# Patient Record
Sex: MALE | Race: WHITE | Employment: FULL TIME | URBAN - METROPOLITAN AREA
[De-identification: names, ages, dates, MRNs, and addresses within clinical notes are randomized per-mention and may not be internally consistent; named-entity substitution may affect disease eponyms.]

---

## 2021-07-16 PROCEDURE — 96375 TX/PRO/DX INJ NEW DRUG ADDON: CPT

## 2021-07-16 PROCEDURE — 96374 THER/PROPH/DIAG INJ IV PUSH: CPT

## 2021-07-16 PROCEDURE — 99282 EMERGENCY DEPT VISIT SF MDM: CPT

## 2021-07-16 RX ORDER — HYDROCODONE BITARTRATE AND ACETAMINOPHEN 5; 325 MG/1; MG/1
1 TABLET ORAL EVERY 6 HOURS PRN
Status: ON HOLD | COMMUNITY
End: 2021-08-02 | Stop reason: HOSPADM

## 2021-07-16 RX ORDER — DICYCLOMINE HCL 20 MG
20 TABLET ORAL EVERY 6 HOURS
Status: ON HOLD | COMMUNITY
End: 2021-08-02 | Stop reason: HOSPADM

## 2021-07-16 RX ORDER — HYDROCHLOROTHIAZIDE 25 MG/1
25 TABLET ORAL DAILY
Status: ON HOLD | COMMUNITY
End: 2021-08-02 | Stop reason: HOSPADM

## 2021-07-16 RX ORDER — CARVEDILOL 25 MG/1
25 TABLET ORAL 2 TIMES DAILY WITH MEALS
Status: ON HOLD | COMMUNITY
End: 2021-08-02 | Stop reason: HOSPADM

## 2021-07-16 RX ORDER — AMLODIPINE BESYLATE 5 MG/1
5 TABLET ORAL DAILY
Status: ON HOLD | COMMUNITY
End: 2021-08-02 | Stop reason: HOSPADM

## 2021-07-16 RX ORDER — POTASSIUM CHLORIDE 750 MG/1
20 CAPSULE, EXTENDED RELEASE ORAL 2 TIMES DAILY
Status: ON HOLD | COMMUNITY
End: 2021-08-02 | Stop reason: HOSPADM

## 2021-07-16 RX ORDER — ATORVASTATIN CALCIUM 80 MG/1
80 TABLET, FILM COATED ORAL DAILY
Status: ON HOLD | COMMUNITY
End: 2021-08-02 | Stop reason: HOSPADM

## 2021-07-16 ASSESSMENT — PAIN SCALES - GENERAL: PAINLEVEL_OUTOF10: 8

## 2021-07-16 ASSESSMENT — PAIN DESCRIPTION - LOCATION: LOCATION: ABDOMEN

## 2021-07-17 ENCOUNTER — HOSPITAL ENCOUNTER (INPATIENT)
Age: 41
LOS: 16 days | Discharge: HOME HEALTH | DRG: 871 | End: 2021-08-02
Attending: EMERGENCY MEDICINE
Payer: COMMERCIAL

## 2021-07-17 ENCOUNTER — APPOINTMENT (OUTPATIENT)
Dept: ULTRASOUND IMAGING | Age: 41
DRG: 871 | End: 2021-07-17
Payer: COMMERCIAL

## 2021-07-17 ENCOUNTER — APPOINTMENT (OUTPATIENT)
Dept: CT IMAGING | Age: 41
DRG: 871 | End: 2021-07-17
Payer: COMMERCIAL

## 2021-07-17 ENCOUNTER — APPOINTMENT (OUTPATIENT)
Dept: GENERAL RADIOLOGY | Age: 41
DRG: 871 | End: 2021-07-17
Payer: COMMERCIAL

## 2021-07-17 DIAGNOSIS — R19.7 DIARRHEA OF PRESUMED INFECTIOUS ORIGIN: Primary | ICD-10-CM

## 2021-07-17 DIAGNOSIS — R78.81 MSSA BACTEREMIA: ICD-10-CM

## 2021-07-17 DIAGNOSIS — E87.6 HYPOKALEMIA: ICD-10-CM

## 2021-07-17 DIAGNOSIS — I33.0 SUBACUTE BACTERIAL ENDOCARDITIS: ICD-10-CM

## 2021-07-17 DIAGNOSIS — E87.1 HYPONATREMIA: ICD-10-CM

## 2021-07-17 DIAGNOSIS — I34.0 SEVERE MITRAL REGURGITATION: ICD-10-CM

## 2021-07-17 DIAGNOSIS — B95.61 MSSA BACTEREMIA: ICD-10-CM

## 2021-07-17 PROBLEM — R04.0 BLEEDING NOSE: Status: ACTIVE | Noted: 2021-07-17

## 2021-07-17 PROBLEM — R73.9 HYPERGLYCEMIA: Status: ACTIVE | Noted: 2021-07-17

## 2021-07-17 PROBLEM — R65.21 SEPSIS WITH ACUTE ORGAN DYSFUNCTION AND SEPTIC SHOCK (HCC): Status: ACTIVE | Noted: 2021-07-17

## 2021-07-17 PROBLEM — I10 ESSENTIAL HYPERTENSION: Status: ACTIVE | Noted: 2021-07-17

## 2021-07-17 PROBLEM — E86.0 ACUTE DEHYDRATION: Status: ACTIVE | Noted: 2021-07-17

## 2021-07-17 PROBLEM — E83.42 HYPOMAGNESEMIA: Status: ACTIVE | Noted: 2021-07-17

## 2021-07-17 PROBLEM — D69.6 THROMBOCYTOPENIA (HCC): Status: ACTIVE | Noted: 2021-07-17

## 2021-07-17 PROBLEM — A41.9 SEPSIS WITH ACUTE ORGAN DYSFUNCTION AND SEPTIC SHOCK (HCC): Status: ACTIVE | Noted: 2021-07-17

## 2021-07-17 LAB
-: ABNORMAL
ABSOLUTE EOS #: 0 K/UL (ref 0–0.4)
ABSOLUTE EOS #: 0 K/UL (ref 0–0.4)
ABSOLUTE IMMATURE GRANULOCYTE: 0 K/UL (ref 0–0.3)
ABSOLUTE IMMATURE GRANULOCYTE: 0 K/UL (ref 0–0.3)
ABSOLUTE LYMPH #: 0.55 K/UL (ref 1–4.8)
ABSOLUTE LYMPH #: 0.83 K/UL (ref 1–4.8)
ABSOLUTE MONO #: 0.55 K/UL (ref 0.1–0.8)
ABSOLUTE MONO #: 0.9 K/UL (ref 0.1–0.8)
ABSOLUTE RETIC #: 0.04 M/UL (ref 0.03–0.08)
ADENOVIRUS PCR: NOT DETECTED
ALBUMIN SERPL-MCNC: 3.2 G/DL (ref 3.5–5.2)
ALBUMIN/GLOBULIN RATIO: 1 (ref 1–2.5)
ALP BLD-CCNC: 59 U/L (ref 40–129)
ALT SERPL-CCNC: 51 U/L (ref 5–41)
AMORPHOUS: ABNORMAL
ANION GAP SERPL CALCULATED.3IONS-SCNC: 13 MMOL/L (ref 9–17)
ANION GAP SERPL CALCULATED.3IONS-SCNC: 13 MMOL/L (ref 9–17)
AST SERPL-CCNC: 67 U/L
BACTERIA: ABNORMAL
BASOPHILS # BLD: 0 % (ref 0–2)
BASOPHILS # BLD: 0 % (ref 0–2)
BASOPHILS ABSOLUTE: 0 K/UL (ref 0–0.2)
BASOPHILS ABSOLUTE: 0 K/UL (ref 0–0.2)
BILIRUB SERPL-MCNC: 0.65 MG/DL (ref 0.3–1.2)
BILIRUBIN DIRECT: 0.21 MG/DL
BILIRUBIN URINE: NEGATIVE
BILIRUBIN, INDIRECT: 0.44 MG/DL (ref 0–1)
BORDETELLA PARAPERTUSSIS: NOT DETECTED
BORDETELLA PERTUSSIS PCR: NOT DETECTED
BUN BLDV-MCNC: 10 MG/DL (ref 6–20)
BUN BLDV-MCNC: 11 MG/DL (ref 6–20)
BUN/CREAT BLD: ABNORMAL (ref 9–20)
BUN/CREAT BLD: ABNORMAL (ref 9–20)
C-REACTIVE PROTEIN: 260.5 MG/L (ref 0–5)
CALCIUM SERPL-MCNC: 8 MG/DL (ref 8.6–10.4)
CALCIUM SERPL-MCNC: 8.3 MG/DL (ref 8.6–10.4)
CASTS UA: ABNORMAL /LPF (ref 0–8)
CHLAMYDIA PNEUMONIAE BY PCR: NOT DETECTED
CHLORIDE BLD-SCNC: 93 MMOL/L (ref 98–107)
CHLORIDE BLD-SCNC: 98 MMOL/L (ref 98–107)
CO2: 18 MMOL/L (ref 20–31)
CO2: 21 MMOL/L (ref 20–31)
COLOR: YELLOW
CORONAVIRUS 229E PCR: NOT DETECTED
CORONAVIRUS HKU1 PCR: NOT DETECTED
CORONAVIRUS NL63 PCR: NOT DETECTED
CORONAVIRUS OC43 PCR: NOT DETECTED
CREAT SERPL-MCNC: 0.93 MG/DL (ref 0.7–1.2)
CREAT SERPL-MCNC: 1.19 MG/DL (ref 0.7–1.2)
CRYSTALS, UA: ABNORMAL /HPF
DATE, STOOL #1: ABNORMAL
DATE, STOOL #2: ABNORMAL
DATE, STOOL #3: ABNORMAL
DIFFERENTIAL TYPE: ABNORMAL
DIFFERENTIAL TYPE: ABNORMAL
EOSINOPHILS RELATIVE PERCENT: 0 % (ref 1–4)
EOSINOPHILS RELATIVE PERCENT: 0 % (ref 1–4)
EPITHELIAL CELLS UA: ABNORMAL /HPF (ref 0–5)
FOLATE: >20 NG/ML
GFR AFRICAN AMERICAN: >60 ML/MIN
GFR AFRICAN AMERICAN: >60 ML/MIN
GFR NON-AFRICAN AMERICAN: >60 ML/MIN
GFR NON-AFRICAN AMERICAN: >60 ML/MIN
GFR SERPL CREATININE-BSD FRML MDRD: ABNORMAL ML/MIN/{1.73_M2}
GLOBULIN: ABNORMAL G/DL (ref 1.5–3.8)
GLUCOSE BLD-MCNC: 147 MG/DL (ref 70–99)
GLUCOSE BLD-MCNC: 151 MG/DL (ref 70–99)
GLUCOSE URINE: NEGATIVE
HAV IGM SER IA-ACNC: NONREACTIVE
HCT VFR BLD CALC: 36.3 % (ref 40.7–50.3)
HCT VFR BLD CALC: 38.2 % (ref 40.7–50.3)
HEMOCCULT SP1 STL QL: POSITIVE
HEMOCCULT SP2 STL QL: ABNORMAL
HEMOCCULT SP3 STL QL: ABNORMAL
HEMOGLOBIN: 12.2 G/DL (ref 13–17)
HEMOGLOBIN: 13.3 G/DL (ref 13–17)
HEPATITIS B CORE IGM ANTIBODY: NONREACTIVE
HEPATITIS B SURFACE ANTIGEN: NONREACTIVE
HEPATITIS C ANTIBODY: NONREACTIVE
HUMAN METAPNEUMOVIRUS PCR: NOT DETECTED
IMMATURE GRANULOCYTES: 0 %
IMMATURE GRANULOCYTES: 0 %
IMMATURE RETIC FRACT: 5.7 % (ref 2.7–18.3)
INFLUENZA A BY PCR: NOT DETECTED
INFLUENZA A H1 (2009) PCR: NORMAL
INFLUENZA A H1 PCR: NORMAL
INFLUENZA A H3 PCR: NORMAL
INFLUENZA B BY PCR: NOT DETECTED
KETONES, URINE: NEGATIVE
LEUKOCYTE ESTERASE, URINE: NEGATIVE
LIPASE: 32 U/L (ref 13–60)
LYMPHOCYTES # BLD: 8 % (ref 24–44)
LYMPHOCYTES # BLD: 9 % (ref 24–44)
MAGNESIUM: 1.5 MG/DL (ref 1.6–2.6)
MAGNESIUM: 2.6 MG/DL (ref 1.6–2.6)
MCH RBC QN AUTO: 28.7 PG (ref 25.2–33.5)
MCH RBC QN AUTO: 29 PG (ref 25.2–33.5)
MCHC RBC AUTO-ENTMCNC: 33.6 G/DL (ref 28.4–34.8)
MCHC RBC AUTO-ENTMCNC: 34.8 G/DL (ref 28.4–34.8)
MCV RBC AUTO: 82.5 FL (ref 82.6–102.9)
MCV RBC AUTO: 86.2 FL (ref 82.6–102.9)
MONOCYTES # BLD: 13 % (ref 1–7)
MONOCYTES # BLD: 6 % (ref 1–7)
MORPHOLOGY: ABNORMAL
MORPHOLOGY: ABNORMAL
MORPHOLOGY: NORMAL
MUCUS: ABNORMAL
MYCOPLASMA PNEUMONIAE PCR: NOT DETECTED
NITRITE, URINE: NEGATIVE
NRBC AUTOMATED: 0 PER 100 WBC
NRBC AUTOMATED: 0 PER 100 WBC
OTHER OBSERVATIONS UA: ABNORMAL
PARAINFLUENZA 1 PCR: NOT DETECTED
PARAINFLUENZA 2 PCR: NOT DETECTED
PARAINFLUENZA 3 PCR: NOT DETECTED
PARAINFLUENZA 4 PCR: NOT DETECTED
PDW BLD-RTO: 12.1 % (ref 11.8–14.4)
PDW BLD-RTO: 12.6 % (ref 11.8–14.4)
PH UA: 6 (ref 5–8)
PLATELET # BLD: ABNORMAL K/UL (ref 138–453)
PLATELET # BLD: ABNORMAL K/UL (ref 138–453)
PLATELET ESTIMATE: ABNORMAL
PLATELET ESTIMATE: ABNORMAL
PLATELET, FLUORESCENCE: 37 K/UL (ref 138–453)
PLATELET, FLUORESCENCE: 45 K/UL (ref 138–453)
PLATELET, IMMATURE FRACTION: 7.2 % (ref 1.1–10.3)
PLATELET, IMMATURE FRACTION: 8 % (ref 1.1–10.3)
PMV BLD AUTO: ABNORMAL FL (ref 8.1–13.5)
PMV BLD AUTO: ABNORMAL FL (ref 8.1–13.5)
POTASSIUM SERPL-SCNC: 2.9 MMOL/L (ref 3.7–5.3)
POTASSIUM SERPL-SCNC: 3.7 MMOL/L (ref 3.7–5.3)
POTASSIUM SERPL-SCNC: 3.7 MMOL/L (ref 3.7–5.3)
PROTEIN UA: ABNORMAL
RBC # BLD: 4.21 M/UL (ref 4.21–5.77)
RBC # BLD: 4.63 M/UL (ref 4.21–5.77)
RBC # BLD: ABNORMAL 10*6/UL
RBC # BLD: ABNORMAL 10*6/UL
RBC UA: ABNORMAL /HPF (ref 0–4)
RENAL EPITHELIAL, UA: ABNORMAL /HPF
RESP SYNCYTIAL VIRUS PCR: NOT DETECTED
RETIC %: 0.8 % (ref 0.5–1.9)
RETIC HEMOGLOBIN: 27.1 PG (ref 28.2–35.7)
RHINO/ENTEROVIRUS PCR: NOT DETECTED
SARS-COV-2, PCR: NOT DETECTED
SARS-COV-2, RAPID: NOT DETECTED
SEDIMENTATION RATE, ERYTHROCYTE: 31 MM (ref 0–15)
SEG NEUTROPHILS: 79 % (ref 36–66)
SEG NEUTROPHILS: 85 % (ref 36–66)
SEGMENTED NEUTROPHILS ABSOLUTE COUNT: 5.45 K/UL (ref 1.8–7.7)
SEGMENTED NEUTROPHILS ABSOLUTE COUNT: 7.82 K/UL (ref 1.8–7.7)
SODIUM BLD-SCNC: 127 MMOL/L (ref 135–144)
SODIUM BLD-SCNC: 129 MMOL/L (ref 135–144)
SPECIFIC GRAVITY UA: 1.01 (ref 1–1.03)
SPECIMEN DESCRIPTION: NORMAL
SPECIMEN DESCRIPTION: NORMAL
TIME, STOOL #1: ABNORMAL
TIME, STOOL #2: ABNORMAL
TIME, STOOL #3: ABNORMAL
TOTAL PROTEIN: 6.5 G/DL (ref 6.4–8.3)
TRICHOMONAS: ABNORMAL
TURBIDITY: CLEAR
URINE HGB: ABNORMAL
UROBILINOGEN, URINE: NORMAL
VITAMIN B-12: 1120 PG/ML (ref 232–1245)
WBC # BLD: 6.9 K/UL (ref 3.5–11.3)
WBC # BLD: 9.2 K/UL (ref 3.5–11.3)
WBC # BLD: ABNORMAL 10*3/UL
WBC # BLD: ABNORMAL 10*3/UL
WBC UA: ABNORMAL /HPF (ref 0–5)
YEAST: ABNORMAL

## 2021-07-17 PROCEDURE — 82607 VITAMIN B-12: CPT

## 2021-07-17 PROCEDURE — 2060000000 HC ICU INTERMEDIATE R&B

## 2021-07-17 PROCEDURE — 6370000000 HC RX 637 (ALT 250 FOR IP): Performed by: STUDENT IN AN ORGANIZED HEALTH CARE EDUCATION/TRAINING PROGRAM

## 2021-07-17 PROCEDURE — 6360000002 HC RX W HCPCS: Performed by: NURSE PRACTITIONER

## 2021-07-17 PROCEDURE — 87506 IADNA-DNA/RNA PROBE TQ 6-11: CPT

## 2021-07-17 PROCEDURE — 99222 1ST HOSP IP/OBS MODERATE 55: CPT | Performed by: STUDENT IN AN ORGANIZED HEALTH CARE EDUCATION/TRAINING PROGRAM

## 2021-07-17 PROCEDURE — 6370000000 HC RX 637 (ALT 250 FOR IP): Performed by: NURSE PRACTITIONER

## 2021-07-17 PROCEDURE — 36415 COLL VENOUS BLD VENIPUNCTURE: CPT

## 2021-07-17 PROCEDURE — 2580000003 HC RX 258: Performed by: STUDENT IN AN ORGANIZED HEALTH CARE EDUCATION/TRAINING PROGRAM

## 2021-07-17 PROCEDURE — APPSS60 APP SPLIT SHARED TIME 46-60 MINUTES: Performed by: NURSE PRACTITIONER

## 2021-07-17 PROCEDURE — 94760 N-INVAS EAR/PLS OXIMETRY 1: CPT

## 2021-07-17 PROCEDURE — 85055 RETICULATED PLATELET ASSAY: CPT

## 2021-07-17 PROCEDURE — 87147 CULTURE TYPE IMMUNOLOGIC: CPT

## 2021-07-17 PROCEDURE — 0202U NFCT DS 22 TRGT SARS-COV-2: CPT

## 2021-07-17 PROCEDURE — 2580000003 HC RX 258: Performed by: NURSE PRACTITIONER

## 2021-07-17 PROCEDURE — 83036 HEMOGLOBIN GLYCOSYLATED A1C: CPT

## 2021-07-17 PROCEDURE — 80074 ACUTE HEPATITIS PANEL: CPT

## 2021-07-17 PROCEDURE — 87205 SMEAR GRAM STAIN: CPT

## 2021-07-17 PROCEDURE — 87150 DNA/RNA AMPLIFIED PROBE: CPT

## 2021-07-17 PROCEDURE — 86403 PARTICLE AGGLUT ANTBDY SCRN: CPT

## 2021-07-17 PROCEDURE — 2500000003 HC RX 250 WO HCPCS: Performed by: NURSE PRACTITIONER

## 2021-07-17 PROCEDURE — 85045 AUTOMATED RETICULOCYTE COUNT: CPT

## 2021-07-17 PROCEDURE — 74176 CT ABD & PELVIS W/O CONTRAST: CPT

## 2021-07-17 PROCEDURE — 71045 X-RAY EXAM CHEST 1 VIEW: CPT

## 2021-07-17 PROCEDURE — 83690 ASSAY OF LIPASE: CPT

## 2021-07-17 PROCEDURE — 6360000002 HC RX W HCPCS: Performed by: STUDENT IN AN ORGANIZED HEALTH CARE EDUCATION/TRAINING PROGRAM

## 2021-07-17 PROCEDURE — 81001 URINALYSIS AUTO W/SCOPE: CPT

## 2021-07-17 PROCEDURE — 80048 BASIC METABOLIC PNL TOTAL CA: CPT

## 2021-07-17 PROCEDURE — 86140 C-REACTIVE PROTEIN: CPT

## 2021-07-17 PROCEDURE — 87635 SARS-COV-2 COVID-19 AMP PRB: CPT

## 2021-07-17 PROCEDURE — 85652 RBC SED RATE AUTOMATED: CPT

## 2021-07-17 PROCEDURE — 85025 COMPLETE CBC W/AUTO DIFF WBC: CPT

## 2021-07-17 PROCEDURE — 87040 BLOOD CULTURE FOR BACTERIA: CPT

## 2021-07-17 PROCEDURE — 82746 ASSAY OF FOLIC ACID SERUM: CPT

## 2021-07-17 PROCEDURE — 80076 HEPATIC FUNCTION PANEL: CPT

## 2021-07-17 PROCEDURE — 83735 ASSAY OF MAGNESIUM: CPT

## 2021-07-17 PROCEDURE — 82272 OCCULT BLD FECES 1-3 TESTS: CPT

## 2021-07-17 PROCEDURE — 83993 ASSAY FOR CALPROTECTIN FECAL: CPT

## 2021-07-17 PROCEDURE — 76705 ECHO EXAM OF ABDOMEN: CPT

## 2021-07-17 PROCEDURE — 87186 SC STD MICRODIL/AGAR DIL: CPT

## 2021-07-17 PROCEDURE — 84132 ASSAY OF SERUM POTASSIUM: CPT

## 2021-07-17 PROCEDURE — 87086 URINE CULTURE/COLONY COUNT: CPT

## 2021-07-17 PROCEDURE — 74018 RADEX ABDOMEN 1 VIEW: CPT

## 2021-07-17 RX ORDER — OXYCODONE HYDROCHLORIDE 5 MG/1
5 TABLET ORAL EVERY 6 HOURS PRN
Status: DISCONTINUED | OUTPATIENT
Start: 2021-07-17 | End: 2021-08-02 | Stop reason: HOSPADM

## 2021-07-17 RX ORDER — MAGNESIUM HYDROXIDE/ALUMINUM HYDROXICE/SIMETHICONE 120; 1200; 1200 MG/30ML; MG/30ML; MG/30ML
30 SUSPENSION ORAL ONCE
Status: DISCONTINUED | OUTPATIENT
Start: 2021-07-17 | End: 2021-07-17

## 2021-07-17 RX ORDER — FAMOTIDINE 20 MG/1
20 TABLET, FILM COATED ORAL ONCE
Status: DISCONTINUED | OUTPATIENT
Start: 2021-07-17 | End: 2021-07-17

## 2021-07-17 RX ORDER — AMLODIPINE BESYLATE 10 MG/1
5 TABLET ORAL DAILY
Status: DISCONTINUED | OUTPATIENT
Start: 2021-07-17 | End: 2021-07-18

## 2021-07-17 RX ORDER — POTASSIUM CHLORIDE 7.45 MG/ML
10 INJECTION INTRAVENOUS PRN
Status: DISCONTINUED | OUTPATIENT
Start: 2021-07-17 | End: 2021-07-17 | Stop reason: SDUPTHER

## 2021-07-17 RX ORDER — ONDANSETRON 4 MG/1
4 TABLET, ORALLY DISINTEGRATING ORAL EVERY 8 HOURS PRN
Status: DISCONTINUED | OUTPATIENT
Start: 2021-07-17 | End: 2021-08-02 | Stop reason: HOSPADM

## 2021-07-17 RX ORDER — 0.9 % SODIUM CHLORIDE 0.9 %
2000 INTRAVENOUS SOLUTION INTRAVENOUS ONCE
Status: COMPLETED | OUTPATIENT
Start: 2021-07-17 | End: 2021-07-17

## 2021-07-17 RX ORDER — SODIUM CHLORIDE 9 MG/ML
25 INJECTION, SOLUTION INTRAVENOUS PRN
Status: DISCONTINUED | OUTPATIENT
Start: 2021-07-17 | End: 2021-08-02 | Stop reason: HOSPADM

## 2021-07-17 RX ORDER — MAGNESIUM HYDROXIDE/ALUMINUM HYDROXICE/SIMETHICONE 120; 1200; 1200 MG/30ML; MG/30ML; MG/30ML
30 SUSPENSION ORAL EVERY 6 HOURS PRN
Status: DISCONTINUED | OUTPATIENT
Start: 2021-07-17 | End: 2021-08-02 | Stop reason: HOSPADM

## 2021-07-17 RX ORDER — POTASSIUM CHLORIDE 750 MG/1
20 CAPSULE, EXTENDED RELEASE ORAL 2 TIMES DAILY
Status: DISCONTINUED | OUTPATIENT
Start: 2021-07-17 | End: 2021-07-17

## 2021-07-17 RX ORDER — ONDANSETRON 2 MG/ML
4 INJECTION INTRAMUSCULAR; INTRAVENOUS EVERY 6 HOURS PRN
Status: DISCONTINUED | OUTPATIENT
Start: 2021-07-17 | End: 2021-08-02 | Stop reason: HOSPADM

## 2021-07-17 RX ORDER — CARVEDILOL 12.5 MG/1
25 TABLET ORAL 2 TIMES DAILY WITH MEALS
Status: DISCONTINUED | OUTPATIENT
Start: 2021-07-17 | End: 2021-07-18

## 2021-07-17 RX ORDER — SODIUM CHLORIDE 0.9 % (FLUSH) 0.9 %
5-40 SYRINGE (ML) INJECTION EVERY 12 HOURS SCHEDULED
Status: DISCONTINUED | OUTPATIENT
Start: 2021-07-17 | End: 2021-08-02 | Stop reason: HOSPADM

## 2021-07-17 RX ORDER — MORPHINE SULFATE 4 MG/ML
4 INJECTION, SOLUTION INTRAMUSCULAR; INTRAVENOUS
Status: DISCONTINUED | OUTPATIENT
Start: 2021-07-17 | End: 2021-07-17

## 2021-07-17 RX ORDER — KETOROLAC TROMETHAMINE 15 MG/ML
15 INJECTION, SOLUTION INTRAMUSCULAR; INTRAVENOUS ONCE
Status: COMPLETED | OUTPATIENT
Start: 2021-07-17 | End: 2021-07-17

## 2021-07-17 RX ORDER — POLYETHYLENE GLYCOL 3350 17 G/17G
17 POWDER, FOR SOLUTION ORAL DAILY PRN
Status: DISCONTINUED | OUTPATIENT
Start: 2021-07-17 | End: 2021-08-02 | Stop reason: HOSPADM

## 2021-07-17 RX ORDER — LANOLIN ALCOHOL/MO/W.PET/CERES
6 CREAM (GRAM) TOPICAL NIGHTLY PRN
Status: DISCONTINUED | OUTPATIENT
Start: 2021-07-17 | End: 2021-08-02 | Stop reason: HOSPADM

## 2021-07-17 RX ORDER — ACETAMINOPHEN 325 MG/1
650 TABLET ORAL EVERY 6 HOURS PRN
Status: DISCONTINUED | OUTPATIENT
Start: 2021-07-17 | End: 2021-08-02 | Stop reason: HOSPADM

## 2021-07-17 RX ORDER — POTASSIUM CHLORIDE 7.45 MG/ML
10 INJECTION INTRAVENOUS PRN
Status: DISCONTINUED | OUTPATIENT
Start: 2021-07-17 | End: 2021-07-19

## 2021-07-17 RX ORDER — POTASSIUM CHLORIDE 7.45 MG/ML
20 INJECTION INTRAVENOUS ONCE
Status: COMPLETED | OUTPATIENT
Start: 2021-07-17 | End: 2021-07-17

## 2021-07-17 RX ORDER — DEXTROSE, SODIUM CHLORIDE, AND POTASSIUM CHLORIDE 5; .45; .15 G/100ML; G/100ML; G/100ML
INJECTION INTRAVENOUS CONTINUOUS
Status: DISCONTINUED | OUTPATIENT
Start: 2021-07-17 | End: 2021-07-18

## 2021-07-17 RX ORDER — MORPHINE SULFATE 4 MG/ML
2 INJECTION, SOLUTION INTRAMUSCULAR; INTRAVENOUS
Status: DISCONTINUED | OUTPATIENT
Start: 2021-07-17 | End: 2021-07-17

## 2021-07-17 RX ORDER — ACETAMINOPHEN 500 MG
1000 TABLET ORAL ONCE
Status: DISCONTINUED | OUTPATIENT
Start: 2021-07-17 | End: 2021-07-17

## 2021-07-17 RX ORDER — HYDROCHLOROTHIAZIDE 25 MG/1
25 TABLET ORAL DAILY
Status: DISCONTINUED | OUTPATIENT
Start: 2021-07-17 | End: 2021-07-18

## 2021-07-17 RX ORDER — MAGNESIUM SULFATE 1 G/100ML
1000 INJECTION INTRAVENOUS PRN
Status: DISCONTINUED | OUTPATIENT
Start: 2021-07-17 | End: 2021-08-02 | Stop reason: HOSPADM

## 2021-07-17 RX ORDER — MAGNESIUM SULFATE IN WATER 40 MG/ML
2000 INJECTION, SOLUTION INTRAVENOUS ONCE
Status: COMPLETED | OUTPATIENT
Start: 2021-07-17 | End: 2021-07-17

## 2021-07-17 RX ORDER — POTASSIUM CHLORIDE 20 MEQ/1
40 TABLET, EXTENDED RELEASE ORAL ONCE
Status: COMPLETED | OUTPATIENT
Start: 2021-07-17 | End: 2021-07-17

## 2021-07-17 RX ORDER — ONDANSETRON 2 MG/ML
4 INJECTION INTRAMUSCULAR; INTRAVENOUS ONCE
Status: COMPLETED | OUTPATIENT
Start: 2021-07-17 | End: 2021-07-17

## 2021-07-17 RX ORDER — MAGNESIUM SULFATE 1 G/100ML
1000 INJECTION INTRAVENOUS PRN
Status: DISCONTINUED | OUTPATIENT
Start: 2021-07-17 | End: 2021-07-17 | Stop reason: SDUPTHER

## 2021-07-17 RX ORDER — ACETAMINOPHEN 650 MG/1
650 SUPPOSITORY RECTAL EVERY 6 HOURS PRN
Status: DISCONTINUED | OUTPATIENT
Start: 2021-07-17 | End: 2021-08-02 | Stop reason: HOSPADM

## 2021-07-17 RX ORDER — ATORVASTATIN CALCIUM 80 MG/1
80 TABLET, FILM COATED ORAL DAILY
Status: DISCONTINUED | OUTPATIENT
Start: 2021-07-17 | End: 2021-07-18

## 2021-07-17 RX ORDER — POTASSIUM CHLORIDE 20 MEQ/1
40 TABLET, EXTENDED RELEASE ORAL PRN
Status: DISCONTINUED | OUTPATIENT
Start: 2021-07-17 | End: 2021-07-19

## 2021-07-17 RX ORDER — SODIUM CHLORIDE 0.9 % (FLUSH) 0.9 %
10 SYRINGE (ML) INJECTION PRN
Status: DISCONTINUED | OUTPATIENT
Start: 2021-07-17 | End: 2021-08-02 | Stop reason: HOSPADM

## 2021-07-17 RX ADMIN — MAGNESIUM SULFATE 2000 MG: 2 INJECTION INTRAVENOUS at 08:08

## 2021-07-17 RX ADMIN — POTASSIUM CHLORIDE 40 MEQ: 1500 TABLET, EXTENDED RELEASE ORAL at 03:15

## 2021-07-17 RX ADMIN — ONDANSETRON 4 MG: 2 INJECTION INTRAMUSCULAR; INTRAVENOUS at 01:47

## 2021-07-17 RX ADMIN — ACETAMINOPHEN 650 MG: 325 TABLET ORAL at 20:38

## 2021-07-17 RX ADMIN — POTASSIUM CHLORIDE, DEXTROSE MONOHYDRATE AND SODIUM CHLORIDE: 150; 5; 450 INJECTION, SOLUTION INTRAVENOUS at 23:08

## 2021-07-17 RX ADMIN — ALUMINUM HYDROXIDE, MAGNESIUM HYDROXIDE, AND SIMETHICONE 30 ML: 200; 200; 20 SUSPENSION ORAL at 23:24

## 2021-07-17 RX ADMIN — ATORVASTATIN CALCIUM 80 MG: 80 TABLET, FILM COATED ORAL at 08:07

## 2021-07-17 RX ADMIN — MAGNESIUM SULFATE 2000 MG: 2 INJECTION INTRAVENOUS at 03:16

## 2021-07-17 RX ADMIN — POTASSIUM CHLORIDE 20 MEQ: 750 CAPSULE, EXTENDED RELEASE ORAL at 08:07

## 2021-07-17 RX ADMIN — OXYCODONE HYDROCHLORIDE 5 MG: 5 TABLET ORAL at 16:11

## 2021-07-17 RX ADMIN — OXYCODONE HYDROCHLORIDE 5 MG: 5 TABLET ORAL at 10:04

## 2021-07-17 RX ADMIN — SODIUM CHLORIDE 2000 ML: 9 INJECTION, SOLUTION INTRAVENOUS at 01:47

## 2021-07-17 RX ADMIN — ACETAMINOPHEN 650 MG: 325 TABLET ORAL at 12:33

## 2021-07-17 RX ADMIN — PIPERACILLIN AND TAZOBACTAM 3375 MG: 3; .375 INJECTION, POWDER, FOR SOLUTION INTRAVENOUS at 05:19

## 2021-07-17 RX ADMIN — PIPERACILLIN AND TAZOBACTAM 3375 MG: 3; .375 INJECTION, POWDER, FOR SOLUTION INTRAVENOUS at 22:35

## 2021-07-17 RX ADMIN — OXYCODONE HYDROCHLORIDE 5 MG: 5 TABLET ORAL at 23:23

## 2021-07-17 RX ADMIN — SODIUM CHLORIDE, PRESERVATIVE FREE 10 ML: 5 INJECTION INTRAVENOUS at 22:35

## 2021-07-17 RX ADMIN — SODIUM CHLORIDE, PRESERVATIVE FREE 10 ML: 5 INJECTION INTRAVENOUS at 08:08

## 2021-07-17 RX ADMIN — POTASSIUM CHLORIDE, DEXTROSE MONOHYDRATE AND SODIUM CHLORIDE: 150; 5; 450 INJECTION, SOLUTION INTRAVENOUS at 14:28

## 2021-07-17 RX ADMIN — KETOROLAC TROMETHAMINE 15 MG: 15 INJECTION, SOLUTION INTRAMUSCULAR; INTRAVENOUS at 01:47

## 2021-07-17 RX ADMIN — POTASSIUM CHLORIDE, DEXTROSE MONOHYDRATE AND SODIUM CHLORIDE: 150; 5; 450 INJECTION, SOLUTION INTRAVENOUS at 05:19

## 2021-07-17 RX ADMIN — PIPERACILLIN AND TAZOBACTAM 3375 MG: 3; .375 INJECTION, POWDER, FOR SOLUTION INTRAVENOUS at 14:08

## 2021-07-17 RX ADMIN — POTASSIUM CHLORIDE 20 MEQ: 7.46 INJECTION, SOLUTION INTRAVENOUS at 03:15

## 2021-07-17 ASSESSMENT — ENCOUNTER SYMPTOMS
WHEEZING: 0
STRIDOR: 0
VOMITING: 1
ABDOMINAL PAIN: 1
CONSTIPATION: 0
SORE THROAT: 0
BLOOD IN STOOL: 0
ABDOMINAL DISTENTION: 1
COUGH: 0
NAUSEA: 1
SHORTNESS OF BREATH: 0
DIARRHEA: 1

## 2021-07-17 ASSESSMENT — PAIN DESCRIPTION - LOCATION
LOCATION: GENERALIZED
LOCATION: ABDOMEN;GENERALIZED

## 2021-07-17 ASSESSMENT — PAIN SCALES - GENERAL
PAINLEVEL_OUTOF10: 7
PAINLEVEL_OUTOF10: 7
PAINLEVEL_OUTOF10: 10
PAINLEVEL_OUTOF10: 0
PAINLEVEL_OUTOF10: 8
PAINLEVEL_OUTOF10: 6

## 2021-07-17 ASSESSMENT — PAIN DESCRIPTION - PAIN TYPE
TYPE: ACUTE PAIN

## 2021-07-17 ASSESSMENT — PAIN DESCRIPTION - ONSET: ONSET: GRADUAL

## 2021-07-17 ASSESSMENT — PAIN DESCRIPTION - FREQUENCY: FREQUENCY: INTERMITTENT

## 2021-07-17 ASSESSMENT — PAIN DESCRIPTION - DESCRIPTORS: DESCRIPTORS: ACHING;TIGHTNESS

## 2021-07-17 NOTE — CARE COORDINATION
Case Management Initial Discharge Plan  [place]han,             Met with:patient to discuss discharge plans. Information verified: address, contacts, phone number, , insurance Yes  Insurance Provider: Rylan Chance [employer]    Emergency Contact/Next of Kin name & number: Emily Robb (wife) [phone]  Who are involved in patient's support system? wife    PCP: Edward Little MD  Date of last visit: called last Monday      Discharge Planning    Living Arrangements:  Spouse/Significant Other     Home has 2 stories   stairs to climb to get into front door, stairs to climb to reach second floor  Location of bedroom/bathroom in home main    Patient able to perform ADL's:Independent    Current Services (outpatient & in home) none  DME equipment: none  DME provider: none    Is patient receiving oral anticoagulation therapy? No    If indicated:   Physician managing anticoagulation treatment:   Where does patient obtain lab work for ATC treatment? Potential Assistance Needed:  N/A    Patient agreeable to home care: No  [place] of choice provided:  no    Prior SNF/Rehab Placement and Facility: no  Agreeable to SNF/Rehab: No  [place] of choice provided: no     Evaluation: no    Expected Discharge date:       Patient expects to be discharged to: If home: is the family and/or caregiver wiling & able to provide support at home? wife  Who will be providing this support? wife    Follow Up Appointment: Best Day/ Time:      Transportation provider: family  Transportation arrangements needed for discharge: No    Readmission Risk              Risk of Unplanned Readmission:  10             Does patient have a readmission risk score greater than 14?: No  If yes, follow-up appointment must be made within 7 days of discharge.      Goals of Care: increased comfort level      Educated patient on transitional options, provided freedom of choice and are agreeable with plan      Discharge Plan: home with wife          Electronically signed by Monalisa Samuels RN on 7/17/21 at 12:43 PM EDT

## 2021-07-17 NOTE — ED TRIAGE NOTES
Pt c/o abdominal pain with N/v/D x 4 days, pt was seen at [facility] and treated for dehydration & hypokalemia, pt states that pain is now in his 'bone', denies chest pain or shortness of breath, states that he has decrease in energy, triage completed

## 2021-07-17 NOTE — ED PROVIDER NOTES
[facility] [facility]     Emergency Department     Faculty Attestation        I performed a history and physical examination of the patient and discussed management with the resident. I reviewed the residents note and agree with the documented findings and plan of care. Any areas of disagreement are noted on the chart. I was personally present for the key portions of any procedures. I have documented in the chart those procedures where I was not present during the key portions. I have reviewed the emergency nurses triage note. I agree with the chief complaint, past medical history, past surgical history, allergies, medications, social and family history as documented unless otherwise noted below. For mid-level providers such as nurse practitioners as well as physicians assistants:    I have personally seen and evaluated the patient. I find the patient's history and physical exam are consistent with NP/PA documentation. I agree with the care provided, treatment rendered, disposition, & follow-up plan. Additional findings are as noted. Vital Signs: /78   Pulse 64   Temp 98.4 °F (36.9 °C)   Resp 18   Ht 5' 6\" (1.676 m)   Wt 172 lb (78 kg)   SpO2 98%   BMI 27.76 kg/m²   PCP:  Lorene Jesus MD    Pertinent Comments:     Patient has had some nausea vomiting diarrhea some body aches joint aches and rib pain has been going on for the past few days he seen in [facility] and labs showed mild hypokalemia and a CT which was unremarkable, per patient. Denies chest pain or shortness of breath. He states he feels generalized fatigue. Denies any recent travel or sick contacts. He describes clear watery vomiting and diarrhea.   Will check labs, symptomatic treatment, reassessment      Critical Care  None          Uziel Rose MD    Attending Emergency Medicine Physician              Junie Glover MD  07/17/21 0124       Hanna Slater, MD  07/17/21 [id number]

## 2021-07-17 NOTE — H&P
[facility]  Office: [address], DO, Jesika Kerrer, DO, Jamarcus Manifold, DO, Chacho Maggie Blood, DO, Augustina Varela MD, Theodore Tripp MD, Rocky Garcia MD, Jatin Hayward MD, Wendi Walker MD, Renée Conte MD, Facundo Moss MD, Bobo Buckner, DO, Micky Kinsey MD, Dahlia Echevarria DO, Александр Wright MD,  Yohannes Amaya DO, Norris Holter, MD, Gita Johnson MD, Addie Martinez MD, Yusuf Gilmore MD, Renae Dean MD, Samia Doran MD, Lien Marsh CNP, [facility], CNP, Lauren Chand, CNP, Stevie Londono, CNS, Javon Jose, CNP, Bertin Ramos, CNP, Loni Oglesby, CNP, William Steve, CNP, Diego Alvares, CNP, Marvin Choudhary PA-C, Kamran Morris, [facility], Debra Johnson, CNP, Maty Hopkins, CNP, Oliva Lugo, CNP, Radha Hernandez, CNP, Ariana Carlton, CNP, Dariel Galan, CNP, Angie Barnes, CNP, Royer Leroy, CNP         [address]    HISTORY AND PHYSICAL EXAMINATION            Date:   7/17/2021  Patient name:  Jatin Fournier  Date of admission:  7/17/2021 12:42 AM  MRN:   [id number]  Account:  [de-identified]  YOB: 1980  PCP:    Shayna Calderon MD  Room:   01/01  Code Status:    No Order    Chief Complaint:     Chief Complaint   Patient presents with    Abdominal Pain    Emesis        History Obtained From:     patient    History of Present Illness:     Jatin Fournier is a 36 y.o. Non-/non  male who presents with Abdominal Pain and Emesis   and is admitted to the hospital for the management of hypokalemia    Patient presents to the emergency room for the concern of generalized fatigue, recurrent abdominal pain, n/v/d with assoicated bone ache. Patient states that he was just recently released from [facility] for very similar complaints and was told that his potassium was low and the CT of the abdomen was normal.  Patient states the diarrhea is water-like.  Patient admits to approx 5-7 watery stools a day for the past 4 days, there is no associated bleeding. Abdomen pain is present prior to the diarrhea. There is associated fevers and chills. Subjective fever of 104 prior to arrival. Emesis is food that is digested but from earlier in the day. Patient admits to eating popcorn and nuts just the weekend before the onset. Work up in the emergency room        Laboratories:   Metabolic panel. - sodium 865, potassium 2.9, chloride 93, bun 21, creat 1.19, Magnesium 1.5, glucose 151  GI/Liver Panel- albumin 3.2, alk phos normal at 59, ALT 51, AST 67, normal bili levels   Hematology. -NO acute leukocytosis or gross anemia, plt 45 seg neutrophil 79, lymphocytes 8,       Imaging and Diagnostics:     KUB- pending        While in the Emergency room patient was given 2 grams of magnesium sulfate, and 20 meq kcl IV and then 40 oral. IV toradol was given for the abdomen pain. Past Medical History:     Past Medical History:   Diagnosis Date    Hyperlipidemia     Hypertension         Past Surgical History:     Past Surgical History:   Procedure Laterality Date    ANTERIOR CRUCIATE LIGAMENT REPAIR Right     APPENDECTOMY  07/09/1997    COLECTOMY      2012 - D/T Dverticulitis    JOINT REPLACEMENT      right knee, hip        Medications Prior to Admission:     Prior to Admission medications    Medication Sig Start Date End Date Taking?  Authorizing Provider   amLODIPine (NORVASC) 5 MG tablet Take 5 mg by mouth daily   Yes Historical Provider, MD   atorvastatin (LIPITOR) 80 MG tablet Take 80 mg by mouth daily   Yes Historical Provider, MD   carvedilol (COREG) 25 MG tablet Take 25 mg by mouth 2 times daily (with meals)   Yes Historical Provider, MD   dicyclomine (BENTYL) 20 MG tablet Take 20 mg by mouth every 6 hours   Yes Historical Provider, MD   hydroCHLOROthiazide (HYDRODIURIL) 25 MG tablet Take 25 mg by mouth daily   Yes Historical Provider, MD   HYDROcodone-acetaminophen (NORCO) 5-325 MG per tablet Take 1 tablet by mouth every 6 hours as needed for Pain. Yes Historical Provider, MD   potassium chloride (MICRO-K) 10 MEQ extended release capsule Take 20 mEq by mouth 2 times daily   Yes Historical Provider, MD        Allergies:     Patient has no known allergies. Social History:     Tobacco:    reports that he has never smoked. He has never used smokeless tobacco.  Alcohol:      has no history on file for alcohol use. Drug Use:  has no history on file for drug use. Family History:     Family History   Problem Relation Age of Onset    No Known Problems Mother     Other Father         diverticulitis       Review of Systems:     Positive and Negative as described in HPI. Review of Systems   Constitutional: Positive for appetite change, chills, fatigue and fever. Negative for diaphoresis. HENT: Positive for nosebleeds. Negative for congestion and hearing loss. Respiratory: Negative for cough, shortness of breath, wheezing and stridor. Cardiovascular: Negative for chest pain, palpitations and leg swelling. Gastrointestinal: Positive for abdominal pain, diarrhea, nausea and vomiting. Negative for blood in stool and constipation. Genitourinary: Negative for dysuria and frequency. Musculoskeletal: Positive for arthralgias and myalgias. Right rib area pain, right knee pain, Right leg pain. Skin: Negative for rash. Neurological: Negative for dizziness, seizures and headaches. Psychiatric/Behavioral: The patient is not nervous/anxious. Physical Exam:   /73   Pulse 122   Temp 102.7 °F (39.3 °C) (Oral)   Resp 19   Ht 5' 6\" (1.676 m)   Wt 172 lb (78 kg)   SpO2 99%   BMI 27.76 kg/m²   Temp (24hrs), Av.6 °F (38.1 °C), Min:98.4 °F (36.9 °C), Max:102.7 °F (39.3 °C)    No results for input(s): POCGLU in the last 72 hours. No intake or output data in the 24 hours ending 21 [id number]    Physical Exam  Vitals and nursing note reviewed.    Constitutional:       General: He is not in acute distress. Appearance: He is well-developed. He is ill-appearing. He is not toxic-appearing or diaphoretic. HENT:      Head: Normocephalic and atraumatic. Right Ear: Hearing normal.      Left Ear: Hearing normal.      Nose: Nose normal. No rhinorrhea. Eyes:      General: Lids are normal.      Extraocular Movements:      Right eye: Normal extraocular motion. Left eye: Normal extraocular motion. Conjunctiva/sclera: Conjunctivae normal.      Right eye: Right conjunctiva is not injected. Left eye: Left conjunctiva is not injected. Pupils: Pupils are equal, round, and reactive to light. Pupils are equal.      Right eye: Pupil is reactive. Left eye: Pupil is reactive. Neck:      Thyroid: No thyromegaly. Vascular: No carotid bruit. Trachea: Trachea and phonation normal. No tracheal deviation. Cardiovascular:      Rate and Rhythm: Regular rhythm. Tachycardia present. Pulses: Normal pulses. Heart sounds: Murmur heard. Pulmonary:      Effort: Pulmonary effort is normal. No respiratory distress. Breath sounds: Normal breath sounds. No stridor. No decreased breath sounds. Abdominal:      General: Abdomen is protuberant. Bowel sounds are normal. There is no distension. Palpations: Abdomen is soft. There is no mass. Tenderness: There is generalized abdominal tenderness. There is no guarding. Musculoskeletal:         General: No tenderness. Cervical back: Neck supple. Skin:     General: Skin is warm and dry. Findings: No erythema, lesion or rash. Neurological:      General: No focal deficit present. Mental Status: He is alert and oriented to person, place, and time. He is not disoriented. GCS: GCS eye subscore is 4. GCS verbal subscore is 5. GCS motor subscore is 6. Cranial Nerves: Cranial nerves are intact. No cranial nerve deficit.    Psychiatric:         Attention and Perception: Attention normal. Mood and Affect: Mood normal.         Speech: Speech normal.         Behavior: Behavior normal. Behavior is cooperative.          Investigations:      Laboratory Testing:  Recent Results (from the past 24 hour(s))   CBC Auto Differential    Collection Time: 07/17/21  1:40 AM   Result Value Ref Range    WBC 6.9 3.5 - 11.3 k/uL    RBC 4.63 4.21 - 5.77 m/uL    Hemoglobin 13.3 13.0 - 17.0 g/dL    Hematocrit 38.2 (L) 40.7 - 50.3 %    MCV 82.5 (L) 82.6 - 102.9 fL    MCH 28.7 25.2 - 33.5 pg    MCHC 34.8 28.4 - 34.8 g/dL    RDW 12.1 11.8 - 14.4 %    Platelets See Reflexed IPF Result 138 - 453 k/uL    MPV NOT REPORTED 8.1 - 13.5 fL    NRBC Automated 0.0 0.0 per 100 WBC    Differential Type NOT REPORTED     WBC Morphology NOT REPORTED     RBC Morphology NOT REPORTED     Platelet Estimate NOT REPORTED     Immature Granulocytes 0 0 %    Seg Neutrophils 79 (H) 36 - 66 %    Lymphocytes 8 (L) 24 - 44 %    Monocytes 13 (H) 1 - 7 %    Eosinophils % 0 (L) 1 - 4 %    Basophils 0 0 - 2 %    Absolute Immature Granulocyte 0.00 0.00 - 0.30 k/uL    Segs Absolute 5.45 1.8 - 7.7 k/uL    Absolute Lymph # 0.55 (L) 1.0 - 4.8 k/uL    Absolute Mono # 0.90 (H) 0.1 - 0.8 k/uL    Absolute Eos # 0.00 0.0 - 0.4 k/uL    Basophils Absolute 0.00 0.0 - 0.2 k/uL    Morphology MICROCYTOSIS PRESENT     Morphology INCREASED BANDS PRESENT    Basic Metabolic Panel w/ Reflex to MG    Collection Time: 07/17/21  1:40 AM   Result Value Ref Range    Glucose 151 (H) 70 - 99 mg/dL    BUN 11 6 - 20 mg/dL    CREATININE 1.19 0.70 - 1.20 mg/dL    Bun/Cre Ratio NOT REPORTED 9 - 20    Calcium 8.3 (L) 8.6 - 10.4 mg/dL    Sodium 127 (L) 135 - 144 mmol/L    Potassium 2.9 (LL) 3.7 - 5.3 mmol/L    Chloride 93 (L) 98 - 107 mmol/L    CO2 21 20 - 31 mmol/L    Anion Gap 13 9 - 17 mmol/L    GFR Non-African American >60 >60 mL/min    GFR African American >60 >60 mL/min    GFR Comment          GFR Staging NOT REPORTED    Hepatic Function Panel    Collection Time: 07/17/21  1:40 AM Result Value Ref Range    Albumin 3.2 (L) 3.5 - 5.2 g/dL    Alkaline Phosphatase 59 40 - 129 U/L    ALT 51 (H) 5 - 41 U/L    AST 67 (H) <40 U/L    Total Bilirubin 0.65 0.3 - 1.2 mg/dL    Bilirubin, Direct 0.21 <0.31 mg/dL    Bilirubin, Indirect 0.44 0.00 - 1.00 mg/dL    Total Protein 6.5 6.4 - 8.3 g/dL    Globulin NOT REPORTED 1.5 - 3.8 g/dL    Albumin/Globulin Ratio 1.0 1.0 - 2.5   Lipase    Collection Time: 07/17/21  1:40 AM   Result Value Ref Range    Lipase 32 13 - 60 U/L   Immature Platelet Fraction    Collection Time: 07/17/21  1:40 AM   Result Value Ref Range    Platelet, Immature Fraction 7.2 1.1 - 10.3 %    Platelet, Fluorescence 45 (L) 138 - 453 k/uL   Magnesium    Collection Time: 07/17/21  1:40 AM   Result Value Ref Range    Magnesium 1.5 (L) 1.6 - 2.6 mg/dL       Imaging/Diagnostics:  No results found. Assessment :      Hospital Problems         Last Modified POA    * (Principal) Hypokalemia 7/17/2021 Yes    Diarrhea 7/17/2021 Yes    Acute dehydration 7/17/2021 Yes    Hypomagnesemia 7/17/2021 Yes    Bleeding nose 7/17/2021 Yes    Thrombocytopenia ([address] 7/17/2021 Yes    Hyperglycemia 7/17/2021 Yes    Essential hypertension 7/17/2021 Yes          Plan:     Patient status inpatient in the Med[facility]Surge    1. Await KUB, CT scan r/o diverticulitis, however he had known ingestion of popcorn and nuts over the weekend. Will start zosyn until ultasound of gb back, and or CT abdomen pelvis. 2. Request CT results from [facility].   3. Check stool for inflammatory and infectious agents  4. Check covid given the myalgias, and no immunization history  5. Replace electrolytes, manage with sliding scale. Imbalance most likely related to the diarrhea and nausea in combination with hctz. 6. Check hg a1c given elevated glucose, even though there is acute illness, he is on a statin at high dose and at risk for DM. 7. Check inflammatory markers. 8. Monitor platelet function. 9. GI proph  10.  DVT proph with EPC     Consultations:   IP CONSULT TO HOSPITALIST     Patient is admitted as inpatient status because of co-morbidities listed above, severity of signs and symptoms as outlined, requirement for current medical therapies and most importantly because of direct risk to patient if care not provided in a hospital setting. Expected length of stay > 48 hours.     MARIKA Cross - CNP  7/17/2021  3:12 AM    Copy sent to Dr. Edward Little MD

## 2021-07-17 NOTE — ED PROVIDER NOTES
[facility]  ED  Emergency Department Encounter  EmergencyMedicine Resident     Pt Lashaun Pina  MRN: [id number]  Dilipgfricci 1980  Date of evaluation: 7/17/21  PCP:  Jackie Chaves MD    [address] [place] [address]       Chief Complaint   Patient presents with    Abdominal Pain    Emesis       HISTORY OF PRESENT ILLNESS  (Location/Symptom, Timing/Onset, Context/Setting, Quality, Duration, Modifying Factors, Severity.)      Willie Garza is a 36 y.o. male who presents with abdominal pain, bloating, emesis, watery diarrhea, body aches. Symptoms started 4 to 5 days ago. Patient states that he returned home from work had chills and felt fatigued. He developed diffuse abdominal pain, nausea and watery diarrhea. Nonbloody stool and emesis. Subsequently had intermittent vomiting and had no appetite. Since that time he has been able to intermittently keep down soup broth and fluids. He was evaluated at [facility] emergency department where they underwent lab work and a CT scan. Lab work showed hypokalemia. CT abdomen pelvis, per patient, showed some inflammatory bowel but no obstruction. He was discharged home on Norco, Zofran and potassium   20 twice daily. Today patient had a fever, felt lightheaded and has had continued symptoms. He reports that he stopped taking his Coreg as it made him lightheaded. Denies history of C. difficile. Denies recent antibiotic use in the past 3 months. Patient has a past medical history of hypertension, hyperlipidemia, diverticulitis resulting in partial hemicolectomy with reanastomoses, appendectomy. PAST MEDICAL / SURGICAL / SOCIAL / FAMILY HISTORY      has a past medical history of Hyperlipidemia and Hypertension. has a past surgical history that includes joint replacement; Anterior cruciate ligament repair (Right); Appendectomy (07/09/1997); and colectomy.     Social History     Socioeconomic History    Marital status:  Spouse name: Not on file    Number of children: Not on file    Years of education: Not on file    Highest education level: Not on file   Occupational History    Not on file   Tobacco Use    Smoking status: Never Smoker    Smokeless tobacco: Never Used   Substance and Sexual Activity    Alcohol use: Not on file    Drug use: Not on file    Sexual activity: Not on file   Other Topics Concern    Not on file   Social History Narrative    Not on file     Social Determinants of Health     Financial Resource Strain:     Difficulty of Paying Living Expenses:    Food Insecurity:     Worried About Running Out of Food in the Last Year:     [address] in the Last Year:    Transportation Needs:     Lack of Transportation (Medical):  Lack of Transportation (Non-Medical):    Physical Activity:     Days of Exercise per Week:     Minutes of Exercise per Session:    Stress:     Feeling of Stress :    Social Connections:     Frequency of Communication with Friends and Family:     Frequency of Social Gatherings with Friends and Family:     Attends [employer] Services:     Active Member of Clubs or Organizations:     Attends Club or Organization Meetings:     Marital Status:    Intimate Partner Violence:     Fear of Current or Ex-Partner:     Emotionally Abused:     Physically Abused:     Sexually Abused:        History reviewed. No pertinent family history. Allergies:  Patient has no known allergies. Home Medications:  Prior to Admission medications    Medication Sig Start Date End Date Taking?  Authorizing Provider   amLODIPine (NORVASC) 5 MG tablet Take 5 mg by mouth daily   Yes Historical Provider, MD   atorvastatin (LIPITOR) 80 MG tablet Take 80 mg by mouth daily   Yes Historical Provider, MD   carvedilol (COREG) 25 MG tablet Take 25 mg by mouth 2 times daily (with meals)   Yes Historical Provider, MD   dicyclomine (BENTYL) 20 MG tablet Take 20 mg by mouth every 6 hours   Yes Historical Provider, MD   hydroCHLOROthiazide (HYDRODIURIL) 25 MG tablet Take 25 mg by mouth daily   Yes Historical Provider, MD   HYDROcodone-acetaminophen (NORCO) 5-325 MG per tablet Take 1 tablet by mouth every 6 hours as needed for Pain. Yes Historical Provider, MD   potassium chloride (MICRO-K) 10 MEQ extended release capsule Take 20 mEq by mouth 2 times daily   Yes Historical Provider, MD       REVIEW OF SYSTEMS    (2-9 systems for level 4, 10 or more for level 5)      Review of Systems   Constitutional: Positive for appetite change, chills, fatigue and fever. HENT: Negative for congestion and sore throat. Respiratory: Negative for cough and shortness of breath. Cardiovascular: Negative for chest pain. Gastrointestinal: Positive for abdominal distention, abdominal pain, diarrhea, nausea and vomiting. Negative for blood in stool. Genitourinary: Positive for decreased urine volume. Negative for dysuria and frequency. Musculoskeletal: Positive for arthralgias and myalgias. Negative for neck stiffness. Skin: Negative for rash. Allergic/Immunologic: Negative for immunocompromised state. Neurological: Positive for headaches. Negative for weakness. PHYSICAL EXAM   (up to 7 for level 4, 8 or more for level 5)      INITIAL VITALS:   /73   Pulse 122   Temp 102.7 °F (39.3 °C) (Oral)   Resp 19   Ht 5' 6\" (1.676 m)   Wt 172 lb (78 kg)   SpO2 99%   BMI 27.76 kg/m²      Vitals:    07/16/21 2338 07/17/21 0147   BP: 127/78 118/73   Pulse: 64 122   Resp: 18 19   Temp: 98.4 °F (36.9 °C) 102.7 °F (39.3 °C)   TempSrc:  Oral   SpO2: 98% 99%   Weight: 172 lb (78 kg)    Height: 5' 6\" (1.676 m)         Physical Exam  Vitals reviewed. Constitutional:       General: He is not in acute distress. Appearance: He is well-developed. He is not ill-appearing, toxic-appearing or diaphoretic. HENT:      Head: Normocephalic and atraumatic. Comments: No intraoral rash.      Mouth/Throat:      Mouth: Mucous membranes are dry. Eyes:      Extraocular Movements: Extraocular movements intact. Pupils: Pupils are equal, round, and reactive to light. Cardiovascular:      Rate and Rhythm: Normal rate and regular rhythm. Pulmonary:      Effort: Pulmonary effort is normal.      Breath sounds: Normal breath sounds. Abdominal:      General: There is no distension. Palpations: Abdomen is soft. Tenderness: There is generalized abdominal tenderness. There is no guarding or rebound. Comments: Some abdominal bloating, abdomen is soft, mildly tender, no focal tenderness, no rebound or guarding. Old surgical scars present. Musculoskeletal:         General: Normal range of motion. Cervical back: Normal range of motion and neck supple. Right lower leg: No edema. Left lower leg: No edema. Skin:     General: Skin is warm and dry. Comments: No rash on chest abdomen extremities. Neurological:      General: No focal deficit present. Mental Status: He is alert and oriented to person, place, and time.          DIFFERENTIAL  DIAGNOSIS     PLAN (LABS / IMAGING / EKG):  Orders Placed This Encounter   Procedures    COVID-19, Rapid    XR ABDOMEN (KUB) (SINGLE AP VIEW)    CBC Auto Differential    Basic Metabolic Panel w/ Reflex to MG    Hepatic Function Panel    Lipase    Immature Platelet Fraction    Magnesium    Inpatient consult to Hospitalist    PATIENT STATUS (FROM ED OR OR/PROCEDURAL) Inpatient       MEDICATIONS ORDERED:  Orders Placed This Encounter   Medications    0.9 % sodium chloride bolus    ondansetron (ZOFRAN) injection 4 mg    ketorolac (TORADOL) injection 15 mg    DISCONTD: aluminum & magnesium hydroxide-simethicone (MAALOX) 200-200-20 MG/5ML suspension 30 mL    DISCONTD: acetaminophen (TYLENOL) tablet 1,000 mg    DISCONTD: famotidine (PEPCID) tablet 20 mg    potassium chloride (KLOR-CON M) extended release tablet 40 mEq    potassium chloride 10 mEq/100 mL IVPB (Peripheral Line)    magnesium sulfate 2000 mg in 50 mL IVPB premix       DIAGNOSTIC RESULTS / EMERGENCY DEPARTMENT COURSE / MDM   LAB RESULTS:  Results for orders placed or performed during the hospital encounter of 07/17/21   CBC Auto Differential   Result Value Ref Range    WBC 6.9 3.5 - 11.3 k/uL    RBC 4.63 4.21 - 5.77 m/uL    Hemoglobin 13.3 13.0 - 17.0 g/dL    Hematocrit 38.2 (L) 40.7 - 50.3 %    MCV 82.5 (L) 82.6 - 102.9 fL    MCH 28.7 25.2 - 33.5 pg    MCHC 34.8 28.4 - 34.8 g/dL    RDW 12.1 11.8 - 14.4 %    Platelets See Reflexed IPF Result 138 - 453 k/uL    MPV NOT REPORTED 8.1 - 13.5 fL    NRBC Automated 0.0 0.0 per 100 WBC    Differential Type NOT REPORTED     WBC Morphology NOT REPORTED     RBC Morphology NOT REPORTED     Platelet Estimate NOT REPORTED     Immature Granulocytes 0 0 %    Seg Neutrophils 79 (H) 36 - 66 %    Lymphocytes 8 (L) 24 - 44 %    Monocytes 13 (H) 1 - 7 %    Eosinophils % 0 (L) 1 - 4 %    Basophils 0 0 - 2 %    Absolute Immature Granulocyte 0.00 0.00 - 0.30 k/uL    Segs Absolute 5.45 1.8 - 7.7 k/uL    Absolute Lymph # 0.55 (L) 1.0 - 4.8 k/uL    Absolute Mono # 0.90 (H) 0.1 - 0.8 k/uL    Absolute Eos # 0.00 0.0 - 0.4 k/uL    Basophils Absolute 0.00 0.0 - 0.2 k/uL    Morphology MICROCYTOSIS PRESENT     Morphology INCREASED BANDS PRESENT    Basic Metabolic Panel w/ Reflex to MG   Result Value Ref Range    Glucose 151 (H) 70 - 99 mg/dL    BUN 11 6 - 20 mg/dL    CREATININE 1.19 0.70 - 1.20 mg/dL    Bun/Cre Ratio NOT REPORTED 9 - 20    Calcium 8.3 (L) 8.6 - 10.4 mg/dL    Sodium 127 (L) 135 - 144 mmol/L    Potassium 2.9 (LL) 3.7 - 5.3 mmol/L    Chloride 93 (L) 98 - 107 mmol/L    CO2 21 20 - 31 mmol/L    Anion Gap 13 9 - 17 mmol/L    GFR Non-African American >60 >60 mL/min    GFR African American >60 >60 mL/min    GFR Comment          GFR Staging NOT REPORTED    Hepatic Function Panel   Result Value Ref Range    Albumin 3.2 (L) 3.5 - 5.2 g/dL    Alkaline Phosphatase 59 40 - 129 U/L    ALT 51 (H) 5 - 41 U/L    AST 67 (H) <40 U/L    Total Bilirubin 0.65 0.3 - 1.2 mg/dL    Bilirubin, Direct 0.21 <0.31 mg/dL    Bilirubin, Indirect 0.44 0.00 - 1.00 mg/dL    Total Protein 6.5 6.4 - 8.3 g/dL    Globulin NOT REPORTED 1.5 - 3.8 g/dL    Albumin/Globulin Ratio 1.0 1.0 - 2.5   Lipase   Result Value Ref Range    Lipase 32 13 - 60 U/L   Immature Platelet Fraction   Result Value Ref Range    Platelet, Immature Fraction 7.2 1.1 - 10.3 %    Platelet, Fluorescence 45 (L) 138 - 453 k/uL   Magnesium   Result Value Ref Range    Magnesium 1.5 (L) 1.6 - 2.6 mg/dL         RADIOLOGY:  XR ABDOMEN (KUB) (SINGLE AP VIEW)   Final Result   Nonobstructive bowel gas pattern. No acute process identified. EKG      All EKG's are interpreted by the Emergency Department Physician who either signs or Co-signs this chart in the absence of a cardiologist.      INITIAL IMPRESSION:    Infectious diarrhea. EMERGENCY DEPARTMENT COURSE & MDM:    This is a 77-year-old gentleman coming in here with abdominal pain, bloating, nausea vomiting, body aches, fevers. Initial impression presumed infectious diarrhea. He is sitting in bed in no acute distress initial vital signs are unremarkable, he appears nontoxic, abdomen is nonsurgical.  Does appear dehydrated. Given recent CT abdomen and physical exam do not feel repeat CT abdomen pelvis is necessary. Will check CBC, BMP, administer 2 L normal saline, Toradol, Zofran and reassess. On reassessment patient's vital signs show a fever of 102.7, sinus tachycardia 122. He still appears nontoxic did tolerate p.o. Given electrolyte abnormalities, fevers, previous abdominal surgeries will admit patient for IV fluid rehydration, electrolyte corrections and potential consultation from infectious disease versus GI.   Symptoms likely related to viral gastroenteritis however alternatives such as autoimmune or inflammatory bowel disease, and bacterial gastroenteritis should be considered. ED Course as of Jul 17 0358   Sat Jul 17, 2021   0215 Potassium(!!): 2.9 [CS]   0215 Sodium(!): 127 [CS]   0253 Patient administered 20 mEq IV potassium and 40 p.o. Also given 2 L normal saline, 1 mg magnesium IV. Patient is admitted to [facility]. [facility] for CT abdomen pelvis read. Patient updated agreeable to admission.    [CS]      ED Course User Index  [CS] Jessa Villagomez DO         PROCEDURES:      CONSULTS:  IP CONSULT TO HOSPITALIST    CRITICAL CARE:  Please see attending note    FINAL IMPRESSION      1. Diarrhea of presumed infectious origin    2. Hyponatremia    3. Hypokalemia          DISPOSITION / PLAN     DISPOSITION Admitted 07/17/2021 02:50:52 AM      PATIENT REFERRED TO:  No follow-up provider specified.     DISCHARGE MEDICATIONS:  New Prescriptions    No medications on file       Jessa Villagomez DO  Emergency Medicine Resident    (Please note that portions of thisnote were completed with a voice recognition program.  Efforts were made to edit the dictations but occasionally words are mis-transcribed.)        Jessa Villagomez DO  Resident  07/17/21 7729

## 2021-07-17 NOTE — Clinical Note
Patient Class: Inpatient [101]   REQUIRED: Diagnosis: Diarrhea [787.91. ICD-9-CM]   Estimated Length of Stay: Estimated stay of more than 2 midnights   Telemetry/Cardiac Monitoring Required?: Yes

## 2021-07-17 NOTE — ED NOTES
Pt requesting pain medication, states allergy to morphine which was not in chart. Admitting team notified and allergy list updated.       Manuela De Santiago RN  07/17/21 7581

## 2021-07-18 ENCOUNTER — APPOINTMENT (OUTPATIENT)
Dept: GENERAL RADIOLOGY | Age: 41
DRG: 871 | End: 2021-07-18
Payer: COMMERCIAL

## 2021-07-18 ENCOUNTER — APPOINTMENT (OUTPATIENT)
Dept: CT IMAGING | Age: 41
DRG: 871 | End: 2021-07-18
Payer: COMMERCIAL

## 2021-07-18 LAB
ABSOLUTE EOS #: 0 K/UL (ref 0–0.4)
ABSOLUTE EOS #: 0 K/UL (ref 0–0.4)
ABSOLUTE IMMATURE GRANULOCYTE: 0 K/UL (ref 0–0.3)
ABSOLUTE IMMATURE GRANULOCYTE: 0.2 K/UL (ref 0–0.3)
ABSOLUTE LYMPH #: 0.59 K/UL (ref 1–4.8)
ABSOLUTE LYMPH #: 1.15 K/UL (ref 1–4.8)
ABSOLUTE MONO #: 0.48 K/UL (ref 0.1–0.8)
ABSOLUTE MONO #: 0.69 K/UL (ref 0.1–0.8)
ABSOLUTE RETIC #: 0.02 M/UL (ref 0.03–0.08)
ALBUMIN SERPL-MCNC: 2.7 G/DL (ref 3.5–5.2)
ALBUMIN SERPL-MCNC: 2.7 G/DL (ref 3.5–5.2)
ALBUMIN/GLOBULIN RATIO: 0.8 (ref 1–2.5)
ALBUMIN/GLOBULIN RATIO: 0.8 (ref 1–2.5)
ALLEN TEST: POSITIVE
ALP BLD-CCNC: 77 U/L (ref 40–129)
ALP BLD-CCNC: 81 U/L (ref 40–129)
ALT SERPL-CCNC: 43 U/L (ref 5–41)
ALT SERPL-CCNC: 43 U/L (ref 5–41)
AMYLASE: 131 U/L (ref 28–100)
ANION GAP SERPL CALCULATED.3IONS-SCNC: 11 MMOL/L (ref 9–17)
ANION GAP SERPL CALCULATED.3IONS-SCNC: 15 MMOL/L (ref 9–17)
AST SERPL-CCNC: 66 U/L
AST SERPL-CCNC: 66 U/L
ATYPICAL LYMPHOCYTE ABSOLUTE COUNT: 0.1 K/UL
ATYPICAL LYMPHOCYTES: 1 %
BASOPHILS # BLD: 0 % (ref 0–2)
BASOPHILS # BLD: 0 % (ref 0–2)
BASOPHILS ABSOLUTE: 0 K/UL (ref 0–0.2)
BASOPHILS ABSOLUTE: 0 K/UL (ref 0–0.2)
BILIRUB SERPL-MCNC: 0.6 MG/DL (ref 0.3–1.2)
BILIRUB SERPL-MCNC: 0.7 MG/DL (ref 0.3–1.2)
BILIRUBIN DIRECT: 0.25 MG/DL
BNP INTERPRETATION: ABNORMAL
BUN BLDV-MCNC: 6 MG/DL (ref 6–20)
BUN BLDV-MCNC: 8 MG/DL (ref 6–20)
BUN/CREAT BLD: ABNORMAL (ref 9–20)
BUN/CREAT BLD: ABNORMAL (ref 9–20)
CALCIUM SERPL-MCNC: 7.6 MG/DL (ref 8.6–10.4)
CALCIUM SERPL-MCNC: 7.9 MG/DL (ref 8.6–10.4)
CAMPYLOBACTER PCR: NORMAL
CHLORIDE BLD-SCNC: 101 MMOL/L (ref 98–107)
CHLORIDE BLD-SCNC: 101 MMOL/L (ref 98–107)
CO2: 19 MMOL/L (ref 20–31)
CO2: 21 MMOL/L (ref 20–31)
CREAT SERPL-MCNC: 0.76 MG/DL (ref 0.7–1.2)
CREAT SERPL-MCNC: 0.77 MG/DL (ref 0.7–1.2)
D-DIMER QUANTITATIVE: 12.99 MG/L FEU
DIFFERENTIAL TYPE: ABNORMAL
DIFFERENTIAL TYPE: ABNORMAL
E COLI ENTEROTOXIGENIC PCR: NORMAL
EOSINOPHILS RELATIVE PERCENT: 0 % (ref 1–4)
EOSINOPHILS RELATIVE PERCENT: 0 % (ref 1–4)
ESTIMATED AVERAGE GLUCOSE: 111 MG/DL
FIBRINOGEN: 666 MG/DL (ref 140–420)
FIO2: ABNORMAL
GFR AFRICAN AMERICAN: >60 ML/MIN
GFR AFRICAN AMERICAN: >60 ML/MIN
GFR NON-AFRICAN AMERICAN: >60 ML/MIN
GFR NON-AFRICAN AMERICAN: >60 ML/MIN
GFR SERPL CREATININE-BSD FRML MDRD: ABNORMAL ML/MIN/{1.73_M2}
GLUCOSE BLD-MCNC: 160 MG/DL (ref 75–110)
GLUCOSE BLD-MCNC: 169 MG/DL (ref 70–99)
GLUCOSE BLD-MCNC: 172 MG/DL (ref 70–99)
GLUCOSE BLD-MCNC: 187 MG/DL (ref 74–100)
HAPTOGLOBIN: 256 MG/DL (ref 30–200)
HBA1C MFR BLD: 5.5 % (ref 4–6)
HCT VFR BLD CALC: 36.5 % (ref 40.7–50.3)
HCT VFR BLD CALC: 37.9 % (ref 40.7–50.3)
HEMOGLOBIN: 12.5 G/DL (ref 13–17)
HEMOGLOBIN: 12.8 G/DL (ref 13–17)
IMMATURE GRANULOCYTES: 0 %
IMMATURE GRANULOCYTES: 2 %
IMMATURE RETIC FRACT: 4.3 % (ref 2.7–18.3)
INR BLD: 0.9
LACTATE DEHYDROGENASE: 437 U/L (ref 135–225)
LACTIC ACID, WHOLE BLOOD: 1.6 MMOL/L (ref 0.7–2.1)
LACTIC ACID: NORMAL MMOL/L
LEGIONELLA PNEUMOPHILIA AG, URINE: NEGATIVE
LIPASE: 299 U/L (ref 13–60)
LV EF: 48 %
LVEF MODALITY: NORMAL
LYMPHOCYTES # BLD: 12 % (ref 24–44)
LYMPHOCYTES # BLD: 6 % (ref 24–44)
MAGNESIUM: 2.2 MG/DL (ref 1.6–2.6)
MAGNESIUM: 2.3 MG/DL (ref 1.6–2.6)
MCH RBC QN AUTO: 28.3 PG (ref 25.2–33.5)
MCH RBC QN AUTO: 28.6 PG (ref 25.2–33.5)
MCHC RBC AUTO-ENTMCNC: 33.8 G/DL (ref 28.4–34.8)
MCHC RBC AUTO-ENTMCNC: 34.2 G/DL (ref 28.4–34.8)
MCV RBC AUTO: 82.8 FL (ref 82.6–102.9)
MCV RBC AUTO: 84.8 FL (ref 82.6–102.9)
MODE: ABNORMAL
MONOCYTES # BLD: 5 % (ref 1–7)
MONOCYTES # BLD: 7 % (ref 1–7)
MORPHOLOGY: ABNORMAL
MORPHOLOGY: NORMAL
MYOGLOBIN: 135 NG/ML (ref 28–72)
MYOGLOBIN: 138 NG/ML (ref 28–72)
NEGATIVE BASE EXCESS, ART: 1 (ref 0–2)
NRBC AUTOMATED: 0 PER 100 WBC
NRBC AUTOMATED: 0 PER 100 WBC
O2 DEVICE/FLOW/%: ABNORMAL
PARTIAL THROMBOPLASTIN TIME: 23.7 SEC (ref 20.5–30.5)
PATIENT TEMP: ABNORMAL
PDW BLD-RTO: 12.8 % (ref 11.8–14.4)
PDW BLD-RTO: 13.1 % (ref 11.8–14.4)
PHOSPHORUS: 0.7 MG/DL (ref 2.5–4.5)
PHOSPHORUS: 0.8 MG/DL (ref 2.5–4.5)
PHOSPHORUS: 3.1 MG/DL (ref 2.5–4.5)
PLATELET # BLD: ABNORMAL K/UL (ref 138–453)
PLATELET # BLD: ABNORMAL K/UL (ref 138–453)
PLATELET ESTIMATE: ABNORMAL
PLATELET ESTIMATE: ABNORMAL
PLATELET, FLUORESCENCE: 32 K/UL (ref 138–453)
PLATELET, FLUORESCENCE: 39 K/UL (ref 138–453)
PLATELET, IMMATURE FRACTION: 10.6 % (ref 1.1–10.3)
PLATELET, IMMATURE FRACTION: 12.9 % (ref 1.1–10.3)
PLESIOMONAS SHIGELLOIDES PCR: NORMAL
PMV BLD AUTO: ABNORMAL FL (ref 8.1–13.5)
PMV BLD AUTO: ABNORMAL FL (ref 8.1–13.5)
POC HCO3: 21.6 MMOL/L (ref 21–28)
POC O2 SATURATION: 99 % (ref 94–98)
POC PCO2 TEMP: ABNORMAL MM HG
POC PCO2: 28.6 MM HG (ref 35–48)
POC PH TEMP: ABNORMAL
POC PH: 7.49 (ref 7.35–7.45)
POC PO2 TEMP: ABNORMAL MM HG
POC PO2: 134.5 MM HG (ref 83–108)
POSITIVE BASE EXCESS, ART: ABNORMAL (ref 0–3)
POTASSIUM SERPL-SCNC: 3 MMOL/L (ref 3.7–5.3)
POTASSIUM SERPL-SCNC: 3.6 MMOL/L (ref 3.7–5.3)
PRO-BNP: 1835 PG/ML
PROTHROMBIN TIME: 10.1 SEC (ref 9.1–12.3)
RBC # BLD: 4.41 M/UL (ref 4.21–5.77)
RBC # BLD: 4.47 M/UL (ref 4.21–5.77)
RBC # BLD: ABNORMAL 10*6/UL
RBC # BLD: ABNORMAL 10*6/UL
RETIC %: 0.5 % (ref 0.5–1.9)
RETIC HEMOGLOBIN: 28.6 PG (ref 28.2–35.7)
SALMONELLA PCR: NORMAL
SAMPLE SITE: ABNORMAL
SEG NEUTROPHILS: 82 % (ref 36–66)
SEG NEUTROPHILS: 85 % (ref 36–66)
SEGMENTED NEUTROPHILS ABSOLUTE COUNT: 7.87 K/UL (ref 1.8–7.7)
SEGMENTED NEUTROPHILS ABSOLUTE COUNT: 8.42 K/UL (ref 1.8–7.7)
SHIGATOXIN GENE PCR: NORMAL
SHIGELLA SP PCR: NORMAL
SODIUM BLD-SCNC: 131 MMOL/L (ref 135–144)
SODIUM BLD-SCNC: 137 MMOL/L (ref 135–144)
SOURCE: NORMAL
SPECIMEN DESCRIPTION: NORMAL
STREP PNEUMONIAE ANTIGEN: NEGATIVE
TCO2 (CALC), ART: ABNORMAL MMOL/L (ref 22–29)
TOTAL PROTEIN: 6 G/DL (ref 6.4–8.3)
TOTAL PROTEIN: 6 G/DL (ref 6.4–8.3)
TROPONIN INTERP: ABNORMAL
TROPONIN INTERP: ABNORMAL
TROPONIN T: ABNORMAL NG/ML
TROPONIN T: ABNORMAL NG/ML
TROPONIN, HIGH SENSITIVITY: 165 NG/L (ref 0–22)
TROPONIN, HIGH SENSITIVITY: 171 NG/L (ref 0–22)
URIC ACID: 2.3 MG/DL (ref 3.4–7)
VIBRIO PCR: NORMAL
WBC # BLD: 9.6 K/UL (ref 3.5–11.3)
WBC # BLD: 9.9 K/UL (ref 3.5–11.3)
WBC # BLD: ABNORMAL 10*3/UL
WBC # BLD: ABNORMAL 10*3/UL
YERSINIA ENTEROCOLITICA PCR: NORMAL

## 2021-07-18 PROCEDURE — 2580000003 HC RX 258: Performed by: NURSE PRACTITIONER

## 2021-07-18 PROCEDURE — 85379 FIBRIN DEGRADATION QUANT: CPT

## 2021-07-18 PROCEDURE — 82803 BLOOD GASES ANY COMBINATION: CPT

## 2021-07-18 PROCEDURE — 93005 ELECTROCARDIOGRAM TRACING: CPT | Performed by: STUDENT IN AN ORGANIZED HEALTH CARE EDUCATION/TRAINING PROGRAM

## 2021-07-18 PROCEDURE — 86403 PARTICLE AGGLUT ANTBDY SCRN: CPT

## 2021-07-18 PROCEDURE — 84156 ASSAY OF PROTEIN URINE: CPT

## 2021-07-18 PROCEDURE — 83880 ASSAY OF NATRIURETIC PEPTIDE: CPT

## 2021-07-18 PROCEDURE — 93005 ELECTROCARDIOGRAM TRACING: CPT | Performed by: NURSE PRACTITIONER

## 2021-07-18 PROCEDURE — 94761 N-INVAS EAR/PLS OXIMETRY MLT: CPT

## 2021-07-18 PROCEDURE — 2580000003 HC RX 258: Performed by: INTERNAL MEDICINE

## 2021-07-18 PROCEDURE — 84550 ASSAY OF BLOOD/URIC ACID: CPT

## 2021-07-18 PROCEDURE — 85027 COMPLETE CBC AUTOMATED: CPT

## 2021-07-18 PROCEDURE — 85730 THROMBOPLASTIN TIME PARTIAL: CPT

## 2021-07-18 PROCEDURE — 6370000000 HC RX 637 (ALT 250 FOR IP): Performed by: NURSE PRACTITIONER

## 2021-07-18 PROCEDURE — 82947 ASSAY GLUCOSE BLOOD QUANT: CPT

## 2021-07-18 PROCEDURE — 87040 BLOOD CULTURE FOR BACTERIA: CPT

## 2021-07-18 PROCEDURE — 93005 ELECTROCARDIOGRAM TRACING: CPT | Performed by: INTERNAL MEDICINE

## 2021-07-18 PROCEDURE — 2500000003 HC RX 250 WO HCPCS

## 2021-07-18 PROCEDURE — 83874 ASSAY OF MYOGLOBIN: CPT

## 2021-07-18 PROCEDURE — 6360000002 HC RX W HCPCS: Performed by: NURSE PRACTITIONER

## 2021-07-18 PROCEDURE — 74177 CT ABD & PELVIS W/CONTRAST: CPT

## 2021-07-18 PROCEDURE — 87449 NOS EACH ORGANISM AG IA: CPT

## 2021-07-18 PROCEDURE — 85055 RETICULATED PLATELET ASSAY: CPT

## 2021-07-18 PROCEDURE — 80053 COMPREHEN METABOLIC PANEL: CPT

## 2021-07-18 PROCEDURE — 83735 ASSAY OF MAGNESIUM: CPT

## 2021-07-18 PROCEDURE — 85610 PROTHROMBIN TIME: CPT

## 2021-07-18 PROCEDURE — 73620 X-RAY EXAM OF FOOT: CPT

## 2021-07-18 PROCEDURE — 82248 BILIRUBIN DIRECT: CPT

## 2021-07-18 PROCEDURE — APPNB60 APP NON BILLABLE TIME 46-60 MINS: Performed by: NURSE PRACTITIONER

## 2021-07-18 PROCEDURE — 84484 ASSAY OF TROPONIN QUANT: CPT

## 2021-07-18 PROCEDURE — 83690 ASSAY OF LIPASE: CPT

## 2021-07-18 PROCEDURE — 84100 ASSAY OF PHOSPHORUS: CPT

## 2021-07-18 PROCEDURE — 2500000003 HC RX 250 WO HCPCS: Performed by: INTERNAL MEDICINE

## 2021-07-18 PROCEDURE — 83605 ASSAY OF LACTIC ACID: CPT

## 2021-07-18 PROCEDURE — 2060000000 HC ICU INTERMEDIATE R&B

## 2021-07-18 PROCEDURE — 87899 AGENT NOS ASSAY W/OPTIC: CPT

## 2021-07-18 PROCEDURE — 2500000003 HC RX 250 WO HCPCS: Performed by: NURSE PRACTITIONER

## 2021-07-18 PROCEDURE — 6360000004 HC RX CONTRAST MEDICATION: Performed by: INTERNAL MEDICINE

## 2021-07-18 PROCEDURE — 85384 FIBRINOGEN ACTIVITY: CPT

## 2021-07-18 PROCEDURE — 71045 X-RAY EXAM CHEST 1 VIEW: CPT

## 2021-07-18 PROCEDURE — 85025 COMPLETE CBC W/AUTO DIFF WBC: CPT

## 2021-07-18 PROCEDURE — 6360000002 HC RX W HCPCS: Performed by: INTERNAL MEDICINE

## 2021-07-18 PROCEDURE — 36600 WITHDRAWAL OF ARTERIAL BLOOD: CPT

## 2021-07-18 PROCEDURE — 85045 AUTOMATED RETICULOCYTE COUNT: CPT

## 2021-07-18 PROCEDURE — 99254 IP/OBS CNSLTJ NEW/EST MOD 60: CPT | Performed by: INTERNAL MEDICINE

## 2021-07-18 PROCEDURE — 82150 ASSAY OF AMYLASE: CPT

## 2021-07-18 PROCEDURE — 87086 URINE CULTURE/COLONY COUNT: CPT

## 2021-07-18 PROCEDURE — 83615 LACTATE (LD) (LDH) ENZYME: CPT

## 2021-07-18 PROCEDURE — 36415 COLL VENOUS BLD VENIPUNCTURE: CPT

## 2021-07-18 PROCEDURE — 99233 SBSQ HOSP IP/OBS HIGH 50: CPT | Performed by: INTERNAL MEDICINE

## 2021-07-18 PROCEDURE — 83010 ASSAY OF HAPTOGLOBIN QUANT: CPT

## 2021-07-18 PROCEDURE — 82570 ASSAY OF URINE CREATININE: CPT

## 2021-07-18 PROCEDURE — 87186 SC STD MICRODIL/AGAR DIL: CPT

## 2021-07-18 PROCEDURE — 93306 TTE W/DOPPLER COMPLETE: CPT

## 2021-07-18 PROCEDURE — 87205 SMEAR GRAM STAIN: CPT

## 2021-07-18 PROCEDURE — 87147 CULTURE TYPE IMMUNOLOGIC: CPT

## 2021-07-18 PROCEDURE — 94660 CPAP INITIATION&MGMT: CPT

## 2021-07-18 RX ORDER — 0.9 % SODIUM CHLORIDE 0.9 %
500 INTRAVENOUS SOLUTION INTRAVENOUS ONCE
Status: COMPLETED | OUTPATIENT
Start: 2021-07-18 | End: 2021-07-18

## 2021-07-18 RX ORDER — METOPROLOL TARTRATE 5 MG/5ML
INJECTION INTRAVENOUS
Status: COMPLETED
Start: 2021-07-18 | End: 2021-07-18

## 2021-07-18 RX ORDER — SODIUM CHLORIDE, SODIUM LACTATE, POTASSIUM CHLORIDE, CALCIUM CHLORIDE 600; 310; 30; 20 MG/100ML; MG/100ML; MG/100ML; MG/100ML
INJECTION, SOLUTION INTRAVENOUS CONTINUOUS
Status: DISCONTINUED | OUTPATIENT
Start: 2021-07-18 | End: 2021-07-19

## 2021-07-18 RX ORDER — METOPROLOL TARTRATE 5 MG/5ML
5 INJECTION INTRAVENOUS ONCE
Status: COMPLETED | OUTPATIENT
Start: 2021-07-18 | End: 2021-07-18

## 2021-07-18 RX ADMIN — ACETAMINOPHEN 650 MG: 325 TABLET ORAL at 21:47

## 2021-07-18 RX ADMIN — ACETAMINOPHEN 650 MG: 325 TABLET ORAL at 15:17

## 2021-07-18 RX ADMIN — CEFAZOLIN 2000 MG: 10 INJECTION, POWDER, FOR SOLUTION INTRAVENOUS at 23:06

## 2021-07-18 RX ADMIN — POTASSIUM CHLORIDE 10 MEQ: 7.46 INJECTION, SOLUTION INTRAVENOUS at 07:59

## 2021-07-18 RX ADMIN — POTASSIUM CHLORIDE 10 MEQ: 7.46 INJECTION, SOLUTION INTRAVENOUS at 11:41

## 2021-07-18 RX ADMIN — SODIUM PHOSPHATE, MONOBASIC, MONOHYDRATE 30 MMOL: 276; 142 INJECTION, SOLUTION INTRAVENOUS at 08:52

## 2021-07-18 RX ADMIN — SODIUM PHOSPHATE, MONOBASIC, MONOHYDRATE 30 MMOL: 276; 142 INJECTION, SOLUTION INTRAVENOUS at 13:23

## 2021-07-18 RX ADMIN — POTASSIUM CHLORIDE 10 MEQ: 7.46 INJECTION, SOLUTION INTRAVENOUS at 09:03

## 2021-07-18 RX ADMIN — CEFAZOLIN 2000 MG: 10 INJECTION, POWDER, FOR SOLUTION INTRAVENOUS at 18:41

## 2021-07-18 RX ADMIN — SODIUM CHLORIDE 500 ML: 9 INJECTION, SOLUTION INTRAVENOUS at 05:16

## 2021-07-18 RX ADMIN — POTASSIUM CHLORIDE 10 MEQ: 7.46 INJECTION, SOLUTION INTRAVENOUS at 06:31

## 2021-07-18 RX ADMIN — SODIUM CHLORIDE, PRESERVATIVE FREE 10 ML: 5 INJECTION INTRAVENOUS at 09:02

## 2021-07-18 RX ADMIN — IOPAMIDOL 75 ML: 755 INJECTION, SOLUTION INTRAVENOUS at 10:57

## 2021-07-18 RX ADMIN — CEFAZOLIN 2000 MG: 10 INJECTION, POWDER, FOR SOLUTION INTRAVENOUS at 13:19

## 2021-07-18 RX ADMIN — POTASSIUM CHLORIDE 10 MEQ: 7.46 INJECTION, SOLUTION INTRAVENOUS at 13:27

## 2021-07-18 RX ADMIN — METOPROLOL TARTRATE 5 MG: 1 INJECTION, SOLUTION INTRAVENOUS at 04:48

## 2021-07-18 RX ADMIN — SODIUM CHLORIDE, POTASSIUM CHLORIDE, SODIUM LACTATE AND CALCIUM CHLORIDE: 600; 310; 30; 20 INJECTION, SOLUTION INTRAVENOUS at 10:34

## 2021-07-18 RX ADMIN — METOPROLOL TARTRATE 5 MG: 5 INJECTION INTRAVENOUS at 04:48

## 2021-07-18 RX ADMIN — POTASSIUM CHLORIDE 10 MEQ: 7.46 INJECTION, SOLUTION INTRAVENOUS at 10:22

## 2021-07-18 RX ADMIN — PIPERACILLIN AND TAZOBACTAM 3375 MG: 3; .375 INJECTION, POWDER, FOR SOLUTION INTRAVENOUS at 07:53

## 2021-07-18 RX ADMIN — SODIUM CHLORIDE, PRESERVATIVE FREE 10 ML: 5 INJECTION INTRAVENOUS at 08:51

## 2021-07-18 RX ADMIN — ACETAMINOPHEN 650 MG: 325 TABLET ORAL at 08:51

## 2021-07-18 RX ADMIN — Medication 6 MG: at 23:06

## 2021-07-18 ASSESSMENT — PAIN SCALES - GENERAL
PAINLEVEL_OUTOF10: 4
PAINLEVEL_OUTOF10: 2

## 2021-07-18 ASSESSMENT — PAIN DESCRIPTION - DESCRIPTORS: DESCRIPTORS: ACHING

## 2021-07-18 ASSESSMENT — PAIN DESCRIPTION - LOCATION: LOCATION: ABDOMEN;GENERALIZED

## 2021-07-18 ASSESSMENT — PAIN DESCRIPTION - PAIN TYPE: TYPE: ACUTE PAIN

## 2021-07-18 NOTE — PROGRESS NOTES
quickly improved. Patient is on Ancef currently for antibiotics, this morning lipase resulted at 299, CT chest abdomen pelvis was ordered showing acute colitis. Patient is complaining of painful skin lesions, abdominal distention and shortness of breath and diaphoresis. Brief History:     From H&P:    Patient presents to the emergency room for the concern of generalized fatigue, recurrent abdominal pain, n/v/d with assoicated bone ache. Patient states that he was just recently released from [facility] for very similar complaints and was told that his potassium was low and the CT of the abdomen was normal.  Patient states the diarrhea is water-like. Patient admits to approx 5-7 watery stools a day for the past 4 days, there is no associated bleeding. Abdomen pain is present prior to the diarrhea. There is associated fevers and chills. Subjective fever of 104 prior to arrival. Emesis is food that is digested but from earlier in the day. Patient admits to eating popcorn and nuts just the weekend before the onset.        Review of Systems:     Constitutional:  negative for chills, fevers, sweats  Respiratory: complains of shortness of breath  Cardiovascular:  negative for chest pain, chest pressure/discomfort, lower extremity edema, palpitations  Gastrointestinal: complains of diarrhea, abdominal distention  Neurological:  negative for dizziness, headache    Medications: Allergies:     Allergies   Allergen Reactions    Morphine Other (See Comments)     Pt report muscle cramps       Current Meds:   Scheduled Meds:    ceFAZolin  2,000 mg Intravenous Q6H    sodium phosphate IVPB  30 mmol Intravenous Once    [Held by provider] atorvastatin  80 mg Oral Daily    sodium chloride flush  5-40 mL Intravenous 2 times per day    enoxaparin  40 mg Subcutaneous Daily     Continuous Infusions:    lactated ringers 200 mL/hr at 07/18/21 1034    sodium chloride       PRN Meds: sodium chloride flush, sodium chloride, potassium chloride **OR** potassium alternative oral replacement **OR** [DISCONTINUED] potassium chloride, ondansetron **OR** ondansetron, acetaminophen **OR** acetaminophen, polyethylene glycol, magnesium sulfate, potassium chloride, oxyCODONE, melatonin, aluminum & magnesium hydroxide-simethicone    Data:     Past Medical History:   has a past medical history of Diverticulosis, Hyperlipidemia, and Hypertension. Social History:   reports that he has never smoked. He has never used smokeless tobacco. He reports current alcohol use of about 20.0 standard drinks of alcohol per week. He reports that he does not use drugs. Family History:   Family History   Problem Relation Age of Onset    No Known Problems Mother     Other Father         diverticulitis       Vitals:  /85   Pulse 132   Temp 98.6 °F (37 °C) (Oral)   Resp 24   Ht 5' 6\" (1.676 m)   Wt 172 lb (78 kg)   SpO2 95%   BMI 27.76 kg/m²   Temp (24hrs), Av.8 °F (37.7 °C), Min:98.6 °F (37 °C), Max:102.5 °F (39.2 °C)    Recent Labs     21  0421 21  0535   POCGLU 160* 187*       I/O (24Hr):     Intake/Output Summary (Last 24 hours) at 2021 1205  Last data filed at 2021 1006  Gross per 24 hour   Intake 3058.75 ml   Output 1075 ml   Net 1983.75 ml       Labs:  Hematology:  Recent Labs     21  0140 21  1424 21  0521 21  0653   WBC 6.9 9.2 9.9  --    RBC 4.63 4.21 4.41  --    HGB 13.3 12.2* 12.5*  --    HCT 38.2* 36.3* 36.5*  --    MCV 82.5* 86.2 82.8  --    MCH 28.7 29.0 28.3  --    MCHC 34.8 33.6 34.2  --    RDW 12.1 12.6 12.8  --    PLT See Reflexed IPF Result See Reflexed IPF Result See Reflexed IPF Result  --    MPV NOT REPORTED NOT REPORTED NOT REPORTED  --    SEDRATE 31*  --   --   --    .5*  --   --   --    DDIMER  --   --   --  12.99     Chemistry:  Recent Labs     21  0140 21  0140 21  0855 21  0519 21  0521 21  0653   *  --  129*  --  131*  -- (SINGLE VIEW FRONTAL)    Result Date: 7/17/2021  Unremarkable portable chest radiograph. XR ABDOMEN (KUB) (SINGLE AP VIEW)    Result Date: 7/17/2021  Nonobstructive bowel gas pattern. No acute process identified. US GALLBLADDER RUQ    Result Date: 7/17/2021  Mild hepatomegaly. Diffuse hepatic steatosis. Contracted gallbladder. No cholelithiasis or acute cholecystitis. XR CHEST PORTABLE    Result Date: 7/18/2021  No focal consolidation. No acute process evident. CT CHEST ABDOMEN PELVIS W CONTRAST    Result Date: 7/18/2021  Moderate distention of the colon multiple air-fluid levels, findings may be seen with acute enterocolitis. Physical Examination:        General appearance:  Alert, ill-appearing, mild distress  Mental Status:  oriented to person, place and time and normal affect  Lungs:  clear to auscultation bilaterally, normal effort  Heart: Rapid rate regular rhythm 3 out of 6 systolic murmur left sternal border, no rubs or gallops  Abdomen: Moderately distended, epigastric tenderness, no rebound tenderness, no guarding, hypoactive bowel sounds  Extremities:  no edema, redness, tenderness in the calves  Skin: Multiple cutaneous lesions of the left upper extremity, left lower extremity and right upper extremity. Right posterior thorax with erythema marked by pen not increasing from yesterday. Small bruise on the right fingertips    Assessment:        Hospital Problems         Last Modified POA    * (Principal) Sepsis with acute organ dysfunction and septic shock ([address] 7/17/2021 Yes    Diarrhea 7/17/2021 Yes    Acute dehydration 7/17/2021 Yes    Hypomagnesemia 7/17/2021 Yes    Hypokalemia 7/17/2021 Yes    Bleeding nose 7/17/2021 Yes    Thrombocytopenia ([address] 7/17/2021 Yes    Hyperglycemia 7/17/2021 Yes    Essential hypertension 7/17/2021 Yes          Plan:        1. Severe Sepsis -unclear source at this time, infectious disease consulted, patient was changed to [facility] for antibiotics.

## 2021-07-18 NOTE — CONSULTS
[facility] Cardiology    Inpatient Consultation Note               Today's Date: 7/18/2021  Patient Name: Hailey Matt  Date of admission: 7/17/2021 12:42 AM  Patient's age: 36 y.o., 1980  Admission Dx: Diarrhea [R19.7]    Reason for  Consult:  Elevated troponin     Requesting Physician: Raji Alvarado DO    CHIEF COMPLAINT:     Chief Complaint   Patient presents with    Abdominal Pain    Emesis       History Obtained From:  patient, electronic medical record    HISTORY OF PRESENT ILLNESS:    36year old male presents to the ED with complaints of generalized fatigue, abdominal pain along with N/V & Dirrhea. He reports he has been having diarrhea with approximately 5-7 watery BM a day for the last 4 days. He has been tachycardiac since admission however upon ambulating to the restroom HR went up to 150's. Hs Trop ordered and noted to be elevated to 171. EKG shows Sinus tachycardia with a HR of 151. Currently he is reporting no chest pain. Blood cultures positive x2 for gram positive cocci in clusters. Past Medical History:   has a past medical history of Diverticulosis, Hyperlipidemia, and Hypertension. Past Surgical History:   has a past surgical history that includes Anterior cruciate ligament repair (Right); Appendectomy (07/09/1997); colectomy; Dilatation, esophagus; and Abdomen surgery. Home Medications:    Prior to Admission medications    Medication Sig Start Date End Date Taking?  Authorizing Provider   amLODIPine (NORVASC) 5 MG tablet Take 5 mg by mouth daily   Yes Historical Provider, MD   atorvastatin (LIPITOR) 80 MG tablet Take 80 mg by mouth daily   Yes Historical Provider, MD   carvedilol (COREG) 25 MG tablet Take 25 mg by mouth 2 times daily (with meals)   Yes Historical Provider, MD   dicyclomine (BENTYL) 20 MG tablet Take 20 mg by mouth every 6 hours   Yes Historical Provider, MD   hydroCHLOROthiazide (HYDRODIURIL) 25 MG tablet Take 25 mg by mouth daily   Yes Historical Provider, MD   HYDROcodone-acetaminophen (NORCO) 5-325 MG per tablet Take 1 tablet by mouth every 6 hours as needed for Pain.    Yes Historical Provider, MD   potassium chloride (MICRO-K) 10 MEQ extended release capsule Take 20 mEq by mouth 2 times daily   Yes Historical Provider, MD        Current Facility-Administered Medications: [Held by provider] amLODIPine (NORVASC) tablet 5 mg, 5 mg, Oral, Daily  [Held by provider] atorvastatin (LIPITOR) tablet 80 mg, 80 mg, Oral, Daily  [Held by provider] carvedilol (COREG) tablet 25 mg, 25 mg, Oral, BID WC  [Held by provider] hydroCHLOROthiazide (HYDRODIURIL) tablet 25 mg, 25 mg, Oral, Daily  dextrose 5 % and 0.45 % NaCl with KCl 20 mEq infusion, , Intravenous, Continuous  sodium chloride flush 0.9 % injection 5-40 mL, 5-40 mL, Intravenous, 2 times per day  sodium chloride flush 0.9 % injection 10 mL, 10 mL, Intravenous, PRN  0.9 % sodium chloride infusion, 25 mL, Intravenous, PRN  potassium chloride (KLOR-CON M) extended release tablet 40 mEq, 40 mEq, Oral, PRN **OR** potassium bicarb-citric acid (EFFER-K) effervescent tablet 40 mEq, 40 mEq, Oral, PRN **OR** [DISCONTINUED] potassium chloride 10 mEq/100 mL IVPB (Peripheral Line), 10 mEq, Intravenous, PRN  [Held by provider] enoxaparin (LOVENOX) injection 40 mg, 40 mg, Subcutaneous, Daily  ondansetron (ZOFRAN-ODT) disintegrating tablet 4 mg, 4 mg, Oral, Q8H PRN **OR** ondansetron (ZOFRAN) injection 4 mg, 4 mg, Intravenous, Q6H PRN  acetaminophen (TYLENOL) tablet 650 mg, 650 mg, Oral, Q6H PRN **OR** acetaminophen (TYLENOL) suppository 650 mg, 650 mg, Rectal, Q6H PRN  polyethylene glycol (GLYCOLAX) packet 17 g, 17 g, Oral, Daily PRN  magnesium sulfate 1000 mg in dextrose 5% 100 mL IVPB, 1,000 mg, Intravenous, PRN  potassium chloride 10 mEq/100 mL IVPB (Peripheral Line), 10 mEq, Intravenous, PRN  piperacillin-tazobactam (ZOSYN) 3,375 mg in dextrose 5 % 50 mL IVPB (mini-bag), 3,375 mg, Intravenous, Q8H  oxyCODONE (ROXICODONE) immediate release tablet 5 mg, 5 mg, Oral, Q6H PRN  melatonin tablet 6 mg, 6 mg, Oral, Nightly PRN  aluminum & magnesium hydroxide-simethicone (MAALOX) 200-200-20 MG/5ML suspension 30 mL, 30 mL, Oral, Q6H PRN    Allergies:  Morphine    Social History:   reports that he has never smoked. He has never used smokeless tobacco. He reports current alcohol use of about 20.0 standard drinks of alcohol per week. He reports that he does not use drugs. Family History: family history includes No Known Problems in his mother; Other in his father. REVIEW OF SYSTEMS:      · Constitutional: there has been no unanticipated weight loss. · Eyes: No visual changes or diplopia. · ENT: No Headaches  · Cardiovascular:  Remaining as above  · Respiratory: No cough  · Gastrointestinal: No abdominal pain. No change in bowel or bladder habits. · Genitourinary: No dysuria, trouble voiding, or hematuria. · Musculoskeletal: No joint complaints. · Neurological: No headache  · Hematologic/Lymphatic: No abnormal bruising or bleeding      PHYSICAL EXAM:      /75   Pulse 136   Temp 98.6 °F (37 °C) (Oral)   Resp 20   Ht 5' 6\" (1.676 m)   Wt 172 lb (78 kg)   SpO2 99%   BMI 27.76 kg/m²      Intake/Output Summary (Last 24 hours) at 7/18/2021 0636  Last data filed at 7/18/2021 0540  Gross per 24 hour   Intake 3058.75 ml   Output --   Net 3058.75 ml         Constitutional and General Appearance:    Alert, cooperative, no distress and appears stated age  Respiratory:  · No for increased work of breathing. · On auscultation: clear to auscultation bilaterally  Cardiovascular:  · Regular S1 and S2.   · No murmurs  Abdomen:   · No masses or tenderness  · Bowel sounds present  · Abdomen distended  Extremities:  ·  No Cyanosis or Clubbing  ·  Lower extremity edema: No  Neurological:  · Alert and oriented.       DATA:    Diagnostics:    EKG:   ST      Labs:     CBC:   Recent Labs     07/17/21  1424 07/18/21  0521   WBC 9.2 9.9   HGB 12.2* 12.5*   HCT 36.3* 36.5*   PLT See Reflexed IPF Result See Reflexed IPF Result     BMP:   Recent Labs     07/17/21  0855 07/18/21  0521   * 131*   K 3.7  3.7 3.0*   CO2 18* 19*   BUN 10 8   CREATININE 0.93 0.76   LABGLOM >60 >60   GLUCOSE 147* 172*     Pro-BNP:  No results for input(s): PROBNP in the last 72 hours. BNP: No results for input(s): BNP in the last 72 hours. PT/INR: No results for input(s): PROTIME, INR in the last 72 hours. APTT:No results for input(s): APTT in the last 72 hours. CARDIAC ENZYMES:No results for input(s): CKTOTAL, CKMB, CKMBINDEX, TROPONINI in the last 72 hours. Invalid input(s):  [address]  Recent Labs     07/18/21  0519   TROPONINT NOT REPORTED       FASTING LIPID PANEL:No results found for: HDL, LDLDIRECT, LDLCALC, TRIG  LIVER PROFILE:  Recent Labs     07/17/21  0140 07/18/21  0521   AST 67* 66*   ALT 51* 43*   LABALBU 3.2* 2.7*         Patient's Active Problem List  Principal Problem:    Sepsis with acute organ dysfunction and septic shock (HCC)  Active Problems:    Diarrhea    Acute dehydration    Hypomagnesemia    Hypokalemia    Bleeding nose    Thrombocytopenia (HCC)    Hyperglycemia    Essential hypertension  Resolved Problems:    * No resolved hospital problems. *        IMPRESSION:    1. Troponin elevation likely demand ischemia in setting of hypotension 2/2 to sepsis  2. Sinus tachycardia  3. Septic Shock  4. Bacteremia with blood cultures showing gram positive cocci in clusters x2  5. HTN  6. HLD  7. N/V Diarrhea  8. Hypokalemia    RECOMMENDATIONS:  1. Continue to trend troponin  2. 2D ECHO ordered. Will follow up on final result  3. Continue IVF. 4. Continue broad spectrum antibiotics per primary team.  5. Hold all antihypertensive medications  6. K>4, Mg>2  7. Will consider further cardiac work up once acute issues resolve  8. Consider ID consult    Thank you for allowing us to participate in the care of Beau Eden.  If you have any questions or concerns, please do not hesitate to contact us. Discussed with patient and Nurse. Jocelynn Kwok MD, M.D. Fellow, [address] [facility]        Please note that part of this chart were generated using voice recognition  dictation software. Although every effort was made to ensure the accuracy of this automated transcription, some errors in transcription may have occurred. Attestation signed by      Attending Physician Statement:    I have discussed the care of  Vazquez Silverio , including pertinent history and exam findings, with the Cardiology fellow/resident. I have seen and examined the patient and the key elements of all parts of the encounter have been performed by me. I agree with the assessment, plan and orders as documented by the fellow/resident, after I modified exam findings and plan of treatments, and the final version is my approved version of the assessment. Additional Comments: The patient presented with abdominal pain, nausea and vomiting, no chest pain or SOB, dizzy and no syncope. ECG showed NSR, sinus tachycardia and no ST changes. Mildly elevated troponin, type 2 from sepsis. Will obtain echo to assess for any wall motion abnormalities. Medical treatment per primary service for other co morbidities.

## 2021-07-18 NOTE — PROGRESS NOTES
[facility]. Diarrhea [R19.7] . Vitals:    07/18/21 0500   BP: 104/75   Pulse: 136   Resp:    Temp:    SpO2: 99%   . Patients home meds are   Prior to Admission medications    Medication Sig Start Date End Date Taking? Authorizing Provider   amLODIPine (NORVASC) 5 MG tablet Take 5 mg by mouth daily   Yes Historical Provider, MD   atorvastatin (LIPITOR) 80 MG tablet Take 80 mg by mouth daily   Yes Historical Provider, MD   carvedilol (COREG) 25 MG tablet Take 25 mg by mouth 2 times daily (with meals)   Yes Historical Provider, MD   dicyclomine (BENTYL) 20 MG tablet Take 20 mg by mouth every 6 hours   Yes Historical Provider, MD   hydroCHLOROthiazide (HYDRODIURIL) 25 MG tablet Take 25 mg by mouth daily   Yes Historical Provider, MD   HYDROcodone-acetaminophen (NORCO) 5-325 MG per tablet Take 1 tablet by mouth every 6 hours as needed for Pain.    Yes Historical Provider, MD   potassium chloride (MICRO-K) 10 MEQ extended release capsule Take 20 mEq by mouth 2 times daily   Yes Historical Provider, MD   .              RR 18  Breath Sounds: clear diminished      · Bronchodilator assessment at level  1    · []    Bronchodilator Assessment  BRONCHODILATOR ASSESSMENT SCORE  Score 0 1 2 3 4 5   Breath Sounds   []  Patient Baseline [x]  No Wheeze good aeration []  Faint, scattered wheezing, good aeration []  Expiratory Wheezing and or moderately diminished []  Insp/Exp wheeze and/or very diminished []  Insp/Exp and/ or marked distress   Respiratory Rate   []  Patient Baseline [x]  Less than 20 []  Less than 20 []  20-25 []  Greater than 25 []  Greater than 25   Peak flow % of Pred or PB [x]  NA   []  Greater than 90%  []  81-90% []  71-80% []  Less than or equal to 70%  or unable to perform []  Unable due to Respiratory Distress   Dyspnea re []  Patient Baseline [x]  No SOB []  No SOB []  SOB on exertion []  SOB min activity []  At rest/acute   e FEV% Predicted       [x]  NA []  Above 69%  []  Unable []  Above 60-69%  []  Unable []  Above 50-59%  []  Unable []  Above 35-49%  []  Unable []  Less than 35%  []  Unable

## 2021-07-18 NOTE — PROGRESS NOTES
Presented to bedside to discuss echo results with patient and family. Patient noted to have severe mitral regurgitation on echocardiogram.  Patient noted to have severe mitral regurgitation on echocardiogram.  We will plan for [place] to rule out possible infective endocarditis. Risks and benefits discussed with patient and family. They agreed to proceed with procedure. Plan discussed with Dr. Trae Jacobson.     William Farrell MD  PGY 5, [facility] ology Fellow

## 2021-07-18 NOTE — PROGRESS NOTES
RN updated on call NP Carmen Bolanos regarding patient's positive blood cultures showing 2 out of 2 positive for gram positive in clusters and the rapid ID was positive for staph aureus. No new interventions at this time.

## 2021-07-18 NOTE — CONSULTS
[facility] - Initial Consult Note  Today's Date and Time: 7/18/2021, 11:33 AM    Impression :   · MSSA septicemia 7-17-21  · Sepsis with acute organ dysfunction and septic shock  · Diarrhea  · Thrombocytopenia  · History of diverticulitis resulting in partial hemicolectomy with reanastomosis  · History of appendectomy  · History of right ACL repair with screw in place  · Acute dehydration  · Hx systolic cardiac murmur  · Sinus tachycardia  · Hyperlipidemia  · Elevated troponin level likely secondary to demand ischemia  · Elevated inflammatory markers  · Hypokalemia    Recommendations:   · Ancef 2000 mg IV every 6 hours starting 7/18/2021 until 8/15/2021 for MSSA septicemia  · Echocardiogram  · Xray imaging of right foot  · NG tube to low intermittent wall suction for nausea and vomiting with possible acute enterocolitis may be beneficial  · [place]ár[place] for continued urinary bladder distention    Medical Decision Making/Summary/Discussion:7/18/2021     ·   Infection Control Recommendations   · [place] Precautions  · Contact Isolation     Antimicrobial Stewardship Recommendations     · Simplification of therapy  · Targeted therapy    Coordination of Outpatient Care:   · Estimated Length of IV antimicrobials: 4 weeks  · Patient will need Midline Catheter Insertion: TBD  · Patient will need PICC line Insertion: TBD  · Patient will need: Home IV , [place],  SNF,  LTAC: Yes-either SNF or home infusion  · Patient will need outpatient wound care: TBD    Chief complaint/reason for consultation:   · MSSA sepsis      History of Present Illness:   Logan Valadez is a 36y.o.-year-old  male who was initially admitted on 7/17/2021. Patient seen at the request of Dr. Ade Edwards:     Patient initially presented to [facility] a few days ago with complaints of  abdominal pain, nausea, watery diarrhea and vomiting for the past 5 days after eating some chicken.   The patient states that his wife also had episodes of emesis after eating the chicken but her symptoms resolved shortly after. A CT abdomen was unremarkable and he was diagnosed with viral gastroenteritis and sent home with Norco, Zofran, and potassium supplement for hypokalemia. On 7/16/2021, he presented to [address] [place] [address] with worsening symptoms, including fever, chills, fatigue, and worsening abdominal pain with continued vomiting, right big toe pain. Work-up in the ED revealed a fever of 102.6, tachycardia with a heart rate up to 170, dyspnea with hypoxia, blood cultures showed MSSA x2, and urine cultures grew staph aureus ,000 CFU/mL. Occult blood stool positive. CT abdomen 7/17/2021  Impression   No acute abnormality identified in the abdomen and pelvis.       The bladder is markedly distended with a calculated volume of 2 L.       Hepatic steatosis and mild hepatomegaly.       Minor bibasilar atelectasis and interstitial thickening posteriorly. CT chest abdomen pelvis with contrast 7/18/2021  Impression   Moderate distention of the colon multiple air-fluid levels, findings may be   seen with acute enterocolitis. Abnormal labs include:  Sodium 131  Potassium 3.0  .5  Myoglobin 138  Troponin I 65  Amylase 131  AST 66  Lipase 299  Platelet 39  Sed rate 31    ID has been consulted for MSSA sepsis      CURRENT EVALUATION 7/18/2021:    T-max 102.5  Vital signs stable with tachycardia    Patient currently requiring high flow nasal cannula  He continues to complain of diffuse abdominal pain with nausea. He said he has also had a recent dental procedure that he believes was for a tooth abscess that he did not receive prophylactic antibiotics for. There is scattered bruising present and small reddened raised lumps that are warm to the touch on his hands and left arm. Ancef has been initiated for MSSA septicemia.   We will follow up on further culture and echo results    We will continue to monitor  Discussed with Dr. Soheila Sanford, X rays reviewed: 7/18/2021    BUN:8  Cr:0.76  NA: 131  K: 3.0    WBC:9.9  Hb:12.5  Plat: 39    Cultures:  Urine:  ·   Blood:  ·   Sputum :  ·   Wound:  ·     Discussed with patient, RN, family. I have personally reviewed the past medical history, past surgical history, medications, social history, and family history, and I have updated the database accordingly. Past Medical History:     Past Medical History:   Diagnosis Date    Diverticulosis     Hyperlipidemia     Hypertension        Past Surgical  History:     Past Surgical History:   Procedure Laterality Date    ABDOMEN SURGERY      large bowel resection    ANTERIOR CRUCIATE LIGAMENT REPAIR Right     APPENDECTOMY  07/09/1997    COLECTOMY      2012 - D/T Dverticulitis    DILATATION, ESOPHAGUS         Medications:      ceFAZolin  2,000 mg Intravenous Q6H    [Held by provider] amLODIPine  5 mg Oral Daily    [Held by provider] atorvastatin  80 mg Oral Daily    [Held by provider] carvedilol  25 mg Oral BID WC    [Held by provider] hydroCHLOROthiazide  25 mg Oral Daily    sodium chloride flush  5-40 mL Intravenous 2 times per day    [Held by provider] enoxaparin  40 mg Subcutaneous Daily       Social History:     Social History     Socioeconomic History    Marital status:      Spouse name: Not on file    Number of children: Not on file    Years of education: Not on file    Highest education level: Not on file   Occupational History    Not on file   Tobacco Use    Smoking status: Never Smoker    Smokeless tobacco: Never Used   Vaping Use    Vaping Use: Never assessed   Substance and Sexual Activity    Alcohol use:  Yes     Alcohol/week: 20.0 standard drinks     Types: 20 Cans of beer per week     Comment: 20/ week, average every other day    Drug use: Never    Sexual activity: Not on file   Other Topics Concern    Not on file   Social History Narrative    Not on file     Social Examination :     Patient Vitals for the past 8 hrs:   BP Temp Temp src Pulse Resp SpO2   07/18/21 0700 114/85 -- -- 132 -- 100 %   07/18/21 0547 -- -- -- -- 20 --   07/18/21 0500 104/75 98.6 °F (37 °C) Oral 136 20 99 %   07/18/21 0435 -- 98.6 °F (37 °C) Oral 153 -- 92 %   07/18/21 0420 -- -- -- 163 -- 90 %   07/18/21 0400 -- -- -- 129 -- --     General Appearance: Awake, alert, and in no apparent distress  Head:  Normocephalic, no trauma  Eyes: Pupils equal, round, reactive to light and accommodation; extraocular movements intact; sclera anicteric; conjunctivae pink. No embolic phenomena. ENT: Oropharynx clear, without erythema, exudate, or thrush. No tenderness of sinuses. Mouth/throat: mucosa pink and moist. No lesions. Dentition in good repair. Neck:Supple, without lymphadenopathy. Thyroid normal, No bruits. Pulmonary/Chest: Clear to auscultation, without wheezes, rales, or rhonchi. No dullness to percussion. Cardiovascular: Normal sinus tachycardia with murmur, No rubs, or gallops. Abdomen: Soft, non tender. Bowel sounds normal. No organomegaly  All four Extremities: No cyanosis, clubbing, edema, or effusions. Neurologic: No gross sensory or motor deficits. Skin: Warm and dry with good turgor. No signs of peripheral arterial or venous insufficiency. No ulcerations. No open wounds. Medical Decision Making -Laboratory:   I have independently reviewed/ordered the following labs:    CBC with Differential:   Recent Labs     07/17/21  1424 07/18/21  0521   WBC 9.2 9.9   HGB 12.2* 12.5*   HCT 36.3* 36.5*   PLT See Reflexed IPF Result See Reflexed IPF Result   LYMPHOPCT 9* 6*   MONOPCT 6 7     BMP:   Recent Labs     07/17/21  0855 07/17/21  0855 07/18/21  0519 07/18/21  0521   *  --   --  131*   K 3.7  3.7  --   --  3.0*   CL 98  --   --  101   CO2 18*  --   --  19*   BUN 10  --   --  8   CREATININE 0.93  --   --  0.76   MG 2.6   < > 2.2 2.3    < > = values in this interval not displayed.      Hepatic Function Panel:   Recent Labs     07/17/21  0140 07/18/21  0521   PROT 6.5 6.0*   LABALBU 3.2* 2.7*   BILIDIR 0.21  --    IBILI 0.44  --    BILITOT 0.65 0.70   ALKPHOS 59 81   ALT 51* 43*   AST 67* 66*     No results for input(s): RPR in the last 72 hours. No results for input(s): HIV in the last 72 hours. No results for input(s): BC in the last 72 hours. Lab Results   Component Value Date    MUCUS NOT REPORTED 07/17/2021    RBC 4.41 07/18/2021    TRICHOMONAS NOT REPORTED 07/17/2021    WBC 9.9 07/18/2021    YEAST NOT REPORTED 07/17/2021    TURBIDITY CLEAR 07/17/2021     Lab Results   Component Value Date    CREATININE 0.76 07/18/2021    GLUCOSE 172 07/18/2021       Medical Decision Making-Imaging:     EXAMINATION:   CT OF THE CHEST, ABDOMEN, AND PELVIS WITH CONTRAST 7/18/2021 10:53 am       TECHNIQUE:   CT of the chest, abdomen and pelvis was performed with the administration of   intravenous contrast. Multiplanar reformatted images are provided for review. Dose modulation, iterative reconstruction, and/or weight based adjustment of   the mA/kV was utilized to reduce the radiation dose to as low as reasonably   achievable.       COMPARISON:   None       HISTORY:   ORDERING SYSTEM PROVIDED HISTORY: MSSA bacteremia, ? DIC   TECHNOLOGIST PROVIDED HISTORY:   MSSA bacteremia, ? DIC       Reason for Exam: sepsis with acute organ dysfunction   Acuity: Unknown   Type of Exam: Unknown       FINDINGS:   Lungs/pleura: The central airways are patent.  There are no suspicious   pulmonary nodules       Mediastinum: There is no acute mediastinal abnormality       Soft Tissues/Bones: No suspicious osteolytic or osteoblastic lesions       Abdominal organs: The liver, spleen, adrenal glands, kidneys, and pancreas   demonstrate no acute abnormality.       GI/Bowel: The visualized portions of the small bowel are unremarkable. There   is borderline dilation of the colon.  There are multiple air-fluid levels   throughout the colon.

## 2021-07-18 NOTE — PROGRESS NOTES
Writer went into patients room due to patients increased heart rate. Patient ambulated to the bathroom, tried to have a BM and his heart rate increased to 160-170s. Patient was ambulated back to bed and his resting heart rate sustained 150-160s. Patient also stated that he was short of breath and oxygen was placed. /82. EKG was obtained showing sinus tach with lateral infarct. Orders were placed for 5mg metoprolol. After administration of the metoprolol patient started to hallucinate and his oxygen demand increased from patient being previously on room air to requiring 6L NC with O2 at 90% to 15L on the non-rebreather with O2 at 98%. Marybel Kinney NP came to evaluate the patient and orders were placed for ABGs, respiratory evaluation, bipap, lab, chest xray, and 500mL fluid bolus. After bipap placed, and initiation of the fluid bolus patient states that he was over all starting to feel better. Will continue to monitor.

## 2021-07-18 NOTE — PROGRESS NOTES
Asked to evaluate patient secondary to increase oxygen demands, tachycardia and mild hypotension. 696/ 98% NRB,   Physical Exam  Constitutional:       General: He is awake. Appearance: He is ill-appearing and diaphoretic. Neck:      Vascular: No JVD. Cardiovascular:      Rate and Rhythm: Tachycardia present. Heart sounds: Murmur heard. Systolic murmur is present. Pulmonary:      Effort: Tachypnea present. No accessory muscle usage or respiratory distress. Breath sounds: Examination of the right-middle field reveals decreased breath sounds and rales. Examination of the left-middle field reveals rales. Examination of the right-lower field reveals decreased breath sounds and rales. Examination of the left-lower field reveals rales. Decreased breath sounds and rales present. Abdominal:      General: Abdomen is protuberant. Bowel sounds are increased. Palpations: Abdomen is soft. Tenderness: There is abdominal tenderness. Musculoskeletal:        Feet:    Feet:      Left foot:      Skin integrity: Erythema (right great toe ) present. Neurological:      Mental Status: He is alert and oriented to person, place, and time. GCS: GCS eye subscore is 4. GCS verbal subscore is 5. GCS motor subscore is 6. Cranial Nerves: Cranial nerves are intact. Comments: Hallucinations earlier    Psychiatric:         Behavior: Behavior is cooperative. Plan  Staph bacteremia-  Blood culture 2/2 positive, tachy, mild hypotension diaphoretic. Will start with 500 cc fluid bolus despite the lung exam for perfusion. IF cxr is normal, will move forward with remainder of sepsis fluid bolus Check lactic.  Consider ct of l/s r/o infection if the echocardiogram is normal.    Tachypnea, increased 02 demand- stat cxr, RT eval nivpp  ABG  Check ddimer as the events occurred ( sudden dyspnea, tachycardia) after getting up to ambulate to bathroom   Check BMP for electrolyte disturbances  Check EKG for abnormal EKG, repeat once HR is improved 100 or lower or 30 minute, consider demand ischemia given the increased hr anticipate elevated trop to some degree. D/W patient the possible need for transfer to ICU. Patient is open to intubation and or full resuscitation measures         Please call all labs to ONCALL,   Please do not hesitate to call for any changes in exam or clinical picture.

## 2021-07-18 NOTE — FLOWSHEET NOTE
SPIRITUAL CARE DEPARTMENT - [address]  PROGRESS NOTE    Shift date: 7/18/21  Shift day: Sunday   Shift # 1    Room # 2391/9022-05   Name: Lance Bojorquez            Age: 36 y.o. Gender: male          Restorationist: Unknown   Place of Sabianism: Unknown    Referral: Routine Visit    Admit Date & Time: 7/17/2021 12:42 AM    PATIENT/EVENT DESCRIPTION:  Lance Bojorquez is a 36 y.o. male who is currently having a CT done. SPIRITUAL ASSESSMENT/INTERVENTION:   visited room for initial visit. Family member communicated that Patient was out for medical getting at "[employer]. I proceeded to tell the family member to let the Patient know that I stopped by. If they need spiritual care they can notify a  for future visitation. SPIRITUAL CARE FOLLOW-UP PLAN:  Chaplains will remain available to offer spiritual and emotional support as needed.       Electronically signed by Colt Oscar, on 7/18/2021 at 10:54 AM.  [facility]  [phone]       07/18/21 1051   Encounter Summary   Services provided to: Family   Referral/Consult From: [address] [facility] Family members   Place of Faith Unknown   Contact Adventism No   Continue Visiting   (7/18/21)   Volunteer Visit No   Complexity of Encounter Low   Length of Encounter 15 minutes   Spiritual Assessment Completed Yes   Routine   Type Initial   Assessment   (Patient getting CT.)   Intervention Sustaining presence/ Ministry of presence   Outcome Acceptance;Receptive

## 2021-07-19 ENCOUNTER — APPOINTMENT (OUTPATIENT)
Dept: CARDIAC CATH/INVASIVE PROCEDURES | Age: 41
DRG: 871 | End: 2021-07-19
Payer: COMMERCIAL

## 2021-07-19 LAB
ABO/RH: NORMAL
ALBUMIN SERPL-MCNC: 2.3 G/DL (ref 3.5–5.2)
ALBUMIN SERPL-MCNC: 2.5 G/DL (ref 3.5–5.2)
ALBUMIN SERPL-MCNC: 2.7 G/DL (ref 3.5–5.2)
ALBUMIN/GLOBULIN RATIO: 0.7 (ref 1–2.5)
ALBUMIN/GLOBULIN RATIO: 0.8 (ref 1–2.5)
ALP BLD-CCNC: 73 U/L (ref 40–129)
ALP BLD-CCNC: 92 U/L (ref 40–129)
ALT SERPL-CCNC: 34 U/L (ref 5–41)
ALT SERPL-CCNC: 37 U/L (ref 5–41)
ANION GAP SERPL CALCULATED.3IONS-SCNC: 10 MMOL/L (ref 9–17)
ANION GAP SERPL CALCULATED.3IONS-SCNC: 12 MMOL/L (ref 9–17)
ANION GAP SERPL CALCULATED.3IONS-SCNC: 15 MMOL/L (ref 9–17)
ANTIBODY SCREEN: NEGATIVE
ARM BAND NUMBER: NORMAL
AST SERPL-CCNC: 55 U/L
AST SERPL-CCNC: 64 U/L
BILIRUB SERPL-MCNC: 0.6 MG/DL (ref 0.3–1.2)
BILIRUB SERPL-MCNC: 0.6 MG/DL (ref 0.3–1.2)
BLOOD BANK SPECIMEN: NORMAL
BUN BLDV-MCNC: 10 MG/DL (ref 6–20)
BUN BLDV-MCNC: 8 MG/DL (ref 6–20)
BUN BLDV-MCNC: 9 MG/DL (ref 6–20)
BUN/CREAT BLD: ABNORMAL (ref 9–20)
C DIFF AG + TOXIN: NEGATIVE
CALCIUM SERPL-MCNC: 7.7 MG/DL (ref 8.6–10.4)
CALCIUM SERPL-MCNC: 7.9 MG/DL (ref 8.6–10.4)
CALCIUM SERPL-MCNC: 8.6 MG/DL (ref 8.6–10.4)
CALPROTECTIN, FECAL: <5 UG/G
CHLORIDE BLD-SCNC: 102 MMOL/L (ref 98–107)
CHLORIDE BLD-SCNC: 104 MMOL/L (ref 98–107)
CHLORIDE BLD-SCNC: 104 MMOL/L (ref 98–107)
CO2: 22 MMOL/L (ref 20–31)
CO2: 23 MMOL/L (ref 20–31)
CO2: 24 MMOL/L (ref 20–31)
CREAT SERPL-MCNC: 0.48 MG/DL (ref 0.7–1.2)
CREAT SERPL-MCNC: 0.57 MG/DL (ref 0.7–1.2)
CREAT SERPL-MCNC: 0.65 MG/DL (ref 0.7–1.2)
CREATININE URINE: 25.4 MG/DL (ref 39–259)
CULTURE: ABNORMAL
CULTURE: NO GROWTH
EKG ATRIAL RATE: 124 BPM
EKG ATRIAL RATE: 151 BPM
EKG P AXIS: 61 DEGREES
EKG P-R INTERVAL: 148 MS
EKG P-R INTERVAL: 156 MS
EKG Q-T INTERVAL: 244 MS
EKG Q-T INTERVAL: 306 MS
EKG QRS DURATION: 78 MS
EKG QRS DURATION: 88 MS
EKG QTC CALCULATION (BAZETT): 386 MS
EKG QTC CALCULATION (BAZETT): 439 MS
EKG R AXIS: 132 DEGREES
EKG R AXIS: 73 DEGREES
EKG T AXIS: 164 DEGREES
EKG T AXIS: 6 DEGREES
EKG VENTRICULAR RATE: 124 BPM
EKG VENTRICULAR RATE: 151 BPM
EXPIRATION DATE: NORMAL
GFR AFRICAN AMERICAN: >60 ML/MIN
GFR NON-AFRICAN AMERICAN: >60 ML/MIN
GFR SERPL CREATININE-BSD FRML MDRD: ABNORMAL ML/MIN/{1.73_M2}
GLUCOSE BLD-MCNC: 116 MG/DL (ref 70–99)
GLUCOSE BLD-MCNC: 139 MG/DL (ref 70–99)
GLUCOSE BLD-MCNC: 175 MG/DL (ref 70–99)
HAV IGM SER IA-ACNC: NONREACTIVE
HCT VFR BLD CALC: 32.1 % (ref 40.7–50.3)
HCT VFR BLD CALC: 35.1 % (ref 40.7–50.3)
HEMOGLOBIN: 11.2 G/DL (ref 13–17)
HEMOGLOBIN: 11.9 G/DL (ref 13–17)
HEPATITIS B CORE IGM ANTIBODY: NONREACTIVE
HEPATITIS B SURFACE ANTIGEN: NONREACTIVE
HEPATITIS C ANTIBODY: NONREACTIVE
HIV AG/AB: NONREACTIVE
INR BLD: 1
LIPASE: 228 U/L (ref 13–60)
Lab: ABNORMAL
Lab: NORMAL
Lab: NORMAL
MAGNESIUM: 2 MG/DL (ref 1.6–2.6)
MAGNESIUM: 2.1 MG/DL (ref 1.6–2.6)
MCH RBC QN AUTO: 28.9 PG (ref 25.2–33.5)
MCH RBC QN AUTO: 29.1 PG (ref 25.2–33.5)
MCHC RBC AUTO-ENTMCNC: 33.9 G/DL (ref 28.4–34.8)
MCHC RBC AUTO-ENTMCNC: 34.9 G/DL (ref 28.4–34.8)
MCV RBC AUTO: 82.9 FL (ref 82.6–102.9)
MCV RBC AUTO: 85.8 FL (ref 82.6–102.9)
MICRO OVA & PARASITES: NORMAL
NRBC AUTOMATED: 0 PER 100 WBC
NRBC AUTOMATED: 0 PER 100 WBC
PDW BLD-RTO: 13.2 % (ref 11.8–14.4)
PDW BLD-RTO: 13.4 % (ref 11.8–14.4)
PHOSPHORUS: 1.8 MG/DL (ref 2.5–4.5)
PHOSPHORUS: 1.9 MG/DL (ref 2.5–4.5)
PHOSPHORUS: 2.2 MG/DL (ref 2.5–4.5)
PLATELET # BLD: 50 K/UL (ref 138–453)
PLATELET # BLD: 66 K/UL (ref 138–453)
PLATELET # BLD: ABNORMAL K/UL (ref 138–453)
PLATELET, FLUORESCENCE: 40 K/UL (ref 138–453)
PLATELET, IMMATURE FRACTION: 10.7 % (ref 1.1–10.3)
PMV BLD AUTO: 12.1 FL (ref 8.1–13.5)
PMV BLD AUTO: ABNORMAL FL (ref 8.1–13.5)
POTASSIUM SERPL-SCNC: 2.7 MMOL/L (ref 3.7–5.3)
POTASSIUM SERPL-SCNC: 2.9 MMOL/L (ref 3.7–5.3)
POTASSIUM SERPL-SCNC: 2.9 MMOL/L (ref 3.7–5.3)
POTASSIUM SERPL-SCNC: 3.1 MMOL/L (ref 3.7–5.3)
PROTHROMBIN TIME: 10.5 SEC (ref 9.1–12.3)
RBC # BLD: 3.87 M/UL (ref 4.21–5.77)
RBC # BLD: 4.09 M/UL (ref 4.21–5.77)
SODIUM BLD-SCNC: 137 MMOL/L (ref 135–144)
SODIUM BLD-SCNC: 139 MMOL/L (ref 135–144)
SODIUM BLD-SCNC: 140 MMOL/L (ref 135–144)
SPECIMEN DESCRIPTION: ABNORMAL
SPECIMEN DESCRIPTION: NORMAL
SURGICAL PATHOLOGY REPORT: NORMAL
TOTAL PROTEIN, URINE: 15 MG/DL
TOTAL PROTEIN: 5.4 G/DL (ref 6.4–8.3)
TOTAL PROTEIN: 5.9 G/DL (ref 6.4–8.3)
TROPONIN INTERP: ABNORMAL
TROPONIN T: ABNORMAL NG/ML
TROPONIN, HIGH SENSITIVITY: 76 NG/L (ref 0–22)
URINE TOTAL PROTEIN CREATININE RATIO: 0.59 (ref 0–0.2)
WBC # BLD: 11.8 K/UL (ref 3.5–11.3)
WBC # BLD: 9.4 K/UL (ref 3.5–11.3)

## 2021-07-19 PROCEDURE — 93010 ELECTROCARDIOGRAM REPORT: CPT | Performed by: INTERNAL MEDICINE

## 2021-07-19 PROCEDURE — 86850 RBC ANTIBODY SCREEN: CPT

## 2021-07-19 PROCEDURE — 36415 COLL VENOUS BLD VENIPUNCTURE: CPT

## 2021-07-19 PROCEDURE — 6360000002 HC RX W HCPCS: Performed by: NURSE PRACTITIONER

## 2021-07-19 PROCEDURE — 86901 BLOOD TYPING SEROLOGIC RH(D): CPT

## 2021-07-19 PROCEDURE — 87324 CLOSTRIDIUM AG IA: CPT

## 2021-07-19 PROCEDURE — 80069 RENAL FUNCTION PANEL: CPT

## 2021-07-19 PROCEDURE — P9073 PLATELETS PHERESIS PATH REDU: HCPCS

## 2021-07-19 PROCEDURE — 85610 PROTHROMBIN TIME: CPT

## 2021-07-19 PROCEDURE — 2500000003 HC RX 250 WO HCPCS: Performed by: INTERNAL MEDICINE

## 2021-07-19 PROCEDURE — 6370000000 HC RX 637 (ALT 250 FOR IP): Performed by: NURSE PRACTITIONER

## 2021-07-19 PROCEDURE — 36430 TRANSFUSION BLD/BLD COMPNT: CPT

## 2021-07-19 PROCEDURE — APPSS30 APP SPLIT SHARED TIME 16-30 MINUTES: Performed by: NURSE PRACTITIONER

## 2021-07-19 PROCEDURE — 2060000000 HC ICU INTERMEDIATE R&B

## 2021-07-19 PROCEDURE — 99232 SBSQ HOSP IP/OBS MODERATE 35: CPT | Performed by: INTERNAL MEDICINE

## 2021-07-19 PROCEDURE — 83735 ASSAY OF MAGNESIUM: CPT

## 2021-07-19 PROCEDURE — 85055 RETICULATED PLATELET ASSAY: CPT

## 2021-07-19 PROCEDURE — 84484 ASSAY OF TROPONIN QUANT: CPT

## 2021-07-19 PROCEDURE — 6360000002 HC RX W HCPCS: Performed by: INTERNAL MEDICINE

## 2021-07-19 PROCEDURE — 99253 IP/OBS CNSLTJ NEW/EST LOW 45: CPT | Performed by: OTOLARYNGOLOGY

## 2021-07-19 PROCEDURE — 99233 SBSQ HOSP IP/OBS HIGH 50: CPT | Performed by: INTERNAL MEDICINE

## 2021-07-19 PROCEDURE — 30901 CONTROL OF NOSEBLEED: CPT | Performed by: OTOLARYNGOLOGY

## 2021-07-19 PROCEDURE — 83690 ASSAY OF LIPASE: CPT

## 2021-07-19 PROCEDURE — 86644 CMV ANTIBODY: CPT

## 2021-07-19 PROCEDURE — 2580000003 HC RX 258: Performed by: INTERNAL MEDICINE

## 2021-07-19 PROCEDURE — 6370000000 HC RX 637 (ALT 250 FOR IP): Performed by: INTERNAL MEDICINE

## 2021-07-19 PROCEDURE — 84100 ASSAY OF PHOSPHORUS: CPT

## 2021-07-19 PROCEDURE — 85049 AUTOMATED PLATELET COUNT: CPT

## 2021-07-19 PROCEDURE — 87329 GIARDIA AG IA: CPT

## 2021-07-19 PROCEDURE — 80074 ACUTE HEPATITIS PANEL: CPT

## 2021-07-19 PROCEDURE — 80053 COMPREHEN METABOLIC PANEL: CPT

## 2021-07-19 PROCEDURE — 84132 ASSAY OF SERUM POTASSIUM: CPT

## 2021-07-19 PROCEDURE — 86900 BLOOD TYPING SEROLOGIC ABO: CPT

## 2021-07-19 PROCEDURE — 87389 HIV-1 AG W/HIV-1&-2 AB AG IA: CPT

## 2021-07-19 PROCEDURE — 93005 ELECTROCARDIOGRAM TRACING: CPT | Performed by: STUDENT IN AN ORGANIZED HEALTH CARE EDUCATION/TRAINING PROGRAM

## 2021-07-19 PROCEDURE — 86645 CMV ANTIBODY IGM: CPT

## 2021-07-19 PROCEDURE — 85027 COMPLETE CBC AUTOMATED: CPT

## 2021-07-19 PROCEDURE — 093K7ZZ CONTROL BLEEDING IN NASAL MUCOSA AND SOFT TISSUE, VIA NATURAL OR ARTIFICIAL OPENING: ICD-10-PCS | Performed by: OTOLARYNGOLOGY

## 2021-07-19 PROCEDURE — 87449 NOS EACH ORGANISM AG IA: CPT

## 2021-07-19 PROCEDURE — 87328 CRYPTOSPORIDIUM AG IA: CPT

## 2021-07-19 RX ORDER — POTASSIUM CHLORIDE 20 MEQ/1
40 TABLET, EXTENDED RELEASE ORAL 2 TIMES DAILY WITH MEALS
Status: DISCONTINUED | OUTPATIENT
Start: 2021-07-19 | End: 2021-07-24

## 2021-07-19 RX ORDER — SODIUM CHLORIDE 9 MG/ML
INJECTION, SOLUTION INTRAVENOUS PRN
Status: DISCONTINUED | OUTPATIENT
Start: 2021-07-19 | End: 2021-08-02 | Stop reason: HOSPADM

## 2021-07-19 RX ORDER — DIPHENHYDRAMINE HYDROCHLORIDE 50 MG/ML
25 INJECTION INTRAMUSCULAR; INTRAVENOUS ONCE
Status: COMPLETED | OUTPATIENT
Start: 2021-07-19 | End: 2021-07-19

## 2021-07-19 RX ORDER — POTASSIUM CHLORIDE 20 MEQ/1
40 TABLET, EXTENDED RELEASE ORAL ONCE
Status: COMPLETED | OUTPATIENT
Start: 2021-07-19 | End: 2021-07-19

## 2021-07-19 RX ORDER — ECHINACEA PURPUREA EXTRACT 125 MG
1 TABLET ORAL PRN
Status: DISCONTINUED | OUTPATIENT
Start: 2021-07-19 | End: 2021-08-02 | Stop reason: HOSPADM

## 2021-07-19 RX ORDER — OXYMETAZOLINE HYDROCHLORIDE 0.05 G/100ML
2 SPRAY NASAL ONCE
Status: COMPLETED | OUTPATIENT
Start: 2021-07-19 | End: 2021-07-19

## 2021-07-19 RX ADMIN — POTASSIUM CHLORIDE: 149 INJECTION, SOLUTION, CONCENTRATE INTRAVENOUS at 16:38

## 2021-07-19 RX ADMIN — POTASSIUM CHLORIDE 10 MEQ: 7.46 INJECTION, SOLUTION INTRAVENOUS at 11:57

## 2021-07-19 RX ADMIN — POTASSIUM CHLORIDE 10 MEQ: 7.46 INJECTION, SOLUTION INTRAVENOUS at 08:44

## 2021-07-19 RX ADMIN — POTASSIUM CHLORIDE 40 MEQ: 1500 TABLET, EXTENDED RELEASE ORAL at 16:37

## 2021-07-19 RX ADMIN — Medication 6 MG: at 19:48

## 2021-07-19 RX ADMIN — DIPHENHYDRAMINE HYDROCHLORIDE 25 MG: 50 INJECTION INTRAMUSCULAR; INTRAVENOUS at 20:26

## 2021-07-19 RX ADMIN — Medication 2 SPRAY: at 06:03

## 2021-07-19 RX ADMIN — ACETAMINOPHEN 650 MG: 325 TABLET ORAL at 16:37

## 2021-07-19 RX ADMIN — POTASSIUM PHOSPHATE, MONOBASIC AND POTASSIUM PHOSPHATE, DIBASIC 30 MMOL: 224; 236 INJECTION, SOLUTION, CONCENTRATE INTRAVENOUS at 13:57

## 2021-07-19 RX ADMIN — CEFAZOLIN 2000 MG: 10 INJECTION, POWDER, FOR SOLUTION INTRAVENOUS at 16:45

## 2021-07-19 RX ADMIN — ACETAMINOPHEN 650 MG: 325 TABLET ORAL at 04:11

## 2021-07-19 RX ADMIN — POTASSIUM CHLORIDE 10 MEQ: 7.46 INJECTION, SOLUTION INTRAVENOUS at 10:43

## 2021-07-19 RX ADMIN — POTASSIUM CHLORIDE 40 MEQ: 1500 TABLET, EXTENDED RELEASE ORAL at 20:43

## 2021-07-19 RX ADMIN — SODIUM CHLORIDE, POTASSIUM CHLORIDE, SODIUM LACTATE AND CALCIUM CHLORIDE: 600; 310; 30; 20 INJECTION, SOLUTION INTRAVENOUS at 04:11

## 2021-07-19 RX ADMIN — POTASSIUM CHLORIDE 10 MEQ: 7.46 INJECTION, SOLUTION INTRAVENOUS at 09:31

## 2021-07-19 RX ADMIN — CEFAZOLIN 2000 MG: 10 INJECTION, POWDER, FOR SOLUTION INTRAVENOUS at 14:11

## 2021-07-19 RX ADMIN — POTASSIUM CHLORIDE 10 MEQ: 7.46 INJECTION, SOLUTION INTRAVENOUS at 13:09

## 2021-07-19 RX ADMIN — POTASSIUM CHLORIDE 10 MEQ: 7.46 INJECTION, SOLUTION INTRAVENOUS at 13:56

## 2021-07-19 RX ADMIN — CEFAZOLIN 2000 MG: 10 INJECTION, POWDER, FOR SOLUTION INTRAVENOUS at 06:04

## 2021-07-19 ASSESSMENT — PAIN SCALES - GENERAL
PAINLEVEL_OUTOF10: 3
PAINLEVEL_OUTOF10: 1
PAINLEVEL_OUTOF10: 1
PAINLEVEL_OUTOF10: 0

## 2021-07-19 NOTE — PROGRESS NOTES
[facility]  Office: [address], DO, Jasmin Ahmadi, DO, Prisca Maharaj, DO, Roxanna Kahn, DO, Joyce Anderson MD, Ruben Villalobos MD, Brittany Narvaez MD, Mata Blanchard MD, Mahad Rascon MD, Skylar Magaña MD, Jeannette Lovett MD, Therese Hines, DO, Sánchez Floyd MD, Raji Alvarado, DO, Girish Fontanez MD,  Jenn Samayoa DO, Az García MD, Eva Rodriguez MD, Benedict Lopez MD, Mikael Jackson MD, Latha Calderon MD, Selena Cedeño MD, [place], [facility], [facility] Dias, CNP, Abel Garcia, [facility], Mirtha Hayward, CNS, Karis Zamarripa, CNP, Wendi Ahmadi, CNP, Manuel Carrera, CNP, Shanna Rocha, CNP, Joon Parents, CNP, Nellie Arredondo PA-C, Baylee Denny, [facility], Vikas Cardona, CNP, Hugo Phoenix, CNP, Anu Warren, CNP, Jacklyn Frankel, CNP, Nakita Scruggs, CNP, Bettye Gross, CNP, Janes Elder, CNP, Mahi Funes, [address] [place] [address]    Progress Note    7/19/2021    11:41 AM    Name:   Hailey Matt  MRN:     [id number]     Acct:      [de-identified]   Room:   [address] [place] [address] Day:  2  Admit Date:  7/17/2021 12:42 AM    PCP:   Phu Wen MD  Code Status:  Full Code    Subjective:     C/C:   Chief Complaint   Patient presents with    Abdominal Pain    Emesis     Interval History Status: worsening    Patient stat echo yesterday showed small vegetation on mitral valve. Severe MR and mitral valve prolapse. Plan for MONROE and heart cath today. Long discussion with family at bedside regarding plan. Spoke with infectious disease at bedside as well. Brief History:     From H&P:    Patient presents to the emergency room for the concern of generalized fatigue, recurrent abdominal pain, n/v/d with assoicated bone ache. Patient states that he was just recently released from [facility] for very similar complaints and was told that his potassium was low and the CT of the abdomen was normal.  Patient states the diarrhea is water-like. Patient admits to approx 5-7 watery stools a day for the past 4 days, there is no associated bleeding. Abdomen pain is present prior to the diarrhea. There is associated fevers and chills. Subjective fever of 104 prior to arrival. Emesis is food that is digested but from earlier in the day. Patient admits to eating popcorn and nuts just the weekend before the onset.        Review of Systems:     Constitutional:  negative for chills, fevers, sweats  Respiratory: complains of shortness of breath  Cardiovascular:  negative for chest pain, chest pressure/discomfort, lower extremity edema, palpitations  Gastrointestinal: complains of diarrhea, abdominal distention  Neurological:  negative for dizziness, headache    Medications: Allergies: Allergies   Allergen Reactions    Morphine Other (See Comments)     Pt report muscle cramps       Current Meds:   Scheduled Meds:    potassium phosphate IVPB  30 mmol Intravenous Once    ceFAZolin  2,000 mg Intravenous Q6H    sodium chloride flush  5-40 mL Intravenous 2 times per day     Continuous Infusions:    sodium chloride      lactated ringers 75 mL/hr at 07/19/21 0411    sodium chloride       PRN Meds: sodium chloride, sodium chloride, sodium chloride flush, sodium chloride, potassium chloride **OR** potassium alternative oral replacement **OR** [DISCONTINUED] potassium chloride, ondansetron **OR** ondansetron, acetaminophen **OR** acetaminophen, polyethylene glycol, magnesium sulfate, potassium chloride, oxyCODONE, melatonin, aluminum & magnesium hydroxide-simethicone    Data:     Past Medical History:   has a past medical history of Diverticulosis, Hyperlipidemia, and Hypertension. Social History:   reports that he has never smoked. He has never used smokeless tobacco. He reports current alcohol use of about 20.0 standard drinks of alcohol per week. He reports that he does not use drugs.      Family History:   Family History   Problem Relation Age of Onset  No Known Problems Mother     Other Father         diverticulitis       Vitals:  /77   Pulse 103   Temp 98.8 °F (37.1 °C)   Resp 30   Ht 5' 6\" (1.676 m)   Wt 193 lb 2 oz (87.6 kg)   SpO2 97%   BMI 31.17 kg/m²   Temp (24hrs), Av.7 °F (37.1 °C), Min:97.2 °F (36.2 °C), Max:101.8 °F (38.8 °C)    Recent Labs     21  0421 21  0535   POCGLU 160* 187*       I/O (24Hr): Intake/Output Summary (Last 24 hours) at 2021 1141  Last data filed at 2021 0800  Gross per 24 hour   Intake 6652.92 ml   Output 6800 ml   Net -147.08 ml       Labs:  Hematology:  Recent Labs     21  0140 21  1424 21  0521 21  0653 21  1427 21  1457 21  0656   WBC 6.9   < > 9.9  --   --  9.6 9.4   RBC 4.63   < > 4.41  --   --  4.47 3.87*   HGB 13.3   < > 12.5*  --   --  12.8* 11.2*   HCT 38.2*   < > 36.5*  --   --  37.9* 32.1*   MCV 82.5*   < > 82.8  --   --  84.8 82.9   MCH 28.7   < > 28.3  --   --  28.6 28.9   MCHC 34.8   < > 34.2  --   --  33.8 34.9*   RDW 12.1   < > 12.8  --   --  13.1 13.2   PLT See Reflexed IPF Result   < > See Reflexed IPF Result  --   --  See Reflexed IPF Result See Reflexed IPF Result   MPV NOT REPORTED   < > NOT REPORTED  --   --  NOT REPORTED NOT REPORTED   SEDRATE 31*  --   --   --   --   --   --    .5*  --   --   --   --   --   --    INR  --   --   --   --  0.9  --  1.0   DDIMER  --   --   --  12.99  --   --   --     < > = values in this interval not displayed.      Chemistry:  Recent Labs     21  0855 21  0519 21  0521 21  0521 21  0653 21  0653 21  1427 21  0656 21  0658   NA   < >  --  131*  --   --   --  137 139  --    K   < >  --  3.0*  --   --   --  3.6* 2.7*  --    CL   < >  --  101  --   --   --  101 102  --    CO2   < >  --  19*  --   --   --   22  --    GLUCOSE   < >  --  172*  --   --   --  169* 139*  --    BUN   < >  --  8  --   --   --  6 9  --    CREATININE   < >  -- 0.76  --   --   --  0.77 0.65*  --    MG  --  2.2 2.3  --   --   --   --  2.1  --    ANIONGAP   < >  --  11  --   --   --  15 15  --    LABGLOM   < >  --  >60  --   --   --  >60 >60  --    GFRAA   < >  --  >60  --   --   --  >60 >60  --    CALCIUM   < >  --  7.9*  --   --   --  7.6* 7.7*  --    PHOS  --   --  0.8*   < > 0.7*   < > 3.1 1.8* 1.9*   PROBNP  --   --   --   --  1,835*  --   --   --   --    TROPHS  --  171*  --   --  165*  --   --   --   --    MYOGLOBIN  --  135*  --   --  138*  --   --   --   --    LACTACIDWB  --  1.6  --   --   --   --   --   --   --     < > = values in this interval not displayed. Recent Labs     07/17/21  0140 07/17/21  0140 07/18/21  0421 07/18/21  0519 07/18/21  0521 07/18/21  0535 07/18/21  1427 07/19/21  0656   PROT 6.5   < >  --   --  6.0*  --  6.0* 5.4*   LABALBU 3.2*   < >  --   --  2.7*  --  2.7* 2.3*   LABA1C 5.5  --   --   --   --   --   --   --    AST 67*   < >  --   --  66*  --  66* 55*   ALT 51*   < >  --   --  43*  --  43* 34   LDH  --   --   --   --   --   --  437*  --    ALKPHOS 59   < >  --   --  81  --  77 73   BILITOT 0.65   < >  --   --  0.70  --  0.60 0.60   BILIDIR 0.21  --   --   --   --   --  0.25  --    AMYLASE  --   --   --   --  131*  --   --   --    LIPASE 32  --   --   --  299*  --   --  228*   URICACID  --   --   --  2.3*  --   --   --   --    POCGLU  --   --  160*  --   --  187*  --   --     < > = values in this interval not displayed.      ABG:  Lab Results   Component Value Date    POCPH 7.486 07/18/2021    POCPCO2 28.6 07/18/2021    POCPO2 134.5 07/18/2021    POCHCO3 21.6 07/18/2021    NBEA 1 07/18/2021    PBEA NOT REPORTED 07/18/2021    AUO2GTT NOT REPORTED 07/18/2021    HNGQ7MZI 99 07/18/2021    FIO2 NOT REPORTED 07/18/2021     Lab Results   Component Value Date/Time    SPECIAL L WRIST 2 ML 07/18/2021 02:43 PM     Lab Results   Component Value Date/Time    CULTURE (A) 07/18/2021 02:43 PM     POSITIVE Blood Culture Results called to and read back by: MILA Rocha ON 7/19/21 AT 0606    CULTURE  07/18/2021 02:43 PM     DIRECT Carlus Cobia STAIN FROM BOTTLE: [id number] IN [employer]       Radiology:  CT ABDOMEN PELVIS WO CONTRAST Additional Contrast? None    Result Date: 7/17/2021  No acute abnormality identified in the abdomen and pelvis. The bladder is markedly distended with a calculated volume of 2 L. Hepatic steatosis and mild hepatomegaly. Minor bibasilar atelectasis and interstitial thickening posteriorly. XR CHEST (SINGLE VIEW FRONTAL)    Result Date: 7/17/2021  Unremarkable portable chest radiograph. XR ABDOMEN (KUB) (SINGLE AP VIEW)    Result Date: 7/17/2021  Nonobstructive bowel gas pattern. No acute process identified. US GALLBLADDER RUQ    Result Date: 7/17/2021  Mild hepatomegaly. Diffuse hepatic steatosis. Contracted gallbladder. No cholelithiasis or acute cholecystitis. XR CHEST PORTABLE    Result Date: 7/18/2021  No focal consolidation. No acute process evident. CT CHEST ABDOMEN PELVIS W CONTRAST    Result Date: 7/18/2021  Moderate distention of the colon multiple air-fluid levels, findings may be seen with acute enterocolitis. Physical Examination:        General appearance:  Alert, ill-appearing, mild distress  Mental Status:  oriented to person, place and time and normal affect  Lungs:  clear to auscultation bilaterally, normal effort  Heart: Rapid rate regular rhythm 3 out of 6 systolic murmur left sternal border, no rubs or gallops  Abdomen: Moderately distended, epigastric tenderness, no rebound tenderness, no guarding, hypoactive bowel sounds  Extremities:  no edema, redness, tenderness in the calves  Skin: Multiple cutaneous lesions of the left upper extremity, left lower extremity and right upper extremity. Right posterior thorax with erythema marked by pen not increasing from yesterday.   Small bruise on the right fingertips    Assessment:        Hospital Problems         Last Modified POA    * (Principal) Sepsis with acute organ dysfunction and septic shock ([facility] [address] 7/17/2021 Yes    Diarrhea 7/17/2021 Yes    Acute dehydration 7/17/2021 Yes    Hypomagnesemia 7/17/2021 Yes    Hypokalemia 7/17/2021 Yes    Bleeding nose 7/17/2021 Yes    Thrombocytopenia ([facility] [address] 7/17/2021 Yes    Hyperglycemia 7/17/2021 Yes    Essential hypertension 7/17/2021 Yes          Plan:        1. Severe Sepsis -unclear source at this time, infectious disease consulted, patient was changed to Ancef for antibiotics. MSSA is suspected on blood cultures. MONROE today  2. MSSA bacteremia -patient on Ancef for bacteremia, spoke with orthopedic surgery they feel left toe is low risk for septic joint. Do not feel he needs an aspiration  3. Thrombocytopenia -no coagulopathy, peripheral smear for schistocytes. Suspect ITP  4. Diarrhea -factious disease screening for C. difficile and ova and parasites. Order CMV IgG and IgM  5. Elevated lipase-no acute pancreatitis on CT, complaining of epigastric pain but mild. Will give IV fluids, possibly from sepsis  6. Acute respiratory failure with hypoxia-multifactorial from severe mitral regurgitation, volume overload. 7. Hypophosphatemia, hypomagnesemia-replace  8. Elevated troponin -likely secondary to dehydration, plateaued. Cardiology is following, echocardiogram ordered  9. Plan:  1. MONROE and heart cath today  2. Infectious disease working up for C. difficile and ova and parasites. 3. Orthopedic surgery discussed aspiration of left toe, will hold off for now  4. Order CMV IgG/IgM, check HIV  5.  Continue Ancef for now, discussed the possibility that if we are unable to wean him from oxygen he will need to go to [facility] for infection to clear if cardiology feels MR needs to be addressed    Danny Kang DO  7/19/2021  11:41 AM

## 2021-07-19 NOTE — CONSULTS
injection 5-40 mL  5-40 mL Intravenous 2 times per day Capri Holder, APRN - CNP   10 mL at 07/18/21 0902    sodium chloride flush 0.9 % injection 10 mL  10 mL Intravenous PRN Pari Peter APRN - CNP   10 mL at 07/18/21 0851    0.9 % sodium chloride infusion  25 mL Intravenous PRN Pari Peter, APRN - CNP        potassium chloride (KLOR-CON M) extended release tablet 40 mEq  40 mEq Oral PRN Pari Peter, APRN - CNP        Or    potassium bicarb-citric acid (EFFER-K) effervescent tablet 40 mEq  40 mEq Oral PRN Pari Peter, APRN - CNP        ondansetron (ZOFRAN-ODT) disintegrating tablet 4 mg  4 mg Oral Q8H PRN Pari Peter, APRN - CNP        Or    ondansetron (ZOFRAN) injection 4 mg  4 mg Intravenous Q6H PRN Pari Peter APRN - CNP        acetaminophen (TYLENOL) tablet 650 mg  650 mg Oral Q6H PRN Pari Peter, APRN - CNP   650 mg at 07/19/21 0411    Or    acetaminophen (TYLENOL) suppository 650 mg  650 mg Rectal Q6H PRN Pari Peter, APRN - CNP        polyethylene glycol (GLYCOLAX) packet 17 g  17 g Oral Daily PRN Pari Peter, APRN - CNP        magnesium sulfate 1000 mg in dextrose 5% 100 mL IVPB  1,000 mg Intravenous PRN Pari Peter APRN - CNP        potassium chloride 10 mEq/100 mL IVPB (Peripheral Line)  10 mEq Intravenous PRN Pari Peter, APRN -  mL/hr at 07/18/21 1327 10 mEq at 07/18/21 1327    oxyCODONE (ROXICODONE) immediate release tablet 5 mg  5 mg Oral Q6H PRN Chrissy Hodgson MD   5 mg at 07/17/21 2323    melatonin tablet 6 mg  6 mg Oral Nightly PRN Soila Spangler APRN - CNP   6 mg at 07/18/21 2306    aluminum & magnesium hydroxide-simethicone (MAALOX) 200-200-20 MG/5ML suspension 30 mL  30 mL Oral Q6H PRN MARIKA Barr CNP   30 mL at 07/17/21 7822        ALLERGIES:   Allergies   Allergen Reactions    Morphine Other (See Comments)     Pt report muscle cramps PHYSICAL EXAM:  Vitals:    07/18/21 2330 07/19/21 0000 07/19/21 0400 07/19/21 0415   BP: 109/76 112/71 114/72    Pulse: 106 105 113    Resp: 25 26 (!) 31    Temp: 98.2 °F (36.8 °C)  98.3 °F (36.8 °C)    TempSrc: Oral  Oral    SpO2: 92% 92% 92%    Weight:    193 lb 2 oz (87.6 kg)   Height:          GENERAL: well developed and well nourished and in no acute distress  HEAD: normocephalic and atraumatic  EYES: no eyelid swelling, no conjunctival injection or exudate, pupils equal round and reactive to light  EXTERNAL EARS: normal  NOSE: bilateral raw areas on anterior septum, left worse than right. No clot visualized, no bleeding seen more posteriorly  MOUTH/THROAT: mucous membranes moist, no focal lesions, no tonsillar enlargement or exudate and old appearing blood draining from nasopharynx on left side in posteroir pharynx  NECK: non-tender, full range of motion, no mass, no focal lymphadenopathy  RESPIRATORY: Normal expansion. Clear to auscultation. No rales, rhonchi, or wheezing. NEUROLOGICAL:  cranial nerves II-XII are grossly intact    PROCEDURE: bilateral Nasal Cautery    DATE AND TIME OF PROCEDURE: 7/19/2021 7:12 AM  PATIENT NAME: Vazquez Silverio  MRN: [id number]  SURGEON: Fina Funez MD   ASSISTANT: {None    PREOPERATIVE DIAGNOSIS:  bilateralepistaxis  POSTOPERATIVE DIAGNOSIS:  bilateral epistaxis    INDICATION:  bilateralepistaxis refractory to medical therapy    TEACHING: Procedure, benefits, and risks (persistent bleeding, perforation, need for further interventions) were explained to patient Verbal informed consent was obtained. TIME OUT: A time out was conducted immediately before starting the procedure that confirmed a final verification of the correct patient, correct procedure, correct patient position, correct site and availability of special equipment.     SITE: bilateral nostril(s)    ANESTHESIA:  Topical 2% lidocaine jelly  COMPLICATIONS: None  EBL: None  TOLERANCE: Excellent    DESCRIPTION OF PROCEDURE:  The bilateral nasal cavity was anesthetized with topical lidocaine and decongested with oxymetazoline applied on a cotton ball. After time for vasoconstriction and anesthesia, the cotton ball was removed and the bilateral nasal septum was visualized. Silver nitrate was applied to the prominent vessel/raw area. Care was taken to avoid contact of the silver nitrate with nasal skin. Hemostasis was excellent. This concluded the procedure. IMPRESSION:  44yo M with bactermia and thrombocytopenia with bilateral (L>R) anterior epistaxis, now s/p nasal cautery.     RECOMMENDATIONS/PLAN:  - Recommend antibiotic ointment to anterior septum twice daily for next 7 days  - Humidify air through nasal cannula to prevent dryness  - Transfuse platelets to goal of 50  - Please call ENT if further bleeding      --------------------------------------------------  Liset Ramirez MD   [facility]  Office  ph# [phone]    Also available in [facility]

## 2021-07-19 NOTE — PROGRESS NOTES
[facility]   Progress Note                   Date:   7/19/2021  Patient name: Francie Ng  Date of admission:  7/17/2021 12:42 AM  MRN:   [id number]  YOB: 1980  PCP: Corey Jay MD    Reason for Admission: Diarrhea [R19.7]    Subjective:       Clinical Changes / Abnormalities: Patient seen and examined in room after discussion with RN. COVID 19 negative. No recent fevers. RN reports patient does experience hallucinations when febrile. Received platelets this morning. Has been NPO since midnight for MONROE today. Medications:   Scheduled Meds:   ceFAZolin  2,000 mg Intravenous Q6H    sodium chloride flush  5-40 mL Intravenous 2 times per day     Continuous Infusions:   sodium chloride      lactated ringers 75 mL/hr at 07/19/21 0411    sodium chloride       CBC:   Recent Labs     07/18/21  0521 07/18/21  1457 07/19/21  0656   WBC 9.9 9.6 9.4   HGB 12.5* 12.8* 11.2*   PLT See Reflexed IPF Result See Reflexed IPF Result See Reflexed IPF Result     BMP:    Recent Labs     07/18/21  0521 07/18/21  1427 07/19/21  0656   * 137 139   K 3.0* 3.6* 2.7*    101 102   CO2 19* 21 22   BUN 8 6 9   CREATININE 0.76 0.77 0.65*   GLUCOSE 172* 169* 139*     Hepatic:   Recent Labs     07/18/21  0521 07/18/21  1427 07/19/21  0656   AST 66* 66* 55*   ALT 43* 43* 34   BILITOT 0.70 0.60 0.60   ALKPHOS 81 77 73     Troponin:   Recent Labs     07/18/21  0519 07/18/21  0653   TROPHS 171* 165*     BNP: No results for input(s): BNP in the last 72 hours. Lipids: No results for input(s): CHOL, HDL in the last 72 hours.     Invalid input(s): LDLCALCU  INR:   Recent Labs     07/18/21  1427 07/19/21  0656   INR 0.9 1.0       Objective:   Vitals: /72   Pulse 103   Temp 98.8 °F (37.1 °C)   Resp 20   Ht 5' 6\" (1.676 m)   Wt 193 lb 2 oz (87.6 kg)   SpO2 93%   BMI 31.17 kg/m²   General appearance: alert and cooperative with exam  HEENT: Head: Normocephalic, no lesions, without obvious abnormality. Neck: no JVD, trachea midline, no adenopathy  Lungs: Clear to auscultation  Heart: Regular rate and rhythm, s1/s2 auscultated, no murmurs  Abdomen: soft, non-tender, bowel sounds active  Extremities: no edema  Neurologic: not done    ECHO 7/18/2021  Summary  Global left ventricular systolic function is normal. Calculated ejection  fraction 48% by Dubois's method. Visually estimated EF 50%. Posterior mitral valve leaflet prolapse. Severe mitral regurgitation. Eccentric anterior diredted jet. EOA = 0.7cm2. PISA radius is 1.3cm. Vena contracta is 0.99 cm. MR volume is  58ml. Possible small vegetation on posterior leaflet. Consider MNOROE. Assessment / Acute Cardiac Problems:   1. Troponin elevation likely demand ischemia in setting of hypotension 2/2 to sepsis  2. Sinus tachycardia  3. Septic Shock  4. Bacteremia with blood cultures showing gram positive cocci in clusters x2  5. HTN  6. HLD  7. N/V Diarrhea  8. Hypokalemia    Patient Active Problem List:     Diarrhea     Acute dehydration     Hypomagnesemia     Hypokalemia     Bleeding nose     Thrombocytopenia (HCC)     Hyperglycemia     Essential hypertension     Sepsis with acute organ dysfunction and septic shock ([facility] [address]      Plan of Treatment:   1. Troponin 171, 165 with last one 7/18/2021 at 0653. Will recheck today. 2. Echo as noted above. Plan for MONROE today to evaluate possible vegetation. COVID 19 test negative. 3. Keep K+ > 4.0, and Mg+ > 2.0. K was 2.7 this morning. Potassium replacement in process. Will recheck K this afternoon. Continue IV Fluids and IV antibiotics per primary team/ID. 4. Keep K+ > 4.0, and Mg+ > 2.0  5. HR remains 90's to 100's at rest. BP stable. Continue to monitor. 6. Further orders pending MONROE findings.          Electronically signed by MARIKA Carrion CNP on 7/19/2021 at 9:09 [facility]      [phone]

## 2021-07-19 NOTE — PROGRESS NOTES
Pt has been experiencing frequent bloody noses. He had two tonight, one lasting about an hour long. Otolaryngology consult was placed. Orders for Afrin nasal spray and saline nasal spray were placed. NP Una at bedside to evaluate pt.

## 2021-07-19 NOTE — PROGRESS NOTES
Patient noted to have thrombocytopenia this morning with platelet count of 61I. He was administered a unit of platelet transfusion however repeat platelet level not obtained. Recommend to obtain platelet level, if greater than 50 K will proceed with OMNROE. If less than 50K will hold off on MONROE until platelet count above 50K. Discussed with Dr. Eduard Preciado.     Finn Pichardo MD  Fellow Cardiovascular Disease  [facility]

## 2021-07-19 NOTE — PLAN OF CARE
Patient's platelet levels came back at 50K, however Potassium level is at 2.9 despite replacements. Discussed with Dr. Osvaldo Rust and was advised to cancel the MONROE today due to hypokalemia. Recommend to keep potassium more than 4.  Reason for canceling MONROE today was explained to the patient.       Niels Cedeño MD  Cardiovascular Disease Fellow

## 2021-07-19 NOTE — PROGRESS NOTES
Patient admitted from room 3004, consent signed, all questions answered. Pt ready for procedure. Bed in low position, call light to reach with side rails up 2 of 2. Wife at bedside with patient.

## 2021-07-19 NOTE — PLAN OF CARE
Problem: Pain:  Goal: Pain level will decrease  Description: Pain level will decrease  7/19/2021 1134 by Layla Rodriguez RN  Outcome: Ongoing  7/18/2021 2354 by Leni Contreras RN  Outcome: Ongoing  Goal: Control of acute pain  Description: Control of acute pain  7/19/2021 1134 by Layla Rodriguez RN  Outcome: Ongoing  7/18/2021 2354 by Leni Contreras RN  Outcome: Ongoing  Goal: Control of chronic pain  Description: Control of chronic pain  7/19/2021 1134 by Layla Rodriguez RN  Outcome: Ongoing  7/18/2021 2354 by Leni Contreras RN  Outcome: Ongoing     Problem: Infection, Septic Shock:  Goal: Will show no infection signs and symptoms  Description: Will show no infection signs and symptoms  7/19/2021 1134 by Layla Rodriguez RN  Outcome: Ongoing  7/18/2021 2354 by Leni Contreras RN  Outcome: Ongoing     Problem: Falls - Risk of:  Goal: Will remain free from falls  Description: Will remain free from falls  Outcome: Ongoing  Goal: Absence of physical injury  Description: Absence of physical injury  Outcome: Ongoing     Problem: Gas Exchange - Impaired:  Goal: Levels of oxygenation will improve  Description: Levels of oxygenation will improve  Outcome: Ongoing     Problem: Cardiac:  Goal: Ability to maintain vital signs within normal range will improve  Description: Ability to maintain vital signs within normal range will improve  Outcome: Ongoing  Goal: Cardiovascular alteration will improve  Description: Cardiovascular alteration will improve  Outcome: Ongoing     Problem: Health Behavior:  Goal: Will modify at least one risk factor affecting health status  Description: Will modify at least one risk factor affecting health status  Outcome: Ongoing  Goal: Identification of resources available to assist in meeting health care needs will improve  Description: Identification of resources available to assist in meeting health care needs will improve  Outcome: Ongoing     Problem: Physical Regulation:  Goal: Ability to maintain vital signs within normal range will improve  Description: Ability to maintain vital signs within normal range will improve  Outcome: Ongoing  Goal: Complications related to the disease process, condition or treatment will be avoided or minimized  Description: Complications related to the disease process, condition or treatment will be avoided or minimized  Outcome: Ongoing  Goal: Diagnostic test results will improve  Description: Diagnostic test results will improve  Outcome: Ongoing  Goal: Will remain free from infection  Description: Will remain free from infection  Outcome: Ongoing  Goal: Ability to maintain clinical measurements within normal limits will improve  Description: Ability to maintain clinical measurements within normal limits will improve  Outcome: Ongoing  Goal: Will show no signs and symptoms of electrolyte imbalance  Description: Will show no signs and symptoms of electrolyte imbalance  Outcome: Ongoing     Problem: Skin Integrity:  Goal: Demonstration of wound healing without infection will improve  Description: Demonstration of wound healing without infection will improve  Outcome: Ongoing  Goal: Complications related to intravenous access or infusion will be avoided or minimized  Description: Complications related to intravenous access or infusion will be avoided or minimized  Outcome: Ongoing     Problem: Respiratory:  Goal: Ability to maintain normal respiratory secretions will improve  Description: Ability to maintain normal respiratory secretions will improve  Outcome: Ongoing     Problem: Anxiety:  Goal: Level of anxiety will decrease  Description: Level of anxiety will decrease  Outcome: Ongoing     Problem: Fluid Volume:  Goal: Ability to achieve a balanced intake and output will improve  Description: Ability to achieve a balanced intake and output will improve  Outcome: Ongoing

## 2021-07-19 NOTE — PROGRESS NOTES
Writer notified ID that pts second set of blood cultures came back positive for gram positive cocci clusters

## 2021-07-19 NOTE — PROGRESS NOTES
Comprehensive Nutrition Assessment    Type and Reason for Visit:  Initial, Positive Nutrition Screen (wt loss, poor appetite)    Nutrition Recommendations/Plan:   -Continue NPO status   -Start diet as able   -Suggest ensure enlive supplements BID as able   -Will monitor nutrition progression     Nutrition Assessment:  Pt currently off the floor during time of visit and NPO for MONROE and heart cath today. No wt hx in EMR. Prior to NPO status, pt was consuming 50-75% of his meals. Will monitor for restart of diet and add nutritional supplements as able to compliment PO intake. Malnutrition Assessment:  Malnutrition Status:  Insufficient data    Context:  Acute Illness     Findings of the 6 clinical characteristics of malnutrition:  Energy Intake:  Mild decrease in energy intake (Comment)  Weight Loss:  Unable to assess     Body Fat Loss:  Unable to assess     Muscle Mass Loss:  Unable to assess    Fluid Accumulation:  1 - Mild Extremities   Strength:  Not Performed    Estimated Daily Nutrient Needs:  Energy (kcal):  1.2-1.3 ~> 6621-0810 kcals/d; Weight Used for Energy Requirements:  Current     Protein (g):  1.2-1.4 gm/kg ~> 78-91 gms/d; Weight Used for Protein Requirements:  Ideal        Fluid (ml/day):  2500 mLs/d OR per MD discretion; Method Used for Fluid Requirements:   (NCM)      Nutrition Related Findings:  BM 7/18; K 2.7, Phos 1.9; meds reviewed      Wounds:  None       Current Nutrition Therapies:    Diet NPO    Anthropometric Measures:  · Height: 5' 6\" (167.6 cm)  · Current Body Weight: 193 lb (87.5 kg)   · Admission Body Weight: 172 lb (78 kg)    · Ideal Body Weight: 142 lbs; % Ideal Body Weight 135.9 %   · BMI: 31.2  · BMI Categories: Obese Class 1 (BMI 30.0-34. 9)       Nutrition Diagnosis:   · Inadequate oral intake related to  (procedure today) as evidenced by NPO or clear liquid status due to medical condition      Nutrition Interventions:   Food and/or Nutrient Delivery:  Continue NPO (Start diet as able; suggest ensure enlive supplements BID as able)  Nutrition Education/Counseling:  Education not indicated   Coordination of Nutrition Care:  Continue to monitor while inpatient    Goals: Set   Restart diet within 24-72 hrs       Nutrition Monitoring and Evaluation:   Food/Nutrient Intake Outcomes:  Diet Advancement/Tolerance  Physical Signs/Symptoms Outcomes:  Biochemical Data, Nutrition Focused Physical Findings, Skin, Weight, GI Status, Fluid Status or Edema     Discharge Planning:     Too soon to determine     Electronically signed by Angelica Hess RD, KWADWO on 7/19/21 at 12:06 PM EDT    Contact: [phone]

## 2021-07-19 NOTE — CARE COORDINATION
Transitional planning:  RN approached writer and asked who the pt could contact at [facility], since he worked in the [employer] and has unusual rashes on his body. [address] [place] [address] at [facility] said he could call this number and ask for extension [phone] to establish becoming a pt at the [facility].   4553 Gave information to the pt.     1015 Nursing report, states pt needs LTACH. Pt is at O2 5 L nc. Has [place]. Needs IV antibiotics for 4 weeks. Pt having MONROE today. 36 NCM met with pt's parents at bedside. Pt and spouse in MONROE. Discussed SNF vs HC. Parents state spouse is a stay at home mother and they would more than likely choose home care. [facility] [place], [facility]. and SNF choice list at bedside.

## 2021-07-19 NOTE — PROGRESS NOTES
[facility] - Progress Note    Today's Date and Time: 7/19/2021, 10:44 AM    Impression :   · MSSA septicemia 7-17-21  · Sepsis with acute organ dysfunction and septic shock  · Diarrhea  · Thrombocytopenia  · History of diverticulitis resulting in partial hemicolectomy with reanastomosis  · History of appendectomy  · History of right ACL repair with screw in place  · Acute dehydration  · Hx systolic cardiac murmur  · Sinus tachycardia  · Hyperlipidemia  · Elevated troponin level likely secondary to demand ischemia  · Elevated inflammatory markers  · Hypokalemia    Recommendations:   · Ancef 2000 mg IV every 6 hours starting 7/18/2021 until 8/15/2021 for MSSA septicemia  · Echocardiogram-MONROE  · X ray imaging of right foot  · NG tube to low intermittent wall suction for nausea and vomiting with possible acute enterocolitis may be beneficial  · Cárdenas for continued urinary bladder distention    Medical Decision Making/Summary/Discussion:7/19/2021     ·   Infection Control Recommendations   · Cecil Precautions  · Contact Isolation     Antimicrobial Stewardship Recommendations     · Simplification of therapy  · Targeted therapy    Coordination of Outpatient Care:   · Estimated Length of IV antimicrobials: 4 weeks  · Patient will need Midline Catheter Insertion: TBD  · Patient will need PICC line Insertion: TBD  · Patient will need: Home IV , [place],  SNF,  LTAC: Yes-either SNF or home infusion  · Patient will need outpatient wound care: TBD    Chief complaint/reason for consultation:   · MSSA sepsis      History of Present Illness:   Bobo Morin is a 36y.o.-year-old  male who was initially admitted on 7/17/2021. Patient seen at the request of Dr. Oren Nieto:     Patient initially presented to [facility] a few days ago with complaints of  abdominal pain, nausea, watery diarrhea and vomiting for the past 5 days after eating some chicken.   The revealed: Moderate distention of the colon multiple air-fluid levels, findings may be   seen with acute enterocolitis. Ortho has been consulted due to concern for septic joint. ENT was consulted as the patient experienced multiple prolonged episodes of epistaxis overnight. His platelet count has continued to be decreased from an unknown cause. Stool studies have been negative thus far. Ova and parasite stool study & C-diff ordered on 7/19/2021    Hepatitis panel ordered    Ancef has been initiated for MSSA septicemia. We will follow up on further culture and echo results    We will continue to monitor  Discussed with Dr. Gabbie Baum and RN    Labs, X rays reviewed: 7/19/2021    BUN:8  Cr:0.76  NA: 131  K: 3.0-->2.7  CRP: 260.5  LDH: 437    WBC:9.9  Hb:12.5  Plat: 39-->40  Haptoglobin: 256  Sed rate: 31  Lipase 299->228  Lactate: 1.6    Cultures:  Urine:  ·   Blood:  ·   Sputum :  ·   Wound:  ·     Discussed with patient, RN, family. I have personally reviewed the past medical history, past surgical history, medications, social history, and family history, and I have updated the database accordingly.   Past Medical History:     Past Medical History:   Diagnosis Date    Diverticulosis     Hyperlipidemia     Hypertension        Past Surgical  History:     Past Surgical History:   Procedure Laterality Date    ABDOMEN SURGERY      large bowel resection    ANTERIOR CRUCIATE LIGAMENT REPAIR Right     APPENDECTOMY  07/09/1997    COLECTOMY      2012 - D/T Dverticulitis    DILATATION, ESOPHAGUS         Medications:      potassium phosphate IVPB  30 mmol Intravenous Once    ceFAZolin  2,000 mg Intravenous Q6H    sodium chloride flush  5-40 mL Intravenous 2 times per day       Social History:     Social History     Socioeconomic History    Marital status:      Spouse name: Not on file    Number of children: Not on file    Years of education: Not on file    Highest education level: Not on file Occupational History    Not on file   Tobacco Use    Smoking status: Never Smoker    Smokeless tobacco: Never Used   Vaping Use    Vaping Use: Never assessed   Substance and Sexual Activity    Alcohol use: Yes     Alcohol/week: 20.0 standard drinks     Types: 20 Cans of beer per week     Comment: 20/ week, average every other day    Drug use: Never    Sexual activity: Not on file   Other Topics Concern    Not on file   Social History Narrative    Not on file     Social Determinants of Health     Financial Resource Strain:     Difficulty of Paying Living Expenses:    Food Insecurity:     Worried About Running Out of Food in the Last Year:     [address] in the Last Year:    Transportation Needs:     Lack of Transportation (Medical):  Lack of Transportation (Non-Medical):    Physical Activity:     Days of Exercise per Week:     Minutes of Exercise per Session:    Stress:     Feeling of Stress :    Social Connections:     Frequency of Communication with Friends and Family:     Frequency of Social Gatherings with Friends and Family:     Attends Worship Services:     Active Member of Clubs or Organizations:     Attends Club or Organization Meetings:     Marital Status:    Intimate Partner Violence:     Fear of Current or Ex-Partner:     Emotionally Abused:     Physically Abused:     Sexually Abused:        Family History:     Family History   Problem Relation Age of Onset    No Known Problems Mother     Other Father         diverticulitis        Allergies:   Morphine     Review of Systems:   Constitutional: No fevers or chills. No systemic complaints  Head: No headaches  Eyes: No double vision or blurry vision. No conjunctival inflammation. ENT: No sore throat or runny nose. . No hearing loss, tinnitus or vertigo. Cardiovascular: No chest pain or palpitations. No shortness of breath. No CASTRO  Lung: No shortness of breath or cough.  No sputum production  Abdomen: No nausea, vomiting, diarrhea, or abdominal pain. Pamalee Bucker No cramps. Genitourinary: No increased urinary frequency, or dysuria. No hematuria. No suprapubic or CVA pain  Musculoskeletal: No muscle aches or pains. No joint effusions, swelling or deformities  Hematologic: No bleeding or bruising. Neurologic: No headache, weakness, numbness, or tingling. Integument: Scattered small reddened bumps and bruises  Psychiatric: No depression. Endocrine: No polyuria, no polydipsia, no polyphagia. Physical Examination :     Patient Vitals for the past 8 hrs:   BP Temp Temp src Pulse Resp SpO2 Weight   07/19/21 1038 114/77 -- -- 103 30 -- --   07/19/21 0839 122/72 98.8 °F (37.1 °C) -- 103 20 93 % --   07/19/21 0829 116/70 98.4 °F (36.9 °C) -- 99 26 94 % --   07/19/21 0824 114/70 98.2 °F (36.8 °C) -- 98 27 95 % --   07/19/21 0819 112/74 97.2 °F (36.2 °C) -- 98 30 93 % --   07/19/21 0814 115/75 98.2 °F (36.8 °C) Oral 99 (!) 31 93 % --   07/19/21 0758 -- 98.2 °F (36.8 °C) Oral 99 -- -- --   07/19/21 0740 109/72 98.2 °F (36.8 °C) Oral 100 (!) 31 93 % --   07/19/21 0415 -- -- -- -- -- -- 193 lb 2 oz (87.6 kg)   07/19/21 0400 114/72 98.3 °F (36.8 °C) Oral 113 (!) 31 92 % --     General Appearance: Awake, alert, and in no apparent distress  Head:  Normocephalic, no trauma  Eyes: Pupils equal, round, reactive to light and accommodation; extraocular movements intact; sclera anicteric; conjunctivae pink. No embolic phenomena. ENT: Oropharynx clear, without erythema, exudate, or thrush. No tenderness of sinuses. Mouth/throat: mucosa pink and moist. No lesions. Dentition in good repair. Neck:Supple, without lymphadenopathy. Thyroid normal, No bruits. Pulmonary/Chest: Clear to auscultation, without wheezes, rales, or rhonchi. No dullness to percussion. Cardiovascular: Normal sinus tachycardia with murmur, No rubs, or gallops. Abdomen: Soft, non tender.  Bowel sounds normal. No organomegaly  All four Extremities: No cyanosis, clubbing, edema, or effusions. Neurologic: No gross sensory or motor deficits. Skin: Warm and dry with good turgor. No signs of peripheral arterial or venous insufficiency. No ulcerations. No open wounds. Medical Decision Making -Laboratory:   I have independently reviewed/ordered the following labs:    CBC with Differential:   Recent Labs     07/18/21  0521 07/18/21  0521 07/18/21  1457 07/19/21  0656   WBC 9.9   < > 9.6 9.4   HGB 12.5*   < > 12.8* 11.2*   HCT 36.5*   < > 37.9* 32.1*   PLT See Reflexed IPF Result   < > See Reflexed IPF Result See Reflexed IPF Result   LYMPHOPCT 6*  --  12*  --    MONOPCT 7  --  5  --     < > = values in this interval not displayed. BMP:   Recent Labs     07/18/21  0521 07/18/21  0521 07/18/21  1427 07/19/21  0656   *   < > 137 139   K 3.0*   < > 3.6* 2.7*      < > 101 102   CO2 19*   < > 21 22   BUN 8   < > 6 9   CREATININE 0.76   < > 0.77 0.65*   MG 2.3  --   --  2.1    < > = values in this interval not displayed. Hepatic Function Panel:   Recent Labs     07/17/21  0140 07/18/21  0521 07/18/21 1427 07/19/21  0656   PROT 6.5   < > 6.0* 5.4*   LABALBU 3.2*   < > 2.7* 2.3*   BILIDIR 0.21  --  0.25  --    IBILI 0.44  --   --   --    BILITOT 0.65   < > 0.60 0.60   ALKPHOS 59   < > 77 73   ALT 51*   < > 43* 34   AST 67*   < > 66* 55*    < > = values in this interval not displayed. No results for input(s): RPR in the last 72 hours. No results for input(s): HIV in the last 72 hours. No results for input(s): BC in the last 72 hours.   Lab Results   Component Value Date    MUCUS NOT REPORTED 07/17/2021    RBC 3.87 07/19/2021    TRICHOMONAS NOT REPORTED 07/17/2021    WBC 9.4 07/19/2021    YEAST NOT REPORTED 07/17/2021    TURBIDITY CLEAR 07/17/2021     Lab Results   Component Value Date    CREATININE 0.65 07/19/2021    GLUCOSE 139 07/19/2021       Medical Decision Making-Imaging:     EXAMINATION:   CT OF THE CHEST, ABDOMEN, AND PELVIS WITH CONTRAST 7/18/2021 10:53 am     TECHNIQUE:   CT of the chest, abdomen and pelvis was performed with the administration of   intravenous contrast. Multiplanar reformatted images are provided for review. Dose modulation, iterative reconstruction, and/or weight based adjustment of   the mA/kV was utilized to reduce the radiation dose to as low as reasonably   achievable.       COMPARISON:   None       HISTORY:   ORDERING SYSTEM PROVIDED HISTORY: MSSA bacteremia, ? DIC   TECHNOLOGIST PROVIDED HISTORY:   MSSA bacteremia, ? DIC       Reason for Exam: sepsis with acute organ dysfunction   Acuity: Unknown   Type of Exam: Unknown       FINDINGS:   Lungs/pleura: The central airways are patent.  There are no suspicious   pulmonary nodules       Mediastinum: There is no acute mediastinal abnormality       Soft Tissues/Bones: No suspicious osteolytic or osteoblastic lesions       Abdominal organs: The liver, spleen, adrenal glands, kidneys, and pancreas   demonstrate no acute abnormality.       GI/Bowel: The visualized portions of the small bowel are unremarkable. There   is borderline dilation of the colon.  There are multiple air-fluid levels   throughout the colon.       Peritoneum/Retroperitoneum: There is a solid amount of retroperitoneal fluid   along the distal ureters bilaterally.       Pelvis: The bladder is moderately distended.       Bones: No suspicious osseous lesions are identified           Impression   Moderate distention of the colon multiple air-fluid levels, findings may be   seen with acute enterocolitis.             XR FOOT LEFT (2 VIEWS) [[id number]] Collected: 07/18/21 1411      Order Status: Completed Updated: 07/19/21 0759     Narrative:       EXAMINATION:   TWO XRAY VIEWS OF THE LEFT FOOT     7/18/2021 2:05 pm     COMPARISON:   None. HISTORY:   ORDERING SYSTEM PROVIDED HISTORY: Left big toe pain and erythema. Looking for   source of MSSA sepsis   TECHNOLOGIST PROVIDED HISTORY:   Left big toe pain and erythema.  Looking for source of MSSA sepsis     FINDINGS:   AP and lateral views of the foot obtained.  Normal alignment and   mineralization.  No abnormal periosteal reaction.  No erosions.  No fracture. No aggressive lytic or blastic lesions.  Mild soft tissue swelling of the   great toe noted.      Impression:       No radiographic evidence for acute osteomyelitis.  Mild soft tissue swelling   of the great toe possibly cellulitis. Medical Decision Txmnnc-Arixfmhy-Vijsl:       Medical Decision Making-Other:     Note:  · Labs, medications, radiologic studies were reviewed with personal review of films  · Large amounts of data were reviewed  · Discussed with nursing Staff, Discharge planner  · Infection Control and Prevention measures reviewed  · All prior entries were reviewed  · Administer medications as ordered  · Prognosis: Guarded  · Discharge planning reviewed  · Follow up as outpatient. Thank you for allowing us to participate in the care of this patient. Please call with questions. MARIKA Galeana - CNP     ATTESTATION:    I have discussed the case, including pertinent history and exam findings with the residents and students. I have seen and examined the patient and the key elements of the encounter have been performed by me. I was present when the student obtained his information or examined the patient. I have reviewed the laboratory data, other diagnostic studies and discussed them with the residents. I have updated the medical record where necessary. I agree with the assessment, plan and orders as documented by the resident/ student.     Alexus Hawkins MD.      Pager: [phone] - Office: [phone]

## 2021-07-20 ENCOUNTER — APPOINTMENT (OUTPATIENT)
Dept: CT IMAGING | Age: 41
DRG: 871 | End: 2021-07-20
Payer: COMMERCIAL

## 2021-07-20 PROBLEM — R74.01 TRANSAMINITIS: Status: ACTIVE | Noted: 2021-07-20

## 2021-07-20 PROBLEM — E66.9 OBESITY (BMI 30-39.9): Status: ACTIVE | Noted: 2021-07-20

## 2021-07-20 PROBLEM — K92.2 GI BLEED: Status: ACTIVE | Noted: 2021-07-20

## 2021-07-20 PROBLEM — R78.81 MSSA BACTEREMIA: Status: ACTIVE | Noted: 2021-07-20

## 2021-07-20 PROBLEM — D64.9 NORMOCYTIC NORMOCHROMIC ANEMIA: Status: ACTIVE | Noted: 2021-07-20

## 2021-07-20 PROBLEM — F22 DELUSIONS (HCC): Status: ACTIVE | Noted: 2021-07-20

## 2021-07-20 PROBLEM — B95.61 MSSA BACTEREMIA: Status: ACTIVE | Noted: 2021-07-20

## 2021-07-20 PROBLEM — J96.01 ACUTE RESPIRATORY FAILURE WITH HYPOXIA (HCC): Status: ACTIVE | Noted: 2021-07-20

## 2021-07-20 PROBLEM — I34.0 SEVERE MITRAL REGURGITATION: Status: ACTIVE | Noted: 2021-07-20

## 2021-07-20 PROBLEM — E83.39 HYPOPHOSPHATEMIA: Status: ACTIVE | Noted: 2021-07-20

## 2021-07-20 LAB
ALBUMIN SERPL-MCNC: 2.2 G/DL (ref 3.5–5.2)
ALBUMIN/GLOBULIN RATIO: 0.6 (ref 1–2.5)
ALP BLD-CCNC: 82 U/L (ref 40–129)
ALT SERPL-CCNC: 31 U/L (ref 5–41)
AMMONIA: 52 UMOL/L (ref 16–60)
ANION GAP SERPL CALCULATED.3IONS-SCNC: 11 MMOL/L (ref 9–17)
ANION GAP SERPL CALCULATED.3IONS-SCNC: 9 MMOL/L (ref 9–17)
AST SERPL-CCNC: 54 U/L
BILIRUB SERPL-MCNC: 0.62 MG/DL (ref 0.3–1.2)
BLD PROD TYP BPU: NORMAL
BUN BLDV-MCNC: 7 MG/DL (ref 6–20)
BUN BLDV-MCNC: 8 MG/DL (ref 6–20)
BUN/CREAT BLD: ABNORMAL (ref 9–20)
BUN/CREAT BLD: ABNORMAL (ref 9–20)
CALCIUM SERPL-MCNC: 7.7 MG/DL (ref 8.6–10.4)
CALCIUM SERPL-MCNC: 8 MG/DL (ref 8.6–10.4)
CHLORIDE BLD-SCNC: 104 MMOL/L (ref 98–107)
CHLORIDE BLD-SCNC: 105 MMOL/L (ref 98–107)
CMV IGM: 0.1
CO2: 22 MMOL/L (ref 20–31)
CO2: 24 MMOL/L (ref 20–31)
CREAT SERPL-MCNC: 0.48 MG/DL (ref 0.7–1.2)
CREAT SERPL-MCNC: 0.49 MG/DL (ref 0.7–1.2)
CULTURE: ABNORMAL
CYTOMEGALOVIRUS IGG ANTIBODY: 0.3
DIRECT EXAM: NORMAL
DIRECT EXAM: NORMAL
DISPENSE STATUS BLOOD BANK: NORMAL
EKG ATRIAL RATE: 113 BPM
EKG P AXIS: 41 DEGREES
EKG P-R INTERVAL: 160 MS
EKG Q-T INTERVAL: 332 MS
EKG QRS DURATION: 90 MS
EKG QTC CALCULATION (BAZETT): 455 MS
EKG R AXIS: 57 DEGREES
EKG T AXIS: 14 DEGREES
EKG VENTRICULAR RATE: 113 BPM
GFR AFRICAN AMERICAN: >60 ML/MIN
GFR AFRICAN AMERICAN: >60 ML/MIN
GFR NON-AFRICAN AMERICAN: >60 ML/MIN
GFR NON-AFRICAN AMERICAN: >60 ML/MIN
GFR SERPL CREATININE-BSD FRML MDRD: ABNORMAL ML/MIN/{1.73_M2}
GLUCOSE BLD-MCNC: 129 MG/DL (ref 70–99)
GLUCOSE BLD-MCNC: 136 MG/DL (ref 70–99)
HCT VFR BLD CALC: 32.1 % (ref 40.7–50.3)
HEMOGLOBIN: 10.8 G/DL (ref 13–17)
LV EF: 55 %
LVEF MODALITY: NORMAL
Lab: ABNORMAL
Lab: NORMAL
MAGNESIUM: 1.9 MG/DL (ref 1.6–2.6)
MCH RBC QN AUTO: 28.4 PG (ref 25.2–33.5)
MCHC RBC AUTO-ENTMCNC: 33.6 G/DL (ref 28.4–34.8)
MCV RBC AUTO: 84.5 FL (ref 82.6–102.9)
NRBC AUTOMATED: 0 PER 100 WBC
PATHOLOGIST REVIEW: NORMAL
PDW BLD-RTO: 13.5 % (ref 11.8–14.4)
PHOSPHORUS: 1.6 MG/DL (ref 2.5–4.5)
PLATELET # BLD: 78 K/UL (ref 138–453)
PMV BLD AUTO: 12.3 FL (ref 8.1–13.5)
POTASSIUM SERPL-SCNC: 3.4 MMOL/L (ref 3.7–5.3)
POTASSIUM SERPL-SCNC: 3.5 MMOL/L (ref 3.7–5.3)
RBC # BLD: 3.8 M/UL (ref 4.21–5.77)
SODIUM BLD-SCNC: 137 MMOL/L (ref 135–144)
SODIUM BLD-SCNC: 138 MMOL/L (ref 135–144)
SPECIMEN DESCRIPTION: ABNORMAL
SPECIMEN DESCRIPTION: NORMAL
TOTAL PROTEIN: 5.6 G/DL (ref 6.4–8.3)
TRANSFUSION STATUS: NORMAL
TSH SERPL DL<=0.05 MIU/L-ACNC: 1.86 MIU/L (ref 0.3–5)
UNIT DIVISION: 0
UNIT NUMBER: NORMAL
WBC # BLD: 10.9 K/UL (ref 3.5–11.3)

## 2021-07-20 PROCEDURE — 51798 US URINE CAPACITY MEASURE: CPT

## 2021-07-20 PROCEDURE — 6370000000 HC RX 637 (ALT 250 FOR IP): Performed by: INTERNAL MEDICINE

## 2021-07-20 PROCEDURE — 2580000003 HC RX 258: Performed by: INTERNAL MEDICINE

## 2021-07-20 PROCEDURE — 6360000002 HC RX W HCPCS

## 2021-07-20 PROCEDURE — 93312 ECHO TRANSESOPHAGEAL: CPT

## 2021-07-20 PROCEDURE — 6360000002 HC RX W HCPCS: Performed by: INTERNAL MEDICINE

## 2021-07-20 PROCEDURE — 6360000002 HC RX W HCPCS: Performed by: NURSE PRACTITIONER

## 2021-07-20 PROCEDURE — APPSS30 APP SPLIT SHARED TIME 16-30 MINUTES: Performed by: NURSE PRACTITIONER

## 2021-07-20 PROCEDURE — 2580000003 HC RX 258: Performed by: NURSE PRACTITIONER

## 2021-07-20 PROCEDURE — 80048 BASIC METABOLIC PNL TOTAL CA: CPT

## 2021-07-20 PROCEDURE — 99233 SBSQ HOSP IP/OBS HIGH 50: CPT | Performed by: INTERNAL MEDICINE

## 2021-07-20 PROCEDURE — 87150 DNA/RNA AMPLIFIED PROBE: CPT

## 2021-07-20 PROCEDURE — 84100 ASSAY OF PHOSPHORUS: CPT

## 2021-07-20 PROCEDURE — 93010 ELECTROCARDIOGRAM REPORT: CPT | Performed by: INTERNAL MEDICINE

## 2021-07-20 PROCEDURE — 87205 SMEAR GRAM STAIN: CPT

## 2021-07-20 PROCEDURE — 93325 DOPPLER ECHO COLOR FLOW MAPG: CPT

## 2021-07-20 PROCEDURE — 6360000004 HC RX CONTRAST MEDICATION: Performed by: INTERNAL MEDICINE

## 2021-07-20 PROCEDURE — 71260 CT THORAX DX C+: CPT

## 2021-07-20 PROCEDURE — 6370000000 HC RX 637 (ALT 250 FOR IP): Performed by: NURSE PRACTITIONER

## 2021-07-20 PROCEDURE — 36415 COLL VENOUS BLD VENIPUNCTURE: CPT

## 2021-07-20 PROCEDURE — 87040 BLOOD CULTURE FOR BACTERIA: CPT

## 2021-07-20 PROCEDURE — 2060000000 HC ICU INTERMEDIATE R&B

## 2021-07-20 PROCEDURE — 2709999900 HC NON-CHARGEABLE SUPPLY

## 2021-07-20 PROCEDURE — 83735 ASSAY OF MAGNESIUM: CPT

## 2021-07-20 PROCEDURE — 99232 SBSQ HOSP IP/OBS MODERATE 35: CPT | Performed by: INTERNAL MEDICINE

## 2021-07-20 PROCEDURE — 84443 ASSAY THYROID STIM HORMONE: CPT

## 2021-07-20 PROCEDURE — C9113 INJ PANTOPRAZOLE SODIUM, VIA: HCPCS | Performed by: INTERNAL MEDICINE

## 2021-07-20 PROCEDURE — 87186 SC STD MICRODIL/AGAR DIL: CPT

## 2021-07-20 PROCEDURE — 85027 COMPLETE CBC AUTOMATED: CPT

## 2021-07-20 PROCEDURE — 82140 ASSAY OF AMMONIA: CPT

## 2021-07-20 PROCEDURE — 70450 CT HEAD/BRAIN W/O DYE: CPT

## 2021-07-20 PROCEDURE — 99222 1ST HOSP IP/OBS MODERATE 55: CPT | Performed by: INTERNAL MEDICINE

## 2021-07-20 PROCEDURE — 2500000003 HC RX 250 WO HCPCS: Performed by: INTERNAL MEDICINE

## 2021-07-20 PROCEDURE — 80053 COMPREHEN METABOLIC PANEL: CPT

## 2021-07-20 PROCEDURE — B246ZZ4 ULTRASONOGRAPHY OF RIGHT AND LEFT HEART, TRANSESOPHAGEAL: ICD-10-PCS | Performed by: INTERNAL MEDICINE

## 2021-07-20 RX ORDER — SODIUM CHLORIDE 9 MG/ML
10 INJECTION INTRAVENOUS 2 TIMES DAILY
Status: DISCONTINUED | OUTPATIENT
Start: 2021-07-20 | End: 2021-07-23

## 2021-07-20 RX ORDER — LOPERAMIDE HYDROCHLORIDE 2 MG/1
4 CAPSULE ORAL 4 TIMES DAILY PRN
Status: DISCONTINUED | OUTPATIENT
Start: 2021-07-20 | End: 2021-08-02 | Stop reason: HOSPADM

## 2021-07-20 RX ORDER — PANTOPRAZOLE SODIUM 40 MG/10ML
40 INJECTION, POWDER, LYOPHILIZED, FOR SOLUTION INTRAVENOUS 2 TIMES DAILY
Status: DISCONTINUED | OUTPATIENT
Start: 2021-07-20 | End: 2021-07-23

## 2021-07-20 RX ORDER — MAGNESIUM SULFATE 1 G/100ML
1000 INJECTION INTRAVENOUS ONCE
Status: COMPLETED | OUTPATIENT
Start: 2021-07-20 | End: 2021-07-20

## 2021-07-20 RX ORDER — POTASSIUM CHLORIDE 20 MEQ/1
40 TABLET, EXTENDED RELEASE ORAL ONCE
Status: COMPLETED | OUTPATIENT
Start: 2021-07-20 | End: 2021-07-20

## 2021-07-20 RX ORDER — SODIUM CHLORIDE 0.9 % (FLUSH) 0.9 %
5-40 SYRINGE (ML) INJECTION PRN
Status: CANCELLED | OUTPATIENT
Start: 2021-07-20

## 2021-07-20 RX ORDER — DEXTROSE, SODIUM CHLORIDE, AND POTASSIUM CHLORIDE 5; .45; .3 G/100ML; G/100ML; G/100ML
INJECTION INTRAVENOUS CONTINUOUS
Status: DISCONTINUED | OUTPATIENT
Start: 2021-07-20 | End: 2021-07-23

## 2021-07-20 RX ORDER — DIPHENHYDRAMINE HYDROCHLORIDE 50 MG/ML
25 INJECTION INTRAMUSCULAR; INTRAVENOUS ONCE
Status: COMPLETED | OUTPATIENT
Start: 2021-07-20 | End: 2021-07-20

## 2021-07-20 RX ORDER — SODIUM CHLORIDE 9 MG/ML
25 INJECTION, SOLUTION INTRAVENOUS PRN
Status: CANCELLED | OUTPATIENT
Start: 2021-07-20

## 2021-07-20 RX ORDER — SODIUM CHLORIDE 0.9 % (FLUSH) 0.9 %
5-40 SYRINGE (ML) INJECTION EVERY 12 HOURS SCHEDULED
Status: CANCELLED | OUTPATIENT
Start: 2021-07-20

## 2021-07-20 RX ADMIN — SODIUM CHLORIDE, PRESERVATIVE FREE 10 ML: 5 INJECTION INTRAVENOUS at 22:00

## 2021-07-20 RX ADMIN — DIBASIC SODIUM PHOSPHATE, MONOBASIC POTASSIUM PHOSPHATE AND MONOBASIC SODIUM PHOSPHATE 2 TABLET: 852; 155; 130 TABLET ORAL at 21:31

## 2021-07-20 RX ADMIN — POTASSIUM CHLORIDE 40 MEQ: 1500 TABLET, EXTENDED RELEASE ORAL at 17:01

## 2021-07-20 RX ADMIN — MAGNESIUM SULFATE HEPTAHYDRATE 1000 MG: 1 INJECTION, SOLUTION INTRAVENOUS at 09:43

## 2021-07-20 RX ADMIN — SODIUM CHLORIDE, PRESERVATIVE FREE 10 ML: 5 INJECTION INTRAVENOUS at 17:01

## 2021-07-20 RX ADMIN — CEFAZOLIN 2000 MG: 10 INJECTION, POWDER, FOR SOLUTION INTRAVENOUS at 05:25

## 2021-07-20 RX ADMIN — DIBASIC SODIUM PHOSPHATE, MONOBASIC POTASSIUM PHOSPHATE AND MONOBASIC SODIUM PHOSPHATE 2 TABLET: 852; 155; 130 TABLET ORAL at 09:46

## 2021-07-20 RX ADMIN — LOPERAMIDE HYDROCHLORIDE 4 MG: 2 CAPSULE ORAL at 19:16

## 2021-07-20 RX ADMIN — PANTOPRAZOLE SODIUM 40 MG: 40 INJECTION, POWDER, LYOPHILIZED, FOR SOLUTION INTRAVENOUS at 22:00

## 2021-07-20 RX ADMIN — POTASSIUM CHLORIDE, DEXTROSE MONOHYDRATE AND SODIUM CHLORIDE: 300; 5; 450 INJECTION, SOLUTION INTRAVENOUS at 04:58

## 2021-07-20 RX ADMIN — CEFAZOLIN 2000 MG: 10 INJECTION, POWDER, FOR SOLUTION INTRAVENOUS at 18:12

## 2021-07-20 RX ADMIN — CEFAZOLIN 2000 MG: 10 INJECTION, POWDER, FOR SOLUTION INTRAVENOUS at 11:47

## 2021-07-20 RX ADMIN — MUPIROCIN: 20 OINTMENT TOPICAL at 21:32

## 2021-07-20 RX ADMIN — IOPAMIDOL 75 ML: 755 INJECTION, SOLUTION INTRAVENOUS at 16:33

## 2021-07-20 RX ADMIN — ACETAMINOPHEN 650 MG: 325 TABLET ORAL at 23:45

## 2021-07-20 RX ADMIN — POTASSIUM CHLORIDE, DEXTROSE MONOHYDRATE AND SODIUM CHLORIDE: 300; 5; 450 INJECTION, SOLUTION INTRAVENOUS at 21:00

## 2021-07-20 RX ADMIN — MUPIROCIN: 20 OINTMENT TOPICAL at 08:32

## 2021-07-20 RX ADMIN — POTASSIUM CHLORIDE 40 MEQ: 1500 TABLET, EXTENDED RELEASE ORAL at 19:15

## 2021-07-20 RX ADMIN — METOPROLOL TARTRATE 25 MG: 25 TABLET ORAL at 09:43

## 2021-07-20 RX ADMIN — PANTOPRAZOLE SODIUM 40 MG: 40 INJECTION, POWDER, LYOPHILIZED, FOR SOLUTION INTRAVENOUS at 17:05

## 2021-07-20 RX ADMIN — SODIUM CHLORIDE, PRESERVATIVE FREE 10 ML: 5 INJECTION INTRAVENOUS at 08:32

## 2021-07-20 RX ADMIN — METOPROLOL TARTRATE 25 MG: 25 TABLET ORAL at 21:32

## 2021-07-20 RX ADMIN — CEFAZOLIN 2000 MG: 10 INJECTION, POWDER, FOR SOLUTION INTRAVENOUS at 23:42

## 2021-07-20 RX ADMIN — POTASSIUM CHLORIDE 40 MEQ: 1500 TABLET, EXTENDED RELEASE ORAL at 08:32

## 2021-07-20 RX ADMIN — CEFAZOLIN 2000 MG: 10 INJECTION, POWDER, FOR SOLUTION INTRAVENOUS at 00:05

## 2021-07-20 RX ADMIN — DIPHENHYDRAMINE HYDROCHLORIDE 25 MG: 50 INJECTION, SOLUTION INTRAMUSCULAR; INTRAVENOUS at 21:31

## 2021-07-20 RX ADMIN — Medication 6 MG: at 21:33

## 2021-07-20 ASSESSMENT — PAIN SCALES - GENERAL
PAINLEVEL_OUTOF10: 0
PAINLEVEL_OUTOF10: 0

## 2021-07-20 NOTE — PROGRESS NOTES
[facility]  Transesophageal Echocardiogram       Today's Date:  7/20/2021  Indication:   Septicemia w/ new murmur    Operators:  Primary:   Fahad Rangel MD (Attending Physician)  Assistant:   Ifeanyi Pereira MD (Cardiovascular Fellow)    Procedure:    Patient seen and examined. History and Physical reviewed. Labs reviewed. After informed consent was obtained with explanation of the risks and benefits, the patient was brought to cardiac cath lab. All sedation was administered by the cardiologist. The oropharynx was pre-anesthesized with viscous lidocaine and cetacaine spray. The ultrasound probe was passed without any difficulty. MONROE findings:    LA:  Normal  RAJAT:  No thrombus  RA:  Normal  RV:   Normal  LV:  Normal  Estimated LVEF:  55%  Aorta:   Mild atheromatous disease arch  Pericardium: Trivial pericardial effusion  Septum:  No intracardiac shunt via color Doppler. Valves:    Mitral Valve: Large vegetation measuring 2.5 X 2.4 cm attached on the posterior leaflet of the mitral valve associated with Flail and degenerated leaflet. Severe regurgitation w/ an anteriorly directed eccentric jet is identified. Aortic Valve: The aortic valve is trileaflet and opens adequately. No regurgitiation is identified. Tricuspid valve: Structurally normal. No regurgitation is identified. Pulmonary valve: Normal. No significant regurgitation    No thrombus identified. Summary:     1. A MONROE was performed without complications. 2. LVEF 55%  3. No thrombus identified  4. No intracardiac shunt via color Doppler. 5. Large vegetation measuring 2.5 X 2.4 cm noted on the posterior leaflet of the mitral valve. Posterior leaflet is flair and is associated with severe anteriorly directed eccentric regurgitation.      Recommendations:  1. CV Surgery consult for further recommendations      Electronically signed by Ifeanyi Pereira MD on 7/20/2021 at 3:39 PM  Cardiovascular Diseases Fellow  [facility]. [facility]    Attending Physician      Gypsy Ruth MD, Jules Hernandez

## 2021-07-20 NOTE — PROGRESS NOTES
[facility]  Office: [address], DO, Owen Alfaro, DO, Saray Rico, DO, Armida Souza Blood, DO, Sanjeev Christiansen MD, Ken Truong MD, Erwin Alvarez MD, Yolande Nevarez MD, Myles Miller MD, Latisha Dean MD, Julienne Galeano MD, Jean Carlos Bledsoe, DO, Lacey Majano MD, Yuri Watkins, DO, Thiago Goldman MD,  Abigail Cornelius, DO, Sue Doran MD, Wayne Naylor MD, Mayco Rinaldi MD, Murray Bueno MD, Tete Eli MD, Nae Bartholomew MD, Andra Sanchez, [facility], [facility], CNP, Cesar Melton, CNP, Ralph Garcia, CNS, Noah Barthel, Geri Lemon, CNP, Jorge Regan, CNP, Gudelia Alegre, CNP, Sharon Oliva, CNP, Brenda Perez PA-C, [facility], [facility], Shanel Levin, CNP, Natacha Russo, CNP, Clay Garza, CNP, Dee Brian, CNP, Kole Suazo, CNP, Bennett Aguilar, CNP, Jeffrey Brooks, [facility], Juanis Leslie, [address] [place] [address]    Progress Note    7/20/2021    1:44 PM    Name:   Francie Ng  MRN:     [id number]     [place]:      [de-identified]   Room:   [address] [place] [address] Day:  3  Admit Date:  7/17/2021 12:42 AM    PCP:   Corey Jay MD  Code Status:  Full Code    Subjective:     C/C:   Chief Complaint   Patient presents with    Abdominal Pain    Emesis     Interval History Status: improved. Patient seen examined this afternoon. Family is at bedside, including his wife. Patient states that he is feeling overall improved. Continues to have episodes of confusion and delusions. He understands that the things that he is seeing are not physically present in the room. Continues to have diarrhea. Cárdensa catheter is irritating. Vitals been stable. Remains on supplemental oxygen. He reports that his left great toe redness and swelling is improving.     Brief History:     Mr. Tere Hinds is a pleasant 36year old  gentleman who presneted to [facility] for evaluation of confusion, fatigue, diarrhea, and abdominal pains on 7/17/2021. He was recently released from [facility] for similar complaints and was noted to have low potassium levels at that time. CT of the abdomen was normal. The diarrhea was \"water-like\" and profuse in nature, admitting to approx 5-7 watery stools a day for the past 4 days prior to admission. No BRBPR. Abdominal pain was present prior to the diarrhea. There is associated fevers and chills. Subjective fever of 104 prior to arrival. Emesis is food that is digested but from earlier in the day. Patient admits to eating popcorn and nuts just the weekend before the onset. His comorbidities include psychiatric disturbances following deployment to [facility] in 2003 and 2005, along with hypertension, and a history of a colectomy secondary to diverticulitis. CT of the abdomen pelvis was performed which revealed a markedly distended bladder with a calculated volume of approximately 2 L in the bladder along with hepatic steatosis and mild hepatomegaly with minor bibasilar atelectasis and interstitial thickening posteriorly. A Cárdenas catheter was placed with alleviation of the patient's urinary retention. Next morning, the patient's lipase level was noted be 29. A CT of the abdomen pelvis was repeated on 7/18 which revealed multiple air-fluid levels, seen with acute enterocolitis. Patient was her IV antibiotics admitted to the hospital.    He was noted to be quite tachycardic with a heart rate upwards of 151 bpm.  Cardiology was consulted, as source troponin was also increased. Troponins likely thought to be secondary to a type II mechanism from sepsis. 2D echo was ordered and revealed an EF of 50% with posterior mitral leaflet prolapse and severe MR with an eccentric anterior directed jet with a possible small vegetation on the posterior leaflet. He is scheduled to undergo MONROE on 7/20.       Meanwhile, 2 of 2 blood cultures have returned positive for MSSA.  His urine culture also grew 50 to 100,000 CFU of MSSA. Infectious disease was consulted and is placed the patient on Ancef. He was also noted to be quite thrombocytopenic with a bradley of 32. This is improving. Fibrinogen is normal.    Patient was scheduled to undergo MONROE on 7/19, however, due to profound hypokalemia, this procedure was postponed. He did develop epistaxis on the evening of 7/18. ENT was consulted and recommended transfusing platelets for goal of greater than 50k. He underwent nasal cautery along with Afrin use which controlled his bleeding. Review of Systems:     Constitutional:  negative for chills, fevers, sweats  Respiratory: Reports shortness of breath; negative for cough, wheezing  Cardiovascular:  negative for chest pain, chest pressure/discomfort, lower extremity edema, palpitations  Gastrointestinal: Reports continued diarrhea; Negative for abdominal pain, constipation,  nausea, vomiting  : Reports Cárdenas catheter irritation  Extremities: Reports left great toe redness is improving. Pain is essentially resolved. Neurological: Reports delusions, negative for dizziness, headache    Medications: Allergies:     Allergies   Allergen Reactions    Morphine Other (See Comments)     Pt report muscle cramps       Current Meds:   Scheduled Meds:    phosphorus  500 mg Oral BID    metoprolol tartrate  25 mg Oral BID    potassium chloride  40 mEq Oral BID WC    mupirocin   Nasal BID    ceFAZolin  2,000 mg Intravenous Q6H    sodium chloride flush  5-40 mL Intravenous 2 times per day     Continuous Infusions:    dextrose 5% and 0.45% NaCl with KCl 40 mEq 100 mL/hr at 07/20/21 0458    sodium chloride      sodium chloride       PRN Meds: sodium chloride, sodium chloride, sodium chloride flush, sodium chloride, ondansetron **OR** ondansetron, acetaminophen **OR** acetaminophen, polyethylene glycol, magnesium sulfate, oxyCODONE, melatonin, aluminum & magnesium hydroxide-simethicone    Data:     Past Medical History:   has a past medical history of Diverticulosis, Hyperlipidemia, and Hypertension. Social History:   reports that he has never smoked. He has never used smokeless tobacco. He reports current alcohol use of about 20.0 standard drinks of alcohol per week. He reports that he does not use drugs. Family History:   Family History   Problem Relation Age of Onset    No Known Problems Mother     Other Father         diverticulitis       Vitals:  /76   Pulse 97   Temp 97.9 °F (36.6 °C) (Oral)   Resp 25   Ht 5' 6\" (1.676 m)   Wt 194 lb 10.7 oz (88.3 kg)   SpO2 96%   BMI 31.42 kg/m²   Temp (24hrs), Av °F (36.7 °C), Min:97.7 °F (36.5 °C), Max:98.4 °F (36.9 °C)    Recent Labs     21  0421 21  0535   POCGLU 160* 187*       I/O (24Hr): Intake/Output Summary (Last 24 hours) at 2021 1344  Last data filed at 2021 1200  Gross per 24 hour   Intake 5645 ml   Output 9100 ml   Net -3455 ml       Labs:  Hematology:  Recent Labs     21  0653 21  1427 21  1457 21  0656 21  0656 21  1116 21  1951 21  0549   WBC  --   --    < > 9.4  --   --  11.8* 10.9   RBC  --   --    < > 3.87*  --   --  4.09* 3.80*   HGB  --   --    < > 11.2*  --   --  11.9* 10.8*   HCT  --   --    < > 32.1*  --   --  35.1* 32.1*   MCV  --   --    < > 82.9  --   --  85.8 84.5   MCH  --   --    < > 28.9  --   --  29.1 28.4   MCHC  --   --    < > 34.9*  --   --  33.9 33.6   RDW  --   --    < > 13.2  --   --  13.4 13.5   PLT  --   --    < > See Reflexed IPF Result   < > 50* 66* 78*   MPV  --   --    < > NOT REPORTED  --   --  12.1 12.3   INR  --  0.9  --  1.0  --   --   --   --    DDIMER 12.99  --   --   --   --   --   --   --     < > = values in this interval not displayed.      Chemistry:  Recent Labs     21  2668 21  4061 21  1427 21  8722 21  0656 21  0658 21  1116 07/19/21  1116 07/19/21  1233 07/19/21  1951 07/20/21  0549   NA  --    < >  --    < > 139   < >  --  140  --   --  137 137   K  --    < >  --    < > 2.7*   < >  --  2.9*   < > 2.9* 3.1* 3.5*   CL  --    < >  --    < > 102   < >  --  104  --   --  104 104   CO2  --    < >  --    < > 22   < >  --  24  --   --  23 22   GLUCOSE  --    < >  --    < > 139*   < >  --  116*  --   --  175* 129*   BUN  --    < >  --    < > 9   < >  --  10  --   --  8 8   CREATININE  --    < >  --    < > 0.65*   < >  --  0.48*  --   --  0.57* 0.49*   MG 2.2   < >  --   --  2.1  --   --   --   --   --  2.0 1.9   ANIONGAP  --    < >  --    < > 15   < >  --  12  --   --  10 11   LABGLOM  --    < >  --    < > >60   < >  --  >60  --   --  >60 >60   GFRAA  --    < >  --    < > >60   < >  --  >60  --   --  >60 >60   CALCIUM  --    < >  --    < > 7.7*   < >  --  7.9*  --   --  8.6 7.7*   PHOS  --    < > 0.7*   < > 1.8*   < > 1.9* 2.2*  --   --   --  1.6*   PROBNP  --   --  1,835*  --   --   --   --   --   --   --   --   --    TROPHS 171*  --  165*  --   --   --   --  76*  --   --   --   --    MYOGLOBIN 135*  --  138*  --   --   --   --   --   --   --   --   --    LACTACIDWB 1.6  --   --   --   --   --   --   --   --   --   --   --     < > = values in this interval not displayed.      Recent Labs     07/18/21  0421 07/18/21  0519 07/18/21  0521 07/18/21  0521 07/18/21  0535 07/18/21  1427 07/18/21  1427 07/19/21  0656 07/19/21  0656 07/19/21  1116 07/19/21  1951 07/20/21  0549   PROT  --   --  6.0*   < >  --  6.0*   < > 5.4*  --   --  5.9* 5.6*   LABALBU  --   --  2.7*   < >  --  2.7*   < > 2.3*   < > 2.5* 2.7* 2.2*   AST  --   --  66*   < >  --  66*   < > 55*  --   --  64* 54*   ALT  --   --  43*   < >  --  43*   < > 34  --   --  37 31   LDH  --   --   --   --   --  437*  --   --   --   --   --   --    ALKPHOS  --   --  81   < >  --  77   < > 73  --   --  92 82   BILITOT  --   --  0.70   < >  --  0.60   < > 0.60  --   --  0.60 0.62   BILIDIR  --   -- --   --   --  0.25  --   --   --   --   --   --    AMYLASE  --   --  131*  --   --   --   --   --   --   --   --   --    LIPASE  --   --  299*  --   --   --   --  228*  --   --   --   --    URICACID  --  2.3*  --   --   --   --   --   --   --   --   --   --    POCGLU 160*  --   --   --  187*  --   --   --   --   --   --   --     < > = values in this interval not displayed. ABG:  Lab Results   Component Value Date    POCPH 7.486 07/18/2021    POCPCO2 28.6 07/18/2021    POCPO2 134.5 07/18/2021    POCHCO3 21.6 07/18/2021    NBEA 1 07/18/2021    PBEA NOT REPORTED 07/18/2021    NZU8UAT NOT REPORTED 07/18/2021    WQVD1UWF 99 07/18/2021    FIO2 NOT REPORTED 07/18/2021     Lab Results   Component Value Date/Time    SPECIAL NOT REPORTED 07/19/2021 01:41 PM     Lab Results   Component Value Date/Time    CULTURE (A) 07/18/2021 02:43 PM     POSITIVE Blood Culture Results called to and read back by: MILA MCCOLLUM ON 7/19/21 AT 0606    CULTURE  07/18/2021 02:43 PM     DIRECT GRAM STAIN FROM BOTTLE: GRAM POSITIVE COCCI IN CLUSTERS    CULTURE STAPHYLOCOCCUS AUREUS (A) 07/18/2021 02:43 PM       Radiology:  CT ABDOMEN PELVIS WO CONTRAST Additional Contrast? None    Result Date: 7/17/2021  No acute abnormality identified in the abdomen and pelvis. The bladder is markedly distended with a calculated volume of 2 L. Hepatic steatosis and mild hepatomegaly. Minor bibasilar atelectasis and interstitial thickening posteriorly. XR CHEST (SINGLE VIEW FRONTAL)    Result Date: 7/17/2021  Unremarkable portable chest radiograph. XR FOOT LEFT (2 VIEWS)    Result Date: 7/19/2021  No radiographic evidence for acute osteomyelitis. Mild soft tissue swelling of the great toe possibly cellulitis. RECOMMENDATION: NM bone scan or MRI may be obtained if there remains strong concern for acute osteomyelitis. XR ABDOMEN (KUB) (SINGLE AP VIEW)    Result Date: 7/17/2021  Nonobstructive bowel gas pattern. No acute process identified.      [address] [facility] GALLBLADDER RUQ    Result Date: 7/17/2021  Mild hepatomegaly. Diffuse hepatic steatosis. Contracted gallbladder. No cholelithiasis or acute cholecystitis. XR CHEST PORTABLE    Result Date: 7/18/2021  No focal consolidation. No acute process evident. CT CHEST ABDOMEN PELVIS W CONTRAST    Result Date: 7/18/2021  Moderate distention of the colon multiple air-fluid levels, findings may be seen with acute enterocolitis. Physical Examination:        General appearance:  alert, cooperative and no distress,  gentleman sitting up in bed  Mental Status:  oriented to person, place and time and normal affect  Lungs:  clear to auscultation bilaterally, normal effort  Heart: Tachycardia with regular rhythm, there is a loud systolic murmur which is blowing in nature best appreciated at the apex  Abdomen:  soft, nontender, nondistended, protuberant, normal bowel sounds, no masses, hepatomegaly, splenomegaly  : Cárdenas catheter present  Extremities:  no edema, redness, tenderness in the calves; left great toe with erythema at the 1st MTP joint. Skin:  Erythema to the first MTP joint on the left    Assessment:        Hospital Problems         Last Modified POA    * (Principal) MSSA bacteremia 7/20/2021 Yes    Diarrhea 7/20/2021 Yes    Acute dehydration 7/20/2021 Yes    Hypokalemia 7/20/2021 Yes    Epistaxis 7/20/2021 No    Thrombocytopenia ([address] 7/20/2021 Yes    Sepsis with acute organ dysfunction and septic shock ([address] 7/20/2021 Yes    Delusions ([address] 7/20/2021 Yes    Hypophosphatemia 7/20/2021 Yes    Severe mitral regurgitation 7/20/2021 Yes    Acute respiratory failure with hypoxia ([address] 7/20/2021 Yes    GI bleed 7/20/2021 Yes    Hypomagnesemia 7/20/2021 Yes    Normocytic normochromic anemia 7/20/2021 Yes    Obesity (BMI 30-39. 9) 7/20/2021 Yes    Transaminitis 7/20/2021 Yes    Hyperglycemia 7/20/2021 Yes    Essential hypertension 7/20/2021 Yes          Plan:        1.  MSSA bacteremia - on Ancef through 8/15. ID following. Source is still unclear. To undergo MONROE today. If no vegetation is noted, need to investigate left great toe with official ortho consult and aspiration. 2. Acute respiratory failure with hypoxia - d-dimer elevated. Need to r/o PE  3. Delusions, confusion, TME - possibly, and likely due to sepsis. However, still having symptoms, despite antibiotics. Check CT of the brain and EEG. May need LP. May need neuro eval. Continue to monitor at present. 4. Thrombocytopenia-improving with IV antibiotics. Fibrinogen elevated at 666. Peripheral smear reviewed. Thrombocytopenia likely secondary to sepsis. 5. GI bleed-likely lower in nature. Occult blood is positive. Continue to monitor hemoglobin hematocrit daily. We will involve GI at this time, as his hemoglobin has dropped 2.5 g since admission. 6. Acute diarrhea, probably viral in nature. Start imodium today  7. Severe mitral regurg-cardiology following. Plan for MONROE today. 8. Normocytic normochromic anemia-occult blood positive. GI consult. Continue to trend daily CBC. Start PPI BID orally at this time  9. Hypokalemia, hypophosphatemia-replace lytes daily. 10. Transaminitis-likely related to fatty liver disease, possibly due to sepsis  11. Obesity 31.42 - diet/weight loss/exercise  12.  Acute left great toe pain - gout vs septic joint - improving with IV antibioitcs    FOREIGN PASTRANA DO  7/20/2021  1:44 PM

## 2021-07-20 NOTE — PROGRESS NOTES
[facility]   Progress Note                   Date:   7/20/2021  Patient name: Francie Ng  Date of admission:  7/17/2021 12:42 AM  MRN:   [id number]  YOB: 1980  PCP: Corey Jay MD    Reason for Admission: Diarrhea [R19.7]    Subjective:       Clinical Changes / Abnormalities: Patient seen and examined in room after discussion with RN. COVID 19 negative. No recent fevers. RN reports patient's HR slightly elevated today. MONROE yesterday was canceled due to hypokalemia. Patient had breakfast today. Will make NPO now and try for MONROE this afternoon. K up to 3.5. Received replacement. Nurse reports intermittent hallucinations. These seem to occur late at night when patient is tired or increased temp. No chest pain or SOB. Tele SR/ST. Medications:   Scheduled Meds:   phosphorus  500 mg Oral BID    potassium chloride  40 mEq Oral BID WC    mupirocin   Nasal BID    ceFAZolin  2,000 mg Intravenous Q6H    sodium chloride flush  5-40 mL Intravenous 2 times per day     Continuous Infusions:   dextrose 5% and 0.45% NaCl with KCl 40 mEq 100 mL/hr at 07/20/21 0458    sodium chloride      sodium chloride       CBC:   Recent Labs     07/19/21  0656 07/19/21  0656 07/19/21 1116 07/19/21 1951 07/20/21  0549   WBC 9.4  --   --  11.8* 10.9   HGB 11.2*  --   --  11.9* 10.8*   PLT See Reflexed IPF Result   < > 50* 66* 78*    < > = values in this interval not displayed. BMP:    Recent Labs     07/19/21  1116 07/19/21  1116 07/19/21  1233 07/19/21 1951 07/20/21  0549     --   --  137 137   K 2.9*   < > 2.9* 3.1* 3.5*     --   --  104 104   CO2 24  --   --  23 22   BUN 10  --   --  8 8   CREATININE 0.48*  --   --  0.57* 0.49*   GLUCOSE 116*  --   --  175* 129*    < > = values in this interval not displayed.      Hepatic:   Recent Labs     07/19/21  0656 07/19/21 1951 07/20/21  0549   AST 55* 64* 54*   ALT 34 37 31   BILITOT 0.60 0.60 0.62   ALKPHOS 73 92 82     Troponin: Recent Labs     07/18/21  0519 07/18/21  0653 07/19/21  1116   TROPHS 171* 165* 76*     BNP: No results for input(s): BNP in the last 72 hours. Lipids: No results for input(s): CHOL, HDL in the last 72 hours. Invalid input(s): LDLCALCU  INR:   Recent Labs     07/18/21  1427 07/19/21  0656   INR 0.9 1.0       Objective:   Vitals: /75   Pulse 126   Temp 97.9 °F (36.6 °C)   Resp 22   Ht 5' 6\" (1.676 m)   Wt 194 lb 10.7 oz (88.3 kg)   SpO2 95%   BMI 31.42 kg/m²   General appearance: alert and cooperative with exam  HEENT: Head: Normocephalic, no lesions, without obvious abnormality. Neck: no JVD, trachea midline, no adenopathy  Lungs: Clear to auscultation. Supplemental Oxygen in use. Heart: Regular rate and rhythm, s1/s2 auscultated, no murmurs.   Abdomen: soft, non-tender, bowel sounds active  Extremities: no edema  Neurologic: not done    ECHO 7/18/2021  Summary  Global left ventricular systolic function is normal. Calculated ejection  fraction 48% by Dubois's method. Visually estimated EF 50%. Posterior mitral valve leaflet prolapse. Severe mitral regurgitation. Eccentric anterior diredted jet. EOA = 0.7cm2. PISA radius is 1.3cm. Vena contracta is 0.99 cm. MR volume is  58ml. Possible small vegetation on posterior leaflet. Consider MONROE. Assessment / Acute Cardiac Problems:   1. Troponin elevation likely demand ischemia in setting of hypotension 2/2 to sepsis  2. Sinus tachycardia  3. Septic Shock  4. Bacteremia with blood cultures showing gram positive cocci in clusters x2  5. HTN  6. HLD  7. N/V Diarrhea  8. Hypokalemia    Patient Active Problem List:     Diarrhea     Acute dehydration     Hypomagnesemia     Hypokalemia     Bleeding nose     Thrombocytopenia (HCC)     Hyperglycemia     Essential hypertension     Sepsis with acute organ dysfunction and septic shock ([facility]      Plan of Treatment:   1. Troponin 171, 165 with last one 7/18/2021 at 0653. Will recheck today.    2. Echo as noted above. Plan for MONROE yesterday was cancelled due to hypokalemia. Will plan MONROE for later today to evaluate possible vegetation. Will make NPO now. 3. Keep K+ > 4.0, and Mg+ > 2.0. K was 3.5 this morning. Potassium replacement PO given. Mag 1.9. Replacement ordered. Will recheck K this afternoon. 4. Continue IV Fluids and IV antibiotics per primary team/ID. 5. HR remains 110s at rest this am. BP stable. Will give Metoprolol 25mg PO BID with hold parameters and continue to monitor. 6. Further orders pending MONROE findings.          Electronically signed by MARIKA Contreras CNP on 7/20/2021 at 8:46 AM  [facility]      [phone]

## 2021-07-20 NOTE — CONSULTS
GASTROENTEROLOGY CONSULTATION NOTE    7/20/2021      REASON FOR CONSULT: Acute anemia, positive fecal blood    REQUESTING PHYSICIAN:  Mellissa Baeza DO    HISTORY OF PRESENT ILLNESS:    The patient is a 36 y.o. male who is admitted with staph endocarditis was noted to have a 2.5 g drop in hemoglobin since admission on 7/17. Patient had a stool occult blood that was tested positive. GI has been consulted to assess for GI bleed. Of note the patient has had significantly low platelets of less than 50,000 on admission. He was transfused platelets and the recent platelet count is around 68,000. Patient has a history of severe diverticulosis and diverticulitis and has undergone sigmoid colectomy in 2012. Denies any NSAID use. Reports h/o alcohol use on weekend. Denies any recreational drug use or h/o smoking tobacco. Reports brown stools for the most days, sometimes the stools are dark. Denies black or bloody stools. Denies any nausea or vomiting. Medical History:   Past Medical History:   Diagnosis Date    Diverticulosis     Hyperlipidemia     Hypertension        SURGICAL HISTORY:  Past Surgical History:   Procedure Laterality Date    ABDOMEN SURGERY      large bowel resection    ANTERIOR CRUCIATE LIGAMENT REPAIR Right     APPENDECTOMY  07/09/1997    COLECTOMY      2012 - D/T Dverticulitis    DILATATION, ESOPHAGUS         MEDS:  Prior to Admission medications    Medication Sig Start Date End Date Taking?  Authorizing Provider   amLODIPine (NORVASC) 5 MG tablet Take 5 mg by mouth daily   Yes Historical Provider, MD   atorvastatin (LIPITOR) 80 MG tablet Take 80 mg by mouth daily   Yes Historical Provider, MD   carvedilol (COREG) 25 MG tablet Take 25 mg by mouth 2 times daily (with meals)   Yes Historical Provider, MD   dicyclomine (BENTYL) 20 MG tablet Take 20 mg by mouth every 6 hours   Yes Historical Provider, MD hydroCHLOROthiazide (HYDRODIURIL) 25 MG tablet Take 25 mg by mouth daily   Yes Historical Provider, MD   HYDROcodone-acetaminophen (NORCO) 5-325 MG per tablet Take 1 tablet by mouth every 6 hours as needed for Pain. Yes Historical Provider, MD   potassium chloride (MICRO-K) 10 MEQ extended release capsule Take 20 mEq by mouth 2 times daily   Yes Historical Provider, MD     Scheduled Meds:   phosphorus  500 mg Oral BID    metoprolol tartrate  25 mg Oral BID    pantoprazole  40 mg Intravenous BID    And    sodium chloride (PF)  10 mL Intravenous BID    potassium chloride  40 mEq Oral BID WC    mupirocin   Nasal BID    ceFAZolin  2,000 mg Intravenous Q6H    sodium chloride flush  5-40 mL Intravenous 2 times per day     Continuous Infusions:   dextrose 5% and 0.45% NaCl with KCl 40 mEq 100 mL/hr at 07/20/21 0458    sodium chloride      sodium chloride       PRN Meds:sodium chloride, sodium chloride, sodium chloride flush, sodium chloride, ondansetron **OR** ondansetron, acetaminophen **OR** acetaminophen, polyethylene glycol, magnesium sulfate, oxyCODONE, melatonin, aluminum & magnesium hydroxide-simethicone    Allergies   Allergen Reactions    Morphine Other (See Comments)     Pt report muscle cramps       SOCIAL HISTORY:   Social History     Socioeconomic History    Marital status:      Spouse name: Not on file    Number of children: Not on file    Years of education: Not on file    Highest education level: Not on file   Occupational History    Not on file   Tobacco Use    Smoking status: Never Smoker    Smokeless tobacco: Never Used   Vaping Use    Vaping Use: Never assessed   Substance and Sexual Activity    Alcohol use:  Yes     Alcohol/week: 20.0 standard drinks     Types: 20 Cans of beer per week     Comment: 20/ week, average every other day    Drug use: Never    Sexual activity: Not on file   Other Topics Concern    Not on file   Social History Narrative    Not on file Social Determinants of Health     Financial Resource Strain:     Difficulty of Paying Living Expenses:    Food Insecurity:     Worried About Running Out of Food in the Last Year:     [address] in the Last Year:    Transportation Needs:     Lack of Transportation (Medical):  Lack of Transportation (Non-Medical):    Physical Activity:     Days of Exercise per Week:     Minutes of Exercise per Session:    Stress:     Feeling of Stress :    Social Connections:     Frequency of Communication with Friends and Family:     Frequency of Social Gatherings with Friends and Family:     Attends Yazidism Services:     Active Member of Clubs or Organizations:     Attends Club or Organization Meetings:     Marital Status:    Intimate Partner Violence:     Fear of Current or Ex-Partner:     Emotionally Abused:     Physically Abused:     Sexually Abused:        FAMILY HISTORY:   Family History   Problem Relation Age of Onset    No Known Problems Mother     Other Father         diverticulitis       REVIEW OF SYSTEMS:  10 out of 14 systems were reviewed and otherwise negative per patient recall. PHYSICAL EXAM:    /81   Pulse 103   Temp 100.8 °F (38.2 °C) (Oral)   Resp 25   Ht 5' 6\" (1.676 m)   Wt 194 lb 10.7 oz (88.3 kg)   SpO2 96%   BMI 31.42 kg/m²     General:  Alert and oriented x 3. No acute distress, pleasant conscious uncooperative. Nontoxic in appearance. HEENT:  Sclera are anicteric and conjunctiva are normal.  Normocephalic. Hearing is adequate. Moist oropharynx without thrush. No oral ulcers are seen. Neck is supple without masses. No palpable cervical or supraclavicular lymph nodes  Lungs:  Clear to auscultation, with no rales, wheezes, or rhonchi. Unlabored breathing pattern with adequate aeration. Heart:  Regular rate and rhythm. There are no murmurs. Abdomen: Bowel sounds are normal.  The abdomen is soft and non distended.   There is no tenderness, no guarding, rebound, or rigidity. There are no palpable masses, hepatosplenomegaly. Extemities: There is no clubbing or edema. Pulses are palpable. Skin:  No rashes or lesions appreciated. There is no jaundice. Neuro:  CN 2-12 intact bilaterally, no asterixis, no obvious focal neurologic deficits.   Musculoskeletal:  Normal muscle tone, no joint swelling,     Lab and Imaging Review   Recent Labs     07/18/21  0521 07/18/21  0521 07/18/21  1427 07/18/21  1427 07/18/21  1457 07/19/21  0656 07/19/21  0656 07/19/21  1116 07/19/21  1233 07/19/21  1951 07/20/21  0549 07/20/21  1150   WBC 9.9   < >  --    < > 9.6 9.4  --   --   --  11.8* 10.9  --    HGB 12.5*  --   --   --  12.8* 11.2*  --   --   --  11.9* 10.8*  --    MCV 82.8   < >  --    < > 84.8 82.9  --   --   --  85.8 84.5  --    PLT See Reflexed IPF Result   < >  --    < > See Reflexed IPF Result See Reflexed IPF Result   < > 50*  --  66* 78*  --    INR  --   --  0.9  --   --  1.0  --   --   --   --   --   --    *   < > 137   < >  --  139   < > 140   < > 137 137 138   K 3.0*   < > 3.6*   < >  --  2.7*   < > 2.9*   < > 3.1* 3.5* 3.4*      < > 101   < >  --  102   < > 104   < > 104 104 105   CO2 19*   < > 21   < >  --  22   < > 24   < > 23 22 24   BUN 8   < > 6   < >  --  9   < > 10   < > 8 8 7   CREATININE 0.76   < > 0.77   < >  --  0.65*   < > 0.48*   < > 0.57* 0.49* 0.48*   GLUCOSE 172*   < > 169*   < >  --  139*   < > 116*   < > 175* 129* 136*   CALCIUM 7.9*   < > 7.6*   < >  --  7.7*   < > 7.9*   < > 8.6 7.7* 8.0*   PROT 6.0*   < > 6.0*   < >  --  5.4*  --   --   --  5.9* 5.6*  --    LABALBU 2.7*   < > 2.7*   < >  --  2.3*   < > 2.5*  --  2.7* 2.2*  --    AST 66*   < > 66*   < >  --  55*  --   --   --  64* 54*  --    ALT 43*   < > 43*   < >  --  34  --   --   --  37 31  --    ALKPHOS 81   < > 77   < >  --  73  --   --   --  92 82  --    BILITOT 0.70   < > 0.60   < >  --  0.60  --   --   --  0.60 0.62  --    BILIDIR  --   --  0.25  --   --   --   --   -- --   --   --   --    AMYLASE 131*  --   --   --   --   --   --   --   --   --   --   --    LIPASE 299*  --   --   --   --  228*  --   --   --   --   --   --    MG 2.3   < >  --   --   --  2.1  --   --   --  2.0 1.9  --     < > = values in this interval not displayed. Recent Labs     07/18/21  1427 07/19/21  0656   INR 0.9 1.0   PROTIME 10.1 10.5       IMPRESSION:  1. Melena  2. Acute blood loss anemia most likely due to low platelets and anti-  3. 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 Staph endocarditis  5. Staph bacteremia       RECOMMENDATIONS:   1. Continue IV pantoprazole 40 mg twice daily  2. Patient is high risk to undergo procedure due to the above comorbid status including acute CHF, staph endocarditis, ITP/thrombocytopenia  3. Continue conservative management  4. Monitor CBC and transfuse as clinically indicated  5. We will consider endoscopic evaluation as patient is having signs of active GI bleeding such as melena/hematochezia, hemodynamic instability      Please note that this chart was generated using voice recognition Dragon dictation software. Although every effort was made to ensure the accuracy of this automated transcription, some errors in transcription may have occurred.       Electronically signed by Salvatore Win MD on 7/20/2021 at 4:48 PM

## 2021-07-20 NOTE — PROGRESS NOTES
[facility]  Pre-Procedure Conscious Sedation Data         Pre Procedure Conscious Sedation Data:  ASA Class:                  [] I [] II [x] III [] IV     Mallampati Class:       [] I [x] II [] III [] IV    Page Tejada MD  [facility]  [address] [facility] [place]

## 2021-07-20 NOTE — PLAN OF CARE
Problem: Pain:  Goal: Pain level will decrease  Description: Pain level will decrease  7/20/2021 1043 by Marylouise Severs, RN  Outcome: Ongoing  7/20/2021 0342 by Mamadou Lomax RN  Outcome: Ongoing  Goal: Control of acute pain  Description: Control of acute pain  7/20/2021 1043 by Marylouise Severs, RN  Outcome: Ongoing  7/20/2021 0342 by Mamadou Lomax RN  Outcome: Ongoing  Goal: Control of chronic pain  Description: Control of chronic pain  7/20/2021 1043 by Marylouise Severs, RN  Outcome: Ongoing  7/20/2021 0342 by Mamadou Lomax RN  Outcome: Ongoing     Problem: Infection, Septic Shock:  Goal: Will show no infection signs and symptoms  Description: Will show no infection signs and symptoms  7/20/2021 1043 by Marylouise Severs, RN  Outcome: Ongoing  7/20/2021 0342 by Mamadou Lomax RN  Outcome: Ongoing     Problem: Falls - Risk of:  Goal: Will remain free from falls  Description: Will remain free from falls  7/20/2021 1043 by Marylouise Severs, RN  Outcome: Ongoing  7/20/2021 0342 by Mamadou Lomax RN  Outcome: Ongoing  Goal: Absence of physical injury  Description: Absence of physical injury  7/20/2021 1043 by Marylouise Severs, RN  Outcome: Ongoing  7/20/2021 0342 by Mamadou Lomax RN  Outcome: Ongoing     Problem: Gas Exchange - Impaired:  Goal: Levels of oxygenation will improve  Description: Levels of oxygenation will improve  7/20/2021 1043 by Marylouise Severs, RN  Outcome: Ongoing  7/20/2021 0342 by Mamadou Lomax RN  Outcome: Ongoing     Problem: Cardiac:  Goal: Ability to maintain vital signs within normal range will improve  Description: Ability to maintain vital signs within normal range will improve  7/20/2021 1043 by Marylouise Severs, RN  Outcome: Ongoing  7/20/2021 0342 by Mamadou Lomax RN  Outcome: Ongoing  Goal: Cardiovascular alteration will improve  Description: Cardiovascular alteration will improve  7/20/2021 1043 by Marylouise Severs, RN  Outcome: Ongoing  7/20/2021 0342 by Mamadou Lomax RN  Outcome: Ongoing     Problem: Health Behavior:  Goal: Will modify at least one risk factor affecting health status  Description: Will modify at least one risk factor affecting health status  7/20/2021 1043 by Naomi Aparicio RN  Outcome: Ongoing  7/20/2021 0342 by Babar Batista RN  Outcome: Ongoing  Goal: Identification of resources available to assist in meeting health care needs will improve  Description: Identification of resources available to assist in meeting health care needs will improve  7/20/2021 1043 by Naomi Aparicio RN  Outcome: Ongoing  7/20/2021 0342 by Babar Batista RN  Outcome: Ongoing     Problem: Physical Regulation:  Goal: Ability to maintain vital signs within normal range will improve  Description: Ability to maintain vital signs within normal range will improve  7/20/2021 1043 by Naomi Aparicio RN  Outcome: Ongoing  7/20/2021 0342 by Babar Batista RN  Outcome: Ongoing  Goal: Complications related to the disease process, condition or treatment will be avoided or minimized  Description: Complications related to the disease process, condition or treatment will be avoided or minimized  7/20/2021 1043 by Naomi Aparicio RN  Outcome: Ongoing  7/20/2021 0342 by Babar Batista RN  Outcome: Ongoing  Goal: Diagnostic test results will improve  Description: Diagnostic test results will improve  7/20/2021 1043 by Naomi Aparicio RN  Outcome: Ongoing  7/20/2021 0342 by Babar Batista RN  Outcome: Ongoing  Goal: Will remain free from infection  Description: Will remain free from infection  7/20/2021 1043 by Naomi Aparicio RN  Outcome: Ongoing  7/20/2021 0342 by Babar Batista RN  Outcome: Ongoing  Goal: Ability to maintain clinical measurements within normal limits will improve  Description: Ability to maintain clinical measurements within normal limits will improve  7/20/2021 1043 by Naomi Aparicio RN  Outcome: Ongoing  7/20/2021 0342 by Babar Batista RN  Outcome: Ongoing  Goal: Will show no signs and symptoms of electrolyte imbalance  Description: Will show no signs and symptoms of electrolyte imbalance  7/20/2021 1043 by Sujatha Juárez RN  Outcome: Ongoing  7/20/2021 0342 by Lizeth Beltran RN  Outcome: Ongoing     Problem: Nutritional:  Goal: Nutritional status will improve  Description: Nutritional status will improve  7/20/2021 1043 by Sujatha Juárez RN  Outcome: Ongoing  7/20/2021 0342 by Lizeth Beltran RN  Outcome: Ongoing     Problem: Respiratory:  Goal: Ability to maintain normal respiratory secretions will improve  Description: Ability to maintain normal respiratory secretions will improve  7/20/2021 1043 by Sujatha Juárez RN  Outcome: Ongoing  7/20/2021 0342 by Lizeth Beltran RN  Outcome: Ongoing     Problem: Skin Integrity:  Goal: Demonstration of wound healing without infection will improve  Description: Demonstration of wound healing without infection will improve  7/20/2021 1043 by Sujatha Juárez RN  Outcome: Ongoing  7/20/2021 0342 by Lizeth Beltran RN  Outcome: Ongoing  Goal: Complications related to intravenous access or infusion will be avoided or minimized  Description: Complications related to intravenous access or infusion will be avoided or minimized  7/20/2021 1043 by Sujatha Juárez RN  Outcome: Ongoing  7/20/2021 0342 by Lizeth Beltran RN  Outcome: Ongoing  Goal: Will show no infection signs and symptoms  Description: Will show no infection signs and symptoms  7/20/2021 1043 by Sujatha Juárez RN  Outcome: Ongoing  7/20/2021 0342 by Lizeth Beltran RN  Outcome: Ongoing  Goal: Absence of new skin breakdown  Description: Absence of new skin breakdown  7/20/2021 1043 by Sujatha Juárez RN  Outcome: Ongoing  7/20/2021 0342 by Lizeth Beltran RN  Outcome: Ongoing     Problem: Anxiety:  Goal: Level of anxiety will decrease  Description: Level of anxiety will decrease  7/20/2021 1043 by Sujatha Juárez RN  Outcome: Ongoing  7/20/2021 0342 by Lizeth Beltran RN  Outcome: Ongoing

## 2021-07-20 NOTE — PROGRESS NOTES
[facility] - Progress Note    Today's Date and Time: 7/20/2021, 10:43 AM    Impression :   · MSSA septicemia 7-17-21  · Sepsis with acute organ dysfunction and septic shock  · Diarrhea  · Thrombocytopenia  · History of diverticulitis resulting in partial hemicolectomy with reanastomosis  · History of appendectomy  · History of right ACL repair with screw in place  · Acute dehydration  · Hx systolic cardiac murmur  · Sinus tachycardia  · Hyperlipidemia  · Elevated troponin level likely secondary to demand ischemia  · Elevated inflammatory markers  · Hypokalemia    Recommendations:   · Ancef 2000 mg IV every 6 hours starting 7/18/2021 until 8/15/2021 for MSSA septicemia  · Echocardiogram-MONROE  · X ray imaging of right foot  · NG tube to low intermittent wall suction for nausea and vomiting with possible acute enterocolitis may be beneficial  · Cárdenas for continued urinary bladder distention    Medical Decision Making/Summary/Discussion:7/20/2021     ·   Infection Control Recommendations   · [place] Precautions  · Contact Isolation     Antimicrobial Stewardship Recommendations     · Simplification of therapy  · Targeted therapy    Coordination of Outpatient Care:   · Estimated Length of IV antimicrobials: 4 weeks  · Patient will need Midline Catheter Insertion: TBD  · Patient will need PICC line Insertion: TBD  · Patient will need: Home IV , [place],  SNF,  LTAC: Yes-either SNF or home infusion  · Patient will need outpatient wound care: TBD    Chief complaint/reason for consultation:   · MSSA sepsis      History of Present Illness:   Sujit Jacobson is a 36y.o.-year-old  male who was initially admitted on 7/17/2021. Patient seen at the request of Dr. Shanita Adhikari:     Patient initially presented to [facility] a few days ago with complaints of  abdominal pain, nausea, watery diarrhea and vomiting for the past 5 days after eating some chicken.   The patient states that his wife also had episodes of emesis after eating the chicken but her symptoms resolved shortly after. A CT abdomen was unremarkable and he was diagnosed with viral gastroenteritis and sent home with Norco, Zofran, and potassium supplement for hypokalemia. On 7/16/2021, he presented to [address] [place] [address] with worsening symptoms, including fever, chills, fatigue, and worsening abdominal pain with continued vomiting, right big toe pain. Work-up in the ED revealed a fever of 102.6, tachycardia with a heart rate up to 170, dyspnea with hypoxia, blood cultures showed MSSA x2, and urine cultures grew staph aureus ,000 CFU/mL. Occult blood stool positive. CT abdomen 7/17/2021  Impression   No acute abnormality identified in the abdomen and pelvis.       The bladder is markedly distended with a calculated volume of 2 L.       Hepatic steatosis and mild hepatomegaly.       Minor bibasilar atelectasis and interstitial thickening posteriorly. CT chest abdomen pelvis with contrast 7/18/2021  Impression   Moderate distention of the colon multiple air-fluid levels, findings may be   seen with acute enterocolitis. Abnormal labs include:  Sodium 131  Potassium 3.0  .5  Myoglobin 138  Troponin I 65  Amylase 131  AST 66  Lipase 299  Platelet 39  Sed rate 31    ID has been consulted for MSSA sepsis      CURRENT EVALUATION 7/20/2021:    Afebrile  Vital signs stable with continued tachycardia    Upon evaluation, the patient stated that he was feeling better but is having episodes of hallucinations. CT head has been ordered per primary.     Cardiology will attempt to complete MONROE later this afternoon   Echocardiogram completed on 7/18/2021 revealed posterior mitral valve leaflet prolapse, severe mitral regurgitation, and possible small vegetation on posterior leaflet    Right foot x-ray revealed:  No radiographic evidence for acute osteomyelitis.  Mild soft tissue swelling   of the great toe possibly cellulitis.       RECOMMENDATION:   NM bone scan or MRI may be obtained if there remains strong concern for acute   osteomyelitis.         CT abdomen pelvis revealed: Moderate distention of the colon multiple air-fluid levels, findings may be   seen with acute enterocolitis. Ortho has been consulted due to concern for septic joint. They do not feel that the toe is septic and have no plans for interventions at this time. Stool studies have been negative     Hepatitis panel nonreactive    Ancef has been initiated for MSSA septicemia. We will follow up on further culture and echo results    We will continue to monitor  Discussed with RN and Dr. Randall Coleman, X rays reviewed: 7/20/2021    BUN:8-->8  Cr:0.76-->0.49  K: 3.0-->2.7-->3.5  CRP: 260.5  LDH: 437    WBC:9.9-->10.9  Hb:12.5-->10.8  Plat: 39-->40-->78  Haptoglobin: 256  Sed rate: 31  Lipase 299->228  Lactate: 1.6    Cultures:  Urine:  ·   Blood:  · 7/17/2021: MSSA x2  · 7/18/2021: MSSA  Sputum :  ·   Wound:  ·     Discussed with patient, RN, family. I have personally reviewed the past medical history, past surgical history, medications, social history, and family history, and I have updated the database accordingly.   Past Medical History:     Past Medical History:   Diagnosis Date    Diverticulosis     Hyperlipidemia     Hypertension        Past Surgical  History:     Past Surgical History:   Procedure Laterality Date    ABDOMEN SURGERY      large bowel resection    ANTERIOR CRUCIATE LIGAMENT REPAIR Right     APPENDECTOMY  07/09/1997    COLECTOMY      2012 - D/T Dverticulitis    DILATATION, ESOPHAGUS         Medications:      phosphorus  500 mg Oral BID    metoprolol tartrate  25 mg Oral BID    potassium chloride  40 mEq Oral BID WC    mupirocin   Nasal BID    ceFAZolin  2,000 mg Intravenous Q6H    sodium chloride flush  5-40 mL Intravenous 2 times per day       Social History:     Social History loss, tinnitus or vertigo. Cardiovascular: No chest pain or palpitations. No shortness of breath. No CASTRO  Lung: No shortness of breath or cough. No sputum production  Abdomen: No nausea, vomiting, diarrhea, or abdominal pain. Lon Salter No cramps. Genitourinary: No increased urinary frequency, or dysuria. No hematuria. No suprapubic or CVA pain  Musculoskeletal: No muscle aches or pains. No joint effusions, swelling or deformities  Hematologic: No bleeding or bruising. Neurologic: No headache, weakness, numbness, or tingling. Integument: Scattered small reddened bumps and bruises  Psychiatric: No depression. Endocrine: No polyuria, no polydipsia, no polyphagia. Physical Examination :     Patient Vitals for the past 8 hrs:   BP Temp Temp src Pulse Resp SpO2 Weight   07/20/21 0721 115/75 97.9 °F (36.6 °C) -- 126 22 95 % --   07/20/21 0441 127/68 98.1 °F (36.7 °C) Oral 104 (!) 36 95 % 194 lb 10.7 oz (88.3 kg)     General Appearance: Awake, alert, and in no apparent distress  Head:  Normocephalic, no trauma  Eyes: Pupils equal, round, reactive to light and accommodation; extraocular movements intact; sclera anicteric; conjunctivae pink. No embolic phenomena. ENT: Oropharynx clear, without erythema, exudate, or thrush. No tenderness of sinuses. Mouth/throat: mucosa pink and moist. No lesions. Dentition in good repair. Neck:Supple, without lymphadenopathy. Thyroid normal, No bruits. Pulmonary/Chest: Clear to auscultation, without wheezes, rales, or rhonchi. No dullness to percussion. Cardiovascular: Normal sinus tachycardia with murmur, No rubs, or gallops. Abdomen: Soft, non tender. Bowel sounds normal. No organomegaly  All four Extremities: No cyanosis, clubbing, edema, or effusions. Neurologic: No gross sensory or motor deficits. Skin: Warm and dry with good turgor. No signs of peripheral arterial or venous insufficiency. No ulcerations. No open wounds.     Medical Decision Making -Laboratory:   I have     COMPARISON:   None       HISTORY:   ORDERING SYSTEM PROVIDED HISTORY: MSSA bacteremia, ? DIC   TECHNOLOGIST PROVIDED HISTORY:   MSSA bacteremia, ? DIC       Reason for Exam: sepsis with acute organ dysfunction   Acuity: Unknown   Type of Exam: Unknown       FINDINGS:   Lungs/pleura: The central airways are patent.  There are no suspicious   pulmonary nodules       Mediastinum: There is no acute mediastinal abnormality       Soft Tissues/Bones: No suspicious osteolytic or osteoblastic lesions       Abdominal organs: The liver, spleen, adrenal glands, kidneys, and pancreas   demonstrate no acute abnormality.       GI/Bowel: The visualized portions of the small bowel are unremarkable. There   is borderline dilation of the colon.  There are multiple air-fluid levels   throughout the colon.       Peritoneum/Retroperitoneum: There is a solid amount of retroperitoneal fluid   along the distal ureters bilaterally.       Pelvis: The bladder is moderately distended.       Bones: No suspicious osseous lesions are identified           Impression   Moderate distention of the colon multiple air-fluid levels, findings may be   seen with acute enterocolitis.             XR FOOT LEFT (2 VIEWS) [[id number]] Collected: 07/18/21 1411      Order Status: Completed Updated: 07/19/21 0759     Narrative:       EXAMINATION:   TWO XRAY VIEWS OF THE LEFT FOOT     7/18/2021 2:05 pm     COMPARISON:   None. HISTORY:   ORDERING SYSTEM PROVIDED HISTORY: Left big toe pain and erythema. Looking for   source of MSSA sepsis   TECHNOLOGIST PROVIDED HISTORY:   Left big toe pain and erythema. Looking for source of MSSA sepsis     FINDINGS:   AP and lateral views of the foot obtained.  Normal alignment and   mineralization.  No abnormal periosteal reaction.  No erosions.  No fracture.    No aggressive lytic or blastic lesions.  Mild soft tissue swelling of the   great toe noted.      Impression:       No radiographic evidence for acute osteomyelitis.  Mild soft tissue swelling   of the great toe possibly cellulitis. Medical Decision [id number]:     Component Collected Lab   Specimen Description 07/17/2021  8:48  [address]   . BLOOD    Special Requests 07/17/2021  8:48  [address]   10ML LFA    Culture Abnormal  07/17/2021  8:48 AM [address] Blood Culture Results called to and read back by: Dominga Hammond RN AT 2045 ON 07.17.2021    Culture 07/17/2021  8:48 AM [facility] FROM BOTTLE: GRAM POSITIVE COCCI IN CLUSTERS    Culture Abnormal  07/17/2021  8:48  [address]   Staphylococcus aureus Detected: mecA/C and MREJ Not Detected Methodology- Polymerase Chain Reaction (PCR)   Culture Abnormal  07/17/2021  8:48 AM [facility] This isolate is methicillin susceptible. Testing Performed By    Manjinder Watson Name Director Address Valid Date Range   [address] [address]Jsesica Adams MD [address] [facility] [address] 08/30/17 0801-Present   Susceptibility    Staphylococcus aureus (4)    Antibiotic Interpretation DAVID Status    penicillin Resistant  Final     >=0.5   RESISTANT   cefoxitin screen  NOT REPORTED Final    ciprofloxacin   Final     NOT REPORTED    clindamycin Sensitive  Final     <=0.25   SUSCEPTIBLE   erythromycin Sensitive  Final     <=0.25   SUSCEPTIBLE   gentamicin Sensitive  Final     <=0.5   SUSCEPTIBLE   gentamicin Sensitive Gentamicin is used only in combination with other active agents that test susceptible.  Final    Induced Clind Resist  NOT REPORTED Final    levofloxacin Sensitive  Final     0.25   SUSCEPTIBLE   linezolid   Final     NOT REPORTED    moxifloxacin   Final     NOT REPORTED    nitrofurantoin   Final     NOT REPORTED    oxacillin Sensitive  Final     0.5   SUSCEPTIBLE   Synercid   Final     NOT REPORTED rifampin   Final     NOT REPORTED    tetracycline Sensitive  Final     <=1   SUSCEPTIBLE   tigecycline   Final     NOT REPORTED    trimethoprim-sulfamethoxazole Sensitive  Final     <=10   SUSCEPTIBLE   vancomycin   Final     NOT REPORTED        Medical Decision Making-Other:     Note:  · Labs, medications, radiologic studies were reviewed with personal review of films  · Large amounts of data were reviewed  · Discussed with nursing Staff, Discharge planner  · Infection Control and Prevention measures reviewed  · All prior entries were reviewed  · Administer medications as ordered  · Prognosis: Guarded  · Discharge planning reviewed  · Follow up as outpatient. Thank you for allowing us to participate in the care of this patient. Please call with questions. Tania Alegre, APRN - CNP     ATTESTATION:    I have discussed the case, including pertinent history and exam findings with the APRN. I have evaluated the  History, physical findings and pictures of the patient and the key elements of the encounter have been performed by me. I have reviewed the laboratory data, other diagnostic studies and discussed them with the APRN. I have updated the medical record where necessary. I agree with the assessment, plan and orders as documented by the APRN.     Alicia Woods MD.        Pager: [phone] - Office: [phone]

## 2021-07-20 NOTE — PLAN OF CARE
Problem: Pain:  Goal: Pain level will decrease  Description: Pain level will decrease  Outcome: Ongoing  Goal: Control of acute pain  Description: Control of acute pain  Outcome: Ongoing  Goal: Control of chronic pain  Description: Control of chronic pain  Outcome: Ongoing     Problem: Infection, Septic Shock:  Goal: Will show no infection signs and symptoms  Description: Will show no infection signs and symptoms  Outcome: Ongoing     Problem: Falls - Risk of:  Goal: Will remain free from falls  Description: Will remain free from falls  Outcome: Ongoing  Goal: Absence of physical injury  Description: Absence of physical injury  Outcome: Ongoing     Problem: Gas Exchange - Impaired:  Goal: Levels of oxygenation will improve  Description: Levels of oxygenation will improve  Outcome: Ongoing     Problem: Cardiac:  Goal: Ability to maintain vital signs within normal range will improve  Description: Ability to maintain vital signs within normal range will improve  Outcome: Ongoing  Goal: Cardiovascular alteration will improve  Description: Cardiovascular alteration will improve  Outcome: Ongoing     Problem: Health Behavior:  Goal: Will modify at least one risk factor affecting health status  Description: Will modify at least one risk factor affecting health status  Outcome: Ongoing  Goal: Identification of resources available to assist in meeting health care needs will improve  Description: Identification of resources available to assist in meeting health care needs will improve  Outcome: Ongoing     Problem: Physical Regulation:  Goal: Ability to maintain vital signs within normal range will improve  Description: Ability to maintain vital signs within normal range will improve  Outcome: Ongoing  Goal: Complications related to the disease process, condition or treatment will be avoided or minimized  Description: Complications related to the disease process, condition or treatment will be avoided or minimized  Outcome: Ongoing  Goal: Diagnostic test results will improve  Description: Diagnostic test results will improve  Outcome: Ongoing  Goal: Will remain free from infection  Description: Will remain free from infection  Outcome: Ongoing  Goal: Ability to maintain clinical measurements within normal limits will improve  Description: Ability to maintain clinical measurements within normal limits will improve  Outcome: Ongoing  Goal: Will show no signs and symptoms of electrolyte imbalance  Description: Will show no signs and symptoms of electrolyte imbalance  Outcome: Ongoing     Problem: Nutritional:  Goal: Nutritional status will improve  Description: Nutritional status will improve  Outcome: Ongoing     Problem: Respiratory:  Goal: Ability to maintain normal respiratory secretions will improve  Description: Ability to maintain normal respiratory secretions will improve  Outcome: Ongoing     Problem: Skin Integrity:  Goal: Demonstration of wound healing without infection will improve  Description: Demonstration of wound healing without infection will improve  Outcome: Ongoing  Goal: Complications related to intravenous access or infusion will be avoided or minimized  Description: Complications related to intravenous access or infusion will be avoided or minimized  Outcome: Ongoing  Goal: Will show no infection signs and symptoms  Description: Will show no infection signs and symptoms  Outcome: Ongoing  Goal: Absence of new skin breakdown  Description: Absence of new skin breakdown  Outcome: Ongoing     Problem: Anxiety:  Goal: Level of anxiety will decrease  Description: Level of anxiety will decrease  Outcome: Ongoing     Problem: Fluid Volume:  Goal: Ability to achieve a balanced intake and output will improve  Description: Ability to achieve a balanced intake and output will improve  Outcome: Ongoing     Problem: Nutrition  Goal: Optimal nutrition therapy  Outcome: Ongoing

## 2021-07-21 ENCOUNTER — APPOINTMENT (OUTPATIENT)
Dept: MRI IMAGING | Age: 41
DRG: 871 | End: 2021-07-21
Payer: COMMERCIAL

## 2021-07-21 PROBLEM — R42 VERTIGO: Status: ACTIVE | Noted: 2021-07-21

## 2021-07-21 PROBLEM — G93.41 METABOLIC ENCEPHALOPATHY: Status: ACTIVE | Noted: 2021-07-21

## 2021-07-21 LAB
ABSOLUTE EOS #: 0.11 K/UL (ref 0–0.44)
ABSOLUTE IMMATURE GRANULOCYTE: 0.11 K/UL (ref 0–0.3)
ABSOLUTE LYMPH #: 1.36 K/UL (ref 1.1–3.7)
ABSOLUTE MONO #: 0.68 K/UL (ref 0.1–1.2)
ALBUMIN SERPL-MCNC: 2.5 G/DL (ref 3.5–5.2)
ANION GAP SERPL CALCULATED.3IONS-SCNC: 13 MMOL/L (ref 9–17)
BASOPHILS # BLD: 0 % (ref 0–2)
BASOPHILS ABSOLUTE: 0 K/UL (ref 0–0.2)
BUN BLDV-MCNC: 9 MG/DL (ref 6–20)
BUN/CREAT BLD: ABNORMAL (ref 9–20)
CALCIUM SERPL-MCNC: 7.8 MG/DL (ref 8.6–10.4)
CHLORIDE BLD-SCNC: 104 MMOL/L (ref 98–107)
CO2: 20 MMOL/L (ref 20–31)
CREAT SERPL-MCNC: 0.53 MG/DL (ref 0.7–1.2)
DIFFERENTIAL TYPE: ABNORMAL
EOSINOPHILS RELATIVE PERCENT: 1 % (ref 1–4)
GFR AFRICAN AMERICAN: >60 ML/MIN
GFR NON-AFRICAN AMERICAN: >60 ML/MIN
GFR SERPL CREATININE-BSD FRML MDRD: ABNORMAL ML/MIN/{1.73_M2}
GFR SERPL CREATININE-BSD FRML MDRD: ABNORMAL ML/MIN/{1.73_M2}
GLUCOSE BLD-MCNC: 121 MG/DL (ref 70–99)
HCT VFR BLD CALC: 32.4 % (ref 40.7–50.3)
HEMOGLOBIN: 10.6 G/DL (ref 13–17)
IMMATURE GRANULOCYTES: 1 %
LYMPHOCYTES # BLD: 12 % (ref 24–43)
MCH RBC QN AUTO: 29 PG (ref 25.2–33.5)
MCHC RBC AUTO-ENTMCNC: 32.7 G/DL (ref 28.4–34.8)
MCV RBC AUTO: 88.8 FL (ref 82.6–102.9)
MONOCYTES # BLD: 6 % (ref 3–12)
MORPHOLOGY: NORMAL
NRBC AUTOMATED: 0 PER 100 WBC
PDW BLD-RTO: 13.6 % (ref 11.8–14.4)
PHOSPHORUS: 2.4 MG/DL (ref 2.5–4.5)
PLATELET # BLD: 163 K/UL (ref 138–453)
PLATELET ESTIMATE: ABNORMAL
PMV BLD AUTO: 11.7 FL (ref 8.1–13.5)
POTASSIUM SERPL-SCNC: 3.9 MMOL/L (ref 3.7–5.3)
RBC # BLD: 3.65 M/UL (ref 4.21–5.77)
RBC # BLD: ABNORMAL 10*6/UL
SEG NEUTROPHILS: 80 % (ref 36–65)
SEGMENTED NEUTROPHILS ABSOLUTE COUNT: 9.04 K/UL (ref 1.5–8.1)
SODIUM BLD-SCNC: 137 MMOL/L (ref 135–144)
WBC # BLD: 11.3 K/UL (ref 3.5–11.3)
WBC # BLD: ABNORMAL 10*3/UL

## 2021-07-21 PROCEDURE — 6360000002 HC RX W HCPCS: Performed by: INTERNAL MEDICINE

## 2021-07-21 PROCEDURE — 99233 SBSQ HOSP IP/OBS HIGH 50: CPT | Performed by: INTERNAL MEDICINE

## 2021-07-21 PROCEDURE — 95819 EEG AWAKE AND ASLEEP: CPT | Performed by: PSYCHIATRY & NEUROLOGY

## 2021-07-21 PROCEDURE — 95819 EEG AWAKE AND ASLEEP: CPT

## 2021-07-21 PROCEDURE — 36415 COLL VENOUS BLD VENIPUNCTURE: CPT

## 2021-07-21 PROCEDURE — 6370000000 HC RX 637 (ALT 250 FOR IP): Performed by: INTERNAL MEDICINE

## 2021-07-21 PROCEDURE — 99232 SBSQ HOSP IP/OBS MODERATE 35: CPT | Performed by: INTERNAL MEDICINE

## 2021-07-21 PROCEDURE — 6370000000 HC RX 637 (ALT 250 FOR IP): Performed by: NURSE PRACTITIONER

## 2021-07-21 PROCEDURE — 70551 MRI BRAIN STEM W/O DYE: CPT

## 2021-07-21 PROCEDURE — 99254 IP/OBS CNSLTJ NEW/EST MOD 60: CPT | Performed by: PSYCHIATRY & NEUROLOGY

## 2021-07-21 PROCEDURE — C9113 INJ PANTOPRAZOLE SODIUM, VIA: HCPCS | Performed by: INTERNAL MEDICINE

## 2021-07-21 PROCEDURE — 99254 IP/OBS CNSLTJ NEW/EST MOD 60: CPT | Performed by: NURSE PRACTITIONER

## 2021-07-21 PROCEDURE — 2580000003 HC RX 258: Performed by: INTERNAL MEDICINE

## 2021-07-21 PROCEDURE — 80069 RENAL FUNCTION PANEL: CPT

## 2021-07-21 PROCEDURE — 2580000003 HC RX 258: Performed by: NURSE PRACTITIONER

## 2021-07-21 PROCEDURE — 99253 IP/OBS CNSLTJ NEW/EST LOW 45: CPT | Performed by: THORACIC SURGERY (CARDIOTHORACIC VASCULAR SURGERY)

## 2021-07-21 PROCEDURE — 2500000003 HC RX 250 WO HCPCS: Performed by: INTERNAL MEDICINE

## 2021-07-21 PROCEDURE — 6360000002 HC RX W HCPCS: Performed by: NURSE PRACTITIONER

## 2021-07-21 PROCEDURE — 2060000000 HC ICU INTERMEDIATE R&B

## 2021-07-21 PROCEDURE — 85025 COMPLETE CBC W/AUTO DIFF WBC: CPT

## 2021-07-21 RX ORDER — MECLIZINE HCL 12.5 MG/1
12.5 TABLET ORAL 3 TIMES DAILY PRN
Status: DISCONTINUED | OUTPATIENT
Start: 2021-07-21 | End: 2021-08-02 | Stop reason: HOSPADM

## 2021-07-21 RX ORDER — KETOROLAC TROMETHAMINE 15 MG/ML
15 INJECTION, SOLUTION INTRAMUSCULAR; INTRAVENOUS EVERY 6 HOURS PRN
Status: DISCONTINUED | OUTPATIENT
Start: 2021-07-21 | End: 2021-07-24

## 2021-07-21 RX ORDER — DIPHENHYDRAMINE HYDROCHLORIDE 50 MG/ML
25 INJECTION INTRAMUSCULAR; INTRAVENOUS ONCE
Status: COMPLETED | OUTPATIENT
Start: 2021-07-21 | End: 2021-07-21

## 2021-07-21 RX ADMIN — SODIUM CHLORIDE, PRESERVATIVE FREE 10 ML: 5 INJECTION INTRAVENOUS at 08:59

## 2021-07-21 RX ADMIN — SODIUM CHLORIDE, PRESERVATIVE FREE 10 ML: 5 INJECTION INTRAVENOUS at 09:07

## 2021-07-21 RX ADMIN — CEFAZOLIN 2000 MG: 10 INJECTION, POWDER, FOR SOLUTION INTRAVENOUS at 21:44

## 2021-07-21 RX ADMIN — PANTOPRAZOLE SODIUM 40 MG: 40 INJECTION, POWDER, LYOPHILIZED, FOR SOLUTION INTRAVENOUS at 08:58

## 2021-07-21 RX ADMIN — CEFAZOLIN 2000 MG: 10 INJECTION, POWDER, FOR SOLUTION INTRAVENOUS at 15:50

## 2021-07-21 RX ADMIN — LOPERAMIDE HYDROCHLORIDE 4 MG: 2 CAPSULE ORAL at 09:07

## 2021-07-21 RX ADMIN — CEFAZOLIN 2000 MG: 10 INJECTION, POWDER, FOR SOLUTION INTRAVENOUS at 06:00

## 2021-07-21 RX ADMIN — DIPHENHYDRAMINE HYDROCHLORIDE 25 MG: 50 INJECTION INTRAMUSCULAR; INTRAVENOUS at 23:35

## 2021-07-21 RX ADMIN — ACETAMINOPHEN 650 MG: 325 TABLET ORAL at 06:18

## 2021-07-21 RX ADMIN — MECLIZINE HYDROCHLORIDE 12.5 MG: 12.5 TABLET, FILM COATED ORAL at 12:17

## 2021-07-21 RX ADMIN — POTASSIUM CHLORIDE, DEXTROSE MONOHYDRATE AND SODIUM CHLORIDE: 300; 5; 450 INJECTION, SOLUTION INTRAVENOUS at 14:56

## 2021-07-21 RX ADMIN — MUPIROCIN: 20 OINTMENT TOPICAL at 21:45

## 2021-07-21 RX ADMIN — MUPIROCIN: 20 OINTMENT TOPICAL at 09:00

## 2021-07-21 RX ADMIN — PANTOPRAZOLE SODIUM 40 MG: 40 INJECTION, POWDER, LYOPHILIZED, FOR SOLUTION INTRAVENOUS at 21:45

## 2021-07-21 RX ADMIN — METOPROLOL TARTRATE 25 MG: 25 TABLET ORAL at 08:58

## 2021-07-21 RX ADMIN — POTASSIUM CHLORIDE 40 MEQ: 1500 TABLET, EXTENDED RELEASE ORAL at 08:58

## 2021-07-21 RX ADMIN — POTASSIUM CHLORIDE 40 MEQ: 1500 TABLET, EXTENDED RELEASE ORAL at 18:19

## 2021-07-21 RX ADMIN — SODIUM CHLORIDE, PRESERVATIVE FREE 10 ML: 5 INJECTION INTRAVENOUS at 23:38

## 2021-07-21 RX ADMIN — METOPROLOL TARTRATE 25 MG: 25 TABLET ORAL at 23:35

## 2021-07-21 RX ADMIN — SODIUM CHLORIDE, PRESERVATIVE FREE 10 ML: 5 INJECTION INTRAVENOUS at 21:45

## 2021-07-21 RX ADMIN — LOPERAMIDE HYDROCHLORIDE 4 MG: 2 CAPSULE ORAL at 23:37

## 2021-07-21 ASSESSMENT — ENCOUNTER SYMPTOMS
EYE ITCHING: 0
CHOKING: 0
CONSTIPATION: 0
PHOTOPHOBIA: 0
ABDOMINAL PAIN: 1
EYE REDNESS: 0
BACK PAIN: 0
COUGH: 0
NAUSEA: 1
VOMITING: 1
EYE PAIN: 0
WHEEZING: 0
SHORTNESS OF BREATH: 0
DIARRHEA: 1

## 2021-07-21 ASSESSMENT — PAIN SCALES - GENERAL
PAINLEVEL_OUTOF10: 0
PAINLEVEL_OUTOF10: 0

## 2021-07-21 NOTE — CONSULTS
Neurology Inpatient Consult Note          CONSULTED BY: Internal medicine  REASON FOR CONSULT: Vertigo, delusions  CHIEF COMPLAINT: Abdominal, pain, nausea, vomiting, diarrhea, confusion    History Obtained From:  patient, electronic medical record       HISTORY OF PRESENT ILLNESS:              The patient is a 36 y.o. male with significant past medical history of HLD, hypertension who presents with fever, vomiting, abdominal pain, diarrhea, for 5 days. Then, on Friday he started having behavioral changes. At that time his temperature was found to be 104 at home. Behavioral changes are described by his wife as happening during sleeping but at that time his eyes will be open and he will be describing seeing things that are not there like seeing a dog or people who are not in the room and speaking about things that are not happening. He does not recall these events. However, he remember seeing things like water coming from the IV line that his wife describes is not there. No delusions. Then, today he woke up with severe vertigo, and gait unsteadiness. Vertigo went on for 3 to 4 hours continuously. Was not provoked with eye opening or closing or with head movement. He was also having significant gait unsteadiness and needed help to get into the bathroom. During that time, he also had an episode of diplopia that lasted for 2 hours. Diplopia was monocular and oblique. It was not associated with any tinnitus, ear pain, hearing loss, or ear fullness. Since Friday he has been having memory problems, and difficulty to concentrate. He denies headache, photophobia, neck pain and stiffness. No seizure like activity. No weakness except for right shoulder weakness due to rotator cuff that has been ongoing for some time. No numbness or tingling. He has been managed for septicemia due to staph aureus and septic shock. Blood cultures grew MSSA.   Was found to have infective endocarditis involving the mitral valve with large vegetation and severe regurgitation. Was also found to have melena and dropping hemoglobin which is thought to be likely due to thrombocytopenia with platelet in 47Y. Ancef was started for MSSA septicemia.     Past Medical History:        Diagnosis Date    Diverticulosis     Hyperlipidemia     Hypertension      Past Surgical History:        Procedure Laterality Date    ABDOMEN SURGERY      large bowel resection    ANTERIOR CRUCIATE LIGAMENT REPAIR Right     APPENDECTOMY  07/09/1997    COLECTOMY      2012 - D/T Dverticulitis    DILATATION, ESOPHAGUS       Current Medications:   Current Facility-Administered Medications: meclizine (ANTIVERT) tablet 12.5 mg, 12.5 mg, Oral, TID PRN  dextrose 5 % and 0.45 % NaCl with KCl 40 mEq infusion, , Intravenous, Continuous  metoprolol tartrate (LOPRESSOR) tablet 25 mg, 25 mg, Oral, BID  pantoprazole (PROTONIX) injection 40 mg, 40 mg, Intravenous, BID **AND** sodium chloride (PF) 0.9 % injection 10 mL, 10 mL, Intravenous, BID  loperamide (IMODIUM) capsule 4 mg, 4 mg, Oral, 4x Daily PRN  sodium chloride (OCEAN) 0.65 % nasal spray 1 spray, 1 spray, Each Nostril, PRN  0.9 % sodium chloride infusion, , Intravenous, PRN  potassium chloride (KLOR-CON M) extended release tablet 40 mEq, 40 mEq, Oral, BID WC  mupirocin (BACTROBAN) 2 % ointment, , Nasal, BID  ceFAZolin (ANCEF) 2000 mg in dextrose 5 % 50 mL IVPB, 2,000 mg, Intravenous, Q6H  sodium chloride flush 0.9 % injection 5-40 mL, 5-40 mL, Intravenous, 2 times per day  sodium chloride flush 0.9 % injection 10 mL, 10 mL, Intravenous, PRN  0.9 % sodium chloride infusion, 25 mL, Intravenous, PRN  ondansetron (ZOFRAN-ODT) disintegrating tablet 4 mg, 4 mg, Oral, Q8H PRN **OR** ondansetron (ZOFRAN) injection 4 mg, 4 mg, Intravenous, Q6H PRN  acetaminophen (TYLENOL) tablet 650 mg, 650 mg, Oral, Q6H PRN **OR** acetaminophen (TYLENOL) suppository 650 mg, 650 mg, Rectal, Q6H PRN  polyethylene glycol (GLYCOLAX) packet 17 g, 17 g, Oral, Daily PRN  magnesium sulfate 1000 mg in dextrose 5% 100 mL IVPB, 1,000 mg, Intravenous, PRN  oxyCODONE (ROXICODONE) immediate release tablet 5 mg, 5 mg, Oral, Q6H PRN  melatonin tablet 6 mg, 6 mg, Oral, Nightly PRN  aluminum & magnesium hydroxide-simethicone (MAALOX) 200-200-20 MG/5ML suspension 30 mL, 30 mL, Oral, Q6H PRN  Allergies:  Morphine    Social History:  TOBACCO:   reports that he has never smoked. He has never used smokeless tobacco.  ETOH:   reports current alcohol use of about 20.0 standard drinks of alcohol per week. DRUGS:   reports no history of drug use. ACTIVITIES OF DAILY LIVING:    INSTRUMENTAL ACTIVITIES OF DAILY LIVING:  Patient is able to perform all instrumental activities of daily living. Family History:       Problem Relation Age of Onset    No Known Problems Mother     Other Father         diverticulitis       REVIEW OF SYSTEMS:  Review of Systems   Constitutional: Positive for chills, fatigue and fever. Negative for unexpected weight change. Eyes: Positive for visual disturbance. Negative for photophobia, pain, redness and itching. Respiratory: Negative for cough, choking, shortness of breath and wheezing. Cardiovascular: Negative for chest pain, palpitations and leg swelling. Gastrointestinal: Positive for abdominal pain, diarrhea, nausea and vomiting. Negative for constipation. Endocrine: Negative. Genitourinary: Negative. Musculoskeletal: Positive for arthralgias and gait problem. Negative for back pain, myalgias, neck pain and neck stiffness. Neurological: Positive for dizziness and light-headedness. Negative for tremors, seizures, syncope, facial asymmetry, speech difficulty, weakness, numbness and headaches. Psychiatric/Behavioral: Positive for behavioral problems, confusion, decreased concentration, hallucinations and sleep disturbance. Negative for agitation, dysphoric mood, self-injury and suicidal ideas.  The patient is not nervous/anxious and is not hyperactive. PHYSICAL EXAM:  Vitals:  /80   Pulse 122   Temp 100.2 °F (37.9 °C) (Oral)   Resp 27   Ht 5' 6\" (1.676 m)   Wt 181 lb 14.1 oz (82.5 kg)   SpO2 96%   BMI 29.36 kg/m²    Physical Exam  Neurological:      Mental Status: He is oriented to person, place, and time. Coordination: Finger-Nose-Finger Test and Heel to [place] nerissa Test normal.      Deep Tendon Reflexes:      Reflex Scores:       Tricep reflexes are 2+ on the right side and 2+ on the left side. Bicep reflexes are 2+ on the right side and 2+ on the left side. Brachioradialis reflexes are 2+ on the right side and 2+ on the left side. Patellar reflexes are 2+ on the right side and 2+ on the left side. Achilles reflexes are 2+ on the right side and 2+ on the left side. Psychiatric:         Speech: Speech normal.        Neurologic Exam     Mental Status   Oriented to person, place, and time. Registration: recalls 3 of 3 objects. Recall at 5 minutes: recalls 2 of 3 objects. Follows 3 step commands. Attention: normal. Concentration: normal.   Speech: speech is normal   Level of consciousness: alert    Cranial Nerves   Cranial nerves II through XII intact. Motor Exam   Muscle bulk: normal  Overall muscle tone: normal  Right arm tone: normal  Left arm tone: normal  Right arm pronator drift: absent  Left arm pronator drift: absent  Right leg tone: normal  Left leg tone: normal    Strength   Strength 5/5 except as noted.    Right deltoid: 3/5    Sensory Exam   Light touch normal.   Pinprick normal.     Gait, Coordination, and Reflexes     Coordination   Finger to nose coordination: normal  Heel to shin coordination: normal    Tremor   Resting tremor: absent  Intention tremor: absent  Action tremor: absent    Reflexes   Right brachioradialis: 2+  Left brachioradialis: 2+  Right biceps: 2+  Left biceps: 2+  Right triceps: 2+  Left triceps: 2+  Right patellar: 2+  Left patellar: 2+  Right achilles: 2+  Left achilles: 2+  Right plantar: normal  Left plantar: normal  Right Haney: absent  Left Haney: absent  Right ankle clonus: absent  Left pendular knee jerk: absent    NIHSS: 0     DATA  Recent Results (from the past 24 hour(s))   BASIC METABOLIC PANEL    Collection Time: 07/20/21 11:50 AM   Result Value Ref Range    Glucose 136 (H) 70 - 99 mg/dL    BUN 7 6 - 20 mg/dL    CREATININE 0.48 (L) 0.70 - 1.20 mg/dL    Bun/Cre Ratio NOT REPORTED 9 - 20    Calcium 8.0 (L) 8.6 - 10.4 mg/dL    Sodium 138 135 - 144 mmol/L    Potassium 3.4 (L) 3.7 - 5.3 mmol/L    Chloride 105 98 - 107 mmol/L    CO2 24 20 - 31 mmol/L    Anion Gap 9 9 - 17 mmol/L    GFR Non-African American >60 >60 mL/min    GFR African American >60 >60 mL/min    GFR Comment          GFR Staging NOT REPORTED    AMMONIA    Collection Time: 07/20/21  5:55 PM   Result Value Ref Range    Ammonia 52 16 - 60 umol/L   TSH with Reflex    Collection Time: 07/20/21  5:55 PM   Result Value Ref Range    TSH 1.86 0.30 - 5.00 mIU/L   Culture, Blood 1    Collection Time: 07/20/21  6:00 PM    Specimen: Blood   Result Value Ref Range    Specimen Description . BLOOD     Special Requests  R HAND 20 ML     Culture (A)      POSITIVE Blood Culture Results called to and read back by: Ariadna NIX AT 1000 7/21/21    Culture       DIRECT GRAM STAIN FROM BOTTLE: GRAM POSITIVE COCCI IN CLUSTERS    Culture (A)      Staphylococcus aureus Detected: mecA/C and MREJ Not Detected Methodology- Polymerase Chain Reaction (PCR)   RENAL FUNCTION PANEL    Collection Time: 07/21/21  7:10 AM   Result Value Ref Range    Glucose 121 (H) 70 - 99 mg/dL    BUN 9 6 - 20 mg/dL    CREATININE 0.53 (L) 0.70 - 1.20 mg/dL    Bun/Cre Ratio NOT REPORTED 9 - 20    Calcium 7.8 (L) 8.6 - 10.4 mg/dL    Albumin 2.5 (L) 3.5 - 5.2 g/dL    Phosphorus 2.4 (L) 2.5 - 4.5 mg/dL    Sodium 137 135 - 144 mmol/L    Potassium 3.9 3.7 - 5.3 mmol/L    Chloride 104 98 - 107 mmol/L    CO2 20 20 - 31 mmol/L    Anion Gap 13 9 - 17 mmol/L    GFR Non-African American >60 >60 mL/min    GFR African American >60 >60 mL/min    GFR Comment          GFR Staging NOT REPORTED    CBC Auto Differential    Collection Time: 07/21/21  7:10 AM   Result Value Ref Range    WBC 11.3 3.5 - 11.3 k/uL    RBC 3.65 (L) 4.21 - 5.77 m/uL    Hemoglobin 10.6 (L) 13.0 - 17.0 g/dL    Hematocrit 32.4 (L) 40.7 - 50.3 %    MCV 88.8 82.6 - 102.9 fL    MCH 29.0 25.2 - 33.5 pg    MCHC 32.7 28.4 - 34.8 g/dL    RDW 13.6 11.8 - 14.4 %    Platelets 847 085 - 079 k/uL    MPV 11.7 8.1 - 13.5 fL    NRBC Automated 0.0 0.0 per 100 WBC    Differential Type NOT REPORTED     WBC Morphology NOT REPORTED     RBC Morphology NOT REPORTED     Platelet Estimate NOT REPORTED     Immature Granulocytes 1 (H) 0 %    Seg Neutrophils 80 (H) 36 - 65 %    Lymphocytes 12 (L) 24 - 43 %    Monocytes 6 3 - 12 %    Eosinophils % 1 1 - 4 %    Basophils 0 0 - 2 %    Absolute Immature Granulocyte 0.11 0.00 - 0.30 k/uL    Segs Absolute 9.04 (H) 1.50 - 8.10 k/uL    Absolute Lymph # 1.36 1.10 - 3.70 k/uL    Absolute Mono # 0.68 0.10 - 1.20 k/uL    Absolute Eos # 0.11 0.00 - 0.44 k/uL    Basophils Absolute 0.00 0.00 - 0.20 k/uL    Morphology Normal        IMAGING    CTH W/O  7/20/2021   Impression   No acute intracranial abnormality.  MRI may be obtained if clinically   indicated. CT abdomen 7/17/2021  Impression   No acute abnormality identified in the abdomen and pelvis. The bladder is markedly distended with a calculated volume of 2 L. Hepatic steatosis and mild hepatomegaly. Minor bibasilar atelectasis and interstitial thickening posteriorly. CT chest abdomen pelvis with contrast 7/18/2021  Impression   Moderate distention of the colon multiple air-fluid levels, findings may be   seen with acute enterocolitis. MONROE  7/20/2021  1. A MONROE was performed without complications. 2. LVEF 55%  3. No thrombus identified  4. No intracardiac shunt via color Doppler.    5. Large vegetation measuring 2.5 X 2.4 cm noted on the posterior leaflet of the mitral valve. Posterior leaflet is flair and is associated with severe anteriorly directed eccentric regurgitation. 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 · Low suspicion for meningitis or encephalitis     Thank you for the consultation. Will follow.      Damion Capellan MD  Neurology Resident PGY-4  7/21/2021 at 11:16 AM

## 2021-07-21 NOTE — PROGRESS NOTES
Speech Language Pathology  [address] [facility]  Speech Language Pathology    Date: 7/21/2021  Patient Name: Edmond Gray  YOB: 1980   AGE: 36 y.o. MRN: [id number]        Patient Not Available for Speech Therapy     Due to:  [] Testing  [] Hemodialysis  [] Cancelled by RN  [] Surgery   [] Intubation/Sedation/Pain Medication  [] Medical instability  [x] Other: Pt. With other disciplines. Next scheduled treatment: 7/22/2021    Completed by: [address]  Clinician    Cosigned By: Zuleyma Grigsby S.CCC/SLP

## 2021-07-21 NOTE — CONSULTS
[id number] [facility]  Consultation Note                             Patient's Name/Date of Birth: Hailey Matt / 1980 (89 y.o.)    Date: July 21, 2021     Chief Complaint:   Chief Complaint   Patient presents with    Abdominal Pain    Emesis     Surgeon: Dr. Kurt Lagos  Cardiologist: Dr. Cordell Corea  PCP: Dr. Andrew Elkins      Admitting Diagnosis: MSSA Bacterimia  Reason for consultation: Mitral Valve Vegetation     HISTORY OF PRESENT ILLNESS:  Hailey Matt is a 36y.o. year old, male who presented to the emergency room at [address] [place] [address] with complaints of abdominal pain, confusion, fatigue, diarrhea, emesis and body aches. The patient's symptoms started approximately 6 days ago. Previously to the patient coming to [facility], he presented to [facility] where they treated him for hypokalemia and was discharged. The patient noted to be tachycardic with elevated troponin levels. Cardiology was consulted and a 2D echo was ordered and showed a posterior mitral leaflet prolapse and severe MR with an eccentric anterior directed jet with a possible small vegetation on the posterior leaflet. The patient underwent a ETT on 7/20 which showed a Large vegetation measuring 2.5 X 2.4 cm noted on the posterior leaflet of the mitral valve. Posterior leaflet is flair and is associated with severe anteriorly directed eccentric regurgitation. Which is why we are being consulted. The patient has a history of HTN, hyperlipidemia, diverticulosis resulting in a partial hemicolectomy with reanastomoses, and an appendectomy. It is also noted that the patent has 2 of 2 blood cultures have returned positive for MSSA. His urine culture also grew 50 to 100,000 CFU of MSSA. Infectious disease was consulted and is placed the patient on Ancef.      Review of systems:  General positive for  fevers and fatigue  HEENT Denies any diplopia, tinnitus or vertigo  Resp Denies any shortness of breath, cough or wheezing  Cardiac Denies any chest pain, palpitations, claudication or edema  GI Denies any melena, hematochezia, hematemesis or pyrosis   Denies any frequency, urgency, hesitancy or incontinence  Heme Denies bruising or bleeding easily  Endocrine Denies any history of diabetes or thyroid disease  Neuro positive for dizziness  Neurological: negative    Past Medical History:        Diagnosis Date    Diverticulosis     Hyperlipidemia     Hypertension        Past Surgical History:        Procedure Laterality Date    ABDOMEN SURGERY      large bowel resection    ANTERIOR CRUCIATE LIGAMENT REPAIR Right     APPENDECTOMY  07/09/1997    COLECTOMY      2012 - D/T Dverticulitis    DILATATION, ESOPHAGUS         Medications:    metoprolol tartrate  25 mg Oral BID    pantoprazole  40 mg Intravenous BID    And    sodium chloride (PF)  10 mL Intravenous BID    potassium chloride  40 mEq Oral BID WC    mupirocin   Nasal BID    ceFAZolin  2,000 mg Intravenous Q6H    sodium chloride flush  5-40 mL Intravenous 2 times per day       Allergies:  Morphine    Social History:       Social History     Tobacco Use   Smoking Status Never Smoker   Smokeless Tobacco Never Used        Social History     Substance and Sexual Activity   Alcohol Use Yes    Alcohol/week: 20.0 standard drinks    Types: 20 Cans of beer per week    Comment: 20/ week, average every other day        Social History     Substance and Sexual Activity   Drug Use Never      Marital status:      Occupation:  Employed     Family History:        Problem Relation Age of Onset    No Known Problems Mother     Other Father         diverticulitis       PHYSICAL EXAM:  General: no acute distress, alert, oriented x 3, appropriate mood and affect, looks stated age, well nourished  VITALS:  /80   Pulse 122   Temp 100.2 °F (37.9 °C) (Oral)   Resp 27   Ht 5' 6\" (1.676 m)   Wt 181 lb 14.1 oz (82.5 kg)   SpO2 96%   BMI 29.36 kg/m²   Eyes: is normal. Calculated ejection  fraction 48% by Dubois's method. Visually estimated EF 50%. Posterior mitral valve leaflet prolapse. Severe mitral regurgitation. Eccentric anterior diredted jet. EOA = 0.7cm2. PISA radius is 1.3cm. Vena contracta is 0.99 cm. MR volume is  58ml. Possible small vegetation on posterior leaflet. Consider MONROE. CT: 7/20/2021  FINDINGS:   Pulmonary Arteries: This study is nondiagnostic for pulmonary embolism. There   is excessive respiratory motion artifact degrading image resolution. There is   not significant contrast within the pulmonary artery system to adequately   assess for pulmonary embolus.       Mediastinum: No evidence of mediastinal lymphadenopathy.  The heart and   pericardium demonstrate no acute abnormality.  There is no acute abnormality   of the thoracic aorta.       Lungs/pleura: Small bilateral pleural effusions and mild bibasilar   atelectasis  No evidence of  pneumothorax.       Upper Abdomen: Limited images of the upper abdomen are unremarkable.       Soft Tissues/Bones: No acute bone or soft tissue abnormality. MONROE: 7/20/2021     LA:       Normal  RAJAT:    No thrombus  RA:      Normal  RV:      Normal  LV:       Normal  Estimated LVEF:         55%  Aorta:               Mild atheromatous disease arch  Pericardium:    Trivial pericardial effusion  Septum:           No intracardiac shunt via color Doppler.      Valves:     Mitral Valve: Large vegetation measuring 2.5 X 2.4 cm attached on the posterior leaflet of the mitral valve associated with Flail and degenerated leaflet. Severe regurgitation w/ an anteriorly directed eccentric jet is identified. Aortic Valve: The aortic valve is trileaflet and opens adequately. No regurgitiation is identified. Tricuspid valve: Structurally normal. No regurgitation is identified. Pulmonary valve: Normal. No significant regurgitation     No thrombus identified.     Assessment & Plan:  Problem List:  Patient Active Problem List   Diagnosis    Diarrhea    Acute dehydration    Hypomagnesemia    Hypokalemia    Epistaxis    Thrombocytopenia (HCC)    Hyperglycemia    Essential hypertension    Sepsis with acute organ dysfunction and septic shock (HCC)    Delusions (HCC)    Hypophosphatemia    MSSA bacteremia    Severe mitral regurgitation    Acute respiratory failure with hypoxia (HCC)    GI bleed    Normocytic normochromic anemia    Obesity (BMI 30-39. 9)    Transaminitis       Plan: Will discuss operative plan with other specialists to assist in planning the timing of the patient's surgery. Appreciate other specialties opinions on surgical timing. The above recommendations including medications and orders were discussed and agreed upon with Dr. Valeria Garduno the attending on service for the cardiothoracic surgery group today. On this date 7/21/2021 I have spent 45 minutes reviewing previous notes, test results and face to face with the patient discussing the diagnosis and importance of compliance with the treatment plan as well as documenting on the day of the visit. At least 50% of the time documented was spent with the patient to provide counseling and/or coordination of care.     Agree with the above  Patient will require IV antibiotics and repeat blood cultures  We will plan for mitral valve replacement in the next week  Have discussed surgery and risks and benefits with patient  Do appreciate the consult and happy to assist in patient's care    MARIKA Corcoran - PATTI, CNP  Phone: [phone]

## 2021-07-21 NOTE — PROGRESS NOTES
Physician Progress Note      PATIENT:               Kelvin PAIGE #:                  [id number]  :                       1980  ADMIT DATE:       2021 12:42 AM  DISCH DATE:  RESPONDING  PROVIDER #:        Deanne Michael DO          QUERY TEXT:    Patient admitted with staph bacteremia, weakness, dehydration. Noted   documentation of sepsis in progress notes. Per MONROE, large vegetation on the   posterior leaflet of the mitral valve. If possible, please document in   progress notes and discharge summary the source of sepsis:    The medical record reflects the following:  Risk Factors: valvular vegetation  Clinical Indicators:  MONROE: Large vegetation measuring 2.5 X 2.4 cm   attached on the posterior leaflet of the mitral valve associated with flail   and degenerated leaflet.  Blood Cx: positive staph aureus, methicillin susceptible   Urine Cx: staph aureus, methicillin susceptible, ,000 cfu/ml  Treatment: IV Cefazolin, ID consult, Cardiology consult, MONROE    Thank you. Marii Gabriel RN, CDS. Please call with any questions 6a-2:30pm.   (cell) [phone]  Options provided:  -- Sepsis, present on admission due to bacterial endocarditis  -- Sepsis, present on admission, due to bacterial endocarditis and UTI  -- Sepsis, present on admission, due to, Please document source. -- Other - I will add my own diagnosis  -- Disagree - Not applicable / Not valid  -- Disagree - Clinically unable to determine / Unknown  -- Refer to Clinical Documentation Reviewer    PROVIDER RESPONSE TEXT:    This patient has sepsis which was present on admission due to bacterial   endocarditis.     Query created by: Jose Angel Daniels on 2021 10:44 AM      Electronically signed by:  Deanne Michael DO 2021 1:34 PM

## 2021-07-21 NOTE — PROGRESS NOTES
[facility])  Occupational Therapy Not Seen Note    DATE: 2021    NAME: Risa Sanchez  MRN: [id number]   : 1980      Patient not seen this date for Occupational Therapy due to: Other: plan for possible CT surgery.  await POC and timing of OR     Next Scheduled Treatment: check back 2021    Electronically signed by MOISES Emmanuel on 2021 at 2:18 PM

## 2021-07-21 NOTE — PROGRESS NOTES
THE [facility] [place] [facility]   Progress Note    Radha Rapp is a 36 y.o. male patient. Hospitalization Day:4      Chief consult reason:   Acute anemia, positive fecal blood      Subjective:  Patient seen and examined. No acute events overnight. No rectal bleeding. Patient complains of rigors, slightly tachycardic on antibiotics. Tolerated diet with no nausea or vomiting. Hemoglobin stable    VITALS:  /72   Pulse 122   Temp 98.4 °F (36.9 °C) (Oral)   Resp 21   Ht 5' 6\" (1.676 m)   Wt 181 lb 14.1 oz (82.5 kg)   SpO2 94%   BMI 29.36 kg/m²   TEMPERATURE:  Current - Temp: 98.4 °F (36.9 °C); Max - Temp  Av.9 °F (37.2 °C)  Min: 97.9 °F (36.6 °C)  Max: 100.2 °F (37.9 °C)    Physical Assessment:  General appearance:  alert, cooperative and no distress  Mental Status:  oriented to person, place and time and normal affect  Lungs:  clear to auscultation bilaterally, normal effort  Heart:  regular rate and rhythm, no murmur  Abdomen:  soft, nontender, nondistended, normal bowel sounds, no masses, hepatomegaly, splenomegaly  Extremities:  no edema, redness, tenderness in the calves  Skin:  no gross lesions, rashes, induration    Data Review:    Labs and Imaging:     CBC:  Recent Labs     21  0656 21  1116 21  1951 21  0549 21  0710   WBC 9.4  --  11.8* 10.9 11.3   HGB 11.2*  --  11.9* 10.8* 10.6*   MCV 82.9  --  85.8 84.5 88.8   RDW 13.2  --  13.4 13.5 13.6   PLT See Reflexed IPF Result 50* 66* 78* 163       ANEMIA STUDIES:  No results for input(s): LABIRON, TIBC, FERRITIN, [id number], FOLATE, OCCULTBLD in the last 72 hours.     BMP:  Recent Labs     21  0549 21  1150 21  0710    138 137   K 3.5* 3.4* 3.9    105 104   CO2 22 24 20   BUN 8 7 9   CREATININE 0.49* 0.48* 0.53*   GLUCOSE 129* 136* 121*   CALCIUM 7.7* 8.0* 7.8*       LFTS:  Recent Labs     21  0656 21  1116 21  1951 21  0549 21  0710   ALKPHOS 73 Plan and Recommendations:    1. No indications for endoscopy at this time due to lack of overt bleeding, current infections and stable hemoglobin  2. Continue conservative management  3. Monitor CBC and transfuse as clinically indicated  4. If patient develops overt bleeding we will assess and consider endoscopy at that time  5. Will follow      This plan was formulated in collaboration with Dr. Bibiana Pretty MD    Thank you for allowing me to participate in the care of your patient. Please feel free to contact me with any questions or concerns. [facility]    This note was created with the assistance of a speech-recognition program.  Although the intention is to generate a document that actually reflects the content of the visit, no guarantees can be provided that every mistake has been identified and corrected by editing.

## 2021-07-21 NOTE — PROCEDURES
[address] [facility] [address] [address]                          ELECTROENCEPHALOGRAM REPORT    PATIENT NAME: Gerson Fox                 :        1980  MED REC NO:   [id number]                             ROOM:       3004  ACCOUNT NO:   [de-identified]                           ADMIT DATE: 2021  PROVIDER:     Mukul Flor MD    DATE OF EE2021    ATTENDING OF RECORD:  Jean Carlos Bledsoe DO    REASON FOR STUDY:  This is a 51-year-old gentleman with confusion,  hallucinations, episode of vertigo with MSSA sepsis, and bacterial  endocarditis. MEDICATIONS:  Include Antivert, Lopressor, Protonix, Imodium, Zofran,  and Roxicodone. EEG FINDINGS:  This is a 16-channel EEG with one EKG channel recording  performed on a patient described to be awake, drowsy, and asleep. The  patient shows delayed waking rhythms. Background activity consists of  poorly-regulated 9 Hz activity in a 40-60 microvolt range, showing some  reactivity to eye opening and closing. Over the anterior head regions,  there are 15-20 Hz activity in a 20-30 microvolt range. The record is  prominent for mild degree of medium-amplitude 4-7 Hz activity seen  throughout all head areas during waking state. With drowsiness and  sleep, there is further intrusion of slower frequencies in the theta and  lesser degree in the delta band, accompanied by vertex wave activity and  sleep spindles. This record is not lateralized or epileptiform. Hyperventilation is not performed. Photic stimulation shows no change  of the record. IMPRESSION:  This EEG shows the presence of mild diffuse slowing. This  EEG is concordant with diffuse encephalopathy. No clear lateralized or  epileptiform disturbance is seen.       Carlos Gallardo MD    D: 2021 18:17:47       T: 2021 19:04:07     [id number]  Job#: [id number]     Doc#: [id number]    CC:

## 2021-07-21 NOTE — PROGRESS NOTES
[facility] - Progress Note    Today's Date and Time: 7/21/2021, 1:44 PM    Impression :   · MSSA septicemia 7-17-21  · Sepsis with acute organ dysfunction and septic shock  · Mitral valve vegetation 2.5 X 2.4 cm 7/20/2021  · Diarrhea   · Thrombocytopenia  · History of diverticulitis resulting in partial hemicolectomy with reanastomosis  · History of appendectomy  · History of right ACL repair with screw in place  · Acute dehydration  · Hx systolic cardiac murmur  · Sinus tachycardia  · Hyperlipidemia  · Elevated troponin level likely secondary to demand ischemia  · Elevated inflammatory markers  · Hypokalemia    Recommendations:   · Ancef 2000 mg IV every 6 hours starting 7/18/2021 until 8/15/2021 for MSSA septicemia    Medical Decision Making/Summary/Discussion:7/21/2021     ·   Infection Control Recommendations   · [place] Precautions  · Contact Isolation     Antimicrobial Stewardship Recommendations     · Simplification of therapy  · Targeted therapy    Coordination of Outpatient Care:   · Estimated Length of IV antimicrobials: 4 weeks  · Patient will need Midline Catheter Insertion: TBD  · Patient will need PICC line Insertion: TBD  · Patient will need: Home IV , [place],  SNF,  LTAC: Yes-either SNF or home infusion  · Patient will need outpatient wound care: TBD    Chief complaint/reason for consultation:   · MSSA sepsis      History of Present Illness:   Emilia Rachel is a 36y.o.-year-old  male who was initially admitted on 7/17/2021. Patient seen at the request of Dr. Mariia Valdivia:     Patient initially presented to [facility] a few days ago with complaints of  abdominal pain, nausea, watery diarrhea and vomiting for the past 5 days after eating some chicken. The patient states that his wife also had episodes of emesis after eating the chicken but her symptoms resolved shortly after.   A CT abdomen was unremarkable and he was diagnosed with viral gastroenteritis and sent home with Norco, Zofran, and potassium supplement for hypokalemia. On 7/16/2021, he presented to [address] [place] [address] with worsening symptoms, including fever, chills, fatigue, and worsening abdominal pain with continued vomiting, right big toe pain. Work-up in the ED revealed a fever of 102.6, tachycardia with a heart rate up to 170, dyspnea with hypoxia, blood cultures showed MSSA x2, and urine cultures grew staph aureus ,000 CFU/mL. Occult blood stool positive. CT abdomen 7/17/2021  Impression   No acute abnormality identified in the abdomen and pelvis.       The bladder is markedly distended with a calculated volume of 2 L.       Hepatic steatosis and mild hepatomegaly.       Minor bibasilar atelectasis and interstitial thickening posteriorly. CT chest abdomen pelvis with contrast 7/18/2021  Impression   Moderate distention of the colon multiple air-fluid levels, findings may be   seen with acute enterocolitis. Abnormal labs include:  Sodium 131  Potassium 3.0  .5  Myoglobin 138  Troponin I 65  Amylase 131  AST 66  Lipase 299  Platelet 39  Sed rate 31    ID has been consulted for MSSA sepsis      CURRENT EVALUATION 7/21/2021:    Afebrile  VS stable    On evaluation, the patient was alert and oriented on room air with his wife present. States he feels much better than when he was first admitted. He is to undergo a valve replacement per CT surgery in approximately 1 week. The patient's blood cultures from 7/20/2020 continue to show MSSA sepsis after 4 days of IV Ancef. Repeat blood cultures have been ordered for 7/22/2021    Neurology consult has been ordered due to episodes of altered mentation along with vertigo and diplopia.        Echocardiogram completed on 7/18/2021 revealed posterior mitral valve leaflet prolapse, severe mitral regurgitation, and possible small vegetation on posterior leaflet    MONROE on 7/20 which showed a Large vegetation measuring 2.5 X 2.4 cm noted on the posterior leaflet of the mitral valve. Posterior leaflet is flair and is associated with severe anteriorly directed eccentric regurgitation. Right foot x-ray revealed:  No radiographic evidence for acute osteomyelitis.  Mild soft tissue swelling   of the great toe possibly cellulitis.       RECOMMENDATION:   NM bone scan or MRI may be obtained if there remains strong concern for acute   osteomyelitis.         CT abdomen pelvis revealed: Moderate distention of the colon multiple air-fluid levels, findings may be   seen with acute enterocolitis. Bruising on the patient's back is from a Tens unit-it does not appear to be the source of sepsis. There are no open lesions noted and there are three distinct marks from the electrodes. There are Osler node appearing lesions on the patient's left pinky toe and right middle finger. Stool studies have been negative    Hepatitis panel nonreactive    Ancef has been initiated for MSSA septicemia. We will follow up on further culture and echo results    We will continue to monitor  Discussed with RN and Dr. Shannan Garcia, X rays reviewed: 7/21/2021    BUN:8-->8  Cr:0.76-->0.49  K: 3.0-->2.7-->3.5  CRP: 260.5  LDH: 437    WBC:9.9-->10.9  Hb:12.5-->10.8  Plat: 39-->40-->78  Haptoglobin: 256  Sed rate: 31  Lipase 299->228  Lactate: 1.6    Cultures:  Urine:  ·   Blood:  · 7/17/2021: MSSA x2  · 7/18/2021: MSSA  Sputum :  ·   Wound:  ·     Discussed with patient, RN, family. I have personally reviewed the past medical history, past surgical history, medications, social history, and family history, and I have updated the database accordingly.   Past Medical History:     Past Medical History:   Diagnosis Date    Diverticulosis     Hyperlipidemia     Hypertension        Past Surgical  History:     Past Surgical History:   Procedure Laterality Date    ABDOMEN SURGERY      large bowel resection    ANTERIOR CRUCIATE LIGAMENT REPAIR Right     APPENDECTOMY  07/09/1997    COLECTOMY      2012 - D/T Dverticulitis    DILATATION, ESOPHAGUS         Medications:      metoprolol tartrate  25 mg Oral BID    pantoprazole  40 mg Intravenous BID    And    sodium chloride (PF)  10 mL Intravenous BID    potassium chloride  40 mEq Oral BID WC    mupirocin   Nasal BID    ceFAZolin  2,000 mg Intravenous Q6H    sodium chloride flush  5-40 mL Intravenous 2 times per day       Social History:     Social History     Socioeconomic History    Marital status:      Spouse name: Not on file    Number of children: Not on file    Years of education: Not on file    Highest education level: Not on file   Occupational History    Not on file   Tobacco Use    Smoking status: Never Smoker    Smokeless tobacco: Never Used   Vaping Use    Vaping Use: Never assessed   Substance and Sexual Activity    Alcohol use: Yes     Alcohol/week: 20.0 standard drinks     Types: 20 Cans of beer per week     Comment: 20/ week, average every other day    Drug use: Never    Sexual activity: Not on file   Other Topics Concern    Not on file   Social History Narrative    Not on file     Social Determinants of Health     Financial Resource Strain:     Difficulty of Paying Living Expenses:    Food Insecurity:     Worried About Running Out of Food in the Last Year:     [address] in the Last Year:    Transportation Needs:     Lack of Transportation (Medical):      Lack of Transportation (Non-Medical):    Physical Activity:     Days of Exercise per Week:     Minutes of Exercise per Session:    Stress:     Feeling of Stress :    Social Connections:     Frequency of Communication with Friends and Family:     Frequency of Social Gatherings with Friends and Family:     Attends Voodoo Services:     Active Member of Clubs or Organizations:     Attends Club or Organization Meetings:     Marital Status:    Intimate Partner Violence:  Fear of Current or Ex-Partner:     Emotionally Abused:     Physically Abused:     Sexually Abused:        Family History:     Family History   Problem Relation Age of Onset    No Known Problems Mother     Other Father         diverticulitis        Allergies:   Morphine     Review of Systems:   Constitutional: No fevers or chills. No systemic complaints  Head: No headaches  Eyes: No double vision or blurry vision. No conjunctival inflammation. ENT: No sore throat or runny nose. . No hearing loss, tinnitus or vertigo. Cardiovascular: No chest pain or palpitations. No shortness of breath. No CASTRO  Lung: No shortness of breath or cough. No sputum production  Abdomen: No nausea, vomiting, diarrhea, or abdominal pain. Clenton Reas No cramps. Genitourinary: No increased urinary frequency, or dysuria. No hematuria. No suprapubic or CVA pain  Musculoskeletal: No muscle aches or pains. No joint effusions, swelling or deformities  Hematologic: No bleeding or bruising. Neurologic: No headache, weakness, numbness, or tingling. Integument: Scattered small reddened bumps and bruises  Psychiatric: No depression. Endocrine: No polyuria, no polydipsia, no polyphagia. Physical Examination :     Patient Vitals for the past 8 hrs:   BP Temp Temp src Pulse Resp SpO2 Weight   07/21/21 1000 126/70 97.9 °F (36.6 °C) Oral 93 21 95 % --   07/21/21 0655 123/80 100.2 °F (37.9 °C) Oral 122 27 96 % --   07/21/21 0647 -- 98.4 °F (36.9 °C) Oral -- -- -- --   07/21/21 0610 -- -- -- -- -- -- 181 lb 14.1 oz (82.5 kg)     General Appearance: Awake, alert, and in no apparent distress  Head:  Normocephalic, no trauma  Eyes: Pupils equal, round, reactive to light and accommodation; extraocular movements intact; sclera anicteric; conjunctivae pink. No embolic phenomena. ENT: Oropharynx clear, without erythema, exudate, or thrush. No tenderness of sinuses. Mouth/throat: mucosa pink and moist. No lesions. Dentition in good repair.   Neck:Supple, without lymphadenopathy. Thyroid normal, No bruits. Pulmonary/Chest: Clear to auscultation, without wheezes, rales, or rhonchi. No dullness to percussion. Cardiovascular: Normal sinus tachycardia with murmur, No rubs, or gallops. Abdomen: Soft, non tender. Bowel sounds normal. No organomegaly  All four Extremities: No cyanosis, clubbing, edema, or effusions. Neurologic: No gross sensory or motor deficits. Skin: Warm and dry with good turgor. No signs of peripheral arterial or venous insufficiency. No ulcerations. No open wounds. Medical Decision Making -Laboratory:   I have independently reviewed/ordered the following labs:    CBC with Differential:   Recent Labs     07/18/21  1457 07/19/21  0656 07/20/21  0549 07/21/21  0710   WBC 9.6   < > 10.9 11.3   HGB 12.8*   < > 10.8* 10.6*   HCT 37.9*   < > 32.1* 32.4*   PLT See Reflexed IPF Result   < > 78* 163   LYMPHOPCT 12*  --   --  12*   MONOPCT 5  --   --  6    < > = values in this interval not displayed. BMP:   Recent Labs     07/19/21 1951 07/19/21 1951 07/20/21  0549 07/20/21  0549 07/20/21  1150 07/21/21  0710      < > 137   < > 138 137   K 3.1*   < > 3.5*   < > 3.4* 3.9      < > 104   < > 105 104   CO2 23   < > 22   < > 24 20   BUN 8   < > 8   < > 7 9   CREATININE 0.57*   < > 0.49*   < > 0.48* 0.53*   MG 2.0  --  1.9  --   --   --     < > = values in this interval not displayed. Hepatic Function Panel:   Recent Labs     07/18/21  1427 07/19/21  0656 07/19/21 1951 07/19/21 1951 07/20/21  0549 07/21/21  0710   PROT 6.0*   < > 5.9*  --  5.6*  --    LABALBU 2.7*   < > 2.7*   < > 2.2* 2.5*   BILIDIR 0.25  --   --   --   --   --    BILITOT 0.60   < > 0.60  --  0.62  --    ALKPHOS 77   < > 92  --  82  --    ALT 43*   < > 37  --  31  --    AST 66*   < > 64*  --  54*  --     < > = values in this interval not displayed. No results for input(s): RPR in the last 72 hours. No results for input(s): HIV in the last 72 hours.   No results for input(s): BC in the last 72 hours. Lab Results   Component Value Date    MUCUS NOT REPORTED 07/17/2021    RBC 3.65 07/21/2021    TRICHOMONAS NOT REPORTED 07/17/2021    WBC 11.3 07/21/2021    YEAST NOT REPORTED 07/17/2021    TURBIDITY CLEAR 07/17/2021     Lab Results   Component Value Date    CREATININE 0.53 07/21/2021    GLUCOSE 121 07/21/2021       Medical Decision Making-Imaging:     EXAMINATION:   CT OF THE CHEST, ABDOMEN, AND PELVIS WITH CONTRAST 7/18/2021 10:53 am       TECHNIQUE:   CT of the chest, abdomen and pelvis was performed with the administration of   intravenous contrast. Multiplanar reformatted images are provided for review. Dose modulation, iterative reconstruction, and/or weight based adjustment of   the mA/kV was utilized to reduce the radiation dose to as low as reasonably   achievable.       COMPARISON:   None       HISTORY:   ORDERING SYSTEM PROVIDED HISTORY: MSSA bacteremia, ? DIC   TECHNOLOGIST PROVIDED HISTORY:   MSSA bacteremia, ? DIC       Reason for Exam: sepsis with acute organ dysfunction   Acuity: Unknown   Type of Exam: Unknown       FINDINGS:   Lungs/pleura: The central airways are patent.  There are no suspicious   pulmonary nodules       Mediastinum: There is no acute mediastinal abnormality       Soft Tissues/Bones: No suspicious osteolytic or osteoblastic lesions       Abdominal organs: The liver, spleen, adrenal glands, kidneys, and pancreas   demonstrate no acute abnormality.       GI/Bowel: The visualized portions of the small bowel are unremarkable. There   is borderline dilation of the colon.  There are multiple air-fluid levels   throughout the colon.       Peritoneum/Retroperitoneum: There is a solid amount of retroperitoneal fluid   along the distal ureters bilaterally.       Pelvis:  The bladder is moderately distended.       Bones: No suspicious osseous lesions are identified           Impression   Moderate distention of the colon multiple air-fluid levels, findings may be   seen with acute enterocolitis.             XR FOOT LEFT (2 VIEWS) [[id number]] Collected: 07/18/21 1411      Order Status: Completed Updated: 07/19/21 0759     Narrative:       EXAMINATION:   TWO XRAY VIEWS OF THE LEFT FOOT     7/18/2021 2:05 pm     COMPARISON:   None. HISTORY:   ORDERING SYSTEM PROVIDED HISTORY: Left big toe pain and erythema. Looking for   source of MSSA sepsis   TECHNOLOGIST PROVIDED HISTORY:   Left big toe pain and erythema. Looking for source of MSSA sepsis     FINDINGS:   AP and lateral views of the foot obtained.  Normal alignment and   mineralization.  No abnormal periosteal reaction.  No erosions.  No fracture. No aggressive lytic or blastic lesions.  Mild soft tissue swelling of the   great toe noted.      Impression:       No radiographic evidence for acute osteomyelitis.  Mild soft tissue swelling   of the great toe possibly cellulitis. Medical Decision [id number]:     Component Collected Lab   Specimen Description 07/17/2021  8:48  [address]   . BLOOD    Special Requests 07/17/2021  8:48  [address]   10ML LFA    Culture Abnormal  07/17/2021  8:48 AM [address] Blood Culture Results called to and read back by: Jaswant Dawn RN AT 2045 ON 07.17.2021    Culture 07/17/2021  8:48 AM 14[address] [facility] FROM BOTTLE: GRAM POSITIVE COCCI IN CLUSTERS    Culture Abnormal  07/17/2021  8:48  [address]   Staphylococcus aureus Detected: mecA/C and MREJ Not Detected Methodology- Polymerase Chain Reaction (PCR)   Culture Abnormal  07/17/2021  8:48 AM [address] [facility] This isolate is methicillin susceptible.     Testing Performed By    Manjinder Watson Name Director Address Valid Date Range   [address] [address] 08/30/17 0801-Present with the APRN. I have updated the medical record where necessary. I agree with the assessment, plan and orders as documented by the APRN.     Jeanine Vera MD.          Pager: [phone] - Office: [phone]

## 2021-07-21 NOTE — ADT AUTH CERT
Subscriber Details  Hospital Account [de-identified]  [facility] Subscriber Name/Sex/Relation Subscriber  Subscriber Address/Phone Subscriber Emp/Emp Phone   1. [address] M - Male   (Self) 1980 [address]  [address]   [phone](M) OTHER    Utilization Reviews       Systemic or Infectious Condition GR[facility] - Care Day 5 (2021) by Casandra Hernández RN       Review Entered Review Status   2021 13:08 Completed      Criteria Review      Care Day: 5 Care Date: 2021 Level of Care:    Guideline Day 2    Clinical Status    ( ) * No ICU or intermediate care needs    Interventions    (X) Inpatient interventions continue    2021 1:08 PM EDT by Pau Simpson      Ancef 2000 mg q 6 hr IV  Protonix 40 mg IV bid  Tylenol 650 mg po prn x 1  Imodium 4 mg po prn x 1  Antivert 12.5 mg po prn  x1    * Milestone   Additional Notes   2021         Neurology Inpatient Consult Note       REASON FOR CONSULT: Vertigo, delusions           IMPRESSION   ? Toxic metabolic encephalopathy due to sepsis    ? Episode of vertigo and diplopia likely due to posterior circulation TIA likely due to septic emboli    ? MSSA septicemia    ? Infective endocarditis with severe mitral regurgitation    ? 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 Melena, blood loss anemia    ? Type 2 MI        RECOMMENDATIONS:    ? MRI brain wo contrast to rule out silent infarcts    ? Continue antibiotics as per ID   ? Surgical management as planned as per cardiology as CTS. ? Low suspicion for meningitis or encephalitis           Internal Medicine           Interval History Status: improved.        Patient seen examined this morning.  Wife and friend are at bedside.  Today, the patient is reporting that he is having significant vertiginous symptoms including spinning of the room while at rest. Enzo Manuel did have hallucinations yesterday afternoon.  Delusions appear to have improved.  Left toe pain is gone. Enzo Manuel does report an episode of rigors this morning.  Continues to be intermittently febrile.  Previously on 4 L of oxygen via nasal cannula.  This is been weaned to room air.          Review of Systems:       Constitutional: Reports rigors, negative for sweats   Respiratory: Reports shortness of breath; negative for cough, wheezing   Cardiovascular:  negative for chest pain, chest pressure/discomfort, lower extremity edema, palpitations   Gastrointestinal: Reports continued diarrhea; Negative for abdominal pain, constipation,  nausea, vomiting   : Reports Cárdenas catheter irritation   Extremities: Reports left great toe redness is improving.  Pain is essentially resolved.    Neurological: Reports dizziness and spinning of the room while at rest.  Denies headache.           · metoprolol tartrate  25 mg Oral BID   · pantoprazole  40 mg Intravenous BID     And   · sodium chloride (PF)  10 mL Intravenous BID   · potassium chloride  40 mEq Oral BID WC   · mupirocin   Nasal BID   · ceFAZolin  2,000 mg Intravenous Q6H   · sodium chloride flush  5-40 mL Intravenous 2 times per day          Continuous Infusions:    Infusions Meds    · dextrose 5% and 0.45% NaCl with KCl 40 mEq 100 mL/hr at 21 2100            Vitals:   /80   Pulse 122   Temp 100.2 °F (37.9 °C) (Oral)   Resp 27   Ht 5' 6\" (1.676 m)   Wt 181 lb 14.1 oz (82.5 kg)   SpO2 96%   BMI 29.36 kg/m²    Temp (24hrs), Av.2 °F (37.3 °C), Min:97.9 °F (36.6 °C), Max:100.8 °F (38.2 °C)       No results for input(s): POCGLU in the last 72 hours.       I/O (24Hr):       Intake/Output Summary (Last 24 hours) at 2021 1040   Last data filed at 2021 9236      Gross per 24 hour   Intake 3726 ml   Output 4623 ml   Net -897 ml         Physical Examination       General appearance:  alert, cooperative and no distress,  gentleman sitting up in bed   Mental Status:  oriented to person, place and time and normal affect   Lungs:  clear to auscultation bilaterally, normal effort   Heart: Regular rate (96 bpm) with regular rhythm, there is a loud systolic murmur which is blowing in nature best appreciated at the apex   Abdomen:  soft, nontender, nondistended, protuberant, normal bowel sounds, no masses, hepatomegaly, splenomegaly   : Cárdenas catheter has been removed   Extremities:  no edema, redness, tenderness in the calves; left great toe with erythema at the 1st MTP joint. Neuro:  strength is equal bilaterally.  Upper extremity strength is 5/5.  Lower extremity strength is 5/5.  No nystagmus noted. Skin:  Erythema to the first MTP joint on the left is improving.  Mid back erythema and discoloration noted. Plan:          1. MSSA bacteremia - on Ancef through 8/15. ID following. Source is still unclear. Large mitral valve vegetation noted. CT surgery has evaluated the patient and appears to be recommending surgery. Discussed with ID today. See #2   2. 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 Continue IV antibiotics as noted above. 3. Back rash - brought to my attention for the first time today. Patient does have a history of a traumatic injury to his spinal vertebrae.  This may be the source of his infection.  Discussed with ID.  May need MRI of the C, T, L-spine. 4. Acute respiratory failure with hypoxia - D-dimer is elevated.  CT of the chest to rule out PE was inadequate yesterday.  Needs to have V/Q or repeat CT of the chest to r/o PE prior to d/c due to hypoxia and elevated D-dimer, tachycardia. Well's score 1.5   5. Delusions, confusion, TME - possibly, and likely due to sepsis. However, still having symptoms, despite antibiotics. Check CT of the brain and EEG. May need LP. May need neuro eval. Continue to monitor at present. 6. Vertigo - neuro consult today. Start Antivert. May need MRI of the brain   7. Thrombocytopenia - resolved.  Likely secondary to sepsis.    8. GI bleed/normo normo anemia-likely lower in nature. Continue IV protonix. Hemoglobin 10.6. GI following. Continue to monitor hemoglobin hematocrit daily.  Hemoglobin stable   9. Acute diarrhea, probably viral in nature - continue imodium   10. Hypokalemia, hypophosphatemia-replace lytes daily. 11. Transaminitis-likely related to fatty liver disease, possibly due to sepsis. Check every other day. 12. Obesity 31.42 - diet/weight loss/exercise   13. Acute left great toe pain - gout vs septic joint - improving with IV antibioitcs   14. [facility]   Consultation Note        Review of systems:   General positive for  fevers and fatigue   HEENT Denies any diplopia, tinnitus or vertigo   Resp Denies any shortness of breath, cough or wheezing   Cardiac Denies any chest pain, palpitations, claudication or edema   GI Denies any melena, hematochezia, hematemesis or pyrosis    Denies any frequency, urgency, hesitancy or incontinence   Heme Denies bruising or bleeding easily   Endocrine Denies any history of diabetes or thyroid disease   Neuro positive for dizziness   Neurological: negative      ·   metoprolol tartrate    25 mg   Oral   BID   ·   pantoprazole    40 mg   Intravenous   BID       And   ·   sodium chloride (PF)    10 mL   Intravenous   BID   ·   potassium chloride    40 mEq   Oral   BID WC   ·   mupirocin       Nasal   BID   ·   ceFAZolin    2,000 mg   Intravenous   Q6H   ·   sodium chloride flush    5-40 mL   Intravenous   2 times per day          PHYSICAL EXAM:   General: no acute distress, alert, oriented x 3, appropriate mood and affect, looks stated age, well nourished   VITALS:  /80   Pulse 122   Temp 100.2 °F (37.9 °C) (Oral)   Resp 27   Ht 5' 6\" (1.676 m)   Wt 181 lb 14.1 oz (82.5 kg)   SpO2 96%   BMI 29.36 kg/m²    Eyes:  Pupils equal round reactive to light and accomodation. Extraocular movement intact. Anicteric. Head/ENT:  Atraumatic, normocephalic.  Hearing grossly intact.     Neck: Supple, nontender. Trachea midline. No thyromegaly.     Lymphatics: cervical no lymphadenopathy, clavicular no lymphadenopathy   Lungs: clear to auscultation   Cardiovascular: Normal S1, S2, Rapid rate regular rhythm 3 out of 6 systolic murmur left sternal border, no rubs or gallops noted   Abdomen:  Soft, non-tender, normal bowel sounds.  No bruits, organomegaly or masses. Musculoskeletal:  5/5 muscle strength throughout.     Extremities: negative   PV:  negative   Skin: No jaundice or eczema. Neurological: Normal sensation throughout. Moves all extremities to command   Psychiatric: Oriented to person place and time, no evidence of depression.          Echo: 7/18/2021   CONCLUSIONS       Summary   Global left ventricular systolic function is normal. Calculated ejection   fraction 48% by Dubois's method. Visually estimated EF 50%. Posterior mitral valve leaflet prolapse. Severe mitral regurgitation. Eccentric anterior diredted jet. EOA = 0.7cm2. PISA radius is 1.3cm. Vena contracta is 0.99 cm. MR volume is   58ml. Possible small vegetation on posterior leaflet. Consider MONROE.       CT: 7/20/2021   FINDINGS:   Pulmonary Arteries: This study is nondiagnostic for pulmonary embolism. There   is excessive respiratory motion artifact degrading image resolution.  There is   not significant contrast within the pulmonary artery system to adequately   assess for pulmonary embolus.       Mediastinum: No evidence of mediastinal lymphadenopathy.  The heart and   pericardium demonstrate no acute abnormality.  There is no acute abnormality   of the thoracic aorta.       Lungs/pleura: Small bilateral pleural effusions and mild bibasilar   atelectasis  No evidence of  pneumothorax.       Upper Abdomen: Limited images of the upper abdomen are unremarkable.       Soft          MONROE: 7/20/2021       LA:       Normal   RAJAT:    No thrombus   RA:      Normal   RV:      Normal   LV:       Normal   Estimated LVEF:         55%   Aorta:               PGPG atheromatous disease arch   Pericardium:    Trivial pericardial effusion   Septum:           No intracardiac shunt via color Doppler.        Valves:      Mitral Valve: Large vegetation measuring 2.5 X 2.4 cm attached on the posterior leaflet of the mitral valve associated with Flail and degenerated leaflet. Severe regurgitation w/ an anteriorly directed eccentric jet is identified. Aortic Valve: The aortic valve is trileaflet and opens adequately. No regurgitiation is identified. Tricuspid valve: Structurally normal. No regurgitation is identified. Pulmonary valve: Normal. No significant regurgitation       No thrombus identified. Plan: Will discuss operative plan with other specialists to assist in planning the timing of the patient's surgery.     Appreciate other specialties opinions on surgical timing.          7/21/2021 07:10   Creatinine: 0.53 (L)   Glucose: 121 (H)   Calcium: 7.8 (L)   Phosphorus: 2.4 (L)   Albumin: 2.5 (L)   RBC: 3.65 (L)   Hemoglobin Quant: 10.6 (L)   Hematocrit: 32.4 (L)   Seg Neutrophils: 80 (H)   Segs Absolute: 9.04 (H)   Lymphocytes: 12 (L)   Immature Granulocytes: 1 (H)         Systemic or Infectious Condition GRG - Care Day 3 (7/19/2021) by Juliocesar Ann RN       Review Entered Review Status   7/21/2021 12:59 Completed      Criteria Review      Care Day: 3 Care Date: 7/19/2021 Level of Care:    Guideline Day 2    Clinical Status    ( ) * No ICU or intermediate care needs    Interventions    (X) Inpatient interventions continue    7/21/2021 12:59 PM EDT by Guero Haney      Ancef 2000 mg q 6 hr IV  Benadryl 25 mg IV x 1  Potassium phosphate 30 mmol IV x 1  LR 75 ml/hr  KCl 40 meq 100 ml/hr IV  tylenol 650 mg poprn x 2  melatonin 6 mg po hs  KCl 10 meq prn IV x 6    * Milestone   Additional Notes   7/19/2021       Internal Medicine       Interval History Status: worsening       Patient stat echo yesterday showed small vegetation on mitral valve.  Severe MR and mitral valve prolapse.  Plan for MONROE and heart cath today.  Long discussion with family at bedside regarding plan.  Spoke with infectious disease at bedside as well. Plan:          1. Severe Sepsis -unclear source at this time, infectious disease consulted, patient was changed to Ancef for antibiotics.  MSSA is suspected on blood cultures.  MONROE today   2. MSSA bacteremia -patient on Ancef for bacteremia, spoke with orthopedic surgery they feel left toe is low risk for septic joint.  Do not feel he needs an aspiration   3. Thrombocytopenia -no coagulopathy, peripheral smear for schistocytes.  Suspect ITP   4. Diarrhea -factious disease screening for C. difficile and ova and parasites.  Order CMV IgG and IgM   5. Elevated lipase-no acute pancreatitis on CT, complaining of epigastric pain but mild.  Will give IV fluids, possibly from sepsis   6. Acute respiratory failure with hypoxia-multifactorial from severe mitral regurgitation, volume overload.     7. Hypophosphatemia, hypomagnesemia-replace   8. Elevated troponin -likely secondary to dehydration, plateaued.  Cardiology is following, echocardiogram ordered   9. Plan:   1. MONROE and heart cath today   2. Infectious disease working up for C. difficile and ova and parasites. 3. Orthopedic surgery discussed aspiration of left toe, will hold off for now   4. Order CMV IgG/IgM, check HIV   5. Continue Ancef for now, discussed the possibility that if we are unable to wean him from oxygen he will need to go to [facility] for infection to clear if cardiology feels MR needs to be addressed   6. Cardiology       Patient noted to have thrombocytopenia this morning with platelet count of 17U. He was administered a unit of platelet transfusion however repeat platelet level not obtained. Recommend to obtain platelet level, if greater than 50 K will proceed with MONROE.  If less than 50K will hold off on MONROE until platelet count above 50K          Infectious Disease at 07/18/21 0902   · sodium chloride flush 0.9 % injection 10 mL  10 mL Intravenous PRN MARIKA Farooq CNP   10 mL at 07/18/21 0851   · 0.9 % sodium chloride infusion  25 mL Intravenous PRN MARIKA Farooq CNP       · potassium chloride (KLOR-CON M) extended release tablet 40 mEq  40 mEq Oral PRN MARIKA Farooq CNP         Or   · potassium bicarb-citric acid (EFFER-K) effervescent tablet 40 mEq  40 mEq Oral PRN MARIKA Farooq CNP       · ondansetron (ZOFRAN-ODT) disintegrating tablet 4 mg  4 mg Oral Q8H PRN MARIKA Farooq CNP         Or   · ondansetron (ZOFRAN) injection 4 mg  4 mg Intravenous Q6H PRN MARIKA Farooq CNP       · acetaminophen (TYLENOL) tablet 650 mg  650 mg Oral Q6H PRN MARIKA Farooq CNP   650 mg at 07/19/21 0411     Or   · acetaminophen (TYLENOL) suppository 650 mg  650 mg Rectal Q6H PRN MARIKA Farooq CNP       · polyethylene glycol (GLYCOLAX) packet 17 g  17 g Oral Daily PRN MARIKA Farooq CNP       · magnesium sulfate 1000 mg in dextrose 5% 100 mL IVPB  1,000 mg Intravenous PRN MARIKA Farooq CNP       · potassium chloride 10 mEq/100 mL IVPB (Peripheral Line)  10 mEq Intravenous PRN MARIKA Farooq  mL/hr at 07/18/21 1327 10 mEq at 07/18/21 1327   · oxyCODONE (ROXICODONE) immediate release tablet 5 mg  5 mg Oral Q6H PRN Nicci Sanford MD   5 mg at 07/17/21 2323   · melatonin tablet 6 mg  6 mg Oral Nightly PRN MARIKA Tom CNP   6 mg at 07/18/21 2306   · aluminum & magnesium hydroxide-simethicone (MAALOX) 200-200-20 MG/5ML suspension 30 mL  30 mL Oral Q6H PRN Shashank Samoa, APRN - CNP   30 mL at 07/17/21 2324             Vitals:       07/18/21 2330   07/19/21 0000   07/19/21 0400   07/19/21 0415   BP:   109/76   112/71   114/72       Pulse:   106   105   113       Resp:   25   26   (!) 31       Temp:   98.2 °F (36.8 °C)     98.3 °F (36.8 °C)       TempSrc:   Oral       Oral       SpO2:   92%   92%   92%       Weight:               193 lb 2 oz (87.6 kg)   Height:                      GENERAL: well developed and well nourished and in no acute distress   HEAD: normocephalic and atraumatic   EYES: no eyelid swelling, no conjunctival injection or exudate, pupils equal round and reactive to light   EXTERNAL EARS: normal   NOSE: bilateral raw areas on anterior septum, left worse than right. No clot visualized, no bleeding seen more posteriorly   MOUTH/THROAT: mucous membranes moist, no focal lesions, no tonsillar enlargement or exudate and old appearing blood draining from nasopharynx on left side in posteroir pharynx   NECK: non-tender, full range of motion, no mass, no focal lymphadenopathy   RESPIRATORY: Normal expansion.  Clear to auscultation.  No rales, rhonchi, or wheezing. NEUROLOGICAL:  cranial nerves II-XII are grossly intact       PROCEDURE: bilateral Nasal Cautery       DATE AND TIME OF PROCEDURE: 7/19/2021 7:12 AM   PATIENT NAME: Cleve Ugarte   SNU: [id number]   SURGEON: Wanda De Jesus MD   ASSISTANT: {None       PREOPERATIVE DIAGNOSIS:  bilateralepistaxis  POSTOPERATIVE DIAGNOSIS:  bilateral epistaxis       INDICATION:  bilateralepistaxis refractory to medical therapy       TEACHING: Procedure, benefits, and risks (persistent bleeding, perforation, need for further interventions) were explained to patient Verbal informed consent was obtained.    TIME OUT: A time out was conducted immediately before starting the procedure that confirmed a final verification of the correct patient, correct procedure, correct patient position, correct site and availability of special equipment.       SITE: bilateral nostril(s)       ANESTHESIA:  Topical 2% lidocaine jelly  COMPLICATIONS: None   EBL: None   TOLERANCE: Excellent       DESCRIPTION OF PROCEDURE:  The bilateral nasal cavity was anesthetized with topical lidocaine and decongested DIFF AG + TOXIN: NEGATIVE      7/19/2021 12:36   Specimen Description: . FECES   MICRO OVA & PARASITES: A Giardia/Cryptosporidium Screen has been performed.  A traditional Ova and Parasite exam, if col. ..    Special Requests: NOT REPORTED      7/19/2021 18:41   EKG 12-LEAD:    Atrial Rate: 113   P Axis: 41   P-R Interval: 160   Q-T Interval: 332   QRS Duration: 90   QTc Calculation (Bazett): 455   R Axis: 57   T Axis: 14   Ventricular Rate: 113      7/19/2021 19:51   Potassium: 3.1 (L)   Creatinine: 0.57 (L)   Glucose: 175 (H)   Albumin/Globulin Ratio: 0.8 (L)   Total Protein: 5.9 (L)   Albumin: 2.7 (L)   AST: 64 (H)   WBC: 11.8 (H)   RBC: 4.09 (L)   Hemoglobin Quant: 11.9 (L)   Hematocrit: 35.1 (L)   Platelet Count: 66 (L)

## 2021-07-22 ENCOUNTER — APPOINTMENT (OUTPATIENT)
Dept: CT IMAGING | Age: 41
DRG: 871 | End: 2021-07-22
Payer: COMMERCIAL

## 2021-07-22 LAB
ABSOLUTE EOS #: 0.12 K/UL (ref 0–0.44)
ABSOLUTE IMMATURE GRANULOCYTE: 0.12 K/UL (ref 0–0.3)
ABSOLUTE LYMPH #: 1.84 K/UL (ref 1.1–3.7)
ABSOLUTE MONO #: 0.93 K/UL (ref 0.1–1.2)
ALBUMIN SERPL-MCNC: 2.7 G/DL (ref 3.5–5.2)
ALBUMIN SERPL-MCNC: 2.7 G/DL (ref 3.5–5.2)
ALBUMIN/GLOBULIN RATIO: 0.7 (ref 1–2.5)
ALP BLD-CCNC: 79 U/L (ref 40–129)
ALT SERPL-CCNC: 35 U/L (ref 5–41)
ANION GAP SERPL CALCULATED.3IONS-SCNC: 14 MMOL/L (ref 9–17)
AST SERPL-CCNC: 38 U/L
BASOPHILS # BLD: 0 % (ref 0–2)
BASOPHILS ABSOLUTE: 0.04 K/UL (ref 0–0.2)
BILIRUB SERPL-MCNC: 0.55 MG/DL (ref 0.3–1.2)
BILIRUBIN DIRECT: 0.16 MG/DL
BILIRUBIN, INDIRECT: 0.39 MG/DL (ref 0–1)
BUN BLDV-MCNC: 7 MG/DL (ref 6–20)
BUN/CREAT BLD: ABNORMAL (ref 9–20)
CALCIUM SERPL-MCNC: 8.3 MG/DL (ref 8.6–10.4)
CHLORIDE BLD-SCNC: 104 MMOL/L (ref 98–107)
CO2: 19 MMOL/L (ref 20–31)
CREAT SERPL-MCNC: 0.64 MG/DL (ref 0.7–1.2)
CULTURE: ABNORMAL
DIFFERENTIAL TYPE: ABNORMAL
EOSINOPHILS RELATIVE PERCENT: 1 % (ref 1–4)
GFR AFRICAN AMERICAN: >60 ML/MIN
GFR NON-AFRICAN AMERICAN: >60 ML/MIN
GFR SERPL CREATININE-BSD FRML MDRD: ABNORMAL ML/MIN/{1.73_M2}
GFR SERPL CREATININE-BSD FRML MDRD: ABNORMAL ML/MIN/{1.73_M2}
GLOBULIN: ABNORMAL G/DL (ref 1.5–3.8)
GLUCOSE BLD-MCNC: 141 MG/DL (ref 70–99)
HCT VFR BLD CALC: 34.7 % (ref 40.7–50.3)
HEMOGLOBIN: 11.1 G/DL (ref 13–17)
IMMATURE GRANULOCYTES: 1 %
LYMPHOCYTES # BLD: 16 % (ref 24–43)
Lab: ABNORMAL
MCH RBC QN AUTO: 28.7 PG (ref 25.2–33.5)
MCHC RBC AUTO-ENTMCNC: 32 G/DL (ref 28.4–34.8)
MCV RBC AUTO: 89.7 FL (ref 82.6–102.9)
MONOCYTES # BLD: 8 % (ref 3–12)
NRBC AUTOMATED: 0 PER 100 WBC
PDW BLD-RTO: 13.6 % (ref 11.8–14.4)
PHOSPHORUS: 3.1 MG/DL (ref 2.5–4.5)
PLATELET # BLD: 214 K/UL (ref 138–453)
PLATELET ESTIMATE: ABNORMAL
PMV BLD AUTO: 10.6 FL (ref 8.1–13.5)
POTASSIUM SERPL-SCNC: 4.4 MMOL/L (ref 3.7–5.3)
RBC # BLD: 3.87 M/UL (ref 4.21–5.77)
RBC # BLD: ABNORMAL 10*6/UL
SEG NEUTROPHILS: 74 % (ref 36–65)
SEGMENTED NEUTROPHILS ABSOLUTE COUNT: 8.39 K/UL (ref 1.5–8.1)
SODIUM BLD-SCNC: 137 MMOL/L (ref 135–144)
SPECIMEN DESCRIPTION: ABNORMAL
TOTAL PROTEIN: 6.6 G/DL (ref 6.4–8.3)
WBC # BLD: 11.4 K/UL (ref 3.5–11.3)
WBC # BLD: ABNORMAL 10*3/UL

## 2021-07-22 PROCEDURE — 94761 N-INVAS EAR/PLS OXIMETRY MLT: CPT

## 2021-07-22 PROCEDURE — 80076 HEPATIC FUNCTION PANEL: CPT

## 2021-07-22 PROCEDURE — 87205 SMEAR GRAM STAIN: CPT

## 2021-07-22 PROCEDURE — 86403 PARTICLE AGGLUT ANTBDY SCRN: CPT

## 2021-07-22 PROCEDURE — C9113 INJ PANTOPRAZOLE SODIUM, VIA: HCPCS | Performed by: INTERNAL MEDICINE

## 2021-07-22 PROCEDURE — 80053 COMPREHEN METABOLIC PANEL: CPT

## 2021-07-22 PROCEDURE — 70498 CT ANGIOGRAPHY NECK: CPT

## 2021-07-22 PROCEDURE — 6360000002 HC RX W HCPCS: Performed by: INTERNAL MEDICINE

## 2021-07-22 PROCEDURE — 2580000003 HC RX 258: Performed by: NURSE PRACTITIONER

## 2021-07-22 PROCEDURE — 80069 RENAL FUNCTION PANEL: CPT

## 2021-07-22 PROCEDURE — 87147 CULTURE TYPE IMMUNOLOGIC: CPT

## 2021-07-22 PROCEDURE — 2060000000 HC ICU INTERMEDIATE R&B

## 2021-07-22 PROCEDURE — 6370000000 HC RX 637 (ALT 250 FOR IP): Performed by: NURSE PRACTITIONER

## 2021-07-22 PROCEDURE — 87186 SC STD MICRODIL/AGAR DIL: CPT

## 2021-07-22 PROCEDURE — 82248 BILIRUBIN DIRECT: CPT

## 2021-07-22 PROCEDURE — 6370000000 HC RX 637 (ALT 250 FOR IP): Performed by: INTERNAL MEDICINE

## 2021-07-22 PROCEDURE — 36415 COLL VENOUS BLD VENIPUNCTURE: CPT

## 2021-07-22 PROCEDURE — 6360000002 HC RX W HCPCS: Performed by: NURSE PRACTITIONER

## 2021-07-22 PROCEDURE — 85025 COMPLETE CBC W/AUTO DIFF WBC: CPT

## 2021-07-22 PROCEDURE — 94660 CPAP INITIATION&MGMT: CPT

## 2021-07-22 PROCEDURE — 2700000000 HC OXYGEN THERAPY PER DAY

## 2021-07-22 PROCEDURE — 2500000003 HC RX 250 WO HCPCS: Performed by: INTERNAL MEDICINE

## 2021-07-22 PROCEDURE — 87040 BLOOD CULTURE FOR BACTERIA: CPT

## 2021-07-22 PROCEDURE — 84100 ASSAY OF PHOSPHORUS: CPT

## 2021-07-22 PROCEDURE — 99232 SBSQ HOSP IP/OBS MODERATE 35: CPT | Performed by: NURSE PRACTITIONER

## 2021-07-22 PROCEDURE — 2580000003 HC RX 258: Performed by: INTERNAL MEDICINE

## 2021-07-22 PROCEDURE — 6360000004 HC RX CONTRAST MEDICATION: Performed by: STUDENT IN AN ORGANIZED HEALTH CARE EDUCATION/TRAINING PROGRAM

## 2021-07-22 PROCEDURE — APPSS30 APP SPLIT SHARED TIME 16-30 MINUTES: Performed by: NURSE PRACTITIONER

## 2021-07-22 PROCEDURE — 92523 SPEECH SOUND LANG COMPREHEN: CPT

## 2021-07-22 PROCEDURE — 99233 SBSQ HOSP IP/OBS HIGH 50: CPT | Performed by: INTERNAL MEDICINE

## 2021-07-22 PROCEDURE — 99232 SBSQ HOSP IP/OBS MODERATE 35: CPT | Performed by: INTERNAL MEDICINE

## 2021-07-22 RX ORDER — DIPHENHYDRAMINE HYDROCHLORIDE 50 MG/ML
25 INJECTION INTRAMUSCULAR; INTRAVENOUS EVERY 6 HOURS PRN
Status: DISCONTINUED | OUTPATIENT
Start: 2021-07-22 | End: 2021-07-24

## 2021-07-22 RX ORDER — MECLIZINE HYDROCHLORIDE 25 MG/1
TABLET ORAL
Qty: 45 TABLET | Refills: 1 | Status: ON HOLD | OUTPATIENT
Start: 2021-07-22 | End: 2021-08-22

## 2021-07-22 RX ADMIN — IOPAMIDOL 90 ML: 755 INJECTION, SOLUTION INTRAVENOUS at 09:08

## 2021-07-22 RX ADMIN — SODIUM CHLORIDE, PRESERVATIVE FREE 10 ML: 5 INJECTION INTRAVENOUS at 11:36

## 2021-07-22 RX ADMIN — DIPHENHYDRAMINE HYDROCHLORIDE 25 MG: 50 INJECTION, SOLUTION INTRAMUSCULAR; INTRAVENOUS at 22:00

## 2021-07-22 RX ADMIN — RIFAMPIN 600 MG: 600 INJECTION, POWDER, LYOPHILIZED, FOR SOLUTION INTRAVENOUS at 17:21

## 2021-07-22 RX ADMIN — SODIUM CHLORIDE, PRESERVATIVE FREE 10 ML: 5 INJECTION INTRAVENOUS at 21:23

## 2021-07-22 RX ADMIN — CEFAZOLIN 2000 MG: 10 INJECTION, POWDER, FOR SOLUTION INTRAVENOUS at 11:36

## 2021-07-22 RX ADMIN — MUPIROCIN: 20 OINTMENT TOPICAL at 08:21

## 2021-07-22 RX ADMIN — ACETAMINOPHEN 650 MG: 325 TABLET ORAL at 19:21

## 2021-07-22 RX ADMIN — PANTOPRAZOLE SODIUM 40 MG: 40 INJECTION, POWDER, LYOPHILIZED, FOR SOLUTION INTRAVENOUS at 21:23

## 2021-07-22 RX ADMIN — POTASSIUM CHLORIDE 40 MEQ: 1500 TABLET, EXTENDED RELEASE ORAL at 17:21

## 2021-07-22 RX ADMIN — CEFAZOLIN 2000 MG: 10 INJECTION, POWDER, FOR SOLUTION INTRAVENOUS at 03:40

## 2021-07-22 RX ADMIN — POTASSIUM CHLORIDE, DEXTROSE MONOHYDRATE AND SODIUM CHLORIDE: 300; 5; 450 INJECTION, SOLUTION INTRAVENOUS at 15:00

## 2021-07-22 RX ADMIN — PANTOPRAZOLE SODIUM 40 MG: 40 INJECTION, POWDER, LYOPHILIZED, FOR SOLUTION INTRAVENOUS at 11:35

## 2021-07-22 RX ADMIN — POTASSIUM CHLORIDE, DEXTROSE MONOHYDRATE AND SODIUM CHLORIDE: 300; 5; 450 INJECTION, SOLUTION INTRAVENOUS at 05:49

## 2021-07-22 RX ADMIN — POTASSIUM CHLORIDE 40 MEQ: 1500 TABLET, EXTENDED RELEASE ORAL at 08:20

## 2021-07-22 RX ADMIN — METOPROLOL TARTRATE 25 MG: 25 TABLET ORAL at 21:23

## 2021-07-22 RX ADMIN — MUPIROCIN: 20 OINTMENT TOPICAL at 21:22

## 2021-07-22 RX ADMIN — NAFCILLIN INJECTION 2000 MG: 2 POWDER, FOR SOLUTION INTRAMUSCULAR; INTRAMUSCULAR; INTRAVENOUS at 23:15

## 2021-07-22 RX ADMIN — SODIUM CHLORIDE, PRESERVATIVE FREE 10 ML: 5 INJECTION INTRAVENOUS at 21:29

## 2021-07-22 RX ADMIN — METOPROLOL TARTRATE 25 MG: 25 TABLET ORAL at 08:20

## 2021-07-22 RX ADMIN — NAFCILLIN INJECTION 2000 MG: 2 POWDER, FOR SOLUTION INTRAMUSCULAR; INTRAMUSCULAR; INTRAVENOUS at 15:07

## 2021-07-22 RX ADMIN — LOPERAMIDE HYDROCHLORIDE 4 MG: 2 CAPSULE ORAL at 08:20

## 2021-07-22 RX ADMIN — LOPERAMIDE HYDROCHLORIDE 4 MG: 2 CAPSULE ORAL at 17:22

## 2021-07-22 RX ADMIN — NAFCILLIN INJECTION 2000 MG: 2 POWDER, FOR SOLUTION INTRAMUSCULAR; INTRAMUSCULAR; INTRAVENOUS at 21:17

## 2021-07-22 ASSESSMENT — PAIN SCALES - GENERAL
PAINLEVEL_OUTOF10: 0
PAINLEVEL_OUTOF10: 6
PAINLEVEL_OUTOF10: 0

## 2021-07-22 ASSESSMENT — PAIN - FUNCTIONAL ASSESSMENT: PAIN_FUNCTIONAL_ASSESSMENT: 0-10

## 2021-07-22 NOTE — PLAN OF CARE
Problem: Pain:  Goal: Pain level will decrease  Description: Pain level will decrease  Outcome: Ongoing  Goal: Control of acute pain  Description: Control of acute pain  Outcome: Ongoing  Goal: Control of chronic pain  Description: Control of chronic pain  Outcome: Ongoing     Problem: Infection, Septic Shock:  Goal: Will show no infection signs and symptoms  Description: Will show no infection signs and symptoms  Outcome: Ongoing     Problem: Falls - Risk of:  Goal: Will remain free from falls  Description: Will remain free from falls  Outcome: Ongoing  Goal: Absence of physical injury  Description: Absence of physical injury  Outcome: Ongoing     Problem: Cardiac:  Goal: Ability to maintain vital signs within normal range will improve  Description: Ability to maintain vital signs within normal range will improve  Outcome: Ongoing  Goal: Cardiovascular alteration will improve  Description: Cardiovascular alteration will improve  Outcome: Ongoing     Problem: Physical Regulation:  Goal: Ability to maintain vital signs within normal range will improve  Description: Ability to maintain vital signs within normal range will improve  Outcome: Ongoing  Goal: Complications related to the disease process, condition or treatment will be avoided or minimized  Description: Complications related to the disease process, condition or treatment will be avoided or minimized  Outcome: Ongoing  Goal: Diagnostic test results will improve  Description: Diagnostic test results will improve  Outcome: Ongoing  Goal: Will remain free from infection  Description: Will remain free from infection  Outcome: Ongoing  Goal: Ability to maintain clinical measurements within normal limits will improve  Description: Ability to maintain clinical measurements within normal limits will improve  Outcome: Ongoing  Goal: Will show no signs and symptoms of electrolyte imbalance  Description: Will show no signs and symptoms of electrolyte

## 2021-07-22 NOTE — PROGRESS NOTES
[facility]  Office: [address], DO, Kaity Moe, DO, Jf Cross, DO, Manuela Erazo Blood, DO, Margaret Padilla MD, Genaro Sy MD, Balbina Sorto MD, Jahaira Alvarez MD, Olayinka Mckeon MD, Brittnee Rae MD, Ishan Cross MD, Katie Gaytan, DO, Franciso Schaumann, MD, Antoinette Mansfield DO, Stuart Ellis MD,  Destini Fishman DO, Tangela Mantilla MD, Denman Najjar, MD, Francisco Conner MD, Nayla Frank MD, Lorna Silva MD, Carin Denson MD, Keyon Childress, [facility], [facility], CNP, Sincere Lui, CNP, Kaitlin Agarwal, CNS, Srinivasa Spain, Meryl Charles, CNP, Ernestine Alfonso, CNP, Susan Harvey, CNP, Kathy Rosas CNP, Scott Vallejo PA-C, Lam Maki, [facility], Scot Chun, CNP, Amelie Jeffries, CNP, Paula Hale, CNP, Tom Blandon, CNP, Stephanie Rush, CNP, Adolfo Cole CNP, Luisa Cardenas, CNP, Katrina Schroeder, [address] [place] [address]    Progress Note    7/22/2021    2:38 PM    Name:   Sharif Choi  MRN:     [id number]     [place]:      [de-identified]   Room:   [address] [place] [address] Day:  5  Admit Date:  7/17/2021 12:42 AM    PCP:   Eliseo Mercado MD  Code Status:  Full Code    Subjective:     C/C:   Chief Complaint   Patient presents with    Abdominal Pain    Emesis     Interval History Status: improved. Patient seen examined this afternoon. Wife and family are at bedside. He reports that today has been a \"good\" day. States that his dizziness is significantly improved. Having loose bowel movements still. Hallucinations/delusions are also improving. Discussed the results of MRI with patient and wife. They expressed understanding that he has infarcts. Later this afternoon, RN reporting a 90 second episode of epigastric pain/chest pain. Notes that this dissipated after ensure was drank. HR noted to jump to 120s during this episode.     Brief History:     Mr. Dina Chester is a pleasant 36year old  gentleman who presneted to [facility] for evaluation of confusion, fatigue, diarrhea, and abdominal pains on 7/17/2021. He was recently released from [facility] for similar complaints and was noted to have low potassium levels at that time. CT of the abdomen was normal. The diarrhea was \"water-like\" and profuse in nature, admitting to approx 5-7 watery stools a day for the past 4 days prior to admission. No BRBPR. Abdominal pain was present prior to the diarrhea. There is associated fevers and chills. Subjective fever of 104 prior to arrival. Emesis is food that is digested but from earlier in the day. Patient admits to eating popcorn and nuts just the weekend before the onset. 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 CT of the abdomen pelvis was performed which revealed a markedly distended bladder with a calculated volume of approximately 2 L in the bladder along with hepatic steatosis and mild hepatomegaly with minor bibasilar atelectasis and interstitial thickening posteriorly. A Cárdenas catheter was placed with alleviation of the patient's urinary retention. Next morning, the patient's lipase level was noted be 29. A CT of the abdomen pelvis was repeated on 7/18 which revealed multiple air-fluid levels, seen with acute enterocolitis. Patient was her IV antibiotics admitted to the hospital.    He was noted to be quite tachycardic with a heart rate upwards of 151 bpm.  Cardiology was consulted, as source troponin was also increased. Troponins likely thought to be secondary to a type II mechanism from sepsis. 2D echo was ordered and revealed an EF of 50% with posterior mitral leaflet prolapse and severe MR with an eccentric anterior directed jet with a possible small vegetation on the posterior leaflet.   He is scheduled to undergo MONROE on KCl 40 mEq 100 mL/hr at 21 0549    sodium chloride      sodium chloride       PRN Meds: meclizine, ketorolac, loperamide, sodium chloride, sodium chloride, sodium chloride flush, sodium chloride, ondansetron **OR** ondansetron, acetaminophen **OR** acetaminophen, polyethylene glycol, magnesium sulfate, oxyCODONE, melatonin, aluminum & magnesium hydroxide-simethicone    Data:     Past Medical History:   has a past medical history of Diverticulosis, Hyperlipidemia, and Hypertension. Social History:   reports that he has never smoked. He has never used smokeless tobacco. He reports current alcohol use of about 20.0 standard drinks of alcohol per week. He reports that he does not use drugs. Family History:   Family History   Problem Relation Age of Onset    No Known Problems Mother     Other Father         diverticulitis       Vitals:  /61   Pulse 99   Temp 97.9 °F (36.6 °C) (Oral)   Resp 30   Ht 5' 6\" (1.676 m)   Wt 185 lb 10 oz (84.2 kg)   SpO2 99%   BMI 29.96 kg/m²   Temp (24hrs), Av.2 °F (36.8 °C), Min:97.3 °F (36.3 °C), Max:99.3 °F (37.4 °C)    No results for input(s): POCGLU in the last 72 hours. I/O (24Hr):     Intake/Output Summary (Last 24 hours) at 2021 1438  Last data filed at 2021 1015  Gross per 24 hour   Intake 350 ml   Output 4000 ml   Net -3650 ml       Labs:  Hematology:  Recent Labs     21  0549 21  0710 21  0752   WBC 10.9 11.3 11.4*   RBC 3.80* 3.65* 3.87*   HGB 10.8* 10.6* 11.1*   HCT 32.1* 32.4* 34.7*   MCV 84.5 88.8 89.7   MCH 28.4 29.0 28.7   MCHC 33.6 32.7 32.0   RDW 13.5 13.6 13.6   PLT 78* 163 214   MPV 12.3 11.7 10.6     Chemistry:  Recent Labs     21  0549 21  0549 21  1150 21  0710 21  0752      < > 137   < > 138 137 137   K 3.1*   < > 3.5*   < > 3.4* 3.9 4.4      < > 104   < > 105 104 104   CO2 23   < > 22   < > 24 20 19*   GLUCOSE 175*   < > 129*   < > 136* 121* 141*   BUN 8   < > 8   < > 7 9 7   CREATININE 0.57*   < > 0.49*   < > 0.48* 0.53* 0.64*   MG 2.0  --  1.9  --   --   --   --    ANIONGAP 10   < > 11   < > 9 13 14   LABGLOM >60   < > >60   < > >60 >60 >60   GFRAA >60   < > >60   < > >60 >60 >60   CALCIUM 8.6   < > 7.7*   < > 8.0* 7.8* 8.3*   PHOS  --   --  1.6*  --   --  2.4* 3.1    < > = values in this interval not displayed. Recent Labs     07/19/21 1951 07/19/21 1951 07/20/21  0549 07/20/21 1755 07/21/21 0710 07/22/21  0752   PROT 5.9*  --  5.6*  --   --  6.6   LABALBU 2.7*   < > 2.2*  --  2.5* 2.7*  2.7*   TSH  --   --   --  1.86  --   --    AST 64*  --  54*  --   --  38   ALT 37  --  31  --   --  35   ALKPHOS 92  --  82  --   --  79   BILITOT 0.60  --  0.62  --   --  0.55   BILIDIR  --   --   --   --   --  0.16   AMMONIA  --   --   --  52  --   --     < > = values in this interval not displayed. ABG:  Lab Results   Component Value Date    POCPH 7.486 07/18/2021    POCPCO2 28.6 07/18/2021    POCPO2 134.5 07/18/2021    POCHCO3 21.6 07/18/2021    NBEA 1 07/18/2021    PBEA NOT REPORTED 07/18/2021    CBU3BDJ NOT REPORTED 07/18/2021    NBGL5LOX 99 07/18/2021    FIO2 NOT REPORTED 07/18/2021     Lab Results   Component Value Date/Time    SPECIAL  R HAND 20 ML 07/20/2021 06:00 PM     Lab Results   Component Value Date/Time    CULTURE (A) 07/20/2021 06:00 PM     POSITIVE Blood Culture Results called to and read back by: Halima NIX AT 1000 7/21/21    CULTURE  07/20/2021 06:00 PM     DIRECT GRAM STAIN FROM BOTTLE: GRAM POSITIVE COCCI IN CLUSTERS    CULTURE (A) 07/20/2021 06:00 PM     Staphylococcus aureus Detected: mecA/C and MREJ Not Detected Methodology- Polymerase Chain Reaction (PCR)    CULTURE STAPHYLOCOCCUS AUREUS (A) 07/20/2021 06:00 PM       Radiology:  CT ABDOMEN PELVIS WO CONTRAST Additional Contrast? None    Result Date: 7/17/2021  No acute abnormality identified in the abdomen and pelvis.  The bladder is markedly distended with a calculated volume of 2 L. Hepatic steatosis and mild hepatomegaly. Minor bibasilar atelectasis and interstitial thickening posteriorly. XR CHEST (SINGLE VIEW FRONTAL)    Result Date: 7/17/2021  Unremarkable portable chest radiograph. XR FOOT LEFT (2 VIEWS)    Result Date: 7/19/2021  No radiographic evidence for acute osteomyelitis. Mild soft tissue swelling of the great toe possibly cellulitis. RECOMMENDATION: NM bone scan or MRI may be obtained if there remains strong concern for acute osteomyelitis. XR ABDOMEN (KUB) (SINGLE AP VIEW)    Result Date: 7/17/2021  Nonobstructive bowel gas pattern. No acute process identified. US GALLBLADDER RUQ    Result Date: 7/17/2021  Mild hepatomegaly. Diffuse hepatic steatosis. Contracted gallbladder. No cholelithiasis or acute cholecystitis. XR CHEST PORTABLE    Result Date: 7/18/2021  No focal consolidation. No acute process evident. CT CHEST ABDOMEN PELVIS W CONTRAST    Result Date: 7/18/2021  Moderate distention of the colon multiple air-fluid levels, findings may be seen with acute enterocolitis. Physical Examination:        General appearance:  alert, cooperative and no distress,  gentleman sitting up in bed  Mental Status:  oriented to person, place and time and normal affect  Lungs:  clear to auscultation bilaterally, normal effort  Heart: Regular rate (96 bpm) with regular rhythm, there is a loud systolic murmur which is blowing in nature best appreciated at the apex  Abdomen:  soft, nontender, nondistended, protuberant, normal bowel sounds, no masses, hepatomegaly, splenomegaly  Extremities:  no edema, redness, tenderness in the calves; left great toe with erythema at the 1st MTP joint. Neuro:  strength is equal bilaterally. Upper extremity strength is 5/5. Lower extremity strength is 5/5. No nystagmus noted. Skin:  Erythema to the first MTP joint on the left is improving.   Mid back erythema and discoloration noted.    Assessment:        Hospital Problems         Last Modified POA    * (Principal) MSSA bacteremia 7/20/2021 Yes    Diarrhea 7/20/2021 Yes    Acute dehydration 7/20/2021 Yes    Hypokalemia 7/20/2021 Yes    Epistaxis 7/20/2021 No    Thrombocytopenia ([address] 7/20/2021 Yes    Sepsis with acute organ dysfunction and septic shock ([address] 7/20/2021 Yes    Delusions ([address] 7/20/2021 Yes    Hypophosphatemia 7/20/2021 Yes    Severe mitral regurgitation 7/20/2021 Yes    Acute respiratory failure with hypoxia ([address] 7/20/2021 Yes    GI bleed 7/20/2021 Yes    Vertigo 7/21/2021 No    Hypomagnesemia 7/20/2021 Yes    Normocytic normochromic anemia 7/20/2021 Yes    Obesity (BMI 30-39. 9) 7/20/2021 Yes    Transaminitis 7/20/2021 Yes    Hyperglycemia 7/20/2021 Yes    Essential hypertension 4/52/8844 Yes    Metabolic encephalopathy 6/52/4812 Yes    Iron deficiency anemia secondary to blood loss (chronic) 7/21/2021 Yes    Guaiac positive stools 7/21/2021 Yes          Plan:        1. MSSA bacteremia - changed from Ancef to Nafcillin 2 g q 4 hours through 8/15. ID managing. Also adding Rifampin 600 mg qd for synergy. No AC recommended. Source is still unclear. Large mitral valve vegetation noted. CT surgery has evaluated the patient and appears to be recommending surgery. Discussed with ID today. 2. 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 Continue IV antibiotics as noted above. 3. Back rash - noted. Pictures in media/ID notes. May need MRI of the C, T, L-spine. 4. Acute respiratory failure with hypoxia - D-dimer is elevated. CT of the chest to rule out PE was inadequate yesterday. Wells score 1.5. Hold off on scanning, as we are unable to anticoagulate him due to septic emboli in the brain.   5. Acute septic emboli infarcts - to the supra and infratentorial structures along with subacute to chronic encephalomalacia with chronic blood products within the left superior parietal lobule and right inferior parietal lobule. Await further input from neurology. May be contributing factor to #6  6. Delusions, confusion, TME - possibly, and likely due to sepsis. However, still having symptoms, despite antibiotics. 7. Vertigo - likely related to #5 - continue antivert. Symptoms appear to have abated. 8. Thrombocytopenia - resolved. Likely secondary to sepsis. 9. GI bleed/normo normo anemia-likely lower in nature. Change IV protonix to oral. Follow up with GI as noted. Hemoglobin 11.1 and stable. 10. Acute diarrhea, probably viral in nature - continue imodium  11. Hypokalemia, hypophosphatemia-replace lytes daily. 12. Transaminitis-likely related to fatty liver disease, possibly due to sepsis. Now WNL  13. Obesity 31.42 - diet/weight loss/exercise  14.  Acute left great toe pain - gout vs septic joint - improving with IV antibioitcs    33 Darlene Valdez DO  7/22/2021  2:38 PM

## 2021-07-22 NOTE — PROGRESS NOTES
[facility])  Occupational Therapy Not Seen Note    DATE: 2021    NAME: Nneka Monsivais  MRN: [id number]   : 1980      Patient not seen this date for Occupational Therapy due to:    Testing: CT; Await CTS surgical plan     Next Scheduled Treatment:  Ck     Electronically signed by Anthony Guy OT on 2021 at 8:56 AM

## 2021-07-22 NOTE — PROGRESS NOTES
[facility]  Progress Note                   Date:   7/22/2021  Patient name: Funmilayo Mcfarland  Date of admission:  7/17/2021 12:42 AM  MRN:   [id number]  YOB: 1980  PCP: Saida German MD    Reason for Admission: Diarrhea [R19.7]    Subjective:       Clinical Changes /Abnormalities:  Patient seen and examined after discussion with Dr Kalani Royal and Neurology. Sitting up in bed eating lunch with family at bedside. Denies chest pain and SOB. SR/ST on tele HR low 100s. Review of Systems    Medications:   Scheduled Meds:   rifampin (RIFADIN) IVPB  600 mg Intravenous Q24H    metoprolol tartrate  25 mg Oral BID    pantoprazole  40 mg Intravenous BID    And    sodium chloride (PF)  10 mL Intravenous BID    potassium chloride  40 mEq Oral BID WC    mupirocin   Nasal BID    ceFAZolin  2,000 mg Intravenous Q6H    sodium chloride flush  5-40 mL Intravenous 2 times per day     Continuous Infusions:   dextrose 5% and 0.45% NaCl with KCl 40 mEq 100 mL/hr at 07/22/21 0549    sodium chloride      sodium chloride       CBC:   Recent Labs     07/20/21  0549 07/21/21  0710 07/22/21  0752   WBC 10.9 11.3 11.4*   HGB 10.8* 10.6* 11.1*   PLT 78* 163 214     BMP:    Recent Labs     07/20/21  1150 07/21/21  0710 07/22/21  0752    137 137   K 3.4* 3.9 4.4    104 104   CO2 24 20 19*   BUN 7 9 7   CREATININE 0.48* 0.53* 0.64*   GLUCOSE 136* 121* 141*     Hepatic:  Recent Labs     07/19/21  1951 07/20/21  0549 07/22/21  0752   AST 64* 54* 38   ALT 37 31 35   BILITOT 0.60 0.62 0.55   ALKPHOS 92 82 79     Troponin: No results for input(s): TROPHS in the last 72 hours. BNP: No results for input(s): BNP in the last 72 hours. Lipids: No results for input(s): CHOL, HDL in the last 72 hours. Invalid input(s): LDLCALCU  INR: No results for input(s): INR in the last 72 hours.     DIAGNOSTIC DATA  MONROE 7/20/2021  MONROE findings:     LA:       Normal  RAJAT:    No thrombus  RA:      Normal  RV: Normal  LV:       Normal  Estimated LVEF:         55%  Aorta:               Mild atheromatous disease arch  Pericardium:    Trivial pericardial effusion  Septum:           No intracardiac shunt via color Doppler.      Valves:     Mitral Valve: Large vegetation measuring 2.5 X 2.4 cm attached on the posterior leaflet of the mitral valve associated with Flail and degenerated leaflet. Severe regurgitation w/ an anteriorly directed eccentric jet is identified. Aortic Valve: The aortic valve is trileaflet and opens adequately. No regurgitiation is identified. Tricuspid valve: Structurally normal. No regurgitation is identified. Pulmonary valve: Normal. No significant regurgitation     No thrombus identified.        Summary:      1. A MONROE was performed without complications. 2. LVEF 55%  3. No thrombus identified  4. No intracardiac shunt via color Doppler. 5. Large vegetation measuring 2.5 X 2.4 cm noted on the posterior leaflet of the mitral valve. Posterior leaflet is flair and is associated with severe anteriorly directed eccentric regurgitation.      Recommendations:  1. CV Surgery consult for further recommendations    ECHO 7/18/2021  Summary  Global left ventricular systolic function is normal. Calculated ejection  fraction 48% by Dubois's method. Visually estimated EF 50%. Posterior mitral valve leaflet prolapse. Severe mitral regurgitation. Eccentric anterior diredted jet. EOA = 0.7cm2. PISA radius is 1.3cm. Vena contracta is 0.99 cm. MR volume is  58ml. Possible small vegetation on posterior leaflet. Consider MONROE. Objective:   Vitals: /61   Pulse 99   Temp 97.9 °F (36.6 °C) (Oral)   Resp 30   Ht 5' 6\" (1.676 m)   Wt 185 lb 10 oz (84.2 kg)   SpO2 99%   BMI 29.96 kg/m²   General appearance: alert and cooperative with exam  HEENT: Head: Normocephalic, no lesions, without obvious abnormality. Neck:no JVD, trachea midline, no adenopathy  Lungs: Clear to auscultation diminished bases. Heart: Regular rate and rhythm, s1/s2 auscultated, no murmurs  Abdomen: soft, non-tender, bowel sounds active  Extremities: no edema  Neurologic: not done        Assessment / Acute Cardiac Problems:   1. Troponin elevation likely demand ischemia in setting of hypotension 2/2 to sepsis  2. Sinus tachycardia  3. Septic Shock  4. Bacteremia with blood cultures showing gram positive cocci in clusters x2  5. HTN  6. HLD  7. N/V Diarrhea  8. Hypokalemia    Patient Active Problem List:     Diarrhea     Acute dehydration     Hypomagnesemia     Hypokalemia     Epistaxis     Thrombocytopenia (HCC)     Hyperglycemia     Essential hypertension     Sepsis with acute organ dysfunction and septic shock (HCC)     Delusions (HCC)     Hypophosphatemia     MSSA bacteremia     Severe mitral regurgitation     Acute respiratory failure with hypoxia (HCC)     GI bleed     Normocytic normochromic anemia     Obesity (BMI 30-39. 9)     Transaminitis     Vertigo     Metabolic encephalopathy     Iron deficiency anemia secondary to blood loss (chronic)     Guaiac positive stools      Plan of Treatment:   1. Troponin elevation likely secondary to demand ischemia/sepsis  2. Tachycardia  Low 100s on tele. Continue BB with parameters. Hypotensive this am.   3. Keep K > 4.0 and Mag > 2.0  4. Sepsis followed by ID on IV AB  5. Endocarditis:  MONROE as above with large vegetation. CV surgery consulted. IV AB Plans for MVR possibly next week. 6. Toxic metabolic encephalopathy/septic emboli CVA Followed by neurology  7.  Rest of care managed by Primary Team.     Electronically signed by MARIKA Alexandre NP on 7/22/2021 at [address] [place] [address],[address].  [phone]

## 2021-07-22 NOTE — PROGRESS NOTES
Medication Sig Dispense Refill    amLODIPine (NORVASC) 5 MG tablet Take 5 mg by mouth daily      atorvastatin (LIPITOR) 80 MG tablet Take 80 mg by mouth daily      carvedilol (COREG) 25 MG tablet Take 25 mg by mouth 2 times daily (with meals)      dicyclomine (BENTYL) 20 MG tablet Take 20 mg by mouth every 6 hours      hydroCHLOROthiazide (HYDRODIURIL) 25 MG tablet Take 25 mg by mouth daily      HYDROcodone-acetaminophen (NORCO) 5-325 MG per tablet Take 1 tablet by mouth every 6 hours as needed for Pain.  potassium chloride (MICRO-K) 10 MEQ extended release capsule Take 20 mEq by mouth 2 times daily         Allergies: Nneka Monsivais is allergic to morphine.     Past Medical History:   Diagnosis Date    Diverticulosis     Hyperlipidemia     Hypertension        Past Surgical History:   Procedure Laterality Date    ABDOMEN SURGERY      large bowel resection    ANTERIOR CRUCIATE LIGAMENT REPAIR Right     APPENDECTOMY  07/09/1997    COLECTOMY      2012 - D/T Dverticulitis    DILATATION, ESOPHAGUS         Medications:     metoprolol tartrate  25 mg Oral BID    pantoprazole  40 mg Intravenous BID    And    sodium chloride (PF)  10 mL Intravenous BID    potassium chloride  40 mEq Oral BID WC    mupirocin   Nasal BID    ceFAZolin  2,000 mg Intravenous Q6H    sodium chloride flush  5-40 mL Intravenous 2 times per day     PRN Meds include: meclizine, ketorolac, loperamide, sodium chloride, sodium chloride, sodium chloride flush, sodium chloride, ondansetron **OR** ondansetron, acetaminophen **OR** acetaminophen, polyethylene glycol, magnesium sulfate, oxyCODONE, melatonin, aluminum & magnesium hydroxide-simethicone    Objective:   /70   Pulse 94   Temp 97.7 °F (36.5 °C) (Oral)   Resp 25   Ht 5' 6\" (1.676 m)   Wt 185 lb 10 oz (84.2 kg)   SpO2 (!) 89%   BMI 29.96 kg/m²     Blood pressure range: Systolic (78KLJ), WJT:117 , Min:111 , QVL:475   ; Diastolic (18BWE), VWT:40, Min:70, Neurologic Exam      Mental Status   Oriented to person, place, and time. Registration: recalls 3 of 3 objects. Recall at 5 minutes: recalls 2 of 3 objects. Follows 3 step commands. Attention: normal. Concentration: normal.   Speech: speech is normal   Level of consciousness: alert     Cranial Nerves   Cranial nerves II through XII intact. Motor Exam   Muscle bulk: normal  Overall muscle tone: normal  Right arm tone: normal  Left arm tone: normal  Right arm pronator drift: absent  Left arm pronator drift: absent  Right leg tone: normal  Left leg tone: normal     Strength   Strength 5/5 except as noted.    Right deltoid: 3/5     Sensory Exam   Light touch normal.   Pinprick normal.      Gait, Coordination, and Reflexes      Coordination   Finger to nose coordination: normal  Heel to shin coordination: normal     Tremor   Resting tremor: absent  Intention tremor: absent  Action tremor: absent     Reflexes   Right brachioradialis: 2+  Left brachioradialis: 2+  Right biceps: 2+  Left biceps: 2+  Right triceps: 2+  Left triceps: 2+  Right patellar: 2+  Left patellar: 2+  Right achilles: 2+  Left achilles: 2+  Right plantar: normal  Left plantar: normal  Right Haney: absent  Left Haney: absent  Right ankle clonus: absent  Left pendular knee jerk: absent     NIHSS: 0     Lab Results:   CBC:   Recent Labs     07/20/21  0549 07/21/21  0710 07/22/21  0752   WBC 10.9 11.3 11.4*   HGB 10.8* 10.6* 11.1*   PLT 78* 163 214     BMP:    Recent Labs     07/20/21  0549 07/20/21  1150 07/21/21  0710    138 137   K 3.5* 3.4* 3.9    105 104   CO2 22 24 20   BUN 8 7 9   CREATININE 0.49* 0.48* 0.53*   GLUCOSE 129* 136* 121*         Lab Results   Component Value Date    ALT 31 07/20/2021    AST 54 (H) 07/20/2021    TSH 1.86 07/20/2021    INR 1.0 07/19/2021    LABA1C 5.5 07/17/2021    LQGSQYGI24 1120 07/17/2021       No results found for: PHENYTOIN, PHENYTOIN, VALPROATE, CBMZ    IMAGING  [facility] W/O  7/20/2021 Impression   No acute intracranial abnormality. MRI may be obtained if clinically   indicated. CT abdomen 7/17/2021  Impression   No acute abnormality identified in the abdomen and pelvis. The bladder is markedly distended with a calculated volume of 2 L. Hepatic steatosis and mild hepatomegaly. Minor bibasilar atelectasis and interstitial thickening posteriorly. CT chest abdomen pelvis with contrast 7/18/2021  Impression   Moderate distention of the colon multiple air-fluid levels, findings may be   seen with acute enterocolitis. MONROE  7/20/2021  1. A MONROE was performed without complications. 2. LVEF 55%  3. No thrombus identified  4. No intracardiac shunt via color Doppler. 5. Large vegetation measuring 2.5 X 2.4 cm noted on the posterior leaflet of the mitral valve. Posterior leaflet is flair and is associated with severe anteriorly directed eccentric regurgitation. 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 · Low suspicion for meningitis or encephalitis      Thank you for the consultation. Will follow.        George Dorantes MD  PGY-4 Neurology Resident

## 2021-07-22 NOTE — PROGRESS NOTES
[facility]  Daily Progress Note    Surgeon: Dr. Yvonne Kong  Procedure: MVR- schedule TBD   EF: 50%     Subjective:  Mr. Юлия Groves feels well today with no acute complaints. The patient had intractable vertigo with gait imbalances yesterday-testing completed. OBTC and ambulating. Denies chest pain or shortness of breath. Plan of care reviewed and questions answered. Physical Exam  Vital Signs: /70   Pulse 94   Temp 97.7 °F (36.5 °C) (Oral)   Resp 25   Ht 5' 6\" (1.676 m)   Wt 185 lb 10 oz (84.2 kg)   SpO2 (!) 89%   BMI 29.96 kg/m²  O2 Flow Rate (L/min): 2 L/min   Admit Weight: Weight: 172 lb (78 kg)   WEIGHTWeight: 185 lb 10 oz (84.2 kg)     General: alert and oriented to person, place and time, well-developed and well-nourished, in no acute distress. Heart: Normal S1 and S2.  Regular rhythm. No murmurs, gallops, or rubs.   Pacing Wires: No   Lungs: clear to auscultation bilaterally and diminished breath sounds bibasilar Chest tubes: No  Abdomen: soft, non tender, non distended, BSx4  Extremities: negative      Scheduled Meds:    metoprolol tartrate  25 mg Oral BID    pantoprazole  40 mg Intravenous BID    And    sodium chloride (PF)  10 mL Intravenous BID    potassium chloride  40 mEq Oral BID WC    mupirocin   Nasal BID    ceFAZolin  2,000 mg Intravenous Q6H    sodium chloride flush  5-40 mL Intravenous 2 times per day     Continuous Infusions:    dextrose 5% and 0.45% NaCl with KCl 40 mEq 100 mL/hr at 07/22/21 0549    sodium chloride      sodium chloride         Data:  CBC:   Recent Labs     07/19/21  1951 07/20/21  0549 07/21/21  0710   WBC 11.8* 10.9 11.3   HGB 11.9* 10.8* 10.6*   HCT 35.1* 32.1* 32.4*   MCV 85.8 84.5 88.8   PLT 66* 78* 163     BMP:   Recent Labs     07/19/21  1116 07/19/21  1233 07/20/21  0549 07/20/21  1150 07/21/21  0710      < > 137 138 137   K 2.9*   < > 3.5* 3.4* 3.9      < > 104 105 104   CO2 24   < > 22 24 20   PHOS 2.2* --  1.6*  --  2.4*   BUN 10   < > 8 7 9   CREATININE 0.48*   < > 0.49* 0.48* 0.53*    < > = values in this interval not displayed. PT/INR: No results for input(s): PROTIME, INR in the last 72 hours. APTT: No results for input(s): APTT in the last 72 hours. I/O:  I/O last 3 completed shifts: In: 12 [P.O.:950]  Out: 2200 [Urine:2200]    Assessment/Plan:      Diagnosis Date    Diverticulosis     Hyperlipidemia     Hypertension       Neuro: Intact   Lungs: clear, diminished in posterior bases   HD: VSS   Edema: none noted   GI: active bowel sounds noted throughout   : good urine output       Oxygen as needed via nasal cannula to maintain SpO2 > 92%  o Wean as tolerated  [employer] spirometry, acapella and ambulation    Will discuss case with Dr. Sid Andersen to make surgical plan/date. The above recommendations including medications and orders were discussed and agreed upon with Dr. Sid Andersen, the attending on service for the cardiothoracic surgery group today. Electronically signed by MARIKA Pereyra CNP on 7/22/2021 at 7:24 AM    On this date 7/22/2021 I have spent 27 minutes reviewing previous notes, test results and face to face with the patient discussing the diagnosis and importance of compliance with the treatment plan as well as documenting on the day of the visit. At least 50% of the time documented was spent with the patient to provide counseling and/or coordination of care. This note was created with the assistance of a speech-recognition program.  Although the intention is to generate a document that actually reflects the content of the visit, no guarantees can be provided that every mistake has been identified and corrected by editing. Note was updated later by me after  physical examination and  completion of the assessment.

## 2021-07-22 NOTE — PROGRESS NOTES
Neurology Nurse Practitioner Progress Note      INTERVAL HISTORY: This is a 36 y.o.  male admitted 7/17/2021 for Diarrhea [R19.7]. This is a follow-up neurology progress note. The patient was examined and the chart was reviewed. Discussed with the pt & RN. There were no acute events overnight. No new motor, sensory, visual or bulbar symptoms. Patient reported that his symptoms have completely resolved. HPI: Radha Rapp is a 36 y.o. male with H/O HTN, HLD, diverticulitis s/p partial hemicolectomy (2012), who was admitted 7/16/2021 for nausea, vomiting, abdominal pain, watery diarrhea along with body/joints aches and generalized fatigue. Patient reported that his symptoms started 4 to 5 days ago. On the day of admission, he felt feverish, chills & lightheaded so presented to the ED for evaluation. Patient was treated for MSSA sepsis, septic shock, subacute endocarditis and severe MR. Neurology was consulted on 7/21 due to reports of visual hallucinations, vertigo and double vision. Family reported that patient was noticed to be sleeping when he opened his eyes and described seeing people are a dog in the room & spoke about things that were not happening. Patient recalled seeing water coming from the IV line that was not there. On the morning of 7/21, patient woke up with severe vertigo and unsteady gait. Vertigo was constant, no aggravating factor, requiring help to get to the restroom. He also complained of oblique monocular double vision that lasted for 2 hours. On further questioning patient reported difficulty to concentrate and some difficulty in remembering things. He denied any other focal motor, sensory, visual or bulbar symptoms.      rifampin (RIFADIN) IVPB  600 mg Intravenous Q24H    nafcillin  2,000 mg Intravenous Q4H    metoprolol tartrate  25 mg Oral BID    pantoprazole  40 mg Intravenous BID    And    sodium chloride (PF)  10 mL Intravenous BID    potassium chloride  40 mEq Oral BID 07/22/2021    AST 38 07/22/2021     07/22/2021    K 4.4 07/22/2021     07/22/2021    AMMONIA 52 07/20/2021    CREATININE 0.64 (L) 07/22/2021    BUN 7 07/22/2021    CO2 19 (L) 07/22/2021    TSH 1.86 07/20/2021    INR 1.0 07/19/2021    [id number] 1120 07/17/2021    FOLATE >20.0 07/17/2021    LABA1C 5.5 07/17/2021         DIAGNOSTIC DATA:  CT HEAD (7/20/2021): No acute intracranial abnormality     MRI BRAIN (0/25/8701): Acute embolic infarctions involving supra & infratentorial structures. Subacute to chronic encephalomalacia with chronic blood products within the L superior parietal lobule & R inferior parietal lobule    CTA HEAD & NECK (7/22/2021): Unremarkable      ECHO (7/18/2021): EF 50%. Severe MR. Posterior mitral valve leaflet prolapse. Possible small vegetation on posterior leaflet    MONROE (7/20/2021): EF 55%. Large vegetation measuring 2.5 X 2.4 cm noted on the posterior leaflet of the mitral valve. Posterior leaflet is flair and is associated with severe anteriorly directed eccentric regurgitation      EEG (7/21/2021): This EEG shows the presence of mild diffuse slowing. This EEG is concordant with diffuse encephalopathy. No clear lateralized or epileptiform disturbance is seen                          IMPRESSION: 36 y.o.  male admitted with  Metabolic encephalopathy, along with hallucinations, vertigo and diplopia, in the setting of MSSA sepsis, septic shock, subacute endocarditis & acute embolic infarctions supra/infratentorial structures.  Patient stays awake, alert & oriented, at his baseline mentation    MRI brain - acute embolic infarctions supra/infratentorial structures    Hallucination, vertigo & diplopia has resolved completely    MSSA sepsis, subacute endocarditis; is on ATBs, as per ID team    Comorbid conditions - HTN, HLD, diverticulitis s/p partial hemicolectomy (2012), R rotator cuff injury          PLAN:  Antiplatelets/AC contraindicated due to embolic strokes, most likely septic due to endocarditis    MVR to be planned by CTS team    Will follow    Please note that this note was generated using a voice recognition dictation software. Although every effort was made to ensure the accuracy of this automated transcription, some errors in transcription may have occurred.

## 2021-07-22 NOTE — PLAN OF CARE
GI plan of care    No indications for endoscopy as patient is having brown stools  No further interventions from GI-patient will need to follow-up in the office 1 to 2 weeks post discharge to consider EGD/colonoscopy due to FOB positive testing and anemia    GI will sign off    Ismael Callejas, CNP

## 2021-07-22 NOTE — PROGRESS NOTES
Speech Language Pathology  Facility/Department: [facility]  Initial Speech/Language/Cognitive Assessment    NAME: Lance Bojorquez  : 1980   MRN: [id number]  ADMISSION DATE: 2021  ADMITTING DIAGNOSIS: has Diarrhea; Acute dehydration; Hypomagnesemia; Hypokalemia; Epistaxis; Thrombocytopenia ([address] Hyperglycemia; Essential hypertension; Sepsis with acute organ dysfunction and septic shock ([address] Delusions ([address] Hypophosphatemia; MSSA bacteremia; Severe mitral regurgitation; Acute respiratory failure with hypoxia (HCC); GI bleed; Normocytic normochromic anemia; Obesity (BMI 30-39.9); Transaminitis; Vertigo; Metabolic encephalopathy; Iron deficiency anemia secondary to blood loss (chronic); and Guaiac positive stools on their problem list.    Date of Eval: 2021   Evaluating Therapist: Alva Harris    RECENT RESULTS  CT OF HEAD/MRI:   Findings suggestive of acute embolic infarctions involving supra and   infratentorial structures as described.       Subacute to chronic encephalomalacia with chronic blood products within the   left superior parietal lobule and right inferior parietal lobule.       There is no acute intracranial hemorrhage, or intracranial mass lesion. Primary Complaint: Lance Bojorquez is a 36 y.o. Non-/non  male who presents with Abdominal Pain and Emesis and is admitted to the hospital for the management of hypokalemia     Patient presents to the emergency room for the concern of generalized fatigue, recurrent abdominal pain, n/v/d with assoicated bone ache. Patient states that he was just recently released from [place] for very similar complaints and was told that his potassium was low and the CT of the abdomen was normal.  Patient states the diarrhea is water-like. Patient admits to approx 5-7 watery stools a day for the past 4 days, there is no associated bleeding. Abdomen pain is present prior to the diarrhea.   There is associated fevers and chills. Subjective fever of 104 prior to arrival. Emesis is food that is digested but from earlier in the day. Patient admits to eating popcorn and nuts just the weekend before the onset. Pain:  Pain Assessment  Pain Assessment: 0-10  Pain Level: 0    Assessment: Pt presents with no apparent cognitive deficits at this time. No dysarthria noted, no oral motor deficits. No further ST is recommended. Verbal education provided. No therapy recommended at discharge. Recommendations:  Requires SLP Intervention: No  D/C Recommendations: No therapy recommended at discharge. Subjective:   Previous level of function and limitations: independent  General  Chart Reviewed: Yes  Family / Caregiver Present: Yes (wife)  Social/Functional History  Lives With: Spouse  Active : Yes  Occupation: Full time employment        Objective:  Oral/Motor  Oral Motor: Within functional limits    Motor Speech  Motor Speech: Within Functional Limits    Cognition:   Orientation  Overall Orientation Status: Within Normal Limits  Attention  Attention: Within Functional Limits  Memory  Memory: Within Funtional Limits  Problem Solving  Problem Solving: Within Functional Limits  Abstract Reasoning  Abstract Reasoning: Within Functional Limits  Safety/Judgement  Safety/Judgement: Within Functional Limits    Prognosis:  Speech Therapy Prognosis  Prognosis: Good  Individuals consulted  Consulted and agree with results and recommendations: Patient; Family member  Family member consulted: wife    Education:  Patient Education: yes  Patient Education Response: Verbalizes understanding          Therapy Time:   Individual Concurrent Group Co-treatment   Time In 1004         Time Out 1012         Minutes 8               Completed by: [address]  Clinician    Cosigned By: Meg Barros S.CCC/SLP  7/22/2021 11:11 AM

## 2021-07-22 NOTE — PROGRESS NOTES
[facility] - Progress Note    Today's Date and Time: 7/22/2021, 10:08 AM    Impression :   · MSSA septicemia 7-17-21  · Persistent growth of bacteria in blood 7-17-21 through 7-20-21 despite treatment  · Sepsis with acute organ dysfunction and septic shock  · Mitral Valve endocarditis. 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 Will need 6 weeks from first negative blood cultures.   · Rifampin 600 mg daily added 7/22/21 for synergy  · Please do not give anticoagulants due to the risk of septic emboli in the brain transitioning to hemorrhagic   · Repeat blood cultures 7/22/21    Medical Decision Making/Summary/Discussion:7/22/2021     ·   Infection Control Recommendations   · Berkley Precautions  · Contact Isolation     Antimicrobial Stewardship Recommendations     · Simplification of therapy  · Targeted therapy    Coordination of Outpatient Care:   · Estimated Length of IV antimicrobials: 4 weeks  · Patient will need Midline Catheter Insertion: TBD  · Patient will need PICC line Insertion: TBD  · Patient will need: Home IV , [place],  SNF,  LTAC: Yes-either SNF or home infusion  · Patient will need outpatient wound care: TBD    Chief complaint/reason for consultation:   · MSSA sepsis      History of Present Illness:   Denisse Suresh is a 36y.o.-year-old  male who was initially admitted on 7/17/2021. Patient seen at the request of Dr. Belen Cummings:     Patient initially presented to [facility] a few days ago with complaints of  abdominal pain, nausea, watery diarrhea and vomiting for the past 5 days after eating some chicken. The patient states that his wife also had episodes of emesis after eating the chicken but her symptoms resolved shortly after. A CT abdomen was unremarkable and he was diagnosed with viral gastroenteritis and sent home with Norco, Zofran, and potassium supplement for hypokalemia. On 7/16/2021, he presented to [facility] with worsening symptoms, including fever, chills, fatigue, and worsening abdominal pain with continued vomiting, right big toe pain. Work-up in the ED revealed a fever of 102.6, tachycardia with a heart rate up to 170, dyspnea with hypoxia, blood cultures showed MSSA x2, and urine cultures grew staph aureus ,000 CFU/mL. Occult blood stool positive. CT abdomen 7/17/2021  Impression   No acute abnormality identified in the abdomen and pelvis.       The bladder is markedly distended with a calculated volume of 2 L.       Hepatic steatosis and mild hepatomegaly.       Minor bibasilar atelectasis and interstitial thickening posteriorly. CT chest abdomen pelvis with contrast 7/18/2021  Impression   Moderate distention of the colon multiple air-fluid levels, findings may be   seen with acute enterocolitis. Abnormal labs include:  Sodium 131  Potassium 3.0  .5  Myoglobin 138  Troponin I 65  Amylase 131  AST 66  Lipase 299  Platelet 39  Sed rate 31    ID has been consulted for MSSA sepsis    CURRENT EVALUATION 7/22/2021:    Afebrile  VS stable    Upon evaluation, the patient was alert and oriented on 2 L NC. MRI brain 5-14-35: Acute embolic infarctions in supra and infratentorial structures.      The patient's blood cultures from 7/20/2020 continue to show MSSA sepsis after 4 days of IV Ancef. Rifampin added on 7/22/21. Repeat blood cultures have been ordered for 7/22/2021    Neurology consult has been ordered due to episodes of altered mentation along with vertigo and diplopia. Echocardiogram completed on 7/18/2021 revealed posterior mitral valve leaflet prolapse, severe mitral regurgitation, and possible small vegetation on posterior leaflet. Presence of vegetation on mitral valve would help explain the emboli to the brain. [place] on 7/20 showed a Large vegetation measuring 2.5 X 2.4 cm noted on the posterior leaflet of the mitral valve. Posterior leaflet is flair and is associated with severe anteriorly directed eccentric regurgitation. Right foot x-ray revealed:  No radiographic evidence for acute osteomyelitis.  Mild soft tissue swelling   of the great toe possibly cellulitis.       RECOMMENDATION:   NM bone scan or MRI may be obtained if there remains strong concern for acute   osteomyelitis.         CT abdomen pelvis revealed: Moderate distention of the colon multiple air-fluid levels, findings may be   seen with acute enterocolitis. Bruising on the patient's back is from a Tens unit-it does not appear to be the source of sepsis. There are no open lesions noted and there are three distinct marks from the electrodes. There are Osler node appearing lesions on the patient's left pinky toe and right middle finger. Stool studies have been negative    Hepatitis panel nonreactive    Ancef has been initiated for MSSA septicemia.   We will follow up on further culture and echo results    We will continue to monitor  Discussed with RN, Dr. Lester Re with neurology and Dr. Hopkins Read with primary service    Labs, X rays reviewed: 7/22/2021    BUN:8-->8  Cr:0.76-->0.49  K: 3.0-->2.7-->3.5  CRP: 260.5  LDH: 437    WBC:9.9-->10.9  Hb:12.5-->10.8  Plat: 39-->40-->78  Haptoglobin: 256  Sed rate: 31  Lipase 299->228  Lactate: 1.6    Cultures:  Urine:  ·   Blood:  · 7/17/2021: MSSA x2  · 7/18/2021: MSSA  · 7/20/21: MSSA  Sputum :  ·   Wound:    Discussed with patient, RN, family. I have personally reviewed the past medical history, past surgical history, medications, social history, and family history, and I have updated the database accordingly. Past Medical History:     Past Medical History:   Diagnosis Date    Diverticulosis     Hyperlipidemia     Hypertension        Past Surgical  History:     Past Surgical History:   Procedure Laterality Date    ABDOMEN SURGERY      large bowel resection    ANTERIOR CRUCIATE LIGAMENT REPAIR Right     APPENDECTOMY  07/09/1997    COLECTOMY      2012 - D/T Dverticulitis    DILATATION, ESOPHAGUS         Medications:      metoprolol tartrate  25 mg Oral BID    pantoprazole  40 mg Intravenous BID    And    sodium chloride (PF)  10 mL Intravenous BID    potassium chloride  40 mEq Oral BID WC    mupirocin   Nasal BID    ceFAZolin  2,000 mg Intravenous Q6H    sodium chloride flush  5-40 mL Intravenous 2 times per day       Social History:     Social History     Socioeconomic History    Marital status:      Spouse name: Not on file    Number of children: Not on file    Years of education: Not on file    Highest education level: Not on file   Occupational History    Not on file   Tobacco Use    Smoking status: Never Smoker    Smokeless tobacco: Never Used   Vaping Use    Vaping Use: Never assessed   Substance and Sexual Activity    Alcohol use:  Yes     Alcohol/week: 20.0 standard drinks     Types: 20 Cans of beer per week     Comment: 20/ week, average every other day    Drug use: Never    Sexual activity: Not on file   Other Topics Concern    Not on file   Social History Narrative    Not on file     Social Determinants of Health     Financial Resource Strain:     Difficulty of Paying Living Expenses:    Food Insecurity:     Worried About Running Out of Food in the Last Year:     [address] in the Last Year: Transportation Needs:     Lack of Transportation (Medical):  Lack of Transportation (Non-Medical):    Physical Activity:     Days of Exercise per Week:     Minutes of Exercise per Session:    Stress:     Feeling of Stress :    Social Connections:     Frequency of Communication with Friends and Family:     Frequency of Social Gatherings with Friends and Family:     Attends Mandaeism Services:     Active Member of Clubs or Organizations:     Attends Club or Organization Meetings:     Marital Status:    Intimate Partner Violence:     Fear of Current or Ex-Partner:     Emotionally Abused:     Physically Abused:     Sexually Abused:        Family History:     Family History   Problem Relation Age of Onset    No Known Problems Mother     Other Father         diverticulitis        Allergies:   Morphine     Review of Systems:   Constitutional: No fevers or chills. No systemic complaints  Head: No headaches  Eyes: No double vision or blurry vision. No conjunctival inflammation. ENT: No sore throat or runny nose. . No hearing loss, tinnitus or vertigo. Cardiovascular: No chest pain or palpitations. No shortness of breath. No CASTRO  Lung: No shortness of breath or cough. No sputum production  Abdomen: No nausea, vomiting, diarrhea, or abdominal pain. Lorean Snare No cramps. Genitourinary: No increased urinary frequency, or dysuria. No hematuria. No suprapubic or CVA pain  Musculoskeletal: No muscle aches or pains. No joint effusions, swelling or deformities  Hematologic: No bleeding or bruising. Neurologic: No headache, weakness, numbness, or tingling. Integument: Scattered small reddened bumps and bruises  Psychiatric: No depression. Endocrine: No polyuria, no polydipsia, no polyphagia.     Physical Examination :     Patient Vitals for the past 8 hrs:   BP Temp Temp src Pulse Resp SpO2 Weight   07/22/21 0655 111/70 97.7 °F (36.5 °C) Oral 94 25 (!) 89 % --   07/22/21 0519 -- -- -- -- -- -- 185 lb 10 oz (84.2 kg) 07/22/21 0349 -- 97.3 °F (36.3 °C) Oral -- -- 97 % --     General Appearance: Awake, alert, and in no apparent distress  Head:  Normocephalic, no trauma  Eyes: Pupils equal, round, reactive to light and accommodation; extraocular movements intact; sclera anicteric; conjunctivae pink. No embolic phenomena. ENT: Oropharynx clear, without erythema, exudate, or thrush. No tenderness of sinuses. Mouth/throat: mucosa pink and moist. No lesions. Dentition in good repair. Neck:Supple, without lymphadenopathy. Thyroid normal, No bruits. Pulmonary/Chest: Clear to auscultation, without wheezes, rales, or rhonchi. No dullness to percussion. Cardiovascular: Normal sinus tachycardia with murmur, No rubs, or gallops. Abdomen: Soft, non tender. Bowel sounds normal. No organomegaly  All four Extremities: No cyanosis, clubbing, edema, or effusions. Neurologic: No gross sensory or motor deficits. Skin: Warm and dry with good turgor. No signs of peripheral arterial or venous insufficiency. No ulcerations. No open wounds. Medical Decision Making -Laboratory:   I have independently reviewed/ordered the following labs:    CBC with Differential:   Recent Labs     07/21/21  0710 07/22/21  0752   WBC 11.3 11.4*   HGB 10.6* 11.1*   HCT 32.4* 34.7*    214   LYMPHOPCT 12* 16*   MONOPCT 6 8     BMP:   Recent Labs     07/19/21 1951 07/19/21 1951 07/20/21  0549 07/20/21  1150 07/21/21  0710 07/22/21  0752      < > 137   < > 137 137   K 3.1*   < > 3.5*   < > 3.9 4.4      < > 104   < > 104 104   CO2 23   < > 22   < > 20 19*   BUN 8   < > 8   < > 9 7   CREATININE 0.57*   < > 0.49*   < > 0.53* 0.64*   MG 2.0  --  1.9  --   --   --     < > = values in this interval not displayed.      Hepatic Function Panel:   Recent Labs     07/20/21  0549 07/20/21  0549 07/21/21  0710 07/22/21  0752   PROT 5.6*  --   --  6.6   LABALBU 2.2*   < > 2.5* 2.7*  2.7*   BILIDIR  --   --   --  0.16   IBILI  --   --   --  0.39   BILITOT 0.62 --   --  0.55   ALKPHOS 82  --   --  79   ALT 31  --   --  35   AST 54*  --   --  38    < > = values in this interval not displayed. No results for input(s): RPR in the last 72 hours. No results for input(s): HIV in the last 72 hours. No results for input(s): BC in the last 72 hours. Lab Results   Component Value Date    MUCUS NOT REPORTED 07/17/2021    RBC 3.87 07/22/2021    TRICHOMONAS NOT REPORTED 07/17/2021    WBC 11.4 07/22/2021    YEAST NOT REPORTED 07/17/2021    TURBIDITY CLEAR 07/17/2021     Lab Results   Component Value Date    CREATININE 0.64 07/22/2021    GLUCOSE 141 07/22/2021       Medical Decision Making-Imaging:          MRI Brain 7/2121   Impression       Findings suggestive of acute embolic infarctions involving supra and   infratentorial structures as described.       Subacute to chronic encephalomalacia with chronic blood products within the   left superior parietal lobule and right inferior parietal lobule.       There is no acute intracranial hemorrhage, or intracranial mass lesion.           EXAMINATION:   CT OF THE CHEST, ABDOMEN, AND PELVIS WITH CONTRAST 7/18/2021 10:53 am       TECHNIQUE:   CT of the chest, abdomen and pelvis was performed with the administration of   intravenous contrast. Multiplanar reformatted images are provided for review. Dose modulation, iterative reconstruction, and/or weight based adjustment of   the mA/kV was utilized to reduce the radiation dose to as low as reasonably   achievable.       COMPARISON:   None       HISTORY:   ORDERING SYSTEM PROVIDED HISTORY: MSSA bacteremia, ? DIC   TECHNOLOGIST PROVIDED HISTORY:   MSSA bacteremia, ? DIC       Reason for Exam: sepsis with acute organ dysfunction   Acuity: Unknown   Type of Exam: Unknown       FINDINGS:   Lungs/pleura:  The central airways are patent.  There are no suspicious   pulmonary nodules       Mediastinum: There is no acute mediastinal abnormality       Soft Tissues/Bones: No suspicious osteolytic or osteoblastic lesions       Abdominal organs: The liver, spleen, adrenal glands, kidneys, and pancreas   demonstrate no acute abnormality.       GI/Bowel: The visualized portions of the small bowel are unremarkable. There   is borderline dilation of the colon.  There are multiple air-fluid levels   throughout the colon.       Peritoneum/Retroperitoneum: There is a solid amount of retroperitoneal fluid   along the distal ureters bilaterally.       Pelvis: The bladder is moderately distended.       Bones: No suspicious osseous lesions are identified           Impression   Moderate distention of the colon multiple air-fluid levels, findings may be   seen with acute enterocolitis.             XR FOOT LEFT (2 VIEWS) [[id number]] Collected: 07/18/21 1411      Order Status: Completed Updated: 07/19/21 0759     Narrative:       EXAMINATION:   TWO XRAY VIEWS OF THE LEFT FOOT     7/18/2021 2:05 pm     COMPARISON:   None. HISTORY:   ORDERING SYSTEM PROVIDED HISTORY: Left big toe pain and erythema. Looking for   source of MSSA sepsis   TECHNOLOGIST PROVIDED HISTORY:   Left big toe pain and erythema. Looking for source of MSSA sepsis     FINDINGS:   AP and lateral views of the foot obtained.  Normal alignment and   mineralization.  No abnormal periosteal reaction.  No erosions.  No fracture. No aggressive lytic or blastic lesions.  Mild soft tissue swelling of the   great toe noted.      Impression:       No radiographic evidence for acute osteomyelitis.  Mild soft tissue swelling   of the great toe possibly cellulitis.       EXAMINATION:   MRI OF THE BRAIN WITHOUT CONTRAST  7/21/2021 7:03 pm       TECHNIQUE:   Multiplanar multisequence MRI of the brain was performed without the   administration of intravenous contrast.       COMPARISON:   Head CT of 07/20/2021       HISTORY:   ORDERING SYSTEM PROVIDED HISTORY: episode of vertigo, dipolopia, has   infecticive endocadrditis, r/o septic emboli, abscess   TECHNOLOGIST PROVIDED HISTORY:   episode of vertigo, dipolopia, has infecticive endocadrditis, r/o septic   emboli, abscess   Reason for Exam: VERTIGO, MSSA BACTEREMIA, R/O SEPTIC EMBOLI       FINDINGS:   MRI brain:       There are punctate areas of restricted diffusion within the left superior   frontal gyrus subcortical white matter, right superior frontal gyrus   subcortical white matter, left superior parietal lobule right inferior   frontal gyrus subcortical white matter, left inferior frontal gyrus   subcortical white matter and bilateral cerebellar hemisphere, suggestive of   acute embolic infarctions.       There are focus of encephalomalacia with susceptibility artifact and   increased signal on DWI within left superior parietal lobule and right   inferior parietal lobule suggestive of subacute to chronic infarct with   chronic blood products.       There is no acute intracranial hemorrhage, or intracranial mass lesion. No   mass effect, midline shift, or extra-axial collection is noted.       The brain parenchyma is otherwise normal.  The pituitary gland is normal in   appearance. The cerebellar tonsils are in normal position.       The ventricles, sulci, and cisterns are within normal size and range.  No   significant volume loss. .  The intracranial flow voids are preserved.       The globes and orbits are within normal limits.  The visualized extracranial   structures including paranasal sinuses and mastoid air cells are unremarkable.           Impression       Findings suggestive of acute embolic infarctions involving supra and   infratentorial structures as described.       Subacute to chronic encephalomalacia with chronic blood products within the   left superior parietal lobule and right inferior parietal lobule.       There is no acute intracranial hemorrhage, or intracranial mass lesion.       The findings were sent to the Radiology Results [address] at 8:24   pm on 7/21/2021to be communicated to a licensed <=1   SUSCEPTIBLE   tigecycline   Final     NOT REPORTED    trimethoprim-sulfamethoxazole Sensitive  Final     <=10   SUSCEPTIBLE   vancomycin   Final     NOT REPORTED        Medical Decision Making-Other:     Note:  · Labs, medications, radiologic studies were reviewed with personal review of films  · Large amounts of data were reviewed  · Discussed with nursing Staff, Discharge planner  · Infection Control and Prevention measures reviewed  · All prior entries were reviewed  · Administer medications as ordered  · Prognosis: Guarded  · Discharge planning reviewed  · Follow up as outpatient. Thank you for allowing us to participate in the care of this patient. Please call with questions. Shahzad Salas, MARIKA - CNP     ATTESTATION:    I have discussed the case, including pertinent history and exam findings with the APRN. I have evaluated the  History, physical findings and pictures of the patient and the key elements of the encounter have been performed by me. I have reviewed the laboratory data, other diagnostic studies and discussed them with the APRN. I have updated the medical record where necessary. I agree with the assessment, plan and orders as documented by the APRN.     Logan Harper MD.          Pager: [phone] - Office: [phone]

## 2021-07-22 NOTE — PLAN OF CARE
Problem: Pain:  Goal: Pain level will decrease  Description: Pain level will decrease  7/22/2021 0619 by Justina Burnett RN  Outcome: Ongoing  7/21/2021 2017 by Joss Sneed RN  Outcome: Ongoing  Goal: Control of acute pain  Description: Control of acute pain  7/22/2021 0619 by Justina Burnett RN  Outcome: Ongoing  7/21/2021 2017 by Joss Sneed RN  Outcome: Ongoing  Goal: Control of chronic pain  Description: Control of chronic pain  7/22/2021 0619 by Justina Burnett RN  Outcome: Ongoing  7/21/2021 2017 by Joss Sneed RN  Outcome: Ongoing     Problem: Infection, Septic Shock:  Goal: Will show no infection signs and symptoms  Description: Will show no infection signs and symptoms  7/22/2021 0619 by Justina Burnett RN  Outcome: Ongoing  7/21/2021 2017 by Joss Sneed RN  Outcome: Ongoing     Problem: Falls - Risk of:  Goal: Will remain free from falls  Description: Will remain free from falls  7/22/2021 0619 by Justina Burnett RN  Outcome: Ongoing  7/21/2021 2017 by Joss Sneed RN  Outcome: Ongoing  Goal: Absence of physical injury  Description: Absence of physical injury  7/22/2021 0619 by Justina Burnett RN  Outcome: Ongoing  7/21/2021 2017 by Joss Sneed RN  Outcome: Ongoing     Problem: Gas Exchange - Impaired:  Goal: Levels of oxygenation will improve  Description: Levels of oxygenation will improve  7/22/2021 0619 by Justina Burnett RN  Outcome: Ongoing  7/21/2021 2017 by Joss Sneed RN  Outcome: Ongoing     Problem: Cardiac:  Goal: Ability to maintain vital signs within normal range will improve  Description: Ability to maintain vital signs within normal range will improve  7/22/2021 0619 by Justina Burnett RN  Outcome: Ongoing  7/21/2021 2017 by Joss Sneed RN  Outcome: Ongoing  Goal: Cardiovascular alteration will improve  Description: Cardiovascular alteration will improve  7/22/2021 0619 by Justina Burnett RN  Outcome: Ongoing  7/21/2021 2017 by Joss Sneed RN  Outcome: Ongoing Problem: Health Behavior:  Goal: Will modify at least one risk factor affecting health status  Description: Will modify at least one risk factor affecting health status  7/22/2021 0619 by Jaci Lombardi RN  Outcome: Ongoing  7/21/2021 2017 by Roma Chilel RN  Outcome: Ongoing  Goal: Identification of resources available to assist in meeting health care needs will improve  Description: Identification of resources available to assist in meeting health care needs will improve  7/22/2021 0619 by Jaci Lombardi RN  Outcome: Ongoing  7/21/2021 2017 by Roma Chilel RN  Outcome: Ongoing     Problem: Physical Regulation:  Goal: Ability to maintain vital signs within normal range will improve  Description: Ability to maintain vital signs within normal range will improve  7/22/2021 0619 by Jaci Lombardi RN  Outcome: Ongoing  7/21/2021 2017 by Roma Chilel RN  Outcome: Ongoing  Goal: Complications related to the disease process, condition or treatment will be avoided or minimized  Description: Complications related to the disease process, condition or treatment will be avoided or minimized  7/22/2021 0619 by Jaci Lombardi RN  Outcome: Ongoing  7/21/2021 2017 by Roma Chilel RN  Outcome: Ongoing  Goal: Diagnostic test results will improve  Description: Diagnostic test results will improve  7/22/2021 0619 by Jaci Lombardi RN  Outcome: Ongoing  7/21/2021 2017 by Roma Chilel RN  Outcome: Ongoing  Goal: Will remain free from infection  Description: Will remain free from infection  7/22/2021 0619 by Jaci Lombardi RN  Outcome: Ongoing  7/21/2021 2017 by Roma Chilel RN  Outcome: Ongoing  Goal: Ability to maintain clinical measurements within normal limits will improve  Description: Ability to maintain clinical measurements within normal limits will improve  7/22/2021 0619 by Jaci Lombardi RN  Outcome: Ongoing  7/21/2021 2017 by Roma Chilel RN  Outcome: Ongoing  Goal: Will show no signs and symptoms of electrolyte imbalance  Description: Will show no signs and symptoms of electrolyte imbalance  7/22/2021 0619 by Lorne Houser RN  Outcome: Ongoing  7/21/2021 2017 by Soraida Mills RN  Outcome: Ongoing     Problem: Nutritional:  Goal: Nutritional status will improve  Description: Nutritional status will improve  7/22/2021 0619 by Lorne Houser RN  Outcome: Ongoing  7/21/2021 2017 by Soraida Mills RN  Outcome: Ongoing     Problem: Respiratory:  Goal: Ability to maintain normal respiratory secretions will improve  Description: Ability to maintain normal respiratory secretions will improve  7/22/2021 0619 by Lorne Houser RN  Outcome: Ongoing  7/21/2021 2017 by Soraida Mills RN  Outcome: Ongoing     Problem: Skin Integrity:  Goal: Demonstration of wound healing without infection will improve  Description: Demonstration of wound healing without infection will improve  7/22/2021 0619 by Lorne Houser RN  Outcome: Ongoing  7/21/2021 2017 by Soraida Mills RN  Outcome: Ongoing  Goal: Complications related to intravenous access or infusion will be avoided or minimized  Description: Complications related to intravenous access or infusion will be avoided or minimized  7/22/2021 0619 by Lorne Houser RN  Outcome: Ongoing  7/21/2021 2017 by Soraida Mills RN  Outcome: Ongoing  Goal: Will show no infection signs and symptoms  Description: Will show no infection signs and symptoms  7/22/2021 0619 by Lorne Houser RN  Outcome: Ongoing  7/21/2021 2017 by Soraida Mills RN  Outcome: Ongoing  Goal: Absence of new skin breakdown  Description: Absence of new skin breakdown  7/22/2021 0619 by Lorne Houser RN  Outcome: Ongoing  7/21/2021 2017 by Soraida Mills RN  Outcome: Ongoing     Problem: Anxiety:  Goal: Level of anxiety will decrease  Description: Level of anxiety will decrease  7/22/2021 0619 by Lorne Houser RN  Outcome: Ongoing  7/21/2021 2017 by Soraida Mills RN  Outcome: Ongoing     Problem: Fluid Volume:  Goal: Ability to achieve a balanced intake and output will improve  Description: Ability to achieve a balanced intake and output will improve  7/22/2021 0619 by Jaci Lombadri RN  Outcome: Ongoing  7/21/2021 2017 by Roma Chilel RN  Outcome: Ongoing     Problem: Nutrition  Goal: Optimal nutrition therapy  7/22/2021 0619 by Jaci Lombardi RN  Outcome: Ongoing  7/21/2021 2017 by Roma Chilel RN  Outcome: Ongoing

## 2021-07-22 NOTE — PROGRESS NOTES
Physical Therapy         Physical Therapy Cancel Note      DATE: 2021    NAME: Hosey Homans  MRN: [id number]   : 1980      Patient not seen this date for Physical Therapy due to:     Await CTS surgical plan       Electronically signed by Roshan Meraz PT on 2021 at 12:03 PM

## 2021-07-23 ENCOUNTER — APPOINTMENT (OUTPATIENT)
Dept: GENERAL RADIOLOGY | Age: 41
DRG: 871 | End: 2021-07-23
Payer: COMMERCIAL

## 2021-07-23 ENCOUNTER — APPOINTMENT (OUTPATIENT)
Dept: MRI IMAGING | Age: 41
DRG: 871 | End: 2021-07-23
Payer: COMMERCIAL

## 2021-07-23 ENCOUNTER — APPOINTMENT (OUTPATIENT)
Dept: CT IMAGING | Age: 41
DRG: 871 | End: 2021-07-23
Payer: COMMERCIAL

## 2021-07-23 LAB
ABSOLUTE EOS #: 0.07 K/UL (ref 0–0.44)
ABSOLUTE IMMATURE GRANULOCYTE: 0.11 K/UL (ref 0–0.3)
ABSOLUTE LYMPH #: 1.13 K/UL (ref 1.1–3.7)
ABSOLUTE MONO #: 0.69 K/UL (ref 0.1–1.2)
ALBUMIN SERPL-MCNC: 2.7 G/DL (ref 3.5–5.2)
ANION GAP SERPL CALCULATED.3IONS-SCNC: 10 MMOL/L (ref 9–17)
ANION GAP SERPL CALCULATED.3IONS-SCNC: 20 MMOL/L (ref 9–17)
BASOPHILS # BLD: 1 % (ref 0–2)
BASOPHILS ABSOLUTE: 0.05 K/UL (ref 0–0.2)
BUN BLDV-MCNC: 13 MG/DL (ref 6–20)
BUN BLDV-MCNC: 16 MG/DL (ref 6–20)
BUN/CREAT BLD: ABNORMAL (ref 9–20)
BUN/CREAT BLD: ABNORMAL (ref 9–20)
CALCIUM SERPL-MCNC: 8.2 MG/DL (ref 8.6–10.4)
CALCIUM SERPL-MCNC: 8.3 MG/DL (ref 8.6–10.4)
CHLORIDE BLD-SCNC: 101 MMOL/L (ref 98–107)
CHLORIDE BLD-SCNC: 105 MMOL/L (ref 98–107)
CO2: 14 MMOL/L (ref 20–31)
CO2: 22 MMOL/L (ref 20–31)
CREAT SERPL-MCNC: 0.92 MG/DL (ref 0.7–1.2)
CREAT SERPL-MCNC: 0.95 MG/DL (ref 0.7–1.2)
DIFFERENTIAL TYPE: ABNORMAL
EKG ATRIAL RATE: 146 BPM
EKG P AXIS: 50 DEGREES
EKG P-R INTERVAL: 142 MS
EKG Q-T INTERVAL: 260 MS
EKG QRS DURATION: 74 MS
EKG QTC CALCULATION (BAZETT): 405 MS
EKG R AXIS: 40 DEGREES
EKG T AXIS: 40 DEGREES
EKG VENTRICULAR RATE: 146 BPM
EOSINOPHILS RELATIVE PERCENT: 1 % (ref 1–4)
GFR AFRICAN AMERICAN: >60 ML/MIN
GFR AFRICAN AMERICAN: >60 ML/MIN
GFR NON-AFRICAN AMERICAN: >60 ML/MIN
GFR NON-AFRICAN AMERICAN: >60 ML/MIN
GFR SERPL CREATININE-BSD FRML MDRD: ABNORMAL ML/MIN/{1.73_M2}
GLUCOSE BLD-MCNC: 117 MG/DL (ref 70–99)
GLUCOSE BLD-MCNC: 123 MG/DL (ref 70–99)
HCT VFR BLD CALC: 37 % (ref 40.7–50.3)
HEMOGLOBIN: 11.5 G/DL (ref 13–17)
IMMATURE GRANULOCYTES: 1 %
LACTIC ACID, WHOLE BLOOD: 1.9 MMOL/L (ref 0.7–2.1)
LACTIC ACID, WHOLE BLOOD: 2.3 MMOL/L (ref 0.7–2.1)
LACTIC ACID, WHOLE BLOOD: 5.4 MMOL/L (ref 0.7–2.1)
LACTIC ACID: ABNORMAL MMOL/L
LYMPHOCYTES # BLD: 12 % (ref 24–43)
MAGNESIUM: 2.1 MG/DL (ref 1.6–2.6)
MCH RBC QN AUTO: 29.1 PG (ref 25.2–33.5)
MCHC RBC AUTO-ENTMCNC: 31.1 G/DL (ref 28.4–34.8)
MCV RBC AUTO: 93.7 FL (ref 82.6–102.9)
MONOCYTES # BLD: 7 % (ref 3–12)
NRBC AUTOMATED: 0 PER 100 WBC
PDW BLD-RTO: 13.4 % (ref 11.8–14.4)
PHOSPHORUS: 4.7 MG/DL (ref 2.5–4.5)
PHOSPHORUS: 4.8 MG/DL (ref 2.5–4.5)
PLATELET # BLD: 196 K/UL (ref 138–453)
PLATELET ESTIMATE: ABNORMAL
PMV BLD AUTO: 10.4 FL (ref 8.1–13.5)
POTASSIUM SERPL-SCNC: 4.2 MMOL/L (ref 3.7–5.3)
POTASSIUM SERPL-SCNC: 4.7 MMOL/L (ref 3.7–5.3)
RBC # BLD: 3.95 M/UL (ref 4.21–5.77)
RBC # BLD: ABNORMAL 10*6/UL
SEG NEUTROPHILS: 78 % (ref 36–65)
SEGMENTED NEUTROPHILS ABSOLUTE COUNT: 7.75 K/UL (ref 1.5–8.1)
SODIUM BLD-SCNC: 135 MMOL/L (ref 135–144)
SODIUM BLD-SCNC: 137 MMOL/L (ref 135–144)
TROPONIN INTERP: ABNORMAL
TROPONIN T: ABNORMAL NG/ML
TROPONIN, HIGH SENSITIVITY: 92 NG/L (ref 0–22)
WBC # BLD: 9.8 K/UL (ref 3.5–11.3)
WBC # BLD: ABNORMAL 10*3/UL

## 2021-07-23 PROCEDURE — 2580000003 HC RX 258: Performed by: INTERNAL MEDICINE

## 2021-07-23 PROCEDURE — APPSS30 APP SPLIT SHARED TIME 16-30 MINUTES: Performed by: NURSE PRACTITIONER

## 2021-07-23 PROCEDURE — 85025 COMPLETE CBC W/AUTO DIFF WBC: CPT

## 2021-07-23 PROCEDURE — 6360000004 HC RX CONTRAST MEDICATION: Performed by: INTERNAL MEDICINE

## 2021-07-23 PROCEDURE — 6370000000 HC RX 637 (ALT 250 FOR IP): Performed by: NURSE PRACTITIONER

## 2021-07-23 PROCEDURE — 97162 PT EVAL MOD COMPLEX 30 MIN: CPT

## 2021-07-23 PROCEDURE — 80069 RENAL FUNCTION PANEL: CPT

## 2021-07-23 PROCEDURE — 84100 ASSAY OF PHOSPHORUS: CPT

## 2021-07-23 PROCEDURE — 72126 CT NECK SPINE W/DYE: CPT

## 2021-07-23 PROCEDURE — 83735 ASSAY OF MAGNESIUM: CPT

## 2021-07-23 PROCEDURE — 83605 ASSAY OF LACTIC ACID: CPT

## 2021-07-23 PROCEDURE — 2580000003 HC RX 258: Performed by: NURSE PRACTITIONER

## 2021-07-23 PROCEDURE — 99232 SBSQ HOSP IP/OBS MODERATE 35: CPT | Performed by: INTERNAL MEDICINE

## 2021-07-23 PROCEDURE — 72129 CT CHEST SPINE W/DYE: CPT

## 2021-07-23 PROCEDURE — 93005 ELECTROCARDIOGRAM TRACING: CPT | Performed by: STUDENT IN AN ORGANIZED HEALTH CARE EDUCATION/TRAINING PROGRAM

## 2021-07-23 PROCEDURE — 6360000002 HC RX W HCPCS: Performed by: INTERNAL MEDICINE

## 2021-07-23 PROCEDURE — 94761 N-INVAS EAR/PLS OXIMETRY MLT: CPT

## 2021-07-23 PROCEDURE — 2060000000 HC ICU INTERMEDIATE R&B

## 2021-07-23 PROCEDURE — 6370000000 HC RX 637 (ALT 250 FOR IP): Performed by: INTERNAL MEDICINE

## 2021-07-23 PROCEDURE — 2500000003 HC RX 250 WO HCPCS

## 2021-07-23 PROCEDURE — 72132 CT LUMBAR SPINE W/DYE: CPT

## 2021-07-23 PROCEDURE — 2700000000 HC OXYGEN THERAPY PER DAY

## 2021-07-23 PROCEDURE — 2500000003 HC RX 250 WO HCPCS: Performed by: INTERNAL MEDICINE

## 2021-07-23 PROCEDURE — 84484 ASSAY OF TROPONIN QUANT: CPT

## 2021-07-23 PROCEDURE — C9113 INJ PANTOPRAZOLE SODIUM, VIA: HCPCS | Performed by: INTERNAL MEDICINE

## 2021-07-23 PROCEDURE — 97110 THERAPEUTIC EXERCISES: CPT

## 2021-07-23 PROCEDURE — 71045 X-RAY EXAM CHEST 1 VIEW: CPT

## 2021-07-23 PROCEDURE — 6360000002 HC RX W HCPCS: Performed by: NURSE PRACTITIONER

## 2021-07-23 PROCEDURE — 80048 BASIC METABOLIC PNL TOTAL CA: CPT

## 2021-07-23 PROCEDURE — 93005 ELECTROCARDIOGRAM TRACING: CPT | Performed by: INTERNAL MEDICINE

## 2021-07-23 PROCEDURE — 72141 MRI NECK SPINE W/O DYE: CPT

## 2021-07-23 PROCEDURE — 36415 COLL VENOUS BLD VENIPUNCTURE: CPT

## 2021-07-23 RX ORDER — SODIUM CHLORIDE 9 MG/ML
INJECTION, SOLUTION INTRAVENOUS CONTINUOUS
Status: DISCONTINUED | OUTPATIENT
Start: 2021-07-23 | End: 2021-07-26

## 2021-07-23 RX ORDER — LORAZEPAM 2 MG/ML
INJECTION INTRAMUSCULAR
Status: DISCONTINUED
Start: 2021-07-23 | End: 2021-07-24

## 2021-07-23 RX ORDER — PANTOPRAZOLE SODIUM 40 MG/1
40 TABLET, DELAYED RELEASE ORAL
Status: DISCONTINUED | OUTPATIENT
Start: 2021-07-23 | End: 2021-08-02 | Stop reason: HOSPADM

## 2021-07-23 RX ORDER — 0.9 % SODIUM CHLORIDE 0.9 %
500 INTRAVENOUS SOLUTION INTRAVENOUS ONCE
Status: COMPLETED | OUTPATIENT
Start: 2021-07-23 | End: 2021-07-23

## 2021-07-23 RX ORDER — LORAZEPAM 2 MG/ML
1 INJECTION INTRAMUSCULAR ONCE
Status: COMPLETED | OUTPATIENT
Start: 2021-07-23 | End: 2021-07-23

## 2021-07-23 RX ORDER — METOPROLOL TARTRATE 5 MG/5ML
INJECTION INTRAVENOUS
Status: COMPLETED
Start: 2021-07-23 | End: 2021-07-23

## 2021-07-23 RX ADMIN — LOPERAMIDE HYDROCHLORIDE 4 MG: 2 CAPSULE ORAL at 11:38

## 2021-07-23 RX ADMIN — Medication 6 MG: at 20:56

## 2021-07-23 RX ADMIN — IOPAMIDOL 75 ML: 755 INJECTION, SOLUTION INTRAVENOUS at 14:32

## 2021-07-23 RX ADMIN — POTASSIUM CHLORIDE 40 MEQ: 1500 TABLET, EXTENDED RELEASE ORAL at 08:44

## 2021-07-23 RX ADMIN — NAFCILLIN INJECTION 2000 MG: 2 POWDER, FOR SOLUTION INTRAMUSCULAR; INTRAMUSCULAR; INTRAVENOUS at 08:09

## 2021-07-23 RX ADMIN — METOPROLOL TARTRATE 25 MG: 25 TABLET ORAL at 08:44

## 2021-07-23 RX ADMIN — DIPHENHYDRAMINE HYDROCHLORIDE 25 MG: 50 INJECTION, SOLUTION INTRAMUSCULAR; INTRAVENOUS at 04:19

## 2021-07-23 RX ADMIN — KETOROLAC TROMETHAMINE 15 MG: 15 INJECTION, SOLUTION INTRAMUSCULAR; INTRAVENOUS at 19:12

## 2021-07-23 RX ADMIN — PANTOPRAZOLE SODIUM 40 MG: 40 INJECTION, POWDER, LYOPHILIZED, FOR SOLUTION INTRAVENOUS at 08:44

## 2021-07-23 RX ADMIN — POTASSIUM CHLORIDE, DEXTROSE MONOHYDRATE AND SODIUM CHLORIDE: 300; 5; 450 INJECTION, SOLUTION INTRAVENOUS at 08:43

## 2021-07-23 RX ADMIN — SODIUM CHLORIDE 500 ML: 9 INJECTION, SOLUTION INTRAVENOUS at 20:57

## 2021-07-23 RX ADMIN — NAFCILLIN INJECTION 2000 MG: 2 POWDER, FOR SOLUTION INTRAMUSCULAR; INTRAMUSCULAR; INTRAVENOUS at 18:59

## 2021-07-23 RX ADMIN — METOPROLOL TARTRATE 37.5 MG: 25 TABLET ORAL at 20:56

## 2021-07-23 RX ADMIN — SODIUM CHLORIDE 500 ML: 9 INJECTION, SOLUTION INTRAVENOUS at 02:48

## 2021-07-23 RX ADMIN — SODIUM CHLORIDE: 9 INJECTION, SOLUTION INTRAVENOUS at 09:54

## 2021-07-23 RX ADMIN — RIFAMPIN 600 MG: 600 INJECTION, POWDER, LYOPHILIZED, FOR SOLUTION INTRAVENOUS at 11:38

## 2021-07-23 RX ADMIN — LORAZEPAM 1 MG: 2 INJECTION INTRAMUSCULAR; INTRAVENOUS at 12:48

## 2021-07-23 RX ADMIN — NAFCILLIN INJECTION 2000 MG: 2 POWDER, FOR SOLUTION INTRAMUSCULAR; INTRAMUSCULAR; INTRAVENOUS at 02:48

## 2021-07-23 RX ADMIN — KETOROLAC TROMETHAMINE 15 MG: 15 INJECTION, SOLUTION INTRAMUSCULAR; INTRAVENOUS at 01:19

## 2021-07-23 RX ADMIN — MUPIROCIN: 20 OINTMENT TOPICAL at 08:44

## 2021-07-23 RX ADMIN — NAFCILLIN INJECTION 2000 MG: 2 POWDER, FOR SOLUTION INTRAMUSCULAR; INTRAMUSCULAR; INTRAVENOUS at 15:35

## 2021-07-23 RX ADMIN — SODIUM CHLORIDE, PRESERVATIVE FREE 10 ML: 5 INJECTION INTRAVENOUS at 08:44

## 2021-07-23 RX ADMIN — ACETAMINOPHEN 650 MG: 325 TABLET ORAL at 18:59

## 2021-07-23 RX ADMIN — POTASSIUM CHLORIDE 40 MEQ: 1500 TABLET, EXTENDED RELEASE ORAL at 17:29

## 2021-07-23 RX ADMIN — LOPERAMIDE HYDROCHLORIDE 4 MG: 2 CAPSULE ORAL at 06:40

## 2021-07-23 RX ADMIN — NAFCILLIN INJECTION 2000 MG: 2 POWDER, FOR SOLUTION INTRAMUSCULAR; INTRAMUSCULAR; INTRAVENOUS at 23:08

## 2021-07-23 RX ADMIN — METOPROLOL TARTRATE 5 MG: 5 INJECTION INTRAVENOUS at 01:41

## 2021-07-23 RX ADMIN — MUPIROCIN: 20 OINTMENT TOPICAL at 21:05

## 2021-07-23 ASSESSMENT — PAIN SCALES - GENERAL
PAINLEVEL_OUTOF10: 1
PAINLEVEL_OUTOF10: 5
PAINLEVEL_OUTOF10: 0

## 2021-07-23 NOTE — PROGRESS NOTES
Nutrition Assessment     Type and Reason for Visit: Reassess    Nutrition Recommendations/Plan:   -Recommend modifying to low Phos diet   -Recommend increasing ensure enlive supplements BID to QID per pt request - chocolate only     Nutrition Assessment:  Pt improving from a nutritional standpoint aeb diet has been restarted and pt reports that he has very good appetite and is consuming % of his meals and ensure enlive supplements. Pt stated that he would like to receive the ensure supplements QID - chocolate only. Will modify to low phos diet d/t slightly elevated phos. No significant wt loss noted. No wounds noted. Pt deemed to be low-risk. Full nutrition assessment not needed at this time. Will monitor for changes in nutritional status. Malnutrition Assessment:  Malnutrition Status: No malnutrition    Current Nutrition Therapies:    ADULT DIET;  Regular  Adult Oral Nutrition Supplement; Standard High Calorie/High Protein Oral Supplement    Nutrition Diagnosis:   No nutrition diagnosis at this time     Nutrition Interventions:   Food and/or Nutrient Delivery:  Continue Current Diet, Modify Oral Nutrition Supplement  Nutrition Education/Counseling:  Education not indicated   Coordination of Nutrition Care:  Continue to monitor while inpatient      Nutrition Monitoring and Evaluation:   Food/Nutrient Intake Outcomes:  Food and Nutrient Intake, Supplement Intake  Physical Signs/Symptoms Outcomes:  Biochemical Data, Nutrition Focused Physical Findings, Skin, Weight, GI Status, Fluid Status or Edema     Discharge Planning:    Continue current diet     Electronically signed by Sara Brock RD, LD on 7/23/21 at 3:12 PM EDT    Contact: [phone]

## 2021-07-23 NOTE — PROGRESS NOTES
[facility]  Office: [address], DO, Anirudh Hoffman, DO, Zackary Apache, DO, Uma Osoriomalinda Blood, DO, Kellie Narayan MD, Ani Bradford MD, Christian Mo MD, Jayleen Sarabia MD, Beryle Marseille, MD, Ciro Pallas, MD, Oumar Lopze MD, Moe [facility], DO, Rebecca Solis MD, Rosaura Liang, DO, Chris Sharp MD,  Rebel Mitchell, DO, Jeremiah Solomon MD, Eufemia Fulton MD, Nayeli Nuñez MD, Kirk Rao MD, Isi Elias MD, Beverly Pritchett MD, Jolene Hood, [facility], [facility], [facility], Emanuel Cole, CNP, Sekou Willard, CNS, Tomi Smith, Jair Garcia, CNP, Sybil Sena, CNP, Berna Palacios, CNP, Herrera Chawla, CNP, James Preciado PA-C, Mortimer Guardian, [facility], Abel Blanco, CNP, Lavonne Frankel, CNP, Osmar Carrasco, CNP, Ralph Campos, CNP, Melvina Mendoza, CNP, Alexus Daily, CNP, Erna Madden, CNP, Favian Mann, [address] [place] [address]    Progress Note    7/23/2021    10:37 AM    Name:   Hosey Homans  MRN:     [id number]     Acct:      [de-identified]   Room:   [address] [place] [address] Day:  6  Admit Date:  7/17/2021 12:42 AM    PCP:   Savannah Frances MD  Code Status:  Full Code    Subjective:     C/C:   Chief Complaint   Patient presents with    Abdominal Pain    Emesis     Interval History Status: improved. Patient seen examined this morning. Wife and family are at bedside. Seen ambulating from the restroom back to bed. Rapid response called overnight secondary to rigors and concern for SVT. EKG revealed heart rate of 170 bpm.  Symptoms lasted approximately 20 minutes then self-abated. Patient and family obviously concerned about his wellbeing. Brief History:     Mr. Carie Browning is a pleasant 36year old  gentleman who presneted to [facility] for evaluation of confusion, fatigue, diarrhea, and abdominal pains on 7/17/2021.  He was recently released from [facility] for similar complaints and was noted to have low potassium levels at that time. CT of the abdomen was normal. The diarrhea was \"water-like\" and profuse in nature, admitting to approx 5-7 watery stools a day for the past 4 days prior to admission. No BRBPR. Abdominal pain was present prior to the diarrhea. There is associated fevers and chills. Subjective fever of 104 prior to arrival. Emesis is food that is digested but from earlier in the day. Patient admits to eating popcorn and nuts just the weekend before the onset. His comorbidities include psychiatric disturbances following deployment to [facility] in 2003 and 2005, along with hypertension, and a history of a colectomy secondary to diverticulitis. CT of the abdomen pelvis was performed which revealed a markedly distended bladder with a calculated volume of approximately 2 L in the bladder along with hepatic steatosis and mild hepatomegaly with minor bibasilar atelectasis and interstitial thickening posteriorly. A Cárdenas catheter was placed with alleviation of the patient's urinary retention. Next morning, the patient's lipase level was noted be 29. A CT of the abdomen pelvis was repeated on 7/18 which revealed multiple air-fluid levels, seen with acute enterocolitis. Patient was her IV antibiotics admitted to the hospital.    He was noted to be quite tachycardic with a heart rate upwards of 151 bpm.  Cardiology was consulted, as source troponin was also increased. Troponins likely thought to be secondary to a type II mechanism from sepsis. 2D echo was ordered and revealed an EF of 50% with posterior mitral leaflet prolapse and severe MR with an eccentric anterior directed jet with a possible small vegetation on the posterior leaflet. He is scheduled to undergo MONROE on 7/20. Meanwhile, 2 of 2 blood cultures have returned positive for MSSA. His urine culture also grew 50 to 100,000 CFU of MSSA.   Infectious disease was consulted and is placed the patient on Ancef. He was also noted to be quite thrombocytopenic with a bradley of 32. This is improving. Fibrinogen is normal.    Patient was scheduled to undergo MONROE on 7/19, however, due to profound hypokalemia, this procedure was postponed. He did develop epistaxis on the evening of 7/18. ENT was consulted and recommended transfusing platelets for goal of greater than 50k. He underwent nasal cautery along with Afrin use which controlled his bleeding.    - MRI with acute infarcts  - Adjusting antibiotic therapy  - CT surgery wanting to intervene on valve -but after antibiotic therapy. Review of Systems:     Constitutional: Reports intermittent rigors, negative for sweats  Respiratory: Denies shortness of breath; negative for cough, wheezing  Cardiovascular:  Negative for chest pain, chest pressure/discomfort, lower extremity edema, palpitations  Gastrointestinal: Reports continued diarrhea, but daily improvement. Negative for abdominal pain, constipation,  nausea, vomiting  Extremities: Reports left great toe redness is improving. Pain is resolved. Neurological: Reports no further episodes of dizziness. Denies headache. Medications: Allergies:     Allergies   Allergen Reactions    Morphine Other (See Comments)     Pt report muscle cramps       Current Meds:   Scheduled Meds:    rifampin (RIFADIN) IVPB  600 mg Intravenous Q24H    nafcillin  2,000 mg Intravenous Q4H    metoprolol tartrate  25 mg Oral BID    pantoprazole  40 mg Intravenous BID    And    sodium chloride (PF)  10 mL Intravenous BID    potassium chloride  40 mEq Oral BID WC    mupirocin   Nasal BID    sodium chloride flush  5-40 mL Intravenous 2 times per day     Continuous Infusions:    sodium chloride 100 mL/hr at 07/23/21 0954    sodium chloride      sodium chloride       PRN Meds: diphenhydrAMINE, meclizine, ketorolac, loperamide, sodium chloride, sodium chloride, sodium chloride flush, sodium chloride, ondansetron **OR** ondansetron, acetaminophen **OR** acetaminophen, polyethylene glycol, magnesium sulfate, oxyCODONE, melatonin, aluminum & magnesium hydroxide-simethicone    Data:     Past Medical History:   has a past medical history of Diverticulosis, Hyperlipidemia, and Hypertension. Social History:   reports that he has never smoked. He has never used smokeless tobacco. He reports current alcohol use of about 20.0 standard drinks of alcohol per week. He reports that he does not use drugs. Family History:   Family History   Problem Relation Age of Onset    No Known Problems Mother     Other Father         diverticulitis       Vitals:  /68   Pulse 92   Temp 99 °F (37.2 °C) (Oral)   Resp 26   Ht 5' 6\" (1.676 m)   Wt 178 lb 9.2 oz (81 kg)   SpO2 97%   BMI 28.82 kg/m²   Temp (24hrs), Av.5 °F (36.9 °C), Min:97.9 °F (36.6 °C), Max:99 °F (37.2 °C)    No results for input(s): POCGLU in the last 72 hours. I/O (24Hr):     Intake/Output Summary (Last 24 hours) at 2021 1037  Last data filed at 2021 1855  Gross per 24 hour   Intake 2380 ml   Output --   Net 2380 ml       Labs:  Hematology:  Recent Labs     21  0710 21  0752 21  0854   WBC 11.3 11.4* 9.8   RBC 3.65* 3.87* 3.95*   HGB 10.6* 11.1* 11.5*   HCT 32.4* 34.7* 37.0*   MCV 88.8 89.7 93.7   MCH 29.0 28.7 29.1   MCHC 32.7 32.0 31.1   RDW 13.6 13.6 13.4    214 196   MPV 11.7 10.6 10.4     Chemistry:  Recent Labs     21  0752 21  0152 21  0854 21  0901    135 137  --    K 4.4 4.7 4.2  --     101 105  --    CO2 19* 14* 22  --    GLUCOSE 141* 117* 123*  --    BUN 7 13 16  --    CREATININE 0.64* 0.95 0.92  --    MG  --  2.1  --   --    ANIONGAP 14 20* 10  --    LABGLOM >60 >60 >60  --    GFRAA >60 >60 >60  --    CALCIUM 8.3* 8.2* 8.3*  --    PHOS 3.1 4.8* 4.7*  --    TROPHS  --  92*  --   --    LACTACIDWB  --  5.4*  --  2.3*     Recent Labs     21  1755 21  0710 21  3536 07/23/21  0854   PROT  --   --  6.6  --    LABALBU  --  2.5* 2.7*  2.7* 2.7*   TSH 1.86  --   --   --    AST  --   --  38  --    ALT  --   --  35  --    ALKPHOS  --   --  79  --    BILITOT  --   --  0.55  --    BILIDIR  --   --  0.16  --    AMMONIA 52  --   --   --      ABG:  Lab Results   Component Value Date    POCPH 7.486 07/18/2021    POCPCO2 28.6 07/18/2021    POCPO2 134.5 07/18/2021    POCHCO3 21.6 07/18/2021    NBEA 1 07/18/2021    PBEA NOT REPORTED 07/18/2021    LVH4KWK NOT REPORTED 07/18/2021    VMKT8PMT 99 07/18/2021    FIO2 NOT REPORTED 07/18/2021     Lab Results   Component Value Date/Time    SPECIAL L H 10 ML 07/22/2021 07:50 AM     Lab Results   Component Value Date/Time    CULTURE (A) 07/22/2021 07:50 AM     POSITIVE Blood Culture Results called to and read back by: MILA Martinez [phone] 07/23/21    CULTURE  07/22/2021 07:50 AM     DIRECT Maggie Echeverria STAIN FROM BOTTLE: GRAM POSITIVE COCCI IN CLUSTERS       Radiology:  CT ABDOMEN PELVIS WO CONTRAST Additional Contrast? None    Result Date: 7/17/2021  No acute abnormality identified in the abdomen and pelvis. The bladder is markedly distended with a calculated volume of 2 L. Hepatic steatosis and mild hepatomegaly. Minor bibasilar atelectasis and interstitial thickening posteriorly. XR CHEST (SINGLE VIEW FRONTAL)    Result Date: 7/17/2021  Unremarkable portable chest radiograph. XR FOOT LEFT (2 VIEWS)    Result Date: 7/19/2021  No radiographic evidence for acute osteomyelitis. Mild soft tissue swelling of the great toe possibly cellulitis. RECOMMENDATION: NM bone scan or MRI may be obtained if there remains strong concern for acute osteomyelitis. XR ABDOMEN (KUB) (SINGLE AP VIEW)    Result Date: 7/17/2021  Nonobstructive bowel gas pattern. No acute process identified. US GALLBLADDER RUQ    Result Date: 7/17/2021  Mild hepatomegaly. Diffuse hepatic steatosis. Contracted gallbladder. No cholelithiasis or acute cholecystitis.      XR CHEST PORTABLE    Result Date: 7/18/2021  No focal consolidation. No acute process evident. CT CHEST ABDOMEN PELVIS W CONTRAST    Result Date: 7/18/2021  Moderate distention of the colon multiple air-fluid levels, findings may be seen with acute enterocolitis. Physical Examination:        General appearance:  alert, cooperative and no distress,  gentleman sitting up in bed  Mental Status:  oriented to person, place and time and normal affect  Lungs:  clear to auscultation bilaterally, normal effort  Heart: Tachycardic continue rate (100 bpm) with regular rhythm, there is a loud systolic murmur which is blowing in nature best appreciated at the apex  Abdomen:  soft, nontender, nondistended, protuberant, normal bowel sounds, no masses, hepatomegaly, splenomegaly  Extremities:  no edema, redness, tenderness in the calves; left great toe with erythema at the 1st MTP joint. Neuro:  strength is equal bilaterally. Upper extremity strength is 5/5. Lower extremity strength is 5/5. No nystagmus noted. Skin:  Erythema to the first MTP joint on the left is improving. Mid back erythema and discoloration noted. Assessment:        Hospital Problems         Last Modified POA    * (Principal) MSSA bacteremia 7/20/2021 Yes    Diarrhea 7/20/2021 Yes    Acute dehydration 7/20/2021 Yes    Hypokalemia 7/20/2021 Yes    Epistaxis 7/20/2021 No    Thrombocytopenia ([address] 7/20/2021 Yes    Sepsis with acute organ dysfunction and septic shock ([address] 7/20/2021 Yes    Delusions ([address] 7/20/2021 Yes    Hypophosphatemia 7/20/2021 Yes    Severe mitral regurgitation 7/20/2021 Yes    Acute respiratory failure with hypoxia ([address] 7/20/2021 Yes    GI bleed 7/20/2021 Yes    Vertigo 7/21/2021 No    Hypomagnesemia 7/20/2021 Yes    Normocytic normochromic anemia 7/20/2021 Yes    Obesity (BMI 30-39. 9) 7/20/2021 Yes    Transaminitis 7/20/2021 Yes    Hyperglycemia 7/20/2021 Yes    Essential hypertension 6/21/5306 Yes    Metabolic encephalopathy 7/21/2021 Yes    Iron deficiency anemia secondary to blood loss (chronic) 7/21/2021 Yes    Guaiac positive stools 7/21/2021 Yes          Plan:        1. MSSA bacteremia - Continue Nafcillin 2 g q 4 hours and Rifampin 600 mg qd through 8/15. ID following. No AC recommended. Source is still unclear. Checking MRI of C-T-L spine today. Large mitral valve vegetation noted. CT surgery has evaluated the patient and is recommending surgery after. Discussed with ID today. 2. 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 Continue IV antibiotics as noted above. Anticipate 6 weeks of antibiotics after first negative culture. 3. SVT vs ST overnight - related to rigors vs true SVT. Metoprolol was given. HR improved once rigors stopped. Await further cardiology input  4. Lactic acidosis - lactic 5.3 overnight. Improving this morning with IVF. Blood cultures pending. 5. Back rash - noted. Pictures in media/ID notes. Not felt to be source of infection. 6. Acute respiratory failure with hypoxia - D-dimer is elevated. CT of the chest to rule out PE was inadequate. Wells score 1.5. Hold off on scanning, as we are unable to anticoagulate him due to septic emboli in the brain. 7. Acute septic emboli infarcts - to the supra and infratentorial structures along with subacute to chronic encephalomalacia with chronic blood products within the left superior parietal lobule and right inferior parietal lobule. Await further input from neurology. May be contributing factor to #6. Holding off on anti-platelet / anti-coag  8. Delusions, confusion, TME - possibly, and likely due to sepsis. Improving. 9. Vertigo - likely related to #5 - continue antivert. Symptoms appear to have resolved. 10. Thrombocytopenia - resolved. Likely secondary to sepsis. 11. GI bleed/normo normo anemia-likely lower in nature. Continue oral protonix. Follow up with GI as noted.   12. Acute diarrhea, probably viral in nature - continue imodium. Improving slowly. 13. Hypokalemia, hypophosphatemia - replace lytes daily. 14. Transaminitis-likely related to fatty liver disease, possibly due to sepsis. Now WNL  15. Obesity 31.42 - diet/weight loss/exercise  16.  Acute left great toe pain - gout vs septic joint - improving with IV antibioitcs    FOREIGN PASTRANA DO  7/23/2021  10:37 AM

## 2021-07-23 NOTE — FLOWSHEET NOTE
SPIRITUAL CARE DEPARTMENT - [facility]  PROGRESS NOTE    Shift date: 7/23/2021  Shift day: Thursday   Shift # 3    Room # 0407/1158-32   Name: Lance Bojorquez            Age: 36 y.o. Gender: male          Zoroastrianism:    Place of [employer]:     Referral: Rapid Response    Admit Date & Time: 7/17/2021 12:42 AM    PATIENT/EVENT DESCRIPTION:  Lance Bojorquez is a 36 y.o. male  In room 3004.  responded to RRT. SPIRITUAL ASSESSMENT/INTERVENTION:  Tosin Wang was ministry of presence.  ministered to patient's wife Donnal Pancho while patient was getting attention from medical staff. Spouse was tearful and anxious.  was a calming presence.  prayed with spouse and the patient.,      SPIRITUAL CARE FOLLOW-UP PLAN:  Chaplains will remain available to offer spiritual and emotional support as needed. Electronically signed by Theo Fuller      07/23/21 0510   Encounter Summary   Services provided to: Patient; Family   Referral/Consult From: Multi-disciplinary team   Support System Spouse   Continue Visiting   (7/23/2021)   Complexity of Encounter High   Length of Encounter 45 minutes   Spiritual Assessment Completed Yes   Crisis   Type Rapid response   Assessment Approachable   Intervention Active listening;Prayer;Sustaining presence/ Ministry of presence   Outcome Expressed gratitude   ,Chaplain Resident, on 7/23/2021 at 5:13 AM.  Allen Dumont  [phone]

## 2021-07-23 NOTE — PROGRESS NOTES
[facility]  Progress Note                   Date:   7/23/2021  Patient name: Denisse Suresh  Date of admission:  7/17/2021 12:42 AM  MRN:   [id number]  YOB: 1980  PCP: José Manuel Rice MD    Reason for Admission: Diarrhea [R19.7]    Subjective:       Clinical Changes /Abnormalities: Family at bedside. Overnight episode of tachycardia - SVT noted on ECGs. Resolved after rigors improved & IV Lopressor. Denies any chest pain or SOB during event. SR/ST on tele      Review of Systems    Medications:   Scheduled Meds:   pantoprazole  40 mg Oral BID AC    LORazepam        rifampin (RIFADIN) IVPB  600 mg Intravenous Q24H    nafcillin  2,000 mg Intravenous Q4H    metoprolol tartrate  25 mg Oral BID    potassium chloride  40 mEq Oral BID WC    mupirocin   Nasal BID    sodium chloride flush  5-40 mL Intravenous 2 times per day     Continuous Infusions:   sodium chloride 100 mL/hr at 07/23/21 0954    sodium chloride      sodium chloride       CBC:   Recent Labs     07/21/21  0710 07/22/21  0752 07/23/21  0854   WBC 11.3 11.4* 9.8   HGB 10.6* 11.1* 11.5*    214 196     BMP:    Recent Labs     07/22/21  0752 07/23/21  0152 07/23/21  0854    135 137   K 4.4 4.7 4.2    101 105   CO2 19* 14* 22   BUN 7 13 16   CREATININE 0.64* 0.95 0.92   GLUCOSE 141* 117* 123*     Hepatic:  Recent Labs     07/22/21  0752   AST 38   ALT 35   BILITOT 0.55   ALKPHOS 79     Troponin:   Recent Labs     07/23/21  0152   TROPHS 92*     BNP: No results for input(s): BNP in the last 72 hours. Lipids: No results for input(s): CHOL, HDL in the last 72 hours. Invalid input(s): LDLCALCU  INR: No results for input(s): INR in the last 72 hours.     DIAGNOSTIC DATA  MONROE 7/20/2021  MONROE findings:     LA:       Normal  RAJAT:    No thrombus  RA:      Normal  RV:      Normal  LV:       Normal  Estimated LVEF:         55%  Aorta:               Mild atheromatous disease arch  Pericardium:    Trivial pericardial effusion  Septum:           No intracardiac shunt via color Doppler.      Valves:     Mitral Valve: Large vegetation measuring 2.5 X 2.4 cm attached on the posterior leaflet of the mitral valve associated with Flail and degenerated leaflet. Severe regurgitation w/ an anteriorly directed eccentric jet is identified. Aortic Valve: The aortic valve is trileaflet and opens adequately. No regurgitiation is identified. Tricuspid valve: Structurally normal. No regurgitation is identified. Pulmonary valve: Normal. No significant regurgitation     No thrombus identified.        Summary:      1. A MONROE was performed without complications. 2. LVEF 55%  3. No thrombus identified  4. No intracardiac shunt via color Doppler. 5. Large vegetation measuring 2.5 X 2.4 cm noted on the posterior leaflet of the mitral valve. Posterior leaflet is flair and is associated with severe anteriorly directed eccentric regurgitation.      Recommendations:  1. CV Surgery consult for further recommendations    ECHO 7/18/2021  Summary  Global left ventricular systolic function is normal. Calculated ejection  fraction 48% by Dubois's method. Visually estimated EF 50%. Posterior mitral valve leaflet prolapse. Severe mitral regurgitation. Eccentric anterior diredted jet. EOA = 0.7cm2. PISA radius is 1.3cm. Vena contracta is 0.99 cm. MR volume is  58ml. Possible small vegetation on posterior leaflet. Consider MONROE. Objective:   Vitals: /70   Pulse 96   Temp 98.9 °F (37.2 °C) (Oral)   Resp 28   Ht 5' 6\" (1.676 m)   Wt 178 lb 9.2 oz (81 kg)   SpO2 93%   BMI 28.82 kg/m²   General appearance: alert and cooperative with exam  HEENT: Head: Normocephalic, no lesions, without obvious abnormality. Neck:no JVD, trachea midline, no adenopathy  Lungs: Clear to auscultation diminished bases.    Heart: Regular rate and rhythm, s1/s2 auscultated, no murmurs  Abdomen: soft, non-tender, bowel sounds active  Extremities: no edema  Neurologic: not done        Assessment / Acute Cardiac Problems:   1. Troponin elevation likely demand ischemia in setting of hypotension 2/2 to sepsis  2. Sinus tachycardia  3. Septic Shock  4. Bacteremia with blood cultures showing gram positive cocci in clusters x2  5. HTN  6. HLD  7. N/V Diarrhea  8. Hypokalemia    Patient Active Problem List:     Diarrhea     Acute dehydration     Hypomagnesemia     Hypokalemia     Epistaxis     Thrombocytopenia (HCC)     Hyperglycemia     Essential hypertension     Sepsis with acute organ dysfunction and septic shock (HCC)     Delusions (HCC)     Hypophosphatemia     MSSA bacteremia     Severe mitral regurgitation     Acute respiratory failure with hypoxia (HCC)     GI bleed     Normocytic normochromic anemia     Obesity (BMI 30-39. 9)     Transaminitis     Vertigo     Metabolic encephalopathy     Iron deficiency anemia secondary to blood loss (chronic)     Guaiac positive stools      Plan of Treatment:   1. Troponin elevation likely secondary to demand ischemia/sepsis. No ischemic work-up at this time. 2. Tachycardia with episode of SVT overnight. Continue BB with parameters - will increase to 37.5mg BID. 3. Keep K > 4.0 and Mag > 2.0  4. Sepsis followed by ID on IV AB  5. Endocarditis:  MONROE as above with large vegetation. CV surgery consulted. IV AB x6 weeks then will plan CTS per notes.     Electronically signed by Travis Osler, APRN - CNP on 7/23/2021 at [facility] PM  [address]  [phone]

## 2021-07-23 NOTE — PROGRESS NOTES
Pt. Family called out and states pt. Was having heartburn. When RN entered room, pt states his ches was burning like heartburn, lasted about a min. And a half. His wife states he got pale and HR went into the 120's. HR now 119 and B/P WNL. No temp noted at this time. Pt. States he drank his ensure and feels better now. Dr. Clay Tovar notified via perfect. Serve.

## 2021-07-23 NOTE — PROGRESS NOTES
[facility] - Progress Note    Today's Date and Time: 7/23/2021, 9:43 AM    Impression :   · MSSA septicemia 7-17-21  · Persistent growth of bacteria in blood 7-17-21 through 7-20-21 despite treatment  · Sepsis with acute organ dysfunction and septic shock  · Mitral Valve endocarditis. 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 Will need 6 weeks from first negative blood cultures.   · Rifampin 600 mg daily added 7/22/21 for synergy  · Please do not give anticoagulants due to the risk of septic emboli in the brain transitioning to hemorrhagic   · Repeat blood cultures ordered for 7/24/21    Medical Decision Making/Summary/Discussion:7/23/2021     ·   Infection Control Recommendations   · New [place] Precautions  · Contact Isolation     Antimicrobial Stewardship Recommendations     · Simplification of therapy  · Targeted therapy    Coordination of Outpatient Care:   · Estimated Length of IV antimicrobials: 4 weeks  · Patient will need Midline Catheter Insertion: TBD  · Patient will need PICC line Insertion: TBD  · Patient will need: Home IV , [place],  SNF,  LTAC: Yes-either SNF or home infusion  · Patient will need outpatient wound care: TBD    Chief complaint/reason for consultation:   · MSSA sepsis      History of Present Illness:   Radha Rapp is a 36y.o.-year-old  male who was initially admitted on 7/17/2021. Patient seen at the request of Dr. Shruthi Arevalo:     Patient initially presented to [facility] a few days ago with complaints of  abdominal pain, nausea, watery diarrhea and vomiting for the past 5 days after eating some chicken. The patient states that his wife also had episodes of emesis after eating the chicken but her symptoms resolved shortly after. A CT abdomen was unremarkable and he was diagnosed with viral gastroenteritis and sent home with Norco, Zofran, and potassium supplement for hypokalemia. On 7/16/2021, he presented to [facility] with worsening symptoms, including fever, chills, fatigue, and worsening abdominal pain with continued vomiting, right big toe pain. Work-up in the ED revealed a fever of 102.6, tachycardia with a heart rate up to 170, dyspnea with hypoxia, blood cultures showed MSSA x2, and urine cultures grew staph aureus ,000 CFU/mL. Occult blood stool positive. CT abdomen 7/17/2021  Impression   No acute abnormality identified in the abdomen and pelvis.       The bladder is markedly distended with a calculated volume of 2 L.       Hepatic steatosis and mild hepatomegaly.       Minor bibasilar atelectasis and interstitial thickening posteriorly. CT chest abdomen pelvis with contrast 7/18/2021  Impression   Moderate distention of the colon multiple air-fluid levels, findings may be   seen with acute enterocolitis. Abnormal labs include:  Sodium 131  Potassium 3.0  .5  Myoglobin 138  Troponin I 65  Amylase 131  AST 66  Lipase 299  Platelet 39  Sed rate 31    ID has been consulted for MSSA sepsis    CURRENT EVALUATION 7/23/2021:    Afebrile  VS stable    Upon evaluation, the patient is alert and oriented on 2 L nasal cannula. There was a rapid response called last night due to an episode of SVT and rigors. His lactic acid was elevated at 2.3 this morning.   Lymphocyte count is low  Path smear has been ordered    CT surgery is planning for valve repair after the patient has completed IV antibiotic therapy-unless the patient decompensates to the point of needing emergent surgical intervention. MRI brain 8-28-01: Acute embolic infarctions in supra and infratentorial structures. The patient's blood cultures from 7/22/2020 continue to show MSSA sepsis after 4 days of IV Ancef. Repeat blood cultures from 7/22/2021 continue to grow gram-positive cocci clusters. Because of the septic emboli in the brain, Ancef was discontinued and nafcillin was initiated on 7/22/2021. Rifampin was also added for synergy. Neurology consult has been ordered due to episodes of altered mentation along with vertigo and diplopia. Echocardiogram completed on 7/18/2021 revealed posterior mitral valve leaflet prolapse, severe mitral regurgitation, and possible small vegetation on posterior leaflet. Presence of vegetation on mitral valve would help explain the emboli to the brain. MONROE on 7/20 showed a Large vegetation measuring 2.5 X 2.4 cm noted on the posterior leaflet of the mitral valve. Posterior leaflet is flair and is associated with severe anteriorly directed eccentric regurgitation. Right foot x-ray revealed:  No radiographic evidence for acute osteomyelitis.  Mild soft tissue swelling   of the great toe possibly cellulitis.       RECOMMENDATION:   NM bone scan or MRI may be obtained if there remains strong concern for acute   osteomyelitis.         CT abdomen pelvis revealed: Moderate distention of the colon multiple air-fluid levels, findings may be   seen with acute enterocolitis. Bruising on the patient's back is from a Tens unit-it does not appear to be the source of sepsis. There are no open lesions noted and there are three distinct marks from the electrodes. There are Osler node appearing lesions on the patient's left pinky toe and right middle finger.              Stool studies have been negative    Hepatitis panel nonreactive    We will continue to monitor  Discussed with CT surgery and Dr. Anahy Kuhn, X rays reviewed: 7/23/2021    BUN:8-->8-->13  Cr:0.76-->0.49-->0.95  CRP: 260.5  LDH: 437    WBC:9.9-->10.9-->9.8  Hb:12.5-->10.8-->11.5  Plat: 39-->40-->78-->196  Haptoglobin: 256  Sed rate: 31  Lipase 299->228  Lactate: 1.6-->2.3    Cultures:  Urine:  ·   Blood:  · 7/17/2021: MSSA x2  · 7/18/2021: MSSA  · 7/20/21: MSSA  · 7/22/2021: MSSA  Sputum :  ·   Wound:    Discussed with patient, RN, family. I have personally reviewed the past medical history, past surgical history, medications, social history, and family history, and I have updated the database accordingly. Past Medical History:     Past Medical History:   Diagnosis Date    Diverticulosis     Hyperlipidemia     Hypertension        Past Surgical  History:     Past Surgical History:   Procedure Laterality Date    ABDOMEN SURGERY      large bowel resection    ANTERIOR CRUCIATE LIGAMENT REPAIR Right     APPENDECTOMY  07/09/1997    COLECTOMY      2012 - D/T Dverticulitis    DILATATION, ESOPHAGUS         Medications:      rifampin (RIFADIN) IVPB  600 mg Intravenous Q24H    nafcillin  2,000 mg Intravenous Q4H    metoprolol tartrate  25 mg Oral BID    pantoprazole  40 mg Intravenous BID    And    sodium chloride (PF)  10 mL Intravenous BID    potassium chloride  40 mEq Oral BID WC    mupirocin   Nasal BID    sodium chloride flush  5-40 mL Intravenous 2 times per day       Social History:     Social History     Socioeconomic History    Marital status:      Spouse name: Not on file    Number of children: Not on file    Years of education: Not on file    Highest education level: Not on file   Occupational History    Not on file   Tobacco Use    Smoking status: Never Smoker    Smokeless tobacco: Never Used   Vaping Use    Vaping Use: Never assessed   Substance and Sexual Activity    Alcohol use:  Yes deformities  Hematologic: No bleeding or bruising. Neurologic: No headache, weakness, numbness, or tingling. Integument: Scattered small reddened bumps and bruises  Psychiatric: No depression. Endocrine: No polyuria, no polydipsia, no polyphagia. Physical Examination :     Patient Vitals for the past 8 hrs:   BP Temp Temp src Pulse Resp SpO2 Weight   07/23/21 0645 101/68 99 °F (37.2 °C) Oral 92 26 97 % --   07/23/21 0600 -- -- -- -- -- -- 178 lb 9.2 oz (81 kg)   07/23/21 0300 95/63 99 °F (37.2 °C) Oral -- -- -- --     General Appearance: Awake, alert, and in no apparent distress  Head:  Normocephalic, no trauma  Eyes: Pupils equal, round, reactive to light and accommodation; extraocular movements intact; sclera anicteric; conjunctivae pink. No embolic phenomena. ENT: Oropharynx clear, without erythema, exudate, or thrush. No tenderness of sinuses. Mouth/throat: mucosa pink and moist. No lesions. Dentition in good repair. Neck:Supple, without lymphadenopathy. Thyroid normal, No bruits. Pulmonary/Chest: Clear to auscultation, without wheezes, rales, or rhonchi. No dullness to percussion. Cardiovascular: Normal sinus tachycardia with murmur, No rubs, or gallops. Abdomen: Soft, non tender. Bowel sounds normal. No organomegaly  All four Extremities: No cyanosis, clubbing, edema, or effusions. Neurologic: No gross sensory or motor deficits. Skin: Warm and dry with good turgor. No signs of peripheral arterial or venous insufficiency. No ulcerations. No open wounds.     Medical Decision Making -Laboratory:   I have independently reviewed/ordered the following labs:    CBC with Differential:   Recent Labs     07/22/21  0752 07/23/21  0854   WBC 11.4* 9.8   HGB 11.1* 11.5*   HCT 34.7* 37.0*    196   LYMPHOPCT 16* 12*   MONOPCT 8 7     BMP:   Recent Labs     07/22/21  0752 07/23/21  0152    135   K 4.4 4.7    101   CO2 19* 14*   BUN 7 13   CREATININE 0.64* 0.95   MG  --  2.1     Hepatic Function Panel:   Recent Labs     07/21/21  0710 07/22/21  0752   PROT  --  6.6   LABALBU 2.5* 2.7*  2.7*   BILIDIR  --  0.16   IBILI  --  0.39   BILITOT  --  0.55   ALKPHOS  --  79   ALT  --  35   AST  --  38     No results for input(s): RPR in the last 72 hours. No results for input(s): HIV in the last 72 hours. No results for input(s): BC in the last 72 hours. Lab Results   Component Value Date    MUCUS NOT REPORTED 07/17/2021    RBC 3.95 07/23/2021    TRICHOMONAS NOT REPORTED 07/17/2021    WBC 9.8 07/23/2021    YEAST NOT REPORTED 07/17/2021    TURBIDITY CLEAR 07/17/2021     Lab Results   Component Value Date    CREATININE 0.95 07/23/2021    GLUCOSE 117 07/23/2021       Medical Decision Making-Imaging:          MRI Brain 7/2121   Impression       Findings suggestive of acute embolic infarctions involving supra and   infratentorial structures as described.       Subacute to chronic encephalomalacia with chronic blood products within the   left superior parietal lobule and right inferior parietal lobule.       There is no acute intracranial hemorrhage, or intracranial mass lesion.           EXAMINATION:   CT OF THE CHEST, ABDOMEN, AND PELVIS WITH CONTRAST 7/18/2021 10:53 am       TECHNIQUE:   CT of the chest, abdomen and pelvis was performed with the administration of   intravenous contrast. Multiplanar reformatted images are provided for review. Dose modulation, iterative reconstruction, and/or weight based adjustment of   the mA/kV was utilized to reduce the radiation dose to as low as reasonably   achievable.       COMPARISON:   None       HISTORY:   ORDERING SYSTEM PROVIDED HISTORY: MSSA bacteremia, ? DIC   TECHNOLOGIST PROVIDED HISTORY:   MSSA bacteremia, ? DIC       Reason for Exam: sepsis with acute organ dysfunction   Acuity: Unknown   Type of Exam: Unknown       FINDINGS:   Lungs/pleura:  The central airways are patent.  There are no suspicious   pulmonary nodules       Mediastinum: There is no acute mediastinal abnormality       Soft Tissues/Bones: No suspicious osteolytic or osteoblastic lesions       Abdominal organs: The liver, spleen, adrenal glands, kidneys, and pancreas   demonstrate no acute abnormality.       GI/Bowel: The visualized portions of the small bowel are unremarkable. There   is borderline dilation of the colon.  There are multiple air-fluid levels   throughout the colon.       Peritoneum/Retroperitoneum: There is a solid amount of retroperitoneal fluid   along the distal ureters bilaterally.       Pelvis: The bladder is moderately distended.       Bones: No suspicious osseous lesions are identified           Impression   Moderate distention of the colon multiple air-fluid levels, findings may be   seen with acute enterocolitis.             XR FOOT LEFT (2 VIEWS) [[id number]] Collected: 07/18/21 1411      Order Status: Completed Updated: 07/19/21 0759     Narrative:       EXAMINATION:   TWO XRAY VIEWS OF THE LEFT FOOT     7/18/2021 2:05 pm     COMPARISON:   None. HISTORY:   ORDERING SYSTEM PROVIDED HISTORY: Left big toe pain and erythema. Looking for   source of MSSA sepsis   TECHNOLOGIST PROVIDED HISTORY:   Left big toe pain and erythema. Looking for source of MSSA sepsis     FINDINGS:   AP and lateral views of the foot obtained.  Normal alignment and   mineralization.  No abnormal periosteal reaction.  No erosions.  No fracture. No aggressive lytic or blastic lesions.  Mild soft tissue swelling of the   great toe noted.      Impression:       No radiographic evidence for acute osteomyelitis.  Mild soft tissue swelling   of the great toe possibly cellulitis.       EXAMINATION:   MRI OF THE BRAIN WITHOUT CONTRAST  7/21/2021 7:03 pm       TECHNIQUE:   Multiplanar multisequence MRI of the brain was performed without the   administration of intravenous contrast.       COMPARISON:   Head CT of 07/20/2021       HISTORY:   ORDERING SYSTEM PROVIDED HISTORY: episode of vertigo, dipolopia, has infecticive endocadrditis, r/o septic emboli, abscess   TECHNOLOGIST PROVIDED HISTORY:   episode of vertigo, dipolopia, has infecticive endocadrditis, r/o septic   emboli, abscess   Reason for Exam: VERTIGO, MSSA BACTEREMIA, R/O SEPTIC EMBOLI       FINDINGS:   MRI brain:       There are punctate areas of restricted diffusion within the left superior   frontal gyrus subcortical white matter, right superior frontal gyrus   subcortical white matter, left superior parietal lobule right inferior   frontal gyrus subcortical white matter, left inferior frontal gyrus   subcortical white matter and bilateral cerebellar hemisphere, suggestive of   acute embolic infarctions.       There are focus of encephalomalacia with susceptibility artifact and   increased signal on DWI within left superior parietal lobule and right   inferior parietal lobule suggestive of subacute to chronic infarct with   chronic blood products.       There is no acute intracranial hemorrhage, or intracranial mass lesion. No   mass effect, midline shift, or extra-axial collection is noted.       The brain parenchyma is otherwise normal.  The pituitary gland is normal in   appearance. The cerebellar tonsils are in normal position.       The ventricles, sulci, and cisterns are within normal size and range.  No   significant volume loss. .  The intracranial flow voids are preserved.       The globes and orbits are within normal limits.  The visualized extracranial   structures including paranasal sinuses and mastoid air cells are unremarkable.           Impression       Findings suggestive of acute embolic infarctions involving supra and   infratentorial structures as described.       Subacute to chronic encephalomalacia with chronic blood products within the   left superior parietal lobule and right inferior parietal lobule.       There is no acute intracranial hemorrhage, or intracranial mass lesion.       The findings were sent to the Radiology Results Communication Center at 8:24   pm on 7/21/2021to be communicated to a licensed caregiver.                 Medical Decision [id number]:     Component Collected Lab   Specimen Description 07/17/2021  8:48  [address]   . BLOOD    Special Requests 07/17/2021  8:48  [address]   10ML LFA    Culture Abnormal  07/17/2021  8:48 AM [address] Blood Culture Results called to and read back by: Chelsea Amezcua RN AT 2045 ON 07.17.2021    Culture 07/17/2021  8:48 AM 14[address] [facility] FROM BOTTLE: GRAM POSITIVE COCCI IN CLUSTERS    Culture Abnormal  07/17/2021  8:48  [address]   Staphylococcus aureus Detected: mecA/C and MREJ Not Detected Methodology- Polymerase Chain Reaction (PCR)   Culture Abnormal  07/17/2021  8:48 AM [address] [facility] This isolate is methicillin susceptible. Testing Performed By    [facility] Bryant Watson Name Director Address Valid Date Range   [facility] MD [address] [facility] [address] 08/30/17 0801-Present   Susceptibility    Staphylococcus aureus (4)    Antibiotic Interpretation DAVID Status    penicillin Resistant  Final     >=0.5   RESISTANT   cefoxitin screen  NOT REPORTED Final    ciprofloxacin   Final     NOT REPORTED    clindamycin Sensitive  Final     <=0.25   SUSCEPTIBLE   erythromycin Sensitive  Final     <=0.25   SUSCEPTIBLE   gentamicin Sensitive  Final     <=0.5   SUSCEPTIBLE   gentamicin Sensitive Gentamicin is used only in combination with other active agents that test susceptible.  Final    Induced Clind Resist  NOT REPORTED Final    levofloxacin Sensitive  Final     0.25   SUSCEPTIBLE   linezolid   Final     NOT REPORTED    moxifloxacin   Final     NOT REPORTED    nitrofurantoin   Final     NOT REPORTED    oxacillin Sensitive  Final     0.5   SUSCEPTIBLE   Synercid   Final     NOT

## 2021-07-23 NOTE — PROGRESS NOTES
Pt. Resting in bed with eyes closed. HR remains elevated now into 130's. Paged cardiology second attempt.

## 2021-07-23 NOTE — CARE COORDINATION
Rapid Response paged over head    Patient with rigors that started approx 10-15 minutes prior to my arrival.  Rapid called for SVT. 12 lead EKG demonstrating SVT with HR:170s and SBP: 140s-150s. Patient given 5mg IV Lopressor. Repeat EKG demonstrating ST with HR:130. Patient denying any chest pain or shortness of breath. Preceding rapid response, patient with onset of rigors while he was sleeping. Given dose of prn Toradol. After approx 15 minutes, rigors ceased and patient returned to baseline. Continues to deny any chest pain or shortness of breath. No recent fevers. Stat labs pending including lactic acid.  Will follow and discuss with attending physician

## 2021-07-23 NOTE — PROGRESS NOTES
[facility]  Daily Progress Note    Surgeon: Dr. Slade Lopez  Procedure: MVR- schedule TBD   EF: 50%     Subjective:  Mr. Tomas Linton feels well today with no acute complaints. The patient had an episode of rigors noted throughout the night where his heart rate elevated to the 180s with a temp of 99.0. During the episode of rigors, the patient did experience chest pain and pressure. Denies any current chest pain or shortness of breath. Plan of care reviewed and questions answered. Physical Exam  Vital Signs: /68   Pulse 92   Temp 99 °F (37.2 °C) (Oral)   Resp 26   Ht 5' 6\" (1.676 m)   Wt 178 lb 9.2 oz (81 kg)   SpO2 97%   BMI 28.82 kg/m²  O2 Flow Rate (L/min): 2 L/min   Admit Weight: Weight: 172 lb (78 kg)   WEIGHTWeight: 178 lb 9.2 oz (81 kg)     General: alert and oriented to person, place and time, well-developed and well-nourished, in no acute distress. Heart: Normal S1 and S2.  Regular rhythm. No murmurs, gallops, or rubs.   Pacing Wires: No   Lungs: clear to auscultation bilaterally and diminished breath sounds bibasilar Chest tubes: No  Abdomen: soft, non tender, non distended, BSx4  Extremities: negative      Scheduled Meds:    rifampin (RIFADIN) IVPB  600 mg Intravenous Q24H    nafcillin  2,000 mg Intravenous Q4H    metoprolol tartrate  25 mg Oral BID    pantoprazole  40 mg Intravenous BID    And    sodium chloride (PF)  10 mL Intravenous BID    potassium chloride  40 mEq Oral BID WC    mupirocin   Nasal BID    sodium chloride flush  5-40 mL Intravenous 2 times per day     Continuous Infusions:    dextrose 5% and 0.45% NaCl with KCl 40 mEq 100 mL/hr at 07/23/21 0843    sodium chloride      sodium chloride         Data:  CBC:   Recent Labs     07/21/21  0710 07/22/21  0752   WBC 11.3 11.4*   HGB 10.6* 11.1*   HCT 32.4* 34.7*   MCV 88.8 89.7    214     BMP:   Recent Labs     07/21/21  0710 07/22/21  0752 07/23/21  0152    137 135   K 3.9 4.4 4.7  104 101   CO2 20 19* 14*   PHOS 2.4* 3.1 4.8*   BUN 9 7 13   CREATININE 0.53* 0.64* 0.95     PT/INR: No results for input(s): PROTIME, INR in the last 72 hours. APTT: No results for input(s): APTT in the last 72 hours. CXR:  FINDINGS:   AP portable view of the chest is electronically marked regarding sides.  No   infiltrates, effusions or pneumothoraces.  Cardiomediastinal silhouette is   within normal limits.  Stable mild elevation of the right diaphragm.  The   remaining visualized osseous and soft tissues are unchanged. I/O:  I/O last 3 completed shifts: In: 2680 [P.O.:1025; I.V.:1655]  Out: 1800 [Urine:1800]    Assessment/Plan:      Diagnosis Date    Diverticulosis     Hyperlipidemia     Hypertension       Neuro: Intact   Lungs: clear, diminished in posterior bases   HD: VSS   Edema: none noted   GI: active bowel sounds noted throughout   : good urine output     Oxygen as needed via nasal cannula to maintain SpO2 > 92%  o Wean as tolerated  [employer] spirometry, acapella and ambulation    Will discuss case with the CT surgeons to obtain a surgical plan/date.  Continue antibiotics for 4-6 weeks.  Will need neuro clearance prior to surgery        The above recommendations including medications and orders were discussed and agreed upon with Dr. Celia Millan, the attending on service for the cardiothoracic surgery group today. Electronically signed by MARIKA Kenny CNP on 7/23/2021 at 8:47 AM    On this date 7/23/2021 I have spent 23 minutes reviewing previous notes, test results and face to face with the patient discussing the diagnosis and importance of compliance with the treatment plan as well as documenting on the day of the visit. At least 50% of the time documented was spent with the patient to provide counseling and/or coordination of care.     This note was created with the assistance of a speech-recognition program.  Although the intention is to generate a document that actually reflects the content of the visit, no guarantees can be provided that every mistake has been identified and corrected by editing. Note was updated later by me after  physical examination and  completion of the assessment.

## 2021-07-23 NOTE — PROGRESS NOTES
Physical Therapy    Facility/Department: [facility]  Initial Assessment    NAME: Nneka Monsivais  : 1980  MRN: [id number]    Date of Service: 2021    Discharge Recommendations:  Patient would benefit from continued therapy after discharge   PT Equipment Recommendations  Equipment Needed: No       Patient Active Problem List    Diagnosis Date Noted    Vertigo 2021    Delusions ([address] 2021    Hypophosphatemia 2021    MSSA bacteremia 2021    Severe mitral regurgitation 2021    Acute respiratory failure with hypoxia ([address] 2021    GI bleed 2021    Normocytic normochromic anemia 2021    Obesity (BMI 30-39.9) 2021    Transaminitis     Metabolic encephalopathy     Iron deficiency anemia secondary to blood loss (chronic)     Guaiac positive stools     Diarrhea 2021    Acute dehydration 2021    Hypomagnesemia 2021    Hypokalemia 2021    Epistaxis 2021    Thrombocytopenia ([address] 2021    Hyperglycemia 2021    Essential hypertension 2021    Sepsis with acute organ dysfunction and septic shock ([address] 2021       Assessment   Body structures, Functions, Activity limitations: Decreased endurance  Assessment: Pt able to tolerate ambulation well this date. Will benefit from continued PT to improve endurance and balance  Prognosis: Good  Decision Making: Medium Complexity  PT Education: Goals;PT Role  Patient Education: Importance of OOB and up to chair. Education on LE exercises to complete. Standing gatroc stretch completed and encouraged B LE's. Pt demonstrated good understanding. Encouraged ambulation ini room or halls with nursing  REQUIRES PT FOLLOW UP: Yes  Activity Tolerance  Activity Tolerance: Patient Tolerated treatment well       Patient Diagnosis(es): The primary encounter diagnosis was Diarrhea of presumed infectious origin.  Diagnoses of Hyponatremia and Hypokalemia were also pertinent to this visit. has a past medical history of Diverticulosis, Hyperlipidemia, and Hypertension. has a past surgical history that includes Anterior cruciate ligament repair (Right); Appendectomy (07/09/1997); colectomy; Dilatation, esophagus; and Abdomen surgery. Restrictions  Restrictions/Precautions  Restrictions/Precautions: Up as Tolerated  Position Activity Restriction  Other position/activity restrictions: Pt on 6 lpm O2 nasal canula. Decreased to 2 lpm per nursing.   Vision/Hearing  Vision: Within Functional Limits  Hearing: Within functional limits     Subjective  General  Chart Reviewed: Yes  Patient assessed for rehabilitation services?: Yes  Family / Caregiver Present: Yes (spouse)  Follows Commands: Within Functional Limits  Pain Screening  Patient Currently in Pain: No  Vital Signs  Patient Currently in Pain: No       Orientation  Orientation  Overall Orientation Status: Within Functional Limits  Social/Functional History  Social/Functional History  Lives With: Spouse  Type of Home: House  Home Layout: Two level, Able to Live on Main level with bedroom/bathroom  Home Equipment:  (none)  ADL Assistance: Independent  Homemaking Assistance: Independent  Homemaking Responsibilities: Yes  Ambulation Assistance: Independent  Transfer Assistance: Independent  Active : Yes  Mode of Transportation: Car  Occupation: Full time employment  Additional Comments: Pt active and healthy prior to admit, 3 young girls at home  Cognition   Cognition  Overall Cognitive Status: WFL    Objective          AROM RLE (degrees)  RLE AROM: WFL  AROM LLE (degrees)  LLE AROM : WFL  Strength RLE  Strength RLE: WFL  Strength LLE  Strength LLE: WFL     Sensation  Overall Sensation Status: WFL  Bed mobility  Rolling to Left: Independent  Supine to Sit: Independent  Sit to Supine: Independent  Transfers  Sit to Stand: Supervision  Stand to sit: Supervision  Bed to Chair: Supervision  Ambulation  Ambulation?: Yes  More Ambulation?: No  Ambulation 1  Surface: level tile  Device: No Device  Assistance: Supervision  Gait Deviations: Slow Melinda  Distance: 150 ft x2 with standing rest  Comments: SpO2 remained >92% on 4 lpm O2. Good tolerance  Stairs/Curb  Stairs?: No     Balance  Posture: Good  Sitting - Static: Good  Sitting - Dynamic: Good  Standing - Static: Good  Standing - Dynamic: Fair;+   Gastroc stretch standing B LE's against wall    Seated LE exercise program: Long Arc Quads, hip abduction/adduction, heel/toe raises, and marches. Reps: 10    Plan   Plan  Times per week: 5-6x/week  Current Treatment Recommendations: Strengthening, Transfer Training, Endurance Training, Gait Training, Stair training, Functional Mobility Training  Safety Devices  Type of devices: Call light within reach, Left in chair  Restraints  Initially in place: No      AM-PAC Score  AM-PAC Inpatient Mobility Raw Score : 22 (07/23/21 1143)  AM-PAC Inpatient T-Scale Score : 53.28 (07/23/21 1143)  Mobility Inpatient CMS 0-100% Score: 20.91 (07/23/21 1143)  Mobility Inpatient CMS G-Code Modifier : Jarekjenn Elder (07/23/21 [facility])          Goals  Short term goals  Time Frame for Short term goals: 10 visits  Short term goal 1: Pt indep to ambulate 400 ft  Short term goal 2: Pt negotiate 4 stairs with 1 rail, supervision  Short term goal 3: Pt indep with HEP for endurance  Patient Goals   Patient goals :  To return home       Therapy Time   Individual Concurrent Group Co-treatment   Time In 1035         Time Out 1105         Minutes 30         Timed Code Treatment Minutes: [address] [facility], PT

## 2021-07-24 LAB
ABSOLUTE EOS #: 0.07 K/UL (ref 0–0.44)
ABSOLUTE IMMATURE GRANULOCYTE: 0.1 K/UL (ref 0–0.3)
ABSOLUTE LYMPH #: 1.18 K/UL (ref 1.1–3.7)
ABSOLUTE MONO #: 0.81 K/UL (ref 0.1–1.2)
ALBUMIN SERPL-MCNC: 2.7 G/DL (ref 3.5–5.2)
ALBUMIN SERPL-MCNC: 2.7 G/DL (ref 3.5–5.2)
ALBUMIN/GLOBULIN RATIO: 0.8 (ref 1–2.5)
ALP BLD-CCNC: 66 U/L (ref 40–129)
ALT SERPL-CCNC: 26 U/L (ref 5–41)
ANION GAP SERPL CALCULATED.3IONS-SCNC: 8 MMOL/L (ref 9–17)
AST SERPL-CCNC: 26 U/L
BASOPHILS # BLD: 0 % (ref 0–2)
BASOPHILS ABSOLUTE: 0.04 K/UL (ref 0–0.2)
BILIRUB SERPL-MCNC: 0.71 MG/DL (ref 0.3–1.2)
BILIRUBIN DIRECT: 0.28 MG/DL
BILIRUBIN, INDIRECT: 0.43 MG/DL (ref 0–1)
BUN BLDV-MCNC: 13 MG/DL (ref 6–20)
BUN/CREAT BLD: ABNORMAL (ref 9–20)
CALCIUM SERPL-MCNC: 8 MG/DL (ref 8.6–10.4)
CHLORIDE BLD-SCNC: 101 MMOL/L (ref 98–107)
CO2: 22 MMOL/L (ref 20–31)
CREAT SERPL-MCNC: 0.71 MG/DL (ref 0.7–1.2)
CULTURE: ABNORMAL
DIFFERENTIAL TYPE: ABNORMAL
EKG ATRIAL RATE: 120 BPM
EKG P AXIS: 36 DEGREES
EKG P-R INTERVAL: 158 MS
EKG Q-T INTERVAL: 314 MS
EKG QRS DURATION: 80 MS
EKG QTC CALCULATION (BAZETT): 443 MS
EKG R AXIS: 36 DEGREES
EKG T AXIS: 17 DEGREES
EKG VENTRICULAR RATE: 120 BPM
EOSINOPHILS RELATIVE PERCENT: 1 % (ref 1–4)
GFR AFRICAN AMERICAN: >60 ML/MIN
GFR NON-AFRICAN AMERICAN: >60 ML/MIN
GFR SERPL CREATININE-BSD FRML MDRD: ABNORMAL ML/MIN/{1.73_M2}
GFR SERPL CREATININE-BSD FRML MDRD: ABNORMAL ML/MIN/{1.73_M2}
GLOBULIN: ABNORMAL G/DL (ref 1.5–3.8)
GLUCOSE BLD-MCNC: 143 MG/DL (ref 70–99)
HCT VFR BLD CALC: 30.2 % (ref 40.7–50.3)
HEMOGLOBIN: 9.4 G/DL (ref 13–17)
IMMATURE GRANULOCYTES: 1 %
LACTIC ACID, WHOLE BLOOD: 1 MMOL/L (ref 0.7–2.1)
LACTIC ACID, WHOLE BLOOD: 1.2 MMOL/L (ref 0.7–2.1)
LYMPHOCYTES # BLD: 12 % (ref 24–43)
Lab: ABNORMAL
Lab: ABNORMAL
MCH RBC QN AUTO: 28.6 PG (ref 25.2–33.5)
MCHC RBC AUTO-ENTMCNC: 31.1 G/DL (ref 28.4–34.8)
MCV RBC AUTO: 91.8 FL (ref 82.6–102.9)
MONOCYTES # BLD: 8 % (ref 3–12)
NRBC AUTOMATED: 0 PER 100 WBC
PDW BLD-RTO: 13.4 % (ref 11.8–14.4)
PHOSPHORUS: 2.8 MG/DL (ref 2.5–4.5)
PLATELET # BLD: 210 K/UL (ref 138–453)
PLATELET ESTIMATE: ABNORMAL
PMV BLD AUTO: 10.5 FL (ref 8.1–13.5)
POTASSIUM SERPL-SCNC: 4 MMOL/L (ref 3.7–5.3)
RBC # BLD: 3.29 M/UL (ref 4.21–5.77)
RBC # BLD: ABNORMAL 10*6/UL
SEG NEUTROPHILS: 78 % (ref 36–65)
SEGMENTED NEUTROPHILS ABSOLUTE COUNT: 7.64 K/UL (ref 1.5–8.1)
SODIUM BLD-SCNC: 131 MMOL/L (ref 135–144)
SPECIMEN DESCRIPTION: ABNORMAL
SPECIMEN DESCRIPTION: ABNORMAL
TOTAL PROTEIN: 6 G/DL (ref 6.4–8.3)
WBC # BLD: 9.8 K/UL (ref 3.5–11.3)
WBC # BLD: ABNORMAL 10*3/UL

## 2021-07-24 PROCEDURE — 99232 SBSQ HOSP IP/OBS MODERATE 35: CPT | Performed by: NURSE PRACTITIONER

## 2021-07-24 PROCEDURE — 6370000000 HC RX 637 (ALT 250 FOR IP): Performed by: INTERNAL MEDICINE

## 2021-07-24 PROCEDURE — 99232 SBSQ HOSP IP/OBS MODERATE 35: CPT | Performed by: INTERNAL MEDICINE

## 2021-07-24 PROCEDURE — 2580000003 HC RX 258: Performed by: INTERNAL MEDICINE

## 2021-07-24 PROCEDURE — 6370000000 HC RX 637 (ALT 250 FOR IP): Performed by: NURSE PRACTITIONER

## 2021-07-24 PROCEDURE — 36415 COLL VENOUS BLD VENIPUNCTURE: CPT

## 2021-07-24 PROCEDURE — 87077 CULTURE AEROBIC IDENTIFY: CPT

## 2021-07-24 PROCEDURE — 87186 SC STD MICRODIL/AGAR DIL: CPT

## 2021-07-24 PROCEDURE — 93010 ELECTROCARDIOGRAM REPORT: CPT | Performed by: INTERNAL MEDICINE

## 2021-07-24 PROCEDURE — 2580000003 HC RX 258: Performed by: NURSE PRACTITIONER

## 2021-07-24 PROCEDURE — 6360000002 HC RX W HCPCS: Performed by: NURSE PRACTITIONER

## 2021-07-24 PROCEDURE — 80076 HEPATIC FUNCTION PANEL: CPT

## 2021-07-24 PROCEDURE — 87040 BLOOD CULTURE FOR BACTERIA: CPT

## 2021-07-24 PROCEDURE — 85025 COMPLETE CBC W/AUTO DIFF WBC: CPT

## 2021-07-24 PROCEDURE — 2060000000 HC ICU INTERMEDIATE R&B

## 2021-07-24 PROCEDURE — 87150 DNA/RNA AMPLIFIED PROBE: CPT

## 2021-07-24 PROCEDURE — 83605 ASSAY OF LACTIC ACID: CPT

## 2021-07-24 PROCEDURE — 2700000000 HC OXYGEN THERAPY PER DAY

## 2021-07-24 PROCEDURE — 87205 SMEAR GRAM STAIN: CPT

## 2021-07-24 PROCEDURE — 6360000002 HC RX W HCPCS: Performed by: INTERNAL MEDICINE

## 2021-07-24 PROCEDURE — 80069 RENAL FUNCTION PANEL: CPT

## 2021-07-24 PROCEDURE — 87147 CULTURE TYPE IMMUNOLOGIC: CPT

## 2021-07-24 PROCEDURE — 2500000003 HC RX 250 WO HCPCS: Performed by: INTERNAL MEDICINE

## 2021-07-24 PROCEDURE — 99233 SBSQ HOSP IP/OBS HIGH 50: CPT | Performed by: INTERNAL MEDICINE

## 2021-07-24 RX ORDER — DIPHENHYDRAMINE HCL 25 MG
25 TABLET ORAL ONCE
Status: COMPLETED | OUTPATIENT
Start: 2021-07-24 | End: 2021-07-24

## 2021-07-24 RX ORDER — DIPHENHYDRAMINE HYDROCHLORIDE 50 MG/ML
12.5 INJECTION INTRAMUSCULAR; INTRAVENOUS EVERY 6 HOURS PRN
Status: DISCONTINUED | OUTPATIENT
Start: 2021-07-24 | End: 2021-08-02 | Stop reason: HOSPADM

## 2021-07-24 RX ORDER — DIPHENOXYLATE HYDROCHLORIDE AND ATROPINE SULFATE 2.5; .025 MG/1; MG/1
1 TABLET ORAL 2 TIMES DAILY PRN
Status: DISCONTINUED | OUTPATIENT
Start: 2021-07-24 | End: 2021-07-25

## 2021-07-24 RX ORDER — POTASSIUM CHLORIDE 20 MEQ/1
20 TABLET, EXTENDED RELEASE ORAL 2 TIMES DAILY WITH MEALS
Status: DISCONTINUED | OUTPATIENT
Start: 2021-07-25 | End: 2021-08-02 | Stop reason: HOSPADM

## 2021-07-24 RX ADMIN — LOPERAMIDE HYDROCHLORIDE 4 MG: 2 CAPSULE ORAL at 04:33

## 2021-07-24 RX ADMIN — SODIUM CHLORIDE, PRESERVATIVE FREE 10 ML: 5 INJECTION INTRAVENOUS at 21:49

## 2021-07-24 RX ADMIN — ACETAMINOPHEN 650 MG: 325 TABLET ORAL at 09:18

## 2021-07-24 RX ADMIN — PANTOPRAZOLE SODIUM 40 MG: 40 TABLET, DELAYED RELEASE ORAL at 15:19

## 2021-07-24 RX ADMIN — DIPHENHYDRAMINE HCL 25 MG: 25 TABLET ORAL at 13:08

## 2021-07-24 RX ADMIN — DIPHENHYDRAMINE HYDROCHLORIDE 12.5 MG: 50 INJECTION, SOLUTION INTRAMUSCULAR; INTRAVENOUS at 21:45

## 2021-07-24 RX ADMIN — LOPERAMIDE HYDROCHLORIDE 4 MG: 2 CAPSULE ORAL at 11:00

## 2021-07-24 RX ADMIN — ACETAMINOPHEN 650 MG: 325 TABLET ORAL at 23:41

## 2021-07-24 RX ADMIN — METOPROLOL TARTRATE 37.5 MG: 25 TABLET ORAL at 09:03

## 2021-07-24 RX ADMIN — PANTOPRAZOLE SODIUM 40 MG: 40 TABLET, DELAYED RELEASE ORAL at 09:05

## 2021-07-24 RX ADMIN — NAFCILLIN INJECTION 2000 MG: 2 POWDER, FOR SOLUTION INTRAMUSCULAR; INTRAMUSCULAR; INTRAVENOUS at 23:41

## 2021-07-24 RX ADMIN — RIFAMPIN 600 MG: 600 INJECTION, POWDER, LYOPHILIZED, FOR SOLUTION INTRAVENOUS at 12:12

## 2021-07-24 RX ADMIN — ACETAMINOPHEN 650 MG: 325 TABLET ORAL at 17:16

## 2021-07-24 RX ADMIN — NAFCILLIN INJECTION 2000 MG: 2 POWDER, FOR SOLUTION INTRAMUSCULAR; INTRAMUSCULAR; INTRAVENOUS at 10:51

## 2021-07-24 RX ADMIN — NAFCILLIN INJECTION 2000 MG: 2 POWDER, FOR SOLUTION INTRAMUSCULAR; INTRAMUSCULAR; INTRAVENOUS at 03:20

## 2021-07-24 RX ADMIN — NAFCILLIN INJECTION 2000 MG: 2 POWDER, FOR SOLUTION INTRAMUSCULAR; INTRAMUSCULAR; INTRAVENOUS at 19:52

## 2021-07-24 RX ADMIN — Medication 6 MG: at 21:45

## 2021-07-24 RX ADMIN — MUPIROCIN: 20 OINTMENT TOPICAL at 09:06

## 2021-07-24 RX ADMIN — POTASSIUM CHLORIDE 40 MEQ: 1500 TABLET, EXTENDED RELEASE ORAL at 09:03

## 2021-07-24 RX ADMIN — LOPERAMIDE HYDROCHLORIDE 4 MG: 2 CAPSULE ORAL at 17:16

## 2021-07-24 RX ADMIN — NAFCILLIN INJECTION 2000 MG: 2 POWDER, FOR SOLUTION INTRAMUSCULAR; INTRAMUSCULAR; INTRAVENOUS at 06:47

## 2021-07-24 RX ADMIN — NAFCILLIN INJECTION 2000 MG: 2 POWDER, FOR SOLUTION INTRAMUSCULAR; INTRAMUSCULAR; INTRAVENOUS at 15:15

## 2021-07-24 RX ADMIN — METOPROLOL TARTRATE 37.5 MG: 25 TABLET ORAL at 21:44

## 2021-07-24 ASSESSMENT — PAIN SCALES - GENERAL
PAINLEVEL_OUTOF10: 0

## 2021-07-24 NOTE — PROGRESS NOTES
NEUROLOGY INPATIENT PROGRESS NOTE    7/24/2021         Current Exam:     Chart reviewed. Discussed with RN. Patient is feeling well today without acute complaint. He reports diplopia and vertigo have fully resolved. He continues on antibiotics; planning to go home with a PICC and 4-6 weeks of antibiotics before undergoing MVR. Brief History:    Jatin Fournier is a  36 y.o. male with H/O HTN, HLD, diverticulitis status post partial hemicolectomy in 2012, who was admitted on 7/17/2021 with nausea, vomiting, abdominal pain, diarrhea and body aches with generalized fatigue. He was found to have MSSA sepsis, septic shock, subacute endocarditis and severe MR. Neurology was consulted for reports of visual hallucinations along with vertigo and diplopia. Patient family reported patient describing seeing people and a dog in the room and spoke to them about things that were not happening. Patient recalls seeing water coming from the IV line that was not there. On the morning of 7/21 the patient woke up with severe vertigo and an unsteady gait. He described the vertigo was constant with no aggravating factor, requiring help to get up to the restroom. MRI brain was done with acute embolic infarctions involving the supra and infratentorial structures as well as subacute to chronic encephalomalacia with chronic blood products within left superior parietal lobule and right inferior parietal lobule. CTA head and neck were done and were unremarkable. EEG with mild diffuse slowing. Antiplatelets and anticoagulation held given septic emboli. CTS is following the case and planning for MVR. No current facility-administered medications on file prior to encounter.      Current Outpatient Medications on File Prior to Encounter   Medication Sig Dispense Refill    amLODIPine (NORVASC) 5 MG tablet Take 5 mg by mouth daily      atorvastatin (LIPITOR) 80 MG tablet Take 80 mg by mouth daily      carvedilol (COREG) 25 MG tablet Take 25 mg by mouth 2 times daily (with meals)      dicyclomine (BENTYL) 20 MG tablet Take 20 mg by mouth every 6 hours      hydroCHLOROthiazide (HYDRODIURIL) 25 MG tablet Take 25 mg by mouth daily      HYDROcodone-acetaminophen (NORCO) 5-325 MG per tablet Take 1 tablet by mouth every 6 hours as needed for Pain.  potassium chloride (MICRO-K) 10 MEQ extended release capsule Take 20 mEq by mouth 2 times daily         Allergies: Radha Rapp is allergic to morphine. Past Medical History:   Diagnosis Date    Diverticulosis     Hyperlipidemia     Hypertension        Past Surgical History:   Procedure Laterality Date    ABDOMEN SURGERY      large bowel resection    ANTERIOR CRUCIATE LIGAMENT REPAIR Right     APPENDECTOMY  07/09/1997    COLECTOMY      2012 - D/T Dverticulitis    DILATATION, ESOPHAGUS         Social History: Radha Rapp  reports that he has never smoked. He has never used smokeless tobacco. He reports current alcohol use of about 20.0 standard drinks of alcohol per week. He reports that he does not use drugs.     Family History   Problem Relation Age of Onset    No Known Problems Mother     Other Father         diverticulitis       Objective:   /78   Pulse 109   Temp 98.6 °F (37 °C) (Oral)   Resp 25   Ht 5' 6\" (1.676 m)   Wt 186 lb 4.6 oz (84.5 kg)   SpO2 94%   BMI 30.07 kg/m²     Blood pressure range: Systolic (00LZS), KHU:011 , Min:96 , KODY:339   ; Diastolic (63LDE), QKM:73, Min:48, Max:78      Review of Systems:  Constitutional  Negative for fever and chills    HEENT  Negative for ear discharge, ear pain, nosebleed    Eyes  Negative for photophobia, pain and discharge    Respiratory  Negative for hemoptysis and sputum    Cardiovascular  Negative for orthopnea, claudication and PND    Gastrointestinal  Negative for abdominal pain, diarrhea, blood in stool    Musculoskeletal  Negative for joint pain, negative for myalgia    Skin  Negative for rash or itching    Endo/heme/allergies  Negative for polydipsia, environmental allergy    Psychiatric/behavioral  Negative for suicidal ideation. Patient is not anxious        NEUROLOGIC EXAMINATION  GENERAL  Appears comfortable and in no distress   HEENT  NC/ AT   NECK  Supple   MENTAL STATUS:  Alert, oriented, intact memory, no confusion, normal speech, normal language, no hallucination or delusion   CRANIAL NERVES: II     -      Visual fields intact to confrontation  III,IV,VI -  EOMs full, no afferent defect, no ARIELLA, no ptosis  V     -     Normal facial sensation  VII    -     Normal facial symmetry  VIII   -     Intact hearing  IX,X -     Symmetrical palate  XI    -     Symmetrical shoulder shrug  XII   -     Midline tongue, no atrophy    MOTOR FUNCTION:  significant for good strength of grade 5/5 in bilateral proximal and distal muscle groups of both upper and lower extremities with normal bulk, normal tone and no involuntary movements, no tremor   SENSORY FUNCTION:  Normal touch, normal pin   CEREBELLAR FUNCTION:  Intact fine motor control over upper limbs   REFLEX FUNCTION:  Symmetric, no perverted reflex, no Babinski sign   STATION and GAIT  Not tested       Data:    Lab Results:   CBC:   Recent Labs     07/22/21  0752 07/23/21  0854 07/24/21  0714   WBC 11.4* 9.8 9.8   HGB 11.1* 11.5* 9.4*    196 210     BMP:    Recent Labs     07/23/21  0152 07/23/21  0854 07/24/21  0714    137 131*   K 4.7 4.2 4.0    105 101   CO2 14* 22 22   BUN 13 16 13   CREATININE 0.95 0.92 0.71   GLUCOSE 117* 123* 143*         Lab Results   Component Value Date    ALT 26 07/24/2021    AST 26 07/24/2021    TSH 1.86 07/20/2021    INR 1.0 07/19/2021    LABA1C 5.5 07/17/2021    [id number] 1120 07/17/2021           Diagnostic data reviewed:  CT HEAD (7/20/2021): No acute intracranial abnormality      MRI BRAIN (4/57/0002): Acute embolic infarctions involving supra & infratentorial structures.  Subacute to chronic encephalomalacia with chronic blood products within the L superior parietal lobule & R inferior parietal lobule    CTA HEAD & NECK (7/22/2021): Unremarkable       ECHO (7/18/2021): EF 50%. Severe MR. Posterior mitral valve leaflet prolapse. Possible small vegetation on posterior leaflet    MONROE (7/20/2021): EF 55%. Large vegetation measuring 2.5 X 2.4 cm noted on the posterior leaflet of the mitral valve. Posterior leaflet is flair and is associated with severe anteriorly directed eccentric regurgitation        EEG (7/21/2021): This EEG shows the presence of mild diffuse slowing. This EEG is concordant with diffuse encephalopathy. No clear lateralized or epileptiform disturbance is seen            Impression:  -Metabolic encephalopathy with hallucinations in the setting of MSSA sepsis, septic shock, subacute endocarditis and acute embolic infarctions in the supra/infratentorial structures  -Septic emboli to brain  -MSSA sepsis with subacute endocarditis    Plan:  -Patient's complaints of vertigo and diplopia have resolved  -Patient continues on antibiotics as per ID team  -Antiplatelets/anticoagulation contraindicated due to septic emboli which have a higher chance of bleeding  -MVR to be planned by CTS team following 6 weeks of antibiotics and negative blood cultures  -PT/OT  -Neurologically clear. Will need to follow up in the outpatient clinic in 4 weeks. Will follow with you while he is here. Please note that this note was generated using a voice recognition dictation software. Although every effort was made to ensure the accuracy of this automated transcription, some errors in transcription may have occurred.

## 2021-07-24 NOTE — PROGRESS NOTES
[facility] - Progress Note    Today's Date and Time: 7/24/2021, 2:11 PM    Impression :   · MSSA septicemia 7-17-21  · Persistent growth of bacteria in blood 7-17-21 through 7-20-21 despite treatment  · Sepsis with acute organ dysfunction and septic shock  · Mitral Valve endocarditis. 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 Will need 6 weeks from first negative blood cultures. · Rifampin 600 mg daily added 7/22/21 for synergy  · Please do not give anticoagulants due to the risk of septic emboli in the brain transitioning to hemorrhagic   · Repeat blood cultures ordered for 7/24/21  · Plan infusion of Exebacase under compassionate use protocol on 7-25-21. Patient has given informed consent.     Medical Decision Making/Summary/Discussion:7/24/2021     ·   Infection Control Recommendations   · [place] Precautions  · Contact Isolation     Antimicrobial Stewardship Recommendations     · Simplification of therapy  · Targeted therapy    Coordination of Outpatient Care:   · Estimated Length of IV antimicrobials: 4 weeks  · Patient will need Midline Catheter Insertion: TBD  · Patient will need PICC line Insertion: TBD  · Patient will need: Home IV , [place],  SNF,  LTAC: Yes-either SNF or home infusion  · Patient will need outpatient wound care: TBD    Chief complaint/reason for consultation:   · MSSA sepsis      History of Present Illness:   Hosey Homans is a 36y.o.-year-old  male who was initially admitted on 7/17/2021. Patient seen at the request of Dr. Silver Gowers:     Patient initially presented to [facility] a few days ago with complaints of  abdominal pain, nausea, watery diarrhea and vomiting for the past 5 days after eating some chicken. The patient states that his wife also had episodes of emesis after eating the chicken but her symptoms resolved shortly after. A CT abdomen was unremarkable and he was diagnosed with viral gastroenteritis and sent home with Norco, Zofran, and potassium supplement for hypokalemia. On 7/16/2021, he presented to [address] [place] [address] with worsening symptoms, including fever, chills, fatigue, and worsening abdominal pain with continued vomiting, right big toe pain. Work-up in the ED revealed a fever of 102.6, tachycardia with a heart rate up to 170, dyspnea with hypoxia, blood cultures showed MSSA x2, and urine cultures grew staph aureus ,000 CFU/mL. Occult blood stool positive. CT abdomen 7/17/2021  Impression   No acute abnormality identified in the abdomen and pelvis.       The bladder is markedly distended with a calculated volume of 2 L.       Hepatic steatosis and mild hepatomegaly.       Minor bibasilar atelectasis and interstitial thickening posteriorly. CT chest abdomen pelvis with contrast 7/18/2021  Impression   Moderate distention of the colon multiple air-fluid levels, findings may be   seen with acute enterocolitis. Abnormal labs include:  Sodium 131  Potassium 3.0  .5  Myoglobin 138  Troponin I 65  Amylase 131  AST 66  Lipase 299  Platelet 39  Sed rate 31    ID has been consulted for MSSA sepsis    CURRENT EVALUATION 7/24/2021:    Afebrile  VS stable. BP on lower side  Had episode of SVT during the night.      Upon evaluation, the patient is alert and oriented on 2 L nasal cannula. Mentation is clear. He feels about the same. Reports intermittent rigors. I reviewed with patient and wife his clinical course up to the present and the difficulties with source control. I indicated to the patient and his wife, in the presence of his RN, that we had been able to secure permission from the  of Tilda Meraz, Dr Zoraida Le of the IRB at [facility], Hospital administration and Legal Department for him to receive Exebacase under a Compassionate Use Protocol. I discussed the available information with him including prior published clinical results and safety data. Indicated that there was no promise of efficacy. I emphasized that participation was voluntary. Patient and his wife had all their questions answered. I gave them two copies of the informed consent to review. They will keep one copy and return to signed copy for inclusion in his records. Patient gave informed consent to proceed. CT surgery is planning for valve repair after the patient has completed IV antibiotic therapy-unless the patient decompensates to the point of needing emergent surgical intervention. MRI brain [id number]: Acute embolic infarctions in supra and infratentorial structures. The patient's blood cultures from 7/22/2020 continue to show growth of MSSA. Additional cultures were drawn 7-24-21. Because of the septic emboli in the brain, Ancef was discontinued and nafcillin was initiated on 7/22/2021. Rifampin was also added for synergy. Neurology consult was ordered due to episodes of altered mentation along with vertigo and diplopia. Vertigo and diplopia have subsided. Neurology plans outpatient follow up. Echocardiogram completed on 7/18/2021 revealed posterior mitral valve leaflet prolapse, severe mitral regurgitation, and possible small vegetation on posterior leaflet. Presence of vegetation on mitral valve would help explain the emboli to the brain.     MONROE on 7/20 showed a Large vegetation measuring 2.5 X 2.4 cm noted on the posterior leaflet of the mitral valve. Posterior leaflet is flair and is associated with severe anteriorly directed eccentric regurgitation. Right foot x-ray revealed:  No radiographic evidence for acute osteomyelitis.  Mild soft tissue swelling   of the great toe possibly cellulitis.       RECOMMENDATION:   NM bone scan or MRI may be obtained if there remains strong concern for acute   osteomyelitis.         CT abdomen pelvis revealed: Moderate distention of the colon multiple air-fluid levels, findings may be   seen with acute enterocolitis. Bruising on the patient's back is from a Tens unit-it does not appear to be the source of sepsis. There are no open lesions noted and there are three distinct marks from the electrodes. There are Osler node appearing lesions on the patient's left pinky toe and right middle finger. Stool studies have been negative    Hepatitis panel nonreactive    Discussed with CT surgery and Dr. Marcelino Rose, X rays reviewed: 7/24/2021    BUN:8-->8-->13  Cr:0.76-->0.49-->0.95-->0.71    CRP: 260.5  LDH: 437    WBC:9.9-->10.9-->9.8  Hb:12.5-->10.8-->11.5  Plat: 39-->40-->78-->196  Haptoglobin: 256  Sed rate: 31  Lipase 299->228  Lactate: 1.6-->2.3    Cultures:  Urine:  ·   Blood:  · 7/17/2021: MSSA x2  · 7/18/2021: MSSA  · 7/20/21: MSSA  · 7/22/2021: MSSA  Sputum :  ·   Wound:    Discussed with patient, RN, family, Dr Yamilet Knox. I have personally reviewed the past medical history, past surgical history, medications, social history, and family history, and I have updated the database accordingly.   Past Medical History:     Past Medical History:   Diagnosis Date    Diverticulosis     Hyperlipidemia     Hypertension        Past Surgical  History:     Past Surgical History:   Procedure Laterality Date    ABDOMEN SURGERY      large bowel resection    ANTERIOR CRUCIATE LIGAMENT REPAIR Right     APPENDECTOMY 07/09/1997    COLECTOMY      2012 - D/T Dverticulitis    DILATATION, ESOPHAGUS         Medications:      pantoprazole  40 mg Oral BID AC    metoprolol tartrate  37.5 mg Oral BID    rifampin (RIFADIN) IVPB  600 mg Intravenous Q24H    nafcillin  2,000 mg Intravenous Q4H    potassium chloride  40 mEq Oral BID WC    mupirocin   Nasal BID    sodium chloride flush  5-40 mL Intravenous 2 times per day       Social History:     Social History     Socioeconomic History    Marital status:      Spouse name: Not on file    Number of children: Not on file    Years of education: Not on file    Highest education level: Not on file   Occupational History    Not on file   Tobacco Use    Smoking status: Never Smoker    Smokeless tobacco: Never Used   Vaping Use    Vaping Use: Never assessed   Substance and Sexual Activity    Alcohol use: Yes     Alcohol/week: 20.0 standard drinks     Types: 20 Cans of beer per week     Comment: 20/ week, average every other day    Drug use: Never    Sexual activity: Not on file   Other Topics Concern    Not on file   Social History Narrative    Not on file     Social Determinants of Health     Financial Resource Strain:     Difficulty of Paying Living Expenses:    Food Insecurity:     Worried About Running Out of Food in the Last Year:     [address] in the Last Year:    Transportation Needs:     Lack of Transportation (Medical):      Lack of Transportation (Non-Medical):    Physical Activity:     Days of Exercise per Week:     Minutes of Exercise per Session:    Stress:     Feeling of Stress :    Social Connections:     Frequency of Communication with Friends and Family:     Frequency of Social Gatherings with Friends and Family:     Attends Taoism Services:     Active Member of Clubs or Organizations:     Attends Club or Organization Meetings:     Marital Status:    Intimate Partner Violence:     Fear of Current or Ex-Partner:     Emotionally Abused:     Physically Abused:     Sexually Abused:        Family History:     Family History   Problem Relation Age of Onset    No Known Problems Mother     Other Father         diverticulitis        Allergies:   Morphine     Review of Systems:   Constitutional: No fevers or chills. No systemic complaints  Head: No headaches  Eyes: No double vision or blurry vision. No conjunctival inflammation. ENT: No sore throat or runny nose. . No hearing loss, tinnitus or vertigo. Cardiovascular: No chest pain or palpitations. No shortness of breath. No CASTRO  Lung: No shortness of breath or cough. No sputum production  Abdomen: No nausea, vomiting, diarrhea, or abdominal pain. Rawleigh Billing No cramps. Genitourinary: No increased urinary frequency, or dysuria. No hematuria. No suprapubic or CVA pain  Musculoskeletal: No muscle aches or pains. No joint effusions, swelling or deformities  Hematologic: No bleeding or bruising. Neurologic: No headache, weakness, numbness, or tingling. Integument: Scattered small reddened bumps and bruises  Psychiatric: No depression. Endocrine: No polyuria, no polydipsia, no polyphagia. Physical Examination :     Patient Vitals for the past 8 hrs:   BP Temp Temp src Pulse Resp SpO2 Weight   07/24/21 1200 98/61 -- -- 95 20 -- --   07/24/21 0800 110/76 98.3 °F (36.8 °C) Oral 119 24 92 % --   07/24/21 0612 -- -- -- -- -- -- 186 lb 4.6 oz (84.5 kg)     General Appearance: Awake, alert, and in no apparent distress  Head:  Normocephalic, no trauma  Eyes: Pupils equal, round, reactive to light and accommodation; extraocular movements intact; sclera anicteric; conjunctivae pink. No embolic phenomena. ENT: Oropharynx clear, without erythema, exudate, or thrush. No tenderness of sinuses. Mouth/throat: mucosa pink and moist. No lesions. Dentition in good repair. Neck:Supple, without lymphadenopathy. Thyroid normal, No bruits. Pulmonary/Chest: Clear to auscultation, without wheezes, rales, or rhonchi.   No dullness to percussion. Cardiovascular: Normal sinus tachycardia with murmur, No rubs, or gallops. Abdomen: Soft, non tender. Bowel sounds normal. No organomegaly  All four Extremities: No cyanosis, clubbing, edema, or effusions. Neurologic: No gross sensory or motor deficits. Skin: Warm and dry with good turgor. No signs of peripheral arterial or venous insufficiency. No ulcerations. No open wounds. Medical Decision Making -Laboratory:   I have independently reviewed/ordered the following labs:    CBC with Differential:   Recent Labs     07/23/21  0854 07/24/21 0714   WBC 9.8 9.8   HGB 11.5* 9.4*   HCT 37.0* 30.2*    210   LYMPHOPCT 12* 12*   MONOPCT 7 8     BMP:   Recent Labs     07/23/21  0152 07/23/21  0152 07/23/21  0854 07/24/21 0714      < > 137 131*   K 4.7   < > 4.2 4.0      < > 105 101   CO2 14*   < > 22 22   BUN 13   < > 16 13   CREATININE 0.95   < > 0.92 0.71   MG 2.1  --   --   --     < > = values in this interval not displayed. Hepatic Function Panel:   Recent Labs     07/22/21  0752 07/22/21  0752 07/23/21  0854 07/24/21 0714   PROT 6.6  --   --  6.0*   LABALBU 2.7*  2.7*   < > 2.7* 2.7*  2.7*   BILIDIR 0.16  --   --  0.28   IBILI 0.39  --   --  0.43   BILITOT 0.55  --   --  0.71   ALKPHOS 79  --   --  66   ALT 35  --   --  26   AST 38  --   --  26    < > = values in this interval not displayed. No results for input(s): RPR in the last 72 hours. No results for input(s): HIV in the last 72 hours. No results for input(s): BC in the last 72 hours.   Lab Results   Component Value Date    MUCUS NOT REPORTED 07/17/2021    RBC 3.29 07/24/2021    TRICHOMONAS NOT REPORTED 07/17/2021    WBC 9.8 07/24/2021    YEAST NOT REPORTED 07/17/2021    TURBIDITY CLEAR 07/17/2021     Lab Results   Component Value Date    CREATININE 0.71 07/24/2021    GLUCOSE 143 07/24/2021       Medical Decision Making-Imaging:          MRI Brain 7/2121   Impression       Findings suggestive of acute embolic infarctions involving supra and   infratentorial structures as described.       Subacute to chronic encephalomalacia with chronic blood products within the   left superior parietal lobule and right inferior parietal lobule.       There is no acute intracranial hemorrhage, or intracranial mass lesion.           EXAMINATION:   CT OF THE CHEST, ABDOMEN, AND PELVIS WITH CONTRAST 7/18/2021 10:53 am       TECHNIQUE:   CT of the chest, abdomen and pelvis was performed with the administration of   intravenous contrast. Multiplanar reformatted images are provided for review. Dose modulation, iterative reconstruction, and/or weight based adjustment of   the mA/kV was utilized to reduce the radiation dose to as low as reasonably   achievable.       COMPARISON:   None       HISTORY:   ORDERING SYSTEM PROVIDED HISTORY: MSSA bacteremia, ? DIC   TECHNOLOGIST PROVIDED HISTORY:   MSSA bacteremia, ? DIC       Reason for Exam: sepsis with acute organ dysfunction   Acuity: Unknown   Type of Exam: Unknown       FINDINGS:   Lungs/pleura: The central airways are patent.  There are no suspicious   pulmonary nodules       Mediastinum: There is no acute mediastinal abnormality       Soft Tissues/Bones: No suspicious osteolytic or osteoblastic lesions       Abdominal organs: The liver, spleen, adrenal glands, kidneys, and pancreas   demonstrate no acute abnormality.       GI/Bowel: The visualized portions of the small bowel are unremarkable. There   is borderline dilation of the colon.  There are multiple air-fluid levels   throughout the colon.       Peritoneum/Retroperitoneum: There is a solid amount of retroperitoneal fluid   along the distal ureters bilaterally.       Pelvis:  The bladder is moderately distended.       Bones: No suspicious osseous lesions are identified           Impression   Moderate distention of the colon multiple air-fluid levels, findings may be   seen with acute enterocolitis.             XR FOOT LEFT (2 VIEWS) [[id number]] Collected: 07/18/21 1411      Order Status: Completed Updated: 07/19/21 0759     Narrative:       EXAMINATION:   TWO XRAY VIEWS OF THE LEFT FOOT     7/18/2021 2:05 pm     COMPARISON:   None. HISTORY:   ORDERING SYSTEM PROVIDED HISTORY: Left big toe pain and erythema. Looking for   source of MSSA sepsis   TECHNOLOGIST PROVIDED HISTORY:   Left big toe pain and erythema. Looking for source of MSSA sepsis     FINDINGS:   AP and lateral views of the foot obtained.  Normal alignment and   mineralization.  No abnormal periosteal reaction.  No erosions.  No fracture. No aggressive lytic or blastic lesions.  Mild soft tissue swelling of the   great toe noted.      Impression:       No radiographic evidence for acute osteomyelitis.  Mild soft tissue swelling   of the great toe possibly cellulitis.       EXAMINATION:   MRI OF THE BRAIN WITHOUT CONTRAST  7/21/2021 7:03 pm       TECHNIQUE:   Multiplanar multisequence MRI of the brain was performed without the   administration of intravenous contrast.       COMPARISON:   Head CT of 07/20/2021       HISTORY:   ORDERING SYSTEM PROVIDED HISTORY: episode of vertigo, dipolopia, has   infecticive endocadrditis, r/o septic emboli, abscess   TECHNOLOGIST PROVIDED HISTORY:   episode of vertigo, dipolopia, has infecticive endocadrditis, r/o septic   emboli, abscess   Reason for Exam: VERTIGO, MSSA BACTEREMIA, R/O SEPTIC EMBOLI       FINDINGS:   MRI brain:       There are punctate areas of restricted diffusion within the left superior   frontal gyrus subcortical white matter, right superior frontal gyrus   subcortical white matter, left superior parietal lobule right inferior   frontal gyrus subcortical white matter, left inferior frontal gyrus   subcortical white matter and bilateral cerebellar hemisphere, suggestive of   acute embolic infarctions.       There are focus of encephalomalacia with susceptibility artifact and   increased signal on DWI within left superior parietal lobule and right   inferior parietal lobule suggestive of subacute to chronic infarct with   chronic blood products.       There is no acute intracranial hemorrhage, or intracranial mass lesion. No   mass effect, midline shift, or extra-axial collection is noted.       The brain parenchyma is otherwise normal.  The pituitary gland is normal in   appearance. The cerebellar tonsils are in normal position.       The ventricles, sulci, and cisterns are within normal size and range.  No   significant volume loss. .  The intracranial flow voids are preserved.       The globes and orbits are within normal limits. The visualized extracranial   structures including paranasal sinuses and mastoid air cells are unremarkable.           Impression       Findings suggestive of acute embolic infarctions involving supra and   infratentorial structures as described.       Subacute to chronic encephalomalacia with chronic blood products within the   left superior parietal lobule and right inferior parietal lobule.       There is no acute intracranial hemorrhage, or intracranial mass lesion.       The findings were sent to the Radiology Results [address] at 8:24   pm on 7/21/2021to be communicated to a licensed caregiver.                 Medical Decision [id number]-Loerw:     Component Collected Lab   Specimen Description 07/17/2021  8:48  [address]   . BLOOD    Special Requests 07/17/2021  8:48  [address]   10ML LFA    Culture Abnormal  07/17/2021  8:48 AM [address] Blood Culture Results called to and read back by: Sophie Diaz RN AT 2045 ON 07.17.2021    Culture 07/17/2021  8:48 AM [address] FROM BOTTLE: GRAM POSITIVE COCCI IN CLUSTERS    Culture Abnormal  07/17/2021  8:48  [address]   Staphylococcus aureus Detected: mecA/C and MREJ Not Detected Methodology- Polymerase Chain Reaction (PCR)   Culture Abnormal  07/17/2021  8:48 AM [address] [facility] This isolate is methicillin susceptible. Testing Performed By    Manjinder Watson Name Director Address Valid Date Range   [address]-Jessica Adams MD [address] [facility] [address] 08/30/17 0801-Present   Susceptibility    Staphylococcus aureus (4)    Antibiotic Interpretation DAVID Status    penicillin Resistant  Final     >=0.5   RESISTANT   cefoxitin screen  NOT REPORTED Final    ciprofloxacin   Final     NOT REPORTED    clindamycin Sensitive  Final     <=0.25   SUSCEPTIBLE   erythromycin Sensitive  Final     <=0.25   SUSCEPTIBLE   gentamicin Sensitive  Final     <=0.5   SUSCEPTIBLE   gentamicin Sensitive Gentamicin is used only in combination with other active agents that test susceptible. Final    Induced Clind Resist  NOT REPORTED Final    levofloxacin Sensitive  Final     0.25   SUSCEPTIBLE   linezolid   Final     NOT REPORTED    moxifloxacin   Final     NOT REPORTED    nitrofurantoin   Final     NOT REPORTED    oxacillin Sensitive  Final     0.5   SUSCEPTIBLE   Synercid   Final     NOT REPORTED    rifampin   Final     NOT REPORTED    tetracycline Sensitive  Final     <=1   SUSCEPTIBLE   tigecycline   Final     NOT REPORTED    trimethoprim-sulfamethoxazole Sensitive  Final     <=10   SUSCEPTIBLE   vancomycin   Final     NOT REPORTED        Medical Decision Making-Other:     Note:  · Labs, medications, radiologic studies were reviewed with personal review of films  · Large amounts of data were reviewed  · Discussed with nursing Staff, Discharge planner  · Infection Control and Prevention measures reviewed  · All prior entries were reviewed  · Administer medications as ordered  · Prognosis: Guarded  · Discharge planning reviewed  · Follow up as outpatient. Thank you for allowing us to participate in the care of this patient.  Please call with questions.     Abril Mariano MD       Pager: [phone] - Office: [phone]

## 2021-07-24 NOTE — PLAN OF CARE
Problem: Pain:  Goal: Pain level will decrease  Description: Pain level will decrease  Outcome: Ongoing  Goal: Control of acute pain  Description: Control of acute pain  Outcome: Ongoing  Goal: Control of chronic pain  Description: Control of chronic pain  Outcome: Ongoing     Problem: Infection, Septic Shock:  Goal: Will show no infection signs and symptoms  Description: Will show no infection signs and symptoms  Outcome: Ongoing     Problem: Falls - Risk of:  Goal: Will remain free from falls  Description: Will remain free from falls  Outcome: Ongoing  Goal: Absence of physical injury  Description: Absence of physical injury  Outcome: Ongoing     Problem: Gas Exchange - Impaired:  Goal: Levels of oxygenation will improve  Description: Levels of oxygenation will improve  Outcome: Ongoing     Problem: Cardiac:  Goal: Ability to maintain vital signs within normal range will improve  Description: Ability to maintain vital signs within normal range will improve  Outcome: Ongoing  Goal: Cardiovascular alteration will improve  Description: Cardiovascular alteration will improve  Outcome: Ongoing     Problem: Health Behavior:  Goal: Will modify at least one risk factor affecting health status  Description: Will modify at least one risk factor affecting health status  Outcome: Ongoing  Goal: Identification of resources available to assist in meeting health care needs will improve  Description: Identification of resources available to assist in meeting health care needs will improve  Outcome: Ongoing     Problem: Physical Regulation:  Goal: Ability to maintain vital signs within normal range will improve  Description: Ability to maintain vital signs within normal range will improve  Outcome: Ongoing  Goal: Complications related to the disease process, condition or treatment will be avoided or minimized  Description: Complications related to the disease process, condition or treatment will be avoided or minimized  Outcome: Ongoing  Goal: Diagnostic test results will improve  Description: Diagnostic test results will improve  Outcome: Ongoing  Goal: Will remain free from infection  Description: Will remain free from infection  Outcome: Ongoing  Goal: Ability to maintain clinical measurements within normal limits will improve  Description: Ability to maintain clinical measurements within normal limits will improve  Outcome: Ongoing  Goal: Will show no signs and symptoms of electrolyte imbalance  Description: Will show no signs and symptoms of electrolyte imbalance  Outcome: Ongoing     Problem: Nutritional:  Goal: Nutritional status will improve  Description: Nutritional status will improve  Outcome: Ongoing     Problem: Respiratory:  Goal: Ability to maintain normal respiratory secretions will improve  Description: Ability to maintain normal respiratory secretions will improve  Outcome: Ongoing     Problem: Skin Integrity:  Goal: Demonstration of wound healing without infection will improve  Description: Demonstration of wound healing without infection will improve  Outcome: Ongoing  Goal: Complications related to intravenous access or infusion will be avoided or minimized  Description: Complications related to intravenous access or infusion will be avoided or minimized  Outcome: Ongoing  Goal: Will show no infection signs and symptoms  Description: Will show no infection signs and symptoms  Outcome: Ongoing  Goal: Absence of new skin breakdown  Description: Absence of new skin breakdown  Outcome: Ongoing     Problem: Anxiety:  Goal: Level of anxiety will decrease  Description: Level of anxiety will decrease  Outcome: Ongoing     Problem: Fluid Volume:  Goal: Ability to achieve a balanced intake and output will improve  Description: Ability to achieve a balanced intake and output will improve  Outcome: Ongoing     Problem: Nutrition  Goal: Optimal nutrition therapy  Outcome: Ongoing     Problem: HEMODYNAMIC STATUS  Goal: Patient has stable vital signs and fluid balance  Outcome: Ongoing     Problem: ACTIVITY INTOLERANCE/IMPAIRED MOBILITY  Goal: Mobility/activity is maintained at optimum level for patient  Outcome: Ongoing     Problem: COMMUNICATION IMPAIRMENT  Goal: Ability to express needs and understand communication  Outcome: Ongoing

## 2021-07-24 NOTE — CARE COORDINATION
Several Days  [] More than half the day  []  Nearly every day    9. Thoughts that you would be better off dead or of hurting yourself in some way? [x] Not at all  [] Several Days  [] More than half the day  []  Nearly every day    Total Score: 1    If you checked off any problems, how difficult have these problems made it for you to do your work, take care of things at home, or get along with other people?    [x] Not difficult at all  [] Somewhat Difficult  [] Very Difficult  []  Extremely Difficult

## 2021-07-24 NOTE — PROGRESS NOTES
[facility]  Office: [address], DO, Kaity Moe, DO, Jf Cross, DO, Manuela Erazo Blood, DO, Margaret Padilla MD, Genaro Sy MD, Balbina Sorto MD, Jahaira Alvarez MD, Olayinka Mckeon MD, Brittnee Rae MD, Ishan Cross MD, Katie Gaytan, DO, Franciso Schaumann, MD, Antoinette Mansfield DO, Stuart Ellis MD,  Destini Fishman DO, Tangela Mantilla MD, Denman Najjar, MD, Francisco Conner MD, Nayla Frank MD, Lorna Silva MD, Carin Denson MD, Keyon Childress, [facility], [facility], CNP, Sincere Lui, CNP, Kaitlin Agarwal, CNS, Srinivasa Spain, Meryl Charles, CNP, Ernestine Alfonso, CNP, Susan Harvey, CNP, Kathy Rosas, CNP, MELONIE MoreauC, Lam Maki, [facility], Scot Chun, CNP, Amelie Jeffries, CNP, Paula Hale, CNP, Tom Blandon, CNP, Stephanie Rush, CNP, Adolfo Cole, CNP, Luisa Cardenas, CNP, Katrina Schroeder, [address] [place] [address]    Progress Note    7/24/2021    4:42 PM    Name:   Sharif Choi  MRN:     [id number]     [place]:      [de-identified]   Room:   3004/3004-01   Day:  7  Admit Date:  7/17/2021 12:42 AM    PCP:   Eliseo Mercado MD  Code Status:  Full Code    Subjective:     C/C:   Chief Complaint   Patient presents with    Abdominal Pain    Emesis     Interval History Status: improved. Patient seen examined this morning. Wife and aunt/uncle are at bedside. Tachycardia persists, but the patient reports a full night of sleep and a generalized feeling of improvement. Tolerating diet. Diarrhea persists. No dizziness. Brief History:     Mr. Dina Chester is a pleasant 36year old  gentleman who presneted to [facility] for evaluation of confusion, fatigue, diarrhea, and abdominal pains on 7/17/2021. He was recently released from [facility] for similar complaints and was noted to have low potassium levels at that time.  CT of the abdomen was normal. The diarrhea was \"water-like\" and profuse in nature, admitting to approx 5-7 watery stools a day for the past 4 days prior to admission. No BRBPR. Abdominal pain was present prior to the diarrhea. There is associated fevers and chills. Subjective fever of 104 prior to arrival. Emesis is food that is digested but from earlier in the day. Patient admits to eating popcorn and nuts just the weekend before the onset. His comorbidities include psychiatric disturbances following deployment to [facility] ([place], [place]) in 2003 and 2005, along with hypertension, and a history of a colectomy secondary to diverticulitis. CT of the abdomen pelvis was performed which revealed a markedly distended bladder with a calculated volume of approximately 2 L in the bladder along with hepatic steatosis and mild hepatomegaly with minor bibasilar atelectasis and interstitial thickening posteriorly. A Cárdenas catheter was placed with alleviation of the patient's urinary retention. Next morning, the patient's lipase level was noted be 29. A CT of the abdomen pelvis was repeated on 7/18 which revealed multiple air-fluid levels, seen with acute enterocolitis. Patient was her IV antibiotics admitted to the hospital.    He was noted to be quite tachycardic with a heart rate upwards of 151 bpm.  Cardiology was consulted, as source troponin was also increased. Troponins likely thought to be secondary to a type II mechanism from sepsis. 2D echo was ordered and revealed an EF of 50% with posterior mitral leaflet prolapse and severe MR with an eccentric anterior directed jet with a possible small vegetation on the posterior leaflet. He is scheduled to undergo MONROE on 7/20. Meanwhile, 2 of 2 blood cultures have returned positive for MSSA. His urine culture also grew 50 to 100,000 CFU of MSSA. Infectious disease was consulted and is placed the patient on Ancef. He was also noted to be quite thrombocytopenic with a bradley of 32. This is improving.   Fibrinogen is normal.    Patient was scheduled to undergo MONROE on 7/19, however, due to profound hypokalemia, this procedure was postponed. He did develop epistaxis on the evening of 7/18. ENT was consulted and recommended transfusing platelets for goal of greater than 50k. He underwent nasal cautery along with Afrin use which controlled his bleeding.    - MRI with acute infarcts  - Adjusting antibiotic therapy  - CT surgery wanting to intervene on valve -but after antibiotic therapy. Review of Systems:     Constitutional: Reports no rigors today, negative for sweats  Respiratory: Denies shortness of breath; negative for cough, wheezing  Cardiovascular:  Negative for chest pain, chest pressure/discomfort, lower extremity edema, palpitations  Gastrointestinal: Reports continued diarrhea, but daily improvement. Negative for abdominal pain, constipation,  nausea, vomiting  Extremities: Reports left great toe redness is resolved. Pain is resolved. Neurological: Reports no further episodes of dizziness. Denies headache. Medications: Allergies:     Allergies   Allergen Reactions    Morphine Other (See Comments)     Pt report muscle cramps       Current Meds:   Scheduled Meds:    pantoprazole  40 mg Oral BID AC    metoprolol tartrate  37.5 mg Oral BID    rifampin (RIFADIN) IVPB  600 mg Intravenous Q24H    nafcillin  2,000 mg Intravenous Q4H    potassium chloride  40 mEq Oral BID WC    mupirocin   Nasal BID    sodium chloride flush  5-40 mL Intravenous 2 times per day     Continuous Infusions:    sodium chloride 100 mL/hr at 07/24/21 0700    sodium chloride      sodium chloride       PRN Meds: diphenhydrAMINE, meclizine, ketorolac, loperamide, sodium chloride, sodium chloride, sodium chloride flush, sodium chloride, ondansetron **OR** ondansetron, acetaminophen **OR** acetaminophen, polyethylene glycol, magnesium sulfate, oxyCODONE, melatonin, aluminum & magnesium hydroxide-simethicone    Data:     Past Medical History:   has a past medical history of Diverticulosis, Hyperlipidemia, and Hypertension. Social History:   reports that he has never smoked. He has never used smokeless tobacco. He reports current alcohol use of about 20.0 standard drinks of alcohol per week. He reports that he does not use drugs. Family History:   Family History   Problem Relation Age of Onset    No Known Problems Mother     Other Father         diverticulitis       Vitals:  BP 98/61   Pulse 95   Temp 98.3 °F (36.8 °C) (Oral)   Resp 20   Ht 5' 6\" (1.676 m)   Wt 186 lb 4.6 oz (84.5 kg)   SpO2 92%   BMI 30.07 kg/m²   Temp (24hrs), Av.3 °F (36.8 °C), Min:98 °F (36.7 °C), Max:98.6 °F (37 °C)    No results for input(s): POCGLU in the last 72 hours. I/O (24Hr): Intake/Output Summary (Last 24 hours) at 2021 1642  Last data filed at 2021 1632  Gross per 24 hour   Intake 3669 ml   Output --   Net 3669 ml       Labs:  Hematology:  Recent Labs     21  0752 21  0854 21  0714   WBC 11.4* 9.8 9.8   RBC 3.87* 3.95* 3.29*   HGB 11.1* 11.5* 9.4*   HCT 34.7* 37.0* 30.2*   MCV 89.7 93.7 91.8   MCH 28.7 29.1 28.6   MCHC 32.0 31.1 31.1   RDW 13.6 13.4 13.4    196 210   MPV 10.6 10.4 10.5     Chemistry:  Recent Labs     21  0152 21  0854 21  0901 21  1837 21  0037 21  0714    137  --   --   --  131*   K 4.7 4.2  --   --   --  4.0    105  --   --   --  101   CO2 14* 22  --   --   --  22   GLUCOSE 117* 123*  --   --   --  143*   BUN 13 16  --   --   --  13   CREATININE 0.95 0.92  --   --   --  0.71   MG 2.1  --   --   --   --   --    ANIONGAP 20* 10  --   --   --  8*   LABGLOM >60 >60  --   --   --  >60   GFRAA >60 >60  --   --   --  >60   CALCIUM 8.2* 8.3*  --   --   --  8.0*   PHOS 4.8* 4.7*  --   --   --  2.8   TROPHS 92*  --   --   --   --   --    LACTACIDWB 5.4*  --    < > 1.9 1.2 1.0    < > = values in this interval not displayed.      Recent Labs 07/22/21  0752 07/23/21  0854 07/24/21  0714   PROT 6.6  --  6.0*   LABALBU 2.7*  2.7* 2.7* 2.7*  2.7*   AST 38  --  26   ALT 35  --  26   ALKPHOS 79  --  66   BILITOT 0.55  --  0.71   BILIDIR 0.16  --  0.28     ABG:  Lab Results   Component Value Date    POCPH 7.486 07/18/2021    POCPCO2 28.6 07/18/2021    POCPO2 134.5 07/18/2021    POCHCO3 21.6 07/18/2021    NBEA 1 07/18/2021    PBEA NOT REPORTED 07/18/2021    XME0RGQ NOT REPORTED 07/18/2021    LGLO8YLY 99 07/18/2021    FIO2 NOT REPORTED 07/18/2021     Lab Results   Component Value Date/Time    SPECIAL 5ML LT HAND 07/24/2021 07:09 AM     Lab Results   Component Value Date/Time    CULTURE NO GROWTH 8 HOURS 07/24/2021 07:09 AM       Radiology:  CT ABDOMEN PELVIS WO CONTRAST Additional Contrast? None    Result Date: 7/17/2021  No acute abnormality identified in the abdomen and pelvis. The bladder is markedly distended with a calculated volume of 2 L. Hepatic steatosis and mild hepatomegaly. Minor bibasilar atelectasis and interstitial thickening posteriorly. XR CHEST (SINGLE VIEW FRONTAL)    Result Date: 7/17/2021  Unremarkable portable chest radiograph. XR FOOT LEFT (2 VIEWS)    Result Date: 7/19/2021  No radiographic evidence for acute osteomyelitis. Mild soft tissue swelling of the great toe possibly cellulitis. RECOMMENDATION: NM bone scan or MRI may be obtained if there remains strong concern for acute osteomyelitis. XR ABDOMEN (KUB) (SINGLE AP VIEW)    Result Date: 7/17/2021  Nonobstructive bowel gas pattern. No acute process identified. US GALLBLADDER RUQ    Result Date: 7/17/2021  Mild hepatomegaly. Diffuse hepatic steatosis. Contracted gallbladder. No cholelithiasis or acute cholecystitis. XR CHEST PORTABLE    Result Date: 7/18/2021  No focal consolidation. No acute process evident.      CT CHEST ABDOMEN PELVIS W CONTRAST    Result Date: 7/18/2021  Moderate distention of the colon multiple air-fluid levels, findings may be seen with acute enterocolitis. Physical Examination:        General appearance:  alert, cooperative and no distress,  gentleman sitting up in bed  Mental Status:  oriented to person, place and time and normal affect  Lungs:  clear to auscultation bilaterally, normal effort  Heart: Tachycardic continue rate (106 bpm) with regular rhythm, there is a loud systolic murmur which is blowing in nature best appreciated at the apex  Abdomen:  soft, nontender, nondistended, protuberant, normal bowel sounds, no masses, hepatomegaly, splenomegaly  Extremities:  no edema, redness, tenderness in the calves; left great toe with resolution of erythema at the 1st MTP joint. Neuro:  strength is equal bilaterally. Upper extremity strength is 5/5. Lower extremity strength is 5/5. No nystagmus noted. Skin:  Erythema to the first MTP joint on the left is resolved. Mid back erythema and discoloration noted. See media    Assessment:        Hospital Problems         Last Modified POA    * (Principal) MSSA bacteremia 7/20/2021 Yes    Diarrhea 7/20/2021 Yes    Acute dehydration 7/20/2021 Yes    Hypokalemia 7/20/2021 Yes    Epistaxis 7/20/2021 No    Thrombocytopenia ([address] 7/20/2021 Yes    Sepsis with acute organ dysfunction and septic shock ([address] 7/20/2021 Yes    Delusions ([address] 7/20/2021 Yes    Hypophosphatemia 7/20/2021 Yes    Severe mitral regurgitation 7/20/2021 Yes    Acute respiratory failure with hypoxia ([address] 7/20/2021 Yes    GI bleed 7/20/2021 Yes    Vertigo 7/21/2021 No    Hypomagnesemia 7/20/2021 Yes    Normocytic normochromic anemia 7/20/2021 Yes    Obesity (BMI 30-39. 9) 7/20/2021 Yes    Transaminitis 7/20/2021 Yes    Hyperglycemia 7/20/2021 Yes    Essential hypertension 5/00/9565 Yes    Metabolic encephalopathy 3/80/4853 Yes    Iron deficiency anemia secondary to blood loss (chronic) 7/21/2021 Yes    Guaiac positive stools 7/21/2021 Yes          Plan:        1.  MSSA bacteremia - Continue Nafcillin 2 g q 4 hours and Rifampin 600 mg qd through 8/15. ID following. They have gotten approval for a one time dose of exebacase. No AC recommended. Source is still unclear. CT of the spine is without evidence of any discitis that we can tell. Large mitral valve vegetation noted. CT surgery has evaluated the patient and is recommending surgery after antibiotic course. Discussed with ID today. 2. 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 Continue IV antibiotics as noted above. Anticipate 6 weeks of antibiotics after first negative culture. Exebacase to be given. 3. SVT vs sinus tachycardia - likely secondary to sepsis. Avoid beta-blocker unless patient is in SVT. 4. Lactic acidosis - resolved  5. Acute respiratory failure with hypoxia - D-dimer is elevated. Wean supplemental oxygen as able. 6. Acute septic emboli infarcts - to the supra and infratentorial structures along with subacute to chronic encephalomalacia with chronic blood products within the left superior parietal lobule and right inferior parietal lobule. Avoid anticoagulation  7. Delusions, confusion, TME - possibly, and likely due to sepsis +/- acute infarcts. Resolved  8. Vertigo - likely related to #6 - continue antivert. Symptoms esolved. 9. Thrombocytopenia - resolved. Likely secondary to sepsis. 10. GI bleed/normo normo anemia-Continue protonix. Follow up with GI as noted. 11. Acute diarrhea - increase imodium dose today. 12. Hypokalemia, hypophosphatemia - replace lytes daily. Change potassium to 20 mEq daily. 13. Transaminitis-likely related to fatty liver disease, possibly due to sepsis. Now WNL  14. Obesity 30.07 - diet/weight loss/exercise  15. Acute left great toe pain - gout vs septic joint - resolved with IV antibioitcs  16. Disposition: Discharge once patient has negative culture. 6 weeks of antibiotics to follow.   Then follow-up with CT surgery for interventional eval.  Patient receiving exebacase today or tomorrow.     Louis Roy DO  7/24/2021  4:42 PM

## 2021-07-24 NOTE — PROGRESS NOTES
[facility]  Progress Note                   Date:   7/24/2021  Patient name: Edmundo Cash  Date of admission:  7/17/2021 12:42 AM  MRN:   [id number]  YOB: 1980  PCP: Bard Peggy MD    Reason for Admission: Diarrhea [R19.7]    Subjective:       Clinical Changes /Abnormalities: Seen & examined in room with family at bedside. Overnight another episode of tachycardia noted with rates up to 135. Pt. Was asymptomatic. Resolved spontaneously. Denies any chest pain or SOB. SR/ST on tele      Review of Systems    Medications:   Scheduled Meds:   pantoprazole  40 mg Oral BID AC    metoprolol tartrate  37.5 mg Oral BID    rifampin (RIFADIN) IVPB  600 mg Intravenous Q24H    nafcillin  2,000 mg Intravenous Q4H    potassium chloride  40 mEq Oral BID WC    mupirocin   Nasal BID    sodium chloride flush  5-40 mL Intravenous 2 times per day     Continuous Infusions:   sodium chloride 100 mL/hr at 07/23/21 0954    sodium chloride      sodium chloride       CBC:   Recent Labs     07/22/21  0752 07/23/21  0854 07/24/21  0714   WBC 11.4* 9.8 9.8   HGB 11.1* 11.5* 9.4*    196 210     BMP:    Recent Labs     07/23/21  0152 07/23/21  0854 07/24/21  0714    137 131*   K 4.7 4.2 4.0    105 101   CO2 14* 22 22   BUN 13 16 13   CREATININE 0.95 0.92 0.71   GLUCOSE 117* 123* 143*     Hepatic:  Recent Labs     07/22/21  0752 07/24/21  0714   AST 38 26   ALT 35 26   BILITOT 0.55 0.71   ALKPHOS 79 66     Troponin:   Recent Labs     07/23/21  0152   TROPHS 92*     BNP: No results for input(s): BNP in the last 72 hours. Lipids: No results for input(s): CHOL, HDL in the last 72 hours. Invalid input(s): LDLCALCU  INR: No results for input(s): INR in the last 72 hours.     DIAGNOSTIC DATA  MONROE 7/20/2021  MONROE findings:     LA:       Normal  RAJAT:    No thrombus  RA:      Normal  RV:      Normal  LV:       Normal  Estimated LVEF:         55%  Aorta:               Mild atheromatous disease arch  Pericardium:    Trivial pericardial effusion  Septum:           No intracardiac shunt via color Doppler.      Valves:     Mitral Valve: Large vegetation measuring 2.5 X 2.4 cm attached on the posterior leaflet of the mitral valve associated with Flail and degenerated leaflet. Severe regurgitation w/ an anteriorly directed eccentric jet is identified. Aortic Valve: The aortic valve is trileaflet and opens adequately. No regurgitiation is identified. Tricuspid valve: Structurally normal. No regurgitation is identified. Pulmonary valve: Normal. No significant regurgitation     No thrombus identified.        Summary:      1. A MONROE was performed without complications. 2. LVEF 55%  3. No thrombus identified  4. No intracardiac shunt via color Doppler. 5. Large vegetation measuring 2.5 X 2.4 cm noted on the posterior leaflet of the mitral valve. Posterior leaflet is flair and is associated with severe anteriorly directed eccentric regurgitation.      Recommendations:  1. CV Surgery consult for further recommendations    ECHO 7/18/2021  Summary  Global left ventricular systolic function is normal. Calculated ejection  fraction 48% by Dubois's method. Visually estimated EF 50%. Posterior mitral valve leaflet prolapse. Severe mitral regurgitation. Eccentric anterior diredted jet. EOA = 0.7cm2. PISA radius is 1.3cm. Vena contracta is 0.99 cm. MR volume is  58ml. Possible small vegetation on posterior leaflet. Consider MONROE. Objective:   Vitals: /76   Pulse 119   Temp 98.3 °F (36.8 °C) (Oral)   Resp 24   Ht 5' 6\" (1.676 m)   Wt 186 lb 4.6 oz (84.5 kg)   SpO2 92%   BMI 30.07 kg/m²   General appearance: alert and cooperative with exam  HEENT: Head: Normocephalic, no lesions, without obvious abnormality. Neck:no JVD, trachea midline, no adenopathy  Lungs: Clear to auscultation diminished bases.    Heart: Regular rate and rhythm, s1/s2 auscultated, no murmurs  Abdomen: soft, non-tender, bowel sounds active  Extremities: no edema  Neurologic: not done        Assessment / Acute Cardiac Problems:   1. Troponin elevation likely demand ischemia in setting of hypotension 2/2 to sepsis  2. Sinus tachycardia  3. Septic Shock  4. Bacteremia with blood cultures showing gram positive cocci in clusters x2  5. HTN  6. HLD  7. N/V Diarrhea  8. Hypokalemia    Patient Active Problem List:     Diarrhea     Acute dehydration     Hypomagnesemia     Hypokalemia     Epistaxis     Thrombocytopenia (HCC)     Hyperglycemia     Essential hypertension     Sepsis with acute organ dysfunction and septic shock (HCC)     Delusions (HCC)     Hypophosphatemia     MSSA bacteremia     Severe mitral regurgitation     Acute respiratory failure with hypoxia (HCC)     GI bleed     Normocytic normochromic anemia     Obesity (BMI 30-39. 9)     Transaminitis     Vertigo     Metabolic encephalopathy     Iron deficiency anemia secondary to blood loss (chronic)     Guaiac positive stools      Plan of Treatment:   1. Troponin elevation likely secondary to demand ischemia/sepsis. No ischemic work-up at this time. 2. Tachycardia with episode of SVT. Continue BB with parameters - Dose increased yesterday. IV Lopressor PRN. 3. Keep K > 4.0 and Mag > 2.0  4. Sepsis followed by ID on IV AB  5. Endocarditis:  MONROE as above with large vegetation. CV surgery consulted. IV AB x6 weeks then will plan CTS per notes.     Electronically signed by MARIKA Mcgarry CNP on 7/24/2021 at 9:32 AM  [address]  [phone]

## 2021-07-24 NOTE — PLAN OF CARE
Pt will follow up with CTS on scheduled date after antibiotics to discuss valve surgery   Please have him get re-echo outpatient close to scheduled date along with completion of antibiotics and clean serial blood cultures.

## 2021-07-25 LAB
ABSOLUTE EOS #: <0.03 K/UL (ref 0–0.44)
ABSOLUTE IMMATURE GRANULOCYTE: 0.1 K/UL (ref 0–0.3)
ABSOLUTE LYMPH #: 1.15 K/UL (ref 1.1–3.7)
ABSOLUTE MONO #: 0.78 K/UL (ref 0.1–1.2)
ALBUMIN SERPL-MCNC: 2.7 G/DL (ref 3.5–5.2)
ALBUMIN/GLOBULIN RATIO: 0.8 (ref 1–2.5)
ALP BLD-CCNC: 67 U/L (ref 40–129)
ALT SERPL-CCNC: 23 U/L (ref 5–41)
ANION GAP SERPL CALCULATED.3IONS-SCNC: 8 MMOL/L (ref 9–17)
AST SERPL-CCNC: 20 U/L
BASOPHILS # BLD: 0 % (ref 0–2)
BASOPHILS ABSOLUTE: 0.03 K/UL (ref 0–0.2)
BILIRUB SERPL-MCNC: 0.41 MG/DL (ref 0.3–1.2)
BUN BLDV-MCNC: 8 MG/DL (ref 6–20)
BUN/CREAT BLD: ABNORMAL (ref 9–20)
CALCIUM SERPL-MCNC: 8.3 MG/DL (ref 8.6–10.4)
CHLORIDE BLD-SCNC: 102 MMOL/L (ref 98–107)
CO2: 23 MMOL/L (ref 20–31)
CREAT SERPL-MCNC: 0.56 MG/DL (ref 0.7–1.2)
DIFFERENTIAL TYPE: ABNORMAL
EOSINOPHILS RELATIVE PERCENT: 0 % (ref 1–4)
GFR AFRICAN AMERICAN: >60 ML/MIN
GFR NON-AFRICAN AMERICAN: >60 ML/MIN
GFR SERPL CREATININE-BSD FRML MDRD: ABNORMAL ML/MIN/{1.73_M2}
GFR SERPL CREATININE-BSD FRML MDRD: ABNORMAL ML/MIN/{1.73_M2}
GLUCOSE BLD-MCNC: 130 MG/DL (ref 70–99)
HCT VFR BLD CALC: 30.8 % (ref 40.7–50.3)
HEMOGLOBIN: 9.7 G/DL (ref 13–17)
IMMATURE GRANULOCYTES: 1 %
LYMPHOCYTES # BLD: 11 % (ref 24–43)
MAGNESIUM: 2 MG/DL (ref 1.6–2.6)
MCH RBC QN AUTO: 28.7 PG (ref 25.2–33.5)
MCHC RBC AUTO-ENTMCNC: 31.5 G/DL (ref 28.4–34.8)
MCV RBC AUTO: 91.1 FL (ref 82.6–102.9)
MONOCYTES # BLD: 8 % (ref 3–12)
NRBC AUTOMATED: 0 PER 100 WBC
PDW BLD-RTO: 13.2 % (ref 11.8–14.4)
PLATELET # BLD: 250 K/UL (ref 138–453)
PLATELET ESTIMATE: ABNORMAL
PMV BLD AUTO: 9.9 FL (ref 8.1–13.5)
POTASSIUM SERPL-SCNC: 3.5 MMOL/L (ref 3.7–5.3)
RBC # BLD: 3.38 M/UL (ref 4.21–5.77)
RBC # BLD: ABNORMAL 10*6/UL
SEG NEUTROPHILS: 80 % (ref 36–65)
SEGMENTED NEUTROPHILS ABSOLUTE COUNT: 8.26 K/UL (ref 1.5–8.1)
SODIUM BLD-SCNC: 133 MMOL/L (ref 135–144)
TOTAL PROTEIN: 6.3 G/DL (ref 6.4–8.3)
WBC # BLD: 10.3 K/UL (ref 3.5–11.3)
WBC # BLD: ABNORMAL 10*3/UL

## 2021-07-25 PROCEDURE — 2500000003 HC RX 250 WO HCPCS: Performed by: INTERNAL MEDICINE

## 2021-07-25 PROCEDURE — 2580000003 HC RX 258: Performed by: NURSE PRACTITIONER

## 2021-07-25 PROCEDURE — 83735 ASSAY OF MAGNESIUM: CPT

## 2021-07-25 PROCEDURE — 6370000000 HC RX 637 (ALT 250 FOR IP): Performed by: NURSE PRACTITIONER

## 2021-07-25 PROCEDURE — 99232 SBSQ HOSP IP/OBS MODERATE 35: CPT | Performed by: INTERNAL MEDICINE

## 2021-07-25 PROCEDURE — 99232 SBSQ HOSP IP/OBS MODERATE 35: CPT | Performed by: NURSE PRACTITIONER

## 2021-07-25 PROCEDURE — 80053 COMPREHEN METABOLIC PANEL: CPT

## 2021-07-25 PROCEDURE — 87040 BLOOD CULTURE FOR BACTERIA: CPT

## 2021-07-25 PROCEDURE — 36415 COLL VENOUS BLD VENIPUNCTURE: CPT

## 2021-07-25 PROCEDURE — 85025 COMPLETE CBC W/AUTO DIFF WBC: CPT

## 2021-07-25 PROCEDURE — 6370000000 HC RX 637 (ALT 250 FOR IP): Performed by: INTERNAL MEDICINE

## 2021-07-25 PROCEDURE — 6360000002 HC RX W HCPCS: Performed by: NURSE PRACTITIONER

## 2021-07-25 PROCEDURE — 2060000000 HC ICU INTERMEDIATE R&B

## 2021-07-25 PROCEDURE — 2580000003 HC RX 258: Performed by: INTERNAL MEDICINE

## 2021-07-25 PROCEDURE — 86403 PARTICLE AGGLUT ANTBDY SCRN: CPT

## 2021-07-25 PROCEDURE — 87205 SMEAR GRAM STAIN: CPT

## 2021-07-25 PROCEDURE — 6360000002 HC RX W HCPCS: Performed by: INTERNAL MEDICINE

## 2021-07-25 PROCEDURE — 87186 SC STD MICRODIL/AGAR DIL: CPT

## 2021-07-25 RX ORDER — METOPROLOL TARTRATE 50 MG/1
50 TABLET, FILM COATED ORAL 2 TIMES DAILY
Status: DISCONTINUED | OUTPATIENT
Start: 2021-07-25 | End: 2021-08-02 | Stop reason: HOSPADM

## 2021-07-25 RX ORDER — DIPHENOXYLATE HYDROCHLORIDE AND ATROPINE SULFATE 2.5; .025 MG/1; MG/1
1 TABLET ORAL 2 TIMES DAILY
Status: DISCONTINUED | OUTPATIENT
Start: 2021-07-25 | End: 2021-08-02 | Stop reason: HOSPADM

## 2021-07-25 RX ADMIN — NAFCILLIN INJECTION 2000 MG: 2 POWDER, FOR SOLUTION INTRAMUSCULAR; INTRAMUSCULAR; INTRAVENOUS at 07:35

## 2021-07-25 RX ADMIN — ACETAMINOPHEN 650 MG: 325 TABLET ORAL at 17:32

## 2021-07-25 RX ADMIN — PANTOPRAZOLE SODIUM 40 MG: 40 TABLET, DELAYED RELEASE ORAL at 07:50

## 2021-07-25 RX ADMIN — NAFCILLIN INJECTION 2000 MG: 2 POWDER, FOR SOLUTION INTRAMUSCULAR; INTRAMUSCULAR; INTRAVENOUS at 23:01

## 2021-07-25 RX ADMIN — SODIUM CHLORIDE: 9 INJECTION, SOLUTION INTRAVENOUS at 14:46

## 2021-07-25 RX ADMIN — PANTOPRAZOLE SODIUM 40 MG: 40 TABLET, DELAYED RELEASE ORAL at 06:39

## 2021-07-25 RX ADMIN — DIPHENOXYLATE HYDROCHLORIDE AND ATROPINE SULFATE 1 TABLET: 2.5; .025 TABLET ORAL at 13:07

## 2021-07-25 RX ADMIN — ACETAMINOPHEN 650 MG: 325 TABLET ORAL at 06:39

## 2021-07-25 RX ADMIN — MUPIROCIN: 20 OINTMENT TOPICAL at 08:00

## 2021-07-25 RX ADMIN — POTASSIUM CHLORIDE 20 MEQ: 1500 TABLET, EXTENDED RELEASE ORAL at 07:51

## 2021-07-25 RX ADMIN — DEXTROSE MONOHYDRATE 20 ML: 50 INJECTION, SOLUTION INTRAVENOUS at 12:17

## 2021-07-25 RX ADMIN — METOPROLOL TARTRATE 37.5 MG: 25 TABLET ORAL at 07:50

## 2021-07-25 RX ADMIN — SODIUM CHLORIDE: 9 INJECTION, SOLUTION INTRAVENOUS at 03:32

## 2021-07-25 RX ADMIN — DIPHENOXYLATE HYDROCHLORIDE AND ATROPINE SULFATE 1 TABLET: 2.5; .025 TABLET ORAL at 21:34

## 2021-07-25 RX ADMIN — METOPROLOL TARTRATE 50 MG: 25 TABLET ORAL at 19:41

## 2021-07-25 RX ADMIN — NAFCILLIN INJECTION 2000 MG: 2 POWDER, FOR SOLUTION INTRAMUSCULAR; INTRAMUSCULAR; INTRAVENOUS at 14:45

## 2021-07-25 RX ADMIN — MUPIROCIN: 20 OINTMENT TOPICAL at 19:42

## 2021-07-25 RX ADMIN — NAFCILLIN INJECTION 2000 MG: 2 POWDER, FOR SOLUTION INTRAMUSCULAR; INTRAMUSCULAR; INTRAVENOUS at 18:53

## 2021-07-25 RX ADMIN — NAFCILLIN INJECTION 2000 MG: 2 POWDER, FOR SOLUTION INTRAMUSCULAR; INTRAMUSCULAR; INTRAVENOUS at 03:32

## 2021-07-25 RX ADMIN — SODIUM CHLORIDE, PRESERVATIVE FREE 10 ML: 5 INJECTION INTRAVENOUS at 14:33

## 2021-07-25 RX ADMIN — PANTOPRAZOLE SODIUM 40 MG: 40 TABLET, DELAYED RELEASE ORAL at 15:52

## 2021-07-25 RX ADMIN — DEXTROSE 20 ML: 5 SOLUTION INTRAVENOUS at 14:20

## 2021-07-25 RX ADMIN — LOPERAMIDE HYDROCHLORIDE 4 MG: 2 CAPSULE ORAL at 07:50

## 2021-07-25 RX ADMIN — NAFCILLIN INJECTION 2000 MG: 2 POWDER, FOR SOLUTION INTRAMUSCULAR; INTRAMUSCULAR; INTRAVENOUS at 10:45

## 2021-07-25 RX ADMIN — Medication 6 MG: at 21:36

## 2021-07-25 RX ADMIN — RIFAMPIN 600 MG: 600 INJECTION, POWDER, LYOPHILIZED, FOR SOLUTION INTRAVENOUS at 15:50

## 2021-07-25 RX ADMIN — DIPHENHYDRAMINE HYDROCHLORIDE 12.5 MG: 50 INJECTION, SOLUTION INTRAMUSCULAR; INTRAVENOUS at 21:36

## 2021-07-25 RX ADMIN — POTASSIUM CHLORIDE 20 MEQ: 1500 TABLET, EXTENDED RELEASE ORAL at 15:52

## 2021-07-25 ASSESSMENT — PAIN SCALES - GENERAL
PAINLEVEL_OUTOF10: 0

## 2021-07-25 NOTE — PROGRESS NOTES
[facility] - Progress Note    Today's Date and Time: 7/25/2021, 10:16 AM    Impression :   · MSSA septicemia 7-17-21  · Persistent growth of bacteria in blood 7-17-21 through 7-20-21 despite treatment  · Sepsis with acute organ dysfunction and septic shock  · Mitral Valve endocarditis. 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 Will need 6 weeks from first negative blood cultures. · Rifampin 600 mg daily added 7/22/21 for synergy  · Please do not give anticoagulants due to the risk of septic emboli in the brain transitioning to hemorrhagic   · Plan infusion of Exebacase under compassionate use protocol on 7-25-21. Patient has given informed consent.     Medical Decision Making/Summary/Discussion:7/25/2021     ·   Infection Control Recommendations   · [place] Precautions  · Contact Isolation     Antimicrobial Stewardship Recommendations     · Simplification of therapy  · Targeted therapy    Coordination of Outpatient Care:   · Estimated Length of IV antimicrobials: 4 weeks  · Patient will need Midline Catheter Insertion: TBD  · Patient will need PICC line Insertion: TBD  · Patient will need: Home IV , [place],  SNF,  LTAC: Yes-either SNF or home infusion  · Patient will need outpatient wound care: TBD    Chief complaint/reason for consultation:   · MSSA sepsis      History of Present Illness:   Nneka Monsivais is a 36y.o.-year-old  male who was initially admitted on 7/17/2021. Patient seen at the request of Dr. Shruthi Arevalo:     Patient initially presented to [facility] a few days ago with complaints of  abdominal pain, nausea, watery diarrhea and vomiting for the past 5 days after eating some chicken. The patient states that his wife also had episodes of emesis after eating the chicken but her symptoms resolved shortly after. A CT abdomen was unremarkable and he was diagnosed with viral gastroenteritis and sent home with Norco, Zofran, and potassium supplement for hypokalemia. On 7/16/2021, he presented to [address] [place] [address] with worsening symptoms, including fever, chills, fatigue, and worsening abdominal pain with continued vomiting, right big toe pain. Work-up in the ED revealed a fever of 102.6, tachycardia with a heart rate up to 170, dyspnea with hypoxia, blood cultures showed MSSA x2, and urine cultures grew staph aureus ,000 CFU/mL. Occult blood stool positive. CT abdomen 7/17/2021  Impression   No acute abnormality identified in the abdomen and pelvis.       The bladder is markedly distended with a calculated volume of 2 L.       Hepatic steatosis and mild hepatomegaly.       Minor bibasilar atelectasis and interstitial thickening posteriorly. CT chest abdomen pelvis with contrast 7/18/2021  Impression   Moderate distention of the colon multiple air-fluid levels, findings may be   seen with acute enterocolitis. Abnormal labs include:  Sodium 131  Potassium 3.0  .5  Myoglobin 138  Troponin I 65  Amylase 131  AST 66  Lipase 299  Platelet 39  Sed rate 31    ID has been consulted for MSSA sepsis    CURRENT EVALUATION 7/25/2021:    Afebrile  VS stable    Alert and oriented on room air  Mentation is clear. He feels about the same.       On 7-24-21 I reviewed with patient and wife his clinical course up to the present and the difficulties with source control. I indicated to the patient and his wife, in the presence of his RN, that we had been able to secure permission from the  of Nisha Cindi, Dr Maxim Pierre of the IRB at [address] administration and Legal Department for him to receive Exebacase under a Compassionate Use Protocol. I discussed the available information with him including prior published clinical results and safety data. Indicated that there was no promise of efficacy. I emphasized that participation was voluntary. Patient and his wife had all their questions answered. I gave them two copies of the informed consent to review. They will keep one copy and return to signed copy for inclusion in his records. Patient gave informed consent to proceed. Infusion of Exebacase to be administered today. CT surgery is planning for valve repair after the patient has completed IV antibiotic therapy-unless the patient decompensates to the point of needing emergent surgical intervention. MRI brain [id number]: Acute embolic infarctions in supra and infratentorial structures. The patient's blood cultures from 7/24/2020 continue to show growth of MSSA. Additional cultures were drawn 7-25-21. Because of the septic emboli in the brain, Ancef was discontinued and nafcillin was initiated on 7/22/2021. Rifampin was also added for synergy. Neurology consult was ordered due to episodes of altered mentation along with vertigo and diplopia. Vertigo and diplopia have subsided. Neurology plans outpatient follow up. Echocardiogram completed on 7/18/2021 revealed posterior mitral valve leaflet prolapse, severe mitral regurgitation, and possible small vegetation on posterior leaflet. Presence of vegetation on mitral valve would help explain the emboli to the brain.     MONROE on 7/20 showed a Large vegetation measuring 2.5 X 2.4 cm noted on the posterior leaflet of the mitral valve. Posterior leaflet is ESOPHAGUS         Medications:      potassium chloride  20 mEq Oral BID WC    pantoprazole  40 mg Oral BID AC    metoprolol tartrate  37.5 mg Oral BID    rifampin (RIFADIN) IVPB  600 mg Intravenous Q24H    nafcillin  2,000 mg Intravenous Q4H    mupirocin   Nasal BID    sodium chloride flush  5-40 mL Intravenous 2 times per day       Social History:     Social History     Socioeconomic History    Marital status:      Spouse name: Not on file    Number of children: Not on file    Years of education: Not on file    Highest education level: Not on file   Occupational History    Not on file   Tobacco Use    Smoking status: Never Smoker    Smokeless tobacco: Never Used   Vaping Use    Vaping Use: Never assessed   Substance and Sexual Activity    Alcohol use: Yes     Alcohol/week: 20.0 standard drinks     Types: 20 Cans of beer per week     Comment: 20/ week, average every other day    Drug use: Never    Sexual activity: Not on file   Other Topics Concern    Not on file   Social History Narrative    Not on file     Social Determinants of Health     Financial Resource Strain:     Difficulty of Paying Living Expenses:    Food Insecurity:     Worried About Running Out of Food in the Last Year:     [address] in the Last Year:    Transportation Needs:     Lack of Transportation (Medical):      Lack of Transportation (Non-Medical):    Physical Activity:     Days of Exercise per Week:     Minutes of Exercise per Session:    Stress:     Feeling of Stress :    Social Connections:     Frequency of Communication with Friends and Family:     Frequency of Social Gatherings with Friends and Family:     Attends [employer] Services:     Active Member of Clubs or Organizations:     Attends Club or Organization Meetings:     Marital Status:    Intimate Partner Violence:     Fear of Current or Ex-Partner:     Emotionally Abused:     Physically Abused:     Sexually Abused:        Family History:     Family History   Problem Relation Age of Onset    No Known Problems Mother     Other Father         diverticulitis        Allergies:   Morphine     Review of Systems:   Constitutional: No fevers or chills. No systemic complaints  Head: No headaches  Eyes: No double vision or blurry vision. No conjunctival inflammation. ENT: No sore throat or runny nose. . No hearing loss, tinnitus or vertigo. Cardiovascular: No chest pain or palpitations. No shortness of breath. No CASTRO  Lung: No shortness of breath or cough. No sputum production  Abdomen: No nausea, vomiting, diarrhea, or abdominal pain. Lon Salter No cramps. Genitourinary: No increased urinary frequency, or dysuria. No hematuria. No suprapubic or CVA pain  Musculoskeletal: No muscle aches or pains. No joint effusions, swelling or deformities  Hematologic: No bleeding or bruising. Neurologic: No headache, weakness, numbness, or tingling. Integument: Scattered small reddened bumps and bruises  Psychiatric: No depression. Endocrine: No polyuria, no polydipsia, no polyphagia. Physical Examination :     Patient Vitals for the past 8 hrs:   BP Temp Temp src Pulse Resp SpO2 Weight   07/25/21 0740 108/71 98.3 °F (36.8 °C) Oral 107 18 95 % --   07/25/21 0623 106/70 98.4 °F (36.9 °C) Oral 99 16 98 % --   07/25/21 0600 106/70 99.9 °F (37.7 °C) -- -- -- -- 183 lb 3.2 oz (83.1 kg)   07/25/21 0359 (!) 87/54 98.3 °F (36.8 °C) Oral 86 16 -- --     General Appearance: Awake, alert, and in no apparent distress  Head:  Normocephalic, no trauma  Eyes: Pupils equal, round, reactive to light and accommodation; extraocular movements intact; sclera anicteric; conjunctivae pink. No embolic phenomena. ENT: Oropharynx clear, without erythema, exudate, or thrush. No tenderness of sinuses. Mouth/throat: mucosa pink and moist. No lesions. Dentition in good repair. Neck:Supple, without lymphadenopathy. Thyroid normal, No bruits.   Pulmonary/Chest: Clear to auscultation, without wheezes, rales, or rhonchi. No dullness to percussion. Cardiovascular: Normal sinus tachycardia with murmur, No rubs, or gallops. Abdomen: Soft, non tender. Bowel sounds normal. No organomegaly  All four Extremities: No cyanosis, clubbing, edema, or effusions. Neurologic: No gross sensory or motor deficits. Skin: Warm and dry with good turgor. No signs of peripheral arterial or venous insufficiency. No ulcerations. No open wounds. Medical Decision Making -Laboratory:   I have independently reviewed/ordered the following labs:    CBC with Differential:   Recent Labs     07/23/21  0854 07/24/21 0714   WBC 9.8 9.8   HGB 11.5* 9.4*   HCT 37.0* 30.2*    210   LYMPHOPCT 12* 12*   MONOPCT 7 8     BMP:   Recent Labs     07/23/21  0152 07/23/21  0152 07/23/21  0854 07/24/21 0714      < > 137 131*   K 4.7   < > 4.2 4.0      < > 105 101   CO2 14*   < > 22 22   BUN 13   < > 16 13   CREATININE 0.95   < > 0.92 0.71   MG 2.1  --   --   --     < > = values in this interval not displayed. Hepatic Function Panel:   Recent Labs     07/23/21  0854 07/24/21 0714   PROT  --  6.0*   LABALBU 2.7* 2.7*  2.7*   BILIDIR  --  0.28   IBILI  --  0.43   BILITOT  --  0.71   ALKPHOS  --  66   ALT  --  26   AST  --  26     No results for input(s): RPR in the last 72 hours. No results for input(s): HIV in the last 72 hours. No results for input(s): BC in the last 72 hours.   Lab Results   Component Value Date    MUCUS NOT REPORTED 07/17/2021    RBC 3.29 07/24/2021    TRICHOMONAS NOT REPORTED 07/17/2021    WBC 9.8 07/24/2021    YEAST NOT REPORTED 07/17/2021    TURBIDITY CLEAR 07/17/2021     Lab Results   Component Value Date    CREATININE 0.71 07/24/2021    GLUCOSE 143 07/24/2021       Medical Decision Making-Imaging:          MRI Brain 7/2121   Impression       Findings suggestive of acute embolic infarctions involving supra and   infratentorial structures as described.       Subacute to chronic encephalomalacia with chronic blood products within the   left superior parietal lobule and right inferior parietal lobule.       There is no acute intracranial hemorrhage, or intracranial mass lesion.           EXAMINATION:   CT OF THE CHEST, ABDOMEN, AND PELVIS WITH CONTRAST 7/18/2021 10:53 am       TECHNIQUE:   CT of the chest, abdomen and pelvis was performed with the administration of   intravenous contrast. Multiplanar reformatted images are provided for review. Dose modulation, iterative reconstruction, and/or weight based adjustment of   the mA/kV was utilized to reduce the radiation dose to as low as reasonably   achievable.       COMPARISON:   None       HISTORY:   ORDERING SYSTEM PROVIDED HISTORY: MSSA bacteremia, ? DIC   TECHNOLOGIST PROVIDED HISTORY:   MSSA bacteremia, ? DIC       Reason for Exam: sepsis with acute organ dysfunction   Acuity: Unknown   Type of Exam: Unknown       FINDINGS:   Lungs/pleura: The central airways are patent.  There are no suspicious   pulmonary nodules       Mediastinum: There is no acute mediastinal abnormality       Soft Tissues/Bones: No suspicious osteolytic or osteoblastic lesions       Abdominal organs: The liver, spleen, adrenal glands, kidneys, and pancreas   demonstrate no acute abnormality.       GI/Bowel: The visualized portions of the small bowel are unremarkable. There   is borderline dilation of the colon.  There are multiple air-fluid levels   throughout the colon.       Peritoneum/Retroperitoneum: There is a solid amount of retroperitoneal fluid   along the distal ureters bilaterally.       Pelvis:  The bladder is moderately distended.       Bones: No suspicious osseous lesions are identified           Impression   Moderate distention of the colon multiple air-fluid levels, findings may be   seen with acute enterocolitis.             XR FOOT LEFT (2 VIEWS) [[id number]] Collected: 07/18/21 1411      Order Status: Completed Updated: 07/19/21 0755     Narrative:       EXAMINATION:   TWO XRAY VIEWS OF THE LEFT FOOT     7/18/2021 2:05 pm     COMPARISON:   None. HISTORY:   ORDERING SYSTEM PROVIDED HISTORY: Left big toe pain and erythema. Looking for   source of MSSA sepsis   TECHNOLOGIST PROVIDED HISTORY:   Left big toe pain and erythema. Looking for source of MSSA sepsis     FINDINGS:   AP and lateral views of the foot obtained.  Normal alignment and   mineralization.  No abnormal periosteal reaction.  No erosions.  No fracture. No aggressive lytic or blastic lesions.  Mild soft tissue swelling of the   great toe noted.      Impression:       No radiographic evidence for acute osteomyelitis.  Mild soft tissue swelling   of the great toe possibly cellulitis.       EXAMINATION:   MRI OF THE BRAIN WITHOUT CONTRAST  7/21/2021 7:03 pm       TECHNIQUE:   Multiplanar multisequence MRI of the brain was performed without the   administration of intravenous contrast.       COMPARISON:   Head CT of 07/20/2021       HISTORY:   ORDERING SYSTEM PROVIDED HISTORY: episode of vertigo, dipolopia, has   infecticive endocadrditis, r/o septic emboli, abscess   TECHNOLOGIST PROVIDED HISTORY:   episode of vertigo, dipolopia, has infecticive endocadrditis, r/o septic   emboli, abscess   Reason for Exam: VERTIGO, MSSA BACTEREMIA, R/O SEPTIC EMBOLI       FINDINGS:   MRI brain:       There are punctate areas of restricted diffusion within the left superior   frontal gyrus subcortical white matter, right superior frontal gyrus   subcortical white matter, left superior parietal lobule right inferior   frontal gyrus subcortical white matter, left inferior frontal gyrus   subcortical white matter and bilateral cerebellar hemisphere, suggestive of   acute embolic infarctions.       There are focus of encephalomalacia with susceptibility artifact and   increased signal on DWI within left superior parietal lobule and right   inferior parietal lobule suggestive of subacute to chronic infarct with   chronic blood products.       There is no acute intracranial hemorrhage, or intracranial mass lesion. No   mass effect, midline shift, or extra-axial collection is noted.       The brain parenchyma is otherwise normal.  The pituitary gland is normal in   appearance. The cerebellar tonsils are in normal position.       The ventricles, sulci, and cisterns are within normal size and range.  No   significant volume loss. .  The intracranial flow voids are preserved.       The globes and orbits are within normal limits. The visualized extracranial   structures including paranasal sinuses and mastoid air cells are unremarkable.           Impression       Findings suggestive of acute embolic infarctions involving supra and   infratentorial structures as described.       Subacute to chronic encephalomalacia with chronic blood products within the   left superior parietal lobule and right inferior parietal lobule.       There is no acute intracranial hemorrhage, or intracranial mass lesion.       The findings were sent to the Radiology Results [address] at 8:24   pm on 7/21/2021to be communicated to a licensed caregiver.                 Medical Decision [facility]:     Component Collected Lab   Specimen Description 07/17/2021  8:48  [address]   . BLOOD    Special Requests 07/17/2021  8:48  [address]   10ML LFA    Culture Abnormal  07/17/2021  8:48 AM [address] Blood Culture Results called to and read back by: Duane Reyes RN AT 2045 ON 07.17.2021    Culture 07/17/2021  8:48 AM [facility] FROM BOTTLE: GRAM POSITIVE COCCI IN CLUSTERS    Culture Abnormal  07/17/2021  8:48  [address]   Staphylococcus aureus Detected: mecA/C and MREJ Not Detected Methodology- Polymerase Chain Reaction (PCR)   Culture Abnormal  07/17/2021  8:48 AM [facility] This isolate is methicillin susceptible. Testing Performed By    Manjinder Watson Name Director Address Valid Date Range   [address]-Jessica Adams MD [address] [facility] [address] 08/30/17 0801-Present   Susceptibility    Staphylococcus aureus (4)    Antibiotic Interpretation DAVID Status    penicillin Resistant  Final     >=0.5   RESISTANT   cefoxitin screen  NOT REPORTED Final    ciprofloxacin   Final     NOT REPORTED    clindamycin Sensitive  Final     <=0.25   SUSCEPTIBLE   erythromycin Sensitive  Final     <=0.25   SUSCEPTIBLE   gentamicin Sensitive  Final     <=0.5   SUSCEPTIBLE   gentamicin Sensitive Gentamicin is used only in combination with other active agents that test susceptible. Final    Induced Clind Resist  NOT REPORTED Final    levofloxacin Sensitive  Final     0.25   SUSCEPTIBLE   linezolid   Final     NOT REPORTED    moxifloxacin   Final     NOT REPORTED    nitrofurantoin   Final     NOT REPORTED    oxacillin Sensitive  Final     0.5   SUSCEPTIBLE   Synercid   Final     NOT REPORTED    rifampin   Final     NOT REPORTED    tetracycline Sensitive  Final     <=1   SUSCEPTIBLE   tigecycline   Final     NOT REPORTED    trimethoprim-sulfamethoxazole Sensitive  Final     <=10   SUSCEPTIBLE   vancomycin   Final     NOT REPORTED        Medical Decision Making-Other:     Note:  · Labs, medications, radiologic studies were reviewed with personal review of films  · Large amounts of data were reviewed  · Discussed with nursing Staff, Discharge planner  · Infection Control and Prevention measures reviewed  · All prior entries were reviewed  · Administer medications as ordered  · Prognosis: Guarded  · Discharge planning reviewed  · Follow up as outpatient. Thank you for allowing us to participate in the care of this patient. Please call with questions.     Scotty Vergara MD       Pager: [phone] - Office: [phone]

## 2021-07-25 NOTE — PROGRESS NOTES
daily      carvedilol (COREG) 25 MG tablet Take 25 mg by mouth 2 times daily (with meals)      dicyclomine (BENTYL) 20 MG tablet Take 20 mg by mouth every 6 hours      hydroCHLOROthiazide (HYDRODIURIL) 25 MG tablet Take 25 mg by mouth daily      HYDROcodone-acetaminophen (NORCO) 5-325 MG per tablet Take 1 tablet by mouth every 6 hours as needed for Pain.  potassium chloride (MICRO-K) 10 MEQ extended release capsule Take 20 mEq by mouth 2 times daily         Allergies: Sharif Choi is allergic to morphine. Past Medical History:   Diagnosis Date    Diverticulosis     Hyperlipidemia     Hypertension        Past Surgical History:   Procedure Laterality Date    ABDOMEN SURGERY      large bowel resection    ANTERIOR CRUCIATE LIGAMENT REPAIR Right     APPENDECTOMY  07/09/1997    COLECTOMY      2012 - D/T Dverticulitis    DILATATION, ESOPHAGUS         Social History: Sharif Choi  reports that he has never smoked. He has never used smokeless tobacco. He reports current alcohol use of about 20.0 standard drinks of alcohol per week. He reports that he does not use drugs.     Family History   Problem Relation Age of Onset    No Known Problems Mother     Other Father         diverticulitis       Objective:   /71   Pulse 107   Temp 98.3 °F (36.8 °C) (Oral)   Resp 18   Ht 5' 6\" (1.676 m)   Wt 183 lb 3.2 oz (83.1 kg)   SpO2 95%   BMI 29.57 kg/m²     Blood pressure range: Systolic (11ZEW), LRJ:999 , Min:87 , AAY:545   ; Diastolic (61LFV), WIU:52, Min:54, Max:78      Review of Systems:  Constitutional  Negative for fever and chills    HEENT  Negative for ear discharge, ear pain, nosebleed    Eyes  Negative for photophobia, pain and discharge    Respiratory  Negative for hemoptysis and sputum    Cardiovascular  Negative for orthopnea, claudication and PND    Gastrointestinal  Negative for abdominal pain, diarrhea, blood in stool    Musculoskeletal  Negative for joint pain, negative for myalgia    Skin  Negative for rash or itching    Endo/heme/allergies  Negative for polydipsia, environmental allergy    Psychiatric/behavioral  Negative for suicidal ideation. Patient is not anxious        NEUROLOGIC EXAMINATION  GENERAL  Appears comfortable and in no distress   HEENT  NC/ AT   NECK  Supple   MENTAL STATUS:  Alert, oriented, intact memory, no confusion, normal speech, normal language, no hallucination or delusion   CRANIAL NERVES: II     -      Visual fields intact to confrontation  III,IV,VI -  EOMs full, no afferent defect, no ARIELLA, no ptosis  V     -     Normal facial sensation  VII    -     Normal facial symmetry  VIII   -     Intact hearing  IX,X -     Symmetrical palate  XI    -     Symmetrical shoulder shrug  XII   -     Midline tongue, no atrophy    MOTOR FUNCTION:  significant for good strength of grade 5/5 in bilateral proximal and distal muscle groups of both upper and lower extremities with normal bulk, normal tone and no involuntary movements, no tremor   SENSORY FUNCTION:  Normal touch, normal pin   CEREBELLAR FUNCTION:  Intact fine motor control over upper limbs   REFLEX FUNCTION:  Symmetric, no perverted reflex, no Babinski sign   STATION and GAIT  Not tested       Data:    Lab Results:   CBC:   Recent Labs     07/23/21  0854 07/24/21  0714   WBC 9.8 9.8   HGB 11.5* 9.4*    210     BMP:    Recent Labs     07/23/21  0152 07/23/21  0854 07/24/21  0714    137 131*   K 4.7 4.2 4.0    105 101   CO2 14* 22 22   BUN 13 16 13   CREATININE 0.95 0.92 0.71   GLUCOSE 117* 123* 143*         Lab Results   Component Value Date    ALT 26 07/24/2021    AST 26 07/24/2021    TSH 1.86 07/20/2021    INR 1.0 07/19/2021    LABA1C 5.5 07/17/2021    [id number] 1120 07/17/2021           Diagnostic data reviewed:  CT HEAD (7/20/2021): No acute intracranial abnormality      MRI BRAIN (2/22/5521): Acute embolic infarctions involving supra & infratentorial structures.  Subacute to chronic encephalomalacia with chronic blood products within the L superior parietal lobule & R inferior parietal lobule    CTA HEAD & NECK (7/22/2021): Unremarkable       ECHO (7/18/2021): EF 50%. Severe MR. Posterior mitral valve leaflet prolapse. Possible small vegetation on posterior leaflet    MONROE (7/20/2021): EF 55%. Large vegetation measuring 2.5 X 2.4 cm noted on the posterior leaflet of the mitral valve. Posterior leaflet is flair and is associated with severe anteriorly directed eccentric regurgitation        EEG (7/21/2021): This EEG shows the presence of mild diffuse slowing. This EEG is concordant with diffuse encephalopathy. No clear lateralized or epileptiform disturbance is seen            Impression:  -Metabolic encephalopathy in the setting of MSSA sepsis, septic shock, subacute endocarditis and acute embolic infarctions  -Septic emboli to brain  -MSSA sepsis with subacute endocarditis    Plan:  -Patient continues on antibiotics as per ID team with plan for Exebacase today  -Antiplatelets/anticoagulation contraindicated due to septic emboli which have a higher chance of bleeding  -MVR to be planned by CTS team following 6 weeks of antibiotics and negative blood cultures  -PT/OT  -Will need outpatient follow up in 4 weeks  -Will follow    Please note that this note was generated using a voice recognition dictation software. Although every effort was made to ensure the accuracy of this automated transcription, some errors in transcription may have occurred.

## 2021-07-25 NOTE — PROGRESS NOTES
[facility]  Office: [address], DO, Kassandrarehana Cortés, DO, Phu Benavidez, DO, Lynn Polanco Blood, DO, Valiant Sicard, MD, Jasmina Lofton MD, Iftikhar Gutierrez MD, Judy Costa MD, Ritchie Suarez MD, Miguelina Wan MD, Moy Green MD, Beau Rojo, DO, Zach Treadwell MD, Monae Goldberg DO, Michelle Rodríguez MD,  Elisa Lang DO, Jasmin Sun MD, Ariana Flower MD, Lashawn Ansari MD, Sondra Lee MD, Long French MD, Ghazala Morley MD, Chepe Mckenzie, [facility], [facility], [facility], Mandy Fruit, [facility], Tony Delcid, CNS, Corrina Appl, Franklin Copper, CNP, Nuvia Salas, CNP, Tino Dolan, CNP, Mikaela Bay, CNP, MELONIE PinoC, Elisa Kurtz, [facility], Emmett Corea, CNP, Isaac Hassan, CNP, Chinyere Davidson, CNP, vJ Gil, CNP, Soha Estrada, CNP, Rosaura Branch, CNP, Enrrique Maria, [facility], [facility], [address] [place] [address]    Progress Note    7/25/2021    11:44 AM    Name:   Emilia Rachel  MRN:     [id number]     Acct:      [de-identified]   Room:   [address] [place] [address] Day:  8  Admit Date:  7/17/2021 12:42 AM    PCP:   Cleophas Duane, MD  Code Status:  Full Code    Subjective:     C/C:   Chief Complaint   Patient presents with    Abdominal Pain    Emesis     Interval History Status: improved. Patient seen examined this morning. Wife, mother, and father are at bedside. Episode of rigors last night. Patient reports anxiety during this. Nonrebreather mask was placed. Lasted approximate 20 minutes again. To be given Exebacase today to assist with bacteremia. No other complaints. Hemodynamically stable. Brief History:     Mr. Tg Duran is a pleasant 36year old  gentleman who presneted to [facility] for evaluation of confusion, fatigue, diarrhea, and abdominal pains on 7/17/2021.  He was recently released from [facility] for similar complaints and was noted to have low potassium levels at that time. CT of the abdomen was normal. The diarrhea was \"water-like\" and profuse in nature, admitting to approx 5-7 watery stools a day for the past 4 days prior to admission. No BRBPR. Abdominal pain was present prior to the diarrhea. There is associated fevers and chills. Subjective fever of 104 prior to arrival. Emesis is food that is digested but from earlier in the day. Patient admits to eating popcorn and nuts just the weekend before the onset. 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 CT of the abdomen pelvis was performed which revealed a markedly distended bladder with a calculated volume of approximately 2 L in the bladder along with hepatic steatosis and mild hepatomegaly with minor bibasilar atelectasis and interstitial thickening posteriorly. A Cárdenas catheter was placed with alleviation of the patient's urinary retention. Next morning, the patient's lipase level was noted be 29. A CT of the abdomen pelvis was repeated on 7/18 which revealed multiple air-fluid levels, seen with acute enterocolitis. Patient was her IV antibiotics admitted to the hospital.    He was noted to be quite tachycardic with a heart rate upwards of 151 bpm.  Cardiology was consulted, as source troponin was also increased. Troponins likely thought to be secondary to a type II mechanism from sepsis. 2D echo was ordered and revealed an EF of 50% with posterior mitral leaflet prolapse and severe MR with an eccentric anterior directed jet with a possible small vegetation on the posterior leaflet. He is scheduled to undergo MONROE on 7/20. Meanwhile, 2 of 2 blood cultures have returned positive for MSSA. His urine culture also grew 50 to 100,000 CFU of MSSA. Infectious disease was consulted and is placed the patient on Ancef.   He was also noted to be quite thrombocytopenic with a bradley of 32. This is improving. Fibrinogen is normal.    Patient was scheduled to undergo MONROE on 7/19, however, due to profound hypokalemia, this procedure was postponed. He did develop epistaxis on the evening of 7/18. ENT was consulted and recommended transfusing platelets for goal of greater than 50k. He underwent nasal cautery along with Afrin use which controlled his bleeding.    - MRI with acute infarcts  - Adjusting antibiotic therapy  - CT surgery wanting to intervene on valve -but after antibiotic therapy. - Exabacase to be given on 7.25. Review of Systems:     Constitutional: Reports episode of rigors last night, negative for sweats  Respiratory: Denies shortness of breath; negative for cough, wheezing  Cardiovascular:  Negative for chest pain, chest pressure/discomfort, lower extremity edema, palpitations  Gastrointestinal: Reports continued diarrhea, but daily improvement. Negative for abdominal pain, constipation,  nausea, vomiting  Extremities: Reports left great toe redness is resolved. Pain is resolved. Neurological: Reports no further episodes of dizziness. Denies headache. Medications: Allergies:     Allergies   Allergen Reactions    Morphine Other (See Comments)     Pt report muscle cramps       Current Meds:   Scheduled Meds:    diphenoxylate-atropine  1 tablet Oral BID    dextrose 5%  20 mL Intravenous Once    Investigational - Infusion   Intravenous Once    dextrose 5%  20 mL Intravenous Once    potassium chloride  20 mEq Oral BID WC    pantoprazole  40 mg Oral BID AC    metoprolol tartrate  37.5 mg Oral BID    rifampin (RIFADIN) IVPB  600 mg Intravenous Q24H    nafcillin  2,000 mg Intravenous Q4H    mupirocin   Nasal BID    sodium chloride flush  5-40 mL Intravenous 2 times per day     Continuous Infusions:    sodium chloride 100 mL/hr at 07/25/21 0700    sodium chloride      sodium chloride       PRN Meds: diphenhydrAMINE, meclizine, loperamide, sodium chloride, sodium chloride, sodium chloride flush, sodium chloride, ondansetron **OR** ondansetron, acetaminophen **OR** acetaminophen, polyethylene glycol, magnesium sulfate, oxyCODONE, melatonin, aluminum & magnesium hydroxide-simethicone    Data:     Past Medical History:   has a past medical history of Diverticulosis, Hyperlipidemia, and Hypertension. Social History:   reports that he has never smoked. He has never used smokeless tobacco. He reports current alcohol use of about 20.0 standard drinks of alcohol per week. He reports that he does not use drugs. Family History:   Family History   Problem Relation Age of Onset    No Known Problems Mother     Other Father         diverticulitis       Vitals:  /71   Pulse 107   Temp 98.3 °F (36.8 °C) (Oral)   Resp 18   Ht 5' 6\" (1.676 m)   Wt 183 lb 3.2 oz (83.1 kg)   SpO2 95%   BMI 29.57 kg/m²   Temp (24hrs), Av.2 °F (37.3 °C), Min:98.3 °F (36.8 °C), Max:100.5 °F (38.1 °C)    No results for input(s): POCGLU in the last 72 hours. I/O (24Hr):     Intake/Output Summary (Last 24 hours) at 2021 1144  Last data filed at 2021 1808  Gross per 24 hour   Intake 2989 ml   Output --   Net 2989 ml       Labs:  Hematology:  Recent Labs     21  0854 21  0714 21  0952   WBC 9.8 9.8 10.3   RBC 3.95* 3.29* 3.38*   HGB 11.5* 9.4* 9.7*   HCT 37.0* 30.2* 30.8*   MCV 93.7 91.8 91.1   MCH 29.1 28.6 28.7   MCHC 31.1 31.1 31.5   RDW 13.4 13.4 13.2    210 250   MPV 10.4 10.5 9.9     Chemistry:  Recent Labs     21  0152 21  0152 21  0854 21  0901 21  1837 21  0037 21  0714 21  0952      < > 137  --   --   --  131* 133*   K 4.7   < > 4.2  --   --   --  4.0 3.5*      < > 105  --   --   --  101 102   CO2 14*   < > 22  --   --   --  22 23   GLUCOSE 117*   < > 123*  --   --   --  143* 130*   BUN 13   < > 16  --   --   --  13 8   CREATININE 0.95   < > 0.92  --   --   --  0.71 0.56*   MG 2.1  --   --   --   --   --   --  2.0   ANIONGAP 20*   < > 10  --   --   --  8* 8*   LABGLOM >60   < > >60  --   --   --  >60 >60   GFRAA >60   < > >60  --   --   --  >60 >60   CALCIUM 8.2*   < > 8.3*  --   --   --  8.0* 8.3*   PHOS 4.8*  --  4.7*  --   --   --  2.8  --    TROPHS 92*  --   --   --   --   --   --   --    LACTACIDWB 5.4*  --   --    < > 1.9 1.2 1.0  --     < > = values in this interval not displayed. Recent Labs     07/23/21  0854 07/24/21  0714 07/25/21  0952   PROT  --  6.0* 6.3*   LABALBU 2.7* 2.7*  2.7* 2.7*   AST  --  26 20   ALT  --  26 23   ALKPHOS  --  66 67   BILITOT  --  0.71 0.41   BILIDIR  --  0.28  --      ABG:  Lab Results   Component Value Date    POCPH 7.486 07/18/2021    POCPCO2 28.6 07/18/2021    POCPO2 134.5 07/18/2021    POCHCO3 21.6 07/18/2021    NBEA 1 07/18/2021    PBEA NOT REPORTED 07/18/2021    WCY1JRE NOT REPORTED 07/18/2021    PNMS4LMO 99 07/18/2021    FIO2 NOT REPORTED 07/18/2021     Lab Results   Component Value Date/Time    SPECIAL 5ML LT HAND 07/24/2021 07:09 AM     Lab Results   Component Value Date/Time    CULTURE (A) 07/24/2021 07:09 AM     POSITIVE Blood Culture Results called to and read back by: CALLIE MILLER 7/25/21 0750    CULTURE  07/24/2021 07:09 AM     DIRECT GRAM STAIN FROM BOTTLE: GRAM POSITIVE COCCI IN CLUSTERS       Radiology:  CT ABDOMEN PELVIS WO CONTRAST Additional Contrast? None    Result Date: 7/17/2021  No acute abnormality identified in the abdomen and pelvis. The bladder is markedly distended with a calculated volume of 2 L. Hepatic steatosis and mild hepatomegaly. Minor bibasilar atelectasis and interstitial thickening posteriorly. XR CHEST (SINGLE VIEW FRONTAL)    Result Date: 7/17/2021  Unremarkable portable chest radiograph. XR FOOT LEFT (2 VIEWS)    Result Date: 7/19/2021  No radiographic evidence for acute osteomyelitis. Mild soft tissue swelling of the great toe possibly cellulitis. RECOMMENDATION: NM bone scan or MRI may be obtained if there remains strong concern for acute osteomyelitis. XR ABDOMEN (KUB) (SINGLE AP VIEW)    Result Date: 7/17/2021  Nonobstructive bowel gas pattern. No acute process identified. US GALLBLADDER RUQ    Result Date: 7/17/2021  Mild hepatomegaly. Diffuse hepatic steatosis. Contracted gallbladder. No cholelithiasis or acute cholecystitis. XR CHEST PORTABLE    Result Date: 7/18/2021  No focal consolidation. No acute process evident. CT CHEST ABDOMEN PELVIS W CONTRAST    Result Date: 7/18/2021  Moderate distention of the colon multiple air-fluid levels, findings may be seen with acute enterocolitis. Physical Examination:        General appearance:  alert, cooperative and no distress,  gentleman sitting up in bed  Mental Status:  oriented to person, place and time and normal affect  Lungs:  clear to auscultation bilaterally, normal effort  Heart: Tachycardic continue rate (106 bpm) with regular rhythm, there is a loud systolic murmur which is blowing in nature best appreciated at the apex  Abdomen:  soft, nontender, nondistended, protuberant, normal bowel sounds, no masses, hepatomegaly, splenomegaly  Extremities:  no edema, redness, tenderness in the calves; left great toe with resolution of erythema at the 1st MTP joint. Neuro:  strength is equal bilaterally. Upper extremity strength is 5/5. Lower extremity strength is 5/5. No nystagmus noted. Skin:  Erythema to the first MTP joint on the left is resolved. Mid back erythema and discoloration noted.  See media    Assessment:        Hospital Problems         Last Modified POA    * (Principal) MSSA bacteremia 7/20/2021 Yes    Diarrhea 7/20/2021 Yes    Acute dehydration 7/20/2021 Yes    Hypokalemia 7/20/2021 Yes    Epistaxis 7/20/2021 No    Thrombocytopenia ([address] 7/20/2021 Yes    Sepsis with acute organ dysfunction and septic shock ([address] 7/20/2021 Yes    Delusions ([address] 7/20/2021 Yes    Hypophosphatemia 7/20/2021 Yes    Severe mitral regurgitation 7/20/2021 Yes    Acute respiratory failure with hypoxia ([address] 7/20/2021 Yes    GI bleed 7/20/2021 Yes    Vertigo 7/21/2021 No    Hypomagnesemia 7/20/2021 Yes    Normocytic normochromic anemia 7/20/2021 Yes    Obesity (BMI 30-39. 9) 7/20/2021 Yes    Transaminitis 7/20/2021 Yes    Hyperglycemia 7/20/2021 Yes    Essential hypertension 3/30/6591 Yes    Metabolic encephalopathy 4/24/5971 Yes    Iron deficiency anemia secondary to blood loss (chronic) 7/21/2021 Yes    Guaiac positive stools 7/21/2021 Yes          Plan:        1. MSSA bacteremia - Continue Nafcillin 2 g q 4 hours and Rifampin 600 mg qd through 8/15. ID following. One time dose of exebacase to be given today. No AC recommended. Source is still unclear. CT of the spine is without evidence of any discitis that we can tell. Large mitral valve vegetation noted. 2. 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 Continue IV antibiotics as noted above. Anticipate surgery after 6 weeks of antibiotics. Exebacase to be given today. 3. SVT vs sinus tachycardia - likely secondary to sepsis. Avoid beta-blocker unless patient is in SVT. 4. Lactic acidosis - resolved  5. Acute respiratory failure with hypoxia - D-dimer is elevated. Wean supplemental oxygen as able. 6. Acute septic emboli infarcts - to the supra and infratentorial structures along with subacute to chronic encephalomalacia with chronic blood products within the left superior parietal lobule and right inferior parietal lobule. Avoid anticoagulation  7. Delusions, confusion, TME - Resolved; possibly, and likely due to sepsis +/- acute infarcts  8. Vertigo - resolved; likely related to #6 - continue antivert. 9. Thrombocytopenia - resolved. Likely secondary to sepsis. 10. GI bleed/normo normo anemia-Continue protonix. Follow up with GI as noted.   11. Acute diarrhea - increase imodium dose today. Start scheduled Lomotil due to persistent diarrhea  12. Hypokalemia, hypophosphatemia - replace lytes daily. Change potassium to 20 mEq daily. 13. Transaminitis-likely related to fatty liver disease, possibly due to sepsis. Now WNL  14. Obesity 30.07 - diet/weight loss/exercise  15. Acute left great toe pain - gout vs septic joint - resolved with IV antibioitcs  16. Disposition: Discharge once patient has negative culture. 6 weeks of antibiotics to follow. Then follow-up with CT surgery for interventional eval.  Patient receiving exebacase today.   16. Family updated    33 Darlene Valdez DO  7/25/2021  11:44 AM

## 2021-07-25 NOTE — PROGRESS NOTES
[facility]  Progress Note                   Date:   7/25/2021  Patient name: Edmundo Cash  Date of admission:  7/17/2021 12:42 AM  MRN:   [id number]  YOB: 1980  PCP: Bard Peggy MD    Reason for Admission: Diarrhea [R19.7]    Subjective:       Clinical Changes /Abnormalities: Patient seen and examined in room with multiple visitors at bedside after discussion with RN. Denies chest pain or SOB. No CV concerns. On IV AB. ST on tele     Review of Systems    Medications:   Scheduled Meds:   diphenoxylate-atropine  1 tablet Oral BID    potassium chloride  20 mEq Oral BID WC    pantoprazole  40 mg Oral BID AC    metoprolol tartrate  37.5 mg Oral BID    rifampin (RIFADIN) IVPB  600 mg Intravenous Q24H    nafcillin  2,000 mg Intravenous Q4H    mupirocin   Nasal BID    sodium chloride flush  5-40 mL Intravenous 2 times per day     Continuous Infusions:   sodium chloride 100 mL/hr at 07/25/21 1446    sodium chloride      sodium chloride       CBC:   Recent Labs     07/23/21  0854 07/24/21  0714 07/25/21  0952   WBC 9.8 9.8 10.3   HGB 11.5* 9.4* 9.7*    210 250     BMP:    Recent Labs     07/23/21  0854 07/24/21  0714 07/25/21  0952    131* 133*   K 4.2 4.0 3.5*    101 102   CO2 22 22 23   BUN 16 13 8   CREATININE 0.92 0.71 0.56*   GLUCOSE 123* 143* 130*     Hepatic:  Recent Labs     07/24/21  0714 07/25/21  0952   AST 26 20   ALT 26 23   BILITOT 0.71 0.41   ALKPHOS 66 67     Troponin:   Recent Labs     07/23/21  0152   TROPHS 92*     BNP: No results for input(s): BNP in the last 72 hours. Lipids: No results for input(s): CHOL, HDL in the last 72 hours. Invalid input(s): LDLCALCU  INR: No results for input(s): INR in the last 72 hours.     DIAGNOSTIC DATA  MONROE 7/20/2021  MONROE findings:     LA:       Normal  RAJAT:    No thrombus  RA:      Normal  RV:      Normal  LV:       Normal  Estimated LVEF:         55%  Aorta:               Mild atheromatous disease arch  Pericardium:    Trivial pericardial effusion  Septum:           No intracardiac shunt via color Doppler.      Valves:     Mitral Valve: Large vegetation measuring 2.5 X 2.4 cm attached on the posterior leaflet of the mitral valve associated with Flail and degenerated leaflet. Severe regurgitation w/ an anteriorly directed eccentric jet is identified. Aortic Valve: The aortic valve is trileaflet and opens adequately. No regurgitiation is identified. Tricuspid valve: Structurally normal. No regurgitation is identified. Pulmonary valve: Normal. No significant regurgitation     No thrombus identified.        Summary:      1. A MONROE was performed without complications. 2. LVEF 55%  3. No thrombus identified  4. No intracardiac shunt via color Doppler. 5. Large vegetation measuring 2.5 X 2.4 cm noted on the posterior leaflet of the mitral valve. Posterior leaflet is flair and is associated with severe anteriorly directed eccentric regurgitation.      Recommendations:  1. CV Surgery consult for further recommendations    ECHO 7/18/2021  Summary  Global left ventricular systolic function is normal. Calculated ejection  fraction 48% by Dubois's method. Visually estimated EF 50%. Posterior mitral valve leaflet prolapse. Severe mitral regurgitation. Eccentric anterior diredted jet. EOA = 0.7cm2. PISA radius is 1.3cm. Vena contracta is 0.99 cm. MR volume is  58ml. Possible small vegetation on posterior leaflet. Consider MONROE. Objective:   Vitals: /74   Pulse 111   Temp 99 °F (37.2 °C) (Oral)   Resp 24   Ht 5' 6\" (1.676 m)   Wt 183 lb 3.2 oz (83.1 kg)   SpO2 95%   BMI 29.57 kg/m²   General appearance: alert and cooperative with exam  HEENT: Head: Normocephalic, no lesions, without obvious abnormality. Neck:no JVD, trachea midline, no adenopathy  Lungs: Clear to auscultation diminished bases. Heart: Regular rate and rhythm, s1/s2 auscultated, + murmurs SR/ST on tele. Abdomen: soft, non-tender, bowel sounds active  Extremities: no edema  Neurologic: not done        Assessment / Acute Cardiac Problems:   1. Troponin elevation likely demand ischemia in setting of hypotension 2/2 to sepsis  2. Sinus tachycardia  3. Septic Shock  4. Bacteremia with blood cultures showing gram positive cocci in clusters x2  5. HTN  6. HLD  7. N/V Diarrhea  8. Hypokalemia    Patient Active Problem List:     Diarrhea     Acute dehydration     Hypomagnesemia     Hypokalemia     Epistaxis     Thrombocytopenia (HCC)     Hyperglycemia     Essential hypertension     Sepsis with acute organ dysfunction and septic shock (HCC)     Delusions (HCC)     Hypophosphatemia     MSSA bacteremia     Severe mitral regurgitation     Acute respiratory failure with hypoxia (HCC)     GI bleed     Normocytic normochromic anemia     Obesity (BMI 30-39. 9)     Transaminitis     Vertigo     Metabolic encephalopathy     Iron deficiency anemia secondary to blood loss (chronic)     Guaiac positive stools      Plan of Treatment:   1. Troponin elevation likely secondary to demand ischemia/sepsis. No ischemic work-up at this time. 2. Tachycardia with episode of SVT. Continue BB with parameters -Will increase BB dose for HR control. IV Lopressor PRN. 3. Keep K > 4.0 and Mag > 2.0 K 3.5 and Mag 2.0  K Replaced today. BMP in the AM  4. Sepsis followed by ID on IV AB  5. Endocarditis:  MONROE as above with large vegetation. CV surgery consulted. IV AB x6 weeks then will plan CTS per notes.     Electronically signed by MARIKA Machuca NP on 7/25/2021 at 4:06 PM  [address]  [phone]

## 2021-07-25 NOTE — RESEARCH
Clinical Research Services    Patient Name: Denisse Suresh  MRN: [id number]  YOB: 1980  Date of Evaluation: 7/23/21    Reason for evaluation: Consenting and enrollment    Dr. Helder Browning sought EIND for Exebacase from the FDA for persistence of MSSA in the blood and left sided endocarditis despite ongoing treatment regimen on 7/23/2021. Approval was granted by the FDA under FOE#[id number]. [x]  On 723/2021 at 16:12, Dr. Helder Browning contacted the Chair of our local IRB Dr. Zachariah Cantu. Reviewed exebacase and the patient with her. Permission granted to proceed with EIND for this patient,       [x] On 7/24/2021, Dr. Helder Browning discussed with the patient and his wife the EIND for exebacase. After the EIND was granted and IRB approval to proceed, a copy of the consent was given to the patient and his wife on 724/2021 for their review. See Dr Miguelito newby from 7/24/2021 for details. The Consent was signed by the patient on 7/24//2021 at 14:35. [x]  A copy of the signed consents was given to the patient     [x] The patient and wife were educated on Exebacase indication and side effects. [x]  They verbalizes understanding. [x]  The patient will receive IV Exebacase,  a one time, 2-hour infusion along with standard of care IV antibiotic therapy on 7/25/2021.        [x]  On 7/25/2021, I meet with the patient and the wife to answer any additional questions    [x]   For additional questions, please contact Dr. Helder Browning or myself     Pam Pettit RN  Clinical Research Services  [phone]    Nia Jesus MD

## 2021-07-25 NOTE — RESEARCH
Clinical Research Services  Patient Name: Radha Rapp  MRN: [id number]  YOB: 1980  Date of Evaluation: 7/25/2021  Time of Evaluation: 14:40  Reason for evaluation: Infusion    [x] EIND 810215 Exebacase 18 mg in  250 ml hung at 125 ml/hr into Right forearm IV at 12:19 to run over 2 hours via 0.2 micron filter. [x] Subject tolerated infusion without difficulty. [x] EXEBACASE infusion completed at 14:20. [x] Flushed with 20 ml D5W. [x] IV infusion site without redness. [x] Vital signs stable. [x] No adverse events noted. [x] Dr. Violeta Morales update.       Christy David, RN          Clinical Research Nurse  For questions perfect serve Dr. Violeta Morales or call the research nurse at [phone]    Ike Awan MD

## 2021-07-26 LAB
-: NORMAL
ABSOLUTE EOS #: 0.03 K/UL (ref 0–0.44)
ABSOLUTE IMMATURE GRANULOCYTE: 0.11 K/UL (ref 0–0.3)
ABSOLUTE LYMPH #: 1.52 K/UL (ref 1.1–3.7)
ABSOLUTE MONO #: 0.8 K/UL (ref 0.1–1.2)
ALBUMIN SERPL-MCNC: 2.7 G/DL (ref 3.5–5.2)
ALBUMIN SERPL-MCNC: 2.7 G/DL (ref 3.5–5.2)
ALBUMIN/GLOBULIN RATIO: 0.8 (ref 1–2.5)
ALP BLD-CCNC: 59 U/L (ref 40–129)
ALT SERPL-CCNC: 18 U/L (ref 5–41)
ANION GAP SERPL CALCULATED.3IONS-SCNC: 12 MMOL/L (ref 9–17)
AST SERPL-CCNC: 17 U/L
BASOPHILS # BLD: 1 % (ref 0–2)
BASOPHILS ABSOLUTE: 0.06 K/UL (ref 0–0.2)
BILIRUB SERPL-MCNC: 0.58 MG/DL (ref 0.3–1.2)
BILIRUBIN DIRECT: 0.19 MG/DL
BILIRUBIN, INDIRECT: 0.39 MG/DL (ref 0–1)
BUN BLDV-MCNC: 8 MG/DL (ref 6–20)
BUN/CREAT BLD: ABNORMAL (ref 9–20)
CALCIUM SERPL-MCNC: 8 MG/DL (ref 8.6–10.4)
CHLORIDE BLD-SCNC: 103 MMOL/L (ref 98–107)
CO2: 19 MMOL/L (ref 20–31)
CREAT SERPL-MCNC: 0.77 MG/DL (ref 0.7–1.2)
CULTURE: ABNORMAL
DIFFERENTIAL TYPE: ABNORMAL
EKG ATRIAL RATE: 124 BPM
EKG P AXIS: 61 DEGREES
EKG P-R INTERVAL: 156 MS
EKG Q-T INTERVAL: 306 MS
EKG QRS DURATION: 88 MS
EKG QTC CALCULATION (BAZETT): 439 MS
EKG R AXIS: 73 DEGREES
EKG T AXIS: 6 DEGREES
EKG VENTRICULAR RATE: 124 BPM
EOSINOPHILS RELATIVE PERCENT: 0 % (ref 1–4)
GFR AFRICAN AMERICAN: >60 ML/MIN
GFR NON-AFRICAN AMERICAN: >60 ML/MIN
GFR SERPL CREATININE-BSD FRML MDRD: ABNORMAL ML/MIN/{1.73_M2}
GFR SERPL CREATININE-BSD FRML MDRD: ABNORMAL ML/MIN/{1.73_M2}
GLOBULIN: ABNORMAL G/DL (ref 1.5–3.8)
GLUCOSE BLD-MCNC: 108 MG/DL (ref 70–99)
HCT VFR BLD CALC: 29 % (ref 40.7–50.3)
HEMOGLOBIN: 9.1 G/DL (ref 13–17)
IMMATURE GRANULOCYTES: 1 %
LYMPHOCYTES # BLD: 14 % (ref 24–43)
Lab: ABNORMAL
Lab: ABNORMAL
MCH RBC QN AUTO: 28.7 PG (ref 25.2–33.5)
MCHC RBC AUTO-ENTMCNC: 31.4 G/DL (ref 28.4–34.8)
MCV RBC AUTO: 91.5 FL (ref 82.6–102.9)
MONOCYTES # BLD: 7 % (ref 3–12)
NRBC AUTOMATED: 0 PER 100 WBC
PDW BLD-RTO: 13.1 % (ref 11.8–14.4)
PHOSPHORUS: 3.5 MG/DL (ref 2.5–4.5)
PLATELET # BLD: 251 K/UL (ref 138–453)
PLATELET ESTIMATE: ABNORMAL
PMV BLD AUTO: 9.9 FL (ref 8.1–13.5)
POTASSIUM SERPL-SCNC: 3.5 MMOL/L (ref 3.7–5.3)
RBC # BLD: 3.17 M/UL (ref 4.21–5.77)
RBC # BLD: ABNORMAL 10*6/UL
REASON FOR REJECTION: NORMAL
SEG NEUTROPHILS: 77 % (ref 36–65)
SEGMENTED NEUTROPHILS ABSOLUTE COUNT: 8.31 K/UL (ref 1.5–8.1)
SODIUM BLD-SCNC: 134 MMOL/L (ref 135–144)
SPECIMEN DESCRIPTION: ABNORMAL
SPECIMEN DESCRIPTION: ABNORMAL
TOTAL PROTEIN: 6.3 G/DL (ref 6.4–8.3)
WBC # BLD: 10.8 K/UL (ref 3.5–11.3)
WBC # BLD: ABNORMAL 10*3/UL
ZZ NTE CLEAN UP: ORDERED TEST: NORMAL
ZZ NTE WITH NAME CLEAN UP: SPECIMEN SOURCE: NORMAL

## 2021-07-26 PROCEDURE — 80069 RENAL FUNCTION PANEL: CPT

## 2021-07-26 PROCEDURE — 2580000003 HC RX 258: Performed by: INTERNAL MEDICINE

## 2021-07-26 PROCEDURE — 85025 COMPLETE CBC W/AUTO DIFF WBC: CPT

## 2021-07-26 PROCEDURE — APPSS30 APP SPLIT SHARED TIME 16-30 MINUTES: Performed by: NURSE PRACTITIONER

## 2021-07-26 PROCEDURE — 93010 ELECTROCARDIOGRAM REPORT: CPT | Performed by: INTERNAL MEDICINE

## 2021-07-26 PROCEDURE — 99232 SBSQ HOSP IP/OBS MODERATE 35: CPT | Performed by: INTERNAL MEDICINE

## 2021-07-26 PROCEDURE — 6370000000 HC RX 637 (ALT 250 FOR IP): Performed by: INTERNAL MEDICINE

## 2021-07-26 PROCEDURE — 2060000000 HC ICU INTERMEDIATE R&B

## 2021-07-26 PROCEDURE — 80053 COMPREHEN METABOLIC PANEL: CPT

## 2021-07-26 PROCEDURE — 82248 BILIRUBIN DIRECT: CPT

## 2021-07-26 PROCEDURE — 6370000000 HC RX 637 (ALT 250 FOR IP): Performed by: NURSE PRACTITIONER

## 2021-07-26 PROCEDURE — 80076 HEPATIC FUNCTION PANEL: CPT

## 2021-07-26 PROCEDURE — 36415 COLL VENOUS BLD VENIPUNCTURE: CPT

## 2021-07-26 PROCEDURE — 2580000003 HC RX 258: Performed by: NURSE PRACTITIONER

## 2021-07-26 PROCEDURE — 6360000002 HC RX W HCPCS: Performed by: INTERNAL MEDICINE

## 2021-07-26 PROCEDURE — 6360000002 HC RX W HCPCS: Performed by: NURSE PRACTITIONER

## 2021-07-26 PROCEDURE — 84100 ASSAY OF PHOSPHORUS: CPT

## 2021-07-26 PROCEDURE — 2500000003 HC RX 250 WO HCPCS: Performed by: INTERNAL MEDICINE

## 2021-07-26 RX ORDER — KETOROLAC TROMETHAMINE 15 MG/ML
15 INJECTION, SOLUTION INTRAMUSCULAR; INTRAVENOUS ONCE
Status: DISCONTINUED | OUTPATIENT
Start: 2021-07-26 | End: 2021-07-28

## 2021-07-26 RX ADMIN — NAFCILLIN INJECTION 2000 MG: 2 POWDER, FOR SOLUTION INTRAMUSCULAR; INTRAMUSCULAR; INTRAVENOUS at 14:48

## 2021-07-26 RX ADMIN — METOPROLOL TARTRATE 50 MG: 25 TABLET ORAL at 20:04

## 2021-07-26 RX ADMIN — ACETAMINOPHEN 650 MG: 325 TABLET ORAL at 06:30

## 2021-07-26 RX ADMIN — ACETAMINOPHEN 650 MG: 325 TABLET ORAL at 19:49

## 2021-07-26 RX ADMIN — MUPIROCIN: 20 OINTMENT TOPICAL at 09:17

## 2021-07-26 RX ADMIN — SODIUM CHLORIDE: 9 INJECTION, SOLUTION INTRAVENOUS at 05:21

## 2021-07-26 RX ADMIN — NAFCILLIN INJECTION 2000 MG: 2 POWDER, FOR SOLUTION INTRAMUSCULAR; INTRAMUSCULAR; INTRAVENOUS at 19:00

## 2021-07-26 RX ADMIN — POTASSIUM CHLORIDE 20 MEQ: 1500 TABLET, EXTENDED RELEASE ORAL at 16:24

## 2021-07-26 RX ADMIN — NAFCILLIN INJECTION 2000 MG: 2 POWDER, FOR SOLUTION INTRAMUSCULAR; INTRAMUSCULAR; INTRAVENOUS at 03:12

## 2021-07-26 RX ADMIN — DIPHENOXYLATE HYDROCHLORIDE AND ATROPINE SULFATE 1 TABLET: 2.5; .025 TABLET ORAL at 09:16

## 2021-07-26 RX ADMIN — PANTOPRAZOLE SODIUM 40 MG: 40 TABLET, DELAYED RELEASE ORAL at 16:24

## 2021-07-26 RX ADMIN — DIPHENHYDRAMINE HYDROCHLORIDE 12.5 MG: 50 INJECTION, SOLUTION INTRAMUSCULAR; INTRAVENOUS at 21:47

## 2021-07-26 RX ADMIN — DIPHENOXYLATE HYDROCHLORIDE AND ATROPINE SULFATE 1 TABLET: 2.5; .025 TABLET ORAL at 20:04

## 2021-07-26 RX ADMIN — NAFCILLIN INJECTION 2000 MG: 2 POWDER, FOR SOLUTION INTRAMUSCULAR; INTRAMUSCULAR; INTRAVENOUS at 23:30

## 2021-07-26 RX ADMIN — PANTOPRAZOLE SODIUM 40 MG: 40 TABLET, DELAYED RELEASE ORAL at 05:20

## 2021-07-26 RX ADMIN — NAFCILLIN INJECTION 2000 MG: 2 POWDER, FOR SOLUTION INTRAMUSCULAR; INTRAMUSCULAR; INTRAVENOUS at 10:52

## 2021-07-26 RX ADMIN — RIFAMPIN 600 MG: 600 INJECTION, POWDER, LYOPHILIZED, FOR SOLUTION INTRAVENOUS at 12:00

## 2021-07-26 RX ADMIN — METOPROLOL TARTRATE 50 MG: 25 TABLET ORAL at 09:16

## 2021-07-26 RX ADMIN — NAFCILLIN INJECTION 2000 MG: 2 POWDER, FOR SOLUTION INTRAMUSCULAR; INTRAMUSCULAR; INTRAVENOUS at 06:30

## 2021-07-26 RX ADMIN — POTASSIUM CHLORIDE 20 MEQ: 1500 TABLET, EXTENDED RELEASE ORAL at 09:16

## 2021-07-26 RX ADMIN — SODIUM CHLORIDE, PRESERVATIVE FREE 10 ML: 5 INJECTION INTRAVENOUS at 09:17

## 2021-07-26 RX ADMIN — Medication 6 MG: at 21:47

## 2021-07-26 ASSESSMENT — PAIN SCALES - GENERAL
PAINLEVEL_OUTOF10: 3
PAINLEVEL_OUTOF10: 3
PAINLEVEL_OUTOF10: 0

## 2021-07-26 NOTE — PROGRESS NOTES
[facility]  Office: [address], DO, Ritesh Steele, DO, Santyfaviola Mccrary, DO, Nicole Martini Blood, DO, Peter Bradley MD, Bruno Ruby MD, Vanda Obrien MD, J Carlos Taylor MD, Modesta Fernando MD, Trang Mendez MD, Huyen Grimaldo MD, Santo Harris, DO, Saima Kiser MD, Ciaran Ramey, DO, Mike Evans MD,  Paxton Bronw, DO, Shima Zavala MD, Wil Olvera MD, Genie Dakin, MD, Ana Aparicio MD, Olena Tamayo MD, Dora Mendieta MD, David William, [facility], [facility], CNP, Sylvia Bryson, CNP, Jean Sampson, CNS, Anel Silva, CNP, Elke Jackson, CNP, Ansley Pitts, CNP, Myles Singer, CNP, Светлана Pope, CNP, MELONIE GarrettC, Rosales Kirkland, [facility], Jeffery Koenig, CNP, Francie Cortes, CNP, Li Fournier, CNP, Steve Gamez, CNP, Chito Casey, CNP, Deborah Weiss, CNP, Adri Win, [facility], Merle Rubio, [address] [place] [address]    Progress Note    7/26/2021    1:32 PM    Name:   Logan Valadez  MRN:     [id number]     Acct:      [de-identified]   Room:   [address] [place] [address] Day:  9  Admit Date:  7/17/2021 12:42 AM    PCP:   Brayan Vasquez MD  Code Status:  Full Code    Subjective:     C/C:   Chief Complaint   Patient presents with    Abdominal Pain    Emesis     Interval History Status: improved. Patient seen examined this morning. Multiple friends and family members present. Patient feeling significantly better. No further episodes of rigors. HR coming down. Tolerating diet. Brief History:     Mr. Rachel Nolasco is a pleasant 36year old  gentleman who presneted to [facility] for evaluation of confusion, fatigue, diarrhea, and abdominal pains on 7/17/2021. He was recently released from [facility] for similar complaints and was noted to have low potassium levels at that time.  CT of the abdomen was normal. The diarrhea was \"water-like\" and profuse in nature, admitting to approx 5-7 watery stools a day for the past 4 days prior to admission. No BRBPR. Abdominal pain was present prior to the diarrhea. There is associated fevers and chills. Subjective fever of 104 prior to arrival. Emesis is food that is digested but from earlier in the day. Patient admits to eating popcorn and nuts just the weekend before the onset. 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 CT of the abdomen pelvis was performed which revealed a markedly distended bladder with a calculated volume of approximately 2 L in the bladder along with hepatic steatosis and mild hepatomegaly with minor bibasilar atelectasis and interstitial thickening posteriorly. A Cárdenas catheter was placed with alleviation of the patient's urinary retention. Next morning, the patient's lipase level was noted be 29. A CT of the abdomen pelvis was repeated on 7/18 which revealed multiple air-fluid levels, seen with acute enterocolitis. Patient was her IV antibiotics admitted to the hospital.    He was noted to be quite tachycardic with a heart rate upwards of 151 bpm.  Cardiology was consulted, as source troponin was also increased. Troponins likely thought to be secondary to a type II mechanism from sepsis. 2D echo was ordered and revealed an EF of 50% with posterior mitral leaflet prolapse and severe MR with an eccentric anterior directed jet with a possible small vegetation on the posterior leaflet. He is scheduled to undergo MONROE on 7/20. Meanwhile, 2 of 2 blood cultures have returned positive for MSSA. His urine culture also grew 50 to 100,000 CFU of MSSA. Infectious disease was consulted and is placed the patient on Ancef. He was also noted to be quite thrombocytopenic with a bradley of 32. This is improving.   Fibrinogen is normal.    Patient was scheduled to undergo MONROE on 7/19, however, due to profound hypokalemia, this procedure was postponed. He did develop epistaxis on the evening of 7/18. ENT was consulted and recommended transfusing platelets for goal of greater than 50k. He underwent nasal cautery along with Afrin use which controlled his bleeding.    - MRI with acute infarcts  - Adjusting antibiotic therapy  - CT surgery wanting to intervene on valve -but after antibiotic therapy. - Exabacase to be given on 7.25. Review of Systems:     Constitutional: Reports no further episodes of rigors last night, negative for sweats  Respiratory: Denies shortness of breath; negative for cough, wheezing  Cardiovascular:  Negative for chest pain, chest pressure/discomfort, lower extremity edema, palpitations  Gastrointestinal: Reports continued diarrhea, but daily improvement. Negative for abdominal pain, constipation,  nausea, vomiting  Extremities: Reports left great toe redness is resolved. Pain is resolved. Neurological: Reports no further episodes of dizziness. Denies headache. Medications: Allergies:     Allergies   Allergen Reactions    Morphine Other (See Comments)     Pt report muscle cramps       Current Meds:   Scheduled Meds:    diphenoxylate-atropine  1 tablet Oral BID    metoprolol tartrate  50 mg Oral BID    potassium chloride  20 mEq Oral BID WC    pantoprazole  40 mg Oral BID AC    rifampin (RIFADIN) IVPB  600 mg Intravenous Q24H    nafcillin  2,000 mg Intravenous Q4H    mupirocin   Nasal BID    sodium chloride flush  5-40 mL Intravenous 2 times per day     Continuous Infusions:    sodium chloride 100 mL/hr at 07/26/21 0521    sodium chloride      sodium chloride       PRN Meds: diphenhydrAMINE, meclizine, loperamide, sodium chloride, sodium chloride, sodium chloride flush, sodium chloride, ondansetron **OR** ondansetron, acetaminophen **OR** acetaminophen, polyethylene glycol, magnesium sulfate, oxyCODONE, melatonin, aluminum & magnesium hydroxide-simethicone    Data:     Past Medical History:   has a past medical history of Diverticulosis, Hyperlipidemia, Hypertension, and Research subject. Social History:   reports that he has never smoked. He has never used smokeless tobacco. He reports current alcohol use of about 20.0 standard drinks of alcohol per week. He reports that he does not use drugs. Family History:   Family History   Problem Relation Age of Onset    No Known Problems Mother     Other Father         diverticulitis       Vitals:  /68   Pulse 95   Temp (!) 46.8 °F (8.2 °C) (Oral)   Resp 18   Ht 5' 6\" (1.676 m)   Wt 182 lb 5.1 oz (82.7 kg)   SpO2 96%   BMI 29.43 kg/m²   Temp (24hrs), Av.5 °F (33.6 °C), Min:46.8 °F (8.2 °C), Max:100.9 °F (38.3 °C)    No results for input(s): POCGLU in the last 72 hours. I/O (24Hr):     Intake/Output Summary (Last 24 hours) at 2021 1332  Last data filed at 2021 3903  Gross per 24 hour   Intake 2424.01 ml   Output --   Net 2424.01 ml       Labs:  Hematology:  Recent Labs     21  0714 21  0952 21  0544   WBC 9.8 10.3 10.8   RBC 3.29* 3.38* 3.17*   HGB 9.4* 9.7* 9.1*   HCT 30.2* 30.8* 29.0*   MCV 91.8 91.1 91.5   MCH 28.6 28.7 28.7   MCHC 31.1 31.5 31.4   RDW 13.4 13.2 13.1    250 251   MPV 10.5 9.9 9.9     Chemistry:  Recent Labs     21  1837 21  0037 21  0714 21  0952 21  0700   NA  --   --  131* 133* 134*   K  --   --  4.0 3.5* 3.5*   CL  --   --  101 102 103   CO2  --   --  22 23 19*   GLUCOSE  --   --  143* 130* 108*   BUN  --   --  13 8 8   CREATININE  --   --  0.71 0.56* 0.77   MG  --   --   --  2.0  --    ANIONGAP  --   --  8* 8* 12   LABGLOM  --   --  >60 >60 >60   GFRAA  --   --  >60 >60 >60   CALCIUM  --   --  8.0* 8.3* 8.0*   PHOS  --   --  2.8  --  3.5   LACTACIDWB 1.9 1.2 1.0  --   --      Recent Labs     21  0714 21  0952 21  0700   PROT 6.0* 6.3* 6.3*   LABALBU 2.7*  2.7* 2.7* 2.7* 2.7*   AST 26 20 17   ALT 26 23 18   ALKPHOS 66 67 59   BILITOT 0.71 0.41 0.58   BILIDIR 0.28  --  0.19     ABG:  Lab Results   Component Value Date    POCPH 7.486 07/18/2021    POCPCO2 28.6 07/18/2021    POCPO2 134.5 07/18/2021    POCHCO3 21.6 07/18/2021    NBEA 1 07/18/2021    PBEA NOT REPORTED 07/18/2021    PNR6JBC NOT REPORTED 07/18/2021    LMXI1JLP 99 07/18/2021    FIO2 NOT REPORTED 07/18/2021     Lab Results   Component Value Date/Time    SPECIAL 5ML LT HAND 07/25/2021 09:51 AM     Lab Results   Component Value Date/Time    CULTURE (A) 07/25/2021 09:51 AM     POSITIVE Blood Culture Results called to and read back by: JUANCHO MA 7/26 0640    CULTURE  07/25/2021 09:51 AM     DIRECT GRAM STAIN FROM BOTTLE: GRAM POSITIVE COCCI IN CLUSTERS       Radiology:  CT ABDOMEN PELVIS WO CONTRAST Additional Contrast? None    Result Date: 7/17/2021  No acute abnormality identified in the abdomen and pelvis. The bladder is markedly distended with a calculated volume of 2 L. Hepatic steatosis and mild hepatomegaly. Minor bibasilar atelectasis and interstitial thickening posteriorly. XR CHEST (SINGLE VIEW FRONTAL)    Result Date: 7/17/2021  Unremarkable portable chest radiograph. XR FOOT LEFT (2 VIEWS)    Result Date: 7/19/2021  No radiographic evidence for acute osteomyelitis. Mild soft tissue swelling of the great toe possibly cellulitis. RECOMMENDATION: NM bone scan or MRI may be obtained if there remains strong concern for acute osteomyelitis. XR ABDOMEN (KUB) (SINGLE AP VIEW)    Result Date: 7/17/2021  Nonobstructive bowel gas pattern. No acute process identified. US GALLBLADDER RUQ    Result Date: 7/17/2021  Mild hepatomegaly. Diffuse hepatic steatosis. Contracted gallbladder. No cholelithiasis or acute cholecystitis. XR CHEST PORTABLE    Result Date: 7/18/2021  No focal consolidation. No acute process evident.      CT CHEST ABDOMEN PELVIS W CONTRAST    Result Date: 7/18/2021  Moderate distention of the colon multiple air-fluid levels, findings may be seen with acute enterocolitis. Physical Examination:        General appearance:  alert, cooperative and no distress,  gentleman sitting up in bed  Mental Status:  oriented to person, place and time and normal affect  Lungs:  clear to auscultation bilaterally, normal effort  Heart: Regular rate, regular rhythm, there is a loud systolic murmur which is blowing in nature best appreciated at the apex  Abdomen:  soft, nontender, nondistended, protuberant, normal bowel sounds, no masses, hepatomegaly, splenomegaly  Extremities:  no edema, redness, tenderness in the calves; left great toe with resolution of erythema at the 1st MTP joint. Neuro:  strength is equal bilaterally. Upper extremity strength is 5/5. Lower extremity strength is 5/5. No nystagmus noted. Skin:  Erythema to the first MTP joint on the left is resolved. Mid back erythema and discoloration noted. See media    Assessment:        Hospital Problems         Last Modified POA    * (Principal) MSSA bacteremia 7/20/2021 Yes    Diarrhea 7/20/2021 Yes    Acute dehydration 7/20/2021 Yes    Hypokalemia 7/20/2021 Yes    Epistaxis 7/20/2021 No    Thrombocytopenia ([address] 7/20/2021 Yes    Sepsis with acute organ dysfunction and septic shock ([address] 7/20/2021 Yes    Delusions ([address] 7/20/2021 Yes    Hypophosphatemia 7/20/2021 Yes    Severe mitral regurgitation 7/20/2021 Yes    Acute respiratory failure with hypoxia ([address] 7/20/2021 Yes    GI bleed 7/20/2021 Yes    Vertigo 7/21/2021 No    Hypomagnesemia 7/20/2021 Yes    Normocytic normochromic anemia 7/20/2021 Yes    Obesity (BMI 30-39. 9) 7/20/2021 Yes    Transaminitis 7/20/2021 Yes    Hyperglycemia 7/20/2021 Yes    Essential hypertension 7/76/5966 Yes    Metabolic encephalopathy 8/72/5896 Yes    Iron deficiency anemia secondary to blood loss (chronic) 7/21/2021 Yes    Guaiac positive stools 7/21/2021 Yes    Subacute endocarditis 7/26/2021 Yes Cerebral septic emboli ([facility] [address] [facility] 7/26/2021 Yes          Plan:        1. MSSA bacteremia - Continue Nafcillin 2 g q 4 hours and Rifampin 600 mg qd through 8/15. ID following. One time dose of exebacase was given. No AC recommended. Source is still unclear. CT of the spine is without evidence of any discitis that we can tell. Large mitral valve vegetation noted. 2. 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 Continue IV antibiotics as noted above. Anticipate surgery after 6 weeks of antibiotics. Exebacase to given x1. Blood cultures positive on 7/25. 3. SVT vs sinus tachycardia - likely secondary to sepsis. Lopressor being up-titrated. 4. Lactic acidosis - resolved  5. Acute respiratory failure with hypoxia - D-dimer is elevated. Wean supplemental oxygen as able. Not currently requiring supplemental oxygen. 6. Acute septic emboli infarcts - to the supra and infratentorial structures along with subacute to chronic encephalomalacia with chronic blood products within the left superior parietal lobule and right inferior parietal lobule. Avoid anticoagulation  7. Delusions, confusion, TME - Resolved; possibly, and likely due to sepsis +/- acute infarcts  8. Vertigo - resolved; likely related to #6 - continue antivert. 9. Thrombocytopenia - resolved. Likely secondary to sepsis. 10. GI bleed/normo normo anemia-Continue protonix. Follow up with GI as noted. 11. Acute diarrhea - continue imodium dose today. Continue Lomotil due to persistent diarrhea. He is at risk for C. Diff, but diarrhea has been persistent since before admission  12. Hypokalemia, hypophosphatemia - replace lytes daily. Change potassium to 20 mEq daily. 13. Transaminitis-likely related to fatty liver disease, possibly due to sepsis. Now WNL  14. Obesity 30.07 - diet/weight loss/exercise  15. Acute left great toe pain - gout vs septic joint - resolved with IV antibioitcs  16.  Disposition: Discharge once patient has negative culture. 6 weeks of antibiotics to follow. Then follow-up with CT surgery for interventional eval.  Patient received exebacase. HL IVF  17.  Family updated    Oliverio Alvarez DO  7/26/2021  1:32 PM

## 2021-07-26 NOTE — CARE COORDINATION
[facility] Quality Flow/Interdisciplinary Rounds Progress Note    Quality Flow Rounds held on July 26, 2021 at [address] Attending:  Bedside Nurse,  and Nursing Unit Leadership    Barriers to Discharge: Positive Blood Cultures    Anticipated Discharge Date:       Anticipated Discharge Disposition:Home with IV therapy until 8/15 will need a homecare co  nsult    Readmission Risk              Risk of Unplanned Readmission:  17           Discussed patient goal for the day, patient clinical progression, and barriers to discharge.   The following Goal(s) of the Day/Commitment(s) have been identified:  ok      Lela Wynn RN  July 26, 2021

## 2021-07-26 NOTE — PLAN OF CARE
NON INVASIVE VENTILATION    PROVIDE OPTIMAL VENTILATION/ACCEPTABLE SP02  IMPLEMENT NON INVASIVE VENTILATION PROTOCOL  ASSESSMENT SKIN INTEGRITY  PATIENT EDUCATION AS NEEDED  BIPAP AS NEEDED

## 2021-07-26 NOTE — CARE COORDINATION
Transition planning   Spoke with Erich Caba she received referral- will talk to family and pt to see whom they would like for [facility]. .

## 2021-07-26 NOTE — PROGRESS NOTES
[facility]  Progress Note                   Date:   7/26/2021  Patient name: Lian Morales  Date of admission:  7/17/2021 12:42 AM  MRN:   [id number]  YOB: 1980  PCP: Darryle Barman, MD    Reason for Admission: Diarrhea [R19.7]    Subjective:       Clinical Changes /Abnormalities: Patient seen and examined in room with family at bedside after discussion with RN. Denies chest pain or SOB. No CV concerns. On IV AB. SR on tele HR 98. Review of Systems    Medications:   Scheduled Meds:   diphenoxylate-atropine  1 tablet Oral BID    metoprolol tartrate  50 mg Oral BID    potassium chloride  20 mEq Oral BID WC    pantoprazole  40 mg Oral BID AC    rifampin (RIFADIN) IVPB  600 mg Intravenous Q24H    nafcillin  2,000 mg Intravenous Q4H    mupirocin   Nasal BID    sodium chloride flush  5-40 mL Intravenous 2 times per day     Continuous Infusions:   sodium chloride 100 mL/hr at 07/26/21 0521    sodium chloride      sodium chloride       CBC:   Recent Labs     07/24/21  0714 07/25/21  0952 07/26/21  0544   WBC 9.8 10.3 10.8   HGB 9.4* 9.7* 9.1*    250 251     BMP:    Recent Labs     07/24/21  0714 07/25/21  0952 07/26/21  0700   * 133* 134*   K 4.0 3.5* 3.5*    102 103   CO2 22 23 19*   BUN 13 8 8   CREATININE 0.71 0.56* 0.77   GLUCOSE 143* 130* 108*     Hepatic:  Recent Labs     07/24/21  0714 07/25/21  0952 07/26/21  0700   AST 26 20 17   ALT 26 23 18   BILITOT 0.71 0.41 0.58   ALKPHOS 66 67 59     Troponin:   No results for input(s): TROPHS in the last 72 hours. BNP: No results for input(s): BNP in the last 72 hours. Lipids: No results for input(s): CHOL, HDL in the last 72 hours. Invalid input(s): LDLCALCU  INR: No results for input(s): INR in the last 72 hours.     DIAGNOSTIC DATA  MONROE 7/20/2021  MONROE findings:     LA:       Normal  RAJAT:    No thrombus  RA:      Normal  RV:      Normal  LV:       Normal  Estimated LVEF:         55%  Aorta: tele.   Abdomen: soft, non-tender, bowel sounds active  Extremities: no edema  Neurologic: not done        Assessment / Acute Cardiac Problems:   1. Troponin elevation likely demand ischemia in setting of hypotension 2/2 to sepsis  2. Sinus tachycardia  3. Septic Shock  4. Bacteremia with blood cultures showing gram positive cocci in clusters x2  5. HTN  6. HLD  7. N/V Diarrhea  8. Hypokalemia    Patient Active Problem List:     Diarrhea     Acute dehydration     Hypomagnesemia     Hypokalemia     Epistaxis     Thrombocytopenia (HCC)     Hyperglycemia     Essential hypertension     Sepsis with acute organ dysfunction and septic shock (HCC)     Delusions (HCC)     Hypophosphatemia     MSSA bacteremia     Severe mitral regurgitation     Acute respiratory failure with hypoxia (HCC)     GI bleed     Normocytic normochromic anemia     Obesity (BMI 30-39. 9)     Transaminitis     Vertigo     Metabolic encephalopathy     Iron deficiency anemia secondary to blood loss (chronic)     Guaiac positive stools      Plan of Treatment:   1. Troponin elevation likely secondary to demand ischemia/sepsis. No ischemic work-up at this time. 2. Tachycardia secondary to sepsis. HR 98 on tele. Continue BB with parameters -IV Lopressor PRN. 3. Keep K > 4.0 and Mag > 2.0 K 3.5 again today and Mag 2.0 yesterday. K Replaced today. 4. Sepsis followed by ID on IV AB  +BC   5. MV Endocarditis:  MONROE as above with large vegetation. CV surgery consulted. IV AB x6 weeks then will plan CTS per notes.   6. Rest of care managed per Primary Team.      Electronically signed by MARIKA Lopez NP on 7/26/2021 at 11:41 AM  [address]  [phone]

## 2021-07-26 NOTE — PLAN OF CARE
Problem: Ineffective Breathing Pattern  Intervention: Continuous positive airway pressure (CPAP)  Note: NON INVASIVE VENTILATION  PROVIDE OPTIMAL VENTILATION/ACCEPTABLE SP02  IMPLEMENT NON INVASIVE VENTILATION PROTOCOL  ASSESSMENT SKIN INTEGRITY  PATIENT EDUCATION AS NEEDED  BIPAP AS NEEDED

## 2021-07-26 NOTE — PROGRESS NOTES
NEUROLOGY INPATIENT PROGRESS NOTE    7/26/2021         Current Exam:     Chart reviewed. Discussed with RN. No complaints, feeling well. No diplopia or vertigo. Underwent Exebacase infusion yesterday. Brief History:    Jackelyn Barney is a  36 y.o. male with H/O HTN, HLD, diverticulitis status post partial hemicolectomy in 2012, who was admitted on 7/17/2021 with nausea, vomiting, abdominal pain, diarrhea and body aches with generalized fatigue. He was found to have MSSA sepsis, septic shock, subacute endocarditis and severe MR. Neurology was consulted for reports of visual hallucinations along with vertigo and diplopia. Patient family reported patient describing seeing people and a dog in the room and spoke to them about things that were not happening. Patient recalls seeing water coming from the IV line that was not there. On the morning of 7/21 the patient woke up with severe vertigo and an unsteady gait. He described the vertigo was constant with no aggravating factor, requiring help to get up to the restroom. MRI brain was done with acute embolic infarctions involving the supra and infratentorial structures as well as subacute to chronic encephalomalacia with chronic blood products within left superior parietal lobule and right inferior parietal lobule. CTA head and neck were done and were unremarkable. EEG with mild diffuse slowing. Antiplatelets and anticoagulation held given septic emboli. CTS is following the case and planning for MVR. No current facility-administered medications on file prior to encounter.      Current Outpatient Medications on File Prior to Encounter   Medication Sig Dispense Refill    amLODIPine (NORVASC) 5 MG tablet Take 5 mg by mouth daily      atorvastatin (LIPITOR) 80 MG tablet Take 80 mg by mouth daily      carvedilol (COREG) 25 MG tablet Take 25 mg by mouth 2 times daily (with meals)      dicyclomine (BENTYL) 20 MG tablet Take 20 mg by mouth every 6 hours      hydroCHLOROthiazide (HYDRODIURIL) 25 MG tablet Take 25 mg by mouth daily      HYDROcodone-acetaminophen (NORCO) 5-325 MG per tablet Take 1 tablet by mouth every 6 hours as needed for Pain.  potassium chloride (MICRO-K) 10 MEQ extended release capsule Take 20 mEq by mouth 2 times daily         Allergies: Wood Chauhan is allergic to morphine. Past Medical History:   Diagnosis Date    Diverticulosis     Hyperlipidemia     Hypertension     Research subject 07/24/2021    EIND [id number] Exebacase for persistent bacteremia expected date of completion 8/25/2021       Past Surgical History:   Procedure Laterality Date    ABDOMEN SURGERY      large bowel resection    ANTERIOR CRUCIATE LIGAMENT REPAIR Right     APPENDECTOMY  07/09/1997    COLECTOMY      2012 - D/T Dverticulitis    DILATATION, ESOPHAGUS         Social History: Wood Chauhan  reports that he has never smoked. He has never used smokeless tobacco. He reports current alcohol use of about 20.0 standard drinks of alcohol per week. He reports that he does not use drugs.     Family History   Problem Relation Age of Onset    No Known Problems Mother     Other Father         diverticulitis       Objective:   /65   Pulse 111   Temp 98.2 °F (36.8 °C) (Oral)   Resp 18   Ht 5' 6\" (1.676 m)   Wt 182 lb 5.1 oz (82.7 kg)   SpO2 96%   BMI 29.43 kg/m²     Blood pressure range: Systolic (76OYS), LML:020 , Min:102 , JBC:161   ; Diastolic (18GAX), YAK:48, Min:57, Max:81      Review of Systems:  Constitutional  Negative for fever and chills    HEENT  Negative for ear discharge, ear pain, nosebleed    Eyes  Negative for photophobia, pain and discharge    Respiratory  Negative for hemoptysis and sputum    Cardiovascular  Negative for orthopnea, claudication and PND    Gastrointestinal  Negative for abdominal pain, diarrhea, blood in stool    Musculoskeletal  Negative for joint pain, negative for myalgia    Skin  Negative for rash or itching    Endo/heme/allergies  Negative for polydipsia, environmental allergy    Psychiatric/behavioral  Negative for suicidal ideation. Patient is not anxious        NEUROLOGIC EXAMINATION  GENERAL  Appears comfortable and in no distress   HEENT  NC/ AT   NECK  Supple   MENTAL STATUS:  Alert, oriented, intact memory, no confusion, normal speech, normal language, no hallucination or delusion   CRANIAL NERVES: II     -      Visual fields intact to confrontation  III,IV,VI -  EOMs full, no afferent defect, no ARIELLA, no ptosis  V     -     Normal facial sensation  VII    -     Normal facial symmetry  VIII   -     Intact hearing  IX,X -     Symmetrical palate  XI    -     Symmetrical shoulder shrug  XII   -     Midline tongue, no atrophy    MOTOR FUNCTION:  significant for good strength of grade 5/5 in bilateral proximal and distal muscle groups of both upper and lower extremities with normal bulk, normal tone and no involuntary movements, no tremor   SENSORY FUNCTION:  Normal touch, normal pin   CEREBELLAR FUNCTION:  Intact fine motor control over upper limbs   REFLEX FUNCTION:  Symmetric, no perverted reflex, no Babinski sign   STATION and GAIT  Not tested       Data:    Lab Results:   CBC:   Recent Labs     07/24/21  0714 07/25/21  0952 07/26/21  0544   WBC 9.8 10.3 10.8   HGB 9.4* 9.7* 9.1*    250 251     BMP:    Recent Labs     07/24/21  0714 07/25/21  0952 07/26/21  0700   * 133* 134*   K 4.0 3.5* 3.5*    102 103   CO2 22 23 19*   BUN 13 8 8   CREATININE 0.71 0.56* 0.77   GLUCOSE 143* 130* 108*         Lab Results   Component Value Date    ALT 18 07/26/2021    AST 17 07/26/2021    TSH 1.86 07/20/2021    INR 1.0 07/19/2021    LABA1C 5.5 07/17/2021    [id number] 1120 07/17/2021           Diagnostic data reviewed:  CT HEAD (7/20/2021): No acute intracranial abnormality      MRI BRAIN (8/52/9823): Acute embolic infarctions involving supra & infratentorial structures.  Subacute to chronic encephalomalacia with chronic blood products within the L superior parietal lobule & R inferior parietal lobule    CTA HEAD & NECK (7/22/2021): Unremarkable       ECHO (7/18/2021): EF 50%. Severe MR. Posterior mitral valve leaflet prolapse. Possible small vegetation on posterior leaflet    MONROE (7/20/2021): EF 55%. Large vegetation measuring 2.5 X 2.4 cm noted on the posterior leaflet of the mitral valve. Posterior leaflet is flair and is associated with severe anteriorly directed eccentric regurgitation        EEG (7/21/2021): This EEG shows the presence of mild diffuse slowing. This EEG is concordant with diffuse encephalopathy. No clear lateralized or epileptiform disturbance is seen            Impression:  -Metabolic encephalopathy in the setting of MSSA sepsis, septic shock, subacute endocarditis and acute embolic infarctions  -Septic emboli to brain  -MSSA sepsis with subacute endocarditis    Plan:  -Patient continues on antibiotics as per ID team  -Antiplatelets/anticoagulation contraindicated due to septic emboli which have a higher chance of bleeding  -MVR to be planned by CTS team following 6 weeks of antibiotics and negative blood cultures  -PT/OT  -Will need outpatient follow up in 4 weeks  -Will follow    Please note that this note was generated using a voice recognition dictation software. Although every effort was made to ensure the accuracy of this automated transcription, some errors in transcription may have occurred.

## 2021-07-26 NOTE — FLOWSHEET NOTE
SPIRITUAL CARE PROGRESS NOTE     Spiritual Assessment: Pt was calm and approachable, with family present upon  arrival.     Intervention:  facilitated pt exploration of feelings, thoughts, and concerns.  nurtured hope and prayed with family.  Outcome: Pt was receptive to visit and prayer. Pt expressed gratitude for visit and prayer. 07/26/21 1352   Encounter Summary   Services provided to: Patient and family together   Referral/Consult From: Multi-disciplinary team   Support System Spouse; Family members   Continue Visiting   (7/26/21)   Complexity of Encounter Low   Length of Encounter 30 minutes   Spiritual Assessment Completed Yes   Routine   Type Follow up   Assessment Calm; Approachable   Intervention Explored feelings, thoughts, concerns;Prayer;Nurtured hope   Outcome Expressed gratitude;Receptive

## 2021-07-26 NOTE — PROGRESS NOTES
[facility] - Progress Note    Today's Date and Time: 7/26/2021, 11:12 AM    Impression :   · MSSA septicemia 7-17-21  · Persistent growth of bacteria in blood 7-17-21 through 7-20-21 despite treatment  · Sepsis with acute organ dysfunction and septic shock  · Mitral Valve endocarditis. 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 Will need 6 weeks from first negative blood cultures.   · Rifampin 600 mg daily added 7/22/21 for synergy  · Please do not give anticoagulants due to the risk of septic emboli in the brain transitioning to hemorrhagic   · Received infusion of Exebacase under compassionate use protocol on 7-25-21 without any AEs    Medical Decision Making/Summary/Discussion:7/26/2021     ·   Infection Control Recommendations   · [place] Precautions  · Contact Isolation     Antimicrobial Stewardship Recommendations     · Simplification of therapy  · Targeted therapy    Coordination of Outpatient Care:   · Estimated Length of IV antimicrobials: 4 weeks  · Patient will need Midline Catheter Insertion: TBD  · Patient will need PICC line Insertion: TBD  · Patient will need: Home IV , [place],  SNF,  LTAC: Yes-either SNF or home infusion  · Patient will need outpatient wound care: TBD    Chief complaint/reason for consultation:   · MSSA sepsis    History of Present Illness:   Joaquin Barnett Areli Giron is a 36y.o.-year-old  male who was initially admitted on 7/17/2021. Patient seen at the request of Dr. Bobby Levine:     Patient initially presented to [facility] a few days ago with complaints of  abdominal pain, nausea, watery diarrhea and vomiting for the past 5 days after eating some chicken. The patient states that his wife also had episodes of emesis after eating the chicken but her symptoms resolved shortly after. A CT abdomen was unremarkable and he was diagnosed with viral gastroenteritis and sent home with Norco, Zofran, and potassium supplement for hypokalemia. On 7/16/2021, he presented to [address] [place] [address] with worsening symptoms, including fever, chills, fatigue, and worsening abdominal pain with continued vomiting, right big toe pain. Work-up in the ED revealed a fever of 102.6, tachycardia with a heart rate up to 170, dyspnea with hypoxia, blood cultures showed MSSA x2, and urine cultures grew staph aureus ,000 CFU/mL. Occult blood stool positive. CT abdomen 7/17/2021  Impression   No acute abnormality identified in the abdomen and pelvis.       The bladder is markedly distended with a calculated volume of 2 L.       Hepatic steatosis and mild hepatomegaly.       Minor bibasilar atelectasis and interstitial thickening posteriorly. CT chest abdomen pelvis with contrast 7/18/2021  Impression   Moderate distention of the colon multiple air-fluid levels, findings may be   seen with acute enterocolitis. Abnormal labs include:  Sodium 131  Potassium 3.0  .5  Myoglobin 138  Troponin I 65  Amylase 131  AST 66  Lipase 299  Platelet 39  Sed rate 31    ID has been consulted for MSSA sepsis    CURRENT EVALUATION 7/26/2021:    Afebrile  Vital signs stable with intermittent tachycardia    The patient remains alert and oriented on room air. He received exebacase yesterday, 4/18/1280, with no complications.     Unfortunately, the patient's blood cultures from 7/25/2021 continue to be positive for gram-positive cocci clusters. We will continue to monitor and order repeat cultures. On 7-24-21 I reviewed with patient and wife his clinical course up to the present and the difficulties with source control. I indicated to the patient and his wife, in the presence of his RN, that we had been able to secure permission from the  of Stevemariusz Shoemaker, Dr Sergio Cook of the IRB at [facility], Hospital administration and Legal Department for him to receive Exebacase under a Compassionate Use Protocol. I discussed the available information with him including prior published clinical results and safety data. Indicated that there was no promise of efficacy. I emphasized that participation was voluntary. Patient and his wife had all their questions answered. I gave them two copies of the informed consent to review. They will keep one copy and return to signed copy for inclusion in his records. Patient gave informed consent to proceed. CT surgery is planning for valve repair after the patient has completed IV antibiotic therapy-unless the patient decompensates to the point of needing emergent surgical intervention. MRI brain 5-60-39: Acute embolic infarctions in supra and infratentorial structures. The patient's blood cultures from 7/24/2020 continue to show growth of MSSA. Additional cultures were drawn 7-25-21 which are also growing gram-positive cocci clusters. Because of the septic emboli in the brain, Ancef was discontinued and nafcillin was initiated on 7/22/2021. Rifampin was also added for synergy. Neurology consult was ordered due to episodes of altered mentation along with vertigo and diplopia. Vertigo and diplopia have subsided. Neurology plans outpatient follow up.     Echocardiogram completed on 7/18/2021 revealed posterior mitral valve leaflet prolapse, severe mitral regurgitation, and possible small vegetation on posterior leaflet. Presence of vegetation on mitral valve would help explain the emboli to the brain. [place] on 7/20 showed a Large vegetation measuring 2.5 X 2.4 cm noted on the posterior leaflet of the mitral valve. Posterior leaflet is flair and is associated with severe anteriorly directed eccentric regurgitation. Right foot x-ray revealed:  No radiographic evidence for acute osteomyelitis.  Mild soft tissue swelling   of the great toe possibly cellulitis.       RECOMMENDATION:   NM bone scan or MRI may be obtained if there remains strong concern for acute   osteomyelitis.         CT abdomen pelvis revealed: Moderate distention of the colon multiple air-fluid levels, findings may be   seen with acute enterocolitis. Bruising on the patient's back is from a Tens unit-it does not appear to be the source of sepsis. There are no open lesions noted and there are three distinct marks from the electrodes. There are Osler node appearing lesions on the patient's left pinky toe and right middle finger. Stool studies have been negative    Hepatitis panel nonreactive    Discussed with RN    Labs, X rays reviewed: 7/26/2021    BUN:8-->8-->13-->19  Cr:0.76-->0.49-->0.95-->0.71-->0.77     CRP: 260.5  LDH: 437    WBC:9.9-->10.9-->9.8-->10.8  Hb:12.5-->10.8-->11.5--.9.1  Plat: 39-->40-->78-->196-->251  Haptoglobin: 256  Sed rate: 31  Lipase 299->228  Lactate: 1.6-->2.3    Cultures:  Urine:  ·   Blood:  · 7/17/2021: MSSA x2  · 7/18/2021: MSSA  · 7/20/21: MSSA  · 7/22/2021: MSSA  · 7-24-21: MSSA  · 7-25-21: Pending    Sputum :  ·   Wound:    Discussed with patient, RN, family, Dr Sivakumar Foote. I have personally reviewed the past medical history, past surgical history, medications, social history, and family history, and I have updated the database accordingly.   Past Medical History:     Past Medical History:   Diagnosis Date    Diverticulosis     Hyperlipidemia     Hypertension     Research subject 07/24/2021 Laddie Buerger [id number] Exebacase for persistent bacteremia expected date of completion 8/25/2021       Past Surgical  History:     Past Surgical History:   Procedure Laterality Date    ABDOMEN SURGERY      large bowel resection    ANTERIOR CRUCIATE LIGAMENT REPAIR Right     APPENDECTOMY  07/09/1997    COLECTOMY      2012 - D/T Dverticulitis    DILATATION, ESOPHAGUS         Medications:      diphenoxylate-atropine  1 tablet Oral BID    metoprolol tartrate  50 mg Oral BID    potassium chloride  20 mEq Oral BID WC    pantoprazole  40 mg Oral BID AC    rifampin (RIFADIN) IVPB  600 mg Intravenous Q24H    nafcillin  2,000 mg Intravenous Q4H    mupirocin   Nasal BID    sodium chloride flush  5-40 mL Intravenous 2 times per day       Social History:     Social History     Socioeconomic History    Marital status:      Spouse name: Not on file    Number of children: Not on file    Years of education: Not on file    Highest education level: Not on file   Occupational History    Not on file   Tobacco Use    Smoking status: Never Smoker    Smokeless tobacco: Never Used   Vaping Use    Vaping Use: Never assessed   Substance and Sexual Activity    Alcohol use: Yes     Alcohol/week: 20.0 standard drinks     Types: 20 Cans of beer per week     Comment: 20/ week, average every other day    Drug use: Never    Sexual activity: Not on file   Other Topics Concern    Not on file   Social History Narrative    Not on file     Social Determinants of Health     Financial Resource Strain:     Difficulty of Paying Living Expenses:    Food Insecurity:     Worried About Running Out of Food in the Last Year:     [address] in the Last Year:    Transportation Needs:     Lack of Transportation (Medical):      Lack of Transportation (Non-Medical):    Physical Activity:     Days of Exercise per Week:     Minutes of Exercise per Session:    Stress:     Feeling of Stress :    Social Connections:     Frequency of Communication with Friends and Family:     Frequency of Social Gatherings with Friends and Family:     Attends [employer] Services:     Active Member of Clubs or Organizations:     Attends Club or Organization Meetings:     Marital Status:    Intimate Partner Violence:     Fear of Current or Ex-Partner:     Emotionally Abused:     Physically Abused:     Sexually Abused:        Family History:     Family History   Problem Relation Age of Onset    No Known Problems Mother     Other Father         diverticulitis        Allergies:   Morphine     Review of Systems:   Constitutional: No fevers or chills. No systemic complaints  Head: No headaches  Eyes: No double vision or blurry vision. No conjunctival inflammation. ENT: No sore throat or runny nose. . No hearing loss, tinnitus or vertigo. Cardiovascular: No chest pain or palpitations. No shortness of breath. No CASTRO  Lung: No shortness of breath or cough. No sputum production  Abdomen: No nausea, vomiting, diarrhea, or abdominal pain. Panfilo Danny No cramps. Genitourinary: No increased urinary frequency, or dysuria. No hematuria. No suprapubic or CVA pain  Musculoskeletal: No muscle aches or pains. No joint effusions, swelling or deformities  Hematologic: No bleeding or bruising. Neurologic: No headache, weakness, numbness, or tingling. Integument: Scattered small reddened bumps and bruises  Psychiatric: No depression. Endocrine: No polyuria, no polydipsia, no polyphagia. Physical Examination :     Patient Vitals for the past 8 hrs:   BP Temp Temp src Pulse Resp SpO2   07/26/21 0726 106/65 98.2 °F (36.8 °C) Oral 111 18 96 %   07/26/21 0315 -- 98 °F (36.7 °C) Oral -- 18 96 %     General Appearance: Awake, alert, and in no apparent distress  Head:  Normocephalic, no trauma  Eyes: Pupils equal, round, reactive to light and accommodation; extraocular movements intact; sclera anicteric; conjunctivae pink. No embolic phenomena.   ENT: Oropharynx clear, without erythema, exudate, or thrush. No tenderness of sinuses. Mouth/throat: mucosa pink and moist. No lesions. Dentition in good repair. Neck:Supple, without lymphadenopathy. Thyroid normal, No bruits. Pulmonary/Chest: Clear to auscultation, without wheezes, rales, or rhonchi. No dullness to percussion. Cardiovascular: Normal sinus tachycardia with murmur, No rubs, or gallops. Abdomen: Soft, non tender. Bowel sounds normal. No organomegaly  All four Extremities: No cyanosis, clubbing, edema, or effusions. Neurologic: No gross sensory or motor deficits. Skin: Warm and dry with good turgor. No signs of peripheral arterial or venous insufficiency. No ulcerations. No open wounds. Medical Decision Making -Laboratory:   I have independently reviewed/ordered the following labs:    CBC with Differential:   Recent Labs     07/25/21  0952 07/26/21  0544   WBC 10.3 10.8   HGB 9.7* 9.1*   HCT 30.8* 29.0*    251   LYMPHOPCT 11* 14*   MONOPCT 8 7     BMP:   Recent Labs     07/25/21  0952 07/26/21  0700   * 134*   K 3.5* 3.5*    103   CO2 23 19*   BUN 8 8   CREATININE 0.56* 0.77   MG 2.0  --      Hepatic Function Panel:   Recent Labs     07/24/21  0714 07/24/21  0714 07/25/21  0952 07/26/21  0700   PROT 6.0*   < > 6.3* 6.3*   LABALBU 2.7*  2.7*   < > 2.7* 2.7*  2.7*   BILIDIR 0.28  --   --  0.19   IBILI 0.43  --   --  0.39   BILITOT 0.71   < > 0.41 0.58   ALKPHOS 66   < > 67 59   ALT 26   < > 23 18   AST 26   < > 20 17    < > = values in this interval not displayed. No results for input(s): RPR in the last 72 hours. No results for input(s): HIV in the last 72 hours. No results for input(s): BC in the last 72 hours.   Lab Results   Component Value Date    MUCUS NOT REPORTED 07/17/2021    RBC 3.17 07/26/2021    TRICHOMONAS NOT REPORTED 07/17/2021    WBC 10.8 07/26/2021    YEAST NOT REPORTED 07/17/2021    TURBIDITY CLEAR 07/17/2021     Lab Results   Component Value Date    CREATININE 0.77 07/26/2021    GLUCOSE 108 07/26/2021       Medical Decision Making-Imaging:          MRI Brain 7/2121   Impression       Findings suggestive of acute embolic infarctions involving supra and   infratentorial structures as described.       Subacute to chronic encephalomalacia with chronic blood products within the   left superior parietal lobule and right inferior parietal lobule.       There is no acute intracranial hemorrhage, or intracranial mass lesion.           EXAMINATION:   CT OF THE CHEST, ABDOMEN, AND PELVIS WITH CONTRAST 7/18/2021 10:53 am       TECHNIQUE:   CT of the chest, abdomen and pelvis was performed with the administration of   intravenous contrast. Multiplanar reformatted images are provided for review. Dose modulation, iterative reconstruction, and/or weight based adjustment of   the mA/kV was utilized to reduce the radiation dose to as low as reasonably   achievable.       COMPARISON:   None       HISTORY:   ORDERING SYSTEM PROVIDED HISTORY: MSSA bacteremia, ? DIC   TECHNOLOGIST PROVIDED HISTORY:   MSSA bacteremia, ? DIC       Reason for Exam: sepsis with acute organ dysfunction   Acuity: Unknown   Type of Exam: Unknown       FINDINGS:   Lungs/pleura: The central airways are patent.  There are no suspicious   pulmonary nodules       Mediastinum: There is no acute mediastinal abnormality       Soft Tissues/Bones: No suspicious osteolytic or osteoblastic lesions       Abdominal organs: The liver, spleen, adrenal glands, kidneys, and pancreas   demonstrate no acute abnormality.       GI/Bowel: The visualized portions of the small bowel are unremarkable. There   is borderline dilation of the colon.  There are multiple air-fluid levels   throughout the colon.       Peritoneum/Retroperitoneum: There is a solid amount of retroperitoneal fluid   along the distal ureters bilaterally.       Pelvis:  The bladder is moderately distended.       Bones: No suspicious osseous lesions are identified           Impression   Moderate distention of the colon multiple air-fluid levels, findings may be   seen with acute enterocolitis.             XR FOOT LEFT (2 VIEWS) [[id number]] Collected: 07/18/21 1411      Order Status: Completed Updated: 07/19/21 0759     Narrative:       EXAMINATION:   TWO XRAY VIEWS OF THE LEFT FOOT     7/18/2021 2:05 pm     COMPARISON:   None. HISTORY:   ORDERING SYSTEM PROVIDED HISTORY: Left big toe pain and erythema. Looking for   source of MSSA sepsis   TECHNOLOGIST PROVIDED HISTORY:   Left big toe pain and erythema. Looking for source of MSSA sepsis     FINDINGS:   AP and lateral views of the foot obtained.  Normal alignment and   mineralization.  No abnormal periosteal reaction.  No erosions.  No fracture. No aggressive lytic or blastic lesions.  Mild soft tissue swelling of the   great toe noted.      Impression:       No radiographic evidence for acute osteomyelitis.  Mild soft tissue swelling   of the great toe possibly cellulitis.       EXAMINATION:   MRI OF THE BRAIN WITHOUT CONTRAST  7/21/2021 7:03 pm       TECHNIQUE:   Multiplanar multisequence MRI of the brain was performed without the   administration of intravenous contrast.       COMPARISON:   Head CT of 07/20/2021       HISTORY:   ORDERING SYSTEM PROVIDED HISTORY: episode of vertigo, dipolopia, has   infecticive endocadrditis, r/o septic emboli, abscess   TECHNOLOGIST PROVIDED HISTORY:   episode of vertigo, dipolopia, has infecticive endocadrditis, r/o septic   emboli, abscess   Reason for Exam: VERTIGO, MSSA BACTEREMIA, R/O SEPTIC EMBOLI       FINDINGS:   MRI brain:       There are punctate areas of restricted diffusion within the left superior   frontal gyrus subcortical white matter, right superior frontal gyrus   subcortical white matter, left superior parietal lobule right inferior   frontal gyrus subcortical white matter, left inferior frontal gyrus   subcortical white matter and bilateral cerebellar hemisphere, suggestive of   acute embolic infarctions.       There are focus of encephalomalacia with susceptibility artifact and   increased signal on DWI within left superior parietal lobule and right   inferior parietal lobule suggestive of subacute to chronic infarct with   chronic blood products.       There is no acute intracranial hemorrhage, or intracranial mass lesion. No   mass effect, midline shift, or extra-axial collection is noted.       The brain parenchyma is otherwise normal.  The pituitary gland is normal in   appearance. The cerebellar tonsils are in normal position.       The ventricles, sulci, and cisterns are within normal size and range.  No   significant volume loss. .  The intracranial flow voids are preserved.       The globes and orbits are within normal limits. The visualized extracranial   structures including paranasal sinuses and mastoid air cells are unremarkable.           Impression       Findings suggestive of acute embolic infarctions involving supra and   infratentorial structures as described.       Subacute to chronic encephalomalacia with chronic blood products within the   left superior parietal lobule and right inferior parietal lobule.       There is no acute intracranial hemorrhage, or intracranial mass lesion.       The findings were sent to the Radiology Results [address] at 8:24   pm on 7/21/2021to be communicated to a licensed caregiver.                 Medical Decision [id number]:     Component Collected Lab   Specimen Description 07/17/2021  8:48  [address]   . BLOOD    Special Requests 07/17/2021  8:48  [address]   10ML LFA    Culture Abnormal  07/17/2021  8:48 AM [address] Blood Culture Results called to and read back by: Morgan Armstrong RN AT 2045 ON 07.17.2021    Culture 07/17/2021  8:48 AM [facility] FROM BOTTLE: GRAM POSITIVE COCCI IN CLUSTERS    Culture Abnormal  07/17/2021  8:48 AM [address] [facility]   Staphylococcus aureus Detected: mecA/C and MREJ Not Detected Methodology- Polymerase Chain Reaction (PCR)   Culture Abnormal  07/17/2021  8:48 AM [facility] This isolate is methicillin susceptible. Testing Performed By    Manjinder Watson Name Director Address Valid Date Range   [address]Jessica Adams MD [address] [facility] [address] 08/30/17 0801-Present   Susceptibility    Staphylococcus aureus (4)    Antibiotic Interpretation DAVID Status    penicillin Resistant  Final     >=0.5   RESISTANT   cefoxitin screen  NOT REPORTED Final    ciprofloxacin   Final     NOT REPORTED    clindamycin Sensitive  Final     <=0.25   SUSCEPTIBLE   erythromycin Sensitive  Final     <=0.25   SUSCEPTIBLE   gentamicin Sensitive  Final     <=0.5   SUSCEPTIBLE   gentamicin Sensitive Gentamicin is used only in combination with other active agents that test susceptible.  Final    Induced Clind Resist  NOT REPORTED Final    levofloxacin Sensitive  Final     0.25   SUSCEPTIBLE   linezolid   Final     NOT REPORTED    moxifloxacin   Final     NOT REPORTED    nitrofurantoin   Final     NOT REPORTED    oxacillin Sensitive  Final     0.5   SUSCEPTIBLE   Synercid   Final     NOT REPORTED    rifampin   Final     NOT REPORTED    tetracycline Sensitive  Final     <=1   SUSCEPTIBLE   tigecycline   Final     NOT REPORTED    trimethoprim-sulfamethoxazole Sensitive  Final     <=10   SUSCEPTIBLE   vancomycin   Final     NOT REPORTED        Medical Decision Making-Other:     Note:  · Labs, medications, radiologic studies were reviewed with personal review of films  · Large amounts of data were reviewed  · Discussed with nursing Staff, Discharge planner  · Infection Control and Prevention measures reviewed  · All prior entries were reviewed  · Administer medications as ordered  · Prognosis: Guarded  · Discharge planning reviewed  · Follow up as outpatient. Thank you for allowing us to participate in the care of this patient. Please call with questions. Aydee Kuhn, APRN - CNP     ATTESTATION:    I have discussed the case, including pertinent history and exam findings with the APRN. I have evaluated the  History, physical findings and pictures of the patient and the key elements of the encounter have been performed by me. I have reviewed the laboratory data, other diagnostic studies and discussed them with the APRN. I have updated the medical record where necessary. I agree with the assessment, plan and orders as documented by the APRN.     Jenni Holt MD.      Pager: [phone] - Office: [phone]

## 2021-07-27 ENCOUNTER — APPOINTMENT (OUTPATIENT)
Dept: MRI IMAGING | Age: 41
DRG: 871 | End: 2021-07-27
Payer: COMMERCIAL

## 2021-07-27 LAB
ABSOLUTE EOS #: <0.03 K/UL (ref 0–0.44)
ABSOLUTE IMMATURE GRANULOCYTE: 0.08 K/UL (ref 0–0.3)
ABSOLUTE LYMPH #: 1.37 K/UL (ref 1.1–3.7)
ABSOLUTE MONO #: 0.78 K/UL (ref 0.1–1.2)
BASOPHILS # BLD: 1 % (ref 0–2)
BASOPHILS ABSOLUTE: 0.06 K/UL (ref 0–0.2)
C-REACTIVE PROTEIN: 56 MG/L (ref 0–5)
CULTURE: ABNORMAL
DIFFERENTIAL TYPE: ABNORMAL
EKG ATRIAL RATE: 151 BPM
EKG P-R INTERVAL: 148 MS
EKG Q-T INTERVAL: 244 MS
EKG QRS DURATION: 78 MS
EKG QTC CALCULATION (BAZETT): 386 MS
EKG R AXIS: 132 DEGREES
EKG T AXIS: 164 DEGREES
EKG VENTRICULAR RATE: 151 BPM
EOSINOPHILS RELATIVE PERCENT: 0 % (ref 1–4)
HCT VFR BLD CALC: 29.7 % (ref 40.7–50.3)
HEMOGLOBIN: 9.5 G/DL (ref 13–17)
IMMATURE GRANULOCYTES: 1 %
LYMPHOCYTES # BLD: 14 % (ref 24–43)
Lab: ABNORMAL
MCH RBC QN AUTO: 29.1 PG (ref 25.2–33.5)
MCHC RBC AUTO-ENTMCNC: 32 G/DL (ref 28.4–34.8)
MCV RBC AUTO: 90.8 FL (ref 82.6–102.9)
MONOCYTES # BLD: 8 % (ref 3–12)
NRBC AUTOMATED: 0 PER 100 WBC
PDW BLD-RTO: 12.8 % (ref 11.8–14.4)
PLATELET # BLD: 264 K/UL (ref 138–453)
PLATELET ESTIMATE: ABNORMAL
PMV BLD AUTO: 9.9 FL (ref 8.1–13.5)
RBC # BLD: 3.27 M/UL (ref 4.21–5.77)
RBC # BLD: ABNORMAL 10*6/UL
SEG NEUTROPHILS: 76 % (ref 36–65)
SEGMENTED NEUTROPHILS ABSOLUTE COUNT: 7.81 K/UL (ref 1.5–8.1)
SPECIMEN DESCRIPTION: ABNORMAL
WBC # BLD: 10.1 K/UL (ref 3.5–11.3)
WBC # BLD: ABNORMAL 10*3/UL

## 2021-07-27 PROCEDURE — 99232 SBSQ HOSP IP/OBS MODERATE 35: CPT | Performed by: INTERNAL MEDICINE

## 2021-07-27 PROCEDURE — 6370000000 HC RX 637 (ALT 250 FOR IP): Performed by: NURSE PRACTITIONER

## 2021-07-27 PROCEDURE — 2580000003 HC RX 258: Performed by: NURSE PRACTITIONER

## 2021-07-27 PROCEDURE — 6360000002 HC RX W HCPCS: Performed by: INTERNAL MEDICINE

## 2021-07-27 PROCEDURE — 97535 SELF CARE MNGMENT TRAINING: CPT

## 2021-07-27 PROCEDURE — 87040 BLOOD CULTURE FOR BACTERIA: CPT

## 2021-07-27 PROCEDURE — 6370000000 HC RX 637 (ALT 250 FOR IP): Performed by: STUDENT IN AN ORGANIZED HEALTH CARE EDUCATION/TRAINING PROGRAM

## 2021-07-27 PROCEDURE — 97165 OT EVAL LOW COMPLEX 30 MIN: CPT

## 2021-07-27 PROCEDURE — 36415 COLL VENOUS BLD VENIPUNCTURE: CPT

## 2021-07-27 PROCEDURE — 2060000000 HC ICU INTERMEDIATE R&B

## 2021-07-27 PROCEDURE — 87205 SMEAR GRAM STAIN: CPT

## 2021-07-27 PROCEDURE — 2500000003 HC RX 250 WO HCPCS: Performed by: INTERNAL MEDICINE

## 2021-07-27 PROCEDURE — 6370000000 HC RX 637 (ALT 250 FOR IP): Performed by: INTERNAL MEDICINE

## 2021-07-27 PROCEDURE — APPSS30 APP SPLIT SHARED TIME 16-30 MINUTES: Performed by: NURSE PRACTITIONER

## 2021-07-27 PROCEDURE — 80069 RENAL FUNCTION PANEL: CPT

## 2021-07-27 PROCEDURE — 70551 MRI BRAIN STEM W/O DYE: CPT

## 2021-07-27 PROCEDURE — 6360000002 HC RX W HCPCS: Performed by: NURSE PRACTITIONER

## 2021-07-27 PROCEDURE — 85025 COMPLETE CBC W/AUTO DIFF WBC: CPT

## 2021-07-27 PROCEDURE — 86140 C-REACTIVE PROTEIN: CPT

## 2021-07-27 PROCEDURE — 2580000003 HC RX 258: Performed by: INTERNAL MEDICINE

## 2021-07-27 PROCEDURE — 86403 PARTICLE AGGLUT ANTBDY SCRN: CPT

## 2021-07-27 PROCEDURE — 87186 SC STD MICRODIL/AGAR DIL: CPT

## 2021-07-27 PROCEDURE — 99233 SBSQ HOSP IP/OBS HIGH 50: CPT | Performed by: STUDENT IN AN ORGANIZED HEALTH CARE EDUCATION/TRAINING PROGRAM

## 2021-07-27 RX ORDER — POTASSIUM CHLORIDE 7.45 MG/ML
10 INJECTION INTRAVENOUS PRN
Status: DISCONTINUED | OUTPATIENT
Start: 2021-07-27 | End: 2021-08-02 | Stop reason: HOSPADM

## 2021-07-27 RX ORDER — LACTOBACILLUS RHAMNOSUS GG 10B CELL
1 CAPSULE ORAL
Status: DISCONTINUED | OUTPATIENT
Start: 2021-07-27 | End: 2021-08-02 | Stop reason: HOSPADM

## 2021-07-27 RX ADMIN — PANTOPRAZOLE SODIUM 40 MG: 40 TABLET, DELAYED RELEASE ORAL at 15:48

## 2021-07-27 RX ADMIN — NAFCILLIN INJECTION 2000 MG: 2 POWDER, FOR SOLUTION INTRAMUSCULAR; INTRAMUSCULAR; INTRAVENOUS at 11:04

## 2021-07-27 RX ADMIN — DIPHENHYDRAMINE HYDROCHLORIDE 12.5 MG: 50 INJECTION, SOLUTION INTRAMUSCULAR; INTRAVENOUS at 21:00

## 2021-07-27 RX ADMIN — Medication 1 CAPSULE: at 18:26

## 2021-07-27 RX ADMIN — Medication 6 MG: at 21:00

## 2021-07-27 RX ADMIN — NAFCILLIN INJECTION 2000 MG: 2 POWDER, FOR SOLUTION INTRAMUSCULAR; INTRAMUSCULAR; INTRAVENOUS at 23:05

## 2021-07-27 RX ADMIN — NAFCILLIN INJECTION 2000 MG: 2 POWDER, FOR SOLUTION INTRAMUSCULAR; INTRAMUSCULAR; INTRAVENOUS at 18:26

## 2021-07-27 RX ADMIN — PANTOPRAZOLE SODIUM 40 MG: 40 TABLET, DELAYED RELEASE ORAL at 08:36

## 2021-07-27 RX ADMIN — METOPROLOL TARTRATE 50 MG: 25 TABLET ORAL at 08:36

## 2021-07-27 RX ADMIN — DIPHENOXYLATE HYDROCHLORIDE AND ATROPINE SULFATE 1 TABLET: 2.5; .025 TABLET ORAL at 08:36

## 2021-07-27 RX ADMIN — NAFCILLIN INJECTION 2000 MG: 2 POWDER, FOR SOLUTION INTRAMUSCULAR; INTRAMUSCULAR; INTRAVENOUS at 07:00

## 2021-07-27 RX ADMIN — ACETAMINOPHEN 650 MG: 325 TABLET ORAL at 18:26

## 2021-07-27 RX ADMIN — NAFCILLIN INJECTION 2000 MG: 2 POWDER, FOR SOLUTION INTRAMUSCULAR; INTRAMUSCULAR; INTRAVENOUS at 03:07

## 2021-07-27 RX ADMIN — POTASSIUM CHLORIDE 20 MEQ: 1500 TABLET, EXTENDED RELEASE ORAL at 08:36

## 2021-07-27 RX ADMIN — DIPHENOXYLATE HYDROCHLORIDE AND ATROPINE SULFATE 1 TABLET: 2.5; .025 TABLET ORAL at 21:00

## 2021-07-27 RX ADMIN — POTASSIUM CHLORIDE 20 MEQ: 1500 TABLET, EXTENDED RELEASE ORAL at 15:48

## 2021-07-27 RX ADMIN — RIFAMPIN 600 MG: 600 INJECTION, POWDER, LYOPHILIZED, FOR SOLUTION INTRAVENOUS at 12:05

## 2021-07-27 RX ADMIN — METOPROLOL TARTRATE 50 MG: 25 TABLET ORAL at 21:00

## 2021-07-27 RX ADMIN — NAFCILLIN INJECTION 2000 MG: 2 POWDER, FOR SOLUTION INTRAMUSCULAR; INTRAMUSCULAR; INTRAVENOUS at 15:42

## 2021-07-27 NOTE — ADT AUTH CERT
Subscriber Details  Hospital Account [de-identified]  [facility] Subscriber Name/Sex/Relation Subscriber  Subscriber Address/Phone Subscriber Emp/Emp Phone   1. [address] Male   (Self) 1980 Natan Go   [phone](F) OTHER    Utilization Reviews       Systemic or Infectious Condition [facility] - Care Day 9 (2021) by Mayito Castle RN       Review Entered Review Status   2021 06:41 Completed      Criteria Review      Care Day: 9 Care Date: 2021 Level of Care:    Guideline Day 2    Clinical Status    ( ) * No ICU or intermediate care needs    2021 6:41 AM EDT by Olga Green      Remains Tachycardic 111 112 109 111   BP 87/54    Interventions    (X) Inpatient interventions continue    2021 6:41 AM EDT by Olga Green      D5 bolus 20 ml x 2  lomotil 1 po bid  Investigaional infusion IV x 1  Nafcillin 2000 mg q 4 hr IV  Rifadin 600 mg q 24 hr IV   ml/hr iv    PRN:  Benadryl 12.5 mg iv x 1  immodium 4 mg po x 1  melatonin 6 mg po x 1  Tylenol 650 mg po x 2    * Milestone   Additional Notes   2021      Internal Medicine      Interval History Status: improved.        Patient seen examined this morning. Wife, mother, and father are at bedside.  Episode of rigors last night.  Patient reports anxiety during this.  Nonrebreather mask was placed.  Lasted approximate 20 minutes again.  To be given Exebacase today to assist with bacteremia. No other complaints. Hemodynamically stable.          Review of Systems:       Constitutional: Reports episode of rigors last night, negative for sweats   Respiratory: Denies shortness of breath; negative for cough, wheezing   Cardiovascular:  Negative for chest pain, chest pressure/discomfort, lower extremity edema, palpitations   Gastrointestinal: Reports continued diarrhea, but daily improvement.  Negative for abdominal pain, constipation,  nausea, vomiting   Extremities: Reports left great toe redness is resolved. Khris Gess is resolved. Neurological: Reports no further episodes of dizziness. Denies headache. · diphenoxylate-atropine  1 tablet Oral BID   · dextrose 5%  20 mL Intravenous Once   · Investigational - Infusion   Intravenous Once   · dextrose 5%  20 mL Intravenous Once   · potassium chloride  20 mEq Oral BID WC   · pantoprazole  40 mg Oral BID AC   · metoprolol tartrate  37.5 mg Oral BID   · rifampin (RIFADIN) IVPB  600 mg Intravenous Q24H   · nafcillin  2,000 mg Intravenous Q4H   · mupirocin   Nasal BID   · sodium chloride flush  5-40 mL Intravenous 2 times per day          Continuous Infusions:    Infusions Meds    · sodium chloride 100 mL/hr at 21 0700            Vitals:   /71   Pulse 107   Temp 98.3 °F (36.8 °C) (Oral)   Resp 18   Ht 5' 6\" (1.676 m)   Wt 183 lb 3.2 oz (83.1 kg)   SpO2 95%   BMI 29.57 kg/m²    Temp (24hrs), Av.2 °F (37.3 °C), Min:98.3 °F (36.8 °C), Max:100.5 °F (38.1 °C)       No results for input(s): POCGLU in the last 72 hours.       I/O (24Hr):       Intake/Output Summary (Last 24 hours) at 2021 1144   Last data filed at 2021 1808      Gross per 24 hour   Intake 2989 ml   Output -   Net 2989 ml          Physical Examination:          General appearance:  alert, cooperative and no distress,  gentleman sitting up in bed   Mental Status:  oriented to person, place and time and normal affect   Lungs:  clear to auscultation bilaterally, normal effort   Heart: Tachycardic continue rate (106 bpm) with regular rhythm, there is a loud systolic murmur which is blowing in nature best appreciated at the apex   Abdomen:  soft, nontender, nondistended, protuberant, normal bowel sounds, no masses, hepatomegaly, splenomegaly   Extremities:  no edema, redness, tenderness in the calves; left great toe with resolution of erythema at the 1st MTP joint.    Neuro:  strength is equal bilaterally.  Upper extremity strength is 5/5.  Lower extremity strength is 5/5.  No nystagmus noted. Skin:  Erythema to the first MTP joint on the left is resolved.  Mid back erythema and discoloration noted. See media      Plan:          1. MSSA bacteremia - Continue Nafcillin 2 g q 4 hours and Rifampin 600 mg qd through 8/15. ID following.  One time dose of exebacase to be given today. No AC recommended. Source is still unclear. CT of the spine is without evidence of any discitis that we can tell. Large mitral valve vegetation noted. 2. Acute MSSA endocarditis with severe MR - CT surgery, ID, and cardiology following. Continue IV antibiotics as noted above. Anticipate surgery after 6 weeks of antibiotics. Exebacase to be given today. 3. SVT vs sinus tachycardia - likely secondary to sepsis. Avoid beta-blocker unless patient is in SVT. 4. Lactic acidosis - resolved   5. Acute respiratory failure with hypoxia - D-dimer is elevated.  Wean supplemental oxygen as able. 6. Acute septic emboli infarcts - to the supra and infratentorial structures along with subacute to chronic encephalomalacia with chronic blood products within the left superior parietal lobule and right inferior parietal lobule. Avoid anticoagulation   7. Delusions, confusion, TME - Resolved; possibly, and likely due to sepsis +/- acute infarcts   8. Vertigo - resolved; likely related to #6 - continue antivert. 9. Thrombocytopenia - resolved. Likely secondary to sepsis. 10. GI bleed/normo normo anemia-Continue protonix. Follow up with GI as noted. 11. Acute diarrhea - increase imodium dose today. Start scheduled Lomotil due to persistent diarrhea   12. Hypokalemia, hypophosphatemia - replace lytes daily.  Change potassium to 20 mEq daily. 13. Transaminitis-likely related to fatty liver disease, possibly due to sepsis. Now WNL   14. Obesity 30.07 - diet/weight loss/exercise   15. Acute left great toe pain - gout vs septic joint - resolved with IV antibioitcs   16.  Disposition: Discharge once patient has negative culture.  6 weeks of antibiotics to follow.  Then follow-up with CT surgery for interventional eval.  Patient receiving exebacase today. 17.    Neurology       Continues to feel well, no diplopia or vertigo. Had an episode overnight of whole body tremoring/rigors, states he was awake and aware of what was happening. Lasted about 20 minutes. No tongue bite or incontinence. Low grade fever at time of event. Plan:   -Patient continues on antibiotics as per ID team with plan for Exebacase today   -Antiplatelets/anticoagulation contraindicated due to septic emboli which have a higher chance of bleeding   -MVR to be planned by CTS team following 6 weeks of antibiotics and negative blood cultures   -PT/OT      Infectious Disease       Recommendations:       ? Nafcillin 2000 mg IV every 4 hours until 8/15/2021 for MSSA septicemia and to promote antibiotic penetration into brain. Will need 6 weeks from first negative blood cultures. ? Rifampin 600 mg daily added 7/22/21 for synergy   ? Please do not give anticoagulants due to the risk of septic emboli in the brain transitioning to hemorrhagic    ? Plan infusion of Exebacase under compassionate use protocol on 7-25-21. Patient has given informed consent. 7/25/2021 09:51   CULTURE, BLOOD 1: Rpt (A)   Specimen Description: . BLOOD         7/25/2021 09:52   Sodium: 133 (L)   Potassium: 3.5 (L)   Creatinine: 0.56 (L)   Anion Gap: 8 (L)   Glucose: 130 (H)   Calcium: 8.3 (L)   Albumin/Globulin Ratio: 0.8 (L)   Total Protein: 6.3 (L)   Albumin: 2.7 (L)   RBC: 3.38 (L)   Hemoglobin Quant: 9.7 (L)   Hematocrit: 30.8 (L)   Eosinophils %: 0 (L)   Seg Neutrophils: 80 (H)   Segs Absolute: 8.26 (H)   Lymphocytes: 11 (L)   Immature Granulocytes: 1 (H)            Systemic or Infectious Condition GRG - Care Day 7 (7/23/2021) by Darline Blackwell RN       Review Entered Review Status   7/26/2021 14:19 Completed      Criteria Review      Care Day: 7 Care Date: 7/23/2021 Pictures in [address] felt to be source of infection. 6. Acute respiratory failure with hypoxia - D-dimer is elevated. CT of the chest to rule out PE was inadequate. Wells score 1.5. Hold off on scanning, as we are unable to anticoagulate him due to septic emboli in the brain. 7. Acute septic emboli infarcts - to the supra and infratentorial structures along with subacute to chronic encephalomalacia with chronic blood products within the left superior parietal lobule and right inferior parietal lobule. Await further input from neurology. May be contributing factor to #6. Holding off on anti-platelet / anti-coag   8. Delusions, confusion, TME - possibly, and likely due to sepsis.  Improving. 9. Vertigo - likely related to #5 - continue antivert. Symptoms appear to have resolved. 10. Thrombocytopenia - resolved. Likely secondary to sepsis. 11. GI bleed/normo normo anemia-likely lower in nature. Continue oral protonix. Follow up with GI as noted. 12. Acute diarrhea, probably viral in nature - continue imodium. Improving slowly. 13. Hypokalemia, hypophosphatemia - replace lytes daily. 14. Transaminitis-likely related to fatty liver disease, possibly due to sepsis. Now WNL   15. Obesity 31.42 - diet/weight loss/exercise   16. Acute left great toe pain - gout vs septic joint - improving with IV antibioitcs   17. Cardiology       Subjective:                              Clinical Changes /Abnormalities: Family at bedside. Overnight episode of tachycardia - SVT noted on ECGs. Resolved after rigors improved & IV Lopressor. Denies any chest pain or SOB during event.   SR/ST on tele        Vitals: /70   Pulse 96   Temp 98.9 °F (37.2 °C) (Oral)   Resp 28   Ht 5' 6\" (1.676 m)   Wt 178 lb 9.2 oz (81 kg)   SpO2 93%   BMI 28.82 kg/m²    General appearance: alert and cooperative with exam   HEENT: Head: Normocephalic, no lesions, without obvious abnormality.    Neck:no JVD, trachea midline, no adenopathy   Lungs: Clear to auscultation diminished bases. Heart: Regular rate and rhythm, s1/s2 auscultated, no murmurs   Abdomen: soft, non-tender, bowel sounds active   Extremities: no edema   Neurologic: not done          Plan of Treatment:   1. Troponin elevation likely secondary to demand ischemia/sepsis. No ischemic work-up at this time. 2. Tachycardia with episode of SVT overnight. Continue BB with parameters - will increase to 37.5mg BID.     3. Keep K > 4.0 and Mag > 2.0   4. Sepsis followed by ID on IV AB   5. Endocarditis:  MONROE as above with large vegetation. [address] [place] [address] surgery consulted. IV AB x6 weeks then will plan CTS per notes. Infectious Disease    Recommendations:       ? Start Nafcillin 2000 mg IV every 4 hours until 8/15/2021 for MSSA septicemia and to promote antibiotic penetration into brain. Will need 6 weeks from first negative blood cultures. ? Rifampin 600 mg daily added 7/22/21 for synergy   ?  Please do not give anticoagulants due to the risk of septic emboli in the brain transitioning to hemorrhagic    ? Repeat blood cultures ordered for 7/24/21   ?          7/23/2021 01:52   CO2: 14 (L)D   Anion Gap: 20 (H)   Lactic Acid, Whole Blood: 5.4 (H)   Glucose: 117 (H)   Calcium: 8.2 (L)   Phosphorus: 4.8 (H)   Troponin, High Sensitivity: 92 (HH)      7/23/2021 08:542   Glucose: 123 (H)   Calcium: 8.3 (L)   Phosphorus: 4.7 (H)   Albumin: 2.7 (L)   RBC: 3.95 (L)   Hemoglobin Quant: 11.5 (L)   Hematocrit: 37.0 (L)   Seg Neutrophils: 78 (H)   Lymphocytes: 12 (L)   Immature Granulocytes: 1 (H)      7/23/2021 09:01   Lactic Acid, Whole Blood: 2.3 (H)

## 2021-07-27 NOTE — PROGRESS NOTES
[facility]  Progress Note                   Date:   7/27/2021  Patient name: Edmond Gray  Date of admission:  7/17/2021 12:42 AM  MRN:   [id number]  YOB: 1980  PCP: Cherelle Eckert MD    Reason for Admission: Diarrhea [R19.7]    Subjective:       Clinical Changes /Abnormalities: Patient seen and examined in room with family at bedside after discussion with Dr Ambreen Kennedy. Denies chest pain or SOB. No CV concerns. On IV AB. SR on tele HR 98. Review of Systems    Medications:   Scheduled Meds:   ketorolac  15 mg Intravenous Once    diphenoxylate-atropine  1 tablet Oral BID    metoprolol tartrate  50 mg Oral BID    potassium chloride  20 mEq Oral BID WC    pantoprazole  40 mg Oral BID AC    rifampin (RIFADIN) IVPB  600 mg Intravenous Q24H    nafcillin  2,000 mg Intravenous Q4H    sodium chloride flush  5-40 mL Intravenous 2 times per day     Continuous Infusions:   sodium chloride      sodium chloride       CBC:   Recent Labs     07/25/21  0952 07/26/21  0544 07/27/21  0715   WBC 10.3 10.8 10.1   HGB 9.7* 9.1* 9.5*    251 264     BMP:    Recent Labs     07/25/21  0952 07/26/21  0700 07/27/21  0715   * 134* 136   K 3.5* 3.5* 3.4*    103 103   CO2 23 19* 22   BUN 8 8 9   CREATININE 0.56* 0.77 0.66*   GLUCOSE 130* 108* 112*     Hepatic:  Recent Labs     07/25/21  0952 07/26/21  0700   AST 20 17   ALT 23 18   BILITOT 0.41 0.58   ALKPHOS 67 59     Troponin:   No results for input(s): TROPHS in the last 72 hours. BNP: No results for input(s): BNP in the last 72 hours. Lipids: No results for input(s): CHOL, HDL in the last 72 hours. Invalid input(s): LDLCALCU  INR: No results for input(s): INR in the last 72 hours.     DIAGNOSTIC DATA  MONROE 7/20/2021  MONROE findings:     LA:       Normal  RAJAT:    No thrombus  RA:      Normal  RV:      Normal  LV:       Normal  Estimated LVEF:         55%  Aorta:               Mild atheromatous disease arch  Pericardium: Trivial pericardial effusion  Septum:           No intracardiac shunt via color Doppler.      Valves:     Mitral Valve: Large vegetation measuring 2.5 X 2.4 cm attached on the posterior leaflet of the mitral valve associated with Flail and degenerated leaflet. Severe regurgitation w/ an anteriorly directed eccentric jet is identified. Aortic Valve: The aortic valve is trileaflet and opens adequately. No regurgitiation is identified. Tricuspid valve: Structurally normal. No regurgitation is identified. Pulmonary valve: Normal. No significant regurgitation     No thrombus identified.        Summary:      1. A MONROE was performed without complications. 2. LVEF 55%  3. No thrombus identified  4. No intracardiac shunt via color Doppler. 5. Large vegetation measuring 2.5 X 2.4 cm noted on the posterior leaflet of the mitral valve. Posterior leaflet is flair and is associated with severe anteriorly directed eccentric regurgitation.      Recommendations:  1. CV Surgery consult for further recommendations    ECHO 7/18/2021  Summary  Global left ventricular systolic function is normal. Calculated ejection  fraction 48% by Dubois's method. Visually estimated EF 50%. Posterior mitral valve leaflet prolapse. Severe mitral regurgitation. Eccentric anterior diredted jet. EOA = 0.7cm2. PISA radius is 1.3cm. Vena contracta is 0.99 cm. MR volume is  58ml. Possible small vegetation on posterior leaflet. Consider MONROE. Objective:   Vitals: /65   Pulse 96   Temp 97.7 °F (36.5 °C) (Oral)   Resp 16   Ht 5' 6\" (1.676 m)   Wt 179 lb 10.8 oz (81.5 kg)   SpO2 96%   BMI 29.00 kg/m²   General appearance: alert and cooperative with exam  HEENT: Head: Normocephalic, no lesions, without obvious abnormality. Neck:no JVD, trachea midline, no adenopathy  Lungs: Clear to auscultation diminished bases. Heart: Regular rate and rhythm, s1/s2 auscultated, + murmurs SR on tele.    Abdomen: soft, non-tender, bowel sounds

## 2021-07-27 NOTE — PROGRESS NOTES
[facility] - Progress Note    Today's Date and Time: 7/27/2021, 11:11 AM    Impression :   · MSSA septicemia 7-17-21  · Persistent growth of bacteria in blood 7-17-21 through 7-20-21 despite treatment  · Sepsis with acute organ dysfunction and septic shock  · Mitral Valve endocarditis. 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 Will need 6 weeks from first negative blood cultures.   · Rifampin 600 mg daily added 7/22/21 for synergy  · Please do not give anticoagulants due to the risk of septic emboli in the brain transitioning to hemorrhagic   · Received infusion of Exebacase under compassionate use protocol on 7-25-21 without any AEs    Medical Decision Making/Summary/Discussion:7/27/2021     ·   Infection Control Recommendations   · [place] Precautions  · Contact Isolation     Antimicrobial Stewardship Recommendations     · Simplification of therapy  · Targeted therapy    Coordination of Outpatient Care:   · Estimated Length of IV antimicrobials: 6 weeks from first negative blood cultures  · Patient will need Midline Catheter Insertion: TBD  · Patient will need PICC line Insertion: TBD  · Patient will need: Home IV , [place],  SNF,  LTAC: Yes-either SNF or home infusion  · Patient will need outpatient wound care: TBD    Chief complaint/reason for consultation:   · MSSA we had been able to secure permission from the  of [place], Dr Ean Padgett of the IRB at [place] [address], Hospital administration and Legal Department for him to receive Exebacase under a Compassionate Use Protocol. I discussed the available information with him including prior published clinical results and safety data. Indicated that there was no promise of efficacy. I emphasized that participation was voluntary. Patient and his wife had all their questions answered. I gave them two copies of the informed consent to review. They will keep one copy and return to signed copy for inclusion in his records. Patient gave informed consent to proceed. CURRENT EVALUATION 7/27/2021:    Afebrile  Vital signs stable    Upon evaluation, the patient appeared to be in better spirits today. He stated he had one episode of rigors yesterday, but it was much less intense than previously, and he also had an episode of blurry vision this morning, but again, it was milder than usual.    I discussed previous culture results and reviewed our plan of care with the patient and his wife. Unfortunately, the patient's blood cultures from 7/25/2021 continue to be positive for MSSA. Repeat blood cultures have been drawn today, 7/27/2021 and these will be the first set of blood cultures status post Exebacase administration. CT surgery is planning for valve repair after the patient has completed IV antibiotic therapy-unless the patient decompensates to the point of needing emergent surgical intervention. MRI brain 5-38-34: Acute embolic infarctions in supra and infratentorial structures. Because of the septic emboli in the brain, Ancef was discontinued and nafcillin was initiated on 7/22/2021. Rifampin was also added for synergy. Neurology consult was ordered due to episodes of altered mentation along with vertigo and diplopia. Vertigo and diplopia have subsided.  Neurology plans outpatient follow up.    Echocardiogram completed on 7/18/2021 revealed posterior mitral valve leaflet prolapse, severe mitral regurgitation, and possible small vegetation on posterior leaflet. Presence of vegetation on mitral valve would help explain the emboli to the brain. [place] on 7/20 showed a Large vegetation measuring 2.5 X 2.4 cm noted on the posterior leaflet of the mitral valve. Posterior leaflet is flair and is associated with severe anteriorly directed eccentric regurgitation. Right foot x-ray revealed:  No radiographic evidence for acute osteomyelitis.  Mild soft tissue swelling   of the great toe possibly cellulitis.       RECOMMENDATION:   NM bone scan or MRI may be obtained if there remains strong concern for acute   osteomyelitis.         CT abdomen pelvis revealed: Moderate distention of the colon multiple air-fluid levels, findings may be   seen with acute enterocolitis. Bruising on the patient's back is from a Tens unit-it does not appear to be the source of sepsis. There are no open lesions noted and there are three distinct marks from the electrodes. Stool studies have been negative    Hepatitis panel nonreactive    Discussed with RN    Labs, X rays reviewed: 7/27/2021    BUN:8-->8-->13-->19  Cr:0.76-->0.49-->0.95-->0.71-->0.77     CRP: 260.5  LDH: 437    WBC:9.9-->10.9-->9.8-->10.8  Hb:12.5-->10.8-->11.5--.9.1  Plat: 39-->40-->78-->196-->251  Haptoglobin: 256  Sed rate: 31  Lipase 299->228  Lactate: 1.6-->2.3    Cultures:  Urine:  ·   Blood:  · 7/17/2021: MSSA x2  · 7/18/2021: MSSA  · 7/20/21: MSSA  · 7/22/2021: MSSA  · 7-24-21: MSSA  · 7-25-21: MSSA  · 7/27/21: Pending    Sputum :  ·   Wound:    Discussed with patient, RN, family, Dr Edmundo Magana. I have personally reviewed the past medical history, past surgical history, medications, social history, and family history, and I have updated the database accordingly.   Past Medical History:     Past Medical History:   Diagnosis Date    Diverticulosis  Hyperlipidemia     Hypertension     MESSI (obstructive sleep apnea)     Has PSG study done Pos for Mod MESSI, never had cpap tritration done for machine    Research subject 07/24/2021    EIND [id number] Exebacase for persistent bacteremia expected date of completion 8/25/2021       Past Surgical  History:     Past Surgical History:   Procedure Laterality Date    ABDOMEN SURGERY      large bowel resection    ANTERIOR CRUCIATE LIGAMENT REPAIR Right     APPENDECTOMY  07/09/1997    COLECTOMY      2012 - D/T Dverticulitis    DILATATION, ESOPHAGUS         Medications:      ketorolac  15 mg Intravenous Once    diphenoxylate-atropine  1 tablet Oral BID    metoprolol tartrate  50 mg Oral BID    potassium chloride  20 mEq Oral BID WC    pantoprazole  40 mg Oral BID AC    rifampin (RIFADIN) IVPB  600 mg Intravenous Q24H    nafcillin  2,000 mg Intravenous Q4H    sodium chloride flush  5-40 mL Intravenous 2 times per day       Social History:     Social History     Socioeconomic History    Marital status:      Spouse name: Not on file    Number of children: Not on file    Years of education: Not on file    Highest education level: Not on file   Occupational History    Not on file   Tobacco Use    Smoking status: Never Smoker    Smokeless tobacco: Never Used   Vaping Use    Vaping Use: Never assessed   Substance and Sexual Activity    Alcohol use: Yes     Alcohol/week: 20.0 standard drinks     Types: 20 Cans of beer per week     Comment: 20/ week, average every other day    Drug use: Never    Sexual activity: Not on file   Other Topics Concern    Not on file   Social History Narrative    Not on file     Social Determinants of Health     Financial Resource Strain:     Difficulty of Paying Living Expenses:    Food Insecurity:     Worried About Running Out of Food in the Last Year:     [address] in the Last Year:    Transportation Needs:     Lack of Transportation (Medical):      Lack of Appearance: Awake, alert, and in no apparent distress  Head:  Normocephalic, no trauma  Eyes: Pupils equal, round, reactive to light and accommodation; extraocular movements intact; sclera anicteric; conjunctivae pink. No embolic phenomena. ENT: Oropharynx clear, without erythema, exudate, or thrush. No tenderness of sinuses. Mouth/throat: mucosa pink and moist. No lesions. Dentition in good repair. Neck:Supple, without lymphadenopathy. Thyroid normal, No bruits. Pulmonary/Chest: Clear to auscultation, without wheezes, rales, or rhonchi. No dullness to percussion. Cardiovascular: Normal sinus tachycardia with murmur, No rubs, or gallops. Abdomen: Soft, non tender. Bowel sounds normal. No organomegaly  All four Extremities: No cyanosis, clubbing, edema, or effusions. Neurologic: No gross sensory or motor deficits. Skin: Warm and dry with good turgor. No signs of peripheral arterial or venous insufficiency. No ulcerations. No open wounds. Medical Decision Making -Laboratory:   I have independently reviewed/ordered the following labs:    CBC with Differential:   Recent Labs     07/26/21  0544 07/27/21  0715   WBC 10.8 10.1   HGB 9.1* 9.5*   HCT 29.0* 29.7*    264   LYMPHOPCT 14* 14*   MONOPCT 7 8     BMP:   Recent Labs     07/25/21  0952 07/25/21  0952 07/26/21  0700 07/27/21  0715   *   < > 134* 136   K 3.5*   < > 3.5* 3.4*      < > 103 103   CO2 23   < > 19* 22   BUN 8   < > 8 9   CREATININE 0.56*   < > 0.77 0.66*   MG 2.0  --   --   --     < > = values in this interval not displayed. Hepatic Function Panel:   Recent Labs     07/25/21  0952 07/25/21  0952 07/26/21  0700 07/27/21  0715   PROT 6.3*  --  6.3*  --    LABALBU 2.7*   < > 2.7*  2.7* 2.6*   BILIDIR  --   --  0.19  --    IBILI  --   --  0.39  --    BILITOT 0.41  --  0.58  --    ALKPHOS 67  --  59  --    ALT 23  --  18  --    AST 20  --  17  --     < > = values in this interval not displayed.      No results for input(s): RPR in the last 72 hours. No results for input(s): HIV in the last 72 hours. No results for input(s): BC in the last 72 hours. Lab Results   Component Value Date    MUCUS NOT REPORTED 07/17/2021    RBC 3.27 07/27/2021    TRICHOMONAS NOT REPORTED 07/17/2021    WBC 10.1 07/27/2021    YEAST NOT REPORTED 07/17/2021    TURBIDITY CLEAR 07/17/2021     Lab Results   Component Value Date    CREATININE 0.66 07/27/2021    GLUCOSE 112 07/27/2021       Medical Decision Making-Imaging:          MRI Brain 7/2121   Impression       Findings suggestive of acute embolic infarctions involving supra and   infratentorial structures as described.       Subacute to chronic encephalomalacia with chronic blood products within the   left superior parietal lobule and right inferior parietal lobule.       There is no acute intracranial hemorrhage, or intracranial mass lesion.           EXAMINATION:   CT OF THE CHEST, ABDOMEN, AND PELVIS WITH CONTRAST 7/18/2021 10:53 am       TECHNIQUE:   CT of the chest, abdomen and pelvis was performed with the administration of   intravenous contrast. Multiplanar reformatted images are provided for review. Dose modulation, iterative reconstruction, and/or weight based adjustment of   the mA/kV was utilized to reduce the radiation dose to as low as reasonably   achievable.       COMPARISON:   None       HISTORY:   ORDERING SYSTEM PROVIDED HISTORY: MSSA bacteremia, ? DIC   TECHNOLOGIST PROVIDED HISTORY:   MSSA bacteremia, ? DIC       Reason for Exam: sepsis with acute organ dysfunction   Acuity: Unknown   Type of Exam: Unknown       FINDINGS:   Lungs/pleura: The central airways are patent.  There are no suspicious   pulmonary nodules       Mediastinum: There is no acute mediastinal abnormality       Soft Tissues/Bones: No suspicious osteolytic or osteoblastic lesions       Abdominal organs: The liver, spleen, adrenal glands, kidneys, and pancreas   demonstrate no acute abnormality.       GI/Bowel:  The visualized portions of the small bowel are unremarkable. There   is borderline dilation of the colon.  There are multiple air-fluid levels   throughout the colon.       Peritoneum/Retroperitoneum: There is a solid amount of retroperitoneal fluid   along the distal ureters bilaterally.       Pelvis: The bladder is moderately distended.       Bones: No suspicious osseous lesions are identified           Impression   Moderate distention of the colon multiple air-fluid levels, findings may be   seen with acute enterocolitis.             XR FOOT LEFT (2 VIEWS) [[id number]] Collected: 07/18/21 1411      Order Status: Completed Updated: 07/19/21 0759     Narrative:       EXAMINATION:   TWO XRAY VIEWS OF THE LEFT FOOT     7/18/2021 2:05 pm     COMPARISON:   None. HISTORY:   ORDERING SYSTEM PROVIDED HISTORY: Left big toe pain and erythema. Looking for   source of MSSA sepsis   TECHNOLOGIST PROVIDED HISTORY:   Left big toe pain and erythema. Looking for source of MSSA sepsis     FINDINGS:   AP and lateral views of the foot obtained.  Normal alignment and   mineralization.  No abnormal periosteal reaction.  No erosions.  No fracture. No aggressive lytic or blastic lesions.  Mild soft tissue swelling of the   great toe noted.      Impression:       No radiographic evidence for acute osteomyelitis.  Mild soft tissue swelling   of the great toe possibly cellulitis.       EXAMINATION:   MRI OF THE BRAIN WITHOUT CONTRAST  7/21/2021 7:03 pm       TECHNIQUE:   Multiplanar multisequence MRI of the brain was performed without the   administration of intravenous contrast.       COMPARISON:   Head CT of 07/20/2021       HISTORY:   ORDERING SYSTEM PROVIDED HISTORY: episode of vertigo, dipolopia, has   infecticive endocadrditis, r/o septic emboli, abscess   TECHNOLOGIST PROVIDED HISTORY:   episode of vertigo, dipolopia, has infecticive endocadrditis, r/o septic   emboli, abscess   Reason for Exam: VERTIGO, MSSA BACTEREMIA, R/O SEPTIC EMBOLI       FINDINGS:   MRI brain:       There are punctate areas of restricted diffusion within the left superior   frontal gyrus subcortical white matter, right superior frontal gyrus   subcortical white matter, left superior parietal lobule right inferior   frontal gyrus subcortical white matter, left inferior frontal gyrus   subcortical white matter and bilateral cerebellar hemisphere, suggestive of   acute embolic infarctions.       There are focus of encephalomalacia with susceptibility artifact and   increased signal on DWI within left superior parietal lobule and right   inferior parietal lobule suggestive of subacute to chronic infarct with   chronic blood products.       There is no acute intracranial hemorrhage, or intracranial mass lesion. No   mass effect, midline shift, or extra-axial collection is noted.       The brain parenchyma is otherwise normal.  The pituitary gland is normal in   appearance. The cerebellar tonsils are in normal position.       The ventricles, sulci, and cisterns are within normal size and range.  No   significant volume loss. .  The intracranial flow voids are preserved.       The globes and orbits are within normal limits.  The visualized extracranial   structures including paranasal sinuses and mastoid air cells are unremarkable.           Impression       Findings suggestive of acute embolic infarctions involving supra and   infratentorial structures as described.       Subacute to chronic encephalomalacia with chronic blood products within the   left superior parietal lobule and right inferior parietal lobule.       There is no acute intracranial hemorrhage, or intracranial mass lesion.       The findings were sent to the Radiology Results [address] at 8:24   pm on 7/21/2021to be communicated to a licensed caregiver.                 Medical Decision Ljssfo-Cwoymgiv-Pgugf:     Component Collected Lab   Specimen Description 07/17/2021  8:48 AM [facility]   . BLOOD    Special Requests 07/17/2021  8:48  [address]   10ML LFA    Culture Abnormal  07/17/2021  8:48 AM [address] Blood Culture Results called to and read back by: Sincere Ortega RN AT 2045 ON 07.17.2021    Culture 07/17/2021  8:48 AM [address] FROM BOTTLE: GRAM POSITIVE COCCI IN CLUSTERS    Culture Abnormal  07/17/2021  8:48  [address]   Staphylococcus aureus Detected: mecA/C and MREJ Not Detected Methodology- Polymerase Chain Reaction (PCR)   Culture Abnormal  07/17/2021  8:48 AM [address] [facility] This isolate is methicillin susceptible. Testing Performed By    Manjinder Watson Name Director Address Valid Date Range   [address]-Jessica Adams MD [address] [facility] [place] [address] 08/30/17 0801-Present   Susceptibility    Staphylococcus aureus (4)    Antibiotic Interpretation DAVID Status    penicillin Resistant  Final     >=0.5   RESISTANT   cefoxitin screen  NOT REPORTED Final    ciprofloxacin   Final     NOT REPORTED    clindamycin Sensitive  Final     <=0.25   SUSCEPTIBLE   erythromycin Sensitive  Final     <=0.25   SUSCEPTIBLE   gentamicin Sensitive  Final     <=0.5   SUSCEPTIBLE   gentamicin Sensitive Gentamicin is used only in combination with other active agents that test susceptible.  Final    Induced Clind Resist  NOT REPORTED Final    levofloxacin Sensitive  Final     0.25   SUSCEPTIBLE   linezolid   Final     NOT REPORTED    moxifloxacin   Final     NOT REPORTED    nitrofurantoin   Final     NOT REPORTED    oxacillin Sensitive  Final     0.5   SUSCEPTIBLE   Synercid   Final     NOT REPORTED    rifampin   Final     NOT REPORTED    tetracycline Sensitive  Final     <=1   SUSCEPTIBLE   tigecycline   Final     NOT REPORTED    trimethoprim-sulfamethoxazole Sensitive  Final     <=10   SUSCEPTIBLE   vancomycin   Final NOT REPORTED        Medical Decision Making-Other:     Note:  · Labs, medications, radiologic studies were reviewed with personal review of films  · Large amounts of data were reviewed  · Discussed with nursing Staff, Discharge planner  · Infection Control and Prevention measures reviewed  · All prior entries were reviewed  · Administer medications as ordered  · Prognosis: Guarded  · Discharge planning reviewed  · Follow up as outpatient. Thank you for allowing us to participate in the care of this patient. Please call with questions. Shannen Mckeon, APRN - CNP     ATTESTATION:    I have discussed the case, including pertinent history and exam findings with the APRN. I have evaluated the  History, physical findings and pictures of the patient and the key elements of the encounter have been performed by me. I have reviewed the laboratory data, other diagnostic studies and discussed them with the APRN. I have updated the medical record where necessary. I agree with the assessment, plan and orders as documented by the APRN.     Deepak Dunlap MD.          Pager: [phone] - Office: [phone]

## 2021-07-27 NOTE — PROGRESS NOTES
Neurology Nurse Practitioner Progress Note      INTERVAL HISTORY: This is a 36 y.o.  male admitted 7/17/2021 for Diarrhea [R19.7]. This is a follow-up neurology progress note. The patient was examined and the chart was reviewed. Discussed with the pt & RN. There were no acute events overnight. Patient reported that vertigo & diplopia have completely resolved. However, today he had a sudden transient episode of right sided visual disturbance, described as seeing \"wavy ribbons\" followed by bright light; he was unable to see through that eye, the whole event lasted for 15 to 20 minutes before resolving spontaneously. He recalled a similar episode 1 month ago while he was at work; did not seek any medical attention at that time. Repeat MRI brain (7/27) - additional b/l infarcts. HPI: Logan Valadez is a 36 y.o. male with H/O HTN, HLD, diverticulitis s/p partial hemicolectomy (2012), who was admitted 7/16/2021 for nausea, vomiting, abdominal pain, watery diarrhea along with body/joints aches and generalized fatigue. Patient reported that his symptoms started 4 to 5 days ago. On the day of admission, he felt feverish, chills & lightheaded so presented to the ED for evaluation. Patient was treated for MSSA sepsis, septic shock, subacute endocarditis and severe MR. Neurology was consulted on 7/21 due to reports of visual hallucinations, vertigo and double vision. Family reported that patient was noticed to be sleeping when he opened his eyes and described seeing people are a dog in the room & spoke about things that were not happening. Patient recalled seeing water coming from the IV line that was not there. On the morning of 7/21, patient woke up with severe vertigo and unsteady gait. Vertigo was constant, no aggravating factor, requiring help to get to the restroom. He also complained of oblique monocular double vision that lasted for 2 hours.  On further questioning patient reported difficulty to concentrate and some difficulty in remembering things. He denied any other focal motor, sensory, visual or bulbar symptoms.  ketorolac  15 mg Intravenous Once    diphenoxylate-atropine  1 tablet Oral BID    metoprolol tartrate  50 mg Oral BID    potassium chloride  20 mEq Oral BID WC    pantoprazole  40 mg Oral BID AC    rifampin (RIFADIN) IVPB  600 mg Intravenous Q24H    nafcillin  2,000 mg Intravenous Q4H    sodium chloride flush  5-40 mL Intravenous 2 times per day       Past Medical History:   Diagnosis Date    Diverticulosis     Hyperlipidemia     Hypertension     MESSI (obstructive sleep apnea)     Has PSG study done Pos for Mod MESSI, never had cpap tritration done for machine    Research subject 07/24/2021    EIND [id number] Ex[employer]ac[employer] for persistent bacteremia expected date of completion 8/25/2021       Past Surgical History:   Procedure Laterality Date    ABDOMEN SURGERY      large bowel resection    ANTERIOR CRUCIATE LIGAMENT REPAIR Right     APPENDECTOMY  07/09/1997    COLECTOMY      2012 - D/T Dverticulitis    DILATATION, ESOPHAGUS         PHYSICAL EXAM:      Blood pressure 106/65, pulse 96, temperature 97.7 °F (36.5 °C), temperature source Oral, resp. rate 16, height 5' 6\" (1.676 m), weight 179 lb 10.8 oz (81.5 kg), SpO2 96 %.       Neurological Examination:  Mental status   Alert and oriented x 3; following all commands; speech is fluent, no dysarthria, aphasia   Cranial nerves   II - visual fields intact to confrontation; pupils reactive  III, IV, VI - extraocular muscles intact; no ARIELLA; no nystagmus; no ptosis   V - normal facial sensation                                                               VII - normal facial symmetry                                                             VIII - intact hearing                                                                             IX, X - symmetrical palate elevation                                               XI - symmetrical shoulder shrug XII - midline tongue without atrophy or fasciculation   Motor function  Strength: Able to raise all limbs antigravity with no drift noticed  Chronic pain in right shoulder  Normal bulk and tone                  Sensory function Grossly intact     Cerebellar No visible tremors   Reflex function 2/4 symmetric throughout  Down going plantar response bilaterally   Gait                  Not tested       DATA      Lab Results   Component Value Date    WBC 10.1 07/27/2021    HGB 9.5 (L) 07/27/2021    HCT 29.7 (L) 07/27/2021     07/27/2021    ALT 18 07/26/2021    AST 17 07/26/2021     07/27/2021    K 3.4 (L) 07/27/2021     07/27/2021    AMMONIA 52 07/20/2021    CREATININE 0.66 (L) 07/27/2021    BUN 9 07/27/2021    CO2 22 07/27/2021    TSH 1.86 07/20/2021    INR 1.0 07/19/2021    [id number] 1120 07/17/2021    FOLATE >20.0 07/17/2021    LABA1C 5.5 07/17/2021         DIAGNOSTIC DATA:  CT HEAD (7/20/2021): No acute intracranial abnormality     MRI BRAIN (6/53/5333): Acute embolic infarctions involving supra & infratentorial structures. Subacute to chronic encephalomalacia with chronic blood products within the L superior parietal lobule & R inferior parietal lobule    REPEAT MRI BRAIN (7/27/2021): Interval development of additional areas of restricted diffusion demonstrated in the R cerebellum, R posterior temporal lobe, R head of caudate, L temporal lobe & b/l frontal lobes    CTA HEAD & NECK (7/22/2021): Unremarkable      ECHO (7/18/2021): EF 50%. Severe MR. Posterior mitral valve leaflet prolapse. Possible small vegetation on posterior leaflet    MONROE (7/20/2021): EF 55%. Large vegetation measuring 2.5 X 2.4 cm noted on the posterior leaflet of the mitral valve. Posterior leaflet is flair and is associated with severe anteriorly directed eccentric regurgitation      EEG (7/21/2021): This EEG shows the presence of mild diffuse slowing.  This EEG is concordant with diffuse encephalopathy. No clear lateralized or epileptiform disturbance is seen                          IMPRESSION: 36 y.o.  male admitted with  Metabolic encephalopathy, along with hallucinations, vertigo and diplopia, in the setting of MSSA sepsis, septic shock, subacute endocarditis & acute embolic infarctions supra/infratentorial structures. Patient stays awake, alert & oriented, at his baseline mentation    MRI brain - acute embolic infarctions supra/infratentorial structures    Pt reported sudden transient episode of right sided visual disturbance, described as seeing \"wavy ribbons\" followed by bright light; he was unable to see through that eye, the whole event lasted for 15 to 20 minutes before resolving spontaneously. He recalled a similar episode 1 month ago while he was at work; did not seek any medical attention at that time; MRI brain - additional b/l embolic infarcts     Hallucination, vertigo & diplopia has resolved completely    MSSA sepsis, subacute endocarditis; is on nafcillin & rifampin, as per ID team    Comorbid conditions - HTN, HLD, diverticulitis s/p partial hemicolectomy (2012), R rotator cuff injury          PLAN:  Unfortunately antiplatelets/AC are contraindicated in this case, as septic emboli carry a high risk of bleeding    MVR to be planned by CTS team following 6 weeks of ATBs & negative blood cultures    Will follow    Please note that this note was generated using a voice recognition dictation software. Although every effort was made to ensure the accuracy of this automated transcription, some errors in transcription may have occurred.

## 2021-07-27 NOTE — CARE COORDINATION
Transition planning   Spoke with Wife and Pt about Home Care will send referral to [facility] - and can send to other companies if needed - no preference

## 2021-07-27 NOTE — PROGRESS NOTES
Occupational Therapy   Occupational Therapy Initial Assessment  Date: 2021   Patient Name: Ramiro Hayward  MRN: [id number]     : 1980    Date of Service: 2021   Obtained from medical chart:   \"Mr. Rosales Torres is a pleasant 36year old  gentleman who presneted to [facility] for evaluation of confusion, fatigue, diarrhea, and abdominal pains on 2021. He was recently released from [facility] for similar complaints and was noted to have low potassium levels at that time. CT of the abdomen was normal. The diarrhea was \"water-like\" and profuse in nature, admitting to approx 5-7 watery stools a day for the past 4 days prior to admission. No BRBPR. Abdominal pain was present prior to the diarrhea. There is associated fevers and chills. Subjective fever of 104 prior to arrival. Emesis is food that is digested but from earlier in the day. \"    Discharge Recommendations: No therapy recommended at discharge. OT Equipment Recommendations  Equipment Needed: Yes  Mobility Devices: ADL Assistive Devices  ADL Assistive Devices: Shower Chair with back    Assessment   Assessment: Pt presents functioning at baseline levels with no acute OT needs. Pt reports showering seated in AM and would benefit from shower chair for home use. Discussed transition home with no other concerns noted. Notify OTR to reasses if status changes. Prognosis: Good  Decision Making: Low Complexity  Patient Education: OT role, OT POC, purpose of eval, EC/WS, ADL adaptive strategies - good return  REQUIRES OT FOLLOW UP: No  Activity Tolerance  Activity Tolerance: Patient Tolerated treatment well  Safety Devices  Safety Devices in place: Yes  Type of devices: Left in bed;Gait belt;Call light within reach  Restraints  Initially in place: No           Patient Diagnosis(es): The primary encounter diagnosis was Diarrhea of presumed infectious origin.  Diagnoses of Hyponatremia, Hypokalemia, and Severe mitral regurgitation were also pertinent to this visit. has a past medical history of Diverticulosis, Hyperlipidemia, Hypertension, MESSI (obstructive sleep apnea), and Research subject. has a past surgical history that includes Anterior cruciate ligament repair (Right); Appendectomy (07/09/1997); colectomy; Dilatation, esophagus; and Abdomen surgery.            Restrictions  Restrictions/Precautions  Restrictions/Precautions: Up as Tolerated  Required Braces or Orthoses?: No  Position Activity Restriction    Subjective   General  Patient assessed for rehabilitation services?: Yes  Family / Caregiver Present: Yes (Wife)  General Comment  Comments: RN ok'd OT eval this date; pt agreeable to session and pleasant throughout  Patient Currently in Pain: Denies    Social/Functional History  Social/Functional History  Lives With: Spouse (3 children aged 10 years and under)  Type of Home: House  Home Layout: Two level, Able to Live on Main level with bedroom/bathroom  Home Access: Stairs to enter with rails  Entrance Stairs - Number of Steps: 3  Entrance Stairs - Rails: Right  Bathroom Shower/Tub: Tub/Shower unit  Bathroom Toilet: Standard  Home Equipment:  (No DME at baseline)  ADL Assistance: [address]: Independent  Homemaking Responsibilities: Yes (Pt completes outdoor chores)  Meal Prep Responsibility: Secondary  Laundry Responsibility: Secondary  Ambulation Assistance: Independent  Transfer Assistance: Independent  Active : Yes  Mode of Transportation: Car, Deidra Gaucher  Occupation: Full time employment  Type of occupation: [employer]       Objective   Vision: Impaired  Vision Exceptions:  (Wears contacts)  Hearing: Within functional limits          Balance  Sitting Balance: Independent (Static sittiing EOB ~6-7 minutes)  Standing Balance: Independent  Standing Balance  Time: ~2-3 min  Activity: washing hands standing sinkside  Comment: demo'd good posture  Functional Mobility  Functional - Mobility Device: No device  Activity: To/from bathroom  Assist Level: Independent  Functional Mobility Comments: Pt demo'd no LOB  Toilet Transfers  Toilet - Technique: Ambulating  Equipment Used: Standard toilet  Toilet Transfer: Independent  Toilet Transfers Comments: Pt did not use grab bars  ADL  Feeding: Independent  Grooming: Independent (Pt washed hands standing sinkside)  UE Bathing: Modified independent   LE Bathing: Modified independent   UE Dressing: Independent  LE Dressing: Independent (Pt donned/doffed socks in supine with HOB elevated ~60 degrees)  Toileting: Independent (Pt demo'd toilet t/f independently; did not use grab bars)  Tone RUE  RUE Tone: Normotonic  Tone LUE  LUE Tone: Normotonic  Coordination  Movements Are Fluid And Coordinated: Yes     Bed mobility  Supine to Sit: Independent  Sit to Supine: Independent  Scooting: Independent  Transfers  Sit to stand: Independent  Stand to sit: Independent     Cognition  Overall Cognitive Status: WFL        Sensation  Overall Sensation Status: WFL (Pt denies n/t)        LUE AROM : WFL  Left Hand AROM: WFL  RUE AROM : Exceptions  RUE General AROM: BENITO shoulder flexion;  Pt reports recent rotator cuff injury  Right Hand AROM: WFL  LUE Strength  Gross LUE Strength: WFL  L Hand General: 4+/5  LUE Strength Comment: L UE grossly 4+/5  RUE Strength  Gross RUE Strength: WFL  R Hand General: 4+/5  RUE Strength Comment: BENITO R shoulder; R elbow 4+/5      Plan    AM-PAC Score        AM-[facility] Inpatient Daily Activity Raw Score: 24 (07/27/21 1317)  AM-PAC Inpatient ADL T-Scale Score : 57.54 (07/27/21 1317)  ADL Inpatient CMS 0-100% Score: 0 (07/27/21 1317)  ADL Inpatient CMS G-Code Modifier : CH (07/27/21 1317)    Goals          Therapy Time   Individual Concurrent Group Co-treatment   Time In 1007         Time Out 1026         Minutes 19         Timed Code Treatment Minutes: 13 Minutes       Becky Partida S/OT

## 2021-07-27 NOTE — PROGRESS NOTES
[facility]  Office: [address], DO, Margot Cook, DO, Yolandafarhan Tavarez, DO, Gilmar Ontiveros Criss, DO, Domenica Gilbert MD, Sabrina Neri MD, Keri Baum MD, Diya Murray MD, Michelle Marte MD, Isabel Oshea MD, Gerson Perea MD, Ghanshyam Juárez, DO, Julienne Byrd MD, Derek Sandra DO, Michoacano Arreaga MD,  Corrie Rocha DO, Anita Barrera MD, Claire Beltre MD, Jared Jonas MD, Analilia Reyes MD, Joli Angelucci, MD, Reginald Arnold MD, Bradley Bermudez, [facility], [facility], CNP, Goldy Barkley, CNP, Wayne Elder, [facility], George Pettit, CNP, Aimee Saravia, CNP, Nohemi Salter, CNP, Terri Best, CNP, Cameron Rogers, CNP, Tanmay Krueger PA-C, Annabel Nuñez, [facility], Sonja Pineda, CNP, Bean Nails, CNP, Samina Sear, CNP, Siddharth Oconnor, CNP, Gertrude Ma, CNP, Abigail Huerta, CNP, Renae Anderson, CNP, Calderon Proper, [address] [place] [address]    Progress Note    7/27/2021    1:33 PM    Name:   Elisa Zhang  MRN:     [id number]     Acct:      [de-identified]   Room:   [address] [place] [address] Day:  8  Admit Date:  7/17/2021 12:42 AM    PCP:   Zhane Conway MD  Code Status:  Full Code    Subjective:     C/C:   Chief Complaint   Patient presents with    Abdominal Pain    Emesis     Interval History Status: not changed. Patient seen examined bedside wife also at bedside as well as patient's parents. Patient overall feeling better since my last examination 10 days ago. Low-grade fever 100.3 overnight. Patient having some issues with right-sided lower field of vision blurriness that lasted about 15 minutes but has now resolved. Tachycardia significantly improved in the 90s. Saturating well.  Was receiving rifampin at time of examination    Brief History:   41-year-old male past medical history of hypertension history of colectomy secondary to diverticulitis presented with fevers diarrhea approximately 3 days prior to admission was noted to be septic with 2 of 2 blood cultures showing positive MSSA as well as urinalysis positive for MSSA and found to have mitral valve endocarditis requiring IV antibiotics currently being treated with nafcillin 2 g IV every 4 hours until 8/15/2021, 600 mg rifampin for synergy, received infusion of Exebacase on 7/25/2021. MRI 7/21/2021:  Findings suggestive of acute embolic infarctions involving supra and   infratentorial structures as described.       Subacute to chronic encephalomalacia with chronic blood products within the   left superior parietal lobule and right inferior parietal lobule.       There is no acute intracranial hemorrhage, or intracranial mass lesion. Echo 7/17/2021:  Maria Dolores Flake left ventricular systolic function is normal. Calculated ejection  fraction 48% by Dubois's method. Visually estimated EF 50%. Posterior mitral valve leaflet prolapse. Severe mitral regurgitation. Eccentric anterior diredted jet. EOA = 0.7cm2. PISA radius is 1.3cm. Vena contracta is 0.99 cm. MR volume is  58ml. Possible small vegetation on posterior leaflet. Consider MONROE. MONROE 7/20/2021  MONROE findings:     LA:       Normal  RAJAT:    No thrombus  RA:      Normal  RV:      Normal  LV:       Normal  Estimated LVEF:         55%  Aorta:               Mild atheromatous disease arch  Pericardium:    Trivial pericardial effusion  Septum:           No intracardiac shunt via color Doppler.      Valves:     Mitral Valve: Large vegetation measuring 2.5 X 2.4 cm attached on the posterior leaflet of the mitral valve associated with Flail and degenerated leaflet. Severe regurgitation w/ an anteriorly directed eccentric jet is identified. Aortic Valve: The aortic valve is trileaflet and opens adequately. No regurgitiation is identified. Tricuspid valve: Structurally normal. No regurgitation is identified.   Pulmonary valve: Normal. No significant regurgitation     No thrombus identified  Review of Systems:     Constitutional:  negative for chills, fevers, sweats  Respiratory:  negative for cough, dyspnea on exertion, shortness of breath, wheezing  Cardiovascular:  negative for chest pain, chest pressure/discomfort, lower extremity edema, palpitations  Gastrointestinal:  negative for abdominal pain, constipation, diarrhea, nausea, vomiting  Neurological: Positive for change in vision negative for dizziness, headache      Medications: Allergies: Allergies   Allergen Reactions    Morphine Other (See Comments)     Pt report muscle cramps       Current Meds:   Scheduled Meds:    ketorolac  15 mg Intravenous Once    diphenoxylate-atropine  1 tablet Oral BID    metoprolol tartrate  50 mg Oral BID    potassium chloride  20 mEq Oral BID WC    pantoprazole  40 mg Oral BID AC    rifampin (RIFADIN) IVPB  600 mg Intravenous Q24H    nafcillin  2,000 mg Intravenous Q4H    sodium chloride flush  5-40 mL Intravenous 2 times per day     Continuous Infusions:    sodium chloride      sodium chloride       PRN Meds: diphenhydrAMINE, meclizine, loperamide, sodium chloride, sodium chloride, sodium chloride flush, sodium chloride, ondansetron **OR** ondansetron, acetaminophen **OR** acetaminophen, polyethylene glycol, magnesium sulfate, oxyCODONE, melatonin, aluminum & magnesium hydroxide-simethicone    Data:     Past Medical History:   has a past medical history of Diverticulosis, Hyperlipidemia, Hypertension, MESSI (obstructive sleep apnea), and Research subject. Social History:   reports that he has never smoked. He has never used smokeless tobacco. He reports current alcohol use of about 20.0 standard drinks of alcohol per week. He reports that he does not use drugs.      Family History:   Family History   Problem Relation Age of Onset    No Known Problems Mother     Other Father         diverticulitis       Vitals:  /65   Pulse 96   Temp 97.7 °F (36.5 °C) (Oral)   Resp 16   Ht 5' 6\" (1.676 m)   Wt 179 lb 10.8 oz (81.5 kg)   SpO2 96% BMI 29.00 kg/m²   Temp (24hrs), Av.2 °F (37.3 °C), Min:97.7 °F (36.5 °C), Max:102.2 °F (39 °C)    No results for input(s): POCGLU in the last 72 hours. I/O (24Hr): Intake/Output Summary (Last 24 hours) at 2021 1333  Last data filed at 2021 0333  Gross per 24 hour   Intake 1420 ml   Output --   Net 1420 ml       Labs:  Hematology:  Recent Labs     21  0952 21  0544 21  0715   WBC 10.3 10.8 10.1   RBC 3.38* 3.17* 3.27*   HGB 9.7* 9.1* 9.5*   HCT 30.8* 29.0* 29.7*   MCV 91.1 91.5 90.8   MCH 28.7 28.7 29.1   MCHC 31.5 31.4 32.0   RDW 13.2 13.1 12.8    251 264   MPV 9.9 9.9 9.9   CRP  --   --  56.0*     Chemistry:  Recent Labs     21  0952 21  0700 21  0715   * 134* 136   K 3.5* 3.5* 3.4*    103 103   CO2 23 19* 22   GLUCOSE 130* 108* 112*   BUN 8 8 9   CREATININE 0.56* 0.77 0.66*   MG 2.0  --   --    ANIONGAP 8* 12 11   LABGLOM >60 >60 >60   GFRAA >60 >60 >60   CALCIUM 8.3* 8.0* 8.2*   PHOS  --  3.5 PENDING     Recent Labs     21  0952 21  0700 21  0715   PROT 6.3* 6.3*  --    LABALBU 2.7* 2.7*  2.7* 2.6*   AST 20 17  --    ALT 23 18  --    ALKPHOS 67 59  --    BILITOT 0.41 0.58  --    BILIDIR  --  0.19  --      ABG:  Lab Results   Component Value Date    POCPH 7.486 2021    POCPCO2 28.6 2021    POCPO2 134.5 2021    POCHCO3 21.6 2021    NBEA 1 2021    PBEA NOT REPORTED 2021    UQD3BIT NOT REPORTED 2021    WJEZ9ZMQ 99 2021    FIO2 NOT REPORTED 2021     Lab Results   Component Value Date/Time    SPECIAL 5ML LT HAND 2021 09:51 AM     Lab Results   Component Value Date/Time    CULTURE (A) 2021 09:51 AM     POSITIVE Blood Culture Results called to and read back by: Nicholas ARAGON.  06    CULTURE  2021 09:51 AM     DIRECT GRAM STAIN FROM BOTTLE: GRAM POSITIVE COCCI IN CLUSTERS    CULTURE STAPHYLOCOCCUS AUREUS Susceptibility to follow.  (A) 2021 09:51 AM limitations of patient motion. No significant spinal canal stenosis seen. XR CHEST PORTABLE    Result Date: 7/23/2021  No radiologic evidence of acute cardiopulmonary disease. CT CHEST PULMONARY EMBOLISM W CONTRAST    Result Date: 7/20/2021  Small bilateral pleural effusions and mild bibasilar atelectasis This study is nondiagnostic for pulmonary embolism. There is excessive respiratory motion artifact degrading image resolution. There is not significant contrast within the pulmonary artery system to adequately assess for pulmonary embolus. Recommend either repeat exam with improved bolus timing, and decreased patient respiratory motion or VQ scan. CTA HEAD NECK W CONTRAST    Result Date: 7/22/2021  1. Unremarkable CT angiogram of the head and neck. 2. Sequelae of embolic phenomenon as seen on the prior MRI of the brain. Given the degree of vasogenic edema surrounding several of these areas of restricted diffusion, septic emboli is favored. The findings were sent to the Radiology Results [address] at 9:48 am on 7/22/2021to be communicated to a licensed caregiver. MRI BRAIN WO CONTRAST    Result Date: 7/21/2021  Findings suggestive of acute embolic infarctions involving supra and infratentorial structures as described. Subacute to chronic encephalomalacia with chronic blood products within the left superior parietal lobule and right inferior parietal lobule. There is no acute intracranial hemorrhage, or intracranial mass lesion. The findings were sent to the Radiology Results [id number] at 8:24 pm on 7/21/2021to be communicated to a licensed caregiver.        Physical Examination:        General appearance:  alert, cooperative and no distress  Mental Status:  oriented to person, place and time and normal affect  Lungs:  clear to auscultation bilaterally, normal effort  Heart:  regular rate and rhythm, systolic murmur  Abdomen:  soft, nontender, nondistended, normal bowel sounds  Extremities:  no edema, redness, tenderness in the calves  Skin: Osler node improving on right middle finger,    Assessment:        Hospital Problems         Last Modified POA    * (Principal) MSSA bacteremia 7/20/2021 Yes    Delusions ([address] 7/20/2021 Yes    Hypophosphatemia 7/20/2021 Yes    Severe mitral regurgitation 7/20/2021 Yes    Acute respiratory failure with hypoxia ([address] 7/20/2021 Yes    GI bleed 7/20/2021 Yes    Vertigo 7/21/2021 No    Normocytic normochromic anemia 7/20/2021 Yes    Obesity (BMI 30-39. 9) 7/20/2021 Yes    Transaminitis 7/20/2021 Yes    Diarrhea 7/20/2021 Yes    Acute dehydration 7/20/2021 Yes    Hypomagnesemia 7/20/2021 Yes    Hypokalemia 7/20/2021 Yes    Epistaxis 7/20/2021 No    Thrombocytopenia (HCC) 7/20/2021 Yes    Hyperglycemia 7/20/2021 Yes    Essential hypertension 7/20/2021 Yes    Sepsis with acute organ dysfunction and septic shock ([address] 0/03/7484 Yes    Metabolic encephalopathy 4/96/2117 Yes    Iron deficiency anemia secondary to blood loss (chronic) 7/21/2021 Yes    Guaiac positive stools 7/21/2021 Yes    Subacute endocarditis 7/26/2021 Yes    Cerebral septic emboli ([address] 7/26/2021 Yes          Plan:        1. MSSA bacteremia with severe MR. [address] cardiology, CT surgery, ID recommendations. IV antibiotics planned for total of 6 weeks. Exabecase given 7/25/2021, on Ancef, rifampin for synergy  2. Acute septic embolic infarcts appreciate neurology recommendations. Due to worsening vision status repeat MRI. Hold on anticoagulation due to concern for conversion to hemorrhagic  3. Antivert for vertigo  4. GI prophylaxis with Protonix  5. Lomotil for diarrhea  6. Follow-up new blood cultures last cultures on 7/25/2021 were positive for MSSA  7. Discussed with family at bedside.     Chrissy Hodgson MD  7/27/2021  1:33 PM

## 2021-07-27 NOTE — PROGRESS NOTES
Physical Therapy        Physical Therapy Cancel Note      DATE: 2021    NAME: Safia Muse  MRN: [id number]   : 1980      Patient not seen this date for Physical Therapy due to:    Patient reports independent with functional mobility. Pt declines any additional PT at this time. Physical therapy to sign off. Please reorder PT if future needs arise. RN notified.       Electronically signed by Zacarias Mars PTA on 2021 at 9:53 AM

## 2021-07-28 LAB
ABSOLUTE EOS #: <0.03 K/UL (ref 0–0.44)
ABSOLUTE IMMATURE GRANULOCYTE: 0.09 K/UL (ref 0–0.3)
ABSOLUTE LYMPH #: 1.58 K/UL (ref 1.1–3.7)
ABSOLUTE MONO #: 0.87 K/UL (ref 0.1–1.2)
ALBUMIN SERPL-MCNC: 2.5 G/DL (ref 3.5–5.2)
ALBUMIN SERPL-MCNC: 2.5 G/DL (ref 3.5–5.2)
ALBUMIN SERPL-MCNC: 2.6 G/DL (ref 3.5–5.2)
ALBUMIN/GLOBULIN RATIO: 0.7 (ref 1–2.5)
ALP BLD-CCNC: 57 U/L (ref 40–129)
ALT SERPL-CCNC: 19 U/L (ref 5–41)
ANION GAP SERPL CALCULATED.3IONS-SCNC: 11 MMOL/L (ref 9–17)
ANION GAP SERPL CALCULATED.3IONS-SCNC: 12 MMOL/L (ref 9–17)
AST SERPL-CCNC: 17 U/L
BASOPHILS # BLD: 1 % (ref 0–2)
BASOPHILS ABSOLUTE: 0.08 K/UL (ref 0–0.2)
BILIRUB SERPL-MCNC: 0.38 MG/DL (ref 0.3–1.2)
BILIRUBIN DIRECT: 0.14 MG/DL
BILIRUBIN, INDIRECT: 0.24 MG/DL (ref 0–1)
BUN BLDV-MCNC: 9 MG/DL (ref 6–20)
BUN BLDV-MCNC: 9 MG/DL (ref 6–20)
BUN/CREAT BLD: ABNORMAL (ref 9–20)
BUN/CREAT BLD: ABNORMAL (ref 9–20)
CALCIUM SERPL-MCNC: 8.2 MG/DL (ref 8.6–10.4)
CALCIUM SERPL-MCNC: 8.2 MG/DL (ref 8.6–10.4)
CHLORIDE BLD-SCNC: 103 MMOL/L (ref 98–107)
CHLORIDE BLD-SCNC: 104 MMOL/L (ref 98–107)
CO2: 20 MMOL/L (ref 20–31)
CO2: 22 MMOL/L (ref 20–31)
CREAT SERPL-MCNC: 0.66 MG/DL (ref 0.7–1.2)
CREAT SERPL-MCNC: 0.75 MG/DL (ref 0.7–1.2)
DIFFERENTIAL TYPE: ABNORMAL
EOSINOPHILS RELATIVE PERCENT: 0 % (ref 1–4)
GFR AFRICAN AMERICAN: >60 ML/MIN
GFR AFRICAN AMERICAN: >60 ML/MIN
GFR NON-AFRICAN AMERICAN: >60 ML/MIN
GFR NON-AFRICAN AMERICAN: >60 ML/MIN
GFR SERPL CREATININE-BSD FRML MDRD: ABNORMAL ML/MIN/{1.73_M2}
GLOBULIN: ABNORMAL G/DL (ref 1.5–3.8)
GLUCOSE BLD-MCNC: 112 MG/DL (ref 70–99)
GLUCOSE BLD-MCNC: 113 MG/DL (ref 70–99)
HCT VFR BLD CALC: 29.1 % (ref 40.7–50.3)
HEMOGLOBIN: 9.2 G/DL (ref 13–17)
IMMATURE GRANULOCYTES: 1 %
LYMPHOCYTES # BLD: 14 % (ref 24–43)
MCH RBC QN AUTO: 28.8 PG (ref 25.2–33.5)
MCHC RBC AUTO-ENTMCNC: 31.6 G/DL (ref 28.4–34.8)
MCV RBC AUTO: 91.2 FL (ref 82.6–102.9)
MONOCYTES # BLD: 8 % (ref 3–12)
NRBC AUTOMATED: 0 PER 100 WBC
PDW BLD-RTO: 12.7 % (ref 11.8–14.4)
PHOSPHORUS: 3.7 MG/DL (ref 2.5–4.5)
PHOSPHORUS: 3.8 MG/DL (ref 2.5–4.5)
PLATELET # BLD: 312 K/UL (ref 138–453)
PLATELET ESTIMATE: ABNORMAL
PMV BLD AUTO: 9.9 FL (ref 8.1–13.5)
POTASSIUM SERPL-SCNC: 3.4 MMOL/L (ref 3.7–5.3)
POTASSIUM SERPL-SCNC: 3.6 MMOL/L (ref 3.7–5.3)
RBC # BLD: 3.19 M/UL (ref 4.21–5.77)
RBC # BLD: ABNORMAL 10*6/UL
SEG NEUTROPHILS: 76 % (ref 36–65)
SEGMENTED NEUTROPHILS ABSOLUTE COUNT: 8.49 K/UL (ref 1.5–8.1)
SODIUM BLD-SCNC: 136 MMOL/L (ref 135–144)
SODIUM BLD-SCNC: 136 MMOL/L (ref 135–144)
TOTAL PROTEIN: 6.3 G/DL (ref 6.4–8.3)
WBC # BLD: 11.1 K/UL (ref 3.5–11.3)
WBC # BLD: ABNORMAL 10*3/UL

## 2021-07-28 PROCEDURE — 2580000003 HC RX 258: Performed by: NURSE PRACTITIONER

## 2021-07-28 PROCEDURE — 6360000002 HC RX W HCPCS: Performed by: NURSE PRACTITIONER

## 2021-07-28 PROCEDURE — 80076 HEPATIC FUNCTION PANEL: CPT

## 2021-07-28 PROCEDURE — 6370000000 HC RX 637 (ALT 250 FOR IP): Performed by: INTERNAL MEDICINE

## 2021-07-28 PROCEDURE — 2500000003 HC RX 250 WO HCPCS: Performed by: INTERNAL MEDICINE

## 2021-07-28 PROCEDURE — 99232 SBSQ HOSP IP/OBS MODERATE 35: CPT | Performed by: INTERNAL MEDICINE

## 2021-07-28 PROCEDURE — 99232 SBSQ HOSP IP/OBS MODERATE 35: CPT | Performed by: STUDENT IN AN ORGANIZED HEALTH CARE EDUCATION/TRAINING PROGRAM

## 2021-07-28 PROCEDURE — 36415 COLL VENOUS BLD VENIPUNCTURE: CPT

## 2021-07-28 PROCEDURE — APPSS30 APP SPLIT SHARED TIME 16-30 MINUTES: Performed by: NURSE PRACTITIONER

## 2021-07-28 PROCEDURE — 82248 BILIRUBIN DIRECT: CPT

## 2021-07-28 PROCEDURE — 6360000002 HC RX W HCPCS: Performed by: INTERNAL MEDICINE

## 2021-07-28 PROCEDURE — 2060000000 HC ICU INTERMEDIATE R&B

## 2021-07-28 PROCEDURE — 84100 ASSAY OF PHOSPHORUS: CPT

## 2021-07-28 PROCEDURE — 85025 COMPLETE CBC W/AUTO DIFF WBC: CPT

## 2021-07-28 PROCEDURE — 6370000000 HC RX 637 (ALT 250 FOR IP): Performed by: STUDENT IN AN ORGANIZED HEALTH CARE EDUCATION/TRAINING PROGRAM

## 2021-07-28 PROCEDURE — 6370000000 HC RX 637 (ALT 250 FOR IP): Performed by: NURSE PRACTITIONER

## 2021-07-28 PROCEDURE — 80069 RENAL FUNCTION PANEL: CPT

## 2021-07-28 PROCEDURE — 99232 SBSQ HOSP IP/OBS MODERATE 35: CPT | Performed by: PSYCHIATRY & NEUROLOGY

## 2021-07-28 PROCEDURE — 87040 BLOOD CULTURE FOR BACTERIA: CPT

## 2021-07-28 PROCEDURE — 80053 COMPREHEN METABOLIC PANEL: CPT

## 2021-07-28 PROCEDURE — 2580000003 HC RX 258: Performed by: INTERNAL MEDICINE

## 2021-07-28 RX ORDER — KETOROLAC TROMETHAMINE 15 MG/ML
15 INJECTION, SOLUTION INTRAMUSCULAR; INTRAVENOUS ONCE
Status: COMPLETED | OUTPATIENT
Start: 2021-07-28 | End: 2021-07-28

## 2021-07-28 RX ORDER — LORAZEPAM 1 MG/1
1 TABLET ORAL EVERY 4 HOURS PRN
Status: DISCONTINUED | OUTPATIENT
Start: 2021-07-28 | End: 2021-07-29

## 2021-07-28 RX ADMIN — Medication 1 CAPSULE: at 16:57

## 2021-07-28 RX ADMIN — PANTOPRAZOLE SODIUM 40 MG: 40 TABLET, DELAYED RELEASE ORAL at 16:57

## 2021-07-28 RX ADMIN — DIPHENOXYLATE HYDROCHLORIDE AND ATROPINE SULFATE 1 TABLET: 2.5; .025 TABLET ORAL at 21:32

## 2021-07-28 RX ADMIN — NAFCILLIN INJECTION 2000 MG: 2 POWDER, FOR SOLUTION INTRAMUSCULAR; INTRAMUSCULAR; INTRAVENOUS at 07:00

## 2021-07-28 RX ADMIN — DIPHENHYDRAMINE HYDROCHLORIDE 12.5 MG: 50 INJECTION, SOLUTION INTRAMUSCULAR; INTRAVENOUS at 21:31

## 2021-07-28 RX ADMIN — ACETAMINOPHEN 650 MG: 325 TABLET ORAL at 03:41

## 2021-07-28 RX ADMIN — SODIUM CHLORIDE, PRESERVATIVE FREE 10 ML: 5 INJECTION INTRAVENOUS at 11:18

## 2021-07-28 RX ADMIN — POTASSIUM CHLORIDE 20 MEQ: 1500 TABLET, EXTENDED RELEASE ORAL at 16:58

## 2021-07-28 RX ADMIN — PANTOPRAZOLE SODIUM 40 MG: 40 TABLET, DELAYED RELEASE ORAL at 08:40

## 2021-07-28 RX ADMIN — Medication 1 CAPSULE: at 11:33

## 2021-07-28 RX ADMIN — METOPROLOL TARTRATE 50 MG: 25 TABLET ORAL at 19:38

## 2021-07-28 RX ADMIN — NAFCILLIN INJECTION 2000 MG: 2 POWDER, FOR SOLUTION INTRAMUSCULAR; INTRAMUSCULAR; INTRAVENOUS at 03:00

## 2021-07-28 RX ADMIN — RIFAMPIN 600 MG: 600 INJECTION, POWDER, LYOPHILIZED, FOR SOLUTION INTRAVENOUS at 12:52

## 2021-07-28 RX ADMIN — NAFCILLIN INJECTION 2000 MG: 2 POWDER, FOR SOLUTION INTRAMUSCULAR; INTRAMUSCULAR; INTRAVENOUS at 19:34

## 2021-07-28 RX ADMIN — NAFCILLIN INJECTION 2000 MG: 2 POWDER, FOR SOLUTION INTRAMUSCULAR; INTRAMUSCULAR; INTRAVENOUS at 11:18

## 2021-07-28 RX ADMIN — Medication 6 MG: at 21:31

## 2021-07-28 RX ADMIN — DIPHENOXYLATE HYDROCHLORIDE AND ATROPINE SULFATE 1 TABLET: 2.5; .025 TABLET ORAL at 08:40

## 2021-07-28 RX ADMIN — ACETAMINOPHEN 650 MG: 325 TABLET ORAL at 15:05

## 2021-07-28 RX ADMIN — KETOROLAC TROMETHAMINE 15 MG: 15 INJECTION, SOLUTION INTRAMUSCULAR; INTRAVENOUS at 20:53

## 2021-07-28 RX ADMIN — Medication 1 CAPSULE: at 08:40

## 2021-07-28 RX ADMIN — NAFCILLIN INJECTION 2000 MG: 2 POWDER, FOR SOLUTION INTRAMUSCULAR; INTRAMUSCULAR; INTRAVENOUS at 16:44

## 2021-07-28 RX ADMIN — ACETAMINOPHEN 650 MG: 325 TABLET ORAL at 20:14

## 2021-07-28 RX ADMIN — NAFCILLIN INJECTION 2000 MG: 2 POWDER, FOR SOLUTION INTRAMUSCULAR; INTRAMUSCULAR; INTRAVENOUS at 23:30

## 2021-07-28 RX ADMIN — METOPROLOL TARTRATE 50 MG: 25 TABLET ORAL at 08:40

## 2021-07-28 RX ADMIN — POTASSIUM CHLORIDE 20 MEQ: 1500 TABLET, EXTENDED RELEASE ORAL at 08:40

## 2021-07-28 ASSESSMENT — PAIN SCALES - GENERAL
PAINLEVEL_OUTOF10: 0
PAINLEVEL_OUTOF10: 0
PAINLEVEL_OUTOF10: 5
PAINLEVEL_OUTOF10: 0

## 2021-07-28 NOTE — PROGRESS NOTES
[facility] - Progress Note    Today's Date and Time: 7/28/2021, 9:47 AM    Impression :   · MSSA septicemia 7-17-21  · Persistent growth of bacteria in blood 7-17-21 through 7-27-21 despite treatment  · Sepsis with acute organ dysfunction and septic shock  · Mitral Valve endocarditis. 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 Will need 6 weeks from first negative blood cultures.   · Rifampin 600 mg daily added 7/22/21 for synergy  · Please do not give anticoagulants due to the risk of septic emboli in the brain transitioning to hemorrhagic   · Received infusion of Exebacase under compassionate use protocol on 7-25-21 without any AEs    Medical Decision Making/Summary/Discussion:7/28/2021     ·   Infection Control Recommendations   · [place] Precautions  · Contact Isolation     Antimicrobial Stewardship Recommendations     · Simplification of therapy  · Targeted therapy    Coordination of Outpatient Care:   · Estimated Length of IV antimicrobials: 6 weeks from first negative blood cultures  · Patient will need Midline Catheter Insertion: TBD  · Patient will need PICC line Insertion: TBD  · Patient will need: Home IV , [place],  SNF,  LTAC: Yes-either SNF or home infusion  · Patient will need outpatient wound care: TBD    Chief complaint/reason for consultation:   · MSSA sepsis    History of Present Illness:   Brandi Jade is a 36y.o.-year-old  male who was initially admitted on 7/17/2021. Patient seen at the request of Dr. Clive Jerez:     Patient initially presented to [facility] a few days ago with complaints of  abdominal pain, nausea, watery diarrhea and vomiting for the past 5 days after eating some chicken. The patient states that his wife also had episodes of emesis after eating the chicken but her symptoms resolved shortly after. A CT abdomen was unremarkable and he was diagnosed with viral gastroenteritis and sent home with Norco, Zofran, and potassium supplement for hypokalemia. On 7/16/2021, he presented to [address] [place] [address] with worsening symptoms, including fever, chills, fatigue, and worsening abdominal pain with continued vomiting, right big toe pain. Work-up in the ED revealed a fever of 102.6, tachycardia with a heart rate up to 170, dyspnea with hypoxia, blood cultures showed MSSA x2, and urine cultures grew staph aureus ,000 CFU/mL. Occult blood stool positive. CT abdomen 7/17/2021  Impression   No acute abnormality identified in the abdomen and pelvis.       The bladder is markedly distended with a calculated volume of 2 L.       Hepatic steatosis and mild hepatomegaly.       Minor bibasilar atelectasis and interstitial thickening posteriorly. CT chest abdomen pelvis with contrast 7/18/2021  Impression   Moderate distention of the colon multiple air-fluid levels, findings may be   seen with acute enterocolitis. Abnormal labs include:  Sodium 131  Potassium 3.0  .5  Myoglobin 138  Troponin I 65  Amylase 131  AST 66  Lipase 299  Platelet 39  Sed rate 31    ID has been consulted for MSSA sepsis     Addendum: On 7-24-21 I reviewed with patient and wife his clinical course up to the present and the difficulties with source control.   I indicated to the patient and his wife, in the presence of his RN, that we had been able to secure permission from the  of Urbinajassi Rocha, Dr Naima Fay of the IRB at [facility], Hospital administration and Legal Department for him to receive Exebacase under a Compassionate Use Protocol. I discussed the available information with him including prior published clinical results and safety data. Indicated that there was no promise of efficacy. I emphasized that participation was voluntary. Patient and his wife had all their questions answered. I gave them two copies of the informed consent to review. They will keep one copy and return to signed copy for inclusion in his records. Patient gave informed consent to proceed. CURRENT EVALUATION 7/28/2021: TMAX 100.6  Vital signs stable    The patient remains stable on room air. He did have a fever of 100.6 this morning and another episode of rigors. Unfortunately, the patient's blood cultures from 7/27/2021 have come back 1 out of 2 positive for gram-positive cocci clusters the second showing no growth for 21 hours. Repeat blood cultures have been ordered for 7/28/2021. We are seeing a longer period of time before the cultures are growing any bacteria which is hopeful that the antibiotics are slowly having the desired effect. CT surgery is planning for valve repair after the patient has completed IV antibiotic therapy-unless the patient decompensates to the point of needing emergent surgical intervention. MRI brain 2-13-95: Acute embolic infarctions in supra and infratentorial structures. Because of the septic emboli in the brain, Ancef was discontinued and nafcillin was initiated on 7/22/2021. Rifampin was also added for synergy. Echocardiogram completed on 7/18/2021 revealed posterior mitral valve leaflet prolapse, severe mitral regurgitation, and possible small vegetation on posterior leaflet. Presence of vegetation on mitral valve would help explain the emboli to the brain.     MONROE on 7/20 showed a Large vegetation measuring 2.5 X 2.4 cm noted on the posterior leaflet of the mitral valve. Posterior leaflet is flair and is associated with severe anteriorly directed eccentric regurgitation. Right foot x-ray revealed:  No radiographic evidence for acute osteomyelitis.  Mild soft tissue swelling   of the great toe possibly cellulitis.       RECOMMENDATION:   NM bone scan or MRI may be obtained if there remains strong concern for acute   osteomyelitis.         CT abdomen pelvis revealed: Moderate distention of the colon multiple air-fluid levels, findings may be   seen with acute enterocolitis. Bruising on the patient's back is from a Tens unit-it does not appear to be the source of sepsis. There are no open lesions noted and there are three distinct marks from the electrodes. Stool studies have been negative    Hepatitis panel nonreactive    Discussed with RN    Labs, X rays reviewed: 7/28/2021    BUN:8-->8-->13-->19-->20  Cr:0.76-->0.49-->0.95-->0.71-->0.77-->0.75    CRP: 260.5-->56.0  LDH: 437    WBC:9.9-->10.9-->9.8-->10.8-->11.1  Hb:12.5-->10.8-->11.5--.9.1-->9.2   Plat: 39-->40-->78-->196-->251-->312  Haptoglobin: 256  Sed rate: 31  Lipase 299->228  Lactate: 1.6-->2.3    Cultures:  Urine:  ·   Blood:  · 7/17/2021: MSSA x2  · 7/18/2021: MSSA  · 7/20/21: MSSA  · 7/22/2021: MSSA  · 7-24-21: MSSA  · 7-25-21: MSSA  · 7/27/21: 1/2 MSSA after 21 hours  · 7/28/21: Pending    Sputum :  ·   Wound:    Discussed with patient, RN, family, Dr Apollo Yee. I have personally reviewed the past medical history, past surgical history, medications, social history, and family history, and I have updated the database accordingly.   Past Medical History:     Past Medical History:   Diagnosis Date    Diverticulosis     Hyperlipidemia     Hypertension     MESSI (obstructive sleep apnea)     Has PSG study done Pos for Mod MESSI, never had cpap tritration done for machine    Research subject 07/24/2021    EIND [id number] Exebacase for persistent bacteremia expected date of completion 8/25/2021       Past Surgical  History:     Past Surgical History:   Procedure Laterality Date    ABDOMEN SURGERY      large bowel resection    ANTERIOR CRUCIATE LIGAMENT REPAIR Right     APPENDECTOMY  07/09/1997    COLECTOMY      2012 - D/T Dverticulitis    DILATATION, ESOPHAGUS         Medications:      lactobacillus  1 capsule Oral TID WC    diphenoxylate-atropine  1 tablet Oral BID    metoprolol tartrate  50 mg Oral BID    potassium chloride  20 mEq Oral BID WC    pantoprazole  40 mg Oral BID AC    rifampin (RIFADIN) IVPB  600 mg Intravenous Q24H    nafcillin  2,000 mg Intravenous Q4H    sodium chloride flush  5-40 mL Intravenous 2 times per day       Social History:     Social History     Socioeconomic History    Marital status:      Spouse name: Not on file    Number of children: Not on file    Years of education: Not on file    Highest education level: Not on file   Occupational History    Not on file   Tobacco Use    Smoking status: Never Smoker    Smokeless tobacco: Never Used   Vaping Use    Vaping Use: Never assessed   Substance and Sexual Activity    Alcohol use: Yes     Alcohol/week: 20.0 standard drinks     Types: 20 Cans of beer per week     Comment: 20/ week, average every other day    Drug use: Never    Sexual activity: Not on file   Other Topics Concern    Not on file   Social History Narrative    Not on file     Social Determinants of Health     Financial Resource Strain:     Difficulty of Paying Living Expenses:    Food Insecurity:     Worried About Running Out of Food in the Last Year:     [address] in the Last Year:    Transportation Needs:     Lack of Transportation (Medical):      Lack of Transportation (Non-Medical):    Physical Activity:     Days of Exercise per Week:     Minutes of Exercise per Session:    Stress:     Feeling of Stress :    Social Connections:     Frequency of Communication with Friends and Family:     Frequency of Social Gatherings with Friends and Family:     Attends Worship Services:     Active Member of Clubs or Organizations:     Attends Club or Organization Meetings:     Marital Status:    Intimate Partner Violence:     Fear of Current or Ex-Partner:     Emotionally Abused:     Physically Abused:     Sexually Abused:        Family History:     Family History   Problem Relation Age of Onset    No Known Problems Mother     Other Father         diverticulitis        Allergies:   Morphine     Review of Systems:   Constitutional: No fevers or chills. No systemic complaints  Head: No headaches  Eyes: No double vision or blurry vision. No conjunctival inflammation. ENT: No sore throat or runny nose. . No hearing loss, tinnitus or vertigo. Cardiovascular: No chest pain or palpitations. No shortness of breath. No CASTRO  Lung: No shortness of breath or cough. No sputum production  Abdomen: No nausea, vomiting, diarrhea, or abdominal pain. Panfilo Danny No cramps. Genitourinary: No increased urinary frequency, or dysuria. No hematuria. No suprapubic or CVA pain  Musculoskeletal: No muscle aches or pains. No joint effusions, swelling or deformities  Hematologic: No bleeding or bruising. Neurologic: No headache, weakness, numbness, or tingling. Integument: Scattered small reddened bumps and bruises  Psychiatric: No depression. Endocrine: No polyuria, no polydipsia, no polyphagia.     Physical Examination :     Patient Vitals for the past 8 hrs:   BP Temp Temp src Pulse Resp SpO2 Weight   07/28/21 0840 103/67 -- -- 115 -- -- --   07/28/21 0730 107/65 100.6 °F (38.1 °C) Oral 121 18 95 % --   07/28/21 9013 -- -- -- -- -- -- 179 lb 14.3 oz (81.6 kg)   07/28/21 0338 102/60 99.9 °F (37.7 °C) Oral 109 18 95 % --     General Appearance: Awake, alert, and in no apparent distress  Head:  Normocephalic, no trauma  Eyes: Pupils equal, round, reactive to light and accommodation; extraocular movements intact; sclera anicteric; conjunctivae pink. No embolic phenomena. ENT: Oropharynx clear, without erythema, exudate, or thrush. No tenderness of sinuses. Mouth/throat: mucosa pink and moist. No lesions. Dentition in good repair. Neck:Supple, without lymphadenopathy. Thyroid normal, No bruits. Pulmonary/Chest: Clear to auscultation, without wheezes, rales, or rhonchi. No dullness to percussion. Cardiovascular: Normal sinus tachycardia with murmur, No rubs, or gallops. Abdomen: Soft, non tender. Bowel sounds normal. No organomegaly  All four Extremities: No cyanosis, clubbing, edema, or effusions. Neurologic: No gross sensory or motor deficits. Skin: Warm and dry with good turgor. No signs of peripheral arterial or venous insufficiency. No ulcerations. No open wounds. Medical Decision Making -Laboratory:   I have independently reviewed/ordered the following labs:    CBC with Differential:   Recent Labs     07/27/21  0715 07/28/21  0551   WBC 10.1 11.1   HGB 9.5* 9.2*   HCT 29.7* 29.1*    312   LYMPHOPCT 14* 14*   MONOPCT 8 8     BMP:   Recent Labs     07/25/21  0952 07/26/21  0700 07/27/21  0715 07/28/21  0551   *   < > 136 136   K 3.5*   < > 3.4* 3.6*      < > 103 104   CO2 23   < > 22 20   BUN 8   < > 9 9   CREATININE 0.56*   < > 0.66* 0.75   MG 2.0  --   --   --     < > = values in this interval not displayed. Hepatic Function Panel:   Recent Labs     07/26/21  0700 07/26/21  0700 07/27/21  0715 07/28/21  0551   PROT 6.3*  --   --  6.3*   LABALBU 2.7*  2.7*   < > 2.6* 2.5*  2.5*   BILIDIR 0.19  --   --  0.14   IBILI 0.39  --   --  0.24   BILITOT 0.58  --   --  0.38   ALKPHOS 59  --   --  57   ALT 18  --   --  19   AST 17  --   --  17    < > = values in this interval not displayed. No results for input(s): RPR in the last 72 hours. No results for input(s): HIV in the last 72 hours. No results for input(s): BC in the last 72 hours.   Lab Results Component Value Date    MUCUS NOT REPORTED 07/17/2021    RBC 3.19 07/28/2021    TRICHOMONAS NOT REPORTED 07/17/2021    WBC 11.1 07/28/2021    YEAST NOT REPORTED 07/17/2021    TURBIDITY CLEAR 07/17/2021     Lab Results   Component Value Date    CREATININE 0.75 07/28/2021    GLUCOSE 113 07/28/2021       Medical Decision Making-Imaging:          MRI Brain 7/2121   Impression       Findings suggestive of acute embolic infarctions involving supra and   infratentorial structures as described.       Subacute to chronic encephalomalacia with chronic blood products within the   left superior parietal lobule and right inferior parietal lobule.       There is no acute intracranial hemorrhage, or intracranial mass lesion.           EXAMINATION:   CT OF THE CHEST, ABDOMEN, AND PELVIS WITH CONTRAST 7/18/2021 10:53 am       TECHNIQUE:   CT of the chest, abdomen and pelvis was performed with the administration of   intravenous contrast. Multiplanar reformatted images are provided for review. Dose modulation, iterative reconstruction, and/or weight based adjustment of   the mA/kV was utilized to reduce the radiation dose to as low as reasonably   achievable.       COMPARISON:   None       HISTORY:   ORDERING SYSTEM PROVIDED HISTORY: MSSA bacteremia, ? DIC   TECHNOLOGIST PROVIDED HISTORY:   MSSA bacteremia, ? DIC       Reason for Exam: sepsis with acute organ dysfunction   Acuity: Unknown   Type of Exam: Unknown       FINDINGS:   Lungs/pleura: The central airways are patent.  There are no suspicious   pulmonary nodules       Mediastinum: There is no acute mediastinal abnormality       Soft Tissues/Bones: No suspicious osteolytic or osteoblastic lesions       Abdominal organs: The liver, spleen, adrenal glands, kidneys, and pancreas   demonstrate no acute abnormality.       GI/Bowel: The visualized portions of the small bowel are unremarkable.  There   is borderline dilation of the colon.  There are multiple air-fluid levels throughout the colon.       Peritoneum/Retroperitoneum: There is a solid amount of retroperitoneal fluid   along the distal ureters bilaterally.       Pelvis: The bladder is moderately distended.       Bones: No suspicious osseous lesions are identified           Impression   Moderate distention of the colon multiple air-fluid levels, findings may be   seen with acute enterocolitis.             XR FOOT LEFT (2 VIEWS) [[id number]] Collected: 07/18/21 1411      Order Status: Completed Updated: 07/19/21 0759     Narrative:       EXAMINATION:   TWO XRAY VIEWS OF THE LEFT FOOT     7/18/2021 2:05 pm     COMPARISON:   None. HISTORY:   ORDERING SYSTEM PROVIDED HISTORY: Left big toe pain and erythema. Looking for   source of MSSA sepsis   TECHNOLOGIST PROVIDED HISTORY:   Left big toe pain and erythema. Looking for source of MSSA sepsis     FINDINGS:   AP and lateral views of the foot obtained.  Normal alignment and   mineralization.  No abnormal periosteal reaction.  No erosions.  No fracture. No aggressive lytic or blastic lesions.  Mild soft tissue swelling of the   great toe noted.      Impression:       No radiographic evidence for acute osteomyelitis.  Mild soft tissue swelling   of the great toe possibly cellulitis.       EXAMINATION:   MRI OF THE BRAIN WITHOUT CONTRAST  7/21/2021 7:03 pm       TECHNIQUE:   Multiplanar multisequence MRI of the brain was performed without the   administration of intravenous contrast.       COMPARISON:   Head CT of 07/20/2021       HISTORY:   ORDERING SYSTEM PROVIDED HISTORY: episode of vertigo, dipolopia, has   infecticive endocadrditis, r/o septic emboli, abscess   TECHNOLOGIST PROVIDED HISTORY:   episode of vertigo, dipolopia, has infecticive endocadrditis, r/o septic   emboli, abscess   Reason for Exam: VERTIGO, MSSA BACTEREMIA, R/O SEPTIC EMBOLI       FINDINGS:   MRI brain:       There are punctate areas of restricted diffusion within the left superior   frontal gyrus subcortical white matter, right superior frontal gyrus   subcortical white matter, left superior parietal lobule right inferior   frontal gyrus subcortical white matter, left inferior frontal gyrus   subcortical white matter and bilateral cerebellar hemisphere, suggestive of   acute embolic infarctions.       There are focus of encephalomalacia with susceptibility artifact and   increased signal on DWI within left superior parietal lobule and right   inferior parietal lobule suggestive of subacute to chronic infarct with   chronic blood products.       There is no acute intracranial hemorrhage, or intracranial mass lesion. No   mass effect, midline shift, or extra-axial collection is noted.       The brain parenchyma is otherwise normal.  The pituitary gland is normal in   appearance. The cerebellar tonsils are in normal position.       The ventricles, sulci, and cisterns are within normal size and range.  No   significant volume loss. .  The intracranial flow voids are preserved.       The globes and orbits are within normal limits. The visualized extracranial   structures including paranasal sinuses and mastoid air cells are unremarkable.           Impression       Findings suggestive of acute embolic infarctions involving supra and   infratentorial structures as described.       Subacute to chronic encephalomalacia with chronic blood products within the   left superior parietal lobule and right inferior parietal lobule.       There is no acute intracranial hemorrhage, or intracranial mass lesion.       The findings were sent to the Radiology Results [address] at 8:24   pm on 7/21/2021to be communicated to a licensed caregiver.                 Medical Decision [id number]:     Component Collected Lab   Specimen Description 07/17/2021  8:48  [address]   . BLOOD    Special Requests 07/17/2021  8:48  [address]   10ML LFA    Culture Abnormal  07/17/2021  8:48 AM [address] Blood Culture Results called to and read back by: Belen Solano RN AT 2045 ON [phone]    Culture 07/17/2021  8:48 AM [facility] FROM BOTTLE: GRAM POSITIVE COCCI IN CLUSTERS    Culture Abnormal  07/17/2021  8:48  [address]   Staphylococcus aureus Detected: mecA/C and MREJ Not Detected Methodology- Polymerase Chain Reaction (PCR)   Culture Abnormal  07/17/2021  8:48 AM 14[address] [facility] This isolate is methicillin susceptible. Testing Performed By    Manjinder Watson Name Director Address Valid Date Range   [address]-Jessica Adams, [place] [address] [facility] [address] 08/30/17 0801-Present   Susceptibility    Staphylococcus aureus (4)    Antibiotic Interpretation DAVID Status    penicillin Resistant  Final     >=0.5   RESISTANT   cefoxitin screen  NOT REPORTED Final    ciprofloxacin   Final     NOT REPORTED    clindamycin Sensitive  Final     <=0.25   SUSCEPTIBLE   erythromycin Sensitive  Final     <=0.25   SUSCEPTIBLE   gentamicin Sensitive  Final     <=0.5   SUSCEPTIBLE   gentamicin Sensitive Gentamicin is used only in combination with other active agents that test susceptible.  Final    Induced Clind Resist  NOT REPORTED Final    levofloxacin Sensitive  Final     0.25   SUSCEPTIBLE   linezolid   Final     NOT REPORTED    moxifloxacin   Final     NOT REPORTED    nitrofurantoin   Final     NOT REPORTED    oxacillin Sensitive  Final     0.5   SUSCEPTIBLE   Synercid   Final     NOT REPORTED    rifampin   Final     NOT REPORTED    tetracycline Sensitive  Final     <=1   SUSCEPTIBLE   tigecycline   Final     NOT REPORTED    trimethoprim-sulfamethoxazole Sensitive  Final     <=10   SUSCEPTIBLE   vancomycin   Final     NOT REPORTED        Medical Decision Making-Other:     Note:  · Labs, medications, radiologic studies were reviewed with personal review of films  · Large amounts of data were reviewed  · Discussed with nursing Staff, Discharge planner  · Infection Control and Prevention measures reviewed  · All prior entries were reviewed  · Administer medications as ordered  · Prognosis: Guarded  · Discharge planning reviewed  · Follow up as outpatient. Thank you for allowing us to participate in the care of this patient. Please call with questions. Klein Groves, APRN - CNP     ATTESTATION:    I have discussed the case, including pertinent history and exam findings with the APRN. I have evaluated the  History, physical findings and pictures of the patient and the key elements of the encounter have been performed by me. I have reviewed the laboratory data, other diagnostic studies and discussed them with the APRN. I have updated the medical record where necessary. I agree with the assessment, plan and orders as documented by the APRN.     Kevan Schwartz MD.        Pager: [phone] - Office: [phone]

## 2021-07-28 NOTE — PROGRESS NOTES
timing for surgery. MRI was done on 7/27/2021. Review of imaging was done. There are punctate areas of diffusion restriction that were not seen in the previous MRI indicating active showing of embolism. Right cerebellar, right occipital, left parietal, and right periventricular coronary radiata      Subjective: Has recurrent episodes of right right ocular visual blurriness happened yesterday and this morning. No current facility-administered medications on file prior to encounter. Current Outpatient Medications on File Prior to Encounter   Medication Sig Dispense Refill    amLODIPine (NORVASC) 5 MG tablet Take 5 mg by mouth daily      atorvastatin (LIPITOR) 80 MG tablet Take 80 mg by mouth daily      carvedilol (COREG) 25 MG tablet Take 25 mg by mouth 2 times daily (with meals)      dicyclomine (BENTYL) 20 MG tablet Take 20 mg by mouth every 6 hours      hydroCHLOROthiazide (HYDRODIURIL) 25 MG tablet Take 25 mg by mouth daily      HYDROcodone-acetaminophen (NORCO) 5-325 MG per tablet Take 1 tablet by mouth every 6 hours as needed for Pain.  potassium chloride (MICRO-K) 10 MEQ extended release capsule Take 20 mEq by mouth 2 times daily         Allergies: Dannielle Montoya is allergic to morphine.     Past Medical History:   Diagnosis Date    Diverticulosis     Hyperlipidemia     Hypertension     MESSI (obstructive sleep apnea)     Has PSG study done Pos for Mod MESSI, never had cpap tritration done for machine    Research subject 07/24/2021    EIND [id number] Exebacase for persistent bacteremia expected date of completion 8/25/2021       Past Surgical History:   Procedure Laterality Date    ABDOMEN SURGERY      large bowel resection    ANTERIOR CRUCIATE LIGAMENT REPAIR Right     APPENDECTOMY  07/09/1997    COLECTOMY      2012 - D/T Dverticulitis    DILATATION, ESOPHAGUS         Medications:     lactobacillus  1 capsule Oral TID WC    diphenoxylate-atropine  1 tablet Oral BID    metoprolol tartrate  50 mg Oral BID    potassium chloride  20 mEq Oral BID WC    pantoprazole  40 mg Oral BID AC    rifampin (RIFADIN) IVPB  600 mg Intravenous Q24H    nafcillin  2,000 mg Intravenous Q4H    sodium chloride flush  5-40 mL Intravenous 2 times per day     PRN Meds include: LORazepam, potassium chloride, diphenhydrAMINE, meclizine, loperamide, sodium chloride, sodium chloride, sodium chloride flush, sodium chloride, ondansetron **OR** ondansetron, acetaminophen **OR** acetaminophen, polyethylene glycol, magnesium sulfate, oxyCODONE, melatonin, aluminum & magnesium hydroxide-simethicone    Objective:   /65   Pulse 99   Temp 98.9 °F (37.2 °C) (Oral)   Resp 18   Ht 5' 6\" (1.676 m)   Wt 179 lb 14.3 oz (81.6 kg)   SpO2 96%   BMI 29.04 kg/m²     Blood pressure range: Systolic (13LQC), JJA:155 , Min:95 , VPQ:986   ; Diastolic (46ATT), WGJ:49, Min:56, Max:69         REVIEW OF SYSTEMS:  Review of Systems   Constitutional: Positive for chills, fatigue and fever. Negative for unexpected weight change. Eyes: Positive for visual disturbance. Negative for photophobia, pain, redness and itching. Respiratory: Negative for cough, choking, shortness of breath and wheezing. Cardiovascular: Negative for chest pain, palpitations and leg swelling. Gastrointestinal: Positive for abdominal pain, diarrhea, nausea and vomiting. Negative for constipation. Endocrine: Negative. Genitourinary: Negative. Musculoskeletal: Positive for arthralgias and gait problem. Negative for back pain, myalgias, neck pain and neck stiffness. Neurological: Positive for dizziness and light-headedness. Negative for tremors, seizures, syncope, facial asymmetry, speech difficulty, weakness, numbness and headaches. Psychiatric/Behavioral: Positive for behavioral problems, confusion, decreased concentration, hallucinations and sleep disturbance. Negative for agitation, dysphoric mood, self-injury and suicidal ideas. The patient is not nervous/anxious and is not hyperactive. PHYSICAL EXAM:  Vitals:  /80   Pulse 122   Temp 100.2 °F (37.9 °C) (Oral)   Resp 27   Ht 5' 6\" (1.676 m)   Wt 181 lb 14.1 oz (82.5 kg)   SpO2 96%   BMI 29.36 kg/m²    Physical Exam  Neurological:      Mental Status: He is oriented to person, place, and time. Coordination: Finger-Nose-Finger Test and Heel to [facility] Test normal.      Deep Tendon Reflexes:      Reflex Scores:       Tricep reflexes are 2+ on the right side and 2+ on the left side. Bicep reflexes are 2+ on the right side and 2+ on the left side. Brachioradialis reflexes are 2+ on the right side and 2+ on the left side. Patellar reflexes are 2+ on the right side and 2+ on the left side. Achilles reflexes are 2+ on the right side and 2+ on the left side. Psychiatric:         Speech: Speech normal.         Neurologic Exam      Mental Status   Oriented to person, place, and time. Registration: recalls 3 of 3 objects. Recall at 5 minutes: recalls 2 of 3 objects. Follows 3 step commands. Attention: normal. Concentration: normal.   Speech: speech is normal   Level of consciousness: alert     Cranial Nerves   Cranial nerves II through XII intact. Motor Exam   Muscle bulk: normal  Overall muscle tone: normal  Right arm tone: normal  Left arm tone: normal  Right arm pronator drift: absent  Left arm pronator drift: absent  Right leg tone: normal  Left leg tone: normal     Strength   Strength 5/5 except as noted.    Right deltoid: 3/5     Sensory Exam   Light touch normal.   Pinprick normal.      Gait, Coordination, and Reflexes      Coordination   Finger to nose coordination: normal  Heel to shin coordination: normal     Tremor   Resting tremor: absent  Intention tremor: absent  Action tremor: absent     Reflexes   Right brachioradialis: 2+  Left brachioradialis: 2+  Right biceps: 2+  Left biceps: 2+  Right triceps: 2+  Left triceps: 2+  Right patellar: 2+  Left patellar: 2+  Right achilles: 2+  Left achilles: 2+  Right plantar: normal  Left plantar: normal  Right Haney: absent  Left Haney: absent  Right ankle clonus: absent  Left pendular knee jerk: absent     NIHSS: 0     Lab Results:   CBC:   Recent Labs     07/26/21  0544 07/27/21  0715 07/28/21  0551   WBC 10.8 10.1 11.1   HGB 9.1* 9.5* 9.2*    264 312     BMP:    Recent Labs     07/26/21  0700 07/27/21  0715 07/28/21  0551   * 136 136   K 3.5* 3.4* 3.6*    103 104   CO2 19* 22 20   BUN 8 9 9   CREATININE 0.77 0.66* 0.75   GLUCOSE 108* 112* 113*         Lab Results   Component Value Date    ALT 19 07/28/2021    AST 17 07/28/2021    TSH 1.86 07/20/2021    INR 1.0 07/19/2021    LABA1C 5.5 07/17/2021    [id number] 1120 07/17/2021       No results found for: PHENYTOIN, PHENYTOIN, VALPROATE, CBMZ    IMAGING  CTH W/O  7/20/2021   Impression   No acute intracranial abnormality. MRI may be obtained if clinically   indicated. CT abdomen 7/17/2021  Impression   No acute abnormality identified in the abdomen and pelvis. The bladder is markedly distended with a calculated volume of 2 L. Hepatic steatosis and mild hepatomegaly. Minor bibasilar atelectasis and interstitial thickening posteriorly. CT chest abdomen pelvis with contrast 7/18/2021  Impression   Moderate distention of the colon multiple air-fluid levels, findings may be   seen with acute enterocolitis. MONROE  7/20/2021  1. A MONROE was performed without complications. 2. LVEF 55%  3. No thrombus identified  4. No intracardiac shunt via color Doppler. 5. Large vegetation measuring 2.5 X 2.4 cm noted on the posterior leaflet of the mitral valve. Posterior leaflet is flair and is associated with severe anteriorly directed eccentric regurgitation. 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  New 4 punctuate infarcts seen on MRI  · Episode of right occular visual blurriness likely migraine aura   · MSSA septicemia   · Thrombocytopenia due to ITP   · Melena, blood loss anemia    · Type 2 MI   · Toxic metabolic encephalopathy due to sepsis, resolved      RECOMMENDATIONS:   · Continue antibiotics as per ID  · Recommend early surgical management to decrease the risk of recurrent strokes giving the size of the vegetation. Can also repeat echo to reassess the size   · Low suspicion for meningitis or encephalitis      Thank you for the consultation. Will follow.        Angie Hays MD  PGY-4 Neurology Resident

## 2021-07-28 NOTE — PLAN OF CARE
Problem: Pain:  Goal: Pain level will decrease  Description: Pain level will decrease  7/28/2021 1348 by Alejo Code  Outcome: Ongoing  7/28/2021 0614 by Michelle Alfonso RN  Outcome: Ongoing  Goal: Control of acute pain  Description: Control of acute pain  7/28/2021 1348 by Alejo Code  Outcome: Ongoing  7/28/2021 0614 by Michelle Alfonso RN  Outcome: Ongoing  Goal: Control of chronic pain  Description: Control of chronic pain  7/28/2021 1348 by [place] Code  Outcome: Ongoing  7/28/2021 0614 by Michelle Alfonso RN  Outcome: Ongoing     Problem: Infection, Septic Shock:  Goal: Will show no infection signs and symptoms  Description: Will show no infection signs and symptoms  7/28/2021 1348 by Alejo Code  Outcome: Ongoing  7/28/2021 0614 by Michelle Alfonso RN  Outcome: Ongoing     Problem: Falls - Risk of:  Goal: Will remain free from falls  Description: Will remain free from falls  7/28/2021 1348 by Alejo Code  Outcome: Ongoing  7/28/2021 0614 by Michelle Alfonso RN  Outcome: Ongoing  Goal: Absence of physical injury  Description: Absence of physical injury  7/28/2021 1348 by Alejo Code  Outcome: Ongoing  7/28/2021 0614 by Michelle Alofnso RN  Outcome: Ongoing     Problem: Gas Exchange - Impaired:  Goal: Levels of oxygenation will improve  Description: Levels of oxygenation will improve  7/28/2021 1348 by [place] Code  Outcome: Ongoing  7/28/2021 0614 by Michelle Alfonso RN  Outcome: Ongoing     Problem: Cardiac:  Goal: Ability to maintain vital signs within normal range will improve  Description: Ability to maintain vital signs within normal range will improve  7/28/2021 1348 by [place] Code  Outcome: Ongoing  7/28/2021 0614 by Michelle Alfonso RN  Outcome: Ongoing  Goal: Cardiovascular alteration will improve  Description: Cardiovascular alteration will improve  7/28/2021 1348 by Alejo Code  Outcome: Ongoing  7/28/2021 0614 by Michelle Alfonso RN  Outcome: Ongoing     Problem: Health Behavior:  Goal: Will modify at least one risk factor affecting health status  Description: Will modify at least one risk factor affecting health status  7/28/2021 1348 by Estephania Pollard  Outcome: Ongoing  7/28/2021 0614 by Reyes Pale, RN  Outcome: Ongoing  Goal: Identification of resources available to assist in meeting health care needs will improve  Description: Identification of resources available to assist in meeting health care needs will improve  7/28/2021 1348 by Estephania Pollard  Outcome: Ongoing  7/28/2021 0614 by Reyes Pale, RN  Outcome: Ongoing     Problem: Physical Regulation:  Goal: Ability to maintain vital signs within normal range will improve  Description: Ability to maintain vital signs within normal range will improve  7/28/2021 1348 by Estephania Pollard  Outcome: Ongoing  7/28/2021 0614 by Reyes Pale, RN  Outcome: Ongoing  Goal: Complications related to the disease process, condition or treatment will be avoided or minimized  Description: Complications related to the disease process, condition or treatment will be avoided or minimized  7/28/2021 1348 by Estephania Pollard  Outcome: Ongoing  7/28/2021 0614 by Reyes Pale, RN  Outcome: Ongoing  Goal: Diagnostic test results will improve  Description: Diagnostic test results will improve  7/28/2021 1348 by Estephania Pollard  Outcome: Ongoing  7/28/2021 0614 by Reyes Pale, RN  Outcome: Ongoing  Goal: Will remain free from infection  Description: Will remain free from infection  7/28/2021 1348 by Estephania Pollard  Outcome: Ongoing  7/28/2021 0614 by Reyes Pale, RN  Outcome: Ongoing  Goal: Ability to maintain clinical measurements within normal limits will improve  Description: Ability to maintain clinical measurements within normal limits will improve  7/28/2021 1348 by Estephania Pollard  Outcome: Ongoing  7/28/2021 0614 by Reyes Pale, RN  Outcome: Ongoing  Goal: Will show no signs and symptoms of electrolyte imbalance  Description: Will show no signs and symptoms of electrolyte imbalance  7/28/2021 1348 by CHRISSIE Uriel Landin  Outcome: Ongoing  7/28/2021 0614 by Jimmie Trinidad RN  Outcome: Ongoing     Problem: Nutritional:  Goal: Nutritional status will improve  Description: Nutritional status will improve  7/28/2021 1348 by Reyes Gutter  Outcome: Ongoing  7/28/2021 0614 by Jimmie Trinidad RN  Outcome: Ongoing     Problem: Respiratory:  Goal: Ability to maintain normal respiratory secretions will improve  Description: Ability to maintain normal respiratory secretions will improve  7/28/2021 1348 by Reyes Gutter  Outcome: Ongoing  7/28/2021 0614 by Jimmie Trinidad RN  Outcome: Ongoing     Problem: Skin Integrity:  Goal: Demonstration of wound healing without infection will improve  Description: Demonstration of wound healing without infection will improve  7/28/2021 1348 by Reyes Gutter  Outcome: Ongoing  7/28/2021 0614 by Jimmie Trinidad RN  Outcome: Ongoing  Goal: Complications related to intravenous access or infusion will be avoided or minimized  Description: Complications related to intravenous access or infusion will be avoided or minimized  7/28/2021 1348 by Reyes Gutter  Outcome: Ongoing  7/28/2021 0614 by Jimmie Trinidad RN  Outcome: Ongoing  Goal: Will show no infection signs and symptoms  Description: Will show no infection signs and symptoms  7/28/2021 1348 by Reyes Gutter  Outcome: Ongoing  7/28/2021 0614 by Jimmie Trinidad RN  Outcome: Ongoing  Goal: Absence of new skin breakdown  Description: Absence of new skin breakdown  7/28/2021 1348 by Reyes Gutter  Outcome: Ongoing  7/28/2021 0614 by Jimmie Trinidad RN  Outcome: Ongoing     Problem: Anxiety:  Goal: Level of anxiety will decrease  Description: Level of anxiety will decrease  7/28/2021 1348 by Reyes Gutter  Outcome: Ongoing  7/28/2021 0614 by Jimmie Trinidad RN  Outcome: Ongoing     Problem: Fluid Volume:  Goal: Ability to achieve a balanced intake and output will improve  Description: Ability to achieve a balanced intake and output will improve  7/28/2021 1348 by Reyes Gutter  Outcome: Ongoing  7/28/2021 0614 by Nancy Salinas RN  Outcome: Ongoing     Problem: Nutrition  Goal: Optimal nutrition therapy  7/28/2021 1348 by Mayur Pinedo  Outcome: Ongoing  7/28/2021 0614 by Nancy Salinas RN  Outcome: Ongoing     Problem: HEMODYNAMIC STATUS  Goal: Patient has stable vital signs and fluid balance  7/28/2021 1348 by Mayur Pinedo  Outcome: Ongoing  7/28/2021 0614 by Nancy Salinas RN  Outcome: Ongoing     Problem: ACTIVITY INTOLERANCE/IMPAIRED MOBILITY  Goal: Mobility/activity is maintained at optimum level for patient  7/28/2021 1348 by Mayur Pinedo  Outcome: Ongoing  7/28/2021 0614 by Nancy Salinas RN  Outcome: Ongoing     Problem: COMMUNICATION IMPAIRMENT  Goal: Ability to express needs and understand communication  7/28/2021 1348 by Mayur Pinedo  Outcome: Ongoing  7/28/2021 0614 by Nancy Salinas RN  Outcome: Ongoing     Electronically signed by Mayur Pinedo on 7/28/2021 at 1:48 PM

## 2021-07-28 NOTE — PROGRESS NOTES
Writer updated NP with internal medicine regarding pt having another episode of intense rigors with fever. HR and vitals stable throughout, patient placed on NRB to help ease breathing to get pt through the episode. Writer requested to get something to help pt relax to get him through the episode as it is extremely painful and uncomfortable for the pt. Pt also had vision changes during this, similar to yesterday morning, pt described the visual disturbance as \"ribbons,\" this time without bright lights. Visual changes lasted only a couple minutes and resolved, however, the overall episode lasted 30 minutes. No new orders placed. Will pass info to day shift RN.

## 2021-07-28 NOTE — PLAN OF CARE
Patient made aware of 24/7 emergency services. Problem: Pain:  Goal: Pain level will decrease  Description: Pain level will decrease  Outcome: Ongoing  Goal: Control of acute pain  Description: Control of acute pain  Outcome: Ongoing  Goal: Control of chronic pain  Description: Control of chronic pain  Outcome: Ongoing     Problem: Infection, Septic Shock:  Goal: Will show no infection signs and symptoms  Description: Will show no infection signs and symptoms  Outcome: Ongoing     Problem: Falls - Risk of:  Goal: Will remain free from falls  Description: Will remain free from falls  Outcome: Ongoing  Goal: Absence of physical injury  Description: Absence of physical injury  Outcome: Ongoing     Problem: Gas Exchange - Impaired:  Goal: Levels of oxygenation will improve  Description: Levels of oxygenation will improve  Outcome: Ongoing     Problem: Cardiac:  Goal: Ability to maintain vital signs within normal range will improve  Description: Ability to maintain vital signs within normal range will improve  Outcome: Ongoing  Goal: Cardiovascular alteration will improve  Description: Cardiovascular alteration will improve  Outcome: Ongoing     Problem: Health Behavior:  Goal: Will modify at least one risk factor affecting health status  Description: Will modify at least one risk factor affecting health status  Outcome: Ongoing  Goal: Identification of resources available to assist in meeting health care needs will improve  Description: Identification of resources available to assist in meeting health care needs will improve  Outcome: Ongoing     Problem: Physical Regulation:  Goal: Ability to maintain vital signs within normal range will improve  Description: Ability to maintain vital signs within normal range will improve  Outcome: Ongoing  Goal: Complications related to the disease process, condition or treatment will be avoided or minimized  Description: Complications related to the disease process, condition or treatment will be avoided or minimized  Outcome: Ongoing  Goal: Diagnostic test results will improve  Description: Diagnostic test results will improve  Outcome: Ongoing  Goal: Will remain free from infection  Description: Will remain free from infection  Outcome: Ongoing  Goal: Ability to maintain clinical measurements within normal limits will improve  Description: Ability to maintain clinical measurements within normal limits will improve  Outcome: Ongoing  Goal: Will show no signs and symptoms of electrolyte imbalance  Description: Will show no signs and symptoms of electrolyte imbalance  Outcome: Ongoing     Problem: Nutritional:  Goal: Nutritional status will improve  Description: Nutritional status will improve  Outcome: Ongoing     Problem: Respiratory:  Goal: Ability to maintain normal respiratory secretions will improve  Description: Ability to maintain normal respiratory secretions will improve  Outcome: Ongoing     Problem: Skin Integrity:  Goal: Demonstration of wound healing without infection will improve  Description: Demonstration of wound healing without infection will improve  Outcome: Ongoing  Goal: Complications related to intravenous access or infusion will be avoided or minimized  Description: Complications related to intravenous access or infusion will be avoided or minimized  Outcome: Ongoing  Goal: Will show no infection signs and symptoms  Description: Will show no infection signs and symptoms  Outcome: Ongoing  Goal: Absence of new skin breakdown  Description: Absence of new skin breakdown  Outcome: Ongoing     Problem: Anxiety:  Goal: Level of anxiety will decrease  Description: Level of anxiety will decrease  Outcome: Ongoing     Problem: Fluid Volume:  Goal: Ability to achieve a balanced intake and output will improve  Description: Ability to achieve a balanced intake and output will improve  Outcome: Ongoing     Problem: Nutrition  Goal: Optimal nutrition therapy  Outcome: Ongoing     Problem: HEMODYNAMIC STATUS  Goal: Patient has stable vital signs and fluid balance  Outcome: Ongoing     Problem: ACTIVITY INTOLERANCE/IMPAIRED MOBILITY  Goal: Mobility/activity is maintained at optimum level for patient  Outcome: Ongoing     Problem: COMMUNICATION IMPAIRMENT  Goal: Ability to express needs and understand communication  Outcome: Ongoing

## 2021-07-28 NOTE — PROGRESS NOTES
[facility]  Office: [address], DO, Santo Gear, DO, Johanne Drew, DO, Griselda Ivone Blood, DO, Alireza Sotelo MD, Steve Alonso MD, Mick Schaffer MD, Nico Regan MD, Karin Crystal MD, Tarsha Finnegan MD, Samantha Escobar MD, Brooke Mckeon, DO, Michelle Dutton MD, Radha Miller, DO, Chrissy Hodgson MD,  Cee Kelly, DO, Cuca Olmstead MD, Edgar Rico MD, Joie Josue MD, Chris Arshad MD, Karmen Rogers MD, Anabela Andersen MD, Vero Espinoza CNP, [facility] Marcellus, CNP, Kimmie Santiago, CNP, Kellen Mask, CNS, Lolo Dawson, Smith Arnold, CNP, Shilo Zayas, CNP, Laurent Weiss, CNP, Bill James, CNP, Lendel Nissen, PA-C, Brent Kahn, [facility], Bianca Oconnor, CNP, Alie Cortez, CNP, Radha Arrington, CNP, Migue Pike, CNP, Oneyda Jackson, CNP, Berna Martinez, CNP, Meghan Madrigal, CNP, [facility], [address] [place] [address]    Progress Note    7/28/2021    10:05 AM    Name:   Edmond Gray  MRN:     [id number]     Acct:      [de-identified]   Room:   [address] [place] [address] Day:  6  Admit Date:  7/17/2021 12:42 AM    PCP:   Cherelle Eckert MD  Code Status:  Full Code    Subjective:     C/C:   Chief Complaint   Patient presents with    Abdominal Pain    Emesis     Interval History Status: not changed. Patient seen examined bedside wife also at bedside. Blood cultures still postive. MRI yesterday showing interval development of aditional puncate areas of restricted diffusion suggesting continuing embolic showering. Had a fever this morning.      Brief History:   59-year-old male past medical history of hypertension history of colectomy secondary to diverticulitis presented with fevers diarrhea approximately 3 days prior to admission was noted to be septic with 2 of 2 blood cultures showing positive MSSA as well as urinalysis positive for MSSA and found to have mitral valve endocarditis requiring IV antibiotics currently being treated with nafcillin 2 g IV every 4 hours until 8/15/2021, 600 mg rifampin for synergy, received infusion of Exebacase on 7/25/2021. MRI 7/21/2021:  Findings suggestive of acute embolic infarctions involving supra and   infratentorial structures as described.       Subacute to chronic encephalomalacia with chronic blood products within the   left superior parietal lobule and right inferior parietal lobule.       There is no acute intracranial hemorrhage, or intracranial mass lesion. Echo 7/17/2021:  Marin Harden left ventricular systolic function is normal. Calculated ejection  fraction 48% by Dubois's method. Visually estimated EF 50%. Posterior mitral valve leaflet prolapse. Severe mitral regurgitation. Eccentric anterior diredted jet. EOA = 0.7cm2. PISA radius is 1.3cm. Vena contracta is 0.99 cm. MR volume is  58ml. Possible small vegetation on posterior leaflet. Consider MONROE. MONROE 7/20/2021  MONROE findings:     LA:       Normal  RAJAT:    No thrombus  RA:      Normal  RV:      Normal  LV:       Normal  Estimated LVEF:         55%  Aorta:               Mild atheromatous disease arch  Pericardium:    Trivial pericardial effusion  Septum:           No intracardiac shunt via color Doppler.      Valves:     Mitral Valve: Large vegetation measuring 2.5 X 2.4 cm attached on the posterior leaflet of the mitral valve associated with Flail and degenerated leaflet. Severe regurgitation w/ an anteriorly directed eccentric jet is identified. Aortic Valve: The aortic valve is trileaflet and opens adequately. No regurgitiation is identified. Tricuspid valve: Structurally normal. No regurgitation is identified.   Pulmonary valve: Normal. No significant regurgitation     No thrombus identified  Review of Systems:     Constitutional:  negative for chills, fevers, sweats  Respiratory:  negative for cough, dyspnea on exertion, shortness of breath, wheezing  Cardiovascular:  negative for chest pain, chest pressure/discomfort, lower extremity edema, palpitations  Gastrointestinal:  negative for abdominal pain, constipation, diarrhea, nausea, vomiting  Neurological: Positive for change in vision negative for dizziness, headache      Medications: Allergies: Allergies   Allergen Reactions    Morphine Other (See Comments)     Pt report muscle cramps       Current Meds:   Scheduled Meds:    lactobacillus  1 capsule Oral TID WC    diphenoxylate-atropine  1 tablet Oral BID    metoprolol tartrate  50 mg Oral BID    potassium chloride  20 mEq Oral BID WC    pantoprazole  40 mg Oral BID AC    rifampin (RIFADIN) IVPB  600 mg Intravenous Q24H    nafcillin  2,000 mg Intravenous Q4H    sodium chloride flush  5-40 mL Intravenous 2 times per day     Continuous Infusions:    sodium chloride      sodium chloride       PRN Meds: LORazepam, potassium chloride, diphenhydrAMINE, meclizine, loperamide, sodium chloride, sodium chloride, sodium chloride flush, sodium chloride, ondansetron **OR** ondansetron, acetaminophen **OR** acetaminophen, polyethylene glycol, magnesium sulfate, oxyCODONE, melatonin, aluminum & magnesium hydroxide-simethicone    Data:     Past Medical History:   has a past medical history of Diverticulosis, Hyperlipidemia, Hypertension, MESSI (obstructive sleep apnea), and Research subject. Social History:   reports that he has never smoked. He has never used smokeless tobacco. He reports current alcohol use of about 20.0 standard drinks of alcohol per week. He reports that he does not use drugs.      Family History:   Family History   Problem Relation Age of Onset    No Known Problems Mother     Other Father         diverticulitis       Vitals:  /67   Pulse 115   Temp 100.6 °F (38.1 °C) (Oral)   Resp 18   Ht 5' 6\" (1.676 m)   Wt 179 lb 14.3 oz (81.6 kg)   SpO2 95%   BMI 29.04 kg/m²   Temp (24hrs), Av.1 °F (37.3 °C), Min:97.7 °F (36.5 °C), Max:100.6 °F (38.1 °C)    No results for input(s): POCGLU in the last 72 hours. I/O (24Hr): Intake/Output Summary (Last 24 hours) at 7/28/2021 1005  Last data filed at 7/28/2021 0653  Gross per 24 hour   Intake 1157 ml   Output --   Net 1157 ml       Labs:  Hematology:  Recent Labs     07/26/21  0544 07/27/21  0715 07/28/21  0551   WBC 10.8 10.1 11.1   RBC 3.17* 3.27* 3.19*   HGB 9.1* 9.5* 9.2*   HCT 29.0* 29.7* 29.1*   MCV 91.5 90.8 91.2   MCH 28.7 29.1 28.8   MCHC 31.4 32.0 31.6   RDW 13.1 12.8 12.7    264 312   MPV 9.9 9.9 9.9   CRP  --  56.0*  --      Chemistry:  Recent Labs     07/26/21  0700 07/27/21  0715 07/28/21  0551   * 136 136   K 3.5* 3.4* 3.6*    103 104   CO2 19* 22 20   GLUCOSE 108* 112* 113*   BUN 8 9 9   CREATININE 0.77 0.66* 0.75   ANIONGAP 12 11 12   LABGLOM >60 >60 >60   GFRAA >60 >60 >60   CALCIUM 8.0* 8.2* 8.2*   PHOS 3.5 3.7 3.8     Recent Labs     07/26/21  0700 07/27/21  0715 07/28/21  0551   PROT 6.3*  --  6.3*   LABALBU 2.7*  2.7* 2.6* 2.5*  2.5*   AST 17  --  17   ALT 18  --  19   ALKPHOS 59  --  57   BILITOT 0.58  --  0.38   BILIDIR 0.19  --  0.14     ABG:  Lab Results   Component Value Date    POCPH 7.486 07/18/2021    POCPCO2 28.6 07/18/2021    POCPO2 134.5 07/18/2021    POCHCO3 21.6 07/18/2021    NBEA 1 07/18/2021    PBEA NOT REPORTED 07/18/2021    KCX2OQS NOT REPORTED 07/18/2021    FCVR5CFG 99 07/18/2021    FIO2 NOT REPORTED 07/18/2021     Lab Results   Component Value Date/Time    SPECIAL LH 7ML 07/27/2021 07:15 AM    SPECIAL RH 7ML 07/27/2021 07:15 AM     Lab Results   Component Value Date/Time    CULTURE NO GROWTH 21 HOURS 07/27/2021 07:15 AM    CULTURE (A) 07/27/2021 07:15 AM     POSITIVE Blood Culture Results called to and read back by: Dina ANGELO 7/28 0655    CULTURE  07/27/2021 07:15 AM     DIRECT Nas Linton STAIN FROM BOTTLE: GRAM POSITIVE COCCI IN CLUSTERS       Radiology:  CT HEAD WO CONTRAST    Result Date: 7/20/2021  No acute intracranial abnormality.   MRI may be obtained if clinically indicated. CT CERVICAL SPINE W CONTRAST    Result Date: 7/23/2021  1. Please note, CT does not adequately assess for early discitis/osteomyelitis or an epidural abscess. 2. No convincing acute osseous abnormality seen of the cervical, thoracic or lumbar spine. 3. Scattered degenerative changes noted. 4. Trace pleural effusions bilaterally. 5. Nonspecific stranding along the retroperitoneum bilaterally. 6. No convincing evidence of a drainable fluid collection. CT THORACIC SPINE W CONTRAST    Result Date: 7/23/2021  1. Please note, CT does not adequately assess for early discitis/osteomyelitis or an epidural abscess. 2. No convincing acute osseous abnormality seen of the cervical, thoracic or lumbar spine. 3. Scattered degenerative changes noted. 4. Trace pleural effusions bilaterally. 5. Nonspecific stranding along the retroperitoneum bilaterally. 6. No convincing evidence of a drainable fluid collection. CT LUMBAR SPINE W CONTRAST    Result Date: 7/23/2021  1. Please note, CT does not adequately assess for early discitis/osteomyelitis or an epidural abscess. 2. No convincing acute osseous abnormality seen of the cervical, thoracic or lumbar spine. 3. Scattered degenerative changes noted. 4. Trace pleural effusions bilaterally. 5. Nonspecific stranding along the retroperitoneum bilaterally. 6. No convincing evidence of a drainable fluid collection. MRI CERVICAL SPINE WO CONTRAST    Result Date: 7/23/2021  1. Extensive patient motion limits evaluation. 2. No convincing abnormal bone marrow signal or abnormal signal along the disc spaces of the cervical spine. 3. There is suggestion of abnormal T2 signal within the right medulla. Abnormal T2 signal is seen within the right inferior cerebellum. 4. No convincing abnormal signal within the cervical cord given the limitations of patient motion. No significant spinal canal stenosis seen.      XR CHEST PORTABLE    Result Date: 7/23/2021  No radiologic evidence of acute cardiopulmonary disease. CT CHEST PULMONARY EMBOLISM W CONTRAST    Result Date: 7/20/2021  Small bilateral pleural effusions and mild bibasilar atelectasis This study is nondiagnostic for pulmonary embolism. There is excessive respiratory motion artifact degrading image resolution. There is not significant contrast within the pulmonary artery system to adequately assess for pulmonary embolus. Recommend either repeat exam with improved bolus timing, and decreased patient respiratory motion or VQ scan. CTA HEAD NECK W CONTRAST    Result Date: 7/22/2021  1. Unremarkable CT angiogram of the head and neck. 2. Sequelae of embolic phenomenon as seen on the prior MRI of the brain. Given the degree of vasogenic edema surrounding several of these areas of restricted diffusion, septic emboli is favored. The findings were sent to the Radiology Results [address] at 9:48 am on 7/22/2021to be communicated to a licensed caregiver. MRI BRAIN WO CONTRAST    Result Date: 7/21/2021  Findings suggestive of acute embolic infarctions involving supra and infratentorial structures as described. Subacute to chronic encephalomalacia with chronic blood products within the left superior parietal lobule and right inferior parietal lobule. There is no acute intracranial hemorrhage, or intracranial mass lesion. The findings were sent to the Radiology Results [address] at 8:24 pm on 7/21/2021to be communicated to a licensed caregiver.        Physical Examination:        General appearance:  alert, cooperative and no distress  Mental Status:  oriented to person, place and time and normal affect  Lungs:  clear to auscultation bilaterally, normal effort  Heart:  regular rate and rhythm, systolic murmur  Abdomen:  soft, nontender, nondistended, normal bowel sounds  Extremities:  no edema, redness, tenderness in the calves  Skin: Osler node improving on right middle finger,    Assessment: Hospital Problems         Last Modified POA    * (Principal) MSSA bacteremia 7/20/2021 Yes    Delusions ([address] 7/20/2021 Yes    Hypophosphatemia 7/20/2021 Yes    Severe mitral regurgitation 7/20/2021 Yes    Acute respiratory failure with hypoxia ([address] 7/20/2021 Yes    GI bleed 7/20/2021 Yes    Vertigo 7/21/2021 No    Normocytic normochromic anemia 7/20/2021 Yes    Obesity (BMI 30-39. 9) 7/20/2021 Yes    Transaminitis 7/20/2021 Yes    Diarrhea 7/20/2021 Yes    Acute dehydration 7/20/2021 Yes    Hypomagnesemia 7/20/2021 Yes    Hypokalemia 7/20/2021 Yes    Epistaxis 7/20/2021 No    Thrombocytopenia (HCC) 7/20/2021 Yes    Hyperglycemia 7/20/2021 Yes    Essential hypertension 7/20/2021 Yes    Sepsis with acute organ dysfunction and septic shock ([address] 3/61/7274 Yes    Metabolic encephalopathy 5/44/0223 Yes    Iron deficiency anemia secondary to blood loss (chronic) 7/21/2021 Yes    Guaiac positive stools 7/21/2021 Yes    Subacute endocarditis 7/26/2021 Yes    Cerebral septic emboli ([address] 7/26/2021 Yes          Plan:        1. MSSA bacteremia with severe MR. [address] cardiology, CT surgery, ID recommendations. IV antibiotics planned for total of 6 weeks. Exabecase given 7/25/2021, on Ancef, rifampin for synergy  2. Acute septic embolic infarcts appreciate neurology recommendations. Due to worsening vision status repeat MRI. Hold on anticoagulation due to concern for conversion to hemorrhagic  3. Antivert for vertigo  4. GI prophylaxis with Protonix  5. Lomotil for diarrhea  6. Repeat cultures unfortunately still positive  7. Discussed with family at bedside.     Juliann Costa MD  7/28/2021  10:05 AM

## 2021-07-28 NOTE — ADT AUTH CERT
Subscriber Details  Hospital Account [de-identified]  [facility] Subscriber Name/Sex/Relation Subscriber  Subscriber Address/Phone Subscriber Emp/Emp Phone   1. [address] M - Male   (Self) 1980 [address]  [address]   [phone](C) OTHER    Utilization Reviews       Systemic or Infectious Condition GRG - Care Day 12 (2021) by Daljit Lawton RN       Review Entered Review Status   2021 10:16 Completed      Criteria Review      Care Day: 12 Care Date: 2021 Level of Care:    Guideline Day 2    Clinical Status    ( ) * No ICU or intermediate care needs    Interventions    (X) Inpatient interventions continue    2021 10:16 AM EDT by Berlin Brown      nafcillin 2000 mg q 4 hr iv  rifadin 600 mg q 24 hr iv  Benadryl 12.5 mg iv prn x 1    * Milestone   Additional Notes   2021      Internal Medicine       Interval History Status: not changed.           Blood cultures still postive. MRI yesterday showing interval development of aditional puncate areas of restricted diffusion suggesting continuing embolic showering.  Had a fever this morning.        Review of Systems:       Constitutional:  negative for chills, fevers, sweats   Respiratory:  negative for cough, dyspnea on exertion, shortness of breath, wheezing   Cardiovascular:  negative for chest pain, chest pressure/discomfort, lower extremity edema, palpitations   Gastrointestinal:  negative for abdominal pain, constipation, diarrhea, nausea, vomiting   Neurological: Positive for change in vision negative for dizziness, headache          lactobacillus    1 capsule   Oral   TID WC   ·   diphenoxylate-atropine    1 tablet   Oral   BID   ·   metoprolol tartrate    50 mg   Oral   BID   ·   potassium chloride    20 mEq   Oral   BID WC   ·   pantoprazole    40 mg   Oral   BID AC   ·   rifampin (RIFADIN) IVPB    600 mg   Intravenous   Q24H   ·   nafcillin    2,000 mg   Intravenous   Q4H   ·   sodium chloride infusion of Exebacase under compassionate use protocol on 7-25-21 without any AEs          7/28/2021 05:51   Potassium: 3.6 (L)   Glucose: 113 (H)   Calcium: 8.2 (L)   Albumin: 2.5 (L)   RBC: 3.19 (L)   Hemoglobin Quant: 9.2 (L)   Hematocrit: 29.1 (L)   Eosinophils %: 0 (L)   Seg Neutrophils: 76 (H)   Segs Absolute: 8.49 (H)   Lymphocytes: 14 (L)   Immature Granulocytes: 1 (H)   7/28/2021 05:51   Albumin/Globulin Ratio: 0.7 (L)   Total Protein: 6.3 (L)   Albumin: 2.5 (L)            Systemic or Infectious Condition GRG - Care Day 10 (7/26/2021) by Mikey Resendez RN       Review Entered Review Status   7/28/2021 10:09 Completed      Criteria Review      Care Day: 10 Care Date: 7/26/2021 Level of Care:    Guideline Day 2    Clinical Status    ( ) * No ICU or intermediate care needs    Interventions    (X) Inpatient interventions continue    7/28/2021 10:09 AM EDT by Eliz Pedraza      Nafcillin 2000 mg q 4 hr iv  Rifadin 600 mg q 24 hr iv   ml/hr iv  Benadryl 12.5 mg IV prn x 1    * Milestone   Additional Notes   7/26/2021       Infectious Disease           Impression :   1. MSSA septicemia 7-17-21   o Persistent growth of bacteria in blood 7-17-21 through 7-20-21 despite treatment   2. Sepsis with acute organ dysfunction and septic shock   3. Mitral Valve endocarditis. Mitral valve vegetation 2.5 X 2.4 cm 7/20/2021   4. Acute septic embolic infarcts of the brain 7/21/21   5. Diarrhea    ? Thrombocytopenia   ? History of diverticulitis resulting in partial hemicolectomy with reanastomosis   ? History of appendectomy   ? History of right ACL repair with screw in place   ? Acute dehydration   ? Hx systolic cardiac murmur   ? Sinus tachycardia   ? Hyperlipidemia   ? Elevated troponin level likely secondary to demand ischemia   ? Elevated inflammatory markers   ?  Hypokalemia       Recommendations:       ? Nafcillin 2000 mg IV every 4 hours until 8/15/2021 for MSSA septicemia and to promote antibiotic penetration into Psychiatric/behavioral  Negative for suicidal ideation.  Patient is not anxious            NEUROLOGIC EXAMINATION   GENERAL  Appears comfortable and in no distress   HEENT  NC/ AT   NECK  Supple   MENTAL STATUS:  Alert, oriented, intact memory, no confusion, normal speech, normal language, no hallucination or delusion   CRANIAL NERVES: II     -      Visual fields intact to confrontation   III,IV,VI -  EOMs full, no afferent defect, no ARIELLA, no ptosis   V     -     Normal facial sensation   VII    -     Normal facial symmetry   VIII   -     Intact hearing   IX,X -     Symmetrical palate   XI    -     Symmetrical shoulder shrug   XII   -     Midline tongue, no atrophy    MOTOR FUNCTION:  significant for good strength of grade 5/5 in bilateral proximal and distal muscle groups of both upper and lower extremities with normal bulk, normal tone and no involuntary movements, no tremor   SENSORY FUNCTION:  Normal touch, normal pin   CEREBELLAR FUNCTION:  Intact fine motor control over upper limbs   REFLEX FUNCTION:  Symmetric, no perverted reflex, no Babinski sign   STATION and GAIT  Not tested          Impression:   -Metabolic encephalopathy in the setting of MSSA sepsis, septic shock, subacute endocarditis and acute embolic infarctions   -Septic emboli to brain   -MSSA sepsis with subacute endocarditis       Plan:   -Patient continues on antibiotics as per ID team   -Antiplatelets/anticoagulation contraindicated due to septic emboli which have a higher chance of bleeding   -MVR to be planned by CTS team following 6 weeks of antibiotics and negative blood cultures   -PT/OT         7/26/2021 05:44   RBC: 3.17 (L)   Hemoglobin Quant: 9.1 (L)   Hematocrit: 29.0 (L)   Eosinophils %: 0 (L)   Seg Neutrophils: 77 (H)   Segs Absolute: 8.31 (H)   Lymphocytes: 14 (L)   Absolute Lymph #: 1.52   Immature Granulocytes: 1 (H)      7/26/2021 07:00   Albumin/Globulin Ratio: 0.8 (L)   Total Protein: 6.3 (L)   Albumin: 2.7 (L) 7/26/2021 07:00   Sodium: 134 (L)   Potassium: 3.5 (L)   CO2: 19 (L)   Glucose: 108 (H)   Calcium: 8.0 (L)   Albumin: 2.7 (L)

## 2021-07-28 NOTE — CARE COORDINATION
[place] [facility] Flow/Interdisciplinary Rounds Progress Note    Quality Flow Rounds held on July 28, 2021 at [address] [facility] Attending:  Bedside Nurse,  and Nursing Unit Leadership    Barriers to Discharge: Awaiting - blood cultures       Anticipated Discharge Date:       Anticipated Discharge Disposition:Home with [facility] and [facility], [facility]     Readmission Risk              Risk of Unplanned Readmission:  18           Discussed patient goal for the day, patient clinical progression, and barriers to discharge.   The following Goal(s) of the Day/Commitment(s) have been identified:  Activity Progression  waiting for neg blood cultures      Rodger Mclean, RN  July 28, 2021

## 2021-07-28 NOTE — PLAN OF CARE
Problem: Ineffective Breathing Pattern  Intervention: Respiratory assessment  Note: PATIENT REFUSES TO WEAR BIPAP     [x] Risks and benefits explained to patient   [x] Patient refuses to wear Bipap stating I've been doing just fine without it  [x] Patient verbalizes understanding of information presented.

## 2021-07-29 LAB
ABSOLUTE EOS #: <0.03 K/UL (ref 0–0.44)
ABSOLUTE IMMATURE GRANULOCYTE: 0.09 K/UL (ref 0–0.3)
ABSOLUTE LYMPH #: 1.49 K/UL (ref 1.1–3.7)
ABSOLUTE MONO #: 0.84 K/UL (ref 0.1–1.2)
ALBUMIN SERPL-MCNC: 2.9 G/DL (ref 3.5–5.2)
ANION GAP SERPL CALCULATED.3IONS-SCNC: 12 MMOL/L (ref 9–17)
BASOPHILS # BLD: 1 % (ref 0–2)
BASOPHILS ABSOLUTE: 0.08 K/UL (ref 0–0.2)
BUN BLDV-MCNC: 10 MG/DL (ref 6–20)
BUN/CREAT BLD: ABNORMAL (ref 9–20)
CALCIUM SERPL-MCNC: 8.6 MG/DL (ref 8.6–10.4)
CHLORIDE BLD-SCNC: 103 MMOL/L (ref 98–107)
CO2: 22 MMOL/L (ref 20–31)
CREAT SERPL-MCNC: 0.79 MG/DL (ref 0.7–1.2)
CULTURE: ABNORMAL
DIFFERENTIAL TYPE: ABNORMAL
EKG ATRIAL RATE: 118 BPM
EKG P AXIS: 33 DEGREES
EKG P-R INTERVAL: 148 MS
EKG Q-T INTERVAL: 334 MS
EKG QRS DURATION: 72 MS
EKG QTC CALCULATION (BAZETT): 468 MS
EKG R AXIS: 9 DEGREES
EKG T AXIS: 17 DEGREES
EKG VENTRICULAR RATE: 118 BPM
EOSINOPHILS RELATIVE PERCENT: 0 % (ref 1–4)
GFR AFRICAN AMERICAN: >60 ML/MIN
GFR NON-AFRICAN AMERICAN: >60 ML/MIN
GFR SERPL CREATININE-BSD FRML MDRD: ABNORMAL ML/MIN/{1.73_M2}
GFR SERPL CREATININE-BSD FRML MDRD: ABNORMAL ML/MIN/{1.73_M2}
GLUCOSE BLD-MCNC: 108 MG/DL (ref 70–99)
HCT VFR BLD CALC: 30.4 % (ref 40.7–50.3)
HEMOGLOBIN: 9.4 G/DL (ref 13–17)
IMMATURE GRANULOCYTES: 1 %
LACTIC ACID, SEPSIS WHOLE BLOOD: 1.5 MMOL/L (ref 0.5–1.9)
LACTIC ACID, SEPSIS WHOLE BLOOD: 2.5 MMOL/L (ref 0.5–1.9)
LACTIC ACID, SEPSIS: ABNORMAL MMOL/L (ref 0.5–1.9)
LACTIC ACID, SEPSIS: NORMAL MMOL/L (ref 0.5–1.9)
LYMPHOCYTES # BLD: 15 % (ref 24–43)
Lab: ABNORMAL
Lab: ABNORMAL
MAGNESIUM: 2 MG/DL (ref 1.6–2.6)
MCH RBC QN AUTO: 28.5 PG (ref 25.2–33.5)
MCHC RBC AUTO-ENTMCNC: 30.9 G/DL (ref 28.4–34.8)
MCV RBC AUTO: 92.1 FL (ref 82.6–102.9)
MONOCYTES # BLD: 8 % (ref 3–12)
NRBC AUTOMATED: 0 PER 100 WBC
PDW BLD-RTO: 12.6 % (ref 11.8–14.4)
PHOSPHORUS: 3.9 MG/DL (ref 2.5–4.5)
PLATELET # BLD: 377 K/UL (ref 138–453)
PLATELET ESTIMATE: ABNORMAL
PMV BLD AUTO: 9.4 FL (ref 8.1–13.5)
POTASSIUM SERPL-SCNC: 3.7 MMOL/L (ref 3.7–5.3)
POTASSIUM SERPL-SCNC: 3.9 MMOL/L (ref 3.7–5.3)
PROCALCITONIN: 0.15 NG/ML
RBC # BLD: 3.3 M/UL (ref 4.21–5.77)
RBC # BLD: ABNORMAL 10*6/UL
SEG NEUTROPHILS: 75 % (ref 36–65)
SEGMENTED NEUTROPHILS ABSOLUTE COUNT: 7.5 K/UL (ref 1.5–8.1)
SODIUM BLD-SCNC: 137 MMOL/L (ref 135–144)
SPECIMEN DESCRIPTION: ABNORMAL
SPECIMEN DESCRIPTION: ABNORMAL
TROPONIN INTERP: ABNORMAL
TROPONIN T: ABNORMAL NG/ML
TROPONIN, HIGH SENSITIVITY: 38 NG/L (ref 0–22)
WBC # BLD: 10 K/UL (ref 3.5–11.3)
WBC # BLD: ABNORMAL 10*3/UL

## 2021-07-29 PROCEDURE — 6370000000 HC RX 637 (ALT 250 FOR IP): Performed by: INTERNAL MEDICINE

## 2021-07-29 PROCEDURE — 99232 SBSQ HOSP IP/OBS MODERATE 35: CPT | Performed by: PSYCHIATRY & NEUROLOGY

## 2021-07-29 PROCEDURE — 93010 ELECTROCARDIOGRAM REPORT: CPT | Performed by: INTERNAL MEDICINE

## 2021-07-29 PROCEDURE — 99232 SBSQ HOSP IP/OBS MODERATE 35: CPT | Performed by: STUDENT IN AN ORGANIZED HEALTH CARE EDUCATION/TRAINING PROGRAM

## 2021-07-29 PROCEDURE — 80069 RENAL FUNCTION PANEL: CPT

## 2021-07-29 PROCEDURE — 84132 ASSAY OF SERUM POTASSIUM: CPT

## 2021-07-29 PROCEDURE — 85025 COMPLETE CBC W/AUTO DIFF WBC: CPT

## 2021-07-29 PROCEDURE — 6370000000 HC RX 637 (ALT 250 FOR IP): Performed by: STUDENT IN AN ORGANIZED HEALTH CARE EDUCATION/TRAINING PROGRAM

## 2021-07-29 PROCEDURE — 6360000002 HC RX W HCPCS: Performed by: INTERNAL MEDICINE

## 2021-07-29 PROCEDURE — 2060000000 HC ICU INTERMEDIATE R&B

## 2021-07-29 PROCEDURE — 6360000002 HC RX W HCPCS: Performed by: STUDENT IN AN ORGANIZED HEALTH CARE EDUCATION/TRAINING PROGRAM

## 2021-07-29 PROCEDURE — 6370000000 HC RX 637 (ALT 250 FOR IP): Performed by: NURSE PRACTITIONER

## 2021-07-29 PROCEDURE — 2580000003 HC RX 258: Performed by: NURSE PRACTITIONER

## 2021-07-29 PROCEDURE — 83735 ASSAY OF MAGNESIUM: CPT

## 2021-07-29 PROCEDURE — APPSS30 APP SPLIT SHARED TIME 16-30 MINUTES: Performed by: NURSE PRACTITIONER

## 2021-07-29 PROCEDURE — 2500000003 HC RX 250 WO HCPCS: Performed by: INTERNAL MEDICINE

## 2021-07-29 PROCEDURE — 87040 BLOOD CULTURE FOR BACTERIA: CPT

## 2021-07-29 PROCEDURE — 36415 COLL VENOUS BLD VENIPUNCTURE: CPT

## 2021-07-29 PROCEDURE — 84145 PROCALCITONIN (PCT): CPT

## 2021-07-29 PROCEDURE — 6360000002 HC RX W HCPCS: Performed by: NURSE PRACTITIONER

## 2021-07-29 PROCEDURE — 83605 ASSAY OF LACTIC ACID: CPT

## 2021-07-29 PROCEDURE — 2580000003 HC RX 258: Performed by: INTERNAL MEDICINE

## 2021-07-29 PROCEDURE — 93005 ELECTROCARDIOGRAM TRACING: CPT | Performed by: NURSE PRACTITIONER

## 2021-07-29 PROCEDURE — 99232 SBSQ HOSP IP/OBS MODERATE 35: CPT | Performed by: INTERNAL MEDICINE

## 2021-07-29 PROCEDURE — 84484 ASSAY OF TROPONIN QUANT: CPT

## 2021-07-29 RX ORDER — LORAZEPAM 2 MG/ML
1 INJECTION INTRAMUSCULAR EVERY 6 HOURS PRN
Status: DISCONTINUED | OUTPATIENT
Start: 2021-07-29 | End: 2021-08-02 | Stop reason: HOSPADM

## 2021-07-29 RX ADMIN — Medication 1 CAPSULE: at 17:11

## 2021-07-29 RX ADMIN — LORAZEPAM 1 MG: 2 INJECTION INTRAMUSCULAR; INTRAVENOUS at 23:30

## 2021-07-29 RX ADMIN — NAFCILLIN INJECTION 2000 MG: 2 POWDER, FOR SOLUTION INTRAMUSCULAR; INTRAMUSCULAR; INTRAVENOUS at 19:23

## 2021-07-29 RX ADMIN — POTASSIUM CHLORIDE 20 MEQ: 1500 TABLET, EXTENDED RELEASE ORAL at 09:26

## 2021-07-29 RX ADMIN — LORAZEPAM 1 MG: 1 TABLET ORAL at 05:49

## 2021-07-29 RX ADMIN — DIPHENOXYLATE HYDROCHLORIDE AND ATROPINE SULFATE 1 TABLET: 2.5; .025 TABLET ORAL at 09:25

## 2021-07-29 RX ADMIN — ACETAMINOPHEN 650 MG: 325 TABLET ORAL at 18:26

## 2021-07-29 RX ADMIN — METOPROLOL TARTRATE 50 MG: 25 TABLET ORAL at 19:24

## 2021-07-29 RX ADMIN — Medication 1 CAPSULE: at 09:26

## 2021-07-29 RX ADMIN — DIPHENOXYLATE HYDROCHLORIDE AND ATROPINE SULFATE 1 TABLET: 2.5; .025 TABLET ORAL at 19:25

## 2021-07-29 RX ADMIN — Medication 1 CAPSULE: at 11:29

## 2021-07-29 RX ADMIN — PANTOPRAZOLE SODIUM 40 MG: 40 TABLET, DELAYED RELEASE ORAL at 17:11

## 2021-07-29 RX ADMIN — NAFCILLIN INJECTION 2000 MG: 2 POWDER, FOR SOLUTION INTRAMUSCULAR; INTRAMUSCULAR; INTRAVENOUS at 23:30

## 2021-07-29 RX ADMIN — RIFAMPIN 600 MG: 600 INJECTION, POWDER, LYOPHILIZED, FOR SOLUTION INTRAVENOUS at 12:55

## 2021-07-29 RX ADMIN — METOPROLOL TARTRATE 50 MG: 25 TABLET ORAL at 09:26

## 2021-07-29 RX ADMIN — PANTOPRAZOLE SODIUM 40 MG: 40 TABLET, DELAYED RELEASE ORAL at 07:02

## 2021-07-29 RX ADMIN — ACETAMINOPHEN 650 MG: 325 TABLET ORAL at 09:25

## 2021-07-29 RX ADMIN — NAFCILLIN INJECTION 2000 MG: 2 POWDER, FOR SOLUTION INTRAMUSCULAR; INTRAMUSCULAR; INTRAVENOUS at 07:02

## 2021-07-29 RX ADMIN — NAFCILLIN INJECTION 2000 MG: 2 POWDER, FOR SOLUTION INTRAMUSCULAR; INTRAMUSCULAR; INTRAVENOUS at 03:20

## 2021-07-29 RX ADMIN — NAFCILLIN INJECTION 2000 MG: 2 POWDER, FOR SOLUTION INTRAMUSCULAR; INTRAMUSCULAR; INTRAVENOUS at 17:07

## 2021-07-29 RX ADMIN — DIPHENHYDRAMINE HYDROCHLORIDE 12.5 MG: 50 INJECTION, SOLUTION INTRAMUSCULAR; INTRAVENOUS at 19:39

## 2021-07-29 RX ADMIN — NAFCILLIN INJECTION 2000 MG: 2 POWDER, FOR SOLUTION INTRAMUSCULAR; INTRAMUSCULAR; INTRAVENOUS at 11:25

## 2021-07-29 RX ADMIN — LOPERAMIDE HYDROCHLORIDE 4 MG: 2 CAPSULE ORAL at 11:32

## 2021-07-29 RX ADMIN — SODIUM CHLORIDE, PRESERVATIVE FREE 10 ML: 5 INJECTION INTRAVENOUS at 19:43

## 2021-07-29 RX ADMIN — POTASSIUM CHLORIDE 20 MEQ: 1500 TABLET, EXTENDED RELEASE ORAL at 17:11

## 2021-07-29 ASSESSMENT — PAIN SCALES - GENERAL
PAINLEVEL_OUTOF10: 0

## 2021-07-29 NOTE — PROGRESS NOTES
Pt. Back to bed from shower. Pt. Reports he feels like an episode of the rigors might be coming on. Writer pulled IV Ativan and put in pt. Med cabinet in case he has an episode. Writer informed pt. And spouse to call out if they need anything. Understanding verb. VS as charted.

## 2021-07-29 NOTE — PROGRESS NOTES
[facility] - Progress Note    Today's Date and Time: 7/29/2021, 10:50 AM    Impression :   · MSSA septicemia 7-17-21  · Persistent growth of bacteria in blood 7-17-21 through 7-27-21 despite treatment  · Sepsis with acute organ dysfunction and septic shock  · Mitral Valve endocarditis. 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 Will need 6 weeks from first negative blood cultures.   · Rifampin 600 mg daily added 7/22/21 for synergy  · Please do not give anticoagulants due to the risk of septic emboli in the brain transitioning to hemorrhagic   · Received infusion of Exebacase under compassionate use protocol on 7-25-21 without any AEs    Medical Decision Making/Summary/Discussion:7/29/2021     ·   Infection Control Recommendations   · [place] Precautions  · Contact Isolation     Antimicrobial Stewardship Recommendations     · Simplification of therapy  · Targeted therapy    Coordination of Outpatient Care:   · Estimated Length of IV antimicrobials: 6 weeks from first negative blood cultures  · Patient will need Midline Catheter Insertion: TBD  · Patient will need PICC line Insertion: TBD  · Patient will need: Home IV , [place],  SNF,  LTAC: Yes-either SNF or home infusion  · Patient will need outpatient wound care: TBD    Chief complaint/reason for consultation:   · MSSA sepsis    History of Present Illness:   Beau Eden is a 36y.o.-year-old  male who was initially admitted on 7/17/2021. Patient seen at the request of Dr. Bereket Love:     Patient initially presented to [facility] a few days ago with complaints of  abdominal pain, nausea, watery diarrhea and vomiting for the past 5 days after eating some chicken. The patient states that his wife also had episodes of emesis after eating the chicken but her symptoms resolved shortly after. A CT abdomen was unremarkable and he was diagnosed with viral gastroenteritis and sent home with Norco, Zofran, and potassium supplement for hypokalemia. On 7/16/2021, he presented to [address] [place] [address] with worsening symptoms, including fever, chills, fatigue, and worsening abdominal pain with continued vomiting, right big toe pain. Work-up in the ED revealed a fever of 102.6, tachycardia with a heart rate up to 170, dyspnea with hypoxia, blood cultures showed MSSA x2, and urine cultures grew staph aureus ,000 CFU/mL. Occult blood stool positive. CT abdomen 7/17/2021  Impression   No acute abnormality identified in the abdomen and pelvis.       The bladder is markedly distended with a calculated volume of 2 L.       Hepatic steatosis and mild hepatomegaly.       Minor bibasilar atelectasis and interstitial thickening posteriorly. CT chest abdomen pelvis with contrast 7/18/2021  Impression   Moderate distention of the colon multiple air-fluid levels, findings may be   seen with acute enterocolitis. Abnormal labs include:  Sodium 131  Potassium 3.0  .5  Myoglobin 138  Troponin I 65  Amylase 131  AST 66  Lipase 299  Platelet 39  Sed rate 31    ID has been consulted for MSSA sepsis     Addendum: On 7-24-21 I reviewed with patient and wife his clinical course up to the present and the difficulties with source control.   I indicated to the patient and his wife, in the presence of his RN, that we had been able to secure permission from the  of [place], Dr Martha Pierson of the IRB at [facility], Hospital administration and Legal Department for him to receive Exebacase under a Compassionate Use Protocol. I discussed the available information with him including prior published clinical results and safety data. Indicated that there was no promise of efficacy. I emphasized that participation was voluntary. Patient and his wife had all their questions answered. I gave them two copies of the informed consent to review. They will keep one copy and return to signed copy for inclusion in his records. Patient gave informed consent to proceed. CURRENT EVALUATION 7/29/2021:    Afebrile  Vital signs stable    The patient remains stable on room air. He continues to experience episodes of intermittent fevers and rigors. He and his family are concerned that the rigors appear to be getting worse and there seems to be no causation or correlation to explain the frequency and severity of the episodes. I discussed his recent blood culture results and answered all questions with his family present. We will order one more set of blood cultures today. Unfortunately, the patient's blood cultures from 7/27/2021 have come back 2/2 positive forMSSA. Repeat blood cultures have been ordered for 7/28/2021 and there has been no growth thus far (18 hours). We are seeing a longer period of time before the cultures are growing any bacteria which is hopeful that the antibiotics are slowly having the desired effect. CT surgery is planning for valve repair after the patient has completed IV antibiotic therapy-unless the patient decompensates to the point of needing emergent surgical intervention. MRI brain 0-23-39: Acute embolic infarctions in supra and infratentorial structures. Because of the septic emboli in the brain, Ancef was discontinued and nafcillin was initiated on 7/22/2021.  Rifampin was also added for synergy. Echocardiogram completed on 7/18/2021 revealed posterior mitral valve leaflet prolapse, severe mitral regurgitation, and possible small vegetation on posterior leaflet. Presence of vegetation on mitral valve would help explain the emboli to the brain. [place] on 7/20 showed a Large vegetation measuring 2.5 X 2.4 cm noted on the posterior leaflet of the mitral valve. Posterior leaflet is flair and is associated with severe anteriorly directed eccentric regurgitation. Right foot x-ray revealed:  No radiographic evidence for acute osteomyelitis.  Mild soft tissue swelling   of the great toe possibly cellulitis.       RECOMMENDATION:   NM bone scan or MRI may be obtained if there remains strong concern for acute   osteomyelitis.         CT abdomen pelvis revealed: Moderate distention of the colon multiple air-fluid levels, findings may be   seen with acute enterocolitis. Bruising on the patient's back is from a Tens unit-it does not appear to be the source of sepsis. There are no open lesions noted and there are three distinct marks from the electrodes. Stool studies have been negative    Hepatitis panel nonreactive    Discussed with RN    Labs, X rays reviewed: 7/29/2021    BUN:8-->8-->13-->19-->20-->10  Cr:0.76-->0.49-->0.95-->0.71-->0.77-->0.75-->0.79    CRP: 260.5-->56.0  LDH: 437    WBC:9.9-->10.9-->9.8-->10.8-->11.1-->10  Hb:12.5-->10.8-->11.5--.9.1-->9.2-->9.4  Plat: 39-->40-->78-->196-->251-->312-->377  Haptoglobin: 256  Sed rate: 31  Lipase 299->228  Lactate: 1.6-->2.3-->1.5-->2.5    Cultures:  Urine:  ·   Blood:  · 7/17/2021: MSSA x2  · 7/18/2021: MSSA  · 7/20/21: MSSA  · 7/22/2021: MSSA  · 7-24-21: MSSA  · 7-25-21: MSSA  · 7/27/21: 2/2 MSSA   · 7/28/21: No growth thus far    Sputum :  ·   Wound:    Discussed with patient, RN, family, Dr Dimple Souza.     I have personally reviewed the past medical history, past surgical history, medications, social history, and family history, and I have updated the database accordingly. Past Medical History:     Past Medical History:   Diagnosis Date    Diverticulosis     Hyperlipidemia     Hypertension     MESSI (obstructive sleep apnea)     Has PSG study done Pos for Mod MESSI, never had cpap tritration done for machine    Research subject 07/24/2021    EIND [id number] Exebacase for persistent bacteremia expected date of completion 8/25/2021       Past Surgical  History:     Past Surgical History:   Procedure Laterality Date    ABDOMEN SURGERY      large bowel resection    ANTERIOR CRUCIATE LIGAMENT REPAIR Right     APPENDECTOMY  07/09/1997    COLECTOMY      2012 - D/T Dverticulitis    DILATATION, ESOPHAGUS         Medications:      lactobacillus  1 capsule Oral TID WC    diphenoxylate-atropine  1 tablet Oral BID    metoprolol tartrate  50 mg Oral BID    potassium chloride  20 mEq Oral BID WC    pantoprazole  40 mg Oral BID AC    rifampin (RIFADIN) IVPB  600 mg Intravenous Q24H    nafcillin  2,000 mg Intravenous Q4H    sodium chloride flush  5-40 mL Intravenous 2 times per day       Social History:     Social History     Socioeconomic History    Marital status:      Spouse name: Not on file    Number of children: Not on file    Years of education: Not on file    Highest education level: Not on file   Occupational History    Not on file   Tobacco Use    Smoking status: Never Smoker    Smokeless tobacco: Never Used   Vaping Use    Vaping Use: Never assessed   Substance and Sexual Activity    Alcohol use:  Yes     Alcohol/week: 20.0 standard drinks     Types: 20 Cans of beer per week     Comment: 20/ week, average every other day    Drug use: Never    Sexual activity: Not on file   Other Topics Concern    Not on file   Social History Narrative    Not on file     Social Determinants of Health     Financial Resource Strain:     Difficulty of Paying Living Expenses:    Food Insecurity:     Worried About Running Out of Food in the Last Year:    [address] in the Last Year:    Transportation Needs:     Lack of Transportation (Medical):  Lack of Transportation (Non-Medical):    Physical Activity:     Days of Exercise per Week:     Minutes of Exercise per Session:    Stress:     Feeling of Stress :    Social Connections:     Frequency of Communication with Friends and Family:     Frequency of Social Gatherings with Friends and Family:     Attends Caoda[employer] Services:     Active Member of Clubs or Organizations:     Attends Club or Organization Meetings:     Marital Status:    Intimate Partner Violence:     Fear of Current or Ex-Partner:     Emotionally Abused:     Physically Abused:     Sexually Abused:        Family History:     Family History   Problem Relation Age of Onset    No Known Problems Mother     Other Father         diverticulitis        Allergies:   Morphine     Review of Systems:   Constitutional: Intermittent fevers and rigors  Head: No headaches  Eyes: No double vision or blurry vision. No conjunctival inflammation. ENT: No sore throat or runny nose. . No hearing loss, tinnitus or vertigo. Cardiovascular: No chest pain or palpitations. No shortness of breath. No CASTRO  Lung: No shortness of breath or cough. No sputum production  Abdomen: No nausea, vomiting, diarrhea, or abdominal pain. [place] No cramps. Genitourinary: No increased urinary frequency, or dysuria. No hematuria. No suprapubic or CVA pain  Musculoskeletal: No muscle aches or pains. No joint effusions, swelling or deformities  Hematologic: No bleeding or bruising. Neurologic: No headache, weakness, numbness, or tingling. Integument: Scattered small reddened bumps and bruises  Psychiatric: No depression. Endocrine: No polyuria, no polydipsia, no polyphagia.     Physical Examination :     Patient Vitals for the past 8 hrs:   BP Temp Temp src Pulse Resp SpO2 Weight   07/29/21 0755 102/66 98.2 °F (36.8 °C) Oral 108 16 97 % --   07/29/21 0400 (!) 101/50 97.9 °F (36.6 °C) Axillary 94 14 97 % --   07/29/21 0315 -- -- -- -- -- -- 176 lb 5.9 oz (80 kg)     General Appearance: Awake, alert, and in no apparent distress  Head:  Normocephalic, no trauma  Eyes: Pupils equal, round, reactive to light and accommodation; extraocular movements intact; sclera anicteric; conjunctivae pink. No embolic phenomena. ENT: Oropharynx clear, without erythema, exudate, or thrush. No tenderness of sinuses. Mouth/throat: mucosa pink and moist. No lesions. Dentition in good repair. Neck:Supple, without lymphadenopathy. Thyroid normal, No bruits. Pulmonary/Chest: Clear to auscultation, without wheezes, rales, or rhonchi. No dullness to percussion. Cardiovascular: Normal sinus tachycardia with murmur, No rubs, or gallops. Abdomen: Soft, non tender. Bowel sounds normal. No organomegaly  All four Extremities: No cyanosis, clubbing, edema, or effusions. Neurologic: No gross sensory or motor deficits. Skin: Warm and dry with good turgor. No signs of peripheral arterial or venous insufficiency. No ulcerations. No open wounds. Medical Decision Making -Laboratory:   I have independently reviewed/ordered the following labs:    CBC with Differential:   Recent Labs     07/28/21  0551 07/29/21  0543   WBC 11.1 10.0   HGB 9.2* 9.4*   HCT 29.1* 30.4*    377   LYMPHOPCT 14* 15*   MONOPCT 8 8     BMP:   Recent Labs     07/28/21  0551 07/28/21  0551 07/29/21  0543 07/29/21  0650     --  137  --    K 3.6*   < > 3.9 3.7     --  103  --    CO2 20  --  22  --    BUN 9  --  10  --    CREATININE 0.75  --  0.79  --    MG  --   --   --  2.0    < > = values in this interval not displayed. Hepatic Function Panel:   Recent Labs     07/28/21  0551 07/29/21  0543   PROT 6.3*  --    LABALBU 2.5*  2.5* 2.9*   BILIDIR 0.14  --    IBILI 0.24  --    BILITOT 0.38  --    ALKPHOS 57  --    ALT 19  --    AST 17  --      No results for input(s): RPR in the last 72 hours.   No results for input(s): HIV in the last 72 hours. No results for input(s): BC in the last 72 hours. Lab Results   Component Value Date    MUCUS NOT REPORTED 07/17/2021    RBC 3.30 07/29/2021    TRICHOMONAS NOT REPORTED 07/17/2021    WBC 10.0 07/29/2021    YEAST NOT REPORTED 07/17/2021    TURBIDITY CLEAR 07/17/2021     Lab Results   Component Value Date    CREATININE 0.79 07/29/2021    GLUCOSE 108 07/29/2021       Medical Decision Making-Imaging:          MRI Brain 7/2121   Impression       Findings suggestive of acute embolic infarctions involving supra and   infratentorial structures as described.       Subacute to chronic encephalomalacia with chronic blood products within the   left superior parietal lobule and right inferior parietal lobule.       There is no acute intracranial hemorrhage, or intracranial mass lesion.           EXAMINATION:   CT OF THE CHEST, ABDOMEN, AND PELVIS WITH CONTRAST 7/18/2021 10:53 am       TECHNIQUE:   CT of the chest, abdomen and pelvis was performed with the administration of   intravenous contrast. Multiplanar reformatted images are provided for review. Dose modulation, iterative reconstruction, and/or weight based adjustment of   the mA/kV was utilized to reduce the radiation dose to as low as reasonably   achievable.       COMPARISON:   None       HISTORY:   ORDERING SYSTEM PROVIDED HISTORY: MSSA bacteremia, ? DIC   TECHNOLOGIST PROVIDED HISTORY:   MSSA bacteremia, ? DIC       Reason for Exam: sepsis with acute organ dysfunction   Acuity: Unknown   Type of Exam: Unknown       FINDINGS:   Lungs/pleura: The central airways are patent.  There are no suspicious   pulmonary nodules       Mediastinum: There is no acute mediastinal abnormality       Soft Tissues/Bones: No suspicious osteolytic or osteoblastic lesions       Abdominal organs: The liver, spleen, adrenal glands, kidneys, and pancreas   demonstrate no acute abnormality.       GI/Bowel:  The visualized portions of the small bowel are unremarkable. There   is borderline dilation of the colon.  There are multiple air-fluid levels   throughout the colon.       Peritoneum/Retroperitoneum: There is a solid amount of retroperitoneal fluid   along the distal ureters bilaterally.       Pelvis: The bladder is moderately distended.       Bones: No suspicious osseous lesions are identified           Impression   Moderate distention of the colon multiple air-fluid levels, findings may be   seen with acute enterocolitis.             XR FOOT LEFT (2 VIEWS) [[id number]] Collected: 07/18/21 1411      Order Status: Completed Updated: 07/19/21 0759     Narrative:       EXAMINATION:   TWO XRAY VIEWS OF THE LEFT FOOT     7/18/2021 2:05 pm     COMPARISON:   None. HISTORY:   ORDERING SYSTEM PROVIDED HISTORY: Left big toe pain and erythema. Looking for   source of MSSA sepsis   TECHNOLOGIST PROVIDED HISTORY:   Left big toe pain and erythema. Looking for source of MSSA sepsis     FINDINGS:   AP and lateral views of the foot obtained.  Normal alignment and   mineralization.  No abnormal periosteal reaction.  No erosions.  No fracture. No aggressive lytic or blastic lesions.  Mild soft tissue swelling of the   great toe noted.      Impression:       No radiographic evidence for acute osteomyelitis.  Mild soft tissue swelling   of the great toe possibly cellulitis.       EXAMINATION:   MRI OF THE BRAIN WITHOUT CONTRAST  7/21/2021 7:03 pm       TECHNIQUE:   Multiplanar multisequence MRI of the brain was performed without the   administration of intravenous contrast.       COMPARISON:   Head CT of 07/20/2021       HISTORY:   ORDERING SYSTEM PROVIDED HISTORY: episode of vertigo, dipolopia, has   infecticive endocadrditis, r/o septic emboli, abscess   TECHNOLOGIST PROVIDED HISTORY:   episode of vertigo, dipolopia, has infecticive endocadrditis, r/o septic   emboli, abscess   Reason for Exam: VERTIGO, MSSA BACTEREMIA, R/O SEPTIC EMBOLI       FINDINGS:   MRI Requests 07/17/2021  8:48  [address]   10ML LFA    Culture Abnormal  07/17/2021  8:48 AM [address] [facility] Blood Culture Results called to and read back by: Renetta Hopkins RN AT 2045 ON 07.17.2021    Culture 07/17/2021  8:48 AM 14[facility] FROM BOTTLE: GRAM POSITIVE COCCI IN CLUSTERS    Culture Abnormal  07/17/2021  8:48  [address]   Staphylococcus aureus Detected: mecA/C and MREJ Not Detected Methodology- Polymerase Chain Reaction (PCR)   Culture Abnormal  07/17/2021  8:48 AM 1420 [facility] This isolate is methicillin susceptible. Testing Performed By    Manjinder Watson Name Director Address Valid Date Range   [address]Jessica Adams MD [address] [facility] [address] 08/30/17 0801-Present   Susceptibility    Staphylococcus aureus (4)    Antibiotic Interpretation DAVID Status    penicillin Resistant  Final     >=0.5   RESISTANT   cefoxitin screen  NOT REPORTED Final    ciprofloxacin   Final     NOT REPORTED    clindamycin Sensitive  Final     <=0.25   SUSCEPTIBLE   erythromycin Sensitive  Final     <=0.25   SUSCEPTIBLE   gentamicin Sensitive  Final     <=0.5   SUSCEPTIBLE   gentamicin Sensitive Gentamicin is used only in combination with other active agents that test susceptible.  Final    Induced Clind Resist  NOT REPORTED Final    levofloxacin Sensitive  Final     0.25   SUSCEPTIBLE   linezolid   Final     NOT REPORTED    moxifloxacin   Final     NOT REPORTED    nitrofurantoin   Final     NOT REPORTED    oxacillin Sensitive  Final     0.5   SUSCEPTIBLE   Synercid   Final     NOT REPORTED    rifampin   Final     NOT REPORTED    tetracycline Sensitive  Final     <=1   SUSCEPTIBLE   tigecycline   Final     NOT REPORTED    trimethoprim-sulfamethoxazole Sensitive  Final     <=10   SUSCEPTIBLE   vancomycin   Final     NOT REPORTED Medical Decision Making-Other:     Note:  · Labs, medications, radiologic studies were reviewed with personal review of films  · Large amounts of data were reviewed  · Discussed with nursing Staff, Discharge planner  · Infection Control and Prevention measures reviewed  · All prior entries were reviewed  · Administer medications as ordered  · Prognosis: Guarded  · Discharge planning reviewed  · Follow up as outpatient. Thank you for allowing us to participate in the care of this patient. Please call with questions. Genna Melendez, APRN - CNP       ATTESTATION:    I have discussed the case, including pertinent history and exam findings with the APRN. I have evaluated the  History, physical findings and pictures of the patient and the key elements of the encounter have been performed by me. I have reviewed the laboratory data, other diagnostic studies and discussed them with the APRN. I have updated the medical record where necessary. I agree with the assessment, plan and orders as documented by the APRN.     Linda Lopez MD.          Pager: [phone] - Office: [phone]

## 2021-07-29 NOTE — PROGRESS NOTES
Neurology Nurse Practitioner Progress Note      INTERVAL HISTORY: This is a 36 y.o.  male admitted 7/17/2021 for Diarrhea [R19.7]. This is a follow-up neurology progress note. The patient was examined and the chart was reviewed. Discussed with the pt & RN. There were no acute events overnight. Patient reported that ongoing intermittent transient episode of right sided visual disturbance, described as seeing \"wavy ribbons\"; he was unable to see through that eye; events usually last for 15 to 20 minutes before resolving spontaneously. He recalled a similar episode 1 month ago while he was at work; did not seek any medical attention at that time. Denied any history of headaches. Repeat MRI brain (7/27) - additional b/l infarcts. HPI: Vazquez Silverio is a 36 y.o. male with H/O HTN, HLD, diverticulitis s/p partial hemicolectomy (2012), who was admitted 7/16/2021 for nausea, vomiting, abdominal pain, watery diarrhea along with body/joints aches and generalized fatigue. Patient reported that his symptoms started 4 to 5 days ago. On the day of admission, he felt feverish, chills & lightheaded so presented to the ED for evaluation. Patient was treated for MSSA sepsis, septic shock, subacute endocarditis and severe MR. Neurology was consulted on 7/21 due to reports of visual hallucinations, vertigo and double vision. Family reported that patient was noticed to be sleeping when he opened his eyes and described seeing people are a dog in the room & spoke about things that were not happening. Patient recalled seeing water coming from the IV line that was not there. On the morning of 7/21, patient woke up with severe vertigo and unsteady gait. Vertigo was constant, no aggravating factor, requiring help to get to the restroom. He also complained of oblique monocular double vision that lasted for 2 hours. On further questioning patient reported difficulty to concentrate and some difficulty in remembering things.  He denied any other focal motor, sensory, visual or bulbar symptoms.  lactobacillus  1 capsule Oral TID WC    diphenoxylate-atropine  1 tablet Oral BID    metoprolol tartrate  50 mg Oral BID    potassium chloride  20 mEq Oral BID WC    pantoprazole  40 mg Oral BID AC    rifampin (RIFADIN) IVPB  600 mg Intravenous Q24H    nafcillin  2,000 mg Intravenous Q4H    sodium chloride flush  5-40 mL Intravenous 2 times per day       Past Medical History:   Diagnosis Date    Diverticulosis     Hyperlipidemia     Hypertension     MESSI (obstructive sleep apnea)     Has PSG study done Pos for Mod MESSI, never had cpap tritration done for machine    Research subject 07/24/2021    EIND [id number] Exebacase for persistent bacteremia expected date of completion 8/25/2021       Past Surgical History:   Procedure Laterality Date    ABDOMEN SURGERY      large bowel resection    ANTERIOR CRUCIATE LIGAMENT REPAIR Right     APPENDECTOMY  07/09/1997    COLECTOMY      2012 - D/T Dverticulitis    DILATATION, ESOPHAGUS         PHYSICAL EXAM:      Blood pressure 102/66, pulse 108, temperature 98.2 °F (36.8 °C), temperature source Oral, resp. rate 16, height 5' 6\" (1.676 m), weight 176 lb 5.9 oz (80 kg), SpO2 97 %.       Neurological Examination:  Mental status   Alert and oriented x 3; following all commands; speech is fluent, no dysarthria, aphasia   Cranial nerves   II - visual fields intact to confrontation; pupils reactive  III, IV, VI - extraocular muscles intact; no ARIELLA; no nystagmus; no ptosis   V - normal facial sensation                                                               VII - normal facial symmetry                                                             VIII - intact hearing                                                                             IX, X - symmetrical palate elevation                                               XI - symmetrical shoulder shrug XII - midline tongue without atrophy or fasciculation   Motor function  Strength: Able to raise all limbs antigravity with no drift noticed  Chronic pain in right shoulder  Normal bulk and tone                  Sensory function Grossly intact     Cerebellar No visible tremors   Reflex function 2/4 symmetric throughout  Down going plantar response bilaterally   Gait                  Not tested       DATA      Lab Results   Component Value Date    WBC 10.0 07/29/2021    HGB 9.4 (L) 07/29/2021    HCT 30.4 (L) 07/29/2021     07/29/2021    ALT 19 07/28/2021    AST 17 07/28/2021     07/29/2021    K 3.7 07/29/2021     07/29/2021    AMMONIA 52 07/20/2021    CREATININE 0.79 07/29/2021    BUN 10 07/29/2021    CO2 22 07/29/2021    TSH 1.86 07/20/2021    INR 1.0 07/19/2021    DVYFAZBI19 1120 07/17/2021    FOLATE >20.0 07/17/2021    LABA1C 5.5 07/17/2021         DIAGNOSTIC DATA:  CT HEAD (7/20/2021): No acute intracranial abnormality     MRI BRAIN (2/44/8268): Acute embolic infarctions involving supra & infratentorial structures. Subacute to chronic encephalomalacia with chronic blood products within the L superior parietal lobule & R inferior parietal lobule    REPEAT MRI BRAIN (7/27/2021): Interval development of additional areas of restricted diffusion demonstrated in the R cerebellum, R posterior temporal lobe, R head of caudate, L temporal lobe & b/l frontal lobes    CTA HEAD & NECK (7/22/2021): Unremarkable      ECHO (7/18/2021): EF 50%. Severe MR. Posterior mitral valve leaflet prolapse. Possible small vegetation on posterior leaflet    MONROE (7/20/2021): EF 55%. Large vegetation measuring 2.5 X 2.4 cm noted on the posterior leaflet of the mitral valve. Posterior leaflet is flair and is associated with severe anteriorly directed eccentric regurgitation      EEG (7/21/2021): This EEG shows the presence of mild diffuse slowing. This EEG is concordant with diffuse encephalopathy.  No clear lateralized or epileptiform disturbance is seen                          IMPRESSION: 36 y.o.  male admitted with  Metabolic encephalopathy, along with hallucinations, vertigo and diplopia, in the setting of MSSA sepsis, septic shock, subacute endocarditis & acute embolic infarctions supra/infratentorial structures. Patient stays awake, alert & oriented, at his baseline mentation    MRI brain - acute embolic infarctions supra/infratentorial structures. Repeat MRI brain -  additional b/l embolic infarcts     Patient reported that ongoing intermittent transient episode of right sided visual disturbance, described as seeing \"wavy ribbons\", during that episode, he was unable to see through that eye; the whole event lasted for 15 to 20 minutes before resolving spontaneously. He recalled a similar episode 1 month ago while he was at work; did not seek any medical attention at that time    MSSA sepsis, subacute endocarditis; is on nafcillin & rifampin till 8/15, as per ID team    Comorbid conditions - HTN, HLD, diverticulitis s/p partial hemicolectomy (2012), R rotator cuff injury          PLAN:  Unfortunately antiplatelets/AC are contraindicated in this case, as septic emboli carry a high risk of bleeding    Ophthalmologist will see the pt tomorrow afternoon (7/30)    MVR to be planned by CTS team following 6 weeks of ATBs & negative blood cultures    Will follow    Please note that this note was generated using a voice recognition dictation software. Although every effort was made to ensure the accuracy of this automated transcription, some errors in transcription may have occurred.

## 2021-07-29 NOTE — PROGRESS NOTES
[facility]  Office: [address], DO, Jasmin Ahmadi, DO, Prisca Maharaj, DO, Roxanna Kahn, DO, Joyce Anderson MD, Ruben Villalobos MD, Brittany Narvaez MD, Mata Blanchard MD, Mahad Rascon MD, Skylar Magaña MD, Jeannette Lovett MD, Therese Hines, DO, Sánchez Floyd MD, Raji Alvarado, DO, Girish Fontanez MD,  Jenn Samayoa DO, Az García MD, Eva Rodriguez MD, Benedict Lopez MD, Mikael Jackson MD, Latha Calderon MD, Selena Cedeño MD, [place] Al, [facility], [facility] Marcellus, CNP, Abel Garcia, [facility], Mirtha Hayward, CNS, Renea Collar, Daryle Co, CNP, Manuel Carrera, CNP, Shanna Rocha, CNP, Joon Parents, CNP, Nellie Arredondo PA-C, Baylee Denny [facility], Vikas Cardona, CNP, Hugo Phoenix, CNP, Anu Warren, CNP, Jacklyn Frankel, CNP, Nakita Scruggs, CNP, Bettye Gross, CNP, Janes Elder, CNP, Mahi Funes, [facility]    Progress Note    7/29/2021    9:56 AM    Name:   Hailey Matt  MRN:     [id number]     Acct:      [de-identified]   Room:   [address] [place] [address] Day:  12  Admit Date:  7/17/2021 12:42 AM    PCP:   Phu Wen MD  Code Status:  Full Code    Subjective:     C/C:   Chief Complaint   Patient presents with    Abdominal Pain    Emesis     Interval History Status: not changed. Patient seen examined bedside wife also at bedside. No growth 18 hours for blood cultures. Patient trying ativan PO but not helping until his \"rigors and fever\" resolve.     Brief History:   44-year-old male past medical history of hypertension history of colectomy secondary to diverticulitis presented with fevers diarrhea approximately 3 days prior to admission was noted to be septic with 2 of 2 blood cultures showing positive MSSA as well as urinalysis positive for MSSA and found to have mitral valve endocarditis requiring IV antibiotics currently being treated with nafcillin 2 g IV every 4 hours until 8/15/2021, 600 mg rifampin for synergy, received infusion of Exebacase on 7/25/2021. MRI 7/21/2021:  Findings suggestive of acute embolic infarctions involving supra and   infratentorial structures as described.       Subacute to chronic encephalomalacia with chronic blood products within the   left superior parietal lobule and right inferior parietal lobule.       There is no acute intracranial hemorrhage, or intracranial mass lesion. Echo 7/17/2021:  [facility] left ventricular systolic function is normal. Calculated ejection  fraction 48% by Dubois's method. Visually estimated EF 50%. Posterior mitral valve leaflet prolapse. Severe mitral regurgitation. Eccentric anterior diredted jet. EOA = 0.7cm2. PISA radius is 1.3cm. Vena contracta is 0.99 cm. MR volume is  58ml. Possible small vegetation on posterior leaflet. Consider MONROE. MONROE 7/20/2021  MONROE findings:     LA:       Normal  RAJAT:    No thrombus  RA:      Normal  RV:      Normal  LV:       Normal  Estimated LVEF:         55%  Aorta:               Mild atheromatous disease arch  Pericardium:    Trivial pericardial effusion  Septum:           No intracardiac shunt via color Doppler.      Valves:     Mitral Valve: Large vegetation measuring 2.5 X 2.4 cm attached on the posterior leaflet of the mitral valve associated with Flail and degenerated leaflet. Severe regurgitation w/ an anteriorly directed eccentric jet is identified. Aortic Valve: The aortic valve is trileaflet and opens adequately. No regurgitiation is identified. Tricuspid valve: Structurally normal. No regurgitation is identified.   Pulmonary valve: Normal. No significant regurgitation     No thrombus identified  Review of Systems:     Constitutional:  negative for chills, fevers, sweats  Respiratory:  negative for cough, dyspnea on exertion, shortness of breath, wheezing  Cardiovascular:  negative for chest pain, chest pressure/discomfort, lower extremity edema, palpitations  Gastrointestinal:  negative for abdominal pain, constipation, diarrhea, nausea, vomiting  Neurological: Positive for change in vision negative for dizziness, headache      Medications: Allergies: Allergies   Allergen Reactions    Morphine Other (See Comments)     Pt report muscle cramps       Current Meds:   Scheduled Meds:    lactobacillus  1 capsule Oral TID WC    diphenoxylate-atropine  1 tablet Oral BID    metoprolol tartrate  50 mg Oral BID    potassium chloride  20 mEq Oral BID WC    pantoprazole  40 mg Oral BID AC    rifampin (RIFADIN) IVPB  600 mg Intravenous Q24H    nafcillin  2,000 mg Intravenous Q4H    sodium chloride flush  5-40 mL Intravenous 2 times per day     Continuous Infusions:    sodium chloride      sodium chloride       PRN Meds: LORazepam, potassium chloride, diphenhydrAMINE, meclizine, loperamide, sodium chloride, sodium chloride, sodium chloride flush, sodium chloride, ondansetron **OR** ondansetron, acetaminophen **OR** acetaminophen, polyethylene glycol, magnesium sulfate, oxyCODONE, melatonin, aluminum & magnesium hydroxide-simethicone    Data:     Past Medical History:   has a past medical history of Diverticulosis, Hyperlipidemia, Hypertension, MESSI (obstructive sleep apnea), and Research subject. Social History:   reports that he has never smoked. He has never used smokeless tobacco. He reports current alcohol use of about 20.0 standard drinks of alcohol per week. He reports that he does not use drugs. Family History:   Family History   Problem Relation Age of Onset    No Known Problems Mother     Other Father         diverticulitis       Vitals:  /66   Pulse 108   Temp 98.2 °F (36.8 °C) (Oral)   Resp 16   Ht 5' 6\" (1.676 m)   Wt 176 lb 5.9 oz (80 kg)   SpO2 97%   BMI 28.47 kg/m²   Temp (24hrs), Av.5 °F (36.9 °C), Min:97.9 °F (36.6 °C), Max:100 °F (37.8 °C)    No results for input(s): POCGLU in the last 72 hours. I/O (24Hr):     Intake/Output Summary (Last 24 hours) at 7/29/2021 0956  Last data filed at 7/29/2021 0000  Gross per 24 hour   Intake 1070 ml   Output --   Net 1070 ml       Labs:  Hematology:  Recent Labs     07/27/21 0715 07/28/21  0551 07/29/21  0543   WBC 10.1 11.1 10.0   RBC 3.27* 3.19* 3.30*   HGB 9.5* 9.2* 9.4*   HCT 29.7* 29.1* 30.4*   MCV 90.8 91.2 92.1   MCH 29.1 28.8 28.5   MCHC 32.0 31.6 30.9   RDW 12.8 12.7 12.6    312 377   MPV 9.9 9.9 9.4   CRP 56.0*  --   --      Chemistry:  Recent Labs     07/27/21 0715 07/27/21 0715 07/28/21 0551 07/29/21  0543 07/29/21  0650     --  136 137  --    K 3.4*   < > 3.6* 3.9 3.7     --  104 103  --    CO2 22  --  20 22  --    GLUCOSE 112*  --  113* 108*  --    BUN 9  --  9 10  --    CREATININE 0.66*  --  0.75 0.79  --    MG  --   --   --   --  2.0   ANIONGAP 11  --  12 12  --    LABGLOM >60  --  >60 >60  --    GFRAA >60  --  >60 >60  --    CALCIUM 8.2*  --  8.2* 8.6  --    PHOS 3.7  --  3.8 3.9  --    TROPHS  --   --   --   --  38*    < > = values in this interval not displayed. Recent Labs     07/27/21 0715 07/28/21 0551 07/29/21  0543   PROT  --  6.3*  --    LABALBU 2.6* 2.5*  2.5* 2.9*   AST  --  17  --    ALT  --  19  --    ALKPHOS  --  57  --    BILITOT  --  0.38  --    BILIDIR  --  0.14  --      ABG:  Lab Results   Component Value Date    POCPH 7.486 07/18/2021    POCPCO2 28.6 07/18/2021    POCPO2 134.5 07/18/2021    POCHCO3 21.6 07/18/2021    NBEA 1 07/18/2021    PBEA NOT REPORTED 07/18/2021    KJR8ZDY NOT REPORTED 07/18/2021    AOXK1NYI 99 07/18/2021    FIO2 NOT REPORTED 07/18/2021     Lab Results   Component Value Date/Time    SPECIAL NOT REPORTED 07/28/2021 02:16 PM     Lab Results   Component Value Date/Time    CULTURE NO GROWTH 14 HOURS 07/28/2021 02:16 PM       Radiology:  CT HEAD WO CONTRAST    Result Date: 7/20/2021  No acute intracranial abnormality. MRI may be obtained if clinically indicated. CT CERVICAL SPINE W CONTRAST    Result Date: 7/23/2021  1.  Please note, CT does not adequately assess for early discitis/osteomyelitis or an epidural abscess. 2. No convincing acute osseous abnormality seen of the cervical, thoracic or lumbar spine. 3. Scattered degenerative changes noted. 4. Trace pleural effusions bilaterally. 5. Nonspecific stranding along the retroperitoneum bilaterally. 6. No convincing evidence of a drainable fluid collection. CT THORACIC SPINE W CONTRAST    Result Date: 7/23/2021  1. Please note, CT does not adequately assess for early discitis/osteomyelitis or an epidural abscess. 2. No convincing acute osseous abnormality seen of the cervical, thoracic or lumbar spine. 3. Scattered degenerative changes noted. 4. Trace pleural effusions bilaterally. 5. Nonspecific stranding along the retroperitoneum bilaterally. 6. No convincing evidence of a drainable fluid collection. CT LUMBAR SPINE W CONTRAST    Result Date: 7/23/2021  1. Please note, CT does not adequately assess for early discitis/osteomyelitis or an epidural abscess. 2. No convincing acute osseous abnormality seen of the cervical, thoracic or lumbar spine. 3. Scattered degenerative changes noted. 4. Trace pleural effusions bilaterally. 5. Nonspecific stranding along the retroperitoneum bilaterally. 6. No convincing evidence of a drainable fluid collection. MRI CERVICAL SPINE WO CONTRAST    Result Date: 7/23/2021  1. Extensive patient motion limits evaluation. 2. No convincing abnormal bone marrow signal or abnormal signal along the disc spaces of the cervical spine. 3. There is suggestion of abnormal T2 signal within the right medulla. Abnormal T2 signal is seen within the right inferior cerebellum. 4. No convincing abnormal signal within the cervical cord given the limitations of patient motion. No significant spinal canal stenosis seen. XR CHEST PORTABLE    Result Date: 7/23/2021  No radiologic evidence of acute cardiopulmonary disease.      CT CHEST PULMONARY EMBOLISM W CONTRAST    Result Date: 7/20/2021  Small bilateral pleural effusions and mild bibasilar atelectasis This study is nondiagnostic for pulmonary embolism. There is excessive respiratory motion artifact degrading image resolution. There is not significant contrast within the pulmonary artery system to adequately assess for pulmonary embolus. Recommend either repeat exam with improved bolus timing, and decreased patient respiratory motion or VQ scan. CTA HEAD NECK W CONTRAST    Result Date: 7/22/2021  1. Unremarkable CT angiogram of the head and neck. 2. Sequelae of embolic phenomenon as seen on the prior MRI of the brain. Given the degree of vasogenic edema surrounding several of these areas of restricted diffusion, septic emboli is favored. The findings were sent to the Radiology Results [address] at 9:48 am on 7/22/2021to be communicated to a licensed caregiver. MRI BRAIN WO CONTRAST    Result Date: 7/21/2021  Findings suggestive of acute embolic infarctions involving supra and infratentorial structures as described. Subacute to chronic encephalomalacia with chronic blood products within the left superior parietal lobule and right inferior parietal lobule. There is no acute intracranial hemorrhage, or intracranial mass lesion. The findings were sent to the Radiology Results [address] at 8:24 pm on 7/21/2021to be communicated to a licensed caregiver.        Physical Examination:        General appearance:  alert, cooperative and no distress  Mental Status:  oriented to person, place and time and normal affect  Lungs:  clear to auscultation bilaterally, normal effort  Heart:  regular rate and rhythm, systolic murmur  Abdomen:  soft, nontender, nondistended, normal bowel sounds  Extremities:  no edema, redness, tenderness in the calves  Skin: Osler node improving on right middle finger,    Assessment:        Hospital Problems         Last Modified POA    * (Principal) MSSA bacteremia 7/20/2021 Yes    Delusions ([address]

## 2021-07-29 NOTE — PLAN OF CARE
Problem: Pain:  Goal: Pain level will decrease  Description: Pain level will decrease  7/28/2021 2146 by Shi Arizmendi RN  Outcome: Ongoing  7/28/2021 1348 by Maribeth Burns  Outcome: Ongoing  Goal: Control of acute pain  Description: Control of acute pain  7/28/2021 2146 by Shi Arizmendi RN  Outcome: Ongoing  7/28/2021 1348 by Maribeth Burns  Outcome: Ongoing  Goal: Control of chronic pain  Description: Control of chronic pain  7/28/2021 2146 by Shi Arizmendi RN  Outcome: Ongoing  7/28/2021 1348 by Maribeth Burns  Outcome: Ongoing     Problem: Infection, Septic Shock:  Goal: Will show no infection signs and symptoms  Description: Will show no infection signs and symptoms  7/28/2021 2146 by Shi Arizmendi RN  Outcome: Ongoing  7/28/2021 1348 by Maribeth Burns  Outcome: Ongoing     Problem: Falls - Risk of:  Goal: Will remain free from falls  Description: Will remain free from falls  7/28/2021 2146 by Shi Arizmendi RN  Outcome: Ongoing  7/28/2021 1348 by Maribeth Burns  Outcome: Ongoing  Goal: Absence of physical injury  Description: Absence of physical injury  7/28/2021 2146 by Shi Arizmendi RN  Outcome: Ongoing  7/28/2021 1348 by Maribeth Burns  Outcome: Ongoing     Problem: Gas Exchange - Impaired:  Goal: Levels of oxygenation will improve  Description: Levels of oxygenation will improve  7/28/2021 2146 by Shi Arizmendi RN  Outcome: Ongoing  7/28/2021 1348 by Maribeth Burns  Outcome: Ongoing     Problem: Cardiac:  Goal: Ability to maintain vital signs within normal range will improve  Description: Ability to maintain vital signs within normal range will improve  7/28/2021 2146 by Shi Arizmendi RN  Outcome: Ongoing  7/28/2021 1348 by Maribeth Burns  Outcome: Ongoing  Goal: Cardiovascular alteration will improve  Description: Cardiovascular alteration will improve  7/28/2021 2146 by Shi Arizmendi RN  Outcome: Ongoing  7/28/2021 1348 by Maribeth Burns  Outcome: Ongoing     Problem: Health Behavior:  Goal: Will modify at least one risk factor RN  Outcome: Ongoing  7/28/2021 1348 by Amara Jackson  Outcome: Ongoing     Problem: Nutritional:  Goal: Nutritional status will improve  Description: Nutritional status will improve  7/28/2021 2146 by Yudelka Yusuf RN  Outcome: Ongoing  7/28/2021 1348 by Amara Jackson  Outcome: Ongoing     Problem: Respiratory:  Goal: Ability to maintain normal respiratory secretions will improve  Description: Ability to maintain normal respiratory secretions will improve  7/28/2021 2146 by Yudelka Yusuf RN  Outcome: Ongoing  7/28/2021 1348 by Amara Jackson  Outcome: Ongoing     Problem: Skin Integrity:  Goal: Demonstration of wound healing without infection will improve  Description: Demonstration of wound healing without infection will improve  7/28/2021 2146 by Yudelka Yusuf RN  Outcome: Ongoing  7/28/2021 1348 by Amara Jackson  Outcome: Ongoing  Goal: Complications related to intravenous access or infusion will be avoided or minimized  Description: Complications related to intravenous access or infusion will be avoided or minimized  7/28/2021 2146 by Yudelka Yusuf RN  Outcome: Ongoing  7/28/2021 1348 by Amara Jackson  Outcome: Ongoing  Goal: Will show no infection signs and symptoms  Description: Will show no infection signs and symptoms  7/28/2021 2146 by Yudelka Yusuf RN  Outcome: Ongoing  7/28/2021 1348 by Amara Jackson  Outcome: Ongoing  Goal: Absence of new skin breakdown  Description: Absence of new skin breakdown  7/28/2021 2146 by Yudelka Yusuf RN  Outcome: Ongoing  7/28/2021 1348 by Amara Jackson  Outcome: Ongoing     Problem: Anxiety:  Goal: Level of anxiety will decrease  Description: Level of anxiety will decrease  7/28/2021 2146 by Yudelka Yusuf RN  Outcome: Ongoing  7/28/2021 1348 by Amara Jackson  Outcome: Ongoing     Problem: Fluid Volume:  Goal: Ability to achieve a balanced intake and output will improve  Description: Ability to achieve a balanced intake and output will improve  7/28/2021 2146 by Yudelka Yusuf RN  Outcome: Ongoing  7/28/2021 1348 by Sonny Murphy  Outcome: Ongoing     Problem: Nutrition  Goal: Optimal nutrition therapy  7/28/2021 2146 by Vita White RN  Outcome: Ongoing  7/28/2021 1348 by Sonny Murphy  Outcome: Ongoing     Problem: HEMODYNAMIC STATUS  Goal: Patient has stable vital signs and fluid balance  7/28/2021 2146 by Vita White RN  Outcome: Ongoing  7/28/2021 1348 by Sonny Murphy  Outcome: Ongoing     Problem: ACTIVITY INTOLERANCE/IMPAIRED MOBILITY  Goal: Mobility/activity is maintained at optimum level for patient  7/28/2021 2146 by Vita White RN  Outcome: Ongoing  7/28/2021 1348 by Sonny Murphy  Outcome: Ongoing     Problem: COMMUNICATION IMPAIRMENT  Goal: Ability to express needs and understand communication  7/28/2021 2146 by Vita White RN  Outcome: Ongoing  7/28/2021 1348 by Sonny Murphy  Outcome: Ongoing

## 2021-07-30 LAB
ABSOLUTE EOS #: 0.03 K/UL (ref 0–0.44)
ABSOLUTE IMMATURE GRANULOCYTE: 0.07 K/UL (ref 0–0.3)
ABSOLUTE LYMPH #: 1.29 K/UL (ref 1.1–3.7)
ABSOLUTE MONO #: 0.65 K/UL (ref 0.1–1.2)
ALBUMIN SERPL-MCNC: 2.9 G/DL (ref 3.5–5.2)
ALBUMIN SERPL-MCNC: 2.9 G/DL (ref 3.5–5.2)
ALBUMIN/GLOBULIN RATIO: 0.8 (ref 1–2.5)
ALP BLD-CCNC: 61 U/L (ref 40–129)
ALT SERPL-CCNC: 22 U/L (ref 5–41)
ANION GAP SERPL CALCULATED.3IONS-SCNC: 11 MMOL/L (ref 9–17)
AST SERPL-CCNC: 20 U/L
BASOPHILS # BLD: 1 % (ref 0–2)
BASOPHILS ABSOLUTE: 0.08 K/UL (ref 0–0.2)
BILIRUB SERPL-MCNC: 0.37 MG/DL (ref 0.3–1.2)
BILIRUBIN DIRECT: 0.13 MG/DL
BILIRUBIN, INDIRECT: 0.24 MG/DL (ref 0–1)
BUN BLDV-MCNC: 10 MG/DL (ref 6–20)
BUN/CREAT BLD: ABNORMAL (ref 9–20)
CALCIUM SERPL-MCNC: 8.4 MG/DL (ref 8.6–10.4)
CHLORIDE BLD-SCNC: 104 MMOL/L (ref 98–107)
CO2: 20 MMOL/L (ref 20–31)
CREAT SERPL-MCNC: 0.78 MG/DL (ref 0.7–1.2)
DIFFERENTIAL TYPE: ABNORMAL
EOSINOPHILS RELATIVE PERCENT: 0 % (ref 1–4)
GFR AFRICAN AMERICAN: >60 ML/MIN
GFR NON-AFRICAN AMERICAN: >60 ML/MIN
GFR SERPL CREATININE-BSD FRML MDRD: ABNORMAL ML/MIN/{1.73_M2}
GFR SERPL CREATININE-BSD FRML MDRD: ABNORMAL ML/MIN/{1.73_M2}
GLOBULIN: ABNORMAL G/DL (ref 1.5–3.8)
GLUCOSE BLD-MCNC: 122 MG/DL (ref 70–99)
HCT VFR BLD CALC: 27.6 % (ref 40.7–50.3)
HEMOGLOBIN: 8.8 G/DL (ref 13–17)
IMMATURE GRANULOCYTES: 1 %
LYMPHOCYTES # BLD: 15 % (ref 24–43)
MCH RBC QN AUTO: 28.8 PG (ref 25.2–33.5)
MCHC RBC AUTO-ENTMCNC: 31.9 G/DL (ref 28.4–34.8)
MCV RBC AUTO: 90.2 FL (ref 82.6–102.9)
MONOCYTES # BLD: 7 % (ref 3–12)
NRBC AUTOMATED: 0 PER 100 WBC
PDW BLD-RTO: 12.8 % (ref 11.8–14.4)
PHOSPHORUS: 3.5 MG/DL (ref 2.5–4.5)
PLATELET # BLD: 341 K/UL (ref 138–453)
PLATELET ESTIMATE: ABNORMAL
PMV BLD AUTO: 9.5 FL (ref 8.1–13.5)
POTASSIUM SERPL-SCNC: 4 MMOL/L (ref 3.7–5.3)
RBC # BLD: 3.06 M/UL (ref 4.21–5.77)
RBC # BLD: ABNORMAL 10*6/UL
SEG NEUTROPHILS: 76 % (ref 36–65)
SEGMENTED NEUTROPHILS ABSOLUTE COUNT: 6.72 K/UL (ref 1.5–8.1)
SODIUM BLD-SCNC: 135 MMOL/L (ref 135–144)
TOTAL PROTEIN: 6.4 G/DL (ref 6.4–8.3)
WBC # BLD: 8.8 K/UL (ref 3.5–11.3)
WBC # BLD: ABNORMAL 10*3/UL

## 2021-07-30 PROCEDURE — 6370000000 HC RX 637 (ALT 250 FOR IP): Performed by: STUDENT IN AN ORGANIZED HEALTH CARE EDUCATION/TRAINING PROGRAM

## 2021-07-30 PROCEDURE — 87040 BLOOD CULTURE FOR BACTERIA: CPT

## 2021-07-30 PROCEDURE — 82248 BILIRUBIN DIRECT: CPT

## 2021-07-30 PROCEDURE — 6360000002 HC RX W HCPCS: Performed by: NURSE PRACTITIONER

## 2021-07-30 PROCEDURE — 2580000003 HC RX 258: Performed by: INTERNAL MEDICINE

## 2021-07-30 PROCEDURE — 99232 SBSQ HOSP IP/OBS MODERATE 35: CPT | Performed by: PSYCHIATRY & NEUROLOGY

## 2021-07-30 PROCEDURE — 99233 SBSQ HOSP IP/OBS HIGH 50: CPT | Performed by: INTERNAL MEDICINE

## 2021-07-30 PROCEDURE — 2580000003 HC RX 258: Performed by: NURSE PRACTITIONER

## 2021-07-30 PROCEDURE — 6360000002 HC RX W HCPCS: Performed by: STUDENT IN AN ORGANIZED HEALTH CARE EDUCATION/TRAINING PROGRAM

## 2021-07-30 PROCEDURE — 2500000003 HC RX 250 WO HCPCS: Performed by: INTERNAL MEDICINE

## 2021-07-30 PROCEDURE — 80053 COMPREHEN METABOLIC PANEL: CPT

## 2021-07-30 PROCEDURE — 6370000000 HC RX 637 (ALT 250 FOR IP): Performed by: NURSE PRACTITIONER

## 2021-07-30 PROCEDURE — APPSS30 APP SPLIT SHARED TIME 16-30 MINUTES: Performed by: NURSE PRACTITIONER

## 2021-07-30 PROCEDURE — 2060000000 HC ICU INTERMEDIATE R&B

## 2021-07-30 PROCEDURE — 84100 ASSAY OF PHOSPHORUS: CPT

## 2021-07-30 PROCEDURE — 36415 COLL VENOUS BLD VENIPUNCTURE: CPT

## 2021-07-30 PROCEDURE — 85025 COMPLETE CBC W/AUTO DIFF WBC: CPT

## 2021-07-30 PROCEDURE — 80076 HEPATIC FUNCTION PANEL: CPT

## 2021-07-30 PROCEDURE — 99232 SBSQ HOSP IP/OBS MODERATE 35: CPT | Performed by: STUDENT IN AN ORGANIZED HEALTH CARE EDUCATION/TRAINING PROGRAM

## 2021-07-30 PROCEDURE — 6370000000 HC RX 637 (ALT 250 FOR IP): Performed by: INTERNAL MEDICINE

## 2021-07-30 PROCEDURE — 80069 RENAL FUNCTION PANEL: CPT

## 2021-07-30 PROCEDURE — 6360000002 HC RX W HCPCS: Performed by: INTERNAL MEDICINE

## 2021-07-30 RX ADMIN — METOPROLOL TARTRATE 50 MG: 25 TABLET ORAL at 07:52

## 2021-07-30 RX ADMIN — RIFAMPIN 600 MG: 600 INJECTION, POWDER, LYOPHILIZED, FOR SOLUTION INTRAVENOUS at 12:01

## 2021-07-30 RX ADMIN — DIPHENOXYLATE HYDROCHLORIDE AND ATROPINE SULFATE 1 TABLET: 2.5; .025 TABLET ORAL at 20:37

## 2021-07-30 RX ADMIN — Medication 1 CAPSULE: at 12:18

## 2021-07-30 RX ADMIN — PANTOPRAZOLE SODIUM 40 MG: 40 TABLET, DELAYED RELEASE ORAL at 06:52

## 2021-07-30 RX ADMIN — NAFCILLIN INJECTION 2000 MG: 2 POWDER, FOR SOLUTION INTRAMUSCULAR; INTRAMUSCULAR; INTRAVENOUS at 06:45

## 2021-07-30 RX ADMIN — METOPROLOL TARTRATE 50 MG: 25 TABLET ORAL at 20:37

## 2021-07-30 RX ADMIN — Medication 1 CAPSULE: at 15:07

## 2021-07-30 RX ADMIN — Medication 1 CAPSULE: at 07:52

## 2021-07-30 RX ADMIN — DIPHENHYDRAMINE HYDROCHLORIDE 12.5 MG: 50 INJECTION, SOLUTION INTRAMUSCULAR; INTRAVENOUS at 22:46

## 2021-07-30 RX ADMIN — NAFCILLIN INJECTION 2000 MG: 2 POWDER, FOR SOLUTION INTRAMUSCULAR; INTRAMUSCULAR; INTRAVENOUS at 03:05

## 2021-07-30 RX ADMIN — LOPERAMIDE HYDROCHLORIDE 4 MG: 2 CAPSULE ORAL at 07:55

## 2021-07-30 RX ADMIN — POTASSIUM CHLORIDE 20 MEQ: 1500 TABLET, EXTENDED RELEASE ORAL at 15:07

## 2021-07-30 RX ADMIN — PANTOPRAZOLE SODIUM 40 MG: 40 TABLET, DELAYED RELEASE ORAL at 15:07

## 2021-07-30 RX ADMIN — POTASSIUM CHLORIDE 20 MEQ: 1500 TABLET, EXTENDED RELEASE ORAL at 07:52

## 2021-07-30 RX ADMIN — LOPERAMIDE HYDROCHLORIDE 4 MG: 2 CAPSULE ORAL at 12:26

## 2021-07-30 RX ADMIN — ACETAMINOPHEN 650 MG: 325 TABLET ORAL at 15:08

## 2021-07-30 RX ADMIN — LORAZEPAM 1 MG: 2 INJECTION INTRAMUSCULAR; INTRAVENOUS at 15:38

## 2021-07-30 RX ADMIN — NAFCILLIN INJECTION 2000 MG: 2 POWDER, FOR SOLUTION INTRAMUSCULAR; INTRAMUSCULAR; INTRAVENOUS at 18:29

## 2021-07-30 RX ADMIN — DIPHENOXYLATE HYDROCHLORIDE AND ATROPINE SULFATE 1 TABLET: 2.5; .025 TABLET ORAL at 07:52

## 2021-07-30 RX ADMIN — Medication 6 MG: at 22:46

## 2021-07-30 RX ADMIN — NAFCILLIN INJECTION 2000 MG: 2 POWDER, FOR SOLUTION INTRAMUSCULAR; INTRAMUSCULAR; INTRAVENOUS at 22:48

## 2021-07-30 RX ADMIN — NAFCILLIN INJECTION 2000 MG: 2 POWDER, FOR SOLUTION INTRAMUSCULAR; INTRAMUSCULAR; INTRAVENOUS at 10:51

## 2021-07-30 RX ADMIN — ACETAMINOPHEN 650 MG: 325 TABLET ORAL at 07:52

## 2021-07-30 RX ADMIN — NAFCILLIN INJECTION 2000 MG: 2 POWDER, FOR SOLUTION INTRAMUSCULAR; INTRAMUSCULAR; INTRAVENOUS at 15:07

## 2021-07-30 ASSESSMENT — PAIN SCALES - GENERAL
PAINLEVEL_OUTOF10: 0

## 2021-07-30 NOTE — PROGRESS NOTES
Brief Note, complete dictation to follow    No Ocular or Visual complaints at time of exam    Unremarkable eye exam    Ok to discharge from Ophthalmology standpoint

## 2021-07-30 NOTE — PROGRESS NOTES
[facility]  Office: [address], DO, Velia oCrtezr, DO, Moe Narrow, DO, Vesna Prince Blood, DO, Kaycee Green MD, Agus Murillo MD, Angel Harley MD, Emili Gracia MD, Lilia Vann MD, Joaquin Kaiser MD, Angeli Vuong MD, Judi Hood, DO, King Romeo MD, Keyur Brown, DO, Adriel Milton MD,  Annalisa Prado, DO, Sera Osborn MD, Tiarra Mayo MD, Joshua Mcintosh MD, Yanique Hoffman MD, Domingo Hernandez MD, Regina Tolentino MD, Bernard Barnard, [facility], [facility], CNP, Elin James, CNP, Laura Councilman, CNS, Anuel Chan, Adriel Santos, [facility], Genevieve Hamm, CNP, Harleen Smith, CNP, Pipo Whitten, CNP, Ernesto Lei PA-C, [facility] Royal, [facility] [place], Albert Jacinto, CNP, Zara Child, CNP, Trinda Starch, CNP, Romina Yusra, CNP, Mandy Kugel, CNP, Talya Copper, CNP, Marino Valentino, CNP, Jeancarlos Delaney, [address] [place] [address]    Progress Note    7/30/2021    8:42 AM    Name:   Willie Garza  MRN:     [id number]     Acct:      [de-identified]   Room:   [address] [place] [address] Day:  15  Admit Date:  7/17/2021 12:42 AM    PCP:   Jackie Chaves MD  Code Status:  Full Code    Subjective:     C/C:   Chief Complaint   Patient presents with    Abdominal Pain    Emesis     Interval History Status: not changed. Patient seen examined bedside wife also at bedside no growth 2 days, feeling better.  Lovetta Oleg tivan for anxiety, no fevers    Brief History:   41-year-old male past medical history of hypertension history of colectomy secondary to diverticulitis presented with fevers diarrhea approximately 3 days prior to admission was noted to be septic with 2 of 2 blood cultures showing positive MSSA as well as urinalysis positive for MSSA and found to have mitral valve endocarditis requiring IV antibiotics currently being treated with nafcillin 2 g IV every 4 hours until 8/15/2021, 600 mg rifampin for synergy, received infusion of Exebacase on 7/25/2021. MRI 7/21/2021:  Findings suggestive of acute embolic infarctions involving supra and   infratentorial structures as described.       Subacute to chronic encephalomalacia with chronic blood products within the   left superior parietal lobule and right inferior parietal lobule.       There is no acute intracranial hemorrhage, or intracranial mass lesion. Echo 7/17/2021:  Gisela Ramus left ventricular systolic function is normal. Calculated ejection  fraction 48% by Dubois's method. Visually estimated EF 50%. Posterior mitral valve leaflet prolapse. Severe mitral regurgitation. Eccentric anterior diredted jet. EOA = 0.7cm2. PISA radius is 1.3cm. Vena contracta is 0.99 cm. MR volume is  58ml. Possible small vegetation on posterior leaflet. Consider MONROE. MONROE 7/20/2021  MONROE findings:     LA:       Normal  RAJAT:    No thrombus  RA:      Normal  RV:      Normal  LV:       Normal  Estimated LVEF:         55%  Aorta:               Mild atheromatous disease arch  Pericardium:    Trivial pericardial effusion  Septum:           No intracardiac shunt via color Doppler.      Valves:     Mitral Valve: Large vegetation measuring 2.5 X 2.4 cm attached on the posterior leaflet of the mitral valve associated with Flail and degenerated leaflet. Severe regurgitation w/ an anteriorly directed eccentric jet is identified. Aortic Valve: The aortic valve is trileaflet and opens adequately. No regurgitiation is identified. Tricuspid valve: Structurally normal. No regurgitation is identified.   Pulmonary valve: Normal. No significant regurgitation     No thrombus identified  Review of Systems:     Constitutional:  negative for chills, fevers, sweats  Respiratory:  negative for cough, dyspnea on exertion, shortness of breath, wheezing  Cardiovascular:  negative for chest pain, chest pressure/discomfort, lower extremity edema, palpitations  Gastrointestinal:  negative for abdominal pain, constipation, diarrhea, nausea, vomiting  Neurological: Positive for change in vision negative for dizziness, headache      Medications: Allergies: Allergies   Allergen Reactions    Morphine Other (See Comments)     Pt report muscle cramps       Current Meds:   Scheduled Meds:    lactobacillus  1 capsule Oral TID WC    diphenoxylate-atropine  1 tablet Oral BID    metoprolol tartrate  50 mg Oral BID    potassium chloride  20 mEq Oral BID WC    pantoprazole  40 mg Oral BID AC    rifampin (RIFADIN) IVPB  600 mg Intravenous Q24H    nafcillin  2,000 mg Intravenous Q4H    sodium chloride flush  5-40 mL Intravenous 2 times per day     Continuous Infusions:    sodium chloride      sodium chloride       PRN Meds: LORazepam, potassium chloride, diphenhydrAMINE, meclizine, loperamide, sodium chloride, sodium chloride, sodium chloride flush, sodium chloride, ondansetron **OR** ondansetron, acetaminophen **OR** acetaminophen, polyethylene glycol, magnesium sulfate, oxyCODONE, melatonin, aluminum & magnesium hydroxide-simethicone    Data:     Past Medical History:   has a past medical history of Diverticulosis, Hyperlipidemia, Hypertension, MESSI (obstructive sleep apnea), and Research subject. Social History:   reports that he has never smoked. He has never used smokeless tobacco. He reports current alcohol use of about 20.0 standard drinks of alcohol per week. He reports that he does not use drugs. Family History:   Family History   Problem Relation Age of Onset    No Known Problems Mother     Other Father         diverticulitis       Vitals:  /67   Pulse 106   Temp 98.3 °F (36.8 °C) (Oral)   Resp 18   Ht 5' 6\" (1.676 m)   Wt 180 lb 1.9 oz (81.7 kg)   SpO2 99%   BMI 29.07 kg/m²   Temp (24hrs), Av.7 °F (37.1 °C), Min:98.2 °F (36.8 °C), Max:99.7 °F (37.6 °C)    No results for input(s): POCGLU in the last 72 hours. I/O (24Hr):     Intake/Output Summary (Last 24 hours) at 2021 0842  Last data filed at 2021 0400  Gross per 24 hour   Intake 4007.5 ml   Output --   Net 4007.5 ml       Labs:  Hematology:  Recent Labs     07/28/21 0551 07/29/21 0543 07/30/21 0351   WBC 11.1 10.0 8.8   RBC 3.19* 3.30* 3.06*   HGB 9.2* 9.4* 8.8*   HCT 29.1* 30.4* 27.6*   MCV 91.2 92.1 90.2   MCH 28.8 28.5 28.8   MCHC 31.6 30.9 31.9   RDW 12.7 12.6 12.8    377 341   MPV 9.9 9.4 9.5     Chemistry:  Recent Labs     07/28/21 0551 07/28/21 0551 07/29/21 0543 07/29/21 0650 07/30/21 0351     --  137  --  135   K 3.6*   < > 3.9 3.7 4.0     --  103  --  104   CO2 20  --  22  --  20   GLUCOSE 113*  --  108*  --  122*   BUN 9  --  10  --  10   CREATININE 0.75  --  0.79  --  0.78   MG  --   --   --  2.0  --    ANIONGAP 12  --  12  --  11   LABGLOM >60  --  >60  --  >60   GFRAA >60  --  >60  --  >60   CALCIUM 8.2*  --  8.6  --  8.4*   PHOS 3.8  --  3.9  --  3.5   TROPHS  --   --   --  38*  --     < > = values in this interval not displayed. Recent Labs     07/28/21 0551 07/29/21 0543 07/30/21 0351   PROT 6.3*  --  6.4   LABALBU 2.5*  2.5* 2.9* 2.9*  2.9*   AST 17  --  20   ALT 19  --  22   ALKPHOS 57  --  61   BILITOT 0.38  --  0.37   BILIDIR 0.14  --  0.13     ABG:  Lab Results   Component Value Date    POCPH 7.486 07/18/2021    POCPCO2 28.6 07/18/2021    POCPO2 134.5 07/18/2021    POCHCO3 21.6 07/18/2021    NBEA 1 07/18/2021    PBEA NOT REPORTED 07/18/2021    OKI8MAJ NOT REPORTED 07/18/2021    VNFE5IYQ 99 07/18/2021    FIO2 NOT REPORTED 07/18/2021     Lab Results   Component Value Date/Time    SPECIAL NOT REPORTED 07/29/2021 12:00 PM     Lab Results   Component Value Date/Time    CULTURE NO GROWTH 16 HOURS 07/29/2021 12:00 PM       Radiology:  CT HEAD WO CONTRAST    Result Date: 7/20/2021  No acute intracranial abnormality. MRI may be obtained if clinically indicated. CT CERVICAL SPINE W CONTRAST    Result Date: 7/23/2021  1.  Please note, CT does not adequately assess for early discitis/osteomyelitis or an epidural abscess. 2. No convincing acute osseous abnormality seen of the cervical, thoracic or lumbar spine. 3. Scattered degenerative changes noted. 4. Trace pleural effusions bilaterally. 5. Nonspecific stranding along the retroperitoneum bilaterally. 6. No convincing evidence of a drainable fluid collection. CT THORACIC SPINE W CONTRAST    Result Date: 7/23/2021  1. Please note, CT does not adequately assess for early discitis/osteomyelitis or an epidural abscess. 2. No convincing acute osseous abnormality seen of the cervical, thoracic or lumbar spine. 3. Scattered degenerative changes noted. 4. Trace pleural effusions bilaterally. 5. Nonspecific stranding along the retroperitoneum bilaterally. 6. No convincing evidence of a drainable fluid collection. CT LUMBAR SPINE W CONTRAST    Result Date: 7/23/2021  1. Please note, CT does not adequately assess for early discitis/osteomyelitis or an epidural abscess. 2. No convincing acute osseous abnormality seen of the cervical, thoracic or lumbar spine. 3. Scattered degenerative changes noted. 4. Trace pleural effusions bilaterally. 5. Nonspecific stranding along the retroperitoneum bilaterally. 6. No convincing evidence of a drainable fluid collection. MRI CERVICAL SPINE WO CONTRAST    Result Date: 7/23/2021  1. Extensive patient motion limits evaluation. 2. No convincing abnormal bone marrow signal or abnormal signal along the disc spaces of the cervical spine. 3. There is suggestion of abnormal T2 signal within the right medulla. Abnormal T2 signal is seen within the right inferior cerebellum. 4. No convincing abnormal signal within the cervical cord given the limitations of patient motion. No significant spinal canal stenosis seen. XR CHEST PORTABLE    Result Date: 7/23/2021  No radiologic evidence of acute cardiopulmonary disease.      CT CHEST PULMONARY EMBOLISM W CONTRAST    Result Date: 7/20/2021  Small bilateral pleural effusions and mild bibasilar atelectasis This study is nondiagnostic for pulmonary embolism. There is excessive respiratory motion artifact degrading image resolution. There is not significant contrast within the pulmonary artery system to adequately assess for pulmonary embolus. Recommend either repeat exam with improved bolus timing, and decreased patient respiratory motion or VQ scan. CTA HEAD NECK W CONTRAST    Result Date: 7/22/2021  1. Unremarkable CT angiogram of the head and neck. 2. Sequelae of embolic phenomenon as seen on the prior MRI of the brain. Given the degree of vasogenic edema surrounding several of these areas of restricted diffusion, septic emboli is favored. The findings were sent to the Radiology Results [address] at 9:48 am on 7/22/2021to be communicated to a licensed caregiver. MRI BRAIN WO CONTRAST    Result Date: 7/21/2021  Findings suggestive of acute embolic infarctions involving supra and infratentorial structures as described. Subacute to chronic encephalomalacia with chronic blood products within the left superior parietal lobule and right inferior parietal lobule. There is no acute intracranial hemorrhage, or intracranial mass lesion. The findings were sent to the Radiology Results [address] at 8:24 pm on 7/21/2021to be communicated to a licensed caregiver.        Physical Examination:        General appearance:  alert, cooperative and no distress  Mental Status:  oriented to person, place and time and normal affect  Lungs:  clear to auscultation bilaterally, normal effort  Heart:  regular rate and rhythm, systolic murmur  Abdomen:  soft, nontender, nondistended, normal bowel sounds  Extremities:  no edema, redness, tenderness in the calves  Skin: Osler node improving on right middle finger,    Assessment:        Hospital Problems         Last Modified POA    * (Principal) MSSA bacteremia 7/20/2021 Yes    Delusions ([address] 7/20/2021 Yes    Hypophosphatemia 7/20/2021 Yes Severe mitral regurgitation 7/20/2021 Yes    Acute respiratory failure with hypoxia ([address] 7/20/2021 Yes    GI bleed 7/20/2021 Yes    Vertigo 7/21/2021 No    Normocytic normochromic anemia 7/20/2021 Yes    Obesity (BMI 30-39. 9) 7/20/2021 Yes    Transaminitis 7/20/2021 Yes    Diarrhea 7/20/2021 Yes    Acute dehydration 7/20/2021 Yes    Hypomagnesemia 7/20/2021 Yes    Hypokalemia 7/20/2021 Yes    Epistaxis 7/20/2021 No    Thrombocytopenia (HCC) 7/20/2021 Yes    Hyperglycemia 7/20/2021 Yes    Essential hypertension 7/20/2021 Yes    Sepsis with acute organ dysfunction and septic shock ([address] 9/75/5394 Yes    Metabolic encephalopathy 4/42/5262 Yes    Iron deficiency anemia secondary to blood loss (chronic) 7/21/2021 Yes    Guaiac positive stools 7/21/2021 Yes    Subacute endocarditis 7/26/2021 Yes    Cerebral septic emboli ([address] 7/26/2021 Yes          Plan:        1. MSSA bacteremia with severe MR. [address] cardiology, CT surgery, ID recommendations. IV antibiotics planned for total of 6 weeks. Exabecase given 7/25/2021, on Ancef, rifampin for synergy  2. Acute septic embolic infarcts appreciate neurology recommendations. MRI showing progression of infarct  3. Antivert for vertigo  4. GI prophylaxis with Protonix  5. Lomotil for diarrhea  6. Follow up cultures, no growth 2 days, no growth 18 hours  7. IV ativan for anxiety  8. Discussed with family at bedside.     Millie Smith MD  7/30/2021  8:42 AM

## 2021-07-30 NOTE — PROGRESS NOTES
Neurology Nurse Practitioner Progress Note      INTERVAL HISTORY: This is a 36 y.o.  male admitted 7/17/2021 for Diarrhea [R19.7]. This is a follow-up neurology progress note. The patient was examined and the chart was reviewed. Discussed with the pt & RN. There were no acute events overnight. No further episodes of visual obscuration. Eye examination - unremarkable, as per ophthalmologist .    HPI: Nneka Monsivais is a 36 y.o. male with H/O HTN, HLD, diverticulitis s/p partial hemicolectomy (2012), who was admitted 7/16/2021 for nausea, vomiting, abdominal pain, watery diarrhea along with body/joints aches and generalized fatigue. Patient reported that his symptoms started 4 to 5 days ago. On the day of admission, he felt feverish, chills & lightheaded so presented to the ED for evaluation. Patient was treated for MSSA sepsis, septic shock, subacute endocarditis and severe MR. Neurology was consulted on 7/21 due to reports of visual hallucinations, vertigo and double vision. Family reported that patient was noticed to be sleeping when he opened his eyes and described seeing people are a dog in the room & spoke about things that were not happening. Patient recalled seeing water coming from the IV line that was not there. On the morning of 7/21, patient woke up with severe vertigo and unsteady gait. Vertigo was constant, no aggravating factor, requiring help to get to the restroom. He also complained of oblique monocular double vision that lasted for 2 hours. On further questioning patient reported difficulty to concentrate and some difficulty in remembering things. He denied any other focal motor, sensory, visual or bulbar symptoms.      lactobacillus  1 capsule Oral TID WC    diphenoxylate-atropine  1 tablet Oral BID    metoprolol tartrate  50 mg Oral BID    potassium chloride  20 mEq Oral BID WC    pantoprazole  40 mg Oral BID AC    rifampin (RIFADIN) IVPB  600 mg Intravenous Q24H    nafcillin  2,000 response bilaterally   Gait                  Not tested       DATA      Lab Results   Component Value Date    WBC 8.8 07/30/2021    HGB 8.8 (L) 07/30/2021    HCT 27.6 (L) 07/30/2021     07/30/2021    ALT 22 07/30/2021    AST 20 07/30/2021     07/30/2021    K 4.0 07/30/2021     07/30/2021    AMMONIA 52 07/20/2021    CREATININE 0.78 07/30/2021    BUN 10 07/30/2021    CO2 20 07/30/2021    TSH 1.86 07/20/2021    INR 1.0 07/19/2021    [id number] 1120 07/17/2021    FOLATE >20.0 07/17/2021    LABA1C 5.5 07/17/2021         DIAGNOSTIC DATA:  CT HEAD (7/20/2021): No acute intracranial abnormality     MRI BRAIN (9/79/3360): Acute embolic infarctions involving supra & infratentorial structures. Subacute to chronic encephalomalacia with chronic blood products within the L superior parietal lobule & R inferior parietal lobule    REPEAT MRI BRAIN (7/27/2021): Interval development of additional areas of restricted diffusion demonstrated in the R cerebellum, R posterior temporal lobe, R head of caudate, L temporal lobe & b/l frontal lobes    CTA HEAD & NECK (7/22/2021): Unremarkable      ECHO (7/18/2021): EF 50%. Severe MR. Posterior mitral valve leaflet prolapse. Possible small vegetation on posterior leaflet    MONROE (7/20/2021): EF 55%. Large vegetation measuring 2.5 X 2.4 cm noted on the posterior leaflet of the mitral valve. Posterior leaflet is flair and is associated with severe anteriorly directed eccentric regurgitation      EEG (7/21/2021): This EEG shows the presence of mild diffuse slowing. This EEG is concordant with diffuse encephalopathy. No clear lateralized or epileptiform disturbance is seen                          IMPRESSION: 36 y.o.  male admitted with  Metabolic encephalopathy, along with hallucinations, vertigo and diplopia, in the setting of MSSA sepsis, septic shock, subacute endocarditis & acute embolic infarctions supra/infratentorial structures.  Patient stays awake, alert & oriented, at his baseline mentation    MRI brain - acute embolic infarctions supra/infratentorial structures. Repeat MRI brain -  additional b/l embolic infarcts     Patient reported ongoing intermittent transient episode of right sided visual disturbance, described as seeing \"wavy ribbons\", during that episode, he was unable to see through that eye; the whole event lasted for 15 to 20 minutes before resolving spontaneously. He recalled a similar episode 1 month ago while he was at work; did not seek any medical attention at that time. No episodes today (7/30). Eye examination - unremarkable, as per ophthalmologist     MSSA sepsis, subacute endocarditis. Exabecase given (7/25/21); is on nafcillin & rifampin till 8/15, as per ID team    Comorbid conditions - HTN, HLD, diverticulitis s/p partial hemicolectomy (2012), R rotator cuff injury          PLAN:  Unfortunately antiplatelets/AC are contraindicated in this case, as septic emboli carry a high risk of bleeding; will defer to CTS team     MVR to be planned by CTS team following 6 weeks of ATBs & negative blood cultures    Patient needs to F/U with neurology on 9/27/2021 at 1400    No further neurological testing is required at this time    We will sign off. Please, call with any questions or concerns. Thank you    Please note that this note was generated using a voice recognition dictation software. Although every effort was made to ensure the accuracy of this automated transcription, some errors in transcription may have occurred.

## 2021-07-30 NOTE — PROGRESS NOTES
PATIENT REFUSES TO WEAR BIPAP     [x] Risks and benefits explained to patient   [x] Patient refuses to wear Bipap stating no thanks I dont need it  [x] Patient verbalizes understanding of information presented.

## 2021-07-30 NOTE — PROGRESS NOTES
1536 Pt. Reported feeling like he is going to have an episode of   Rigors. Writer to get Ativan. 1538  IV Ativan given, pt. Resting in bed shaking(Alert oriented and able to talk). Pt. Spouse laying across pt. Legs to try and quit from shaking. 1551 Pt. Reports shaking gone but still feels cold chills. 1555 Pt. Resting comfortably in bed with eyes closed and spouse at bedside. Will monitor.

## 2021-07-30 NOTE — PROGRESS NOTES
[facility] - Progress Note    Today's Date and Time: 7/30/2021, 10:57 AM    Impression :   · MSSA septicemia 7-17-21  · Persistent growth of bacteria in blood 7-17-21 through 7-27-21 despite treatment  · Sepsis with acute organ dysfunction and septic shock  · Mitral Valve endocarditis. 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 Will need 4 weeks from first negative blood cultures.   · Rifampin 600 mg daily added 7/22/21 for synergy  · Please do not give anticoagulants due to the risk of septic emboli in the brain transitioning to hemorrhagic   · Received infusion of Exebacase under compassionate use protocol on 7-25-21 without any AEs    Medical Decision Making/Summary/Discussion:7/30/2021     ·   Infection Control Recommendations   · [place] Precautions  · Contact Isolation     Antimicrobial Stewardship Recommendations     · Simplification of therapy  · Targeted therapy    Coordination of Outpatient Care:   · Estimated Length of IV antimicrobials: 6 weeks from first negative blood cultures  · Patient will need Midline Catheter Insertion: TBD  · Patient will need PICC line Insertion: TBD  · Patient will need: Home IV , [place],  SNF,  LTAC: Yes-either SNF or home infusion  · Patient will need outpatient wound care: TBD    Chief complaint/reason for consultation:   · MSSA sepsis    History of Present Illness:   Jhonatan Short is a 36y.o.-year-old  male who was initially admitted on 7/17/2021. Patient seen at the request of Dr. Cynthia Ramirez:     Patient initially presented to [facility] a few days ago with complaints of  abdominal pain, nausea, watery diarrhea and vomiting for the past 5 days after eating some chicken. The patient states that his wife also had episodes of emesis after eating the chicken but her symptoms resolved shortly after. A CT abdomen was unremarkable and he was diagnosed with viral gastroenteritis and sent home with Norco, Zofran, and potassium supplement for hypokalemia. On 7/16/2021, he presented to [address] [place] [address] with worsening symptoms, including fever, chills, fatigue, and worsening abdominal pain with continued vomiting, right big toe pain. Work-up in the ED revealed a fever of 102.6, tachycardia with a heart rate up to 170, dyspnea with hypoxia, blood cultures showed MSSA x2, and urine cultures grew staph aureus ,000 CFU/mL. Occult blood stool positive. CT abdomen 7/17/2021  Impression   No acute abnormality identified in the abdomen and pelvis.       The bladder is markedly distended with a calculated volume of 2 L.       Hepatic steatosis and mild hepatomegaly.       Minor bibasilar atelectasis and interstitial thickening posteriorly. CT chest abdomen pelvis with contrast 7/18/2021  Impression   Moderate distention of the colon multiple air-fluid levels, findings may be   seen with acute enterocolitis. Abnormal labs include:  Sodium 131  Potassium 3.0  .5  Myoglobin 138  Troponin I 65  Amylase 131  AST 66  Lipase 299  Platelet 39  Sed rate 31    ID has been consulted for MSSA sepsis     Addendum: On 7-24-21 I reviewed with patient and wife his clinical course up to the present and the difficulties with source control.   I indicated to the patient and his wife, in the presence of his RN, that we had been able to secure permission from the  of Tilda Meraz, Dr Zoraida Le of the IRB at [place] [address], Hospital administration and Legal Department for him to receive Exebacase under a Compassionate Use Protocol. I discussed the available information with him including prior published clinical results and safety data. Indicated that there was no promise of efficacy. I emphasized that participation was voluntary. Patient and his wife had all their questions answered. I gave them two copies of the informed consent to review. They will keep one copy and return to signed copy for inclusion in his records. Patient gave informed consent to proceed. CURRENT EVALUATION 7/30/2021:    Afebrile  Vital signs stable    Evaluation, the patient remains alert and oriented on room air. He states he is feeling quite tired today and had another episode of rigors overnight. Repeat blood cultures from 7/28/2021 have shown no growth for 2 days now and the single blood culture from 7/29/2021 has also not yielded any growth. 1 blood culture for 7/30/2021 has been ordered. CT surgery is planning for valve repair after the patient has completed IV antibiotic therapy-unless the patient decompensates to the point of needing emergent surgical intervention. MRI brain 7-85-03: Acute embolic infarctions in supra and infratentorial structures. Because of the septic emboli in the brain, Ancef was discontinued and nafcillin was initiated on 7/22/2021. Rifampin was also added for synergy. Echocardiogram completed on 7/18/2021 revealed posterior mitral valve leaflet prolapse, severe mitral regurgitation, and possible small vegetation on posterior leaflet. Presence of vegetation on mitral valve would help explain the emboli to the brain.     MONROE on 7/20 showed a Large vegetation measuring 2.5 X 2.4 cm noted on the posterior leaflet of the mitral valve. Posterior leaflet is flair and is associated with Laterality Date    ABDOMEN SURGERY      large bowel resection    ANTERIOR CRUCIATE LIGAMENT REPAIR Right     APPENDECTOMY  07/09/1997    COLECTOMY      2012 - D/T Dverticulitis    DILATATION, ESOPHAGUS         Medications:      lactobacillus  1 capsule Oral TID WC    diphenoxylate-atropine  1 tablet Oral BID    metoprolol tartrate  50 mg Oral BID    potassium chloride  20 mEq Oral BID WC    pantoprazole  40 mg Oral BID AC    rifampin (RIFADIN) IVPB  600 mg Intravenous Q24H    nafcillin  2,000 mg Intravenous Q4H    sodium chloride flush  5-40 mL Intravenous 2 times per day       Social History:     Social History     Socioeconomic History    Marital status:      Spouse name: Not on file    Number of children: Not on file    Years of education: Not on file    Highest education level: Not on file   Occupational History    Not on file   Tobacco Use    Smoking status: Never Smoker    Smokeless tobacco: Never Used   Vaping Use    Vaping Use: Never assessed   Substance and Sexual Activity    Alcohol use: Yes     Alcohol/week: 20.0 standard drinks     Types: 20 Cans of beer per week     Comment: 20/ week, average every other day    Drug use: Never    Sexual activity: Not on file   Other Topics Concern    Not on file   Social History Narrative    Not on file     Social Determinants of Health     Financial Resource Strain:     Difficulty of Paying Living Expenses:    Food Insecurity:     Worried About Running Out of Food in the Last Year:     [address] in the Last Year:    Transportation Needs:     Lack of Transportation (Medical):      Lack of Transportation (Non-Medical):    Physical Activity:     Days of Exercise per Week:     Minutes of Exercise per Session:    Stress:     Feeling of Stress :    Social Connections:     Frequency of Communication with Friends and Family:     Frequency of Social Gatherings with Friends and Family:     Attends Oriental orthodox Services:     Active Member of Clubs or Organizations:     Attends Club or Organization Meetings:     Marital Status:    Intimate Partner Violence:     Fear of Current or Ex-Partner:     Emotionally Abused:     Physically Abused:     Sexually Abused:        Family History:     Family History   Problem Relation Age of Onset    No Known Problems Mother     Other Father         diverticulitis        Allergies:   Morphine     Review of Systems:   Constitutional: Intermittent fevers and rigors  Head: No headaches  Eyes: No double vision or blurry vision. No conjunctival inflammation. ENT: No sore throat or runny nose. . No hearing loss, tinnitus or vertigo. Cardiovascular: No chest pain or palpitations. No shortness of breath. No CASTRO  Lung: No shortness of breath or cough. No sputum production  Abdomen: No nausea, vomiting, diarrhea, or abdominal pain. Tiny Money No cramps. Genitourinary: No increased urinary frequency, or dysuria. No hematuria. No suprapubic or CVA pain  Musculoskeletal: No muscle aches or pains. No joint effusions, swelling or deformities  Hematologic: No bleeding or bruising. Neurologic: No headache, weakness, numbness, or tingling. Integument: Scattered small reddened bumps and bruises  Psychiatric: No depression. Endocrine: No polyuria, no polydipsia, no polyphagia. Physical Examination :     Patient Vitals for the past 8 hrs:   BP Temp Temp src Pulse Resp SpO2 Weight   07/30/21 0745 103/67 98.3 °F (36.8 °C) Oral 106 -- -- --   07/30/21 0310 (!) 96/56 98.6 °F (37 °C) Oral 114 18 99 % 180 lb 1.9 oz (81.7 kg)     General Appearance: Awake, alert, and in no apparent distress  Head:  Normocephalic, no trauma  Eyes: Pupils equal, round, reactive to light and accommodation; extraocular movements intact; sclera anicteric; conjunctivae pink. No embolic phenomena. ENT: Oropharynx clear, without erythema, exudate, or thrush. No tenderness of sinuses. Mouth/throat: mucosa pink and moist. No lesions.  Dentition in good repair. Neck:Supple, without lymphadenopathy. Thyroid normal, No bruits. Pulmonary/Chest: Clear to auscultation, without wheezes, rales, or rhonchi. No dullness to percussion. Cardiovascular: Normal sinus tachycardia with murmur, No rubs, or gallops. Abdomen: Soft, non tender. Bowel sounds normal. No organomegaly  All four Extremities: No cyanosis, clubbing, edema, or effusions. Neurologic: No gross sensory or motor deficits. Skin: Warm and dry with good turgor. No signs of peripheral arterial or venous insufficiency. No ulcerations. No open wounds. Medical Decision Making -Laboratory:   I have independently reviewed/ordered the following labs:    CBC with Differential:   Recent Labs     07/29/21  0543 07/30/21  0351   WBC 10.0 8.8   HGB 9.4* 8.8*   HCT 30.4* 27.6*    341   LYMPHOPCT 15* 15*   MONOPCT 8 7     BMP:   Recent Labs     07/29/21  0543 07/29/21  0543 07/29/21  0650 07/30/21  0351     --   --  135   K 3.9   < > 3.7 4.0     --   --  104   CO2 22  --   --  20   BUN 10  --   --  10   CREATININE 0.79  --   --  0.78   MG  --   --  2.0  --     < > = values in this interval not displayed. Hepatic Function Panel:   Recent Labs     07/28/21  0551 07/28/21  0551 07/29/21  0543 07/30/21  0351   PROT 6.3*  --   --  6.4   LABALBU 2.5*  2.5*   < > 2.9* 2.9*  2.9*   BILIDIR 0.14  --   --  0.13   IBILI 0.24  --   --  0.24   BILITOT 0.38  --   --  0.37   ALKPHOS 57  --   --  61   ALT 19  --   --  22   AST 17  --   --  20    < > = values in this interval not displayed. No results for input(s): RPR in the last 72 hours. No results for input(s): HIV in the last 72 hours. No results for input(s): BC in the last 72 hours.   Lab Results   Component Value Date    MUCUS NOT REPORTED 07/17/2021    RBC 3.06 07/30/2021    TRICHOMONAS NOT REPORTED 07/17/2021    WBC 8.8 07/30/2021    YEAST NOT REPORTED 07/17/2021    TURBIDITY CLEAR 07/17/2021     Lab Results   Component Value Date    CREATININE 0.78 07/30/2021    GLUCOSE 122 07/30/2021       Medical Decision Making-Imaging:          MRI Brain 7/2121   Impression       Findings suggestive of acute embolic infarctions involving supra and   infratentorial structures as described.       Subacute to chronic encephalomalacia with chronic blood products within the   left superior parietal lobule and right inferior parietal lobule.       There is no acute intracranial hemorrhage, or intracranial mass lesion.           EXAMINATION:   CT OF THE CHEST, ABDOMEN, AND PELVIS WITH CONTRAST 7/18/2021 10:53 am       TECHNIQUE:   CT of the chest, abdomen and pelvis was performed with the administration of   intravenous contrast. Multiplanar reformatted images are provided for review. Dose modulation, iterative reconstruction, and/or weight based adjustment of   the mA/kV was utilized to reduce the radiation dose to as low as reasonably   achievable.       COMPARISON:   None       HISTORY:   ORDERING SYSTEM PROVIDED HISTORY: MSSA bacteremia, ? DIC   TECHNOLOGIST PROVIDED HISTORY:   MSSA bacteremia, ? DIC       Reason for Exam: sepsis with acute organ dysfunction   Acuity: Unknown   Type of Exam: Unknown       FINDINGS:   Lungs/pleura: The central airways are patent.  There are no suspicious   pulmonary nodules       Mediastinum: There is no acute mediastinal abnormality       Soft Tissues/Bones: No suspicious osteolytic or osteoblastic lesions       Abdominal organs: The liver, spleen, adrenal glands, kidneys, and pancreas   demonstrate no acute abnormality.       GI/Bowel: The visualized portions of the small bowel are unremarkable. There   is borderline dilation of the colon.  There are multiple air-fluid levels   throughout the colon.       Peritoneum/Retroperitoneum: There is a solid amount of retroperitoneal fluid   along the distal ureters bilaterally.       Pelvis:  The bladder is moderately distended.       Bones: No suspicious osseous lesions are identified         Impression   Moderate distention of the colon multiple air-fluid levels, findings may be   seen with acute enterocolitis.             XR FOOT LEFT (2 VIEWS) [[id number]] Collected: 07/18/21 1411      Order Status: Completed Updated: 07/19/21 0759     Narrative:       EXAMINATION:   TWO XRAY VIEWS OF THE LEFT FOOT     7/18/2021 2:05 pm     COMPARISON:   None. HISTORY:   ORDERING SYSTEM PROVIDED HISTORY: Left big toe pain and erythema. Looking for   source of MSSA sepsis   TECHNOLOGIST PROVIDED HISTORY:   Left big toe pain and erythema. Looking for source of MSSA sepsis     FINDINGS:   AP and lateral views of the foot obtained.  Normal alignment and   mineralization.  No abnormal periosteal reaction.  No erosions.  No fracture. No aggressive lytic or blastic lesions.  Mild soft tissue swelling of the   great toe noted.      Impression:       No radiographic evidence for acute osteomyelitis.  Mild soft tissue swelling   of the great toe possibly cellulitis.       EXAMINATION:   MRI OF THE BRAIN WITHOUT CONTRAST  7/21/2021 7:03 pm       TECHNIQUE:   Multiplanar multisequence MRI of the brain was performed without the   administration of intravenous contrast.       COMPARISON:   Head CT of 07/20/2021       HISTORY:   ORDERING SYSTEM PROVIDED HISTORY: episode of vertigo, dipolopia, has   infecticive endocadrditis, r/o septic emboli, abscess   TECHNOLOGIST PROVIDED HISTORY:   episode of vertigo, dipolopia, has infecticive endocadrditis, r/o septic   emboli, abscess   Reason for Exam: VERTIGO, MSSA BACTEREMIA, R/O SEPTIC EMBOLI       FINDINGS:   MRI brain:       There are punctate areas of restricted diffusion within the left superior   frontal gyrus subcortical white matter, right superior frontal gyrus   subcortical white matter, left superior parietal lobule right inferior   frontal gyrus subcortical white matter, left inferior frontal gyrus   subcortical white matter and bilateral cerebellar hemisphere, suggestive of   acute embolic infarctions.       There are focus of encephalomalacia with susceptibility artifact and   increased signal on DWI within left superior parietal lobule and right   inferior parietal lobule suggestive of subacute to chronic infarct with   chronic blood products.       There is no acute intracranial hemorrhage, or intracranial mass lesion. No   mass effect, midline shift, or extra-axial collection is noted.       The brain parenchyma is otherwise normal.  The pituitary gland is normal in   appearance. The cerebellar tonsils are in normal position.       The ventricles, sulci, and cisterns are within normal size and range.  No   significant volume loss. .  The intracranial flow voids are preserved.       The globes and orbits are within normal limits. The visualized extracranial   structures including paranasal sinuses and mastoid air cells are unremarkable.           Impression       Findings suggestive of acute embolic infarctions involving supra and   infratentorial structures as described.       Subacute to chronic encephalomalacia with chronic blood products within the   left superior parietal lobule and right inferior parietal lobule.       There is no acute intracranial hemorrhage, or intracranial mass lesion.       The findings were sent to the Radiology Results [address] at 8:24   pm on 7/21/2021to be communicated to a licensed caregiver.                 Medical Decision [id number]:     Component Collected Lab   Specimen Description 07/17/2021  8:48  [address]   . BLOOD    Special Requests 07/17/2021  8:48  [address]   10ML LFA    Culture Abnormal  07/17/2021  8:48 AM [address] Blood Culture Results called to and read back by: Chelsea Amezcua RN AT 2045 ON 07.17.2021    Culture 07/17/2021  8:48 AM [address] FROM BOTTLE: GRAM POSITIVE COCCI IN CLUSTERS    Culture Guarded  · Discharge planning reviewed  · Follow up as outpatient. Thank you for allowing us to participate in the care of this patient. Please call with questions. Deon Reyna, APRN - CNP       ATTESTATION:    I have discussed the case, including pertinent history and exam findings with the APRN. I have evaluated the  History, physical findings and pictures of the patient and the key elements of the encounter have been performed by me. I have reviewed the laboratory data, other diagnostic studies and discussed them with the APRN. I have updated the medical record where necessary. I agree with the assessment, plan and orders as documented by the APRN.     Adebayo Adames MD.            Pager: [phone] - Office: [phone]

## 2021-07-31 LAB
ABSOLUTE EOS #: 0.04 K/UL (ref 0–0.44)
ABSOLUTE IMMATURE GRANULOCYTE: 0.09 K/UL (ref 0–0.3)
ABSOLUTE LYMPH #: 2.38 K/UL (ref 1.1–3.7)
ABSOLUTE MONO #: 1.07 K/UL (ref 0.1–1.2)
ALBUMIN SERPL-MCNC: 3.1 G/DL (ref 3.5–5.2)
ANION GAP SERPL CALCULATED.3IONS-SCNC: 8 MMOL/L (ref 9–17)
BASOPHILS # BLD: 1 % (ref 0–2)
BASOPHILS ABSOLUTE: 0.13 K/UL (ref 0–0.2)
BUN BLDV-MCNC: 8 MG/DL (ref 6–20)
BUN/CREAT BLD: ABNORMAL (ref 9–20)
CALCIUM SERPL-MCNC: 8.8 MG/DL (ref 8.6–10.4)
CHLORIDE BLD-SCNC: 103 MMOL/L (ref 98–107)
CO2: 24 MMOL/L (ref 20–31)
CREAT SERPL-MCNC: 0.74 MG/DL (ref 0.7–1.2)
DIFFERENTIAL TYPE: ABNORMAL
EOSINOPHILS RELATIVE PERCENT: 0 % (ref 1–4)
GFR AFRICAN AMERICAN: >60 ML/MIN
GFR NON-AFRICAN AMERICAN: >60 ML/MIN
GFR SERPL CREATININE-BSD FRML MDRD: ABNORMAL ML/MIN/{1.73_M2}
GFR SERPL CREATININE-BSD FRML MDRD: ABNORMAL ML/MIN/{1.73_M2}
GLUCOSE BLD-MCNC: 99 MG/DL (ref 70–99)
HCT VFR BLD CALC: 30.6 % (ref 40.7–50.3)
HEMOGLOBIN: 9.6 G/DL (ref 13–17)
IMMATURE GRANULOCYTES: 1 %
LYMPHOCYTES # BLD: 19 % (ref 24–43)
MCH RBC QN AUTO: 28.7 PG (ref 25.2–33.5)
MCHC RBC AUTO-ENTMCNC: 31.4 G/DL (ref 28.4–34.8)
MCV RBC AUTO: 91.6 FL (ref 82.6–102.9)
MONOCYTES # BLD: 9 % (ref 3–12)
NRBC AUTOMATED: 0 PER 100 WBC
PDW BLD-RTO: 12.8 % (ref 11.8–14.4)
PHOSPHORUS: 4.1 MG/DL (ref 2.5–4.5)
PLATELET # BLD: 408 K/UL (ref 138–453)
PLATELET ESTIMATE: ABNORMAL
PMV BLD AUTO: 9.3 FL (ref 8.1–13.5)
POTASSIUM SERPL-SCNC: 4.5 MMOL/L (ref 3.7–5.3)
RBC # BLD: 3.34 M/UL (ref 4.21–5.77)
RBC # BLD: ABNORMAL 10*6/UL
SEG NEUTROPHILS: 70 % (ref 36–65)
SEGMENTED NEUTROPHILS ABSOLUTE COUNT: 8.87 K/UL (ref 1.5–8.1)
SODIUM BLD-SCNC: 135 MMOL/L (ref 135–144)
WBC # BLD: 12.6 K/UL (ref 3.5–11.3)
WBC # BLD: ABNORMAL 10*3/UL

## 2021-07-31 PROCEDURE — 80069 RENAL FUNCTION PANEL: CPT

## 2021-07-31 PROCEDURE — 87040 BLOOD CULTURE FOR BACTERIA: CPT

## 2021-07-31 PROCEDURE — 6370000000 HC RX 637 (ALT 250 FOR IP): Performed by: INTERNAL MEDICINE

## 2021-07-31 PROCEDURE — 2580000003 HC RX 258: Performed by: INTERNAL MEDICINE

## 2021-07-31 PROCEDURE — 2580000003 HC RX 258: Performed by: NURSE PRACTITIONER

## 2021-07-31 PROCEDURE — 85025 COMPLETE CBC W/AUTO DIFF WBC: CPT

## 2021-07-31 PROCEDURE — 6370000000 HC RX 637 (ALT 250 FOR IP): Performed by: STUDENT IN AN ORGANIZED HEALTH CARE EDUCATION/TRAINING PROGRAM

## 2021-07-31 PROCEDURE — 36415 COLL VENOUS BLD VENIPUNCTURE: CPT

## 2021-07-31 PROCEDURE — 99232 SBSQ HOSP IP/OBS MODERATE 35: CPT | Performed by: STUDENT IN AN ORGANIZED HEALTH CARE EDUCATION/TRAINING PROGRAM

## 2021-07-31 PROCEDURE — 2060000000 HC ICU INTERMEDIATE R&B

## 2021-07-31 PROCEDURE — 6370000000 HC RX 637 (ALT 250 FOR IP): Performed by: NURSE PRACTITIONER

## 2021-07-31 PROCEDURE — 99233 SBSQ HOSP IP/OBS HIGH 50: CPT | Performed by: INTERNAL MEDICINE

## 2021-07-31 PROCEDURE — 2500000003 HC RX 250 WO HCPCS: Performed by: INTERNAL MEDICINE

## 2021-07-31 PROCEDURE — 6360000002 HC RX W HCPCS: Performed by: INTERNAL MEDICINE

## 2021-07-31 PROCEDURE — 6360000002 HC RX W HCPCS: Performed by: NURSE PRACTITIONER

## 2021-07-31 RX ADMIN — LOPERAMIDE HYDROCHLORIDE 4 MG: 2 CAPSULE ORAL at 08:03

## 2021-07-31 RX ADMIN — DIPHENOXYLATE HYDROCHLORIDE AND ATROPINE SULFATE 1 TABLET: 2.5; .025 TABLET ORAL at 07:58

## 2021-07-31 RX ADMIN — NAFCILLIN INJECTION 2000 MG: 2 POWDER, FOR SOLUTION INTRAMUSCULAR; INTRAMUSCULAR; INTRAVENOUS at 03:52

## 2021-07-31 RX ADMIN — DIPHENOXYLATE HYDROCHLORIDE AND ATROPINE SULFATE 1 TABLET: 2.5; .025 TABLET ORAL at 21:23

## 2021-07-31 RX ADMIN — Medication 6 MG: at 21:24

## 2021-07-31 RX ADMIN — SODIUM CHLORIDE, PRESERVATIVE FREE 10 ML: 5 INJECTION INTRAVENOUS at 07:59

## 2021-07-31 RX ADMIN — METOPROLOL TARTRATE 50 MG: 25 TABLET ORAL at 21:24

## 2021-07-31 RX ADMIN — Medication 1 CAPSULE: at 07:56

## 2021-07-31 RX ADMIN — NAFCILLIN INJECTION 2000 MG: 2 POWDER, FOR SOLUTION INTRAMUSCULAR; INTRAMUSCULAR; INTRAVENOUS at 21:24

## 2021-07-31 RX ADMIN — Medication 1 CAPSULE: at 11:58

## 2021-07-31 RX ADMIN — NAFCILLIN INJECTION 2000 MG: 2 POWDER, FOR SOLUTION INTRAMUSCULAR; INTRAMUSCULAR; INTRAVENOUS at 17:05

## 2021-07-31 RX ADMIN — PANTOPRAZOLE SODIUM 40 MG: 40 TABLET, DELAYED RELEASE ORAL at 07:57

## 2021-07-31 RX ADMIN — DIPHENHYDRAMINE HYDROCHLORIDE 12.5 MG: 50 INJECTION, SOLUTION INTRAMUSCULAR; INTRAVENOUS at 21:24

## 2021-07-31 RX ADMIN — METOPROLOL TARTRATE 50 MG: 25 TABLET ORAL at 07:56

## 2021-07-31 RX ADMIN — PANTOPRAZOLE SODIUM 40 MG: 40 TABLET, DELAYED RELEASE ORAL at 17:06

## 2021-07-31 RX ADMIN — Medication 1 CAPSULE: at 17:07

## 2021-07-31 RX ADMIN — ACETAMINOPHEN 650 MG: 325 TABLET ORAL at 00:53

## 2021-07-31 RX ADMIN — SODIUM CHLORIDE, PRESERVATIVE FREE 10 ML: 5 INJECTION INTRAVENOUS at 21:25

## 2021-07-31 RX ADMIN — ACETAMINOPHEN 650 MG: 325 TABLET ORAL at 20:30

## 2021-07-31 RX ADMIN — NAFCILLIN INJECTION 2000 MG: 2 POWDER, FOR SOLUTION INTRAMUSCULAR; INTRAMUSCULAR; INTRAVENOUS at 07:58

## 2021-07-31 RX ADMIN — POTASSIUM CHLORIDE 20 MEQ: 1500 TABLET, EXTENDED RELEASE ORAL at 07:57

## 2021-07-31 RX ADMIN — ACETAMINOPHEN 650 MG: 325 TABLET ORAL at 14:25

## 2021-07-31 RX ADMIN — RIFAMPIN 600 MG: 600 INJECTION, POWDER, LYOPHILIZED, FOR SOLUTION INTRAVENOUS at 14:22

## 2021-07-31 RX ADMIN — SODIUM CHLORIDE, PRESERVATIVE FREE 10 ML: 5 INJECTION INTRAVENOUS at 11:58

## 2021-07-31 RX ADMIN — ACETAMINOPHEN 650 MG: 325 TABLET ORAL at 08:03

## 2021-07-31 RX ADMIN — NAFCILLIN INJECTION 2000 MG: 2 POWDER, FOR SOLUTION INTRAMUSCULAR; INTRAMUSCULAR; INTRAVENOUS at 11:57

## 2021-07-31 ASSESSMENT — PAIN SCALES - GENERAL
PAINLEVEL_OUTOF10: 0
PAINLEVEL_OUTOF10: 2

## 2021-07-31 ASSESSMENT — ENCOUNTER SYMPTOMS
APNEA: 0
COLOR CHANGE: 0
EYE ITCHING: 0
ABDOMINAL DISTENTION: 0

## 2021-07-31 NOTE — PROGRESS NOTES
[facility]  Office: [address], DO, Alphonso Dale, DO, Lorena Vanegas, DO, Breonna Garcia Blood, DO, Kevon Mcintsoh MD, Shelly Rowan MD, Trang Ramires MD, Natalee Muhammad MD, Darin Toure MD, Joselyn Hunter MD, Tiffany Abebe MD, Hattie Johnson, DO, Case Landry MD, Lor Houston DO, Chadwick Davidson MD,  Shawn Herrera DO, Lucio Rizvi MD, Dylan Hernadez MD, Cassia Anguiano MD, Zachary Weathers MD, Myrtel Bejarano MD, Melvina Rajput MD, Patsy Krause, [facility], [facility], CNP, Kishore Cardona, CNP, Hank Arroyo, CNS, [facility], Allegra Cortes, CNP, Anitra Huitron, CNP, Jocelyne Alvarez, CNP, Daphne Sterling, CNP, Lakisha Malagon PA-C, Javi Moses, [facility], Bonny Hou, CNP, Derek Hummel, CNP, Barak Skinner, CNP, Chapo West, CNP, Christ Turpin, CNP, Heydi Gabriel, CNP, Orquidea Dumont, CNP, Caron Boehringer, [address] [place] [address]    Progress Note    7/31/2021    8:53 AM    Name:   Jhonatan Short  MRN:     [id number]     Acct:      [de-identified]   Room:   [address] [place] [address] Day:  15  Admit Date:  7/17/2021 12:42 AM    PCP:   Go Rios MD  Code Status:  Full Code    Subjective:     C/C:   Chief Complaint   Patient presents with    Abdominal Pain    Emesis     Interval History Status: not changed. Patient seen examined bedside wife also at bedside no growth 3 days had a fever last night, white count overall stable.     Brief History:   49-year-old male past medical history of hypertension history of colectomy secondary to diverticulitis presented with fevers diarrhea approximately 3 days prior to admission was noted to be septic with 2 of 2 blood cultures showing positive MSSA as well as urinalysis positive for MSSA and found to have mitral valve endocarditis requiring IV antibiotics currently being treated with nafcillin 2 g IV every 4 hours until 8/15/2021, 600 mg rifampin for synergy, received infusion of Exebacase on 7/25/2021. MRI 7/21/2021:  Findings suggestive of acute embolic infarctions involving supra and   infratentorial structures as described.       Subacute to chronic encephalomalacia with chronic blood products within the   left superior parietal lobule and right inferior parietal lobule.       There is no acute intracranial hemorrhage, or intracranial mass lesion. Echo 7/17/2021:  Yuliana Henderson left ventricular systolic function is normal. Calculated ejection  fraction 48% by Dubois's method. Visually estimated EF 50%. Posterior mitral valve leaflet prolapse. Severe mitral regurgitation. Eccentric anterior diredted jet. EOA = 0.7cm2. PISA radius is 1.3cm. Vena contracta is 0.99 cm. MR volume is  58ml. Possible small vegetation on posterior leaflet. Consider MONROE. MONROE 7/20/2021  MONROE findings:     LA:       Normal  RAJAT:    No thrombus  RA:      Normal  RV:      Normal  LV:       Normal  Estimated LVEF:         55%  Aorta:               Mild atheromatous disease arch  Pericardium:    Trivial pericardial effusion  Septum:           No intracardiac shunt via color Doppler.      Valves:     Mitral Valve: Large vegetation measuring 2.5 X 2.4 cm attached on the posterior leaflet of the mitral valve associated with Flail and degenerated leaflet. Severe regurgitation w/ an anteriorly directed eccentric jet is identified. Aortic Valve: The aortic valve is trileaflet and opens adequately. No regurgitiation is identified. Tricuspid valve: Structurally normal. No regurgitation is identified. Pulmonary valve: Normal. No significant regurgitation     No thrombus identified    Patient also evaluated by Neurology and ophtalmology due to concern for septic emboli found to have cerebellar septic emboli.  And progression on MRI  Review of Systems:     Constitutional:  negative for chills, fevers, sweats  Respiratory:  negative for cough, dyspnea on exertion, shortness of breath, wheezing  Cardiovascular:  negative for chest pain, chest pressure/discomfort, lower extremity edema, palpitations  Gastrointestinal:  negative for abdominal pain, constipation, diarrhea, nausea, vomiting  Neurological: Positive for change in vision negative for dizziness, headache      Medications: Allergies: Allergies   Allergen Reactions    Morphine Other (See Comments)     Pt report muscle cramps       Current Meds:   Scheduled Meds:    lactobacillus  1 capsule Oral TID WC    diphenoxylate-atropine  1 tablet Oral BID    metoprolol tartrate  50 mg Oral BID    potassium chloride  20 mEq Oral BID WC    pantoprazole  40 mg Oral BID AC    rifampin (RIFADIN) IVPB  600 mg Intravenous Q24H    nafcillin  2,000 mg Intravenous Q4H    sodium chloride flush  5-40 mL Intravenous 2 times per day     Continuous Infusions:    sodium chloride      sodium chloride       PRN Meds: LORazepam, potassium chloride, diphenhydrAMINE, meclizine, loperamide, sodium chloride, sodium chloride, sodium chloride flush, sodium chloride, ondansetron **OR** ondansetron, acetaminophen **OR** acetaminophen, polyethylene glycol, magnesium sulfate, oxyCODONE, melatonin, aluminum & magnesium hydroxide-simethicone    Data:     Past Medical History:   has a past medical history of Diverticulosis, Hyperlipidemia, Hypertension, MESSI (obstructive sleep apnea), and Research subject. Social History:   reports that he has never smoked. He has never used smokeless tobacco. He reports current alcohol use of about 20.0 standard drinks of alcohol per week. He reports that he does not use drugs.      Family History:   Family History   Problem Relation Age of Onset    No Known Problems Mother     Other Father         diverticulitis       Vitals:  /62   Pulse 96   Temp 99.7 °F (37.6 °C)   Resp 14   Ht 5' 6\" (1.676 m)   Wt 175 lb 7.8 oz (79.6 kg)   SpO2 95%   BMI 28.32 kg/m²   Temp (24hrs), Av.2 °F (37.3 °C), Min:97.8 °F (36.6 °C), Max:101.5 °F (38.6 °C)    No results for input(s): POCGLU in the last 72 hours. I/O (24Hr): Intake/Output Summary (Last 24 hours) at 7/31/2021 0853  Last data filed at 7/31/2021 0509  Gross per 24 hour   Intake 1494.1 ml   Output --   Net 1494.1 ml       Labs:  Hematology:  Recent Labs     07/29/21 0543 07/30/21 0351 07/31/21  0535   WBC 10.0 8.8 12.6*   RBC 3.30* 3.06* 3.34*   HGB 9.4* 8.8* 9.6*   HCT 30.4* 27.6* 30.6*   MCV 92.1 90.2 91.6   MCH 28.5 28.8 28.7   MCHC 30.9 31.9 31.4   RDW 12.6 12.8 12.8    341 408   MPV 9.4 9.5 9.3     Chemistry:  Recent Labs     07/29/21 0543 07/29/21 0543 07/29/21 0650 07/30/21 0351 07/31/21  0535     --   --  135 135   K 3.9   < > 3.7 4.0 4.5     --   --  104 103   CO2 22  --   --  20 24   GLUCOSE 108*  --   --  122* 99   BUN 10  --   --  10 8   CREATININE 0.79  --   --  0.78 0.74   MG  --   --  2.0  --   --    ANIONGAP 12  --   --  11 8*   LABGLOM >60  --   --  >60 >60   GFRAA >60  --   --  >60 >60   CALCIUM 8.6  --   --  8.4* 8.8   PHOS 3.9  --   --  3.5 4.1   TROPHS  --   --  38*  --   --     < > = values in this interval not displayed. Recent Labs     07/29/21 0543 07/30/21 0351 07/31/21  0535   PROT  --  6.4  --    LABALBU 2.9* 2.9*  2.9* 3.1*   AST  --  20  --    ALT  --  22  --    ALKPHOS  --  61  --    BILITOT  --  0.37  --    BILIDIR  --  0.13  --      ABG:  Lab Results   Component Value Date    POCPH 7.486 07/18/2021    POCPCO2 28.6 07/18/2021    POCPO2 134.5 07/18/2021    POCHCO3 21.6 07/18/2021    NBEA 1 07/18/2021    PBEA NOT REPORTED 07/18/2021    ELT3MRA NOT REPORTED 07/18/2021    VYFS2YNX 99 07/18/2021    FIO2 NOT REPORTED 07/18/2021     Lab Results   Component Value Date/Time    SPECIAL LEFT HAND 10ML 07/30/2021 11:20 AM     Lab Results   Component Value Date/Time    CULTURE NO GROWTH 12 HOURS 07/30/2021 11:20 AM       Radiology:  CT HEAD WO CONTRAST    Result Date: 7/20/2021  No acute intracranial abnormality. MRI may be obtained if clinically indicated.      CT CERVICAL SPINE W CONTRAST    Result Date: 7/23/2021  1. Please note, CT does not adequately assess for early discitis/osteomyelitis or an epidural abscess. 2. No convincing acute osseous abnormality seen of the cervical, thoracic or lumbar spine. 3. Scattered degenerative changes noted. 4. Trace pleural effusions bilaterally. 5. Nonspecific stranding along the retroperitoneum bilaterally. 6. No convincing evidence of a drainable fluid collection. CT THORACIC SPINE W CONTRAST    Result Date: 7/23/2021  1. Please note, CT does not adequately assess for early discitis/osteomyelitis or an epidural abscess. 2. No convincing acute osseous abnormality seen of the cervical, thoracic or lumbar spine. 3. Scattered degenerative changes noted. 4. Trace pleural effusions bilaterally. 5. Nonspecific stranding along the retroperitoneum bilaterally. 6. No convincing evidence of a drainable fluid collection. CT LUMBAR SPINE W CONTRAST    Result Date: 7/23/2021  1. Please note, CT does not adequately assess for early discitis/osteomyelitis or an epidural abscess. 2. No convincing acute osseous abnormality seen of the cervical, thoracic or lumbar spine. 3. Scattered degenerative changes noted. 4. Trace pleural effusions bilaterally. 5. Nonspecific stranding along the retroperitoneum bilaterally. 6. No convincing evidence of a drainable fluid collection. MRI CERVICAL SPINE WO CONTRAST    Result Date: 7/23/2021  1. Extensive patient motion limits evaluation. 2. No convincing abnormal bone marrow signal or abnormal signal along the disc spaces of the cervical spine. 3. There is suggestion of abnormal T2 signal within the right medulla. Abnormal T2 signal is seen within the right inferior cerebellum. 4. No convincing abnormal signal within the cervical cord given the limitations of patient motion. No significant spinal canal stenosis seen.      XR CHEST PORTABLE    Result Date: 7/23/2021  No radiologic evidence of acute cardiopulmonary disease. CT CHEST PULMONARY EMBOLISM W CONTRAST    Result Date: 7/20/2021  Small bilateral pleural effusions and mild bibasilar atelectasis This study is nondiagnostic for pulmonary embolism. There is excessive respiratory motion artifact degrading image resolution. There is not significant contrast within the pulmonary artery system to adequately assess for pulmonary embolus. Recommend either repeat exam with improved bolus timing, and decreased patient respiratory motion or VQ scan. CTA HEAD NECK W CONTRAST    Result Date: 7/22/2021  1. Unremarkable CT angiogram of the head and neck. 2. Sequelae of embolic phenomenon as seen on the prior MRI of the brain. Given the degree of vasogenic edema surrounding several of these areas of restricted diffusion, septic emboli is favored. The findings were sent to the Radiology Results [address] at 9:48 am on 7/22/2021to be communicated to a licensed caregiver. MRI BRAIN WO CONTRAST    Result Date: 7/21/2021  Findings suggestive of acute embolic infarctions involving supra and infratentorial structures as described. Subacute to chronic encephalomalacia with chronic blood products within the left superior parietal lobule and right inferior parietal lobule. There is no acute intracranial hemorrhage, or intracranial mass lesion. The findings were sent to the Radiology Results [address] at 8:24 pm on 7/21/2021to be communicated to a licensed caregiver.        Physical Examination:        General appearance:  alert, cooperative and no distress  Mental Status:  oriented to person, place and time and normal affect  Lungs:  clear to auscultation bilaterally, normal effort  Heart:  regular rate and rhythm, systolic murmur  Abdomen:  soft, nontender, nondistended, normal bowel sounds  Extremities:  no edema, redness, tenderness in the calves  Skin: Osler node improving on right middle finger no inflamation of right toe    Assessment: Hospital Problems         Last Modified POA    * (Principal) MSSA bacteremia 7/20/2021 Yes    Delusions ([address] 7/20/2021 Yes    Hypophosphatemia 7/20/2021 Yes    Severe mitral regurgitation 7/20/2021 Yes    Acute respiratory failure with hypoxia ([address] 7/20/2021 Yes    GI bleed 7/20/2021 Yes    Vertigo 7/21/2021 No    Normocytic normochromic anemia 7/20/2021 Yes    Obesity (BMI 30-39. 9) 7/20/2021 Yes    Transaminitis 7/20/2021 Yes    Diarrhea 7/20/2021 Yes    Acute dehydration 7/20/2021 Yes    Hypomagnesemia 7/20/2021 Yes    Hypokalemia 7/20/2021 Yes    Epistaxis 7/20/2021 No    Thrombocytopenia (HCC) 7/20/2021 Yes    Hyperglycemia 7/20/2021 Yes    Essential hypertension 7/20/2021 Yes    Sepsis with acute organ dysfunction and septic shock ([address] 3/86/5638 Yes    Metabolic encephalopathy 2/27/7410 Yes    Iron deficiency anemia secondary to blood loss (chronic) 7/21/2021 Yes    Guaiac positive stools 7/21/2021 Yes    Subacute endocarditis 7/26/2021 Yes    Cerebral septic emboli ([address] 7/26/2021 Yes    Cerebral multi-infarct state 7/30/2021 Yes          Plan:        1. MSSA bacteremia with severe MR. [address] cardiology, CT surgery, ID recommendations. IV antibiotics planned for total of 6 weeks. Exabecase given 7/25/2021, on Ancef, rifampin for synergy  2. Acute septic embolic infarcts appreciate neurology recommendations. MRI showing progression of infarct. Appreciate ophtamology recs  3. Antivert for vertigo  4. GI prophylaxis with Protonix  5. Lomotil for diarrhea  6. Follow up cultures, no growth 3 days, new culture due to fevers  7. IV ativan for anxiety  8. Discussed with family at bedside.     Medhat Giron MD  7/31/2021  8:53 AM

## 2021-07-31 NOTE — PLAN OF CARE
Problem: Falls - Risk of:  Goal: Will remain free from falls  Description: Will remain free from falls  Outcome: Met This Shift  Goal: Absence of physical injury  Description: Absence of physical injury  Outcome: Met This Shift     Problem: Pain:  Goal: Pain level will decrease  Description: Pain level will decrease  Outcome: Ongoing  Goal: Control of acute pain  Description: Control of acute pain  Outcome: Ongoing     Problem: Infection, Septic Shock:  Goal: Will show no infection signs and symptoms  Description: Will show no infection signs and symptoms  Outcome: Ongoing     Problem: Physical Regulation:  Goal: Diagnostic test results will improve  Description: Diagnostic test results will improve  Outcome: Ongoing  Goal: Will remain free from infection  Description: Will remain free from infection  Outcome: Ongoing     Problem: Skin Integrity:  Goal: Demonstration of wound healing without infection will improve  Description: Demonstration of wound healing without infection will improve  Outcome: Ongoing     Problem: Anxiety:  Goal: Level of anxiety will decrease  Description: Level of anxiety will decrease  Outcome: Ongoing

## 2021-07-31 NOTE — PROGRESS NOTES
[facility] -   Infectious diseases evaluation  admission date 7/17/2021    reason for consultation:   MSSA septicemia    Impression :   Current:  · MSSA septicemia 7/17-7/27  · Severe sepsis with septic shock and multiorgan failure  · Mitral valve endocarditis, vegetation 2.5X 2.4 cm 7/20  · Septic emboli to the brain 7/21  · Thrombocytopenia  · Diarrhea    Other:  ·   Discussion / summary of stay / plan of care   ·   Recommendations   · Nafcillin 2 g every 4 until 8/26, addressing the bacteremia as well as multiple organ infection  · Rifampin added 7/22 to clear the blood  · Received Exebacase compassionate use 7/25  · On probiotics, 3 x per day    Infection Control Recommendations   · [place] Precautions    Antimicrobial Stewardship Recommendations   · Simplification of therapy  · Targeted therapy  · Per Kg dosing      Coordination ofOutpatient Care:   · Estimated Length of IV antimicrobials:  · Patient will need Midline / picc Catheter Insertion:   · Patient will need SNF:  · Patient will need outpatient wound care:     History of Present Illness:   Initial history:  Francie Ng is a 36y.o.-year-old male     Interval changes  7/31/2021   Patient Vitals for the past 8 hrs:   BP Temp Pulse Resp SpO2 Weight   07/31/21 0509 -- -- -- -- -- 175 lb 7.8 oz (79.6 kg)   07/31/21 0351 103/62 99.7 °F (37.6 °C) 96 14 95 % --     Alert appropriate  Abdomen soft lungs are clear  Still diarrhea, probiotic x 2 days has not resolved it, improved  Walked well and feels better  No rash or signs of embolization so far on the skin    Last positive blood culture 7/27,  Blood culture 7/28, 7/30, 7/31 remain all negative  LFTs within range 7/30    MONROE 7/20 shows a large vegetation on the mitral valve 2.5X 2.4 cm  CT brain showing acute embolic infarcts 7/07    CT surgery planning for valve repair after antibiotics are over inpatient stabilizes    I have personally reviewed the past medical history, past surgical history, medications, social history, and family history, and I haveupdated the database accordingly. Allergies:   Morphine     Review of Systems:     Review of Systems   Constitutional: Positive for fatigue. Negative for fever. HENT: Negative for congestion. Eyes: Negative for itching. Respiratory: Negative for apnea. Cardiovascular: Negative for chest pain. Gastrointestinal: Negative for abdominal distention. Endocrine: Negative for heat intolerance. Genitourinary: Negative for dysuria. Musculoskeletal: Negative for arthralgias. Skin: Negative for color change. Allergic/Immunologic: Negative for immunocompromised state. Neurological: Negative for dizziness. Psychiatric/Behavioral: Negative for agitation. Physical Examination :       Physical Exam  Constitutional:       Appearance: Normal appearance. HENT:      Head: Atraumatic. Nose: Nose normal.      Mouth/Throat:      Mouth: Mucous membranes are moist.   Eyes:      General: No scleral icterus. Conjunctiva/sclera: Conjunctivae normal.   Cardiovascular:      Rate and Rhythm: Normal rate and regular rhythm. Heart sounds: No friction rub. Pulmonary:      Effort: No respiratory distress. Breath sounds: Normal breath sounds. Abdominal:      General: There is no distension. Tenderness: There is no abdominal tenderness. Genitourinary:     Comments: Urine tiara  Musculoskeletal:         General: No swelling or deformity. Cervical back: Neck supple. No rigidity. Skin:     General: Skin is dry. Coloration: Skin is not jaundiced. Neurological:      Mental Status: He is alert and oriented to person, place, and time. Mental status is at baseline. Psychiatric:         Mood and Affect: Mood normal.         Thought Content:  Thought content normal.         Past Medical History:     Past Medical History:   Diagnosis Date    Diverticulosis     Hyperlipidemia  Hypertension     MESSI (obstructive sleep apnea)     Has PSG study done Pos for Mod MESSI, never had cpap tritration done for machine    Research subject 07/24/2021    EIND 727481 Exebacase for persistent bacteremia expected date of completion 8/25/2021       Past Surgical  History:     Past Surgical History:   Procedure Laterality Date    ABDOMEN SURGERY      large bowel resection    ANTERIOR CRUCIATE LIGAMENT REPAIR Right     APPENDECTOMY  07/09/1997    COLECTOMY      2012 - D/T Dverticulitis    DILATATION, ESOPHAGUS         Medications:      lactobacillus  1 capsule Oral TID WC    diphenoxylate-atropine  1 tablet Oral BID    metoprolol tartrate  50 mg Oral BID    potassium chloride  20 mEq Oral BID WC    pantoprazole  40 mg Oral BID AC    rifampin (RIFADIN) IVPB  600 mg Intravenous Q24H    nafcillin  2,000 mg Intravenous Q4H    sodium chloride flush  5-40 mL Intravenous 2 times per day       Social History:     Social History     Socioeconomic History    Marital status:      Spouse name: Not on file    Number of children: Not on file    Years of education: Not on file    Highest education level: Not on file   Occupational History    Not on file   Tobacco Use    Smoking status: Never Smoker    Smokeless tobacco: Never Used   Vaping Use    Vaping Use: Never assessed   Substance and Sexual Activity    Alcohol use: Yes     Alcohol/week: 20.0 standard drinks     Types: 20 Cans of beer per week     Comment: 20/ week, average every other day    Drug use: Never    Sexual activity: Not on file   Other Topics Concern    Not on file   Social History Narrative    Not on file     Social Determinants of Health     Financial Resource Strain:     Difficulty of Paying Living Expenses:    Food Insecurity:     Worried About Running Out of Food in the Last Year:     [address] in the Last Year:    Transportation Needs:     Lack of Transportation (Medical):      Lack of Transportation (Non-Medical):    Physical Activity:     Days of Exercise per Week:     Minutes of Exercise per Session:    Stress:     Feeling of Stress :    Social Connections:     Frequency of Communication with Friends and Family:     Frequency of Social Gatherings with Friends and Family:     Attends Buddhism Services:     Active Member of Clubs or Organizations:     Attends Club or Organization Meetings:     Marital Status:    Intimate Partner Violence:     Fear of Current or Ex-Partner:     Emotionally Abused:     Physically Abused:     Sexually Abused:        Family History:     Family History   Problem Relation Age of Onset    No Known Problems Mother     Other Father         diverticulitis      Medical Decision Making:   I have independently reviewed/ordered the following labs:    CBC with Differential:   Recent Labs     07/30/21 0351 07/31/21  0535   WBC 8.8 12.6*   HGB 8.8* 9.6*   HCT 27.6* 30.6*    408   LYMPHOPCT 15* 19*   MONOPCT 7 9     BMP:  Recent Labs     07/29/21  0543 07/29/21  0650 07/30/21 0351 07/31/21  0535   NA   < >  --  135 135   K   < > 3.7 4.0 4.5   CL   < >  --  104 103   CO2   < >  --  20 24   BUN   < >  --  10 8   CREATININE   < >  --  0.78 0.74   MG  --  2.0  --   --     < > = values in this interval not displayed. Hepatic Function Panel:   Recent Labs     07/30/21 0351 07/31/21  0535   PROT 6.4  --    LABALBU 2.9*  2.9* 3.1*   BILIDIR 0.13  --    IBILI 0.24  --    BILITOT 0.37  --    ALKPHOS 61  --    ALT 22  --    AST 20  --      No results for input(s): RPR in the last 72 hours. No results for input(s): HIV in the last 72 hours. No results for input(s): BC in the last 72 hours. Lab Results   Component Value Date    CREATININE 0.74 07/31/2021    GLUCOSE 99 07/31/2021       Detailed results: Thank you for allowing us to participate in the care of this patient. Please call with questions.     This note is created with the assistance of a speech recognition program.  While intending to generate adocument that actually reflects the content of the visit, the document can still have some errors including those of syntax and sound a like substitutions which may escape proof reading. It such instances, actual meaningcan be extrapolated by contextual diversion.     Ruba Lewis MD  Office: [phone]  Perfect serve / office [phone]

## 2021-08-01 LAB
ABSOLUTE EOS #: 0.03 K/UL (ref 0–0.44)
ABSOLUTE IMMATURE GRANULOCYTE: 0.08 K/UL (ref 0–0.3)
ABSOLUTE LYMPH #: 1.88 K/UL (ref 1.1–3.7)
ABSOLUTE MONO #: 0.99 K/UL (ref 0.1–1.2)
ALBUMIN SERPL-MCNC: 3.1 G/DL (ref 3.5–5.2)
ALBUMIN SERPL-MCNC: 3.1 G/DL (ref 3.5–5.2)
ALBUMIN/GLOBULIN RATIO: 0.8 (ref 1–2.5)
ALP BLD-CCNC: 64 U/L (ref 40–129)
ALT SERPL-CCNC: 17 U/L (ref 5–41)
ANION GAP SERPL CALCULATED.3IONS-SCNC: 10 MMOL/L (ref 9–17)
AST SERPL-CCNC: 15 U/L
BASOPHILS # BLD: 1 % (ref 0–2)
BASOPHILS ABSOLUTE: 0.11 K/UL (ref 0–0.2)
BILIRUB SERPL-MCNC: 0.25 MG/DL (ref 0.3–1.2)
BILIRUBIN DIRECT: 0.11 MG/DL
BILIRUBIN, INDIRECT: 0.14 MG/DL (ref 0–1)
BUN BLDV-MCNC: 10 MG/DL (ref 6–20)
BUN/CREAT BLD: ABNORMAL (ref 9–20)
CALCIUM SERPL-MCNC: 8.8 MG/DL (ref 8.6–10.4)
CHLORIDE BLD-SCNC: 102 MMOL/L (ref 98–107)
CO2: 23 MMOL/L (ref 20–31)
CREAT SERPL-MCNC: 0.68 MG/DL (ref 0.7–1.2)
DIFFERENTIAL TYPE: ABNORMAL
EOSINOPHILS RELATIVE PERCENT: 0 % (ref 1–4)
GFR AFRICAN AMERICAN: >60 ML/MIN
GFR NON-AFRICAN AMERICAN: >60 ML/MIN
GFR SERPL CREATININE-BSD FRML MDRD: ABNORMAL ML/MIN/{1.73_M2}
GFR SERPL CREATININE-BSD FRML MDRD: ABNORMAL ML/MIN/{1.73_M2}
GLOBULIN: ABNORMAL G/DL (ref 1.5–3.8)
GLUCOSE BLD-MCNC: 101 MG/DL (ref 70–99)
HCT VFR BLD CALC: 28.1 % (ref 40.7–50.3)
HEMOGLOBIN: 9 G/DL (ref 13–17)
IMMATURE GRANULOCYTES: 1 %
LYMPHOCYTES # BLD: 18 % (ref 24–43)
MCH RBC QN AUTO: 28.8 PG (ref 25.2–33.5)
MCHC RBC AUTO-ENTMCNC: 32 G/DL (ref 28.4–34.8)
MCV RBC AUTO: 89.8 FL (ref 82.6–102.9)
MONOCYTES # BLD: 9 % (ref 3–12)
NRBC AUTOMATED: 0 PER 100 WBC
PDW BLD-RTO: 13 % (ref 11.8–14.4)
PHOSPHORUS: 4.2 MG/DL (ref 2.5–4.5)
PLATELET # BLD: 373 K/UL (ref 138–453)
PLATELET ESTIMATE: ABNORMAL
PMV BLD AUTO: 9.2 FL (ref 8.1–13.5)
POTASSIUM SERPL-SCNC: 3.9 MMOL/L (ref 3.7–5.3)
RBC # BLD: 3.13 M/UL (ref 4.21–5.77)
RBC # BLD: ABNORMAL 10*6/UL
SEG NEUTROPHILS: 71 % (ref 36–65)
SEGMENTED NEUTROPHILS ABSOLUTE COUNT: 7.68 K/UL (ref 1.5–8.1)
SODIUM BLD-SCNC: 135 MMOL/L (ref 135–144)
TOTAL PROTEIN: 6.8 G/DL (ref 6.4–8.3)
WBC # BLD: 10.8 K/UL (ref 3.5–11.3)
WBC # BLD: ABNORMAL 10*3/UL

## 2021-08-01 PROCEDURE — 6370000000 HC RX 637 (ALT 250 FOR IP): Performed by: INTERNAL MEDICINE

## 2021-08-01 PROCEDURE — 2060000000 HC ICU INTERMEDIATE R&B

## 2021-08-01 PROCEDURE — 6370000000 HC RX 637 (ALT 250 FOR IP): Performed by: NURSE PRACTITIONER

## 2021-08-01 PROCEDURE — 82248 BILIRUBIN DIRECT: CPT

## 2021-08-01 PROCEDURE — 36415 COLL VENOUS BLD VENIPUNCTURE: CPT

## 2021-08-01 PROCEDURE — 84100 ASSAY OF PHOSPHORUS: CPT

## 2021-08-01 PROCEDURE — 2580000003 HC RX 258: Performed by: NURSE PRACTITIONER

## 2021-08-01 PROCEDURE — 99232 SBSQ HOSP IP/OBS MODERATE 35: CPT | Performed by: INTERNAL MEDICINE

## 2021-08-01 PROCEDURE — 6370000000 HC RX 637 (ALT 250 FOR IP): Performed by: STUDENT IN AN ORGANIZED HEALTH CARE EDUCATION/TRAINING PROGRAM

## 2021-08-01 PROCEDURE — 6360000002 HC RX W HCPCS: Performed by: NURSE PRACTITIONER

## 2021-08-01 PROCEDURE — 2500000003 HC RX 250 WO HCPCS: Performed by: INTERNAL MEDICINE

## 2021-08-01 PROCEDURE — 80053 COMPREHEN METABOLIC PANEL: CPT

## 2021-08-01 PROCEDURE — 99232 SBSQ HOSP IP/OBS MODERATE 35: CPT | Performed by: STUDENT IN AN ORGANIZED HEALTH CARE EDUCATION/TRAINING PROGRAM

## 2021-08-01 PROCEDURE — 80076 HEPATIC FUNCTION PANEL: CPT

## 2021-08-01 PROCEDURE — 2580000003 HC RX 258: Performed by: INTERNAL MEDICINE

## 2021-08-01 PROCEDURE — 85025 COMPLETE CBC W/AUTO DIFF WBC: CPT

## 2021-08-01 PROCEDURE — 6360000002 HC RX W HCPCS: Performed by: INTERNAL MEDICINE

## 2021-08-01 PROCEDURE — 80069 RENAL FUNCTION PANEL: CPT

## 2021-08-01 RX ORDER — LACTOBACILLUS RHAMNOSUS GG 10B CELL
1 CAPSULE ORAL
Qty: 90 CAPSULE | Refills: 0 | Status: SHIPPED | OUTPATIENT
Start: 2021-08-02 | End: 2021-09-01

## 2021-08-01 RX ADMIN — SODIUM CHLORIDE, PRESERVATIVE FREE 10 ML: 5 INJECTION INTRAVENOUS at 07:55

## 2021-08-01 RX ADMIN — ACETAMINOPHEN 650 MG: 325 TABLET ORAL at 07:53

## 2021-08-01 RX ADMIN — ACETAMINOPHEN 650 MG: 325 TABLET ORAL at 14:47

## 2021-08-01 RX ADMIN — RIFAMPIN 600 MG: 600 INJECTION, POWDER, LYOPHILIZED, FOR SOLUTION INTRAVENOUS at 11:49

## 2021-08-01 RX ADMIN — Medication 1 CAPSULE: at 11:48

## 2021-08-01 RX ADMIN — NAFCILLIN INJECTION 2000 MG: 2 POWDER, FOR SOLUTION INTRAMUSCULAR; INTRAMUSCULAR; INTRAVENOUS at 09:12

## 2021-08-01 RX ADMIN — ACETAMINOPHEN 650 MG: 325 TABLET ORAL at 20:17

## 2021-08-01 RX ADMIN — POTASSIUM CHLORIDE 20 MEQ: 1500 TABLET, EXTENDED RELEASE ORAL at 17:19

## 2021-08-01 RX ADMIN — DIPHENHYDRAMINE HYDROCHLORIDE 12.5 MG: 50 INJECTION, SOLUTION INTRAMUSCULAR; INTRAVENOUS at 22:21

## 2021-08-01 RX ADMIN — NAFCILLIN INJECTION 2000 MG: 2 POWDER, FOR SOLUTION INTRAMUSCULAR; INTRAMUSCULAR; INTRAVENOUS at 01:40

## 2021-08-01 RX ADMIN — NAFCILLIN INJECTION 2000 MG: 2 POWDER, FOR SOLUTION INTRAMUSCULAR; INTRAMUSCULAR; INTRAVENOUS at 05:26

## 2021-08-01 RX ADMIN — Medication 1 CAPSULE: at 07:54

## 2021-08-01 RX ADMIN — ACETAMINOPHEN 650 MG: 325 TABLET ORAL at 01:51

## 2021-08-01 RX ADMIN — Medication 1 CAPSULE: at 17:19

## 2021-08-01 RX ADMIN — PANTOPRAZOLE SODIUM 40 MG: 40 TABLET, DELAYED RELEASE ORAL at 17:19

## 2021-08-01 RX ADMIN — SODIUM CHLORIDE, PRESERVATIVE FREE 10 ML: 5 INJECTION INTRAVENOUS at 20:20

## 2021-08-01 RX ADMIN — NAFCILLIN INJECTION 2000 MG: 2 POWDER, FOR SOLUTION INTRAMUSCULAR; INTRAMUSCULAR; INTRAVENOUS at 18:57

## 2021-08-01 RX ADMIN — PANTOPRAZOLE SODIUM 40 MG: 40 TABLET, DELAYED RELEASE ORAL at 07:55

## 2021-08-01 RX ADMIN — METOPROLOL TARTRATE 50 MG: 25 TABLET ORAL at 20:19

## 2021-08-01 RX ADMIN — NAFCILLIN INJECTION 2000 MG: 2 POWDER, FOR SOLUTION INTRAMUSCULAR; INTRAMUSCULAR; INTRAVENOUS at 14:05

## 2021-08-01 RX ADMIN — POTASSIUM CHLORIDE 20 MEQ: 1500 TABLET, EXTENDED RELEASE ORAL at 07:54

## 2021-08-01 RX ADMIN — DIPHENOXYLATE HYDROCHLORIDE AND ATROPINE SULFATE 1 TABLET: 2.5; .025 TABLET ORAL at 07:54

## 2021-08-01 RX ADMIN — NAFCILLIN INJECTION 2000 MG: 2 POWDER, FOR SOLUTION INTRAMUSCULAR; INTRAMUSCULAR; INTRAVENOUS at 22:30

## 2021-08-01 RX ADMIN — METOPROLOL TARTRATE 50 MG: 25 TABLET ORAL at 07:54

## 2021-08-01 RX ADMIN — LOPERAMIDE HYDROCHLORIDE 4 MG: 2 CAPSULE ORAL at 07:54

## 2021-08-01 RX ADMIN — DIPHENOXYLATE HYDROCHLORIDE AND ATROPINE SULFATE 1 TABLET: 2.5; .025 TABLET ORAL at 20:17

## 2021-08-01 RX ADMIN — Medication 6 MG: at 22:21

## 2021-08-01 ASSESSMENT — ENCOUNTER SYMPTOMS
CHOKING: 0
APNEA: 0
EYE ITCHING: 0
ABDOMINAL DISTENTION: 0
COLOR CHANGE: 0

## 2021-08-01 ASSESSMENT — PAIN SCALES - GENERAL
PAINLEVEL_OUTOF10: 0

## 2021-08-01 NOTE — CARE COORDINATION
Transitional planning-talked with patient and family. Plan is to go home with [facility] [place] for IV antibiotics until 8-26.  Will need midline or PICC

## 2021-08-01 NOTE — PLAN OF CARE
Problem: Pain:  Goal: Pain level will decrease  Description: Pain level will decrease  Outcome: Ongoing  Goal: Control of acute pain  Description: Control of acute pain  Outcome: Ongoing  Goal: Control of chronic pain  Description: Control of chronic pain  Outcome: Ongoing     Problem: Infection, Septic Shock:  Goal: Will show no infection signs and symptoms  Description: Will show no infection signs and symptoms  Outcome: Ongoing     Problem: Falls - Risk of:  Goal: Will remain free from falls  Description: Will remain free from falls  Outcome: Ongoing  Goal: Absence of physical injury  Description: Absence of physical injury  Outcome: Ongoing     Problem: Gas Exchange - Impaired:  Goal: Levels of oxygenation will improve  Description: Levels of oxygenation will improve  Outcome: Ongoing     Problem: Cardiac:  Goal: Ability to maintain vital signs within normal range will improve  Description: Ability to maintain vital signs within normal range will improve  Outcome: Ongoing  Goal: Cardiovascular alteration will improve  Description: Cardiovascular alteration will improve  Outcome: Ongoing     Problem: Health Behavior:  Goal: Will modify at least one risk factor affecting health status  Description: Will modify at least one risk factor affecting health status  Outcome: Ongoing  Goal: Identification of resources available to assist in meeting health care needs will improve  Description: Identification of resources available to assist in meeting health care needs will improve  Outcome: Ongoing     Problem: Physical Regulation:  Goal: Ability to maintain vital signs within normal range will improve  Description: Ability to maintain vital signs within normal range will improve  Outcome: Ongoing  Goal: Complications related to the disease process, condition or treatment will be avoided or minimized  Description: Complications related to the disease process, condition or treatment will be avoided or minimized  Outcome: Ongoing  Goal: Diagnostic test results will improve  Description: Diagnostic test results will improve  Outcome: Ongoing  Goal: Will remain free from infection  Description: Will remain free from infection  Outcome: Ongoing  Goal: Ability to maintain clinical measurements within normal limits will improve  Description: Ability to maintain clinical measurements within normal limits will improve  Outcome: Ongoing  Goal: Will show no signs and symptoms of electrolyte imbalance  Description: Will show no signs and symptoms of electrolyte imbalance  Outcome: Ongoing     Problem: Respiratory:  Goal: Ability to maintain normal respiratory secretions will improve  Description: Ability to maintain normal respiratory secretions will improve  Outcome: Ongoing     Problem: Skin Integrity:  Goal: Demonstration of wound healing without infection will improve  Description: Demonstration of wound healing without infection will improve  Outcome: Ongoing  Goal: Complications related to intravenous access or infusion will be avoided or minimized  Description: Complications related to intravenous access or infusion will be avoided or minimized  Outcome: Ongoing  Goal: Will show no infection signs and symptoms  Description: Will show no infection signs and symptoms  Outcome: Ongoing  Goal: Absence of new skin breakdown  Description: Absence of new skin breakdown  Outcome: Ongoing     Problem: Anxiety:  Goal: Level of anxiety will decrease  Description: Level of anxiety will decrease  Outcome: Ongoing     Problem: Fluid Volume:  Goal: Ability to achieve a balanced intake and output will improve  Description: Ability to achieve a balanced intake and output will improve  Outcome: Ongoing     Problem: Nutrition  Goal: Optimal nutrition therapy  Outcome: Ongoing     Problem: HEMODYNAMIC STATUS  Goal: Patient has stable vital signs and fluid balance  Outcome: Ongoing     Problem: ACTIVITY INTOLERANCE/IMPAIRED MOBILITY  Goal: Mobility/activity is maintained at optimum level for patient  Outcome: Ongoing     Problem: COMMUNICATION IMPAIRMENT  Goal: Ability to express needs and understand communication  Outcome: Ongoing

## 2021-08-01 NOTE — PROGRESS NOTES
[facility]  Office: [address], DO, Aminah Sidrase, DO, Haseeb Neff, DO, sOmany Wallace Blood, DO, Myron Ackerman MD, Sumit Ramos MD, Giovani Brownlee MD, Bib Oconnor MD, Dejah Bear MD, Donya Florez MD, Gwendolyn White MD, Jorge Castro, DO, Clifford Solis MD, Mikaela Handy DO, Medhat Giron MD,  Jasen Mcrae DO, Juan Pablo Ardon MD, Jameson Leos MD, Shelia Delarosa MD, Ruben Escobar MD, Lili Mcclure MD, Alejandro Arnold MD, Bill Dixon, [facility], [facility], CNP, Tal Mcmahan, CNP, Romeo Yoon, CNS, [place], Shanita Mandujano, CNP, Sylwia Steve, CNP, Sixto Acevedo, CNP, Cody Haney, CNP, MELONIE GarciaC, Janette Collazo, [facility], Silvino Mejía, CNP, Brandee Santos, CNP, Diego Jenkins, CNP, Ximena Glass, CNP, Devan Colvin, CNP, Jade Chan, CNP, Adriano Mckeon, CNP, Obdulia Florian, [address] [place] [address]    Progress Note    8/1/2021    12:03 PM    Name:   Funmilayo Mcfarland  MRN:     [id number]     [place]:      [de-identified]   Room:   [address] [place] [address] Day:  13  Admit Date:  7/17/2021 12:42 AM    PCP:   Saida German MD  Code Status:  Full Code    Subjective:     C/C:   Chief Complaint   Patient presents with    Abdominal Pain    Emesis     Interval History Status: not changed. Patient seen examined bedside wife and parents at bedside.  No growth 4 days, npo new cultures showing growth, problems with rifampin (irritation and itching) during infusion but toelratible    Brief History:   45-year-old male past medical history of hypertension history of colectomy secondary to diverticulitis presented with fevers diarrhea approximately 3 days prior to admission was noted to be septic with 2 of 2 blood cultures showing positive MSSA as well as urinalysis positive for MSSA and found to have mitral valve endocarditis requiring IV antibiotics currently being treated with nafcillin 2 g IV every 4 hours until 8/15/2021, 600 mg rifampin for synergy, received infusion of Exebacase on 7/25/2021. MRI 7/21/2021:  Findings suggestive of acute embolic infarctions involving supra and   infratentorial structures as described.       Subacute to chronic encephalomalacia with chronic blood products within the   left superior parietal lobule and right inferior parietal lobule.       There is no acute intracranial hemorrhage, or intracranial mass lesion. Echo 7/17/2021:  [facility] left ventricular systolic function is normal. Calculated ejection  fraction 48% by Dubois's method. Visually estimated EF 50%. Posterior mitral valve leaflet prolapse. Severe mitral regurgitation. Eccentric anterior diredted jet. EOA = 0.7cm2. PISA radius is 1.3cm. Vena contracta is 0.99 cm. MR volume is  58ml. Possible small vegetation on posterior leaflet. Consider MONROE. MONROE 7/20/2021  MONROE findings:     LA:       Normal  RAJAT:    No thrombus  RA:      Normal  RV:      Normal  LV:       Normal  Estimated LVEF:         55%  Aorta:               Mild atheromatous disease arch  Pericardium:    Trivial pericardial effusion  Septum:           No intracardiac shunt via color Doppler.      Valves:     Mitral Valve: Large vegetation measuring 2.5 X 2.4 cm attached on the posterior leaflet of the mitral valve associated with Flail and degenerated leaflet. Severe regurgitation w/ an anteriorly directed eccentric jet is identified. Aortic Valve: The aortic valve is trileaflet and opens adequately. No regurgitiation is identified. Tricuspid valve: Structurally normal. No regurgitation is identified. Pulmonary valve: Normal. No significant regurgitation     No thrombus identified    Patient also evaluated by Neurology and ophtalmology due to concern for septic emboli found to have cerebellar septic emboli.  And progression on MRI  Review of Systems:     Constitutional:  negative for chills, fevers, sweats  Respiratory:  negative for cough, dyspnea on exertion, shortness of breath, wheezing  Cardiovascular:  negative for chest pain, chest pressure/discomfort, lower extremity edema, palpitations  Gastrointestinal:  negative for abdominal pain, constipation, diarrhea, nausea, vomiting  Neurological: Positive for change in vision negative for dizziness, headache      Medications: Allergies: Allergies   Allergen Reactions    Morphine Other (See Comments)     Pt report muscle cramps       Current Meds:   Scheduled Meds:    lactobacillus  1 capsule Oral TID WC    diphenoxylate-atropine  1 tablet Oral BID    metoprolol tartrate  50 mg Oral BID    potassium chloride  20 mEq Oral BID WC    pantoprazole  40 mg Oral BID AC    rifampin (RIFADIN) IVPB  600 mg Intravenous Q24H    nafcillin  2,000 mg Intravenous Q4H    sodium chloride flush  5-40 mL Intravenous 2 times per day     Continuous Infusions:    sodium chloride      sodium chloride       PRN Meds: LORazepam, potassium chloride, diphenhydrAMINE, meclizine, loperamide, sodium chloride, sodium chloride, sodium chloride flush, sodium chloride, ondansetron **OR** ondansetron, acetaminophen **OR** acetaminophen, polyethylene glycol, magnesium sulfate, oxyCODONE, melatonin, aluminum & magnesium hydroxide-simethicone    Data:     Past Medical History:   has a past medical history of Diverticulosis, Hyperlipidemia, Hypertension, MESSI (obstructive sleep apnea), and Research subject. Social History:   reports that he has never smoked. He has never used smokeless tobacco. He reports current alcohol use of about 20.0 standard drinks of alcohol per week. He reports that he does not use drugs.      Family History:   Family History   Problem Relation Age of Onset    No Known Problems Mother     Other Father         diverticulitis       Vitals:  BP 97/67   Pulse 85   Temp 98.4 °F (36.9 °C) (Oral)   Resp 16   Ht 5' 6\" (1.676 m)   Wt 167 lb 15.9 oz (76.2 kg)   SpO2 98%   BMI 27.11 kg/m²   Temp (24hrs), Av.9 °F (37.2 °C), Min:98.4 °F (36.9 °C), Max:100 °F (37.8 °C)    No results for input(s): POCGLU in the last 72 hours. I/O (24Hr): Intake/Output Summary (Last 24 hours) at 2021 1203  Last data filed at 2021 0526  Gross per 24 hour   Intake 562 ml   Output 600 ml   Net -38 ml       Labs:  Hematology:  Recent Labs     21  0535 21  0535   WBC 8.8 12.6* 10.8   RBC 3.06* 3.34* 3.13*   HGB 8.8* 9.6* 9.0*   HCT 27.6* 30.6* 28.1*   MCV 90.2 91.6 89.8   MCH 28.8 28.7 28.8   MCHC 31.9 31.4 32.0   RDW 12.8 12.8 13.0    408 373   MPV 9.5 9.3 9.2     Chemistry:  Recent Labs     21  0535 21  0535    135 135   K 4.0 4.5 3.9    103 102   CO2 20 24 23   GLUCOSE 122* 99 101*   BUN 10 8 10   CREATININE 0.78 0.74 0.68*   ANIONGAP 11 8* 10   LABGLOM >60 >60 >60   GFRAA >60 >60 >60   CALCIUM 8.4* 8.8 8.8   PHOS 3.5 4.1 4.2     Recent Labs     21  0535 21  0535   PROT 6.4  --  6.8   LABALBU 2.9*  2.9* 3.1* 3.1*  3.1*   AST 20  --  15   ALT 22  --  17   ALKPHOS 61  --  64   BILITOT 0.37  --  0.25*   BILIDIR 0.13  --  0.11     ABG:  Lab Results   Component Value Date    POCPH 7.486 2021    POCPCO2 28.6 2021    POCPO2 134.5 2021    POCHCO3 21.6 2021    NBEA 1 2021    PBEA NOT REPORTED 2021    NCZ1WHX NOT REPORTED 2021    NKKX3ZAM 99 2021    FIO2 NOT REPORTED 2021     Lab Results   Component Value Date/Time    SPECIAL LEFT HAND 18 ML 2021 10:40 AM     Lab Results   Component Value Date/Time    CULTURE NO GROWTH 23 HOURS 2021 10:40 AM       Radiology:  CT HEAD WO CONTRAST    Result Date: 2021  No acute intracranial abnormality. MRI may be obtained if clinically indicated. CT CERVICAL SPINE W CONTRAST    Result Date: 2021  1. Please note, CT does not adequately assess for early discitis/osteomyelitis or an epidural abscess.  2. No convincing acute osseous abnormality seen of the cervical, thoracic or lumbar spine. 3. Scattered degenerative changes noted. 4. Trace pleural effusions bilaterally. 5. Nonspecific stranding along the retroperitoneum bilaterally. 6. No convincing evidence of a drainable fluid collection. CT THORACIC SPINE W CONTRAST    Result Date: 7/23/2021  1. Please note, CT does not adequately assess for early discitis/osteomyelitis or an epidural abscess. 2. No convincing acute osseous abnormality seen of the cervical, thoracic or lumbar spine. 3. Scattered degenerative changes noted. 4. Trace pleural effusions bilaterally. 5. Nonspecific stranding along the retroperitoneum bilaterally. 6. No convincing evidence of a drainable fluid collection. CT LUMBAR SPINE W CONTRAST    Result Date: 7/23/2021  1. Please note, CT does not adequately assess for early discitis/osteomyelitis or an epidural abscess. 2. No convincing acute osseous abnormality seen of the cervical, thoracic or lumbar spine. 3. Scattered degenerative changes noted. 4. Trace pleural effusions bilaterally. 5. Nonspecific stranding along the retroperitoneum bilaterally. 6. No convincing evidence of a drainable fluid collection. MRI CERVICAL SPINE WO CONTRAST    Result Date: 7/23/2021  1. Extensive patient motion limits evaluation. 2. No convincing abnormal bone marrow signal or abnormal signal along the disc spaces of the cervical spine. 3. There is suggestion of abnormal T2 signal within the right medulla. Abnormal T2 signal is seen within the right inferior cerebellum. 4. No convincing abnormal signal within the cervical cord given the limitations of patient motion. No significant spinal canal stenosis seen. XR CHEST PORTABLE    Result Date: 7/23/2021  No radiologic evidence of acute cardiopulmonary disease.      CT CHEST PULMONARY EMBOLISM W CONTRAST    Result Date: 7/20/2021  Small bilateral pleural effusions and mild bibasilar atelectasis This study is nondiagnostic for pulmonary embolism. There is excessive respiratory motion artifact degrading image resolution. There is not significant contrast within the pulmonary artery system to adequately assess for pulmonary embolus. Recommend either repeat exam with improved bolus timing, and decreased patient respiratory motion or VQ scan. CTA HEAD NECK W CONTRAST    Result Date: 7/22/2021  1. Unremarkable CT angiogram of the head and neck. 2. Sequelae of embolic phenomenon as seen on the prior MRI of the brain. Given the degree of vasogenic edema surrounding several of these areas of restricted diffusion, septic emboli is favored. The findings were sent to the Radiology Results [address] at 9:48 am on 7/22/2021to be communicated to a licensed caregiver. MRI BRAIN WO CONTRAST    Result Date: 7/21/2021  Findings suggestive of acute embolic infarctions involving supra and infratentorial structures as described. Subacute to chronic encephalomalacia with chronic blood products within the left superior parietal lobule and right inferior parietal lobule. There is no acute intracranial hemorrhage, or intracranial mass lesion. The findings were sent to the Radiology Results [address] at 8:24 pm on 7/21/2021to be communicated to a licensed caregiver.        Physical Examination:        General appearance:  alert, cooperative and no distress  Mental Status:  oriented to person, place and time and normal affect  Lungs:  clear to auscultation bilaterally, normal effort  Heart:  regular rate and rhythm, systolic murmur  Abdomen:  soft, nontender, nondistended, normal bowel sounds  Extremities:  no edema, redness, tenderness in the calves  Skin: Osler node improving on right middle finger no inflamation of right toe    Assessment:        Hospital Problems         Last Modified POA    * (Principal) MSSA bacteremia 7/20/2021 Yes    Delusions ([address] 7/20/2021 Yes    Hypophosphatemia 7/20/2021 Yes    Severe mitral regurgitation 7/20/2021 Yes    Acute respiratory failure with hypoxia ([address] 7/20/2021 Yes    GI bleed 7/20/2021 Yes    Vertigo 7/21/2021 No    Normocytic normochromic anemia 7/20/2021 Yes    Obesity (BMI 30-39. 9) 7/20/2021 Yes    Transaminitis 7/20/2021 Yes    Diarrhea 7/20/2021 Yes    Acute dehydration 7/20/2021 Yes    Hypomagnesemia 7/20/2021 Yes    Hypokalemia 7/20/2021 Yes    Epistaxis 7/20/2021 No    Thrombocytopenia (HCC) 7/20/2021 Yes    Hyperglycemia 7/20/2021 Yes    Essential hypertension 7/20/2021 Yes    Sepsis with acute organ dysfunction and septic shock ([address] 0/06/1761 Yes    Metabolic encephalopathy 1/35/5808 Yes    Iron deficiency anemia secondary to blood loss (chronic) 7/21/2021 Yes    Guaiac positive stools 7/21/2021 Yes    Subacute endocarditis 7/26/2021 Yes    Cerebral septic emboli ([address] 7/26/2021 Yes    Cerebral multi-infarct state 7/30/2021 Yes          Plan:        1. MSSA bacteremia with severe MR. [address] cardiology, CT surgery, ID recommendations. IV antibiotics planned for total of 6 weeks. Exabecase given 7/25/2021, on Ancef, rifampin for synergy  2. Acute septic embolic infarcts appreciate neurology recommendations. MRI showing progression of infarct. Appreciate ophtamology recs  3. Antivert for vertigo  4. GI prophylaxis with Protonix  5. Lomotil for diarrhea  6. Follow up cultures, no growth 4 days, cultures since then have been showing no growth  7. IV ativan for anxiety  8. Discussed with family at bedside.     Mike Evans MD  8/1/2021  12:03 PM

## 2021-08-02 VITALS
DIASTOLIC BLOOD PRESSURE: 67 MMHG | HEIGHT: 66 IN | OXYGEN SATURATION: 95 % | WEIGHT: 167.99 LBS | BODY MASS INDEX: 27 KG/M2 | RESPIRATION RATE: 16 BRPM | SYSTOLIC BLOOD PRESSURE: 104 MMHG | TEMPERATURE: 98.9 F | HEART RATE: 96 BPM

## 2021-08-02 LAB
ABSOLUTE EOS #: 0.04 K/UL (ref 0–0.44)
ABSOLUTE IMMATURE GRANULOCYTE: 0.05 K/UL (ref 0–0.3)
ABSOLUTE LYMPH #: 1.63 K/UL (ref 1.1–3.7)
ABSOLUTE MONO #: 0.77 K/UL (ref 0.1–1.2)
ALBUMIN SERPL-MCNC: 3.2 G/DL (ref 3.5–5.2)
ANION GAP SERPL CALCULATED.3IONS-SCNC: 9 MMOL/L (ref 9–17)
BASOPHILS # BLD: 1 % (ref 0–2)
BASOPHILS ABSOLUTE: 0.08 K/UL (ref 0–0.2)
BUN BLDV-MCNC: 9 MG/DL (ref 6–20)
BUN/CREAT BLD: ABNORMAL (ref 9–20)
CALCIUM SERPL-MCNC: 8.9 MG/DL (ref 8.6–10.4)
CHLORIDE BLD-SCNC: 104 MMOL/L (ref 98–107)
CO2: 24 MMOL/L (ref 20–31)
CREAT SERPL-MCNC: 0.61 MG/DL (ref 0.7–1.2)
DIFFERENTIAL TYPE: ABNORMAL
EOSINOPHILS RELATIVE PERCENT: 1 % (ref 1–4)
GFR AFRICAN AMERICAN: >60 ML/MIN
GFR NON-AFRICAN AMERICAN: >60 ML/MIN
GFR SERPL CREATININE-BSD FRML MDRD: ABNORMAL ML/MIN/{1.73_M2}
GFR SERPL CREATININE-BSD FRML MDRD: ABNORMAL ML/MIN/{1.73_M2}
GLUCOSE BLD-MCNC: 108 MG/DL (ref 70–99)
HCT VFR BLD CALC: 29.7 % (ref 40.7–50.3)
HEMOGLOBIN: 9.3 G/DL (ref 13–17)
IMMATURE GRANULOCYTES: 1 %
LYMPHOCYTES # BLD: 19 % (ref 24–43)
MCH RBC QN AUTO: 28.6 PG (ref 25.2–33.5)
MCHC RBC AUTO-ENTMCNC: 31.3 G/DL (ref 28.4–34.8)
MCV RBC AUTO: 91.4 FL (ref 82.6–102.9)
MONOCYTES # BLD: 9 % (ref 3–12)
NRBC AUTOMATED: 0 PER 100 WBC
PDW BLD-RTO: 13 % (ref 11.8–14.4)
PHOSPHORUS: 4 MG/DL (ref 2.5–4.5)
PLATELET # BLD: 340 K/UL (ref 138–453)
PLATELET ESTIMATE: ABNORMAL
PMV BLD AUTO: 8.9 FL (ref 8.1–13.5)
POTASSIUM SERPL-SCNC: 3.9 MMOL/L (ref 3.7–5.3)
RBC # BLD: 3.25 M/UL (ref 4.21–5.77)
RBC # BLD: ABNORMAL 10*6/UL
SEG NEUTROPHILS: 69 % (ref 36–65)
SEGMENTED NEUTROPHILS ABSOLUTE COUNT: 6.11 K/UL (ref 1.5–8.1)
SODIUM BLD-SCNC: 137 MMOL/L (ref 135–144)
WBC # BLD: 8.7 K/UL (ref 3.5–11.3)
WBC # BLD: ABNORMAL 10*3/UL

## 2021-08-02 PROCEDURE — 99239 HOSP IP/OBS DSCHRG MGMT >30: CPT | Performed by: STUDENT IN AN ORGANIZED HEALTH CARE EDUCATION/TRAINING PROGRAM

## 2021-08-02 PROCEDURE — 2580000003 HC RX 258: Performed by: NURSE PRACTITIONER

## 2021-08-02 PROCEDURE — APPSS30 APP SPLIT SHARED TIME 16-30 MINUTES: Performed by: NURSE PRACTITIONER

## 2021-08-02 PROCEDURE — 80069 RENAL FUNCTION PANEL: CPT

## 2021-08-02 PROCEDURE — 36415 COLL VENOUS BLD VENIPUNCTURE: CPT

## 2021-08-02 PROCEDURE — 36569 INSJ PICC 5 YR+ W/O IMAGING: CPT

## 2021-08-02 PROCEDURE — 6370000000 HC RX 637 (ALT 250 FOR IP): Performed by: INTERNAL MEDICINE

## 2021-08-02 PROCEDURE — 2580000003 HC RX 258: Performed by: INTERNAL MEDICINE

## 2021-08-02 PROCEDURE — 2500000003 HC RX 250 WO HCPCS: Performed by: INTERNAL MEDICINE

## 2021-08-02 PROCEDURE — 6360000002 HC RX W HCPCS: Performed by: NURSE PRACTITIONER

## 2021-08-02 PROCEDURE — 6370000000 HC RX 637 (ALT 250 FOR IP): Performed by: NURSE PRACTITIONER

## 2021-08-02 PROCEDURE — 6370000000 HC RX 637 (ALT 250 FOR IP): Performed by: STUDENT IN AN ORGANIZED HEALTH CARE EDUCATION/TRAINING PROGRAM

## 2021-08-02 PROCEDURE — 85025 COMPLETE CBC W/AUTO DIFF WBC: CPT

## 2021-08-02 PROCEDURE — 76937 US GUIDE VASCULAR ACCESS: CPT

## 2021-08-02 PROCEDURE — 05HY33Z INSERTION OF INFUSION DEVICE INTO UPPER VEIN, PERCUTANEOUS APPROACH: ICD-10-PCS | Performed by: INTERNAL MEDICINE

## 2021-08-02 PROCEDURE — C1751 CATH, INF, PER/CENT/MIDLINE: HCPCS

## 2021-08-02 PROCEDURE — 99233 SBSQ HOSP IP/OBS HIGH 50: CPT | Performed by: INTERNAL MEDICINE

## 2021-08-02 RX ORDER — LOPERAMIDE HYDROCHLORIDE 2 MG/1
4 CAPSULE ORAL 4 TIMES DAILY PRN
Qty: 30 CAPSULE | Refills: 0 | Status: SHIPPED | OUTPATIENT
Start: 2021-08-02 | End: 2021-08-12

## 2021-08-02 RX ORDER — LIDOCAINE HYDROCHLORIDE 10 MG/ML
5 INJECTION, SOLUTION INFILTRATION; PERINEURAL ONCE
Status: DISCONTINUED | OUTPATIENT
Start: 2021-08-02 | End: 2021-08-02 | Stop reason: HOSPADM

## 2021-08-02 RX ORDER — SODIUM CHLORIDE 0.9 % (FLUSH) 0.9 %
5-40 SYRINGE (ML) INJECTION PRN
Status: DISCONTINUED | OUTPATIENT
Start: 2021-08-02 | End: 2021-08-02 | Stop reason: HOSPADM

## 2021-08-02 RX ORDER — DIPHENOXYLATE HYDROCHLORIDE AND ATROPINE SULFATE 2.5; .025 MG/1; MG/1
1 TABLET ORAL 2 TIMES DAILY
Qty: 20 TABLET | Refills: 0 | Status: SHIPPED | OUTPATIENT
Start: 2021-08-02 | End: 2021-08-12

## 2021-08-02 RX ORDER — RIFAMPIN 600 MG/10ML
300 INJECTION, POWDER, LYOPHILIZED, FOR SOLUTION INTRAVENOUS DAILY
Qty: 9000 MG | Refills: 0 | Status: SHIPPED | OUTPATIENT
Start: 2021-08-02 | End: 2021-08-02

## 2021-08-02 RX ORDER — ECHINACEA PURPUREA EXTRACT 125 MG
1 TABLET ORAL PRN
Qty: 1 BOTTLE | Refills: 3 | Status: SHIPPED | OUTPATIENT
Start: 2021-08-02 | End: 2021-08-27 | Stop reason: ALTCHOICE

## 2021-08-02 RX ORDER — ONDANSETRON 4 MG/1
4 TABLET, ORALLY DISINTEGRATING ORAL EVERY 8 HOURS PRN
Qty: 10 TABLET | Refills: 0 | Status: ON HOLD | OUTPATIENT
Start: 2021-08-02 | End: 2021-08-25 | Stop reason: HOSPADM

## 2021-08-02 RX ORDER — SODIUM CHLORIDE 9 MG/ML
25 INJECTION, SOLUTION INTRAVENOUS PRN
Status: DISCONTINUED | OUTPATIENT
Start: 2021-08-02 | End: 2021-08-02 | Stop reason: HOSPADM

## 2021-08-02 RX ORDER — PANTOPRAZOLE SODIUM 40 MG/1
40 TABLET, DELAYED RELEASE ORAL
Qty: 30 TABLET | Refills: 3 | Status: SHIPPED | OUTPATIENT
Start: 2021-08-02 | End: 2021-09-15 | Stop reason: ALTCHOICE

## 2021-08-02 RX ORDER — METOPROLOL TARTRATE 50 MG/1
50 TABLET, FILM COATED ORAL 2 TIMES DAILY
Qty: 60 TABLET | Refills: 3 | Status: ON HOLD | OUTPATIENT
Start: 2021-08-02 | End: 2021-08-25 | Stop reason: HOSPADM

## 2021-08-02 RX ORDER — LANOLIN ALCOHOL/MO/W.PET/CERES
6 CREAM (GRAM) TOPICAL NIGHTLY PRN
Qty: 60 TABLET | Refills: 0 | Status: SHIPPED | OUTPATIENT
Start: 2021-08-02 | End: 2021-09-08 | Stop reason: ALTCHOICE

## 2021-08-02 RX ORDER — POLYETHYLENE GLYCOL 3350 17 G/17G
17 POWDER, FOR SOLUTION ORAL DAILY PRN
Qty: 527 G | Refills: 1 | Status: SHIPPED | OUTPATIENT
Start: 2021-08-02 | End: 2021-08-27 | Stop reason: ALTCHOICE

## 2021-08-02 RX ORDER — OXYCODONE HYDROCHLORIDE 5 MG/1
5 TABLET ORAL EVERY 6 HOURS PRN
Qty: 10 TABLET | Refills: 0 | Status: SHIPPED | OUTPATIENT
Start: 2021-08-02 | End: 2021-08-09

## 2021-08-02 RX ORDER — SODIUM CHLORIDE 0.9 % (FLUSH) 0.9 %
5-40 SYRINGE (ML) INJECTION EVERY 12 HOURS SCHEDULED
Status: DISCONTINUED | OUTPATIENT
Start: 2021-08-02 | End: 2021-08-02 | Stop reason: HOSPADM

## 2021-08-02 RX ADMIN — NAFCILLIN INJECTION 2000 MG: 2 POWDER, FOR SOLUTION INTRAMUSCULAR; INTRAMUSCULAR; INTRAVENOUS at 10:59

## 2021-08-02 RX ADMIN — RIFAMPIN 600 MG: 600 INJECTION, POWDER, LYOPHILIZED, FOR SOLUTION INTRAVENOUS at 12:17

## 2021-08-02 RX ADMIN — DIPHENOXYLATE HYDROCHLORIDE AND ATROPINE SULFATE 1 TABLET: 2.5; .025 TABLET ORAL at 09:02

## 2021-08-02 RX ADMIN — Medication 1 CAPSULE: at 09:02

## 2021-08-02 RX ADMIN — NAFCILLIN INJECTION 2000 MG: 2 POWDER, FOR SOLUTION INTRAMUSCULAR; INTRAMUSCULAR; INTRAVENOUS at 15:17

## 2021-08-02 RX ADMIN — METOPROLOL TARTRATE 50 MG: 25 TABLET ORAL at 09:03

## 2021-08-02 RX ADMIN — ACETAMINOPHEN 650 MG: 325 TABLET ORAL at 03:39

## 2021-08-02 RX ADMIN — NAFCILLIN INJECTION 2000 MG: 2 POWDER, FOR SOLUTION INTRAMUSCULAR; INTRAMUSCULAR; INTRAVENOUS at 03:30

## 2021-08-02 RX ADMIN — Medication 1 CAPSULE: at 11:03

## 2021-08-02 RX ADMIN — POTASSIUM CHLORIDE 20 MEQ: 1500 TABLET, EXTENDED RELEASE ORAL at 09:02

## 2021-08-02 RX ADMIN — PANTOPRAZOLE SODIUM 40 MG: 40 TABLET, DELAYED RELEASE ORAL at 09:00

## 2021-08-02 RX ADMIN — Medication 1 CAPSULE: at 15:17

## 2021-08-02 RX ADMIN — NAFCILLIN INJECTION 2000 MG: 2 POWDER, FOR SOLUTION INTRAMUSCULAR; INTRAMUSCULAR; INTRAVENOUS at 06:49

## 2021-08-02 RX ADMIN — PANTOPRAZOLE SODIUM 40 MG: 40 TABLET, DELAYED RELEASE ORAL at 15:17

## 2021-08-02 ASSESSMENT — PAIN SCALES - GENERAL: PAINLEVEL_OUTOF10: 0

## 2021-08-02 NOTE — DISCHARGE INSTR - COC
Continuity of Care Form    Patient Name: Radha Rapp   :  1980  MRN:  [id number]    Admit date:  2021  Discharge date:  2021    Code Status Order: Full Code   Advance Directives:      Admitting Physician:  No admitting provider for patient encounter. PCP: Dre Ruby MD    Discharging Nurse: [facility] Unit/Room#: 3004/3004-01  Discharging Unit Phone Number: [phone]    Emergency Contact:   Extended Emergency Contact Information  Primary Emergency Contact: Jorge Chaney  Mobile Phone: [phone]  Relation: Spouse    Past Surgical History:  Past Surgical History:   Procedure Laterality Date    ABDOMEN SURGERY      large bowel resection    ANTERIOR CRUCIATE LIGAMENT REPAIR Right     APPENDECTOMY  1997    COLECTOMY      2012 - D/T Dverticulitis    DILATATION, ESOPHAGUS         Immunization History: There is no immunization history on file for this patient. Active Problems:  Patient Active Problem List   Diagnosis Code    Diarrhea R19.7    Acute dehydration E86.0    Hypomagnesemia E83.42    Hypokalemia E87.6    Epistaxis R04.0    Thrombocytopenia (HCC) D69.6    Hyperglycemia R73.9    Essential hypertension I10    Sepsis with acute organ dysfunction and septic shock (HCC) A41.9, R65.21    Delusions ([address] F22    Hypophosphatemia E83.39    MSSA bacteremia R78.81, B95.61    Severe mitral regurgitation I34.0    Acute respiratory failure with hypoxia (HCC) J96.01    GI bleed K92.2    Normocytic normochromic anemia D64.9    Obesity (BMI 30-39. 9) E66.9    Transaminitis R74.01    Vertigo P48    Metabolic encephalopathy U37.31    Iron deficiency anemia secondary to blood loss (chronic) D50.0    Guaiac positive stools R19.5    Subacute endocarditis I33.9    Cerebral septic emboli (HCC) I76, I66.9    Cerebral multi-infarct state I69.30       Isolation/Infection:   Isolation            Contact          Patient Infection Status       Infection Onset Added Last Indicated Last Indicated By Review Planned Expiration Resolved Resolved By    None active    Resolved    C-diff Rule Out 07/19/21 07/19/21 07/19/21 C DIFF TOXIN/ANTIGEN (Ordered)   07/19/21 Rule-Out Test Resulted    COVID-19 Rule Out 07/17/21 07/17/21 07/17/21 Respiratory Panel, Molecular, with COVID-19 (Restricted: peds pts or suitable admitted adults) (Ordered)   07/17/21 Rule-Out Test Resulted    C-diff Rule Out 07/17/21 07/17/21 07/17/21 Gastrointestinal Panel, Molecular (Ordered)   07/18/21 Rule-Out Test Resulted    COVID-19 Rule Out 07/17/21 07/17/21 07/17/21 COVID-19, Rapid (Ordered)   07/17/21 Rule-Out Test Resulted            Nurse Assessment:  Last Vital Signs: /67   Pulse 96   Temp 98.9 °F (37.2 °C) (Oral)   Resp 16   Ht 5' 6\" (1.676 m)   Wt 167 lb 15.9 oz (76.2 kg)   SpO2 95%   BMI 27.11 kg/m²     Last documented pain score (0-10 scale): Pain Level: 0  Last Weight:   Wt Readings from Last 1 Encounters:   08/01/21 167 lb 15.9 oz (76.2 kg)     Mental Status:  oriented, alert, coherent, logical, thought processes intact and able to concentrate and follow conversation    IV Access:  - PICC - site  {Anatomy; iv placement site:[email or web address]}, insertion date: ***    Nursing Mobility/ADLs:  Walking   Independent  Transfer  Independent  Bathing  Independent  Dressing  Independent  Toileting  Independent  Feeding  Independent  Med [address]  Med Delivery   whole    [address] and Therapy:  Wound 07/30/21 Back Right (Active)   Wound Etiology Traumatic 08/01/21 0759   Dressing Status Clean;Dry; Intact 08/02/21 0400   Dressing/Treatment Foam 08/02/21 0400   Wound Assessment Ruptured blister;Slough 08/02/21 0400   Drainage Amount Scant 08/02/21 0400   Drainage Description Montero 08/02/21 0400   Odor None 08/02/21 0400   Petty-wound Assessment Intact 08/02/21 0400   Number of days: 2        Elimination:  Continence:    Bowel: Yes  Bladder: Yes  Urinary Catheter: None   Colostomy/Ileostomy/Ileal Conduit: No       Date of Last BM: 08/2    Intake/Output Summary (Last 24 hours) at 8/2/2021 0939  Last data filed at 8/2/2021 0335  Gross per 24 hour   Intake 120 ml   Output --   Net 120 ml     I/O last 3 completed shifts: In: 120 [P.O.:120]  Out: -     Safety Concerns:     None    Impairments/Disabilities:      None    Nutrition Therapy:  Current Nutrition Therapy:   - Oral Diet:  General  - Oral Nutrition Supplement:  Standard  Supplement Standard high calorie/high protein    Routes of Feeding: Oral  Liquids: No Restrictions  Daily Fluid Restriction: no  Last Modified Barium Swallow with Video (Video Swallowing Test): not done    Treatments at the Time of Hospital Discharge:   Respiratory Treatments:   Oxygen Therapy:  is not on home oxygen therapy. Ventilator:    - No ventilator support    Rehab Therapies: Physical Therapy and Occupational Therapy  Weight Bearing Status/Restrictions: No weight bearing restirctions  Other Medical Equipment (for information only, NOT a DME order):  none  Other Treatments: ***    Patient's personal belongings (please select all that are sent with patient):  None    RN SIGNATURE:  Electronically signed by Carin Arevalo RN on 8/2/21 at 9:47 AM EDT    CASE MANAGEMENT/SOCIAL WORK SECTION    Inpatient Status Date: ***    Readmission Risk Assessment Score:  Readmission Risk              Risk of Unplanned Readmission:  17           Discharging to Facility/ Agency   Name:   Address:  Phone:  Fax:    Dialysis Facility (if applicable)   Name:  Address:  Dialysis Schedule:  Phone:  Fax:    / signature: {Esignature:[id number]}    PHYSICIAN SECTION    Prognosis: Fair    Condition at Discharge: Stable    Rehab Potential (if transferring to Rehab): Good    Recommended Labs or Other Treatments After Discharge: IV antibiotics Nafcillin and Rifampin until 9/14/2021.  FOllow up echo, follow up CT surgery for mitral valve vegetation Evaluation    Physician Certification: I certify the above information and transfer of Beau Eden  is necessary for the continuing treatment of the diagnosis listed and that he requires [address] for greater 30 days.      Update Admission H&P: No change in H&P    PHYSICIAN SIGNATURE:  Electronically signed by Beau Galvan MD on 8/2/21 at 10:05 AM EDT

## 2021-08-02 NOTE — PROCEDURES
History/labs/allergies reviewed  Placed by MARLY Amanda RN  Assisted by ROBERT Bridges RN  Consent signed and obtained by physician  Time out performed using two identifiers  Catheter type Double  lumen picc  Product type solo2 w VPS RHYTHM  Lot # [id number]  Expiration date 7/31/2022  Catheter size 5  Djiboutian  Trimmed at 42  Total length 42  External catheter length 5 cm  Location [facility]  Number of attempts 1  Estimated blood loss 2 ml  Pre procedure cardiac Rhythm NSR per bedside telemetry and/or VPS Tracing. Placement verified by- CXR and/or VPS Max P wave noted by amplitude changes of the P wave, positive blood return, flushes easily  Special equipment used- ultrasound, micro-introducer technique and VPS technology if indicated  Catheter secured with statlock  Dressing applied- Tegaderm CHG  Lidocaine administered intradermally conc. 1% 2 mL  PICC line education:   [ X ] Discussed with patient/Family or POA prior to signing Informed Procedural Consent. Risks and Benefits along with reason for procedure were discussed and teaching was reinforced with an education handout on PICC insertion. [facility] [place] FAQ Catheter Associated Blood Stream Infections and [address] [id number] REV. 7/13 Nursing and Booklet left at bedside or in chart. Patient (Family or POA) acknowledged understanding of information taught and agreed to procedure. [  ] Was not discussed with patient/family or POA due to pts medical status at time of procedure. pts family or POA not available to discuss PICC education.  [facility] [place] FAQ Catheter Associated Blood Stream Infections and [address] [id number] REV. 7/13 Nursing and Booklet left at bedside or in chart

## 2021-08-02 NOTE — PLAN OF CARE
Problem: Pain:  Description: Pain management should include both nonpharmacologic and pharmacologic interventions.   Goal: Pain level will decrease  Description: Pain level will decrease  Outcome: Met This Shift  Goal: Control of acute pain  Description: Control of acute pain  Outcome: Met This Shift  Goal: Control of chronic pain  Description: Control of chronic pain  Outcome: Met This Shift     Problem: Infection, Septic Shock:  Goal: Will show no infection signs and symptoms  Description: Will show no infection signs and symptoms  Outcome: Met This Shift     Problem: Falls - Risk of:  Goal: Will remain free from falls  Description: Will remain free from falls  Outcome: Met This Shift  Goal: Absence of physical injury  Description: Absence of physical injury  Outcome: Met This Shift     Problem: Gas Exchange - Impaired:  Goal: Levels of oxygenation will improve  Description: Levels of oxygenation will improve  Outcome: Met This Shift     Problem: Cardiac:  Goal: Ability to maintain vital signs within normal range will improve  Description: Ability to maintain vital signs within normal range will improve  Outcome: Met This Shift  Goal: Cardiovascular alteration will improve  Description: Cardiovascular alteration will improve  Outcome: Met This Shift     Problem: Health Behavior:  Goal: Will modify at least one risk factor affecting health status  Description: Will modify at least one risk factor affecting health status  Outcome: Met This Shift  Goal: Identification of resources available to assist in meeting health care needs will improve  Description: Identification of resources available to assist in meeting health care needs will improve  Outcome: Met This Shift     Problem: Physical Regulation:  Goal: Ability to maintain vital signs within normal range will improve  Description: Ability to maintain vital signs within normal range will improve  Outcome: Met This Shift  Goal: Complications related to the disease process, condition or treatment will be avoided or minimized  Description: Complications related to the disease process, condition or treatment will be avoided or minimized  Outcome: Met This Shift  Goal: Diagnostic test results will improve  Description: Diagnostic test results will improve  Outcome: Met This Shift  Goal: Will remain free from infection  Description: Will remain free from infection  Outcome: Met This Shift  Goal: Ability to maintain clinical measurements within normal limits will improve  Description: Ability to maintain clinical measurements within normal limits will improve  Outcome: Met This Shift  Goal: Will show no signs and symptoms of electrolyte imbalance  Description: Will show no signs and symptoms of electrolyte imbalance  Outcome: Met This Shift     Problem: Nutritional:  Goal: Nutritional status will improve  Description: Nutritional status will improve  Outcome: Met This Shift     Problem: Respiratory:  Goal: Ability to maintain normal respiratory secretions will improve  Description: Ability to maintain normal respiratory secretions will improve  Outcome: Met This Shift     Problem: Skin Integrity:  Goal: Demonstration of wound healing without infection will improve  Description: Demonstration of wound healing without infection will improve  Outcome: Met This Shift  Goal: Complications related to intravenous access or infusion will be avoided or minimized  Description: Complications related to intravenous access or infusion will be avoided or minimized  Outcome: Met This Shift  Goal: Will show no infection signs and symptoms  Description: Will show no infection signs and symptoms  Outcome: Met This Shift  Goal: Absence of new skin breakdown  Description: Absence of new skin breakdown  Outcome: Met This Shift     Problem: Anxiety:  Goal: Level of anxiety will decrease  Description: Level of anxiety will decrease  Outcome: Met This Shift     Problem: Fluid Volume:  Goal: Ability to achieve a balanced intake and output will improve  Description: Ability to achieve a balanced intake and output will improve  Outcome: Met This Shift     Problem: Nutrition  Goal: Optimal nutrition therapy  Outcome: Met This Shift     Problem: HEMODYNAMIC STATUS  Goal: Patient has stable vital signs and fluid balance  Outcome: Met This Shift     Problem: ACTIVITY INTOLERANCE/IMPAIRED MOBILITY  Goal: Mobility/activity is maintained at optimum level for patient  Outcome: Met This Shift     Problem: COMMUNICATION IMPAIRMENT  Goal: Ability to express needs and understand communication  Outcome: Met This Shift

## 2021-08-02 NOTE — PROGRESS NOTES
CLINICAL PHARMACY NOTE: MEDS TO BEDS    Total # of Prescriptions Filled: 7   The following medications were delivered to the patient:  · Loperamide 2mg capsule  · Lopressor 50mg tablet  · zofran-odt 4mg disintegrating tablet  · protonix 40mg tablet  · Oxycodone 5mg tablet  · Lomotil 2.5-0.025mg tablet  · Rifampin 300mg capsule    Additional Documentation: medications delivered to the pt room on 08.02.21 at 12:53pm, melatonin, glycolax, ocean nasal spray, and culterelle was not covered under the patients insurance, over the counter meds.  No co pay

## 2021-08-02 NOTE — DISCHARGE SUMMARY
[facility]  Office: [address], DO, Chantale Anderson, DO, Breanna Holder, DO, Sreekanth Montero Blood, DO, Luis A Julien MD, Lucrecia Carroll MD, Queen Hollis MD, Theresa Canales MD, Miriam Shore MD, Milly Bustamante MD, Azalea Mosher MD, Lucie Person, DO, Cleve Maravilla MD, Janice Hope DO, King Lyon MD,  Anurag Summers DO, Kyle Tierney MD, Rebecca Gabriel MD, Mackenzie Anthony MD, Morgan Ritchie MD, Tres Guillory MD, Naa Conde MD, Kim Staley MD, Destin Whitmore, [facility], [facility] Marcellus, CNP, [facility] Derrick, CNP, Josias Flores, CNS, Raven Jordan, CNP, Cordell Guerrero, CNP, Marivel Brown, CNP, Praneeth Trent, CNP, Toby Nair, CNP, Pebbles Hester PA-C, Tish Sutton, [facility], Marvin Rodriguez, CNP, Janessa Plascencia, CNP, Latasha Torres, [facility], Bert Alvarado, CNP, Kalin Sanchez, CNP, Alireza Recio, CNP, Vivian Tejada, CNP, [facility], [facility]    Discharge Summary     Patient ID: Chito Sanford  :  1980   MRN: [id number]     ACCOUNT:  [de-identified]   Patient's PCP: Juan Kessler MD  Admit Date: 2021   Discharge Date: 2021     Length of Stay: 16  Code Status:  Full Code  Admitting Physician: No admitting provider for patient encounter. Discharge Physician: King Lyon MD     Active Discharge Diagnoses:     Hospital Problem Lists:  Principal Problem:    MSSA bacteremia  Active Problems:    Delusions ([address]    Hypophosphatemia    Severe mitral regurgitation    Acute respiratory failure with hypoxia (HCC)    GI bleed    Vertigo    Normocytic normochromic anemia    Obesity (BMI 30-39. 9)    Transaminitis    Diarrhea    Acute dehydration    Hypomagnesemia    Hypokalemia    Epistaxis    Thrombocytopenia (HCC)    Hyperglycemia    Essential hypertension    Sepsis with acute organ dysfunction and septic shock (HCC)    Metabolic encephalopathy    Iron deficiency anemia secondary to blood loss (chronic) Guaiac positive stools    Subacute endocarditis    Cerebral septic emboli (HCC)    Cerebral multi-infarct state  Resolved Problems:    * No resolved hospital problems. *      Admission Condition:  stable     Discharged Condition: stable    Hospital Stay:     Hospital Course:  Hosey Homans is a 36 y.o. male who was admitted for the management of   MSSA bacteremia , presented to ER with Abdominal Pain and Emesis      68-year-old male past medical history of hypertension history of colectomy secondary to diverticulitis presented with fevers diarrhea approximately 3 days prior to admission was noted to be septic with 2 of 2 blood cultures showing positive MSSA as well as urinalysis positive for MSSA and found to have mitral valve endocarditis requiring IV antibiotics currently being treated with nafcillin 2 g IV every 4 hours until 8/15/2021, 600 mg rifampin for synergy, received infusion of Exebacase on 7/25/2021. MRI 7/21/2021:  Findings suggestive of acute embolic infarctions involving supra and   infratentorial structures as described.       Subacute to chronic encephalomalacia with chronic blood products within the   left superior parietal lobule and right inferior parietal lobule.       There is no acute intracranial hemorrhage, or intracranial mass lesion.      Echo 7/17/2021:  Global left ventricular systolic function is normal. Calculated ejection  fraction 48% by Dubois's method. Visually estimated EF 50%. Posterior mitral valve leaflet prolapse. Severe mitral regurgitation. Eccentric anterior diredted jet. EOA = 0.7cm2. PISA radius is 1.3cm. Vena contracta is 0.99 cm. MR volume is  58ml. Possible small vegetation on posterior leaflet.   Consider MONROE.     MONROE 7/20/2021  MONROE findings:     LA:       Normal  RAJAT:    No thrombus  RA:      Normal  RV:      Normal  LV:       Normal  Estimated LVEF:         55%  Aorta:               Mild atheromatous disease arch  Pericardium:    Trivial pericardial effusion  Septum:           No intracardiac shunt via color Doppler.      Valves:     Mitral Valve: Large vegetation measuring 2.5 X 2.4 cm attached on the posterior leaflet of the mitral valve associated with Flail and degenerated leaflet. Severe regurgitation w/ an anteriorly directed eccentric jet is identified. Aortic Valve: The aortic valve is trileaflet and opens adequately. No regurgitiation is identified. Tricuspid valve: Structurally normal. No regurgitation is identified. Pulmonary valve: Normal. No significant regurgitation     No thrombus identified     Patient also evaluated by Neurology and ophtalmology due to concern for septic emboli found to have cerebellar septic emboli. And progression on MRI. Patient had PICC line placed 8/2/2021 with plans for antibiotics till 9/14/2021 on nafcillin and rifampin. Significant therapeutic interventions: see above    Significant Diagnostic Studies:   Labs / Micro:  CBC:   Lab Results   Component Value Date    WBC 8.7 08/02/2021    RBC 3.25 08/02/2021    HGB 9.3 08/02/2021    HCT 29.7 08/02/2021    MCV 91.4 08/02/2021    MCH 28.6 08/02/2021    MCHC 31.3 08/02/2021    RDW 13.0 08/02/2021     08/02/2021     BMP:    Lab Results   Component Value Date    GLUCOSE 108 08/02/2021     08/02/2021    K 3.9 08/02/2021     08/02/2021    CO2 24 08/02/2021    ANIONGAP 9 08/02/2021    BUN 9 08/02/2021    CREATININE 0.61 08/02/2021    BUNCRER NOT REPORTED 08/02/2021    CALCIUM 8.9 08/02/2021    LABGLOM >60 08/02/2021    GFRAA >60 08/02/2021    GFR      08/02/2021    GFR NOT REPORTED 08/02/2021     TSH:    Lab Results   Component Value Date    TSH 1.86 07/20/2021        Radiology:  MRI BRAIN WO CONTRAST    Result Date: 7/27/2021  Interval development of additional punctate areas of restricted diffusion suggesting continuing embolic showering.        Consultations:    Consults:     Final Specialist Recommendations/Findings:   IP CONSULT TO CARDIOLOGY  IP CONSULT TO INFECTIOUS DISEASES  IP CONSULT TO OTOLARYNGOLOGY  IP CONSULT TO GI  IP CONSULT TO CARDIOTHORACIC SURGERY  IP CONSULT TO SPIRITUAL SERVICES  IP CONSULT TO OPHTHALMOLOGY  IP CONSULT TO HOME CARE NEEDS      The patient was seen and examined on day of discharge and this discharge summary is in conjunction with any daily progress note from day of discharge. Discharge plan:     Disposition: Home    Physician Follow Up:     [facility]  [address]  [address]  [address] [facility] [address]  [phone]  Schedule an appointment as soon as possible for a visit in 1 week  For follow up with the GI physicians    Nora Meléndez MD  [address] [place] [address]  [phone]    Go on 9/1/2021  please follow up at 915am with Dr Renato Silver to discuss valve surgery after treatment with antibiotics    Raven Quinonez MD  [address] [facility]  [phone]    Go on 9/27/2021  @ 1400; post stroke    Yusra Vela MD  [address], [address] [place] [address] 9 [address] [facility]  [phone]    Schedule an appointment as soon as possible for a visit in 2 weeks  infec diseases       Requiring Further Evaluation/Follow Up POST HOSPITALIZATION/Incidental Findings: Follow-up with infectious disease for evaluation of antibiotics, follow-up with cardiothoracic surgery after echo for evaluation of surgical repair mitral valve, follow-up with neurology status post septic emboli, follow-up with gastroenterology for possible EGD and colonoscopy. Diet: regular diet    Activity: As tolerated    Instructions to Patient: Please continue take your medications as prescribed. Please follow-up with your family doctor, infectious disease artery, cardiothoracic surgeon, neurologist.  Christiano Urbina are to continue IV antibiotics to help clear your blood infection.  Scripts given for Echo as outpatient and monitor blood work while on antibiotics. Discharge Medications:      Medication List      START taking these medications    diphenoxylate-atropine 2.5-0.025 MG per tablet  Commonly known as: LOMOTIL  Take 1 tablet by mouth 2 times daily for 10 days. lactobacillus capsule  Take 1 capsule by mouth 3 times daily (with meals)     loperamide 2 MG capsule  Commonly known as: IMODIUM  Take 2 capsules by mouth 4 times daily as needed for Diarrhea     meclizine 25 MG tablet  Commonly known as: ANTIVERT  Take 1 tablet 3 times a day if needed for vertigo     melatonin 3 MG Tabs tablet  Take 2 tablets by mouth nightly as needed (sleep)     metoprolol tartrate 50 MG tablet  Commonly known as: LOPRESSOR  Take 1 tablet by mouth 2 times daily     nafcillin  infusion  Infuse 2,000 mg intravenously every 4 hours Compound per protocol     ondansetron 4 MG disintegrating tablet  Commonly known as: ZOFRAN-ODT  Take 1 tablet by mouth every 8 hours as needed for Nausea or Vomiting     oxyCODONE 5 MG immediate release tablet  Commonly known as: ROXICODONE  Take 1 tablet by mouth every 6 hours as needed for Pain for up to 7 days.      pantoprazole 40 MG tablet  Commonly known as: PROTONIX  Take 1 tablet by mouth 2 times daily (before meals)     polyethylene glycol 17 g packet  Commonly known as: GLYCOLAX  Take 17 g by mouth daily as needed for Constipation     rifAMPin 300 MG capsule  Commonly known as: Rifadin  Take 1 capsule by mouth 2 times daily     sodium chloride 0.65 % nasal spray  Commonly known as: OCEAN  1 spray by Nasal route as needed for Congestion        STOP taking these medications    amLODIPine 5 MG tablet  Commonly known as: NORVASC     atorvastatin 80 MG tablet  Commonly known as: LIPITOR     carvedilol 25 MG tablet  Commonly known as: COREG     dicyclomine 20 MG tablet  Commonly known as: BENTYL     hydroCHLOROthiazide 25 MG tablet  Commonly known as: HYDRODIURIL     HYDROcodone-acetaminophen 5-325 MG per tablet  Commonly known as: Nohemi Gagnon     potassium chloride 10 MEQ extended release capsule  Commonly known as: MICRO-K           Where to Get Your Medications      These medications were sent to [facility] [address] [facility], [address] [facility]  [address], [address] [place] [address]    Phone: [phone]   · diphenoxylate-atropine 2.5-0.025 MG per tablet  · lactobacillus capsule  · loperamide 2 MG capsule  · meclizine 25 MG tablet  · melatonin 3 MG Tabs tablet  · metoprolol tartrate 50 MG tablet  · ondansetron 4 MG disintegrating tablet  · oxyCODONE 5 MG immediate release tablet  · pantoprazole 40 MG tablet  · polyethylene glycol 17 g packet  · rifAMPin 300 MG capsule  · sodium chloride 0.65 % nasal spray     You can get these medications from any pharmacy    Bring a paper prescription for each of these medications  · nafcillin  infusion         Discharge Procedure Orders   Hepatic Function Panel   Standing Status: Standing Number of Occurrences: 4 Standing Exp. Date: 10/01/21     Creatinine, Serum   Standing Status: Standing Number of Occurrences: 4 Standing Exp. Date: 10/01/21     CBC With Auto Differential   Standing Status: Standing Number of Occurrences: 4 Standing Exp. Date: 10/01/21     ECHO Complete 2D W Doppler W Color   Standing Status: Future Standing Exp. Date: 07/24/22     Order Specific Question Answer Comments   Reason for exam: eval improveent        Time Spent on discharge is  35 mins in patient examination, evaluation, counseling as well as medication reconciliation, prescriptions for required medications, discharge plan and follow up. Electronically signed by   Faiza Velez MD  8/2/2021  3:15 PM      Thank you Dr. Radha Luna MD for the opportunity to be involved in this patient's care.

## 2021-08-02 NOTE — PROGRESS NOTES
[facility]  Office: [address], DO, Aminah Sidrase, DO, Haseeb Tawanda, DO, Osmany Wallace Blood, DO, Myron Ackerman MD, Sumit Ramos MD, Giovani Brownlee MD, Bib Oconnor MD, Dejah Bear MD, Furman Schlatter, MD, Gwendolyn White MD, Jorge Castro, DO, Clifford Solis MD, Mikaela Handy DO, Medhat Giron MD,  Jasen Mcrae DO, Juan Pablo Ardon MD, Jameson Leos MD, Shelia Delarosa MD, Ruben Escobar MD, Lili Mcclure MD, Alejandro Arnold MD, Bill Dixon, [facility], [facility], CNP, Tal Mcmahan, CNP, Romeo Yoon, CNS, [place], Shanita Mandujano, CNP, Sylwia Steve, CNP, Sixto Acevedo, CNP, Cody Haney, CNP, MELONIE GarciaC, Janette Collazo, [facility], Silvino Mejía, CNP, Brandee Santos, CNP, Diego Jenkins, CNP, Ximena Glass, CNP, Devan Colvin, CNP, Jade Chan, CNP, Adriano Mckeon, CNP, Obdulia Florian, [address] [place] [address]    Progress Note    8/2/2021    9:02 AM    Name:   Funmilayo Mcfarland  MRN:     [id number]     Acct:      [de-identified]   Room:   [address] [place] [address] Day:  12  Admit Date:  7/17/2021 12:42 AM    PCP:   Saida German MD  Code Status:  Full Code    Subjective:     C/C:   Chief Complaint   Patient presents with    Abdominal Pain    Emesis     Interval History Status: not changed. Patient seen examined bedside wife at bedside. No fevers, no rigors, no chills, balance improving, eye sight better.      Brief History:   44-year-old male past medical history of hypertension history of colectomy secondary to diverticulitis presented with fevers diarrhea approximately 3 days prior to admission was noted to be septic with 2 of 2 blood cultures showing positive MSSA as well as urinalysis positive for MSSA and found to have mitral valve endocarditis requiring IV antibiotics currently being treated with nafcillin 2 g IV every 4 hours until 8/15/2021, 600 mg rifampin for synergy, received infusion of Exebacase on 7/25/2021. MRI 7/21/2021:  Findings suggestive of acute embolic infarctions involving supra and   infratentorial structures as described.       Subacute to chronic encephalomalacia with chronic blood products within the   left superior parietal lobule and right inferior parietal lobule.       There is no acute intracranial hemorrhage, or intracranial mass lesion. Echo 7/17/2021:  Georgina Baron left ventricular systolic function is normal. Calculated ejection  fraction 48% by Dubois's method. Visually estimated EF 50%. Posterior mitral valve leaflet prolapse. Severe mitral regurgitation. Eccentric anterior diredted jet. EOA = 0.7cm2. PISA radius is 1.3cm. Vena contracta is 0.99 cm. MR volume is  58ml. Possible small vegetation on posterior leaflet. Consider MONROE. MONROE 7/20/2021  MONROE findings:     LA:       Normal  RAJAT:    No thrombus  RA:      Normal  RV:      Normal  LV:       Normal  Estimated LVEF:         55%  Aorta:               Mild atheromatous disease arch  Pericardium:    Trivial pericardial effusion  Septum:           No intracardiac shunt via color Doppler.      Valves:     Mitral Valve: Large vegetation measuring 2.5 X 2.4 cm attached on the posterior leaflet of the mitral valve associated with Flail and degenerated leaflet. Severe regurgitation w/ an anteriorly directed eccentric jet is identified. Aortic Valve: The aortic valve is trileaflet and opens adequately. No regurgitiation is identified. Tricuspid valve: Structurally normal. No regurgitation is identified. Pulmonary valve: Normal. No significant regurgitation     No thrombus identified    Patient also evaluated by Neurology and ophtalmology due to concern for septic emboli found to have cerebellar septic emboli.  And progression on MRI  Review of Systems:     Constitutional:  negative for chills, fevers, sweats  Respiratory:  negative for cough, dyspnea on exertion, shortness of breath, wheezing  Cardiovascular:  negative for chest pain, chest pressure/discomfort, lower extremity edema, palpitations  Gastrointestinal:  negative for abdominal pain, constipation, diarrhea, nausea, vomiting  Neurological: Positive for change in vision negative for dizziness, headache      Medications: Allergies: Allergies   Allergen Reactions    Morphine Other (See Comments)     Pt report muscle cramps       Current Meds:   Scheduled Meds:    lactobacillus  1 capsule Oral TID WC    diphenoxylate-atropine  1 tablet Oral BID    metoprolol tartrate  50 mg Oral BID    potassium chloride  20 mEq Oral BID WC    pantoprazole  40 mg Oral BID AC    rifampin (RIFADIN) IVPB  600 mg Intravenous Q24H    nafcillin  2,000 mg Intravenous Q4H    sodium chloride flush  5-40 mL Intravenous 2 times per day     Continuous Infusions:    sodium chloride      sodium chloride       PRN Meds: LORazepam, potassium chloride, diphenhydrAMINE, meclizine, loperamide, sodium chloride, sodium chloride, sodium chloride flush, sodium chloride, ondansetron **OR** ondansetron, acetaminophen **OR** acetaminophen, polyethylene glycol, magnesium sulfate, oxyCODONE, melatonin, aluminum & magnesium hydroxide-simethicone    Data:     Past Medical History:   has a past medical history of Diverticulosis, Hyperlipidemia, Hypertension, MESSI (obstructive sleep apnea), and Research subject. Social History:   reports that he has never smoked. He has never used smokeless tobacco. He reports current alcohol use of about 20.0 standard drinks of alcohol per week. He reports that he does not use drugs.      Family History:   Family History   Problem Relation Age of Onset    No Known Problems Mother     Other Father         diverticulitis       Vitals:  /67   Pulse 96   Temp 98.9 °F (37.2 °C) (Oral)   Resp 16   Ht 5' 6\" (1.676 m)   Wt 167 lb 15.9 oz (76.2 kg)   SpO2 95%   BMI 27.11 kg/m²   Temp (24hrs), Av.8 °F (37.1 °C), Min:98.4 °F (36.9 °C), Max:99.3 °F (37.4 °C)    No results degenerative changes noted. 4. Trace pleural effusions bilaterally. 5. Nonspecific stranding along the retroperitoneum bilaterally. 6. No convincing evidence of a drainable fluid collection. CT THORACIC SPINE W CONTRAST    Result Date: 7/23/2021  1. Please note, CT does not adequately assess for early discitis/osteomyelitis or an epidural abscess. 2. No convincing acute osseous abnormality seen of the cervical, thoracic or lumbar spine. 3. Scattered degenerative changes noted. 4. Trace pleural effusions bilaterally. 5. Nonspecific stranding along the retroperitoneum bilaterally. 6. No convincing evidence of a drainable fluid collection. CT LUMBAR SPINE W CONTRAST    Result Date: 7/23/2021  1. Please note, CT does not adequately assess for early discitis/osteomyelitis or an epidural abscess. 2. No convincing acute osseous abnormality seen of the cervical, thoracic or lumbar spine. 3. Scattered degenerative changes noted. 4. Trace pleural effusions bilaterally. 5. Nonspecific stranding along the retroperitoneum bilaterally. 6. No convincing evidence of a drainable fluid collection. MRI CERVICAL SPINE WO CONTRAST    Result Date: 7/23/2021  1. Extensive patient motion limits evaluation. 2. No convincing abnormal bone marrow signal or abnormal signal along the disc spaces of the cervical spine. 3. There is suggestion of abnormal T2 signal within the right medulla. Abnormal T2 signal is seen within the right inferior cerebellum. 4. No convincing abnormal signal within the cervical cord given the limitations of patient motion. No significant spinal canal stenosis seen. XR CHEST PORTABLE    Result Date: 7/23/2021  No radiologic evidence of acute cardiopulmonary disease. CT CHEST PULMONARY EMBOLISM W CONTRAST    Result Date: 7/20/2021  Small bilateral pleural effusions and mild bibasilar atelectasis This study is nondiagnostic for pulmonary embolism.  There is excessive respiratory motion artifact degrading image resolution. There is not significant contrast within the pulmonary artery system to adequately assess for pulmonary embolus. Recommend either repeat exam with improved bolus timing, and decreased patient respiratory motion or VQ scan. CTA HEAD NECK W CONTRAST    Result Date: 7/22/2021  1. Unremarkable CT angiogram of the head and neck. 2. Sequelae of embolic phenomenon as seen on the prior MRI of the brain. Given the degree of vasogenic edema surrounding several of these areas of restricted diffusion, septic emboli is favored. The findings were sent to the Radiology Results [address] at 9:48 am on 7/22/2021to be communicated to a licensed caregiver. MRI BRAIN WO CONTRAST    Result Date: 7/21/2021  Findings suggestive of acute embolic infarctions involving supra and infratentorial structures as described. Subacute to chronic encephalomalacia with chronic blood products within the left superior parietal lobule and right inferior parietal lobule. There is no acute intracranial hemorrhage, or intracranial mass lesion. The findings were sent to the Radiology Results [address] at 8:24 pm on 7/21/2021to be communicated to a licensed caregiver.        Physical Examination:        General appearance:  alert, cooperative and no distress  Mental Status:  oriented to person, place and time and normal affect  Lungs:  clear to auscultation bilaterally, normal effort  Heart:  regular rate and rhythm, systolic murmur  Abdomen:  soft, nontender, nondistended, normal bowel sounds  Extremities:  no edema, redness, tenderness in the calves  Skin: Osler node improving on right middle finger no inflamation of right toe    Assessment:        Hospital Problems         Last Modified POA    * (Principal) MSSA bacteremia 7/20/2021 Yes    Delusions ([address] 7/20/2021 Yes    Hypophosphatemia 7/20/2021 Yes    Severe mitral regurgitation 7/20/2021 Yes    Acute respiratory failure with hypoxia (HCC) 7/20/2021 Yes    GI bleed 7/20/2021 Yes    Vertigo 7/21/2021 No    Normocytic normochromic anemia 7/20/2021 Yes    Obesity (BMI 30-39. 9) 7/20/2021 Yes    Transaminitis 7/20/2021 Yes    Diarrhea 7/20/2021 Yes    Acute dehydration 7/20/2021 Yes    Hypomagnesemia 7/20/2021 Yes    Hypokalemia 7/20/2021 Yes    Epistaxis 7/20/2021 No    Thrombocytopenia (HCC) 7/20/2021 Yes    Hyperglycemia 7/20/2021 Yes    Essential hypertension 7/20/2021 Yes    Sepsis with acute organ dysfunction and septic shock ([facility] 5/15/7978 Yes    Metabolic encephalopathy 1/55/1863 Yes    Iron deficiency anemia secondary to blood loss (chronic) 7/21/2021 Yes    Guaiac positive stools 7/21/2021 Yes    Subacute endocarditis 7/26/2021 Yes    Cerebral septic emboli ([facility] 7/26/2021 Yes    Cerebral multi-infarct state 7/30/2021 Yes          Plan:        1. MSSA bacteremia with severe MR. [address] cardiology, CT surgery, ID recommendations. IV antibiotics planned for total of 6 weeks. Exabecase given 7/25/2021, on Ancef, rifampin for synergy. Rifampin and nafcillin till 9/14/2021.  2. Acute septic embolic infarcts appreciate neurology recommendations. MRI showing progression of infarct. Appreciate ophtamology recs  3. Antivert for vertigo  4. GI prophylaxis with Protonix  5. HTN. Lopressor 50 mg BID  6. Lomotil for diarrhea  7. Plan for PICC today if ok with ID.  8. IV ativan for anxiety  9. Discussed with family at bedside. Abdi Barrera MD  8/2/2021  9:02 AM     Marylee Bees requires a shower chair due to Deconditioning from MSSA bacteremia and cerebelar septic emboli and requires a shower chair. Patient voices understanding and agreement.

## 2021-08-02 NOTE — CARE COORDINATION
Gail at [facility] notified of discharge today. Nafcillin and Rifampin scripts faxed Jus Lim at [facility] updated of discharge today    1136 Rifampin changed to PO. Gail updated. Manolo Sanchez and Manolo Aldridge are planning start of care for 7pm today. Pt and RN updated. Message sent to PICC    [phone] met with pt and family. They are agreeable with plan of home with Sury. Pt is requesting shower chair. Order faxed to [facility]. Pt denies other needs and has transportation.  PICC report faxed to [address] [place] [address] Case Management Department  Written by: Meme Meyers RN    Patient Name: Gallo Crowley  Attending Provider: Kt Howard MD  Admit Date: 2021 12:42 AM  MRN: [id number]  Account: [de-identified]                     : 1980  Discharge Date: 2021      Disposition: home with family support, Sury Meyers RN

## 2021-08-02 NOTE — PROGRESS NOTES
[facility] - Progress Note    Today's Date and Time: 8/2/2021, 9:14 AM    Impression :   · MSSA septicemia   · Persistent growth of bacteria in blood 7-17-21 through 7-27-21 despite treatment  · Sepsis with acute organ dysfunction and septic shock  · Mitral Valve endocarditis. 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 Will need 6 weeks from first negative blood cultures. · Rifampin 600 mg daily added 7/22/21 until 9/14/21 for synergy  · Please do not give anticoagulants due to the risk of septic emboli in the brain transitioning to hemorrhagic   · Received infusion of Exebacase under compassionate use protocol on 7-25-21 without any AEs  · PICC line ordered-Patient will be receiving Nafcillin and Rifampin.   · Follow-up with Dr. David Arnold 2 weeks post discharge-Call office at [facility] [phone]    Medical Decision Making/Summary/Discussion:8/2/2021     ·   Infection Control Recommendations   · [place] Precautions  · Contact Isolation     Antimicrobial Stewardship Recommendations     · Simplification of therapy  · Targeted therapy    Coordination of Outpatient Care:   · Estimated Length of IV antimicrobials: 4 weeks from first negative blood cultures  · Patient will need Midline Catheter Insertion: TBD  · Patient will need PICC line Insertion: TBD  · Patient will need: Home IV , and wife his clinical course up to the present and the difficulties with source control. I indicated to the patient and his wife, in the presence of his RN, that we had been able to secure permission from the  of Juanell Sis, Dr Ainsley Singer of the IRB at [place] [address], Hospital administration and Legal Department for him to receive Exebacase under a Compassionate Use Protocol. I discussed the available information with him including prior published clinical results and safety data. Indicated that there was no promise of efficacy. I emphasized that participation was voluntary. Patient and his wife had all their questions answered. I gave them two copies of the informed consent to review. They will keep one copy and return to signed copy for inclusion in his records. Patient gave informed consent to proceed. CURRENT EVALUATION 8/2/2021:    Afebrile  VS stable    On evaluation, the patient is alert and oriented on room air. He has been feeling much better and has denied any episode of rigors over the past 3 days. Is stable to go home, and a PICC line has been ordered for 4 more weeks of nafcillin and rifampin. Repeat blood cultures (7/28, 7/30, 7/31) have been negative since 7/27/2021    CT surgery is planning for valve repair after the patient has completed IV antibiotic therapy-unless the patient decompensates to the point of needing emergent surgical intervention. MRI brain 4-41-57: Acute embolic infarctions in supra and infratentorial structures. Because of the septic emboli in the brain, Ancef was discontinued and nafcillin was initiated on 7/22/2021. Rifampin was also added for synergy. Echocardiogram completed on 7/18/2021 revealed posterior mitral valve leaflet prolapse, severe mitral regurgitation, and possible small vegetation on posterior leaflet. Presence of vegetation on mitral valve would help explain the emboli to the brain.     MONROE on 7/20 showed a Large vegetation measuring 2.5 X 2.4 cm noted on the posterior leaflet of the mitral valve. Posterior leaflet is flair and is associated with severe anteriorly directed eccentric regurgitation. Right foot x-ray revealed:  No radiographic evidence for acute osteomyelitis.  Mild soft tissue swelling   of the great toe possibly cellulitis.       RECOMMENDATION:   NM bone scan or MRI may be obtained if there remains strong concern for acute   osteomyelitis.         CT abdomen pelvis revealed: Moderate distention of the colon multiple air-fluid levels, findings may be   seen with acute enterocolitis. Bruising on the patient's back is from a Tens unit-it does not appear to be the source of sepsis. There are no open lesions noted and there are three distinct marks from the electrodes. Stool studies have been negative    Hepatitis panel nonreactive    Discussed with Dr. Clary Avila, X rays reviewed: 8/2/2021    BUN:9  Cr:0.61    CRP: 260.5-->56.0  LDH: 437    WBC:8.7  Hb:9.3  Plat: 340  Haptoglobin: 256  Sed rate: 31  Lipase 299->228  Lactate: 1.6-->2.3-->1.5-->2.5    Cultures:  Urine:  ·   Blood:  · 7/17/2021: MSSA x2  · 7/18/2021: MSSA  · 7/20/21: MSSA  · 7/22/2021: MSSA  · 7-24-21: MSSA  · 7-25-21: MSSA  · 7/27/21: 2/2 MSSA   · 7/28/21: No growth   · 7/30/21: no growth  · 7/31/21: no growth    Sputum :  ·   Wound:      I have personally reviewed the past medical history, past surgical history, medications, social history, and family history, and I have updated the database accordingly.   Past Medical History:     Past Medical History:   Diagnosis Date    Diverticulosis     Hyperlipidemia     Hypertension     MESSI (obstructive sleep apnea)     Has PSG study done Pos for Mod MESSI, never had cpap tritration done for machine    Research subject 07/24/2021    EIND [id number] Exebacase for persistent bacteremia expected date of completion 8/25/2021       Past Surgical  History:     Past Surgical History:   Procedure Laterality Date    ABDOMEN SURGERY      large bowel resection    ANTERIOR CRUCIATE LIGAMENT REPAIR Right     APPENDECTOMY  07/09/1997    COLECTOMY      2012 - D/T Dverticulitis    DILATATION, ESOPHAGUS         Medications:      lidocaine 1 % injection  5 mL Intradermal Once    sodium chloride flush  5-40 mL Intravenous 2 times per day    lactobacillus  1 capsule Oral TID WC    diphenoxylate-atropine  1 tablet Oral BID    metoprolol tartrate  50 mg Oral BID    potassium chloride  20 mEq Oral BID WC    pantoprazole  40 mg Oral BID AC    rifampin (RIFADIN) IVPB  600 mg Intravenous Q24H    nafcillin  2,000 mg Intravenous Q4H    sodium chloride flush  5-40 mL Intravenous 2 times per day       Social History:     Social History     Socioeconomic History    Marital status:      Spouse name: Not on file    Number of children: Not on file    Years of education: Not on file    Highest education level: Not on file   Occupational History    Not on file   Tobacco Use    Smoking status: Never Smoker    Smokeless tobacco: Never Used   Vaping Use    Vaping Use: Never assessed   Substance and Sexual Activity    Alcohol use: Yes     Alcohol/week: 20.0 standard drinks     Types: 20 Cans of beer per week     Comment: 20/ week, average every other day    Drug use: Never    Sexual activity: Not on file   Other Topics Concern    Not on file   Social History Narrative    Not on file     Social Determinants of Health     Financial Resource Strain:     Difficulty of Paying Living Expenses:    Food Insecurity:     Worried About Running Out of Food in the Last Year:     [address] in the Last Year:    Transportation Needs:     Lack of Transportation (Medical):      Lack of Transportation (Non-Medical):    Physical Activity:     Days of Exercise per Week:     Minutes of Exercise per Session:    Stress:     Feeling of Stress :    Social Connections:     Frequency of Communication with Friends and thrush. No tenderness of sinuses. Mouth/throat: mucosa pink and moist. No lesions. Dentition in good repair. Neck:Supple, without lymphadenopathy. Thyroid normal, No bruits. Pulmonary/Chest: Clear to auscultation, without wheezes, rales, or rhonchi. No dullness to percussion. Cardiovascular: Normal sinus tachycardia with murmur, No rubs, or gallops. Abdomen: Soft, non tender. Bowel sounds normal. No organomegaly  All four Extremities: No cyanosis, clubbing, edema, or effusions. Neurologic: No gross sensory or motor deficits. Skin: Warm and dry with good turgor. No signs of peripheral arterial or venous insufficiency. No ulcerations. No open wounds. Medical Decision Making -Laboratory:   I have independently reviewed/ordered the following labs:    CBC with Differential:   Recent Labs     08/01/21  0535 08/02/21  0734   WBC 10.8 8.7   HGB 9.0* 9.3*   HCT 28.1* 29.7*    340   LYMPHOPCT 18* 19*   MONOPCT 9 9     BMP:   Recent Labs     08/01/21  0535 08/02/21  0734    137   K 3.9 3.9    104   CO2 23 24   BUN 10 9   CREATININE 0.68* 0.61*     Hepatic Function Panel:   Recent Labs     08/01/21  0535 08/02/21  0734   PROT 6.8  --    LABALBU 3.1*  3.1* 3.2*   BILIDIR 0.11  --    IBILI 0.14  --    BILITOT 0.25*  --    ALKPHOS 64  --    ALT 17  --    AST 15  --      No results for input(s): RPR in the last 72 hours. No results for input(s): HIV in the last 72 hours. No results for input(s): BC in the last 72 hours.   Lab Results   Component Value Date    MUCUS NOT REPORTED 07/17/2021    RBC 3.25 08/02/2021    TRICHOMONAS NOT REPORTED 07/17/2021    WBC 8.7 08/02/2021    YEAST NOT REPORTED 07/17/2021    TURBIDITY CLEAR 07/17/2021     Lab Results   Component Value Date    CREATININE 0.61 08/02/2021    GLUCOSE 108 08/02/2021       Medical Decision Making-Imaging:          MRI Brain 7/2121   Impression       Findings suggestive of acute embolic infarctions involving supra and   infratentorial structures as described.       Subacute to chronic encephalomalacia with chronic blood products within the   left superior parietal lobule and right inferior parietal lobule.       There is no acute intracranial hemorrhage, or intracranial mass lesion.           EXAMINATION:   CT OF THE CHEST, ABDOMEN, AND PELVIS WITH CONTRAST 7/18/2021 10:53 am       TECHNIQUE:   CT of the chest, abdomen and pelvis was performed with the administration of   intravenous contrast. Multiplanar reformatted images are provided for review. Dose modulation, iterative reconstruction, and/or weight based adjustment of   the mA/kV was utilized to reduce the radiation dose to as low as reasonably   achievable.       COMPARISON:   None       HISTORY:   ORDERING SYSTEM PROVIDED HISTORY: MSSA bacteremia, ? DIC   TECHNOLOGIST PROVIDED HISTORY:   MSSA bacteremia, ? DIC       Reason for Exam: sepsis with acute organ dysfunction   Acuity: Unknown   Type of Exam: Unknown       FINDINGS:   Lungs/pleura: The central airways are patent.  There are no suspicious   pulmonary nodules       Mediastinum: There is no acute mediastinal abnormality       Soft Tissues/Bones: No suspicious osteolytic or osteoblastic lesions       Abdominal organs: The liver, spleen, adrenal glands, kidneys, and pancreas   demonstrate no acute abnormality.       GI/Bowel: The visualized portions of the small bowel are unremarkable. There   is borderline dilation of the colon.  There are multiple air-fluid levels   throughout the colon.       Peritoneum/Retroperitoneum: There is a solid amount of retroperitoneal fluid   along the distal ureters bilaterally.       Pelvis:  The bladder is moderately distended.       Bones: No suspicious osseous lesions are identified           Impression   Moderate distention of the colon multiple air-fluid levels, findings may be   seen with acute enterocolitis.             XR FOOT LEFT (2 VIEWS) [[id number]] Collected: 07/18/21 1411      Order Status: Completed Updated: 07/19/21 0759     Narrative:       EXAMINATION:   TWO XRAY VIEWS OF THE LEFT FOOT     7/18/2021 2:05 pm     COMPARISON:   None. HISTORY:   ORDERING SYSTEM PROVIDED HISTORY: Left big toe pain and erythema. Looking for   source of MSSA sepsis   TECHNOLOGIST PROVIDED HISTORY:   Left big toe pain and erythema. Looking for source of MSSA sepsis     FINDINGS:   AP and lateral views of the foot obtained.  Normal alignment and   mineralization.  No abnormal periosteal reaction.  No erosions.  No fracture. No aggressive lytic or blastic lesions.  Mild soft tissue swelling of the   great toe noted.      Impression:       No radiographic evidence for acute osteomyelitis.  Mild soft tissue swelling   of the great toe possibly cellulitis.       EXAMINATION:   MRI OF THE BRAIN WITHOUT CONTRAST  7/21/2021 7:03 pm       TECHNIQUE:   Multiplanar multisequence MRI of the brain was performed without the   administration of intravenous contrast.       COMPARISON:   Head CT of 07/20/2021       HISTORY:   ORDERING SYSTEM PROVIDED HISTORY: episode of vertigo, dipolopia, has   infecticive endocadrditis, r/o septic emboli, abscess   TECHNOLOGIST PROVIDED HISTORY:   episode of vertigo, dipolopia, has infecticive endocadrditis, r/o septic   emboli, abscess   Reason for Exam: VERTIGO, MSSA BACTEREMIA, R/O SEPTIC EMBOLI       FINDINGS:   MRI brain:       There are punctate areas of restricted diffusion within the left superior   frontal gyrus subcortical white matter, right superior frontal gyrus   subcortical white matter, left superior parietal lobule right inferior   frontal gyrus subcortical white matter, left inferior frontal gyrus   subcortical white matter and bilateral cerebellar hemisphere, suggestive of   acute embolic infarctions.       There are focus of encephalomalacia with susceptibility artifact and   increased signal on DWI within left superior parietal lobule and right   inferior parietal lobule suggestive of subacute to chronic infarct with   chronic blood products.       There is no acute intracranial hemorrhage, or intracranial mass lesion. No   mass effect, midline shift, or extra-axial collection is noted.       The brain parenchyma is otherwise normal.  The pituitary gland is normal in   appearance. The cerebellar tonsils are in normal position.       The ventricles, sulci, and cisterns are within normal size and range.  No   significant volume loss. .  The intracranial flow voids are preserved.       The globes and orbits are within normal limits. The visualized extracranial   structures including paranasal sinuses and mastoid air cells are unremarkable.           Impression       Findings suggestive of acute embolic infarctions involving supra and   infratentorial structures as described.       Subacute to chronic encephalomalacia with chronic blood products within the   left superior parietal lobule and right inferior parietal lobule.       There is no acute intracranial hemorrhage, or intracranial mass lesion.       The findings were sent to the Radiology Results [address] at 8:24   pm on 7/21/2021to be communicated to a licensed caregiver.                 Medical Decision [id number]-[id number]-[id number]:     Component Collected Lab   Specimen Description 07/17/2021  8:48  [address]   . BLOOD    Special Requests 07/17/2021  8:48  [address]   10ML LFA    Culture Abnormal  07/17/2021  8:48 AM [address] Blood Culture Results called to and read back by: Leslie Larose RN AT 2045 ON 07.17.2021    Culture 07/17/2021  8:48 AM [address] FROM BOTTLE: GRAM POSITIVE COCCI IN CLUSTERS    Culture Abnormal  07/17/2021  8:48  [address]   Staphylococcus aureus Detected: mecA/C and MREJ Not Detected Methodology- Polymerase Chain Reaction (PCR)   Culture Abnormal  07/17/2021  8:48 AM [facility] This isolate is methicillin susceptible. Testing Performed By    Manjinder Watson Name Director Address Valid Date Range   [address]Jess[facility] Adams MD [address] [facility] [address] 08/30/17 0801-Present   Susceptibility    Staphylococcus aureus (4)    Antibiotic Interpretation DAVID Status    penicillin Resistant  Final     >=0.5   RESISTANT   cefoxitin screen  NOT REPORTED Final    ciprofloxacin   Final     NOT REPORTED    clindamycin Sensitive  Final     <=0.25   SUSCEPTIBLE   erythromycin Sensitive  Final     <=0.25   SUSCEPTIBLE   gentamicin Sensitive  Final     <=0.5   SUSCEPTIBLE   gentamicin Sensitive Gentamicin is used only in combination with other active agents that test susceptible. Final    Induced Clind Resist  NOT REPORTED Final    levofloxacin Sensitive  Final     0.25   SUSCEPTIBLE   linezolid   Final     NOT REPORTED    moxifloxacin   Final     NOT REPORTED    nitrofurantoin   Final     NOT REPORTED    oxacillin Sensitive  Final     0.5   SUSCEPTIBLE   Synercid   Final     NOT REPORTED    rifampin   Final     NOT REPORTED    tetracycline Sensitive  Final     <=1   SUSCEPTIBLE   tigecycline   Final     NOT REPORTED    trimethoprim-sulfamethoxazole Sensitive  Final     <=10   SUSCEPTIBLE   vancomycin   Final     NOT REPORTED        Medical Decision Making-Other:     Note:  · Labs, medications, radiologic studies were reviewed with personal review of films  · Large amounts of data were reviewed  · Discussed with nursing Staff, Discharge planner  · Infection Control and Prevention measures reviewed  · All prior entries were reviewed  · Administer medications as ordered  · Prognosis: Guarded  · Discharge planning reviewed  · Follow up as outpatient. Thank you for allowing us to participate in the care of this patient. Please call with questions.     Andrea Lane, APRN - CNP     ATTESTATION:    I have

## 2021-08-03 ENCOUNTER — TELEPHONE (OUTPATIENT)
Dept: INFECTIOUS DISEASES | Age: 41
End: 2021-08-03

## 2021-08-03 LAB
CULTURE: NORMAL
Lab: NORMAL
SPECIMEN DESCRIPTION: NORMAL

## 2021-08-03 NOTE — TELEPHONE ENCOUNTER
Patients wife called. He is on IV ABX for 6 weeks. He stared having an episode of really bad shaking (Rigors) She mentioned that Ativan has helped him with this in the past. What do you want the patient to do?  Thanks

## 2021-08-03 NOTE — CARE COORDINATION
.    Stroke Follow up Phone Call    Lesley this is Jeevan Colbert from [facility] stroke team calling to see how you are doing since your d/c. Medication List      START taking these medications    diphenoxylate-atropine 2.5-0.025 MG per tablet  Commonly known as: LOMOTIL  Take 1 tablet by mouth 2 times daily for 10 days. lactobacillus capsule  Take 1 capsule by mouth 3 times daily (with meals)     loperamide 2 MG capsule  Commonly known as: IMODIUM  Take 2 capsules by mouth 4 times daily as needed for Diarrhea     meclizine 25 MG tablet  Commonly known as: ANTIVERT  Take 1 tablet 3 times a day if needed for vertigo     melatonin 3 MG Tabs tablet  Take 2 tablets by mouth nightly as needed (sleep)     metoprolol tartrate 50 MG tablet  Commonly known as: LOPRESSOR  Take 1 tablet by mouth 2 times daily     nafcillin  infusion  Infuse 2,000 mg intravenously every 4 hours Compound per protocol     ondansetron 4 MG disintegrating tablet  Commonly known as: ZOFRAN-ODT  Take 1 tablet by mouth every 8 hours as needed for Nausea or Vomiting     oxyCODONE 5 MG immediate release tablet  Commonly known as: ROXICODONE  Take 1 tablet by mouth every 6 hours as needed for Pain for up to 7 days.      pantoprazole 40 MG tablet  Commonly known as: PROTONIX  Take 1 tablet by mouth 2 times daily (before meals)     polyethylene glycol 17 g packet  Commonly known as: GLYCOLAX  Take 17 g by mouth daily as needed for Constipation     rifAMPin 300 MG capsule  Commonly known as: Rifadin  Take 1 capsule by mouth 2 times daily     sodium chloride 0.65 % nasal spray  Commonly known as: OCEAN  1 spray by Nasal route as needed for Congestion        STOP taking these medications    amLODIPine 5 MG tablet  Commonly known as: NORVASC     atorvastatin 80 MG tablet  Commonly known as: LIPITOR     carvedilol 25 MG tablet  Commonly known as: COREG     dicyclomine 20 MG tablet  Commonly known as: BENTYL     hydroCHLOROthiazide

## 2021-08-03 NOTE — ADT AUTH CERT
Subscriber Details  Hospital Account [de-identified]  [facility] Subscriber Name/Sex/Relation Subscriber  Subscriber Address/Phone Subscriber Emp/Emp Phone   1. [address] Male   (Self) 1980 Natan Go   [phone](W) OTHER    Utilization Reviews        2021 by Ras Liriano RN       Review Entered Review Status   8/3/2021 11:02 In Primary      Criteria Review   2021      Infectious Disease      ·    Recommendations ·   Nafcillin 2 g every 4 until , addressing the bacteremia as well as multiple organ infection  · Rifampin added  till  - to clear the blood  · Might need longer AB suppression till cardiac surgery   · Received Exebacase compassionate use   · On probiotics, 3 x per day  · Chart reconsiled - ok for DC - FU Dr Madan Aguero 2 weeks        Internal Medicine      Interval History Status: not changed.      Patient seen examined bedside wife and parents at bedside.  No growth 4 days, npo new cultures showing growth, problems with rifampin (irritation and itching) during infusion but toelratible     Review of Systems:      Constitutional:  negative for chills, fevers, sweats  Respiratory:  negative for cough, dyspnea on exertion, shortness of breath, wheezing  Cardiovascular:  negative for chest pain, chest pressure/discomfort, lower extremity edema, palpitations  Gastrointestinal:  negative for abdominal pain, constipation, diarrhea, nausea, vomiting  Neurological: Positive for change in vision negative for dizziness, headache             lactobacillus   1 capsule  Oral  TID WC    diphenoxylate-atropine   1 tablet  Oral  BID    metoprolol tartrate   50 mg  Oral  BID    potassium chloride   20 mEq  Oral  BID WC    pantoprazole   40 mg  Oral  BID AC    rifampin (RIFADIN) IVPB   600 mg  Intravenous  Q24H    nafcillin   2,000 mg  Intravenous  Q4H    sodium chloride flush   5-40 mL  Intravenous  2 times per day     Tylenol prn 650 mg po x 4  Benadryl 12.5 mg IV x 1   Imodium 4 mg po x 1     Vitals:  BP 97/67   Pulse 85   Temp 98.4 °F (36.9 °C) (Oral)   Resp 16   Ht 5' 6\" (1.676 m)   Wt 167 lb 15.9 oz (76.2 kg)   SpO2 98%   BMI 27.11 kg/m²   Temp (24hrs), Av.9 °F (37.2 °C), Min:98.4 °F (36.9 °C), Max:100 °F (37.8 °C)        Physical Examination:         General appearance:  alert, cooperative and no distress  Mental Status:  oriented to person, place and time and normal affect  Lungs:  clear to auscultation bilaterally, normal effort  Heart:  regular rate and rhythm, systolic murmur  Abdomen:  soft, nontender, nondistended, normal bowel sounds  Extremities:  no edema, redness, tenderness in the calves  Skin: Osler node improving on right middle finger no inflamation of right toe     Plan:         1. MSSA bacteremia with severe MR. [address] cardiology, CT surgery, ID recommendations. IV antibiotics planned for total of 6 weeks. Exabecase given 2021, on Ancef, rifampin for synergy  2. Acute septic embolic infarcts appreciate neurology recommendations. MRI showing progression of infarct. Appreciate ophtamology recs  3. Antivert for vertigo  4. GI prophylaxis with Protonix  5. Lomotil for diarrhea  6. Follow up cultures, no growth 4 days, cultures since then have been showing no growth  7. IV ativan for anxiety  8. Discussed with family at bedside.          Ref.  Range 2021 05:35 2021 05:35   Creatinine Latest Ref Range: 0.70 - 1.20 mg/dL 0.68 (L)     Glucose Latest Ref Range: 70 - 99 mg/dL 101 (H)     Albumin/Globulin Ratio Latest Ref Range: 1.0 - 2.5    0.8 (L)   Albumin Latest Ref Range: 3.5 - 5.2 g/dL 3.1 (L) 3.1 (L)   Bilirubin Latest Ref Range: 0.3 - 1.2 mg/dL   0.25 (L)   RBC Latest Ref Range: 4.21 - 5.77 m/uL 3.13 (L)     Hemoglobin Quant Latest Ref Range: 13.0 - 17.0 g/dL 9.0 (L)     Hematocrit Latest Ref Range: 40.7 - 50.3 % 28.1 (L)     Eosinophils % Latest Ref Range: 1 - 4 % 0 (L)     Seg Neutrophils Latest Ref Range: 36 - 65 % 71 (H)     Lymphocytes Latest Ref Range: 24 - 43 % 18 (L)     Immature Granulocytes Latest Ref Range: 0 % 1 (H)              by Juliocesar Ann RN       Review Entered Review Status   8/3/2021 10:55 In Primary      Criteria Review   2021      Infectious Disease      Recommendations ·   Nafcillin 2 g every 4 until , addressing the bacteremia as well as multiple organ infection  · Rifampin added  to clear the blood  · Received Exebacase compassionate use   · On probiotics, 3 x per day  ·    Internal Medicine      Interval History Status: not changed.      Patient seen examined bedside wife also at bedside no growth 3 days had a fever last night, white count overall stable.        Review of Systems:      Constitutional:  negative for chills, fevers, sweats  Respiratory:  negative for cough, dyspnea on exertion, shortness of breath, wheezing  Cardiovascular:  negative for chest pain, chest pressure/discomfort, lower extremity edema, palpitations  Gastrointestinal:  negative for abdominal pain, constipation, diarrhea, nausea, vomiting  Neurological: Positive for change in vision negative for dizziness, headache          lactobacillus   1 capsule  Oral  TID WC    diphenoxylate-atropine   1 tablet  Oral  BID    metoprolol tartrate   50 mg  Oral  BID    potassium chloride   20 mEq  Oral  BID WC    pantoprazole   40 mg  Oral  BID AC    rifampin (RIFADIN) IVPB   600 mg  Intravenous  Q24H    nafcillin   2,000 mg  Intravenous  Q4H    sodium chloride flush   5-40 mL  Intravenous  2 times per day     Tylenol 650 mg po prn x 4  Benadryl 25 mg   Imodium 4 mg po x 1           Vitals:  /62   Pulse 96   Temp 99.7 °F (37.6 °C)   Resp 14   Ht 5' 6\" (1.676 m)   Wt 175 lb 7.8 oz (79.6 kg)   SpO2 95%   BMI 28.32 kg/m²   Temp (24hrs), Av.2 °F (37.3 °C), Min:97.8 °F (36.6 °C), Max:101.5 °F (38.6 °C)     No results for input(s): POCGLU in the last 72 hours.     I/O (24Hr):     Intake/Output Summary (Last 24 hours) at 7/31/2021 0853  Last data filed at 7/31/2021 0509        Gross per 24 hour   Intake 1494.1 ml   Output --   Net 1494.1 ml            Physical Examination:         General appearance:  alert, cooperative and no distress  Mental Status:  oriented to person, place and time and normal affect  Lungs:  clear to auscultation bilaterally, normal effort  Heart:  regular rate and rhythm, systolic murmur  Abdomen:  soft, nontender, nondistended, normal bowel sounds  Extremities:  no edema, redness, tenderness in the calves  Skin: Osler node improving on right middle finger no inflamation of right toe        Plan:         1. MSSA bacteremia with severe MR. [address], CT surgery, ID recommendations. IV antibiotics planned for total of 6 weeks. Exabecase given 7/25/2021, on Ancef, rifampin for synergy  2. Acute septic embolic infarcts appreciate neurology recommendations. MRI showing progression of infarct. Appreciate ophtamology recs  3. Antivert for vertigo  4. GI prophylaxis with Protonix  5. Lomotil for diarrhea  6. Follow up cultures, no growth 3 days, new culture due to fevers  7. IV ativan for anxiety  8. Discussed with family at bedside. 9.      Ref.  Range 7/31/2021 05:35   Anion Gap Latest Ref Range: 9 - 17 mmol/L 8 (L)   Albumin Latest Ref Range: 3.5 - 5.2 g/dL 3.1 (L)   WBC Latest Ref Range: 3.5 - 11.3 k/uL 12.6 (H)   RBC Latest Ref Range: 4.21 - 5.77 m/uL 3.34 (L)   Hemoglobin Quant Latest Ref Range: 13.0 - 17.0 g/dL 9.6 (L)   Hematocrit Latest Ref Range: 40.7 - 50.3 % 30.6 (L)   Eosinophils % Latest Ref Range: 1 - 4 % 0 (L)   Seg Neutrophils Latest Ref Range: 36 - 65 % 70 (H)   Segs Absolute Latest Ref Range: 1.50 - 8.10 k/uL 8.87 (H)   Lymphocytes Latest Ref Range: 24 - 43 % 19 (L)   Immature Granulocytes Latest Ref Range: 0 % 1 (H)           7/30 by Chinyere Meléndez RN       Review Entered Review Status   8/3/2021 10:51 In Primary      Criteria Review 2021      Infectious Disease      Recommendations:      · Nafcillin 2000 mg IV every 4 hours until 2021 for MSSA septicemia and to promote antibiotic penetration into brain. Will need 4 weeks from first negative blood cultures. · Rifampin 600 mg daily added 21 for synergy  · Please do not give anticoagulants due to the risk of septic emboli in the brain transitioning to hemorrhagic   · Received infusion of Exebacase under compassionate use protocol on 21 without any AEs  ·    Internal Medicine         Interval History Status: not changed.      Patient seen examined bedside wife also at bedside no growth 2 days, feeling better.  useda tivan for anxiety, no fevers     Review of Systems:      Constitutional:  negative for chills, fevers, sweats  Respiratory:  negative for cough, dyspnea on exertion, shortness of breath, wheezing  Cardiovascular:  negative for chest pain, chest pressure/discomfort, lower extremity edema, palpitations  Gastrointestinal:  negative for abdominal pain, constipation, diarrhea, nausea, vomiting  Neurological: Positive for change in vision negative for dizziness, headache         lactobacillus  1 capsule Oral TID WC    diphenoxylate-atropine  1 tablet Oral BID    metoprolol tartrate  50 mg Oral BID    potassium chloride  20 mEq Oral BID WC    pantoprazole  40 mg Oral BID AC    rifampin (RIFADIN) IVPB  600 mg Intravenous Q24H    nafcillin  2,000 mg Intravenous Q4H    sodium chloride flush  5-40 mL Intravenous 2 times per day         Tylenol 650 mg po prn x 4  Benadryl 12.5 mg iv x 1  Imodium 4 mg po x 2  Ativan 1 mg iv x 1     Vitals:  /67   Pulse 106   Temp 98.3 °F (36.8 °C) (Oral)   Resp 18   Ht 5' 6\" (1.676 m)   Wt 180 lb 1.9 oz (81.7 kg)   SpO2 99%   BMI 29.07 kg/m²   Temp (24hrs), Av.7 °F (37.1 °C), Min:98.2 °F (36.8 °C), Max:99.7 °F (37.6 °C)     No results for input(s): POCGLU in the last 72 hours.     I/O (24Hr):     Intake/Output Summary (Last 24 hours) at 7/30/2021 0842  Last data filed at 7/30/2021 0400        Gross per 24 hour   Intake 4007.5 ml   Output --   Net 4007.5 ml         Physical Examination:         General appearance:  alert, cooperative and no distress  Mental Status:  oriented to person, place and time and normal affect  Lungs:  clear to auscultation bilaterally, normal effort  Heart:  regular rate and rhythm, systolic murmur  Abdomen:  soft, nontender, nondistended, normal bowel sounds  Extremities:  no edema, redness, tenderness in the calves  Skin: Osler node improving on right middle finger,        Plan:         1. MSSA bacteremia with severe MR. [address] cardiology, CT surgery, ID recommendations. IV antibiotics planned for total of 6 weeks. Exabecase given 7/25/2021, on Ancef, rifampin for synergy  2. Acute septic embolic infarcts appreciate neurology recommendations. MRI showing progression of infarct  3. Antivert for vertigo  4. GI prophylaxis with Protonix  5. Lomotil for diarrhea  6. Follow up cultures, no growth 2 days, no growth 18 hours  7. IV ativan for anxiety  8.  Discussed with family at bedside.         Additional Notes      7/30/2021 03:51   Glucose: 122 (H)   Calcium: 8.4 (L)   Albumin: 2.9 (L)   RBC: 3.06 (L)   Hemoglobin Quant: 8.8 (L)   Hematocrit: 27.6 (L)   Eosinophils %: 0 (L)   Seg Neutrophils: 76 (H)   Lymphocytes: 15 (L)   Immature Granulocytes: 1 (H)   7/30/2021 03:51   Albumin/Globulin Ratio: 0.8 (L)   Albumin: 2.9 (L)

## 2021-08-04 LAB
CULTURE: NORMAL
Lab: NORMAL
SPECIMEN DESCRIPTION: NORMAL

## 2021-08-05 LAB
CULTURE: NORMAL
Lab: NORMAL
SPECIMEN DESCRIPTION: NORMAL

## 2021-08-06 LAB
CULTURE: NORMAL
CULTURE: NORMAL
Lab: NORMAL
Lab: NORMAL
SPECIMEN DESCRIPTION: NORMAL
SPECIMEN DESCRIPTION: NORMAL

## 2021-08-20 ENCOUNTER — TELEPHONE (OUTPATIENT)
Dept: INFECTIOUS DISEASES | Age: 41
End: 2021-08-20

## 2021-08-20 NOTE — TELEPHONE ENCOUNTER
The patient called, his picc line is coming out and he noticed yesterday that there is some yellow puss coming out around the site. I advised him that he needs to go to the ER. He agreed.

## 2021-08-21 PROBLEM — I31.39 PERICARDIAL EFFUSION: Status: ACTIVE | Noted: 2021-08-21

## 2021-08-22 ENCOUNTER — APPOINTMENT (OUTPATIENT)
Dept: GENERAL RADIOLOGY | Age: 41
DRG: 289 | End: 2021-08-22
Attending: INTERNAL MEDICINE
Payer: COMMERCIAL

## 2021-08-22 ENCOUNTER — HOSPITAL ENCOUNTER (INPATIENT)
Age: 41
LOS: 3 days | Discharge: HOME HEALTH | DRG: 289 | End: 2021-08-25
Attending: INTERNAL MEDICINE | Admitting: INTERNAL MEDICINE
Payer: COMMERCIAL

## 2021-08-22 DIAGNOSIS — R09.1 PLEURISY: Primary | ICD-10-CM

## 2021-08-22 DIAGNOSIS — I05.9 ENDOCARDITIS OF MITRAL VALVE: ICD-10-CM

## 2021-08-22 LAB
ANION GAP SERPL CALCULATED.3IONS-SCNC: 10 MMOL/L (ref 9–17)
BUN BLDV-MCNC: 5 MG/DL (ref 6–20)
BUN/CREAT BLD: ABNORMAL (ref 9–20)
CALCIUM SERPL-MCNC: 8.4 MG/DL (ref 8.6–10.4)
CHLORIDE BLD-SCNC: 104 MMOL/L (ref 98–107)
CO2: 22 MMOL/L (ref 20–31)
CREAT SERPL-MCNC: 0.76 MG/DL (ref 0.7–1.2)
GFR AFRICAN AMERICAN: >60 ML/MIN
GFR NON-AFRICAN AMERICAN: >60 ML/MIN
GFR SERPL CREATININE-BSD FRML MDRD: ABNORMAL ML/MIN/{1.73_M2}
GFR SERPL CREATININE-BSD FRML MDRD: ABNORMAL ML/MIN/{1.73_M2}
GLUCOSE BLD-MCNC: 92 MG/DL (ref 70–99)
HCT VFR BLD CALC: 27.5 % (ref 40.7–50.3)
HEMOGLOBIN: 8.6 G/DL (ref 13–17)
LACTIC ACID, SEPSIS WHOLE BLOOD: 1 MMOL/L (ref 0.5–1.9)
LACTIC ACID, SEPSIS: NORMAL MMOL/L (ref 0.5–1.9)
MCH RBC QN AUTO: 28.6 PG (ref 25.2–33.5)
MCHC RBC AUTO-ENTMCNC: 31.3 G/DL (ref 28.4–34.8)
MCV RBC AUTO: 91.4 FL (ref 82.6–102.9)
NRBC AUTOMATED: 0 PER 100 WBC
PDW BLD-RTO: 14.6 % (ref 11.8–14.4)
PLATELET # BLD: 223 K/UL (ref 138–453)
PMV BLD AUTO: 9.2 FL (ref 8.1–13.5)
POTASSIUM SERPL-SCNC: 3.7 MMOL/L (ref 3.7–5.3)
RBC # BLD: 3.01 M/UL (ref 4.21–5.77)
SODIUM BLD-SCNC: 136 MMOL/L (ref 135–144)
WBC # BLD: 5 K/UL (ref 3.5–11.3)

## 2021-08-22 PROCEDURE — 6360000002 HC RX W HCPCS: Performed by: NURSE PRACTITIONER

## 2021-08-22 PROCEDURE — 2580000003 HC RX 258: Performed by: NURSE PRACTITIONER

## 2021-08-22 PROCEDURE — 6370000000 HC RX 637 (ALT 250 FOR IP): Performed by: STUDENT IN AN ORGANIZED HEALTH CARE EDUCATION/TRAINING PROGRAM

## 2021-08-22 PROCEDURE — 71045 X-RAY EXAM CHEST 1 VIEW: CPT

## 2021-08-22 PROCEDURE — 99223 1ST HOSP IP/OBS HIGH 75: CPT | Performed by: INTERNAL MEDICINE

## 2021-08-22 PROCEDURE — 80048 BASIC METABOLIC PNL TOTAL CA: CPT

## 2021-08-22 PROCEDURE — 6360000002 HC RX W HCPCS: Performed by: INTERNAL MEDICINE

## 2021-08-22 PROCEDURE — 36415 COLL VENOUS BLD VENIPUNCTURE: CPT

## 2021-08-22 PROCEDURE — 85027 COMPLETE CBC AUTOMATED: CPT

## 2021-08-22 PROCEDURE — 2500000003 HC RX 250 WO HCPCS: Performed by: INTERNAL MEDICINE

## 2021-08-22 PROCEDURE — 2580000003 HC RX 258: Performed by: INTERNAL MEDICINE

## 2021-08-22 PROCEDURE — 87040 BLOOD CULTURE FOR BACTERIA: CPT

## 2021-08-22 PROCEDURE — 2060000000 HC ICU INTERMEDIATE R&B

## 2021-08-22 PROCEDURE — 94761 N-INVAS EAR/PLS OXIMETRY MLT: CPT

## 2021-08-22 PROCEDURE — 83605 ASSAY OF LACTIC ACID: CPT

## 2021-08-22 PROCEDURE — 99254 IP/OBS CNSLTJ NEW/EST MOD 60: CPT | Performed by: INTERNAL MEDICINE

## 2021-08-22 PROCEDURE — 6370000000 HC RX 637 (ALT 250 FOR IP): Performed by: NURSE PRACTITIONER

## 2021-08-22 PROCEDURE — 6370000000 HC RX 637 (ALT 250 FOR IP): Performed by: INTERNAL MEDICINE

## 2021-08-22 RX ORDER — ACETAMINOPHEN 325 MG/1
650 TABLET ORAL EVERY 6 HOURS PRN
Status: DISCONTINUED | OUTPATIENT
Start: 2021-08-22 | End: 2021-08-26 | Stop reason: HOSPADM

## 2021-08-22 RX ORDER — OXYCODONE HYDROCHLORIDE 5 MG/1
5 TABLET ORAL EVERY 4 HOURS PRN
Status: DISCONTINUED | OUTPATIENT
Start: 2021-08-22 | End: 2021-08-24

## 2021-08-22 RX ORDER — SODIUM CHLORIDE 0.9 % (FLUSH) 0.9 %
5-40 SYRINGE (ML) INJECTION EVERY 12 HOURS SCHEDULED
Status: DISCONTINUED | OUTPATIENT
Start: 2021-08-22 | End: 2021-08-26 | Stop reason: HOSPADM

## 2021-08-22 RX ORDER — LANOLIN ALCOHOL/MO/W.PET/CERES
6 CREAM (GRAM) TOPICAL NIGHTLY PRN
Status: DISCONTINUED | OUTPATIENT
Start: 2021-08-22 | End: 2021-08-26 | Stop reason: HOSPADM

## 2021-08-22 RX ORDER — SODIUM CHLORIDE 9 MG/ML
INJECTION, SOLUTION INTRAVENOUS CONTINUOUS
Status: DISCONTINUED | OUTPATIENT
Start: 2021-08-22 | End: 2021-08-23

## 2021-08-22 RX ORDER — SODIUM CHLORIDE 9 MG/ML
25 INJECTION, SOLUTION INTRAVENOUS PRN
Status: DISCONTINUED | OUTPATIENT
Start: 2021-08-22 | End: 2021-08-26 | Stop reason: HOSPADM

## 2021-08-22 RX ORDER — ONDANSETRON 2 MG/ML
4 INJECTION INTRAMUSCULAR; INTRAVENOUS EVERY 6 HOURS PRN
Status: DISCONTINUED | OUTPATIENT
Start: 2021-08-22 | End: 2021-08-26 | Stop reason: HOSPADM

## 2021-08-22 RX ORDER — ACETAMINOPHEN 650 MG/1
650 SUPPOSITORY RECTAL EVERY 6 HOURS PRN
Status: DISCONTINUED | OUTPATIENT
Start: 2021-08-22 | End: 2021-08-26 | Stop reason: HOSPADM

## 2021-08-22 RX ORDER — OXYCODONE HYDROCHLORIDE 5 MG/1
5 CAPSULE ORAL EVERY 6 HOURS PRN
Status: ON HOLD | COMMUNITY
End: 2021-08-25 | Stop reason: HOSPADM

## 2021-08-22 RX ORDER — PANTOPRAZOLE SODIUM 40 MG/1
40 TABLET, DELAYED RELEASE ORAL
Status: DISCONTINUED | OUTPATIENT
Start: 2021-08-22 | End: 2021-08-26 | Stop reason: HOSPADM

## 2021-08-22 RX ORDER — LACTOBACILLUS RHAMNOSUS GG 10B CELL
1 CAPSULE ORAL
Status: DISCONTINUED | OUTPATIENT
Start: 2021-08-22 | End: 2021-08-26 | Stop reason: HOSPADM

## 2021-08-22 RX ORDER — OXYCODONE HYDROCHLORIDE 5 MG/1
5 TABLET ORAL EVERY 6 HOURS PRN
Status: DISCONTINUED | OUTPATIENT
Start: 2021-08-22 | End: 2021-08-22

## 2021-08-22 RX ORDER — SODIUM CHLORIDE 0.9 % (FLUSH) 0.9 %
5-40 SYRINGE (ML) INJECTION PRN
Status: DISCONTINUED | OUTPATIENT
Start: 2021-08-22 | End: 2021-08-26 | Stop reason: HOSPADM

## 2021-08-22 RX ORDER — FENTANYL CITRATE 50 UG/ML
25 INJECTION, SOLUTION INTRAMUSCULAR; INTRAVENOUS
Status: DISCONTINUED | OUTPATIENT
Start: 2021-08-22 | End: 2021-08-24

## 2021-08-22 RX ORDER — ECHINACEA PURPUREA EXTRACT 125 MG
1 TABLET ORAL PRN
Status: DISCONTINUED | OUTPATIENT
Start: 2021-08-22 | End: 2021-08-23 | Stop reason: SDUPTHER

## 2021-08-22 RX ORDER — OXYCODONE HYDROCHLORIDE 5 MG/1
10 TABLET ORAL EVERY 4 HOURS PRN
Status: DISCONTINUED | OUTPATIENT
Start: 2021-08-22 | End: 2021-08-24

## 2021-08-22 RX ADMIN — Medication 6 MG: at 22:56

## 2021-08-22 RX ADMIN — OXYCODONE HYDROCHLORIDE 10 MG: 5 TABLET ORAL at 21:52

## 2021-08-22 RX ADMIN — SODIUM CHLORIDE, PRESERVATIVE FREE 10 ML: 5 INJECTION INTRAVENOUS at 08:02

## 2021-08-22 RX ADMIN — HYDROMORPHONE HYDROCHLORIDE 0.5 MG: 1 INJECTION, SOLUTION INTRAMUSCULAR; INTRAVENOUS; SUBCUTANEOUS at 08:01

## 2021-08-22 RX ADMIN — Medication 1 CAPSULE: at 16:58

## 2021-08-22 RX ADMIN — NAFCILLIN SODIUM 2000 MG: 2 INJECTION, POWDER, LYOPHILIZED, FOR SOLUTION INTRAMUSCULAR; INTRAVENOUS at 16:58

## 2021-08-22 RX ADMIN — ONDANSETRON 4 MG: 2 INJECTION INTRAMUSCULAR; INTRAVENOUS at 14:38

## 2021-08-22 RX ADMIN — Medication 600 MG: at 14:08

## 2021-08-22 RX ADMIN — HYDROMORPHONE HYDROCHLORIDE 0.5 MG: 1 INJECTION, SOLUTION INTRAMUSCULAR; INTRAVENOUS; SUBCUTANEOUS at 11:42

## 2021-08-22 RX ADMIN — FENTANYL CITRATE 25 MCG: 50 INJECTION, SOLUTION INTRAMUSCULAR; INTRAVENOUS at 23:00

## 2021-08-22 RX ADMIN — Medication 1 CAPSULE: at 14:38

## 2021-08-22 RX ADMIN — OXYCODONE HYDROCHLORIDE 10 MG: 5 TABLET ORAL at 14:09

## 2021-08-22 RX ADMIN — ACETAMINOPHEN 650 MG: 325 TABLET ORAL at 14:09

## 2021-08-22 RX ADMIN — PANTOPRAZOLE SODIUM 40 MG: 40 TABLET, DELAYED RELEASE ORAL at 14:38

## 2021-08-22 RX ADMIN — HYDROMORPHONE HYDROCHLORIDE 0.5 MG: 1 INJECTION, SOLUTION INTRAMUSCULAR; INTRAVENOUS; SUBCUTANEOUS at 04:59

## 2021-08-22 RX ADMIN — FENTANYL CITRATE 25 MCG: 50 INJECTION, SOLUTION INTRAMUSCULAR; INTRAVENOUS at 14:08

## 2021-08-22 RX ADMIN — NAFCILLIN SODIUM 2000 MG: 2 INJECTION, POWDER, LYOPHILIZED, FOR SOLUTION INTRAMUSCULAR; INTRAVENOUS at 14:07

## 2021-08-22 RX ADMIN — SODIUM CHLORIDE, PRESERVATIVE FREE 10 ML: 5 INJECTION INTRAVENOUS at 20:09

## 2021-08-22 RX ADMIN — NAFCILLIN SODIUM 2000 MG: 2 INJECTION, POWDER, LYOPHILIZED, FOR SOLUTION INTRAMUSCULAR; INTRAVENOUS at 20:09

## 2021-08-22 RX ADMIN — FENTANYL CITRATE 25 MCG: 50 INJECTION, SOLUTION INTRAMUSCULAR; INTRAVENOUS at 20:09

## 2021-08-22 RX ADMIN — METOPROLOL TARTRATE 12.5 MG: 25 TABLET ORAL at 22:55

## 2021-08-22 RX ADMIN — ENOXAPARIN SODIUM 40 MG: 40 INJECTION SUBCUTANEOUS at 08:02

## 2021-08-22 RX ADMIN — OXYCODONE HYDROCHLORIDE 10 MG: 5 TABLET ORAL at 18:06

## 2021-08-22 ASSESSMENT — PAIN SCALES - GENERAL
PAINLEVEL_OUTOF10: 9
PAINLEVEL_OUTOF10: 2
PAINLEVEL_OUTOF10: 9
PAINLEVEL_OUTOF10: 9
PAINLEVEL_OUTOF10: 8
PAINLEVEL_OUTOF10: 8
PAINLEVEL_OUTOF10: 9
PAINLEVEL_OUTOF10: 6
PAINLEVEL_OUTOF10: 7
PAINLEVEL_OUTOF10: 8
PAINLEVEL_OUTOF10: 3
PAINLEVEL_OUTOF10: 9
PAINLEVEL_OUTOF10: 3
PAINLEVEL_OUTOF10: 9
PAINLEVEL_OUTOF10: 2

## 2021-08-22 ASSESSMENT — PAIN DESCRIPTION - ORIENTATION: ORIENTATION: RIGHT;LOWER

## 2021-08-22 ASSESSMENT — PAIN DESCRIPTION - PAIN TYPE: TYPE: ACUTE PAIN

## 2021-08-22 ASSESSMENT — PAIN DESCRIPTION - LOCATION: LOCATION: ABDOMEN

## 2021-08-22 NOTE — H&P
[facility]  Office: [address], DO, Owen Alfaro, DO, Saray Rico, DO, Armida Carvalhosharon Blood, DO, Sanjeev Christiansen MD, Ken Truong MD, Erwin Alvarez MD, Yolande Nevarez MD, Myles Miller MD, Latisha Dean MD, Julienne Galeano MD, Jean Carlos Bledsoe, DO, Lacey Majano MD, Yuri Watkins DO, Thiago Goldman MD,  Abigail Cornelius DO, Sue Doran MD, Wayne Naylor MD, Mayco Rinaldi MD, Eduardo Farias MD, Murray Bueno MD, Tete Eli MD, Nae Bartholomew MD, Andra Sanchez, [facility], [address], [facility], Cesar Melton, CNP, Ralph Garcia, CNS, Noah Barthel, CNP, Jose Vidal, CNP, Jorge Regan, CNP, Gudelia Alegre, CNP, Sharon Oliva, CNP, Brenda Perez PA-C, [facility], [facility], Shanel Levin, CNP, Natacha Russo, CNP, Clay Garza, CNP, Dee Brian, CNP, Kole Suazo, CNP, Bennett Aguilar, CNP, Jeffrey Brooks, CNP, Juanis Leslie, CNP         [address]    HISTORY AND PHYSICAL EXAMINATION            Date:   8/22/2021  Patient name:  Francie Ng  Date of admission:  8/22/2021  4:09 AM  MRN:   [id number]  Account:  [de-identified]  YOB: 1980  PCP:    Corey Jay MD  Room:   2024/2024-01  Code Status:    Full Code    Chief Complaint:     LUQ pain    History Obtained From:     patient, electronic medical record    History of Present Illness:     Francie Ng is a 36 y.o. Non- / non  male who presents with LUQ pain and is admitted to the hospital for the management of Pericardial effusion. Patient is a 70-year-old  male with PMH of recent mitral valve endocarditis, MSSA bacteremia and UTI in July 2021 at [facility]. The patient was admitted July/17/20218/2/2021 with 3 days of fever and diarrhea after eating chicken. He was noted to be septic and had 2/2 blood culture showing positive MSSA.   The patient was started on IV antibiotics including nafcillin 2 g IV every 4 hours, and rifampin. Patient was seen by cardiology, cardiothoracic surgery, infectious diseases and antibiotics were to be continued through September 14, 2021. Patient was also evaluated by neurology and ophthalmology due to concern for septic emboli found on MRI brain. Patient had PICC line placed on 8/2/2021 to continue the nafcillin and rifampin. The patient does experience some intermittent nausea with the rifampin. He continues to have loose stools, but takes probiotics frequently. He denies any recurrent fevers, chills. The patient however developed severe left upper quadrant abdominal pain over the last day or 2. He did note some increased work of breathing and went to the emergency department at an outside facility. Patient was found to have a moderate-sized pericardial effusion on CT chest.  He has been transferred to [facility] since his cardiologist know him and he needs an echocardiogram.  His wife is present. Past Medical History:     Past Medical History:   Diagnosis Date    Diverticulosis     Hyperlipidemia     Hypertension     MESSI (obstructive sleep apnea)     Has PSG study done Pos for Mod MESSI, never had cpap tritration done for machine    Research subject 07/24/2021    EIND [id number] Exebacase for persistent bacteremia expected date of completion 8/25/2021        Past Surgical History:     Past Surgical History:   Procedure Laterality Date    ABDOMEN SURGERY      large bowel resection    ANTERIOR CRUCIATE LIGAMENT REPAIR Right     APPENDECTOMY  07/09/1997    COLECTOMY      2012 - D/T Dverticulitis    DILATATION, ESOPHAGUS      HC  PICC [facility] DOUBLE  8/2/2021             Medications Prior to Admission:     Prior to Admission medications    Medication Sig Start Date End Date Taking? Authorizing Provider   oxyCODONE 5 MG capsule Take 5 mg by mouth every 6 hours as needed for Pain.    Yes Historical Provider, MD   melatonin 3 MG TABS tablet Take 2 tablets by mouth nightly as needed (sleep) 8/2/21 9/1/21  Keira Bansal MD   metoprolol tartrate (LOPRESSOR) 50 MG tablet Take 1 tablet by mouth 2 times daily 8/2/21   Keira Bansal MD   ondansetron (ZOFRAN-ODT) 4 MG disintegrating tablet Take 1 tablet by mouth every 8 hours as needed for Nausea or Vomiting 8/2/21   Keira Bansal MD   pantoprazole (PROTONIX) 40 MG tablet Take 1 tablet by mouth 2 times daily (before meals) 8/2/21   Keira Bansal MD   polyethylene glycol (GLYCOLAX) 17 g packet Take 17 g by mouth daily as needed for Constipation 8/2/21 9/1/21  Keira Bansal MD   sodium chloride (OCEAN) 0.65 % nasal spray 1 spray by Nasal route as needed for Congestion 8/2/21   Keira Bansal MD   nafcillin infusion Infuse 2,000 mg intravenously every 4 hours Compound per protocol 8/2/21 9/14/21  Keira Bansal MD   rifAMPin (RIFADIN) 300 MG capsule Take 1 capsule by mouth 2 times daily 8/2/21 9/14/21  Hoda Rolon, APRN - CNP   lactobacillus (CULTURELLE) capsule Take 1 capsule by mouth 3 times daily (with meals) 8/2/21 9/1/21  Bean Soria MD        Allergies:     Morphine    Social History:     Tobacco:    reports that he has never smoked. He has never used smokeless tobacco.  Alcohol:      reports current alcohol use of about 20.0 standard drinks of alcohol per week. Drug Use:  reports no history of drug use. Family History:     Family History   Problem Relation Age of Onset    No Known Problems Mother     Other Father         diverticulitis       Review of Systems:     Positive and Negative as described in HPI.     CONSTITUTIONAL:  negative for fevers, chills, sweats, fatigue, weight loss  HEENT:  negative for vision, hearing changes, runny nose, throat pain  RESPIRATORY: Positive for shortness of breath, no cough, congestion, wheezing  CARDIOVASCULAR:  negative for chest pain, palpitations  GASTROINTESTINAL:  negative for nausea, vomiting, positive diarrhea, change in bowel habits, abdominal pain   GENITOURINARY:  negative for difficulty of urination, burning with urination, frequency   INTEGUMENT:  negative for rash, skin lesions, easy bruising   HEMATOLOGIC/LYMPHATIC:  negative for swelling/edema   ALLERGIC/IMMUNOLOGIC:  negative for urticaria , itching  ENDOCRINE:  negative increase in drinking, increase in urination, hot or cold intolerance  MUSCULOSKELETAL:  negative joint pains, muscle aches, swelling of joints  NEUROLOGICAL:  negative for headaches, dizziness, lightheadedness, numbness, pain, tingling extremities  BEHAVIOR/PSYCH:  negative for depression, anxiety    Physical Exam:   /72   Pulse 97   Temp 97.9 °F (36.6 °C) (Oral)   Resp 20   Ht 5' 6\" (1.676 m)   Wt 172 lb (78 kg)   SpO2 97%   BMI 27.76 kg/m²   Temp (24hrs), Av.1 °F (36.7 °C), Min:97.9 °F (36.6 °C), Max:98.3 °F (36.8 °C)    No results for input(s): POCGLU in the last 72 hours.     Intake/Output Summary (Last 24 hours) at 2021  Last data filed at 2021 1810  Gross per 24 hour   Intake 2136 ml   Output 1950 ml   Net 186 ml       General Appearance: alert, well appearing, and in no acute distress  Mental status: oriented to person, place, and time  Head: normocephalic, atraumatic  Eye: no icterus, redness, pupils equal and reactive, extraocular eye movements intact, conjunctiva clear  Ear: normal external ear, no discharge, hearing intact  Nose: no drainage noted  Mouth: mucous membranes moist  Neck: supple, no carotid bruits, thyroid not palpable  Lungs: Bilateral equal air entry, clear to ausculation, no wheezing, rales or rhonchi, normal effort  Cardiovascular: normal rate, regular rhythm, ++ murmur,   Abdomen: Soft, nontender, nondistended, normal bowel sounds, no hepatomegaly or splenomegaly  Neurologic: There are no new focal motor or sensory deficits, normal muscle tone and bulk, no abnormal sensation, normal speech, cranial nerves II through XII grossly intact  Skin: No gross lesions, rashes, bruising or bleeding on exposed skin area  Extremities: peripheral pulses palpable, no pedal edema or calf pain with palpation  Psych: normal affect    Investigations:      Laboratory Testing:  Recent Results (from the past 24 hour(s))   Lactate, Sepsis    Collection Time: 08/22/21  7:36 AM   Result Value Ref Range    Lactic Acid, Sepsis NOT REPORTED 0.5 - 1.9 mmol/L    Lactic Acid, Sepsis, Whole Blood 1.0 0.5 - 1.9 mmol/L   Culture, Blood 1    Collection Time: 08/22/21  7:36 AM    Specimen: Blood   Result Value Ref Range    Specimen Description . BLOOD     Special Requests 10ML LT HAND     Culture NO GROWTH 3 HOURS    Basic Metabolic Panel w/ Reflex to MG    Collection Time: 08/22/21  7:36 AM   Result Value Ref Range    Glucose 92 70 - 99 mg/dL    BUN 5 (L) 6 - 20 mg/dL    CREATININE 0.76 0.70 - 1.20 mg/dL    Bun/Cre Ratio NOT REPORTED 9 - 20    Calcium 8.4 (L) 8.6 - 10.4 mg/dL    Sodium 136 135 - 144 mmol/L    Potassium 3.7 3.7 - 5.3 mmol/L    Chloride 104 98 - 107 mmol/L    CO2 22 20 - 31 mmol/L    Anion Gap 10 9 - 17 mmol/L    GFR Non-African American >60 >60 mL/min    GFR African American >60 >60 mL/min    GFR Comment          GFR Staging NOT REPORTED    CBC    Collection Time: 08/22/21  7:36 AM   Result Value Ref Range    WBC 5.0 3.5 - 11.3 k/uL    RBC 3.01 (L) 4.21 - 5.77 m/uL    Hemoglobin 8.6 (L) 13.0 - 17.0 g/dL    Hematocrit 27.5 (L) 40.7 - 50.3 %    MCV 91.4 82.6 - 102.9 fL    MCH 28.6 25.2 - 33.5 pg    MCHC 31.3 28.4 - 34.8 g/dL    RDW 14.6 (H) 11.8 - 14.4 %    Platelets 446 818 - 779 k/uL    MPV 9.2 8.1 - 13.5 fL    NRBC Automated 0.0 0.0 per 100 WBC       Imaging/Diagnostics:  No results found.     Assessment :      Hospital Problems         Last Modified POA    * (Principal) Pericardial effusion 8/22/2021 Yes    Severe mitral regurgitation 8/22/2021 Yes    Normocytic normochromic anemia 8/22/2021 Yes    Diarrhea 8/22/2021 Yes    Essential hypertension 8/22/2021 Yes          Plan:     Patient status inpatient in the Progressive Unit/Step down    1. Pericardial effusionwith recent mitral valve endocarditis, await echocardiogram.  Consult cardiology. May be contributing to left upper quadrant abdominal pain. 2. Recent endocarditiscontinue IV rifampin and nafcillin per infectious diseases  3. Anemiamonitor H/H, last Hb 9.3 on August 2  4. Hypertensionhold beta-blocker, monitor blood pressure, doubt tamponade  5. Diarrheaimproving, continue lactobacillus  6. GI prophylaxis  7. Decrease IV fluids 100 mL/hour    Discussed with patient and wife    Consultations:   IP CONSULT TO INFECTIOUS DISEASES  IP CONSULT TO CARDIOLOGY     Patient is admitted as inpatient status because of co-morbidities listed above, severity of signs and symptoms as outlined, requirement for current medical therapies and most importantly because of direct risk to patient if care not provided in a hospital setting. Expected length of stay > 48 hours.     Carlitos Croft MD  8/22/2021  7:47 PM    Copy sent to Dr. Lorene Jesus MD

## 2021-08-22 NOTE — PLAN OF CARE
Problem: Pain:  Goal: Pain level will decrease  Description: Pain level will decrease  Outcome: Ongoing  Goal: Control of acute pain  Description: Control of acute pain  Outcome: Ongoing  Goal: Control of chronic pain  Description: Control of chronic pain  Outcome: Ongoing     Problem: Skin Integrity:  Goal: Will show no infection signs and symptoms  Description: Will show no infection signs and symptoms  Outcome: Ongoing  Goal: Absence of new skin breakdown  Description: Absence of new skin breakdown  Outcome: Ongoing  Goal: Demonstration of wound healing without infection will improve  Description: Demonstration of wound healing without infection will improve  Outcome: Ongoing  Goal: Complications related to intravenous access or infusion will be avoided or minimized  Description: Complications related to intravenous access or infusion will be avoided or minimized  Outcome: Ongoing     Problem: Falls - Risk of:  Goal: Will remain free from falls  Description: Will remain free from falls  Outcome: Ongoing  Goal: Absence of physical injury  Description: Absence of physical injury  Outcome: Ongoing     Problem: Cardiac:  Goal: Ability to maintain vital signs within normal range will improve  Description: Ability to maintain vital signs within normal range will improve  Outcome: Ongoing  Goal: Cardiovascular alteration will improve  Description: Cardiovascular alteration will improve  Outcome: Ongoing     Problem: Health Behavior:  Goal: Will modify at least one risk factor affecting health status  Description: Will modify at least one risk factor affecting health status  Outcome: Ongoing  Goal: Identification of resources available to assist in meeting health care needs will improve  Description: Identification of resources available to assist in meeting health care needs will improve  Outcome: Ongoing     Problem: Physical Regulation:  Goal: Ability to maintain vital signs within normal range will improve  Description: Ability to maintain vital signs within normal range will improve  Outcome: Ongoing  Goal: Complications related to the disease process, condition or treatment will be avoided or minimized  Description: Complications related to the disease process, condition or treatment will be avoided or minimized  Outcome: Ongoing  Goal: Diagnostic test results will improve  Description: Diagnostic test results will improve  Outcome: Ongoing  Goal: Will remain free from infection  Description: Will remain free from infection  Outcome: Ongoing     Problem: Physical Regulation:  Goal: Ability to maintain vital signs within normal range will improve  Description: Ability to maintain vital signs within normal range will improve  Outcome: Ongoing  Goal: Complications related to the disease process, condition or treatment will be avoided or minimized  Description: Complications related to the disease process, condition or treatment will be avoided or minimized  Outcome: Ongoing  Goal: Diagnostic test results will improve  Description: Diagnostic test results will improve  Outcome: Ongoing  Goal: Will remain free from infection  Description: Will remain free from infection  Outcome: Ongoing

## 2021-08-22 NOTE — CONSULTS
[facility] - Initial Consult Note    Today's Date and Time: 8/22/2021, 7:18 AM    Impression :   · Pericardial effusion  · Prior MSSA septicemia 7-17-21  · Persistent growth of bacteria in blood 7-17-21 through 7-27-21 despite treatment  · Prior Sepsis with acute organ dysfunction and septic shock  · Mitral Valve endocarditis. 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 Will need 6 weeks from first negative blood cultures. · Rifampin 600 mg daily added 7/22/21 until 9/14/21 for synergy  · Please do not give anticoagulants due to the risk of septic emboli in the brain transitioning to hemorrhagic   · Repeat cardiac echo    Medical Decision Making/Summary/Discussion:8/22/2021     ·   Infection Control Recommendations   · [place] Precautions  · Contact Isolation     Antimicrobial Stewardship Recommendations     · Simplification of therapy  · Targeted therapy    Coordination of Outpatient Care:   · Estimated Length of IV antimicrobials: 4 weeks from first negative blood cultures  · Patient will need Midline Catheter Insertion: TBD  · Patient will need PICC line Insertion: TBD  · Patient will need: Home IV , [place],  SNF,  LTAC: Yes-either SNF or home infusion  · Patient will need outpatient wound care: TBD    Chief complaint/reason for consultation:   · MSSA sepsis    History of Present Illness:   Safia Muse is a 36y.o.-year-old  male who was initially admitted on 8/22/2021.  Patient seen at the request of  Cassie    INITIAL HISTORY:     Admission of 7/16/2021:  Patient initially presented to [facility] a few days ago with complaints of  abdominal pain, nausea, watery diarrhea and vomiting for the past 5 days after eating some chicken. The patient states that his wife also had episodes of emesis after eating the chicken but her symptoms resolved shortly after. A CT abdomen was unremarkable and he was diagnosed with viral gastroenteritis and sent home with Norco, Zofran, and potassium supplement for hypokalemia. On 7/16/2021, he presented to [facility] with worsening symptoms, including fever, chills, fatigue, and worsening abdominal pain with continued vomiting, right big toe pain. Work-up in the ED revealed a fever of 102.6, tachycardia with a heart rate up to 170, dyspnea with hypoxia, blood cultures showed MSSA x2, and urine cultures grew staph aureus ,000 CFU/mL. Occult blood stool positive. ID was consulted for MSSA septicemia    His work-up showed the presence of a mitral valve endocarditis with a mitral valve vegetation measuring 2.5 X 2.4 cm 7/20/2021. Echocardiogram completed on 7/18/2021 revealed posterior mitral valve leaflet prolapse, severe mitral regurgitation, and possible small vegetation on posterior leaflet. Presence of vegetation on mitral valve would help explain the emboli to the brain. MONROE on 7/20 showed a Large vegetation measuring 2.5 X 2.4 cm noted on the posterior leaflet of the mitral valve. Posterior leaflet is flair and is associated with severe anteriorly directed eccentric regurgitation. He also developed acute septic embolic infarcts to the brain on 7/21/21. MRI brain 6-84-76: Acute embolic infarctions in supra and infratentorial structures. There was a lot of difficulty controlling the MSSA septicemia as the patient had bacterial growth in the blood from 7/17 through 7/27/2021 despite intensive treatment.   In order to help control the ongoing Transportation (Non-Medical):    Physical Activity:     Days of Exercise per Week:     Minutes of Exercise per Session:    Stress:     Feeling of Stress :    Social Connections:     Frequency of Communication with Friends and Family:     Frequency of Social Gatherings with Friends and Family:     Attends Buddhism Services:     Active Member of Clubs or Organizations:     Attends Club or Organization Meetings:     Marital Status:    Intimate Partner Violence:     Fear of Current or Ex-Partner:     Emotionally Abused:     Physically Abused:     Sexually Abused:        Family History:     Family History   Problem Relation Age of Onset    No Known Problems Mother     Other Father         diverticulitis        Allergies:   Morphine     Review of Systems:   Constitutional: No acute complaints   Head: No headaches  Eyes: No double vision or blurry vision. No conjunctival inflammation. ENT: No sore throat or runny nose. . No hearing loss, tinnitus or vertigo. Cardiovascular: No chest pain or palpitations. No shortness of breath. No CASTRO  Lung: No shortness of breath or cough. No sputum production. Reports pleuritic pain with deep inspiration  Abdomen: No nausea, vomiting, diarrhea, left upper quadrant abdominal pain  Genitourinary: No increased urinary frequency, or dysuria. No hematuria. No suprapubic or CVA pain  Musculoskeletal: No muscle aches or pains. No joint effusions, swelling or deformities  Hematologic: No bleeding or bruising. Neurologic: No headache, weakness, numbness, or tingling. Integument: Scattered small reddened bumps and bruises  Psychiatric: No depression. Endocrine: No polyuria, no polydipsia, no polyphagia.     Physical Examination :     Patient Vitals for the past 8 hrs:   BP Temp Temp src Pulse Resp Height Weight   08/22/21 0430      5' 6\" (1.676 m) 172 lb (78 kg)   08/22/21 0428 103/68 98.3 °F (36.8 °C) Axillary 92 20       General Appearance: Awake, alert, and in no apparent distress  Head:  Normocephalic, no trauma  Eyes: Pupils equal, round, reactive to light and accommodation; extraocular movements intact; sclera anicteric; conjunctivae pink. No embolic phenomena. ENT: Oropharynx clear, without erythema, exudate, or thrush. No tenderness of sinuses. Mouth/throat: mucosa pink and moist. No lesions. Dentition in good repair. Neck:Supple, without lymphadenopathy. Thyroid normal, No bruits. Pulmonary/Chest: Clear to auscultation, without wheezes, rales, or rhonchi. No dullness to percussion. Cardiovascular: Normal sinus tachycardia with murmur, No rubs, or gallops. Abdomen: Soft, non tender. Bowel sounds normal. No organomegaly  All four Extremities: No cyanosis, clubbing, edema, or effusions. Neurologic: No gross sensory or motor deficits. Skin: Warm and dry with good turgor. No signs of peripheral arterial or venous insufficiency. No ulcerations. No open wounds. Medical Decision Making -Laboratory:   I have independently reviewed/ordered the following labs:    CBC with Differential:   No results for input(s): WBC, HGB, HCT, PLT, SEGSPCT, BANDSPCT, LYMPHOPCT, MONOPCT, EOSPCT in the last 72 hours. BMP:   No results for input(s): NA, K, CL, CO2, BUN, CREATININE, MG in the last 72 hours. Invalid input(s): CA  Hepatic Function Panel:   No results for input(s): PROT, LABALBU, BILIDIR, IBILI, BILITOT, ALKPHOS, ALT, AST in the last 72 hours. No results for input(s): RPR in the last 72 hours. No results for input(s): HIV in the last 72 hours. No results for input(s): BC in the last 72 hours.   Lab Results   Component Value Date    MUCUS NOT REPORTED 07/17/2021    RBC 3.25 08/02/2021    TRICHOMONAS NOT REPORTED 07/17/2021    WBC 8.7 08/02/2021    YEAST NOT REPORTED 07/17/2021    TURBIDITY CLEAR 07/17/2021     Lab Results   Component Value Date    CREATININE 0.61 08/02/2021    GLUCOSE 108 08/02/2021       Medical Decision Making-Imaging:          MRI Brain 7/2121 Impression       Findings suggestive of acute embolic infarctions involving supra and   infratentorial structures as described.       Subacute to chronic encephalomalacia with chronic blood products within the   left superior parietal lobule and right inferior parietal lobule.       There is no acute intracranial hemorrhage, or intracranial mass lesion.           EXAMINATION:   CT OF THE CHEST, ABDOMEN, AND PELVIS WITH CONTRAST 7/18/2021 10:53 am       TECHNIQUE:   CT of the chest, abdomen and pelvis was performed with the administration of   intravenous contrast. Multiplanar reformatted images are provided for review. Dose modulation, iterative reconstruction, and/or weight based adjustment of   the mA/kV was utilized to reduce the radiation dose to as low as reasonably   achievable.       COMPARISON:   None       HISTORY:   ORDERING SYSTEM PROVIDED HISTORY: MSSA bacteremia, ? DIC   TECHNOLOGIST PROVIDED HISTORY:   MSSA bacteremia, ? DIC       Reason for Exam: sepsis with acute organ dysfunction   Acuity: Unknown   Type of Exam: Unknown       FINDINGS:   Lungs/pleura: The central airways are patent.  There are no suspicious   pulmonary nodules       Mediastinum: There is no acute mediastinal abnormality       Soft Tissues/Bones: No suspicious osteolytic or osteoblastic lesions       Abdominal organs: The liver, spleen, adrenal glands, kidneys, and pancreas   demonstrate no acute abnormality.       GI/Bowel: The visualized portions of the small bowel are unremarkable. There   is borderline dilation of the colon.  There are multiple air-fluid levels   throughout the colon.       Peritoneum/Retroperitoneum: There is a solid amount of retroperitoneal fluid   along the distal ureters bilaterally.       Pelvis:  The bladder is moderately distended.       Bones: No suspicious osseous lesions are identified           Impression   Moderate distention of the colon multiple air-fluid levels, findings may be   seen with acute enterocolitis.             XR FOOT LEFT (2 VIEWS) [[id number]] Collected: 07/18/21 1411      Order Status: Completed Updated: 07/19/21 0759     Narrative:       EXAMINATION:   TWO XRAY VIEWS OF THE LEFT FOOT     7/18/2021 2:05 pm     COMPARISON:   None. HISTORY:   ORDERING SYSTEM PROVIDED HISTORY: Left big toe pain and erythema. Looking for   source of MSSA sepsis   TECHNOLOGIST PROVIDED HISTORY:   Left big toe pain and erythema. Looking for source of MSSA sepsis     FINDINGS:   AP and lateral views of the foot obtained.  Normal alignment and   mineralization.  No abnormal periosteal reaction.  No erosions.  No fracture. No aggressive lytic or blastic lesions.  Mild soft tissue swelling of the   great toe noted.      Impression:       No radiographic evidence for acute osteomyelitis.  Mild soft tissue swelling   of the great toe possibly cellulitis.       EXAMINATION:   MRI OF THE BRAIN WITHOUT CONTRAST  7/21/2021 7:03 pm       TECHNIQUE:   Multiplanar multisequence MRI of the brain was performed without the   administration of intravenous contrast.       COMPARISON:   Head CT of 07/20/2021       HISTORY:   ORDERING SYSTEM PROVIDED HISTORY: episode of vertigo, dipolopia, has   infecticive endocadrditis, r/o septic emboli, abscess   TECHNOLOGIST PROVIDED HISTORY:   episode of vertigo, dipolopia, has infecticive endocadrditis, r/o septic   emboli, abscess   Reason for Exam: VERTIGO, MSSA BACTEREMIA, R/O SEPTIC EMBOLI       FINDINGS:   MRI brain:       There are punctate areas of restricted diffusion within the left superior   frontal gyrus subcortical white matter, right superior frontal gyrus   subcortical white matter, left superior parietal lobule right inferior   frontal gyrus subcortical white matter, left inferior frontal gyrus   subcortical white matter and bilateral cerebellar hemisphere, suggestive of   acute embolic infarctions.       There are focus of encephalomalacia with susceptibility artifact and increased signal on DWI within left superior parietal lobule and right   inferior parietal lobule suggestive of subacute to chronic infarct with   chronic blood products.       There is no acute intracranial hemorrhage, or intracranial mass lesion. No   mass effect, midline shift, or extra-axial collection is noted.       The brain parenchyma is otherwise normal.  The pituitary gland is normal in   appearance. The cerebellar tonsils are in normal position.       The ventricles, sulci, and cisterns are within normal size and range.  No   significant volume loss. .  The intracranial flow voids are preserved.       The globes and orbits are within normal limits. The visualized extracranial   structures including paranasal sinuses and mastoid air cells are unremarkable.           Impression       Findings suggestive of acute embolic infarctions involving supra and   infratentorial structures as described.       Subacute to chronic encephalomalacia with chronic blood products within the   left superior parietal lobule and right inferior parietal lobule.       There is no acute intracranial hemorrhage, or intracranial mass lesion.       The findings were sent to the Radiology Results [address] at 8:24   pm on 7/21/2021to be communicated to a licensed caregiver.                 Medical Decision [id number]:     Component Collected Lab   Specimen Description 07/17/2021  8:48  [address]   . BLOOD    Special Requests 07/17/2021  8:48  [address]   10ML LFA    Culture Abnormal  07/17/2021  8:48 AM [address] Blood Culture Results called to and read back by: Leslie Larose RN AT 2045 ON 07.17.2021    Culture 07/17/2021  8:48 AM [address] FROM BOTTLE: GRAM POSITIVE COCCI IN CLUSTERS    Culture Abnormal  07/17/2021  8:48  [address]   Staphylococcus aureus Detected: mecA/C and MREJ Not Detected Methodology- Polymerase Chain Reaction (PCR)   Culture Abnormal  07/17/2021  8:48 AM [address] [facility] This isolate is methicillin susceptible. Testing Performed By    Manjinder Watson Name Director Address Valid Date Range   [address]Jessica Adams MD [address] [facility] [address] 08/30/17 0801-Present   Susceptibility    Staphylococcus aureus (4)    Antibiotic Interpretation DAVID Status    penicillin Resistant  Final     >=0.5   RESISTANT   cefoxitin screen  NOT REPORTED Final    ciprofloxacin   Final     NOT REPORTED    clindamycin Sensitive  Final     <=0.25   SUSCEPTIBLE   erythromycin Sensitive  Final     <=0.25   SUSCEPTIBLE   gentamicin Sensitive  Final     <=0.5   SUSCEPTIBLE   gentamicin Sensitive Gentamicin is used only in combination with other active agents that test susceptible. Final    Induced Clind Resist  NOT REPORTED Final    levofloxacin Sensitive  Final     0.25   SUSCEPTIBLE   linezolid   Final     NOT REPORTED    moxifloxacin   Final     NOT REPORTED    nitrofurantoin   Final     NOT REPORTED    oxacillin Sensitive  Final     0.5   SUSCEPTIBLE   Synercid   Final     NOT REPORTED    rifampin   Final     NOT REPORTED    tetracycline Sensitive  Final     <=1   SUSCEPTIBLE   tigecycline   Final     NOT REPORTED    trimethoprim-sulfamethoxazole Sensitive  Final     <=10   SUSCEPTIBLE   vancomycin   Final     NOT REPORTED        Medical Decision Making-Other:     Note:  · Labs, medications, radiologic studies were reviewed with personal review of films  · Large amounts of data were reviewed  · Discussed with nursing Staff, Discharge planner  · Infection Control and Prevention measures reviewed  · All prior entries were reviewed  · Administer medications as ordered  · Prognosis: Guarded  · Discharge planning reviewed  · Follow up as outpatient.     Thank you for allowing us to participate in the care of this

## 2021-08-22 NOTE — CARE COORDINATION
08/22/21 1315   Readmission Assessment   Number of Days since last admission? 8-30 days   Previous Disposition Home with Home Health   Who is being Interviewed Patient;Caregiver   What was the patient's/caregiver's perception as to why they think they needed to return back to the hospital?   [employer] LUQ pain)   Did you visit your Primary Care Physician after you left the hospital, before you returned this time? Yes   Did you see a specialist, such as Cardiac, Pulmonary, Orthopedic Physician, etc. after you left the hospital? No   Who advised the patient to return to the hospital? Self-referral;Caregiver   Does the patient report anything that got in the way of taking their medications? No   In our efforts to provide the best possible care to you and others like you, can you think of anything that we could have done to help you after you left the hospital the first time, so that you might not have needed to return so soon?  Other (Comment)  (Stated was feeng great upon D/C)

## 2021-08-22 NOTE — CONSULTS
[facility]   Consultation Note               Today's Date: 8/22/2021  Patient Name: Nneka Monsivais  Date of admission: 8/22/2021  4:09 AM  Patient's age: 36 y.o., 1980  Admission Dx: Pericardial effusion [I31.3]    Reason for Consult:  Pericardial effusion, endocarditis    Requesting Physician: Carmela Garcia MD    CHIEF COMPLAINT:  No chief complaint on file. History Obtained From:  patient, electronic medical record    HISTORY OF PRESENT ILLNESS:      The patient is a 36 y.o.  male who was admitted to the hospital for new onset left upper quadrant abdominal pain and chest pain associated with taking deep breaths. Patient denies having any fevers or chills recently. He is currently on antibiotic regimen due to recent diagnosis of mitral valve endocarditis with MSSA organisms. He has been compliant with his medications. Previously he was noted to have large vegetation on his mitral valve and was going to follow-up with CT surgery for possible excision after completion of his antibiotic regimen. However due to these new symptoms patient presented to [facility] where he underwent a CT scan that revealed evidence of pericardial effusion and hence he was transferred here for further management. Patient denies any significant shortness of breath, chest pressure, palpitations, dizziness, syncope. Past Medical History:   has a past medical history of Diverticulosis, Hyperlipidemia, Hypertension, MESSI (obstructive sleep apnea), and Research subject. Past Surgical History:   has a past surgical history that includes Anterior cruciate ligament repair (Right); Appendectomy (07/09/1997); colectomy; Dilatation, esophagus; Abdomen surgery; and   picc powerpicc double (8/2/2021). Home Medications:    Prior to Admission medications    Medication Sig Start Date End Date Taking?  Authorizing Provider   oxyCODONE 5 MG capsule Take 5 mg by mouth every 6 hours as He has never used smokeless tobacco. He reports current alcohol use of about 20.0 standard drinks of alcohol per week. He reports that he does not use drugs. Family History: family history includes No Known Problems in his mother; Other in his father. No h/o sudden cardiac death. No for premature CAD      REVIEW OF SYSTEMS:    · Constitutional: there has been no unanticipated weight loss. · Eyes: No visual changes or diplopia. · ENT: No Headaches  · Cardiovascular: see above  · Respiratory: No previous pulmonary problems, No cough  · Gastrointestinal: No abdominal pain. No change in bowel or bladder habits. · Genitourinary: No dysuria, trouble voiding, or hematuria. · Musculoskeletal:  No gait disturbance, No weakness or joint complaints. · Integumentary: No rash or pruritis. · Neurological: No headache, diplopia      PHYSICAL EXAM:      BP 99/68   Pulse 87   Temp 97.9 °F (36.6 °C) (Oral)   Resp 17   Ht 5' 6\" (1.676 m)   Wt 172 lb (78 kg)   BMI 27.76 kg/m²    Constitutional and General Appearance: Alert, no distress  Respiratory:  · No increased work of breathing. · Clear to auscultation bilaterally. No wheeze or crackles. Cardiovascular:  · Normal S1 and S2. Systolic murmur  · Jugular venous pulsation Normal  Abdomen:   · Soft  · No tenderness  Extremities:  · No lower extremity edema  Neurologic:  · Alert and oriented. · Moves all extremities well      DATA:    Diagnostics:    Labs:       ECHO: 7/18/21  Global left ventricular systolic function is normal. Calculated ejection  fraction 48% by Dubois's method. Visually estimated EF 50%. Posterior mitral valve leaflet prolapse. Severe mitral regurgitation. Eccentric anterior diredted jet. EOA = 0.7cm2. PISA radius is 1.3cm. Vena contracta is 0.99 cm. MR volume is  58ml. Possible small vegetation on posterior leaflet. Consider MONROE.     MONROE 7/20/2021  MONROE findings:     LA:       Normal  RAJAT:    No thrombus  RA:      Normal  RV:

## 2021-08-22 NOTE — CARE COORDINATION
Case Management Initial Discharge Plan  [facility],             Met with:patient or spouse/SO to discuss discharge plans. Information verified: address, contacts, phone number, , insurance Yes  Insurance Provider: Noe Roper    Emergency Contact/Next of Kin name & number: Spouse Peyton Major  [phone]   Who are involved in patient's support system? spouse    PCP: Juan Kessler MD  Date of last visit:       Discharge Planning    Living Arrangements:  Spouse/Significant Other, Children     Home has 2 stories but BR/BA on first  4 stairs to climb to get into front door    Patient able to perform ADL's:Independent    Current Services (outpatient & in home) home health care  DME equipment: Shower chair, PICC line  DME provider:     Is patient receiving oral anticoagulation therapy? No    Potential Assistance Needed:  N/A    Patient agreeable to home care: Yes  Freedom of choice provided:  yes-current with [facility]    Prior SNF/Rehab Placement and Facility: none  Agreeable to SNF/Rehab: No  [place] of choice provided: n/a     Evaluation: no    Expected Discharge date:  21    Patient expects to be discharged to: If home: is the family and/or caregiver wiling & able to provide support at home? yes  Who will be providing this support? spouse    Follow Up Appointment: Best Day/ Time:      Transportation provider: spouse  Transportation arrangements needed for discharge: No    Readmission Risk              Risk of Unplanned Readmission:  13             Does patient have a readmission risk score greater than 14?: No  If yes, follow-up appointment must be made within 7 days of discharge. Goals of Care:Pericardial effusion resolving       Educated pt and spouse on transitional options, provided freedom of choice and are agreeable with plan      Discharge Plan: Home with wife and Shawna Brian and spoke with [facility] and they are aware of admission  .     Electronically signed by Portillo Tillman RN on 8/22/21 at 12:54 PM EDT

## 2021-08-23 LAB
ALBUMIN SERPL-MCNC: 3.2 G/DL (ref 3.5–5.2)
ALBUMIN/GLOBULIN RATIO: 1.2 (ref 1–2.5)
ALP BLD-CCNC: 60 U/L (ref 40–129)
ALT SERPL-CCNC: 8 U/L (ref 5–41)
ANION GAP SERPL CALCULATED.3IONS-SCNC: 11 MMOL/L (ref 9–17)
AST SERPL-CCNC: 10 U/L
BILIRUB SERPL-MCNC: 0.21 MG/DL (ref 0.3–1.2)
BUN BLDV-MCNC: 4 MG/DL (ref 6–20)
BUN/CREAT BLD: ABNORMAL (ref 9–20)
CALCIUM IONIZED: 1.17 MMOL/L (ref 1.13–1.33)
CALCIUM SERPL-MCNC: 8.6 MG/DL (ref 8.6–10.4)
CHLORIDE BLD-SCNC: 106 MMOL/L (ref 98–107)
CO2: 22 MMOL/L (ref 20–31)
CREAT SERPL-MCNC: 0.79 MG/DL (ref 0.7–1.2)
GFR AFRICAN AMERICAN: >60 ML/MIN
GFR NON-AFRICAN AMERICAN: >60 ML/MIN
GFR SERPL CREATININE-BSD FRML MDRD: ABNORMAL ML/MIN/{1.73_M2}
GFR SERPL CREATININE-BSD FRML MDRD: ABNORMAL ML/MIN/{1.73_M2}
GLUCOSE BLD-MCNC: 85 MG/DL (ref 70–99)
HCT VFR BLD CALC: 27.2 % (ref 40.7–50.3)
HEMOGLOBIN: 9 G/DL (ref 13–17)
INR BLD: 1.1
LV EF: 60 %
LVEF MODALITY: NORMAL
MAGNESIUM: 2 MG/DL (ref 1.6–2.6)
MCH RBC QN AUTO: 29.1 PG (ref 25.2–33.5)
MCHC RBC AUTO-ENTMCNC: 33.1 G/DL (ref 28.4–34.8)
MCV RBC AUTO: 88 FL (ref 82.6–102.9)
NRBC AUTOMATED: 0 PER 100 WBC
PDW BLD-RTO: 14.2 % (ref 11.8–14.4)
PHOSPHORUS: 3.5 MG/DL (ref 2.5–4.5)
PLATELET # BLD: 215 K/UL (ref 138–453)
PMV BLD AUTO: 9 FL (ref 8.1–13.5)
POTASSIUM SERPL-SCNC: 3.7 MMOL/L (ref 3.7–5.3)
PROTHROMBIN TIME: 11.2 SEC (ref 9.1–12.3)
RBC # BLD: 3.09 M/UL (ref 4.21–5.77)
SODIUM BLD-SCNC: 139 MMOL/L (ref 135–144)
TOTAL PROTEIN: 5.8 G/DL (ref 6.4–8.3)
WBC # BLD: 3 K/UL (ref 3.5–11.3)

## 2021-08-23 PROCEDURE — 6370000000 HC RX 637 (ALT 250 FOR IP): Performed by: STUDENT IN AN ORGANIZED HEALTH CARE EDUCATION/TRAINING PROGRAM

## 2021-08-23 PROCEDURE — 6370000000 HC RX 637 (ALT 250 FOR IP): Performed by: INTERNAL MEDICINE

## 2021-08-23 PROCEDURE — 84100 ASSAY OF PHOSPHORUS: CPT

## 2021-08-23 PROCEDURE — 94761 N-INVAS EAR/PLS OXIMETRY MLT: CPT

## 2021-08-23 PROCEDURE — 2500000003 HC RX 250 WO HCPCS: Performed by: INTERNAL MEDICINE

## 2021-08-23 PROCEDURE — 99232 SBSQ HOSP IP/OBS MODERATE 35: CPT | Performed by: INTERNAL MEDICINE

## 2021-08-23 PROCEDURE — 02HV33Z INSERTION OF INFUSION DEVICE INTO SUPERIOR VENA CAVA, PERCUTANEOUS APPROACH: ICD-10-PCS | Performed by: STUDENT IN AN ORGANIZED HEALTH CARE EDUCATION/TRAINING PROGRAM

## 2021-08-23 PROCEDURE — 80053 COMPREHEN METABOLIC PANEL: CPT

## 2021-08-23 PROCEDURE — 2060000000 HC ICU INTERMEDIATE R&B

## 2021-08-23 PROCEDURE — 83735 ASSAY OF MAGNESIUM: CPT

## 2021-08-23 PROCEDURE — APPSS30 APP SPLIT SHARED TIME 16-30 MINUTES: Performed by: NURSE PRACTITIONER

## 2021-08-23 PROCEDURE — 6360000002 HC RX W HCPCS: Performed by: INTERNAL MEDICINE

## 2021-08-23 PROCEDURE — 2500000003 HC RX 250 WO HCPCS: Performed by: STUDENT IN AN ORGANIZED HEALTH CARE EDUCATION/TRAINING PROGRAM

## 2021-08-23 PROCEDURE — C1751 CATH, INF, PER/CENT/MIDLINE: HCPCS

## 2021-08-23 PROCEDURE — 2580000003 HC RX 258: Performed by: NURSE PRACTITIONER

## 2021-08-23 PROCEDURE — 36415 COLL VENOUS BLD VENIPUNCTURE: CPT

## 2021-08-23 PROCEDURE — 99232 SBSQ HOSP IP/OBS MODERATE 35: CPT | Performed by: STUDENT IN AN ORGANIZED HEALTH CARE EDUCATION/TRAINING PROGRAM

## 2021-08-23 PROCEDURE — 36584 COMPL RPLCMT PICC RS&I: CPT

## 2021-08-23 PROCEDURE — 93306 TTE W/DOPPLER COMPLETE: CPT

## 2021-08-23 PROCEDURE — 85610 PROTHROMBIN TIME: CPT

## 2021-08-23 PROCEDURE — 85027 COMPLETE CBC AUTOMATED: CPT

## 2021-08-23 PROCEDURE — 2580000003 HC RX 258: Performed by: STUDENT IN AN ORGANIZED HEALTH CARE EDUCATION/TRAINING PROGRAM

## 2021-08-23 PROCEDURE — 82330 ASSAY OF CALCIUM: CPT

## 2021-08-23 PROCEDURE — 2580000003 HC RX 258: Performed by: INTERNAL MEDICINE

## 2021-08-23 RX ORDER — SODIUM CHLORIDE 0.9 % (FLUSH) 0.9 %
5-40 SYRINGE (ML) INJECTION PRN
Status: DISCONTINUED | OUTPATIENT
Start: 2021-08-23 | End: 2021-08-26 | Stop reason: HOSPADM

## 2021-08-23 RX ORDER — LIDOCAINE HYDROCHLORIDE 10 MG/ML
5 INJECTION, SOLUTION INFILTRATION; PERINEURAL ONCE
Status: COMPLETED | OUTPATIENT
Start: 2021-08-23 | End: 2021-08-23

## 2021-08-23 RX ORDER — SODIUM CHLORIDE 9 MG/ML
25 INJECTION, SOLUTION INTRAVENOUS PRN
Status: DISCONTINUED | OUTPATIENT
Start: 2021-08-23 | End: 2021-08-26 | Stop reason: HOSPADM

## 2021-08-23 RX ORDER — ECHINACEA PURPUREA EXTRACT 125 MG
1 TABLET ORAL PRN
Status: DISCONTINUED | OUTPATIENT
Start: 2021-08-23 | End: 2021-08-26 | Stop reason: HOSPADM

## 2021-08-23 RX ORDER — SODIUM CHLORIDE 0.9 % (FLUSH) 0.9 %
5-40 SYRINGE (ML) INJECTION EVERY 12 HOURS SCHEDULED
Status: DISCONTINUED | OUTPATIENT
Start: 2021-08-23 | End: 2021-08-26 | Stop reason: HOSPADM

## 2021-08-23 RX ADMIN — PANTOPRAZOLE SODIUM 40 MG: 40 TABLET, DELAYED RELEASE ORAL at 09:13

## 2021-08-23 RX ADMIN — FENTANYL CITRATE 25 MCG: 50 INJECTION, SOLUTION INTRAMUSCULAR; INTRAVENOUS at 04:57

## 2021-08-23 RX ADMIN — METOPROLOL TARTRATE 12.5 MG: 25 TABLET ORAL at 09:13

## 2021-08-23 RX ADMIN — FENTANYL CITRATE 25 MCG: 50 INJECTION, SOLUTION INTRAMUSCULAR; INTRAVENOUS at 01:29

## 2021-08-23 RX ADMIN — Medication 1 CAPSULE: at 09:13

## 2021-08-23 RX ADMIN — NAFCILLIN SODIUM 2000 MG: 2 INJECTION, POWDER, LYOPHILIZED, FOR SOLUTION INTRAMUSCULAR; INTRAVENOUS at 13:31

## 2021-08-23 RX ADMIN — NAFCILLIN SODIUM 2000 MG: 2 INJECTION, POWDER, LYOPHILIZED, FOR SOLUTION INTRAMUSCULAR; INTRAVENOUS at 01:29

## 2021-08-23 RX ADMIN — NAFCILLIN SODIUM 2000 MG: 2 INJECTION, POWDER, LYOPHILIZED, FOR SOLUTION INTRAMUSCULAR; INTRAVENOUS at 22:38

## 2021-08-23 RX ADMIN — OXYCODONE HYDROCHLORIDE 10 MG: 5 TABLET ORAL at 20:01

## 2021-08-23 RX ADMIN — Medication 1 CAPSULE: at 18:52

## 2021-08-23 RX ADMIN — ONDANSETRON 4 MG: 2 INJECTION INTRAMUSCULAR; INTRAVENOUS at 09:14

## 2021-08-23 RX ADMIN — Medication 1 CAPSULE: at 11:57

## 2021-08-23 RX ADMIN — FENTANYL CITRATE 25 MCG: 50 INJECTION, SOLUTION INTRAMUSCULAR; INTRAVENOUS at 09:29

## 2021-08-23 RX ADMIN — FENTANYL CITRATE 25 MCG: 50 INJECTION, SOLUTION INTRAMUSCULAR; INTRAVENOUS at 13:29

## 2021-08-23 RX ADMIN — METOPROLOL TARTRATE 12.5 MG: 25 TABLET ORAL at 20:09

## 2021-08-23 RX ADMIN — Medication 6 MG: at 22:36

## 2021-08-23 RX ADMIN — FENTANYL CITRATE 25 MCG: 50 INJECTION, SOLUTION INTRAMUSCULAR; INTRAVENOUS at 22:46

## 2021-08-23 RX ADMIN — FENTANYL CITRATE 25 MCG: 50 INJECTION, SOLUTION INTRAMUSCULAR; INTRAVENOUS at 19:00

## 2021-08-23 RX ADMIN — OXYCODONE HYDROCHLORIDE 10 MG: 5 TABLET ORAL at 11:55

## 2021-08-23 RX ADMIN — PANTOPRAZOLE SODIUM 40 MG: 40 TABLET, DELAYED RELEASE ORAL at 18:52

## 2021-08-23 RX ADMIN — OXYCODONE HYDROCHLORIDE 10 MG: 5 TABLET ORAL at 02:25

## 2021-08-23 RX ADMIN — SODIUM CHLORIDE, PRESERVATIVE FREE 10 ML: 5 INJECTION INTRAVENOUS at 10:00

## 2021-08-23 RX ADMIN — SODIUM CHLORIDE, PRESERVATIVE FREE 10 ML: 5 INJECTION INTRAVENOUS at 20:06

## 2021-08-23 RX ADMIN — ONDANSETRON 4 MG: 2 INJECTION INTRAMUSCULAR; INTRAVENOUS at 22:45

## 2021-08-23 RX ADMIN — OXYCODONE HYDROCHLORIDE 10 MG: 5 TABLET ORAL at 05:45

## 2021-08-23 RX ADMIN — LIDOCAINE HYDROCHLORIDE 5 ML: 10 INJECTION, SOLUTION INFILTRATION; PERINEURAL at 09:07

## 2021-08-23 RX ADMIN — NAFCILLIN SODIUM 2000 MG: 2 INJECTION, POWDER, LYOPHILIZED, FOR SOLUTION INTRAMUSCULAR; INTRAVENOUS at 18:52

## 2021-08-23 RX ADMIN — NAFCILLIN SODIUM 2000 MG: 2 INJECTION, POWDER, LYOPHILIZED, FOR SOLUTION INTRAMUSCULAR; INTRAVENOUS at 09:59

## 2021-08-23 RX ADMIN — SODIUM CHLORIDE, PRESERVATIVE FREE 10 ML: 5 INJECTION INTRAVENOUS at 09:14

## 2021-08-23 RX ADMIN — Medication 600 MG: at 09:13

## 2021-08-23 RX ADMIN — NAFCILLIN SODIUM 2000 MG: 2 INJECTION, POWDER, LYOPHILIZED, FOR SOLUTION INTRAMUSCULAR; INTRAVENOUS at 04:57

## 2021-08-23 ASSESSMENT — PAIN SCALES - GENERAL
PAINLEVEL_OUTOF10: 7
PAINLEVEL_OUTOF10: 8
PAINLEVEL_OUTOF10: 7
PAINLEVEL_OUTOF10: 8
PAINLEVEL_OUTOF10: 8
PAINLEVEL_OUTOF10: 7
PAINLEVEL_OUTOF10: 8
PAINLEVEL_OUTOF10: 7
PAINLEVEL_OUTOF10: 7

## 2021-08-23 NOTE — PROGRESS NOTES
[facility]   Progress Note                   Date:   8/23/2021  Patient name: Gallo Crowley  Date of admission:  8/22/2021  4:09 AM  MRN:   [id number]  YOB: 1980  PCP: Dia Mason MD    Reason for Admission: Pericardial effusion [I31.3]    Subjective:       Clinical Changes / Abnormalities: Patient seen and examined with wife and other family member present at the bed side. No new acute events overnight. Patient denies chest pain or SOB. Remains on room air without distress. Continues to have LUQ lateral sided pain that is worse with deep breaths. Reviewed vitals, labs, tele, & previous testing. SR/ST on tele. Mid-upper 90's to low 100's        Medications:   Scheduled Meds:   sodium chloride flush  5-40 mL Intravenous 2 times per day    sodium chloride flush  5-40 mL Intravenous 2 times per day    nafcillin  2,000 mg Intravenous Q4H    rifAMPin  600 mg Oral Daily    lactobacillus  1 capsule Oral TID WC    pantoprazole  40 mg Oral BID AC    metoprolol tartrate  12.5 mg Oral BID     Continuous Infusions:   sodium chloride      sodium chloride       CBC:   Recent Labs     08/22/21  0736 08/23/21  0749   WBC 5.0 3.0*   HGB 8.6* 9.0*    215     BMP:    Recent Labs     08/22/21  0736 08/23/21  0749    139   K 3.7 3.7    106   CO2 22 22   BUN 5* 4*   CREATININE 0.76 0.79   GLUCOSE 92 85     Hepatic:   Recent Labs     08/23/21  0749   AST 10   ALT 8   BILITOT 0.21*   ALKPHOS 60     Troponin: No results for input(s): TROPHS in the last 72 hours. BNP: No results for input(s): BNP in the last 72 hours. Lipids: No results for input(s): CHOL, HDL in the last 72 hours. Invalid input(s): LDLCALCU  INR:   Recent Labs     08/23/21  0749   INR 1.1       ECHO: 7/18/21  Global left ventricular systolic function is normal. Calculated ejection  fraction 48% by Dubois's method. Visually estimated EF 50%. Posterior mitral valve leaflet prolapse.   Severe mitral regurgitation. Eccentric anterior diredted jet. EOA = 0.7cm2. PISA radius is 1.3cm. Vena contracta is 0.99 cm. MR volume is  58ml. Possible small vegetation on posterior leaflet. Consider MONROE.     MONROE 7/20/2021  MONROE findings:     LA:       Normal  RAJAT:    No thrombus  RA:      Normal  RV:      Normal  LV:       Normal  Estimated LVEF:         55%  Aorta:               Mild atheromatous disease arch  Pericardium:    Trivial pericardial effusion  Septum:           No intracardiac shunt via color Doppler.      Valves:     Mitral Valve: Large vegetation measuring 2.5 X 2.4 cm attached on the posterior leaflet of the mitral valve associated with Flail and degenerated leaflet. Severe regurgitation w/ an anteriorly directed eccentric jet is identified. Aortic Valve: The aortic valve is trileaflet and opens adequately. No regurgitiation is identified. Tricuspid valve: Structurally normal. No regurgitation is identified. Pulmonary valve: Normal. No significant regurgitation     No thrombus identified.      CTA Chest 8/21/2021 [facility]   CTA CHEST WO W CON EXAMINATION: CTA CHEST WO W CON, 8/21/2021 10:25 AM EDT   HISTORY: CHEST PAIN, UNSPECIFIED   COMPARISON: Chest x-ray 1/7/2021, CT abdomen 10/17/2020 and 1/23/2016. TECHNIQUE: CT angiography of the chest was performed with IV contrast. MIP   (maximum intensity projection) images or 3D post processing was performed. CT   dose reduction technique was used, including Automated Exposure Control. FINDINGS:   VASCULATURE/PULMONARY ARTERIES: There is satisfactory opacification of the   pulmonary arterial system. There is no evidence of pulmonary embolism. The main   pulmonary artery is normal in diameter. The aorta and great vessels appear   normal.   HEART/PERICARDIUM: Moderate pericardial effusion measuring up to 15 mm. MEDIASTINAL/HILAR LYMPH NODES: No lymphadenopathy. ESOPHAGUS: Normal as visualized.    PLEURAL CAVITY: No pleural effusion or pneumothorax. LUNGS/AIRWAYS: Mild bibasilar atelectasis. 3 mm nodular density along the right   major fissure on image 38, favored to represent a benign perifissural lymph   node. 4 mm left lower lobe nodule on image 58, stable compared to the CT from   10/17/2020 although not seen on the CT from 1/23/2016. Mild bibasilar   atelectasis but no focal consolidation. CHEST WALL/AXILLA/LOWER NECK: Normal.   VISUALIZED UPPER ABDOMEN: Normal.   BONES: No acute process. IMPRESSION:   1. No evidence of pulmonary embolism. 2. Moderate pericardial effusion. Consider echocardiography. 3. 3 mm nodular density along the right major fissure, most likely a benign   perifissural lymph node. There is also a 4 mm left lower lobe nodule which is   indeterminate but may also represent a benign intrapulmonary lymph node or   granuloma and is stable since 10/17/2020. Electronically authenticated by: Christopher Lee Date: 2021-08-21 12:31          Objective:   Vitals: /68   Pulse 99   Temp 98.4 °F (36.9 °C) (Oral)   Resp 19   Ht 5' 6\" (1.676 m)   Wt 172 lb (78 kg)   SpO2 98%   BMI 27.76 kg/m²   General appearance: alert and cooperative with exam  HEENT: Head: Normocephalic, no lesions, without obvious abnormality. Neck: no JVD, trachea midline, no adenopathy  Lungs: Clear to auscultation  Heart: Regular rate and rhythm, s1/s2 auscultated, + murmurs  Abdomen: soft, non-tender, bowel sounds active  Extremities: no edema  Neurologic: not done    ECHO 8/23/2021  Results Pending. Assessment / Acute Cardiac Problems:     1. LUQ abdominal pain and pleuritic chest pain  2. MSSA endocarditis involving MV - currently on IV abx  3. MV vegetation 2.5 x 2.4 cm on MONROE 7/20/21  4. Hx of acute enterocolitis  5. Hx of ?septic emboli to brain  6. HTN  7.  Pericardial effusion noted on CT at OSH    Patient Active Problem List:     Diarrhea     Acute dehydration     Hypomagnesemia     Hypokalemia     Epistaxis     Thrombocytopenia ([facility] [address]     Hyperglycemia     Essential hypertension     Sepsis with acute organ dysfunction and septic shock (HCC)     Delusions (HCC)     Hypophosphatemia     MSSA bacteremia     Severe mitral regurgitation     Acute respiratory failure with hypoxia (HCC)     GI bleed     Normocytic normochromic anemia     Obesity (BMI 30-39. 9)     Transaminitis     Vertigo     Metabolic encephalopathy     Iron deficiency anemia secondary to blood loss (chronic)     Guaiac positive stools     Subacute endocarditis     Cerebral septic emboli (HCC)     Cerebral multi-infarct state     Pericardial effusion      Plan of Treatment:   1. 2D Echo completed today. Results pending. Remains hemodynamically stable. 2. Continue IV antibiotics per ID. 3. Keep K+ > 4.0, and Mg+ > 2.0  4. HTN. Stable. Continue Lopressor 12.5 mg BID with parameters. 5. Remainder of care management per primary team.  6. Further orders pending Echo findings.         Electronically signed by MARIKA Puente CNP on 8/23/2021 at 12:23 PM  [facility]      [phone]

## 2021-08-23 NOTE — PROGRESS NOTES
[address] [facility]  Occupational Therapy Not Seen Note    DATE: 2021  Name: Sharif Choi  : 1980  MRN: [id number]    Patient not available for Occupational Therapy due to:        [x] Pt independent with functional mobility and functional tasks; pt demo independent functional mobility to/from bathroom and performed toileting tasks/hand hygiene with writer in room. Pt reports no safety concerns for returning home at discharge. Pt with no OT acute care needs at this time, will defer OT eval. Pt educated to inform RN/MD of any change in functional status while hospitalized so therapy services can be re-ordered, pt verbalized understanding.          Roberta Alanis OT OTR/L

## 2021-08-23 NOTE — PROGRESS NOTES
Physical Therapy        Physical Therapy Cancel Note      DATE: 2021    NAME: Logan Valadez  MRN: [id number]   : 1980      Patient not seen this date for Physical Therapy due to:    Patient independent with functional mobility. Will defer PT evaluation at this time. Please reorder PT if future needs arise.        Electronically signed by Rafa Arnold PT on 2021 at 11:27 AM

## 2021-08-23 NOTE — PLAN OF CARE
Problem: Pain:  Goal: Pain level will decrease  Description: Pain level will decrease  Outcome: Ongoing  Goal: Control of acute pain  Description: Control of acute pain  Outcome: Ongoing  Goal: Control of chronic pain  Description: Control of chronic pain  Outcome: Ongoing     Problem: Skin Integrity:  Goal: Will show no infection signs and symptoms  Description: Will show no infection signs and symptoms  Outcome: Ongoing  Goal: Absence of new skin breakdown  Description: Absence of new skin breakdown  Outcome: Ongoing  Goal: Demonstration of wound healing without infection will improve  Description: Demonstration of wound healing without infection will improve  Outcome: Ongoing  Goal: Complications related to intravenous access or infusion will be avoided or minimized  Description: Complications related to intravenous access or infusion will be avoided or minimized  Outcome: Ongoing     Problem: Falls - Risk of:  Goal: Will remain free from falls  Description: Will remain free from falls  Outcome: Ongoing  Goal: Absence of physical injury  Description: Absence of physical injury  Outcome: Ongoing     Problem: Cardiac:  Goal: Ability to maintain vital signs within normal range will improve  Description: Ability to maintain vital signs within normal range will improve  Outcome: Ongoing  Goal: Cardiovascular alteration will improve  Description: Cardiovascular alteration will improve  Outcome: Ongoing     Problem: Health Behavior:  Goal: Will modify at least one risk factor affecting health status  Description: Will modify at least one risk factor affecting health status  Outcome: Ongoing  Goal: Identification of resources available to assist in meeting health care needs will improve  Description: Identification of resources available to assist in meeting health care needs will improve  Outcome: Ongoing     Problem: Physical Regulation:  Goal: Ability to maintain vital signs within normal range will improve  Description: Ability to maintain vital signs within normal range will improve  Outcome: Ongoing  Goal: Complications related to the disease process, condition or treatment will be avoided or minimized  Description: Complications related to the disease process, condition or treatment will be avoided or minimized  Outcome: Ongoing  Goal: Diagnostic test results will improve  Description: Diagnostic test results will improve  Outcome: Ongoing  Goal: Will remain free from infection  Description: Will remain free from infection  Outcome: Ongoing     Problem: Nutritional:  Goal: Nutritional status will improve  Description: Nutritional status will improve  Outcome: Ongoing     Problem: Respiratory:  Goal: Ability to maintain normal respiratory secretions will improve  Description: Ability to maintain normal respiratory secretions will improve  Outcome: Ongoing

## 2021-08-23 NOTE — PROCEDURES
History/labs/allergies reviewed  Placed by ROBERT New RN  Assisted by n/a  Consent signed and obtained by physician  Time out performed using two identifiers  Catheter type double  lumen picc  Product type solo2 w VPS Rhythm  Lot # [id number]  Expiration date 7/31/2022  Catheter size 5  Bengali  Trimmed at 40  Total length 40  External catheter length 1 cm  Location RBV  Number of attempts 1  Estimated blood loss 1 ml  Pre procedure cardiac Rhythm NSR per bedside telemetry and/or VPS Tracing. Placement verified by- CXR and/or VPS Max P wave noted by amplitude changes of the P wave, positive blood return, flushes easily  Special equipment used- ultrasound, micro-introducer technique and VPS technology if indicated  Catheter secured with statlock  Dressing applied- Tegaderm CHG  Lidocaine administered intradermally conc. 1% 2 mL  PICC line education:   [ X ] Discussed with patient/Family or POA prior to signing Informed Procedural Consent. Risks and Benefits along with reason for procedure were discussed and teaching was reinforced with an education handout on PICC insertion. [facility] FAQ Catheter Associated Blood Stream Infections and [facility] [id number] REV. 7/13 Nursing and Booklet left at bedside or in chart. Patient (Family or POA) acknowledged understanding of information taught and agreed to procedure. [  ] Was not discussed with patient/family or POA due to pts medical status at time of procedure. pts family or POA not available to discuss PICC education.  [facility] FAQ Catheter Associated Blood Stream Infections and [facility] [id number] REV. 7/13 Nursing and Booklet left at bedside or in chart

## 2021-08-23 NOTE — PLAN OF CARE
Problem: Pain:  Description: Pain management should include both nonpharmacologic and pharmacologic interventions.   Goal: Pain level will decrease  Description: Pain level will decrease  Outcome: Ongoing  Goal: Control of acute pain  Description: Control of acute pain  Outcome: Ongoing  Goal: Control of chronic pain  Description: Control of chronic pain  Outcome: Ongoing     Problem: Skin Integrity:  Goal: Will show no infection signs and symptoms  Description: Will show no infection signs and symptoms  Outcome: Ongoing  Goal: Absence of new skin breakdown  Description: Absence of new skin breakdown  Outcome: Ongoing  Goal: Demonstration of wound healing without infection will improve  Description: Demonstration of wound healing without infection will improve  Outcome: Ongoing  Goal: Complications related to intravenous access or infusion will be avoided or minimized  Description: Complications related to intravenous access or infusion will be avoided or minimized  Outcome: Ongoing     Problem: Falls - Risk of:  Goal: Will remain free from falls  Description: Will remain free from falls  Outcome: Ongoing  Goal: Absence of physical injury  Description: Absence of physical injury  Outcome: Ongoing     Problem: Cardiac:  Goal: Ability to maintain vital signs within normal range will improve  Description: Ability to maintain vital signs within normal range will improve  Outcome: Ongoing  Goal: Cardiovascular alteration will improve  Description: Cardiovascular alteration will improve  Outcome: Ongoing     Problem: Health Behavior:  Goal: Will modify at least one risk factor affecting health status  Description: Will modify at least one risk factor affecting health status  Outcome: Ongoing  Goal: Identification of resources available to assist in meeting health care needs will improve  Description: Identification of resources available to assist in meeting health care needs will improve  Outcome: Ongoing     Problem: Physical Regulation:  Goal: Ability to maintain vital signs within normal range will improve  Description: Ability to maintain vital signs within normal range will improve  Outcome: Ongoing  Goal: Complications related to the disease process, condition or treatment will be avoided or minimized  Description: Complications related to the disease process, condition or treatment will be avoided or minimized  Outcome: Ongoing  Goal: Diagnostic test results will improve  Description: Diagnostic test results will improve  Outcome: Ongoing  Goal: Will remain free from infection  Description: Will remain free from infection  Outcome: Ongoing     Problem: Nutritional:  Goal: Nutritional status will improve  Description: Nutritional status will improve  Outcome: Ongoing     Problem: Respiratory:  Goal: Ability to maintain normal respiratory secretions will improve  Description: Ability to maintain normal respiratory secretions will improve  Outcome: Ongoing

## 2021-08-23 NOTE — PROGRESS NOTES
[facility] - Progress Note      Today's Date and Time: 8/23/2021, 11:14 AM    Impression :   · Pericardial effusion  · Prior MSSA septicemia 7-17-21  · Persistent growth of bacteria in blood 7-17-21 through 7-27-21 despite treatment  · Prior Sepsis with acute organ dysfunction and septic shock  · Mitral Valve endocarditis. 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 Will need 6 weeks from first negative blood cultures. · Rifampin 600 mg daily added 7/22/21 until 9/14/21 for synergy  · Please do not give anticoagulants due to the risk of septic emboli in the brain transitioning to hemorrhagic   · Repeat cardiac echo    Medical Decision Making/Summary/Discussion:8/23/2021     ·   Infection Control Recommendations   · [place] Precautions  · Contact Isolation     Antimicrobial Stewardship Recommendations     · Simplification of therapy  · Targeted therapy    Coordination of Outpatient Care:   · Estimated Length of IV antimicrobials: 4 weeks from first negative blood cultures  · Patient will need Midline Catheter Insertion: TBD  · Patient will need PICC line Insertion: TBD  · Patient will need: Home IV , [place],  SNF,  LTAC: Yes-either SNF or home infusion  · Patient will need outpatient wound care: TBD    Chief complaint/reason for consultation:   · MSSA sepsis    History of Present Illness:   Ramiro Hayward is a 36y.o.-year-old  male who was initially admitted on 8/22/2021.  Patient seen at the request of Dr. Aleks Plummer HISTORY:     Admission of 7/16/2021:  Patient initially presented to [facility] a few days ago with complaints of  abdominal pain, nausea, watery diarrhea and vomiting for the past 5 days after eating some chicken. The patient states that his wife also had episodes of emesis after eating the chicken but her symptoms resolved shortly after. A CT abdomen was unremarkable and he was diagnosed with viral gastroenteritis and sent home with Norco, Zofran, and potassium supplement for hypokalemia. On 7/16/2021, he presented to [address] [place] [address] with worsening symptoms, including fever, chills, fatigue, and worsening abdominal pain with continued vomiting, right big toe pain. Work-up in the ED revealed a fever of 102.6, tachycardia with a heart rate up to 170, dyspnea with hypoxia, blood cultures showed MSSA x2, and urine cultures grew staph aureus ,000 CFU/mL. Occult blood stool positive. ID was consulted for MSSA septicemia    His work-up showed the presence of a mitral valve endocarditis with a mitral valve vegetation measuring 2.5 X 2.4 cm 7/20/2021. Echocardiogram completed on 7/18/2021 revealed posterior mitral valve leaflet prolapse, severe mitral regurgitation, and possible small vegetation on posterior leaflet. Presence of vegetation on mitral valve would help explain the emboli to the brain. [place] on 7/20 showed a Large vegetation measuring 2.5 X 2.4 cm noted on the posterior leaflet of the mitral valve. Posterior leaflet is flair and is associated with severe anteriorly directed eccentric regurgitation. He also developed acute septic embolic infarcts to the brain on 7/21/21. MRI brain 6-07-00: Acute embolic infarctions in supra and infratentorial structures. There was a lot of difficulty controlling the MSSA septicemia as the patient had bacterial growth in the blood from 7/17 through 7/27/2021 despite intensive treatment.   In order to help control the ongoing septicemia he received an infusion of Exebacase under compassionate use protocol on 7-25-21 without any AEs . Following the above invention the blood cultures showed no growth. The patient was discharged home 2 g of nafcillin IV every 4 hours with a completion date of 9/14/2021. He was also sent home on rifampin 600 mg p.o. daily through 9/14/2021 for synergistic activity. Admission of 8/22/2021:    Patient indicates that he was doing well with the his antibiotic treatment as an outpatient However, he developed a new onset of left upper quadrant abdominal pain with associated chest pain that worsened with deep breaths. He then presented to [facility] where he underwent a CT scanning of the chest which showed the presence of a pericardial effusion. Because of the complex nature of his previous admission he was transferred to [facility] for further evaluation. The patient denies any fevers or chills. He has been compliant with his antibiotic treatment. Currently the patient denies any shortness of breath, fevers or chills. CURRENT EVALUATION 8/23/2021:    Afebrile  VS stable    On evaluation, the patient is alert and oriented on room air. New PICC line placed 8/23/21 as it had become pulled out by at least 17 cm by home health a few days ago and it was still being used without an xray completed for verification. CT surgery is planning for valve repair after the patient has completed IV antibiotic therapy-unless the patient decompensates to the point of needing emergent surgical intervention.     Labs, X rays reviewed: 8/23/2021    BUN:9-->4  Cr:0.61-->0.72-->0.61-->0.79    CRP: 260.5-->56.0  LDH: 437    WBC:8.7-->3.9-->8.2-->3.0  Hb:9.3-->8.6-->9.0  Plat: 340-->186-->223-->215    Haptoglobin: 256  Sed rate: 31  Lipase 299>228  Lactate: 1.6-->2.3-->1.5-->2.5    Cultures:  Urine:  ·   Blood:  · 7/17/2021: MSSA x2  · 7/18/2021: MSSA  · 7/20/21: MSSA  · 7/22/2021: MSSA  · 7-24-21: MSSA  · 7-25-21: MSSA  · 7/27/21: 2/2 MSSA   · 7/28/21: No growth   · 7/30/21: no growth  · 7/31/21: no growth  · 8-22-21: Pending    Sputum :  ·   Wound:      I have personally reviewed the past medical history, past surgical history, medications, social history, and family history, and I have updated the database accordingly. Past Medical History:     Past Medical History:   Diagnosis Date    Diverticulosis     Hyperlipidemia     Hypertension     MESSI (obstructive sleep apnea)     Has PSG study done Pos for Mod MESSI, never had cpap tritration done for machine    Research subject 07/24/2021    EIND [id number] Exebacase for persistent bacteremia expected date of completion 8/25/2021       Past Surgical  History:     Past Surgical History:   Procedure Laterality Date    ABDOMEN SURGERY      large bowel resection    ANTERIOR CRUCIATE LIGAMENT REPAIR Right     APPENDECTOMY  07/09/1997    COLECTOMY      2012 - D/T Dverticulitis    DILATATION, ESOPHAGUS      HC  PICC [facility] DOUBLE  8/2/2021            Medications:      sodium chloride flush  5-40 mL Intravenous 2 times per day    sodium chloride flush  5-40 mL Intravenous 2 times per day    nafcillin  2,000 mg Intravenous Q4H    rifAMPin  600 mg Oral Daily    lactobacillus  1 capsule Oral TID WC    pantoprazole  40 mg Oral BID AC    metoprolol tartrate  12.5 mg Oral BID       Social History:     Social History     Socioeconomic History    Marital status:      Spouse name: Not on file    Number of children: Not on file    Years of education: Not on file    Highest education level: Not on file   Occupational History    Not on file   Tobacco Use    Smoking status: Never Smoker    Smokeless tobacco: Never Used   Vaping Use    Vaping Use: Never assessed   Substance and Sexual Activity    Alcohol use:  Yes     Alcohol/week: 20.0 standard drinks     Types: 20 Cans of beer per week     Comment: 20/ week, average every other day    Drug use: Never    Sexual activity: Not on file   Other Topics Concern    Not on file   Social History Narrative    Not on file     Social Determinants of Health     Financial Resource Strain:     Difficulty of Paying Living Expenses:    Food Insecurity:     Worried About Running Out of Food in the Last Year:     [address] in the Last Year:    Transportation Needs:     Lack of Transportation (Medical):  Lack of Transportation (Non-Medical):    Physical Activity:     Days of Exercise per Week:     Minutes of Exercise per Session:    Stress:     Feeling of Stress :    Social Connections:     Frequency of Communication with Friends and Family:     Frequency of Social Gatherings with Friends and Family:     Attends Baptism Services:     Active Member of Clubs or Organizations:     Attends Club or Organization Meetings:     Marital Status:    Intimate Partner Violence:     Fear of Current or Ex-Partner:     Emotionally Abused:     Physically Abused:     Sexually Abused:        Family History:     Family History   Problem Relation Age of Onset    No Known Problems Mother     Other Father         diverticulitis        Allergies:   Morphine     Review of Systems:   Constitutional: No acute complaints   Head: No headaches  Eyes: No double vision or blurry vision. No conjunctival inflammation. ENT: No sore throat or runny nose. . No hearing loss, tinnitus or vertigo. Cardiovascular: No chest pain or palpitations. No shortness of breath. No CASTRO  Lung: No shortness of breath or cough. No sputum production. Reports pleuritic pain with deep inspiration  Abdomen: No nausea, vomiting, diarrhea, left upper quadrant abdominal pain  Genitourinary: No increased urinary frequency, or dysuria. No hematuria. No suprapubic or CVA pain  Musculoskeletal: No muscle aches or pains. No joint effusions, swelling or deformities  Hematologic: No bleeding or bruising. Neurologic: No headache, weakness, numbness, or tingling.   Integument: Scattered small reddened bumps and bruises  Psychiatric: No depression. Endocrine: No polyuria, no polydipsia, no polyphagia. Physical Examination :     Patient Vitals for the past 8 hrs:   BP Temp Temp src Pulse Resp SpO2   08/23/21 0950     17 93 %   08/23/21 0906 110/69 98.1 °F (36.7 °C) Oral 113 16 95 %   08/23/21 0503    92 15 95 %   08/23/21 0502 108/63 98 °F (36.7 °C) Axillary 98 18 95 %     General Appearance: Awake, alert, and in no apparent distress  Head:  Normocephalic, no trauma  Eyes: Pupils equal, round, reactive to light and accommodation; extraocular movements intact; sclera anicteric; conjunctivae pink. No embolic phenomena. ENT: Oropharynx clear, without erythema, exudate, or thrush. No tenderness of sinuses. Mouth/throat: mucosa pink and moist. No lesions. Dentition in good repair. Neck:Supple, without lymphadenopathy. Thyroid normal, No bruits. Pulmonary/Chest: Clear to auscultation, without wheezes, rales, or rhonchi. No dullness to percussion. Cardiovascular: Normal sinus tachycardia with murmur, No rubs, or gallops. Abdomen: Soft, non tender. Bowel sounds normal. No organomegaly  All four Extremities: No cyanosis, clubbing, edema, or effusions. Neurologic: No gross sensory or motor deficits. Skin: Warm and dry with good turgor. No signs of peripheral arterial or venous insufficiency. No ulcerations. No open wounds.     Medical Decision Making -Laboratory:   I have independently reviewed/ordered the following labs:    CBC with Differential:   Recent Labs     08/22/21  0736 08/23/21  0749   WBC 5.0 3.0*   HGB 8.6* 9.0*   HCT 27.5* 27.2*    215     BMP:   Recent Labs     08/22/21  0736 08/23/21  0749    139   K 3.7 3.7    106   CO2 22 22   BUN 5* 4*   CREATININE 0.76 0.79   MG  --  2.0     Hepatic Function Panel:   Recent Labs     08/23/21  0749   PROT 5.8*   LABALBU 3.2*   BILITOT 0.21*   ALKPHOS 60   ALT 8   AST 10     No results for input(s): RPR in the last 72 hours. No results for input(s): HIV in the last 72 hours. No results for input(s): BC in the last 72 hours. Lab Results   Component Value Date    MUCUS NOT REPORTED 07/17/2021    RBC 3.09 08/23/2021    TRICHOMONAS NOT REPORTED 07/17/2021    WBC 3.0 08/23/2021    YEAST NOT REPORTED 07/17/2021    TURBIDITY CLEAR 07/17/2021     Lab Results   Component Value Date    CREATININE 0.79 08/23/2021    GLUCOSE 85 08/23/2021       Medical Decision Making-Imaging:          MRI Brain 7/2121   Impression       Findings suggestive of acute embolic infarctions involving supra and   infratentorial structures as described.       Subacute to chronic encephalomalacia with chronic blood products within the   left superior parietal lobule and right inferior parietal lobule.       There is no acute intracranial hemorrhage, or intracranial mass lesion.           EXAMINATION:   CT OF THE CHEST, ABDOMEN, AND PELVIS WITH CONTRAST 7/18/2021 10:53 am       TECHNIQUE:   CT of the chest, abdomen and pelvis was performed with the administration of   intravenous contrast. Multiplanar reformatted images are provided for review. Dose modulation, iterative reconstruction, and/or weight based adjustment of   the mA/kV was utilized to reduce the radiation dose to as low as reasonably   achievable.       COMPARISON:   None       HISTORY:   ORDERING SYSTEM PROVIDED HISTORY: MSSA bacteremia, ? DIC   TECHNOLOGIST PROVIDED HISTORY:   MSSA bacteremia, ? DIC       Reason for Exam: sepsis with acute organ dysfunction   Acuity: Unknown   Type of Exam: Unknown       FINDINGS:   Lungs/pleura: The central airways are patent.  There are no suspicious   pulmonary nodules       Mediastinum: There is no acute mediastinal abnormality       Soft Tissues/Bones: No suspicious osteolytic or osteoblastic lesions       Abdominal organs: The liver, spleen, adrenal glands, kidneys, and pancreas   demonstrate no acute abnormality.       GI/Bowel:  The visualized portions of the small bowel are unremarkable. There   is borderline dilation of the colon.  There are multiple air-fluid levels   throughout the colon.       Peritoneum/Retroperitoneum: There is a solid amount of retroperitoneal fluid   along the distal ureters bilaterally.       Pelvis: The bladder is moderately distended.       Bones: No suspicious osseous lesions are identified           Impression   Moderate distention of the colon multiple air-fluid levels, findings may be   seen with acute enterocolitis.             XR FOOT LEFT (2 VIEWS) [[id number]] Collected: 07/18/21 1411      Order Status: Completed Updated: 07/19/21 0759     Narrative:       EXAMINATION:   TWO XRAY VIEWS OF THE LEFT FOOT     7/18/2021 2:05 pm     COMPARISON:   None. HISTORY:   ORDERING SYSTEM PROVIDED HISTORY: Left big toe pain and erythema. Looking for   source of MSSA sepsis   TECHNOLOGIST PROVIDED HISTORY:   Left big toe pain and erythema. Looking for source of MSSA sepsis     FINDINGS:   AP and lateral views of the foot obtained.  Normal alignment and   mineralization.  No abnormal periosteal reaction.  No erosions.  No fracture. No aggressive lytic or blastic lesions.  Mild soft tissue swelling of the   great toe noted.      Impression:       No radiographic evidence for acute osteomyelitis.  Mild soft tissue swelling   of the great toe possibly cellulitis.       EXAMINATION:   MRI OF THE BRAIN WITHOUT CONTRAST  7/21/2021 7:03 pm       TECHNIQUE:   Multiplanar multisequence MRI of the brain was performed without the   administration of intravenous contrast.       COMPARISON:   Head CT of 07/20/2021       HISTORY:   ORDERING SYSTEM PROVIDED HISTORY: episode of vertigo, dipolopia, has   infecticive endocadrditis, r/o septic emboli, abscess   TECHNOLOGIST PROVIDED HISTORY:   episode of vertigo, dipolopia, has infecticive endocadrditis, r/o septic   emboli, abscess   Reason for Exam: VERTIGO, MSSA BACTEREMIA, R/O SEPTIC EMBOLI     FINDINGS:   MRI brain:       There are punctate areas of restricted diffusion within the left superior   frontal gyrus subcortical white matter, right superior frontal gyrus   subcortical white matter, left superior parietal lobule right inferior   frontal gyrus subcortical white matter, left inferior frontal gyrus   subcortical white matter and bilateral cerebellar hemisphere, suggestive of   acute embolic infarctions.       There are focus of encephalomalacia with susceptibility artifact and   increased signal on DWI within left superior parietal lobule and right   inferior parietal lobule suggestive of subacute to chronic infarct with   chronic blood products.       There is no acute intracranial hemorrhage, or intracranial mass lesion. No   mass effect, midline shift, or extra-axial collection is noted.       The brain parenchyma is otherwise normal.  The pituitary gland is normal in   appearance. The cerebellar tonsils are in normal position.       The ventricles, sulci, and cisterns are within normal size and range.  No   significant volume loss. .  The intracranial flow voids are preserved.       The globes and orbits are within normal limits.  The visualized extracranial   structures including paranasal sinuses and mastoid air cells are unremarkable.           Impression       Findings suggestive of acute embolic infarctions involving supra and   infratentorial structures as described.       Subacute to chronic encephalomalacia with chronic blood products within the   left superior parietal lobule and right inferior parietal lobule.       There is no acute intracranial hemorrhage, or intracranial mass lesion.       The findings were sent to the Radiology Results [address] at 8:24   pm on 7/21/2021to be communicated to a licensed caregiver.                 Medical Decision Kwecey-[id number]:     Component Collected Lab   Specimen Description 07/17/2021  8:48  [facility] BLOOD    Special Requests 07/17/2021  8:48 AM [facility]   10ML LFA    Culture Abnormal  07/17/2021  8:48 AM [address] Blood Culture Results called to and read back by: Renetta Hopkins RN AT 2045 ON 07.17.2021    Culture 07/17/2021  8:48 AM [address] FROM BOTTLE: GRAM POSITIVE COCCI IN CLUSTERS    Culture Abnormal  07/17/2021  8:48 AM [facility]   Staphylococcus aureus Detected: mecA/C and MREJ Not Detected Methodology- Polymerase Chain Reaction (PCR)   Culture Abnormal  07/17/2021  8:48 AM [address] [facility] This isolate is methicillin susceptible. Testing Performed By    Manjinder Watson Name Director Address Valid Date Range   [address]-Jessica Adams MD [address] [facility] [address] 08/30/17 0801-Present   Susceptibility    Staphylococcus aureus (4)    Antibiotic Interpretation DAVID Status    penicillin Resistant  Final     >=0.5   RESISTANT   cefoxitin screen  NOT REPORTED Final    ciprofloxacin   Final     NOT REPORTED    clindamycin Sensitive  Final     <=0.25   SUSCEPTIBLE   erythromycin Sensitive  Final     <=0.25   SUSCEPTIBLE   gentamicin Sensitive  Final     <=0.5   SUSCEPTIBLE   gentamicin Sensitive Gentamicin is used only in combination with other active agents that test susceptible.  Final    Induced Clind Resist  NOT REPORTED Final    levofloxacin Sensitive  Final     0.25   SUSCEPTIBLE   linezolid   Final     NOT REPORTED    moxifloxacin   Final     NOT REPORTED    nitrofurantoin   Final     NOT REPORTED    oxacillin Sensitive  Final     0.5   SUSCEPTIBLE   Synercid   Final     NOT REPORTED    rifampin   Final     NOT REPORTED    tetracycline Sensitive  Final     <=1   SUSCEPTIBLE   tigecycline   Final     NOT REPORTED    trimethoprim-sulfamethoxazole Sensitive  Final     <=10   SUSCEPTIBLE   vancomycin   Final     NOT REPORTED        Medical Decision Making-Other:     Note:  · Labs, medications, radiologic studies were reviewed with personal review of films  · Large amounts of data were reviewed  · Discussed with nursing Staff, Discharge planner  · Infection Control and Prevention measures reviewed  · All prior entries were reviewed  · Administer medications as ordered  · Prognosis: Guarded  · Discharge planning reviewed  · Follow up as outpatient. Thank you for allowing us to participate in the care of this patient. Please call with questions. Gina Mullen, APRN - CNP     ATTESTATION:    I have discussed the case, including pertinent history and exam findings with the APRN. I have evaluated the  History, physical findings and pictures of the patient and the key elements of the encounter have been performed by me. I have reviewed the laboratory data, other diagnostic studies and discussed them with the APRN. I have updated the medical record where necessary. I agree with the assessment, plan and orders as documented by the APRN.     Vanessa Haas MD.                Pager: [phone] - Office: [phone]

## 2021-08-23 NOTE — PROGRESS NOTES
[facility]  Office: [address], DO, Santo Espinalf, DO, Sriram Barney, DO, Yaneli Sharma Blood, DO, Shree Chery MD, Elvira Plaza MD, Anam Elena MD, Abdirahman Rivera MD, Darrell Bridges MD, Whitney Conde MD, Sonam Alex MD, Jacob Sampson, DO, Gita Romo MD, Immanuel Jordan, DO, Nicci Sanford MD,  Killian Gonzáles, DO, Fernando Lozano MD, Francisco Gordillo MD, Jenelle Luke MD, Wanda Espinosa MD, Kalani Wilde MD, Chin Galeana MD, Shayy Samuels MD, [facility], [facility], CNP, Lauren Serna, CNP, Kt Hollis, CNS, Bennett Olivares, CNP, Gerilyn Cockayne, CNP, Watson Roman, CNP, Lorena Herr, CNP, Camillo Schaumann, CNP, MELONIE MeyerC, Blanca Chery, [facility], Kayla Loud, CNP, Raleigh Dangelo, [facility], Agusto Mendoza, CNP, David Mccormick, CNP, Edwin Boo, CNP, Rowena Alanis, CNP, Gladies Kocher, [facility], Simone Alonso, [address] [place] [address]    Progress Note    8/23/2021    1:32 PM    Name:   Nicki Bradford  MRN:     [id number]     Acct:      [de-identified]   Room:   2024/2024-01   Day:  1  Admit Date:  8/22/2021  4:09 AM    PCP:   Rene Zazueta MD  Code Status:  Full Code    Subjective:     C/C: chest pain  Interval History Status: not changed. Patient continues to have chest pain, pleuritic in nature. Awaiting echo  Blood pressure 102/68  Saturating well on room air, afebrile  Slightly tachycardia with heart rate between 90- 110. Patient had no acute events overnight. Brief History:     Patient is a 17-year-old  male with PMH of recent mitral valve endocarditis, MSSA bacteremia and UTI in July 2021 at [facility]. The patient was admitted July/17/20218/2/2021 with 3 days of fever and diarrhea after eating chicken. He was noted to be septic and had 2/2 blood culture showing positive MSSA.   The patient was started on IV antibiotics including nafcillin 2 g IV every 4 hours, and rifampin. Patient was seen by cardiology, cardiothoracic surgery, infectious diseases and antibiotics were to be continued through September 14, 2021.     Patient was also evaluated by neurology and ophthalmology due to concern for septic emboli found on MRI brain. Patient had PICC line placed on 8/2/2021 to continue the nafcillin and rifampin. The patient does experience some intermittent nausea with the rifampin. He continues to have loose stools, but takes probiotics frequently. He denies any recurrent fevers, chills. The patient however developed severe left upper quadrant abdominal pain over the last day or 2. He did note some increased work of breathing and went to the emergency department at an outside facility.     Patient was found to have a moderate-sized pericardial effusion on CT chest.  He has been transferred to [facility] since his cardiologist know him and he needs an echocardiogram.  His wife is present.       Review of Systems:     Constitutional:  negative for chills, fevers, sweats  Respiratory:  negative for cough, dyspnea on exertion, shortness of breath, wheezing  Cardiovascular: Positive for chest pain, negative chest pressure/discomfort, no lower extremity edema, palpitations  Gastrointestinal:  negative for abdominal pain, constipation, diarrhea, nausea, vomiting  Neurological:  negative for dizziness, headache    Medications: Allergies:     Allergies   Allergen Reactions    Morphine Other (See Comments)     Pt report muscle cramps       Current Meds:   Scheduled Meds:    sodium chloride flush  5-40 mL Intravenous 2 times per day    sodium chloride flush  5-40 mL Intravenous 2 times per day    nafcillin  2,000 mg Intravenous Q4H    rifAMPin  600 mg Oral Daily    lactobacillus  1 capsule Oral TID WC    pantoprazole  40 mg Oral BID AC    metoprolol tartrate  12.5 mg Oral BID     Continuous Infusions:    sodium chloride      sodium chloride       PRN Meds: sodium chloride flush, sodium chloride, sodium chloride flush, sodium chloride, acetaminophen **OR** acetaminophen, sodium chloride, melatonin, oxyCODONE **OR** oxyCODONE, fentanNYL, ondansetron    Data:     Past Medical History:   has a past medical history of Diverticulosis, Hyperlipidemia, Hypertension, MESSI (obstructive sleep apnea), and Research subject. Social History:   reports that he has never smoked. He has never used smokeless tobacco. He reports current alcohol use of about 20.0 standard drinks of alcohol per week. He reports that he does not use drugs. Family History:   Family History   Problem Relation Age of Onset    No Known Problems Mother     Other Father         diverticulitis       Vitals:  /68   Pulse 99   Temp 98.4 °F (36.9 °C) (Oral)   Resp 19   Ht 5' 6\" (1.676 m)   Wt 172 lb (78 kg)   SpO2 98%   BMI 27.76 kg/m²   Temp (24hrs), Av.1 °F (36.7 °C), Min:97.9 °F (36.6 °C), Max:98.4 °F (36.9 °C)    No results for input(s): POCGLU in the last 72 hours. I/O (24Hr):     Intake/Output Summary (Last 24 hours) at 2021 1332  Last data filed at 2021 1810  Gross per 24 hour   Intake 2136 ml   Output 1950 ml   Net 186 ml       Labs:  Hematology:  Recent Labs     21  0736 21  0749   WBC 5.0 3.0*   RBC 3.01* 3.09*   HGB 8.6* 9.0*   HCT 27.5* 27.2*   MCV 91.4 88.0   MCH 28.6 29.1   MCHC 31.3 33.1   RDW 14.6* 14.2    215   MPV 9.2 9.0   INR  --  1.1     Chemistry:  Recent Labs     21  0736 21  0749    139   K 3.7 3.7    106   CO2 22 22   GLUCOSE 92 85   BUN 5* 4*   CREATININE 0.76 0.79   MG  --  2.0   ANIONGAP 10 11   LABGLOM >60 >60   GFRAA >60 >60   CALCIUM 8.4* 8.6   CAION  --  1.17   PHOS  --  3.5     Recent Labs     21  0749   PROT 5.8*   LABALBU 3.2*   AST 10   ALT 8   ALKPHOS 60   BILITOT 0.21*     ABG:  Lab Results   Component Value Date    POCPH 7.486 2021    POCPCO2 28.6 2021    POCPO2 134.5 07/18/2021    POCHCO3 21.6 07/18/2021    NBEA 1 07/18/2021    PBEA NOT REPORTED 07/18/2021    JHT8XQQ NOT REPORTED 07/18/2021    QUNP1ZUJ 99 07/18/2021    FIO2 NOT REPORTED 07/18/2021     Lab Results   Component Value Date/Time    SPECIAL 10ML LT HAND 08/22/2021 07:36 AM     Lab Results   Component Value Date/Time    CULTURE NO GROWTH 1 DAY 08/22/2021 07:36 AM       Radiology:  XR CHEST PORTABLE    Result Date: 8/22/2021  Right-sided PICC line terminates in right shoulder area at the right axillary line. The findings were sent to the Radiology Results [address] at 4:44 pm on 8/22/2021to be communicated to a licensed caregiver. Physical Examination:        General appearance:  alert, cooperative and no distress  Mental Status:  oriented to person, place and time and normal affect  Lungs:  clear to auscultation bilaterally, normal effort  Heart:  Sinus tachycardia, + murmur  Abdomen:  soft, nontender, nondistended, normal bowel sounds, no masses, hepatomegaly, splenomegaly  Extremities:  no edema, redness, tenderness in the calves  Skin:  no gross lesions, rashes, induration    Assessment:        Hospital Problems         Last Modified POA    * (Principal) Pericardial effusion 8/22/2021 Yes    MSSA bacteremia 8/22/2021 Yes    Severe mitral regurgitation 8/22/2021 Yes    Normocytic normochromic anemia 8/22/2021 Yes    Diarrhea 8/22/2021 Yes    Essential hypertension 8/22/2021 Yes    Subacute endocarditis 8/22/2021 Yes          Plan:        Pericardial effusion with recent mitral valve endocarditis echo pending, will follow up with results, patient is hemodynamically stable. Cardiology is following. Continue to monitor hb.continue telemetry. Watch for acute decompensation. Sinus tachycardiacontinues on Lopressor 12.5 mg twice daily with parameters.     Previous MSSA bacteremiacontinues on nafcillin 2000 mg IV every 4 hours until 9/14/2021 for MSSA septicemia and to promote antibiotic penetration into brain. Will need 6 weeks from first negative blood cultures. Rifampin 600 mg daily added 7/22/21 until 9/14/21 for synergy. Infectious disease following. Mitral valve endocarditis with MR- saw CT surgery earlier admission, plan to have surgery after antibiotic course is done    Septic emboli history- last MRI did not show any progresssion or hemorrhage. Avoid all antiplatelet and anticoagulant given patient has high risk of bleeding. Evaluated by neurology on last admission.     Diarrhea- resolved    New picc placed 8/23    DVT prop- No anticoagulation, put EPC cuffs    Tory Leal MD  8/23/2021  1:32 PM

## 2021-08-24 ENCOUNTER — APPOINTMENT (OUTPATIENT)
Dept: GENERAL RADIOLOGY | Age: 41
DRG: 289 | End: 2021-08-24
Attending: INTERNAL MEDICINE
Payer: COMMERCIAL

## 2021-08-24 LAB
ALLEN TEST: NORMAL
CARBOXYHEMOGLOBIN: 1.1 % (ref 0–5)
FIO2: NORMAL
HCO3 VENOUS: 26.3 MMOL/L (ref 24–30)
METHEMOGLOBIN: NORMAL % (ref 0–1.5)
MODE: NORMAL
MYOGLOBIN: <21 NG/ML (ref 28–72)
NEGATIVE BASE EXCESS, VEN: NORMAL MMOL/L (ref 0–2)
NOTIFICATION TIME: NORMAL
NOTIFICATION: NORMAL
O2 DEVICE/FLOW/%: NORMAL
O2 SAT, VEN: 60.7 % (ref 60–85)
OXYHEMOGLOBIN: NORMAL % (ref 95–98)
PATIENT TEMP: 37
PCO2, VEN, TEMP ADJ: NORMAL MMHG (ref 39–55)
PCO2, VEN: 42.7 (ref 39–55)
PEEP/CPAP: NORMAL
PH VENOUS: 7.41 (ref 7.32–7.42)
PH, VEN, TEMP ADJ: NORMAL (ref 7.32–7.42)
PO2, VEN, TEMP ADJ: NORMAL MMHG (ref 30–50)
PO2, VEN: 31.2 (ref 30–50)
POSITIVE BASE EXCESS, VEN: 1.9 MMOL/L (ref 0–2)
PSV: NORMAL
PT. POSITION: NORMAL
RESPIRATORY RATE: NORMAL
SAMPLE SITE: NORMAL
SET RATE: NORMAL
TEXT FOR RESPIRATORY: NORMAL
TOTAL HB: NORMAL G/DL (ref 12–16)
TOTAL RATE: NORMAL
TROPONIN INTERP: ABNORMAL
TROPONIN T: ABNORMAL NG/ML
TROPONIN, HIGH SENSITIVITY: 10 NG/L (ref 0–22)
VT: NORMAL

## 2021-08-24 PROCEDURE — 2580000003 HC RX 258: Performed by: INTERNAL MEDICINE

## 2021-08-24 PROCEDURE — 6370000000 HC RX 637 (ALT 250 FOR IP): Performed by: INTERNAL MEDICINE

## 2021-08-24 PROCEDURE — 71045 X-RAY EXAM CHEST 1 VIEW: CPT

## 2021-08-24 PROCEDURE — 6370000000 HC RX 637 (ALT 250 FOR IP): Performed by: STUDENT IN AN ORGANIZED HEALTH CARE EDUCATION/TRAINING PROGRAM

## 2021-08-24 PROCEDURE — 82805 BLOOD GASES W/O2 SATURATION: CPT

## 2021-08-24 PROCEDURE — 2060000000 HC ICU INTERMEDIATE R&B

## 2021-08-24 PROCEDURE — 99232 SBSQ HOSP IP/OBS MODERATE 35: CPT | Performed by: INTERNAL MEDICINE

## 2021-08-24 PROCEDURE — 83874 ASSAY OF MYOGLOBIN: CPT

## 2021-08-24 PROCEDURE — 84484 ASSAY OF TROPONIN QUANT: CPT

## 2021-08-24 PROCEDURE — 36415 COLL VENOUS BLD VENIPUNCTURE: CPT

## 2021-08-24 PROCEDURE — 6370000000 HC RX 637 (ALT 250 FOR IP): Performed by: NURSE PRACTITIONER

## 2021-08-24 PROCEDURE — 2580000003 HC RX 258: Performed by: STUDENT IN AN ORGANIZED HEALTH CARE EDUCATION/TRAINING PROGRAM

## 2021-08-24 PROCEDURE — 93005 ELECTROCARDIOGRAM TRACING: CPT | Performed by: INTERNAL MEDICINE

## 2021-08-24 PROCEDURE — 2580000003 HC RX 258: Performed by: NURSE PRACTITIONER

## 2021-08-24 PROCEDURE — 2500000003 HC RX 250 WO HCPCS: Performed by: INTERNAL MEDICINE

## 2021-08-24 PROCEDURE — APPSS30 APP SPLIT SHARED TIME 16-30 MINUTES: Performed by: NURSE PRACTITIONER

## 2021-08-24 PROCEDURE — 94761 N-INVAS EAR/PLS OXIMETRY MLT: CPT

## 2021-08-24 PROCEDURE — 6360000002 HC RX W HCPCS: Performed by: INTERNAL MEDICINE

## 2021-08-24 RX ORDER — OXYCODONE HYDROCHLORIDE 5 MG/1
10 TABLET ORAL EVERY 4 HOURS PRN
Status: DISCONTINUED | OUTPATIENT
Start: 2021-08-24 | End: 2021-08-26 | Stop reason: HOSPADM

## 2021-08-24 RX ORDER — OXYCODONE HYDROCHLORIDE 5 MG/1
20 TABLET ORAL EVERY 4 HOURS PRN
Status: DISCONTINUED | OUTPATIENT
Start: 2021-08-24 | End: 2021-08-26 | Stop reason: HOSPADM

## 2021-08-24 RX ORDER — POLYETHYLENE GLYCOL 3350 17 G/17G
17 POWDER, FOR SOLUTION ORAL DAILY PRN
Status: DISCONTINUED | OUTPATIENT
Start: 2021-08-24 | End: 2021-08-26 | Stop reason: HOSPADM

## 2021-08-24 RX ORDER — IBUPROFEN 800 MG/1
800 TABLET ORAL
Status: DISCONTINUED | OUTPATIENT
Start: 2021-08-24 | End: 2021-08-25

## 2021-08-24 RX ORDER — ONDANSETRON 4 MG/1
4 TABLET, ORALLY DISINTEGRATING ORAL 3 TIMES DAILY PRN
Qty: 93 TABLET | Refills: 0 | Status: SHIPPED | OUTPATIENT
Start: 2021-08-24 | End: 2021-09-15 | Stop reason: ALTCHOICE

## 2021-08-24 RX ADMIN — METOPROLOL TARTRATE 12.5 MG: 25 TABLET ORAL at 09:30

## 2021-08-24 RX ADMIN — Medication 1 CAPSULE: at 09:30

## 2021-08-24 RX ADMIN — NAFCILLIN SODIUM 2000 MG: 2 INJECTION, POWDER, LYOPHILIZED, FOR SOLUTION INTRAMUSCULAR; INTRAVENOUS at 06:35

## 2021-08-24 RX ADMIN — IBUPROFEN 800 MG: 800 TABLET, FILM COATED ORAL at 12:36

## 2021-08-24 RX ADMIN — SALINE NASAL SPRAY 1 SPRAY: 1.5 SOLUTION NASAL at 00:06

## 2021-08-24 RX ADMIN — NAFCILLIN SODIUM 2000 MG: 2 INJECTION, POWDER, LYOPHILIZED, FOR SOLUTION INTRAMUSCULAR; INTRAVENOUS at 23:00

## 2021-08-24 RX ADMIN — FENTANYL CITRATE 25 MCG: 50 INJECTION, SOLUTION INTRAMUSCULAR; INTRAVENOUS at 06:35

## 2021-08-24 RX ADMIN — SODIUM CHLORIDE, PRESERVATIVE FREE 10 ML: 5 INJECTION INTRAVENOUS at 09:31

## 2021-08-24 RX ADMIN — ONDANSETRON 4 MG: 2 INJECTION INTRAMUSCULAR; INTRAVENOUS at 09:44

## 2021-08-24 RX ADMIN — NAFCILLIN SODIUM 2000 MG: 2 INJECTION, POWDER, LYOPHILIZED, FOR SOLUTION INTRAMUSCULAR; INTRAVENOUS at 17:59

## 2021-08-24 RX ADMIN — OXYCODONE HYDROCHLORIDE 10 MG: 5 TABLET ORAL at 04:02

## 2021-08-24 RX ADMIN — OXYCODONE HYDROCHLORIDE 20 MG: 5 TABLET ORAL at 15:37

## 2021-08-24 RX ADMIN — ONDANSETRON 4 MG: 2 INJECTION INTRAMUSCULAR; INTRAVENOUS at 23:11

## 2021-08-24 RX ADMIN — Medication 600 MG: at 09:30

## 2021-08-24 RX ADMIN — METOPROLOL TARTRATE 12.5 MG: 25 TABLET ORAL at 19:54

## 2021-08-24 RX ADMIN — FENTANYL CITRATE 25 MCG: 50 INJECTION, SOLUTION INTRAMUSCULAR; INTRAVENOUS at 02:46

## 2021-08-24 RX ADMIN — Medication 1 CAPSULE: at 12:36

## 2021-08-24 RX ADMIN — OXYCODONE HYDROCHLORIDE 10 MG: 5 TABLET ORAL at 00:02

## 2021-08-24 RX ADMIN — PANTOPRAZOLE SODIUM 40 MG: 40 TABLET, DELAYED RELEASE ORAL at 09:30

## 2021-08-24 RX ADMIN — NAFCILLIN SODIUM 2000 MG: 2 INJECTION, POWDER, LYOPHILIZED, FOR SOLUTION INTRAMUSCULAR; INTRAVENOUS at 10:59

## 2021-08-24 RX ADMIN — SODIUM CHLORIDE, PRESERVATIVE FREE 10 ML: 5 INJECTION INTRAVENOUS at 19:57

## 2021-08-24 RX ADMIN — NAFCILLIN SODIUM 2000 MG: 2 INJECTION, POWDER, LYOPHILIZED, FOR SOLUTION INTRAMUSCULAR; INTRAVENOUS at 14:30

## 2021-08-24 RX ADMIN — PANTOPRAZOLE SODIUM 40 MG: 40 TABLET, DELAYED RELEASE ORAL at 16:51

## 2021-08-24 RX ADMIN — OXYCODONE HYDROCHLORIDE 20 MG: 5 TABLET ORAL at 10:59

## 2021-08-24 RX ADMIN — SODIUM CHLORIDE, PRESERVATIVE FREE 10 ML: 5 INJECTION INTRAVENOUS at 09:30

## 2021-08-24 RX ADMIN — POLYETHYLENE GLYCOL 3350 17 G: 17 POWDER, FOR SOLUTION ORAL at 04:02

## 2021-08-24 RX ADMIN — OXYCODONE HYDROCHLORIDE 20 MG: 5 TABLET ORAL at 19:51

## 2021-08-24 RX ADMIN — Medication 1 CAPSULE: at 16:51

## 2021-08-24 RX ADMIN — SODIUM CHLORIDE, PRESERVATIVE FREE 10 ML: 5 INJECTION INTRAVENOUS at 23:12

## 2021-08-24 RX ADMIN — IBUPROFEN 800 MG: 800 TABLET, FILM COATED ORAL at 16:51

## 2021-08-24 RX ADMIN — FENTANYL CITRATE 25 MCG: 50 INJECTION, SOLUTION INTRAMUSCULAR; INTRAVENOUS at 09:15

## 2021-08-24 RX ADMIN — NAFCILLIN SODIUM 2000 MG: 2 INJECTION, POWDER, LYOPHILIZED, FOR SOLUTION INTRAMUSCULAR; INTRAVENOUS at 02:45

## 2021-08-24 ASSESSMENT — PAIN SCALES - GENERAL
PAINLEVEL_OUTOF10: 5
PAINLEVEL_OUTOF10: 7
PAINLEVEL_OUTOF10: 8
PAINLEVEL_OUTOF10: 7
PAINLEVEL_OUTOF10: 4
PAINLEVEL_OUTOF10: 7
PAINLEVEL_OUTOF10: 8
PAINLEVEL_OUTOF10: 7
PAINLEVEL_OUTOF10: 8
PAINLEVEL_OUTOF10: 8
PAINLEVEL_OUTOF10: 7

## 2021-08-24 NOTE — PLAN OF CARE
Pain assessed. Patient on telemetry. Call light within reach. Bed in lowest position. Antibiotics given.

## 2021-08-24 NOTE — CARE COORDINATION
Transitional planning-talked with patient and wife. Plan is to go home with [facility] for IV antibiotics.

## 2021-08-24 NOTE — PROGRESS NOTES
[facility]  Office: [address], DO, Margot Joey, DO, Shruthi Tavarez, DO, Gilmar Kahn, DO, Domenica Gilbert MD, Sabrina Neri MD, Keri Baum MD, Diya Murray MD, Michelle Marte MD, Isabel Oshea MD, Gerson Perea MD, Ghanshyam Juárez, DO, Julienne Byrd MD, Derek Sandra DO, Michoacano Arreaga MD,  Corrie Rocha DO, Anita Barrera MD, Claire Beltre MD, Jared Jonas MD, Autumn Almanza MD, Analilia Reyes MD, Joli Angelucci, MD, Reginald Arnold MD, Bradley Bermudez, [facility], [facility], CNP, Goldy Barkley, CNP, Wayne Elder, [facility], George Pettit, CNP, Aimee Saravia, CNP, Nohemi Salter, CNP, Terri Best, CNP, Cameron Rogers, CNP, Tanmay Krueger, PA-C, Annabel Nuñez, [facility], Sonja Pineda, CNP, Bean Nails, CNP, Samina Franz, CNP, Siddharth Oconnor, CNP, Gertrude Ma, CNP, Abigail Huerta, CNP, Renae Anderson, CNP, Calderon Proper, [address] [facility]    Progress Note    8/24/2021    5:04 PM    Name:   Elisa Zhang  MRN:     [id number]     Acct:      [de-identified]   Room:   2024/2024-01  IP Day:  2  Admit Date:  8/22/2021  4:09 AM    PCP:   Zhane Conway MD  Code Status:  Full Code    Subjective:     C/C: chest pain    Interval History Status: not changed. Pt seen and examined this morning. Still with left-sided chest pain with deep breathing. Pressures are stable. Vitals stable. No acute events overnight. Wife updated at bedside. Brief History:     Patient is a 78-year-old  male with PMH of recent mitral valve endocarditis, MSSA bacteremia and UTI in July 2021 at [facility]. The patient was admitted July/17/20218/2/2021 with 3 days of fever and diarrhea after eating chicken. He was noted to be septic and had 2/2 blood culture showing positive MSSA. The patient was started on IV antibiotics including nafcillin 2 g IV every 4 hours, and rifampin.   Patient was seen by cardiology, cardiothoracic surgery, infectious diseases and antibiotics were to be continued through September 14, 2021.     Patient was also evaluated by neurology and ophthalmology due to concern for septic emboli found on MRI brain. Patient had PICC line placed on 8/2/2021 to continue the nafcillin and rifampin. The patient does experience some intermittent nausea with the rifampin. He continues to have loose stools, but takes probiotics frequently. He denies any recurrent fevers, chills. The patient however developed severe left upper quadrant abdominal pain over the last day or 2. He did note some increased work of breathing and went to the emergency department at an outside facility.     Patient was found to have a moderate-sized pericardial effusion on CT chest.  He has been transferred to [facility] since his cardiologist know him and he needs an echocardiogram.  His wife is present.       Review of Systems:     Constitutional:  negative for chills, fevers, sweats  Respiratory:  negative for cough, dyspnea on exertion, shortness of breath, wheezing  Cardiovascular: Positive for chest pain, negative chest pressure/discomfort, no lower extremity edema, palpitations  Gastrointestinal:  negative for abdominal pain, constipation, diarrhea, nausea, vomiting  Neurological:  negative for dizziness, headache    Medications: Allergies:     Allergies   Allergen Reactions    Morphine Other (See Comments)     Pt report muscle cramps       Current Meds:   Scheduled Meds:    ibuprofen  800 mg Oral TID AC    sodium chloride flush  5-40 mL IntraVENous 2 times per day    sodium chloride flush  5-40 mL IntraVENous 2 times per day    nafcillin  2,000 mg IntraVENous Q4H    rifAMPin  600 mg Oral Daily    lactobacillus  1 capsule Oral TID WC    pantoprazole  40 mg Oral BID AC    metoprolol tartrate  12.5 mg Oral BID     Continuous Infusions:    sodium chloride      sodium chloride       PRN Meds: polyethylene glycol, oxyCODONE **OR** oxyCODONE, sodium chloride flush, sodium chloride, sodium chloride, sodium chloride flush, sodium chloride, acetaminophen **OR** acetaminophen, melatonin, ondansetron    Data:     Past Medical History:   has a past medical history of Diverticulosis, Hyperlipidemia, Hypertension, MESSI (obstructive sleep apnea), and Research subject. Social History:   reports that he has never smoked. He has never used smokeless tobacco. He reports current alcohol use of about 20.0 standard drinks of alcohol per week. He reports that he does not use drugs. Family History:   Family History   Problem Relation Age of Onset    No Known Problems Mother     Other Father         diverticulitis       Vitals:  BP 96/63   Pulse 78   Temp 98.6 °F (37 °C) (Oral)   Resp 23   Ht 5' 6\" (1.676 m)   Wt 172 lb (78 kg)   SpO2 96%   BMI 27.76 kg/m²   Temp (24hrs), Av.1 °F (36.7 °C), Min:97 °F (36.1 °C), Max:98.8 °F (37.1 °C)    No results for input(s): POCGLU in the last 72 hours. I/O (24Hr):   No intake or output data in the 24 hours ending 21 1704    Labs:  Hematology:  Recent Labs     21  0736 21  0749   WBC 5.0 3.0*   RBC 3.01* 3.09*   HGB 8.6* 9.0*   HCT 27.5* 27.2*   MCV 91.4 88.0   MCH 28.6 29.1   MCHC 31.3 33.1   RDW 14.6* 14.2    215   MPV 9.2 9.0   INR  --  1.1     Chemistry:  Recent Labs     21  0736 21  0749    139   K 3.7 3.7    106   CO2 22 22   GLUCOSE 92 85   BUN 5* 4*   CREATININE 0.76 0.79   MG  --  2.0   ANIONGAP 10 11   LABGLOM >60 >60   GFRAA >60 >60   CALCIUM 8.4* 8.6   CAION  --  1.17   PHOS  --  3.5     Recent Labs     21  0749   PROT 5.8*   LABALBU 3.2*   AST 10   ALT 8   ALKPHOS 60   BILITOT 0.21*     ABG:  Lab Results   Component Value Date    POCPH 7.486 2021    POCPCO2 28.6 2021    POCPO2 134.5 2021    POCHCO3 21.6 2021    NBEA 1 2021    PBEA NOT REPORTED 2021    EEX8SZX NOT REPORTED 07/18/2021    GLLH8LJW 99 07/18/2021    FIO2 NOT REPORTED 07/18/2021     Lab Results   Component Value Date/Time    SPECIAL 10ML LT HAND 08/22/2021 07:36 AM     Lab Results   Component Value Date/Time    CULTURE NO GROWTH 2 DAYS 08/22/2021 07:36 AM       Radiology:  XR CHEST PORTABLE    Result Date: 8/22/2021  Right-sided PICC line terminates in right shoulder area at the right axillary line. The findings were sent to the Radiology Results [address] at 4:44 pm on 8/22/2021to be communicated to a licensed caregiver. Physical Examination:        General appearance:  alert, cooperative and no distress  Mental Status:  oriented to person, place and time and normal affect  Lungs:  clear to auscultation bilaterally, normal effort  Heart: regular rate, regular rhythm, improvement of his 3/6 blowing systolic murmur  Abdomen:  soft, nontender, nondistended, normal bowel sounds, no masses, hepatomegaly, splenomegaly  Extremities:  no edema, redness, tenderness in the calves  Skin:  no gross lesions, rashes, induration    Assessment:        Hospital Problems         Last Modified POA    * (Principal) Pericardial effusion 8/22/2021 Yes    Diarrhea 8/22/2021 Yes    MSSA bacteremia 8/22/2021 Yes    Severe mitral regurgitation 8/22/2021 Yes    Normocytic normochromic anemia 8/22/2021 Yes    Essential hypertension 8/22/2021 Yes    Subacute endocarditis 8/22/2021 Yes    Endocarditis of mitral valve 8/23/2021 Yes    Endocarditis due to methicillin susceptible Staphylococcus aureus (MSSA) 8/23/2021 Yes    Pleurisy 8/23/2021 Yes          Plan:        Pericardial effusion with recent mitral valve endocarditis  echo reviewed, EF 60%, posterior leaflet of mitral valve is prolapsing, previous vegetation is no longer seen, small echogenicity improved on the posterior leaflet, severe MR. Small pericardial effusion noted. Monitor telemetry. Start ibuprofen 800 mg TID with meals.  Increase doses of roxicodone today.    Pleuritic chest pain - may be related to the small pericardial effusion versus small nodular density noted in the left lower lung field on CT imaging the outside facility. Continue pain regimen as noted. CT needs to be reperformed in 3 to 6 months to reevaluate these nodular densities. Sinus improved - continue Lopressor 12.5 mg twice    Previous MSSA bacteremiacontinues on nafcillin 2000 mg IV every 4 hours until 9/14/2021 for MSSA septicemia and to promote antibiotic penetration into brain. Will need 6 weeks from first negative blood cultures. Rifampin 600 mg daily added 7/22/21 until 9/14/21 for synergy. Infectious disease following. Mitral valve endocarditis with MR-  CTsurgery re-eval after antibiotic course is complete    Septic emboli history - last MRI did not show any progresssion or hemorrhage. Avoid all antiplatelet and anticoagulant given patient has high risk of bleeding. Evaluated by neurology on last admission. Diarrhea - resolved    New picc placed 8/23    DVT prop - No anticoagulation, put EPC cuffs    Hopeful for discharge in next 24-48 hours.     33 Darlene Valdez DO  8/24/2021  5:04 PM

## 2021-08-24 NOTE — PROGRESS NOTES
is  58ml. Possible small vegetation on posterior leaflet. Consider MONROE.     MONROE 7/20/2021  MONROE findings:     LA:       Normal  RAJAT:    No thrombus  RA:      Normal  RV:      Normal  LV:       Normal  Estimated LVEF:         55%  Aorta:               Mild atheromatous disease arch  Pericardium:    Trivial pericardial effusion  Septum:           No intracardiac shunt via color Doppler.      Valves:     Mitral Valve: Large vegetation measuring 2.5 X 2.4 cm attached on the posterior leaflet of the mitral valve associated with Flail and degenerated leaflet. Severe regurgitation w/ an anteriorly directed eccentric jet is identified. Aortic Valve: The aortic valve is trileaflet and opens adequately. No regurgitiation is identified. Tricuspid valve: Structurally normal. No regurgitation is identified. Pulmonary valve: Normal. No significant regurgitation     No thrombus identified.      CTA Chest 8/21/2021 [facility]   CTA CHEST WO W CON EXAMINATION: CTA CHEST WO W CON, 8/21/2021 10:25 AM EDT   HISTORY: CHEST PAIN, UNSPECIFIED   COMPARISON: Chest x-ray 1/7/2021, CT abdomen 10/17/2020 and 1/23/2016. TECHNIQUE: CT angiography of the chest was performed with IV contrast. MIP   (maximum intensity projection) images or 3D post processing was performed. CT   dose reduction technique was used, including Automated Exposure Control. FINDINGS:   VASCULATURE/PULMONARY ARTERIES: There is satisfactory opacification of the   pulmonary arterial system. There is no evidence of pulmonary embolism. The main   pulmonary artery is normal in diameter. The aorta and great vessels appear   normal.   HEART/PERICARDIUM: Moderate pericardial effusion measuring up to 15 mm. MEDIASTINAL/HILAR LYMPH NODES: No lymphadenopathy. ESOPHAGUS: Normal as visualized. PLEURAL CAVITY: No pleural effusion or pneumothorax. LUNGS/AIRWAYS: Mild bibasilar atelectasis.  3 mm nodular density along the right   major fissure on image 38, favored to represent a benign perifissural lymph   node. 4 mm left lower lobe nodule on image 58, stable compared to the CT from   10/17/2020 although not seen on the CT from 1/23/2016. Mild bibasilar   atelectasis but no focal consolidation. CHEST WALL/AXILLA/LOWER NECK: Normal.   VISUALIZED UPPER ABDOMEN: Normal.   BONES: No acute process. IMPRESSION:   1. No evidence of pulmonary embolism. 2. Moderate pericardial effusion. Consider echocardiography. 3. 3 mm nodular density along the right major fissure, most likely a benign   perifissural lymph node. There is also a 4 mm left lower lobe nodule which is   indeterminate but may also represent a benign intrapulmonary lymph node or   granuloma and is stable since 10/17/2020. Electronically authenticated by: Patsy Pires Date: 2021-08-21 12:31        ECHO 8/24/21  Summary   Left ventricle is normal in size with normal systolic function globally. Calculated ejection fraction is 60%   Left atrial dilatation. Right atrium is normal in size. Mild buckling of the right atrial wall. Mild aortic insufficiency. Prolapse of the of posterior leaflet of the mitral valve. Large vegetations noted on the MONROE done 7/20/21 are no longer identified . Small echogenicity is noted on the posterior leaflet, maybe improving   vegetation. Severe mitral regurgitation with pulmonary vein flow reversal.   Consider MONROE when indicated. Mild tricuspid regurgitation. Estimated right ventricular systolic pressure is 49 mmHg, suggests mild Pulm   HTN. Small circumferential pericardial effusion. No signs of echocariographic tamponade. Objective:   Vitals: /72   Pulse 94   Temp 97 °F (36.1 °C) (Oral)   Resp 22   Ht 5' 6\" (1.676 m)   Wt 172 lb (78 kg)   SpO2 97%   BMI 27.76 kg/m²   General appearance: alert and cooperative with exam  HEENT: Head: Normocephalic, no lesions, without obvious abnormality.   Neck: no JVD, trachea midline, no adenopathy  Lungs: Clear to auscultation  Heart: Regular rate and rhythm, s1/s2 auscultated, + murmurs  Abdomen: soft, non-tender, bowel sounds active  Extremities: no edema  Neurologic: not done        Assessment / Acute Cardiac Problems:     1. LUQ abdominal pain and pleuritic chest pain  2. MSSA endocarditis involving MV - currently on IV abx  3. MV vegetation 2.5 x 2.4 cm on MONROE 7/20/21  4. Hx of acute enterocolitis  5. Hx of ?septic emboli to brain  6. HTN  7. Pericardial effusion noted on CT at OSH    Patient Active Problem List:     Diarrhea     Acute dehydration     Hypomagnesemia     Hypokalemia     Epistaxis     Thrombocytopenia (HCC)     Hyperglycemia     Essential hypertension     Sepsis with acute organ dysfunction and septic shock (HCC)     Delusions (HCC)     Hypophosphatemia     MSSA bacteremia     Severe mitral regurgitation     Acute respiratory failure with hypoxia (HCC)     GI bleed     Normocytic normochromic anemia     Obesity (BMI 30-39. 9)     Transaminitis     Vertigo     Metabolic encephalopathy     Iron deficiency anemia secondary to blood loss (chronic)     Guaiac positive stools     Subacute endocarditis     Cerebral septic emboli (HCC)     Cerebral multi-infarct state     Pericardial effusion      Plan of Treatment:   1. Continues to have small pericardial effusion based on ECHO. No signs of tamponade. 2. Endocarditis, vegetation no longer seen on ECHO. Continue IV antibiotics per ID. 3. Keep K+ > 4.0, and Mg+ > 2.0  4. HTN. Stable. Continue Lopressor 12.5 mg BID with parameters. 5. Severe MR. Will plan for MR workup once IV antibiotics completed 9/14/21  6. No plans for further cardiac workup at this time.   Please call with further questions or concerns           Electronically signed by MARIKA Howard CNP on 8/24/2021 at 3:27 PM  [facility]      [phone]

## 2021-08-24 NOTE — PROGRESS NOTES
[facility] - Progress Note      Today's Date and Time: 8/24/2021, 11:18 AM    Impression :   · Pericardial effusion  · Prior MSSA septicemia 7-17-21  · Persistent growth of bacteria in blood 7-17-21 through 7-27-21 despite treatment  · Prior Sepsis with acute organ dysfunction and septic shock  · Mitral Valve endocarditis. 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 Will need 6 weeks from first negative blood cultures. · Rifampin 600 mg daily added 7/22/21 until 9/14/21 for synergy  · Please do not give anticoagulants due to the risk of septic emboli in the brain transitioning to hemorrhagic   · Repeat cardiac echo    Medical Decision Making/Summary/Discussion:8/24/2021     ·   Infection Control Recommendations   · Gardner Precautions  · Contact Isolation     Antimicrobial Stewardship Recommendations     · Simplification of therapy  · Targeted therapy    Coordination of Outpatient Care:   · Estimated Length of IV antimicrobials: 4 weeks from first negative blood cultures  · Patient will need Midline Catheter Insertion: TBD  · Patient will need PICC line Insertion: TBD  · Patient will need: Home IV , [place],  SNF,  LTAC: Yes-either SNF or home infusion  · Patient will need outpatient wound care: TBD    Chief complaint/reason for consultation:   · MSSA sepsis    History of Present Illness:   Marylee Bees is a 36y.o.-year-old  male who was initially admitted on 8/22/2021.  Patient seen at the request of Dr. Grace Riley HISTORY:     Admission of 7/16/2021:  Patient initially presented to [facility] a few days ago with complaints of  abdominal pain, nausea, watery diarrhea and vomiting for the past 5 days after eating some chicken. The patient states that his wife also had episodes of emesis after eating the chicken but her symptoms resolved shortly after. A CT abdomen was unremarkable and he was diagnosed with viral gastroenteritis and sent home with Norco, Zofran, and potassium supplement for hypokalemia. On 7/16/2021, he presented to [address] [place] [address] with worsening symptoms, including fever, chills, fatigue, and worsening abdominal pain with continued vomiting, right big toe pain. Work-up in the ED revealed a fever of 102.6, tachycardia with a heart rate up to 170, dyspnea with hypoxia, blood cultures showed MSSA x2, and urine cultures grew staph aureus ,000 CFU/mL. Occult blood stool positive. ID was consulted for MSSA septicemia    His work-up showed the presence of a mitral valve endocarditis with a mitral valve vegetation measuring 2.5 X 2.4 cm 7/20/2021. Echocardiogram completed on 7/18/2021 revealed posterior mitral valve leaflet prolapse, severe mitral regurgitation, and possible small vegetation on posterior leaflet. Presence of vegetation on mitral valve would help explain the emboli to the brain. MONROE on 7/20 showed a Large vegetation measuring 2.5 X 2.4 cm noted on the posterior leaflet of the mitral valve. Posterior leaflet is flair and is associated with severe anteriorly directed eccentric regurgitation. He also developed acute septic embolic infarcts to the brain on 7/21/21. MRI brain 8-50-71: Acute embolic infarctions in supra and infratentorial structures. There was a lot of difficulty controlling the MSSA septicemia as the patient had bacterial growth in the blood from 7/17 through 7/27/2021 despite intensive treatment.   In order to help control the ongoing septicemia he received an infusion of Exebacase under compassionate use protocol on 7-25-21 without any AEs . Following the above invention the blood cultures showed no growth. The patient was discharged home 2 g of nafcillin IV every 4 hours with a completion date of 9/14/2021. He was also sent home on rifampin 600 mg p.o. daily through 9/14/2021 for synergistic activity. Admission of 8/22/2021:    Patient indicates that he was doing well with the his antibiotic treatment as an outpatient However, he developed a new onset of left upper quadrant abdominal pain with associated chest pain that worsened with deep breaths. He then presented to [facility] where he underwent a CT scanning of the chest which showed the presence of a pericardial effusion. Because of the complex nature of his previous admission he was transferred to [place] [address] for further evaluation. The patient denies any fevers or chills. He has been compliant with his antibiotic treatment. Currently the patient denies any shortness of breath, fevers or chills. CURRENT EVALUATION 8/24/2021:    Afebrile  VS stable    Upon evaluation, the patient was alert and oriented. He says he is feeling better, but is still experiencing pleuritic chest pain on the left lower thoracic area. New PICC line placed 8/23/21 as it had become pulled out by at least 17 cm by home health a few days ago and it was still being used without an x ray completed for verification. CT surgery is planning for valve repair after the patient has completed IV antibiotic therapy-unless the patient decompensates to the point of needing emergent surgical intervention. Echocardiogram from 8/23/21 revealed the large vegetations noted on the MONROE done 7/20/21 are no longer identified. Small echogenicity is noted on the posterior leaflet, maybe improving vegetation. Severe mitral valve regurgitation continues.      Review of CT scans of the chest from 8-22-21 and 7-20-21 shows the presence of a few pneumatoceles likely associated to infection of the lung from prior septicemia . CT of 8-22-21 shows a larger pneumatocele on Lt base which may account for the area of pleurisy. Labs, X rays reviewed: 8/24/2021    BUN:9-->4  Cr:0.61-->0.72-->0.61-->0.79    CRP: 260.5-->56.0  LDH: 437    WBC:8.7-->3.9-->8.2-->3.0  Hb:9.3-->8.6-->9.0  Plat: 340-->186-->223-->215    Haptoglobin: 256  Sed rate: 31  Lipase 299>228  Lactate: 1.6-->2.3-->1.5-->2.5    Cultures:  Urine:  ·   Blood:  · 7/17/2021: MSSA x2  · 7/18/2021: MSSA  · 7/20/21: MSSA  · 7/22/2021: MSSA  · 7-24-21: MSSA  · 7-25-21: MSSA  · 7/27/21: 2/2 MSSA   · 7/28/21: No growth   · 7/30/21: no growth  · 7/31/21: no growth  · 8-22-21: Pending    Sputum :  ·   Wound:      I have personally reviewed the past medical history, past surgical history, medications, social history, and family history, and I have updated the database accordingly.   Past Medical History:     Past Medical History:   Diagnosis Date    Diverticulosis     Hyperlipidemia     Hypertension     MESSI (obstructive sleep apnea)     Has PSG study done Pos for Mod MESSI, never had cpap tritration done for machine    Research subject 07/24/2021    EIND [id number] Exebacase for persistent bacteremia expected date of completion 8/25/2021       Past Surgical  History:     Past Surgical History:   Procedure Laterality Date    ABDOMEN SURGERY      large bowel resection    ANTERIOR CRUCIATE LIGAMENT REPAIR Right     APPENDECTOMY  07/09/1997    COLECTOMY      2012 - D/T Dverticulitis    DILATATION, ESOPHAGUS      HC  PICC [address] DOUBLE  8/2/2021            Medications:      ibuprofen  800 mg Oral TID AC    sodium chloride flush  5-40 mL Intravenous 2 times per day    sodium chloride flush  5-40 mL Intravenous 2 times per day    nafcillin  2,000 mg Intravenous Q4H    rifAMPin  600 mg Oral Daily    lactobacillus  1 capsule Oral TID WC    pantoprazole  40 mg Oral BID AC    metoprolol tartrate  12.5 mg Oral BID       Social History:     Social History     Socioeconomic History    Marital status:      Spouse name: Not on file    Number of children: Not on file    Years of education: Not on file    Highest education level: Not on file   Occupational History    Not on file   Tobacco Use    Smoking status: Never Smoker    Smokeless tobacco: Never Used   Vaping Use    Vaping Use: Never assessed   Substance and Sexual Activity    Alcohol use: Yes     Alcohol/week: 20.0 standard drinks     Types: 20 Cans of beer per week     Comment: 20/ week, average every other day    Drug use: Never    Sexual activity: Not on file   Other Topics Concern    Not on file   Social History Narrative    Not on file     Social Determinants of Health     Financial Resource Strain:     Difficulty of Paying Living Expenses:    Food Insecurity:     Worried About Running Out of Food in the Last Year:     [address] in the Last Year:    Transportation Needs:     Lack of Transportation (Medical):  Lack of Transportation (Non-Medical):    Physical Activity:     Days of Exercise per Week:     Minutes of Exercise per Session:    Stress:     Feeling of Stress :    Social Connections:     Frequency of Communication with Friends and Family:     Frequency of Social Gatherings with Friends and Family:     Attends Jain Services:     Active Member of Clubs or Organizations:     Attends Club or Organization Meetings:     Marital Status:    Intimate Partner Violence:     Fear of Current or Ex-Partner:     Emotionally Abused:     Physically Abused:     Sexually Abused:        Family History:     Family History   Problem Relation Age of Onset    No Known Problems Mother     Other Father         diverticulitis        Allergies:   Morphine     Review of Systems:   Constitutional: No acute complaints   Head: No headaches  Eyes: No double vision or blurry vision.  No conjunctival inflammation. ENT: No sore throat or runny nose. . No hearing loss, tinnitus or vertigo. Cardiovascular: No chest pain or palpitations. No shortness of breath. No CASTRO  Lung: No shortness of breath or cough. No sputum production. Reports pleuritic pain with deep inspiration  Abdomen: No nausea, vomiting, diarrhea, left upper quadrant abdominal pain  Genitourinary: No increased urinary frequency, or dysuria. No hematuria. No suprapubic or CVA pain  Musculoskeletal: No muscle aches or pains. No joint effusions, swelling or deformities  Hematologic: No bleeding or bruising. Neurologic: No headache, weakness, numbness, or tingling. Integument: Scattered small reddened bumps and bruises  Psychiatric: No depression. Endocrine: No polyuria, no polydipsia, no polyphagia. Physical Examination :     No data found. General Appearance: Awake, alert, and in no apparent distress  Head:  Normocephalic, no trauma  Eyes: Pupils equal, round, reactive to light and accommodation; extraocular movements intact; sclera anicteric; conjunctivae pink. No embolic phenomena. ENT: Oropharynx clear, without erythema, exudate, or thrush. No tenderness of sinuses. Mouth/throat: mucosa pink and moist. No lesions. Dentition in good repair. Neck:Supple, without lymphadenopathy. Thyroid normal, No bruits. Pulmonary/Chest: Clear to auscultation, without wheezes, rales, or rhonchi. No dullness to percussion. Cardiovascular: Normal sinus tachycardia with murmur, No rubs, or gallops. Abdomen: Soft, non tender. Bowel sounds normal. No organomegaly  All four Extremities: No cyanosis, clubbing, edema, or effusions. Neurologic: No gross sensory or motor deficits. Skin: Warm and dry with good turgor. No signs of peripheral arterial or venous insufficiency. No ulcerations. No open wounds.     Medical Decision Making -Laboratory:   I have independently reviewed/ordered the following labs:    CBC with Differential:   Recent Labs 08/22/21  0736 08/23/21  0749   WBC 5.0 3.0*   HGB 8.6* 9.0*   HCT 27.5* 27.2*    215     BMP:   Recent Labs     08/22/21  0736 08/23/21  0749    139   K 3.7 3.7    106   CO2 22 22   BUN 5* 4*   CREATININE 0.76 0.79   MG  --  2.0     Hepatic Function Panel:   Recent Labs     08/23/21  0749   PROT 5.8*   LABALBU 3.2*   BILITOT 0.21*   ALKPHOS 60   ALT 8   AST 10     No results for input(s): RPR in the last 72 hours. No results for input(s): HIV in the last 72 hours. No results for input(s): BC in the last 72 hours. Lab Results   Component Value Date    MUCUS NOT REPORTED 07/17/2021    RBC 3.09 08/23/2021    TRICHOMONAS NOT REPORTED 07/17/2021    WBC 3.0 08/23/2021    YEAST NOT REPORTED 07/17/2021    TURBIDITY CLEAR 07/17/2021     Lab Results   Component Value Date    CREATININE 0.79 08/23/2021    GLUCOSE 85 08/23/2021       Medical Decision Making-Imaging:     Echocardiogram 8/23/21  Summary  Left ventricle is normal in size with normal systolic function globally. Calculated ejection fraction is 60%  Left atrial dilatation. Right atrium is normal in size. Mild buckling of the right atrial wall. Mild aortic insufficiency. Prolapse of the of posterior leaflet of the mitral valve. Large vegetations noted on the MONROE done 7/20/21 are no longer identified . Small echogenicity is noted on the posterior leaflet, maybe improving  vegetation. Severe mitral regurgitation with pulmonary vein flow reversal.  Consider MONROE when indicated. Mild tricuspid regurgitation. Estimated right ventricular systolic pressure is 49 mmHg, suggests mild Pulm  HTN. Small circumferential pericardial effusion.   No signs of echocariographic tamponade.        MRI Brain 7/2121   Impression       Findings suggestive of acute embolic infarctions involving supra and   infratentorial structures as described.       Subacute to chronic encephalomalacia with chronic blood products within the   left superior parietal lobule and right inferior parietal lobule.       There is no acute intracranial hemorrhage, or intracranial mass lesion.           EXAMINATION:   CT OF THE CHEST, ABDOMEN, AND PELVIS WITH CONTRAST 7/18/2021 10:53 am       TECHNIQUE:   CT of the chest, abdomen and pelvis was performed with the administration of   intravenous contrast. Multiplanar reformatted images are provided for review. Dose modulation, iterative reconstruction, and/or weight based adjustment of   the mA/kV was utilized to reduce the radiation dose to as low as reasonably   achievable.       COMPARISON:   None       HISTORY:   ORDERING SYSTEM PROVIDED HISTORY: MSSA bacteremia, ? DIC   TECHNOLOGIST PROVIDED HISTORY:   MSSA bacteremia, ? DIC       Reason for Exam: sepsis with acute organ dysfunction   Acuity: Unknown   Type of Exam: Unknown       FINDINGS:   Lungs/pleura: The central airways are patent.  There are no suspicious   pulmonary nodules       Mediastinum: There is no acute mediastinal abnormality       Soft Tissues/Bones: No suspicious osteolytic or osteoblastic lesions       Abdominal organs: The liver, spleen, adrenal glands, kidneys, and pancreas   demonstrate no acute abnormality.       GI/Bowel: The visualized portions of the small bowel are unremarkable. There   is borderline dilation of the colon.  There are multiple air-fluid levels   throughout the colon.       Peritoneum/Retroperitoneum: There is a solid amount of retroperitoneal fluid   along the distal ureters bilaterally.       Pelvis:  The bladder is moderately distended.       Bones: No suspicious osseous lesions are identified           Impression   Moderate distention of the colon multiple air-fluid levels, findings may be   seen with acute enterocolitis.             XR FOOT LEFT (2 VIEWS) [[id number]] Collected: 07/18/21 1411      Order Status: Completed Updated: 07/19/21 0759     Narrative:       EXAMINATION:   TWO XRAY VIEWS OF THE LEFT FOOT     7/18/2021 2:05 pm     COMPARISON: lesion. No   mass effect, midline shift, or extra-axial collection is noted.       The brain parenchyma is otherwise normal.  The pituitary gland is normal in   appearance. The cerebellar tonsils are in normal position.       The ventricles, sulci, and cisterns are within normal size and range.  No   significant volume loss. .  The intracranial flow voids are preserved.       The globes and orbits are within normal limits. The visualized extracranial   structures including paranasal sinuses and mastoid air cells are unremarkable.           Impression       Findings suggestive of acute embolic infarctions involving supra and   infratentorial structures as described.       Subacute to chronic encephalomalacia with chronic blood products within the   left superior parietal lobule and right inferior parietal lobule.       There is no acute intracranial hemorrhage, or intracranial mass lesion.       The findings were sent to the Radiology Results [address] at 8:24   pm on 7/21/2021to be communicated to a licensed caregiver.                 Medical Decision [id number]:     Component Collected Lab   Specimen Description 07/17/2021  8:48  [address]   . BLOOD    Special Requests 07/17/2021  8:48  [address]   10ML LFA    Culture Abnormal  07/17/2021  8:48 AM [address]  Blood Culture Results called to and read back by: Josh Sanchez RN AT 2045 ON 07.17.2021    Culture 07/17/2021  8:48 AM [facility] FROM BOTTLE: GRAM POSITIVE COCCI IN CLUSTERS    Culture Abnormal  07/17/2021  8:48  [address]   Staphylococcus aureus Detected: mecA/C and MREJ Not Detected Methodology- Polymerase Chain Reaction (PCR)   Culture Abnormal  07/17/2021  8:48 AM [facility] This isolate is methicillin susceptible.     Testing Performed By    Manjinder Watson Name Director Address Valid Date Range   [address]-Jessica Adams MD [address] [facility] [address] 08/30/17 0801-Present   Susceptibility    Staphylococcus aureus (4)    Antibiotic Interpretation DAVID Status    penicillin Resistant  Final     >=0.5   RESISTANT   cefoxitin screen  NOT REPORTED Final    ciprofloxacin   Final     NOT REPORTED    clindamycin Sensitive  Final     <=0.25   SUSCEPTIBLE   erythromycin Sensitive  Final     <=0.25   SUSCEPTIBLE   gentamicin Sensitive  Final     <=0.5   SUSCEPTIBLE   gentamicin Sensitive Gentamicin is used only in combination with other active agents that test susceptible. Final    Induced Clind Resist  NOT REPORTED Final    levofloxacin Sensitive  Final     0.25   SUSCEPTIBLE   linezolid   Final     NOT REPORTED    moxifloxacin   Final     NOT REPORTED    nitrofurantoin   Final     NOT REPORTED    oxacillin Sensitive  Final     0.5   SUSCEPTIBLE   Synercid   Final     NOT REPORTED    rifampin   Final     NOT REPORTED    tetracycline Sensitive  Final     <=1   SUSCEPTIBLE   tigecycline   Final     NOT REPORTED    trimethoprim-sulfamethoxazole Sensitive  Final     <=10   SUSCEPTIBLE   vancomycin   Final     NOT REPORTED        Medical Decision Making-Other:     Note:  · Labs, medications, radiologic studies were reviewed with personal review of films  · Large amounts of data were reviewed  · Discussed with nursing Staff, Discharge planner  · Infection Control and Prevention measures reviewed  · All prior entries were reviewed  · Administer medications as ordered  · Prognosis: Guarded  · Discharge planning reviewed  · Follow up as outpatient. Thank you for allowing us to participate in the care of this patient. Please call with questions. MARIKA Galeana - CNP     ATTESTATION:    I have discussed the case, including pertinent history and exam findings with the APRN.  I have evaluated the  History, physical findings and pictures of the patient and the key elements of the encounter have been performed by me. I have reviewed the laboratory data, other diagnostic studies and discussed them with the APRN. I have updated the medical record where necessary. I agree with the assessment, plan and orders as documented by the APRN.     Alicia Woods MD.        Pager: [phone] - Office: [phone]

## 2021-08-24 NOTE — PLAN OF CARE
Problem: Pain:  Goal: Pain level will decrease  Description: Pain level will decrease  8/24/2021 1350 by Ana Laura Larios RN  Outcome: Ongoing  8/24/2021 0854 by Michelle Díaz RN  Outcome: Ongoing  Goal: Control of acute pain  Description: Control of acute pain  8/24/2021 1350 by Ana Laura Larios RN  Outcome: Ongoing  8/24/2021 0854 by Michelle Díaz RN  Outcome: Ongoing  Goal: Control of chronic pain  Description: Control of chronic pain  8/24/2021 1350 by Ana Laura Larios RN  Outcome: Ongoing  8/24/2021 0854 by Michelle Díaz RN  Outcome: Ongoing     Problem: Skin Integrity:  Goal: Will show no infection signs and symptoms  Description: Will show no infection signs and symptoms  8/24/2021 1350 by Ana Laura Larios RN  Outcome: Ongoing  8/24/2021 0854 by Michelle Díaz RN  Outcome: Ongoing  Goal: Absence of new skin breakdown  Description: Absence of new skin breakdown  8/24/2021 1350 by Ana Laura Larios RN  Outcome: Ongoing  8/24/2021 0854 by Michelle Díaz RN  Outcome: Ongoing  Goal: Demonstration of wound healing without infection will improve  Description: Demonstration of wound healing without infection will improve  8/24/2021 1350 by Ana Laura Larios RN  Outcome: Ongoing  8/24/2021 0854 by Michelle Díaz RN  Outcome: Ongoing  Goal: Complications related to intravenous access or infusion will be avoided or minimized  Description: Complications related to intravenous access or infusion will be avoided or minimized  8/24/2021 1350 by Ana Laura Larios RN  Outcome: Ongoing  8/24/2021 0854 by Michelle Díaz RN  Outcome: Ongoing     Problem: Falls - Risk of:  Goal: Will remain free from falls  Description: Will remain free from falls  8/24/2021 1350 by Ana Laura Larios RN  Outcome: Ongoing  8/24/2021 0854 by Michelle Díaz RN  Outcome: Ongoing  Goal: Absence of physical injury  Description: Absence of physical injury  8/24/2021 1350 by Ana Laura Larios RN  Outcome: Ongoing  8/24/2021 0854 by Michelle Díaz RN  Outcome: Ongoing     Problem: Cardiac:  Goal: Ability to maintain vital signs within normal range will improve  Description: Ability to maintain vital signs within normal range will improve  8/24/2021 1350 by Jarrod Taveras RN  Outcome: Ongoing  8/24/2021 0854 by Madai Casillas RN  Outcome: Ongoing  Goal: Cardiovascular alteration will improve  Description: Cardiovascular alteration will improve  8/24/2021 1350 by Jarrod Taveras RN  Outcome: Ongoing  8/24/2021 0854 by Madai Casillas RN  Outcome: Ongoing     Problem: Health Behavior:  Goal: Will modify at least one risk factor affecting health status  Description: Will modify at least one risk factor affecting health status  8/24/2021 1350 by Jarrod Taveras RN  Outcome: Ongoing  8/24/2021 0854 by Madai Casillas RN  Outcome: Ongoing  Goal: Identification of resources available to assist in meeting health care needs will improve  Description: Identification of resources available to assist in meeting health care needs will improve  8/24/2021 1350 by Jarrod Taveras RN  Outcome: Ongoing  8/24/2021 0854 by Madai Casillas RN  Outcome: Ongoing     Problem: Physical Regulation:  Goal: Ability to maintain vital signs within normal range will improve  Description: Ability to maintain vital signs within normal range will improve  8/24/2021 1350 by Jarrod Taveras RN  Outcome: Ongoing  8/24/2021 0854 by Madai Casillas RN  Outcome: Ongoing  Goal: Complications related to the disease process, condition or treatment will be avoided or minimized  Description: Complications related to the disease process, condition or treatment will be avoided or minimized  8/24/2021 1350 by Jarrod Taveras RN  Outcome: Ongoing  8/24/2021 0854 by Madai Casillas RN  Outcome: Ongoing  Goal: Diagnostic test results will improve  Description: Diagnostic test results will improve  8/24/2021 1350 by Jarrod Taveras RN  Outcome: Ongoing  8/24/2021 0854 by Madai Casillas RN  Outcome: Ongoing  Goal: Will remain free from infection  Description: Will remain free from infection  8/24/2021 1350 by Celia Hanson RN  Outcome: Ongoing  8/24/2021 0854 by Rosita Friedman RN  Outcome: Ongoing     Problem: Nutritional:  Goal: Nutritional status will improve  Description: Nutritional status will improve  8/24/2021 1350 by Celia Hanson RN  Outcome: Ongoing  8/24/2021 0854 by Rosita Friedman RN  Outcome: Ongoing     Problem: Respiratory:  Goal: Ability to maintain normal respiratory secretions will improve  Description: Ability to maintain normal respiratory secretions will improve  8/24/2021 1350 by Celia Hanson RN  Outcome: Ongoing  8/24/2021 0854 by Rosita Friedman RN  Outcome: Ongoing

## 2021-08-25 VITALS
WEIGHT: 172 LBS | BODY MASS INDEX: 27.64 KG/M2 | DIASTOLIC BLOOD PRESSURE: 74 MMHG | OXYGEN SATURATION: 93 % | HEIGHT: 66 IN | SYSTOLIC BLOOD PRESSURE: 111 MMHG | HEART RATE: 96 BPM | TEMPERATURE: 98.2 F | RESPIRATION RATE: 22 BRPM

## 2021-08-25 PROBLEM — R19.7 DIARRHEA: Status: RESOLVED | Noted: 2021-07-17 | Resolved: 2021-08-25

## 2021-08-25 LAB
MYOGLOBIN: <21 NG/ML (ref 28–72)
MYOGLOBIN: <21 NG/ML (ref 28–72)
TROPONIN INTERP: ABNORMAL
TROPONIN INTERP: ABNORMAL
TROPONIN T: ABNORMAL NG/ML
TROPONIN T: ABNORMAL NG/ML
TROPONIN, HIGH SENSITIVITY: 10 NG/L (ref 0–22)
TROPONIN, HIGH SENSITIVITY: 10 NG/L (ref 0–22)

## 2021-08-25 PROCEDURE — 84484 ASSAY OF TROPONIN QUANT: CPT

## 2021-08-25 PROCEDURE — 2500000003 HC RX 250 WO HCPCS: Performed by: INTERNAL MEDICINE

## 2021-08-25 PROCEDURE — 94760 N-INVAS EAR/PLS OXIMETRY 1: CPT

## 2021-08-25 PROCEDURE — 2580000003 HC RX 258: Performed by: INTERNAL MEDICINE

## 2021-08-25 PROCEDURE — 6370000000 HC RX 637 (ALT 250 FOR IP): Performed by: INTERNAL MEDICINE

## 2021-08-25 PROCEDURE — APPSS30 APP SPLIT SHARED TIME 16-30 MINUTES: Performed by: NURSE PRACTITIONER

## 2021-08-25 PROCEDURE — 2700000000 HC OXYGEN THERAPY PER DAY

## 2021-08-25 PROCEDURE — 6370000000 HC RX 637 (ALT 250 FOR IP): Performed by: STUDENT IN AN ORGANIZED HEALTH CARE EDUCATION/TRAINING PROGRAM

## 2021-08-25 PROCEDURE — 2580000003 HC RX 258: Performed by: STUDENT IN AN ORGANIZED HEALTH CARE EDUCATION/TRAINING PROGRAM

## 2021-08-25 PROCEDURE — 6360000002 HC RX W HCPCS: Performed by: INTERNAL MEDICINE

## 2021-08-25 PROCEDURE — 36415 COLL VENOUS BLD VENIPUNCTURE: CPT

## 2021-08-25 PROCEDURE — 99239 HOSP IP/OBS DSCHRG MGMT >30: CPT | Performed by: INTERNAL MEDICINE

## 2021-08-25 PROCEDURE — 83874 ASSAY OF MYOGLOBIN: CPT

## 2021-08-25 PROCEDURE — 99232 SBSQ HOSP IP/OBS MODERATE 35: CPT | Performed by: INTERNAL MEDICINE

## 2021-08-25 RX ORDER — IBUPROFEN 600 MG/1
600 TABLET ORAL
Status: DISCONTINUED | OUTPATIENT
Start: 2021-08-25 | End: 2021-08-26 | Stop reason: HOSPADM

## 2021-08-25 RX ORDER — OXYCODONE HYDROCHLORIDE 10 MG/1
20 TABLET ORAL EVERY 4 HOURS PRN
Qty: 36 TABLET | Refills: 0 | Status: SHIPPED | OUTPATIENT
Start: 2021-08-25 | End: 2021-08-28

## 2021-08-25 RX ORDER — IBUPROFEN 600 MG/1
600 TABLET ORAL
Qty: 90 TABLET | Refills: 0 | Status: SHIPPED | OUTPATIENT
Start: 2021-08-25 | End: 2021-09-08 | Stop reason: ALTCHOICE

## 2021-08-25 RX ADMIN — PANTOPRAZOLE SODIUM 40 MG: 40 TABLET, DELAYED RELEASE ORAL at 16:43

## 2021-08-25 RX ADMIN — SODIUM CHLORIDE, PRESERVATIVE FREE 10 ML: 5 INJECTION INTRAVENOUS at 08:53

## 2021-08-25 RX ADMIN — Medication 6 MG: at 00:12

## 2021-08-25 RX ADMIN — ONDANSETRON 4 MG: 2 INJECTION INTRAMUSCULAR; INTRAVENOUS at 08:57

## 2021-08-25 RX ADMIN — OXYCODONE HYDROCHLORIDE 20 MG: 5 TABLET ORAL at 04:08

## 2021-08-25 RX ADMIN — Medication 1 CAPSULE: at 16:43

## 2021-08-25 RX ADMIN — METOPROLOL TARTRATE 12.5 MG: 25 TABLET ORAL at 09:01

## 2021-08-25 RX ADMIN — NAFCILLIN SODIUM 2000 MG: 2 INJECTION, POWDER, LYOPHILIZED, FOR SOLUTION INTRAMUSCULAR; INTRAVENOUS at 03:02

## 2021-08-25 RX ADMIN — Medication 600 MG: at 08:51

## 2021-08-25 RX ADMIN — OXYCODONE HYDROCHLORIDE 20 MG: 5 TABLET ORAL at 08:45

## 2021-08-25 RX ADMIN — OXYCODONE HYDROCHLORIDE 20 MG: 5 TABLET ORAL at 12:18

## 2021-08-25 RX ADMIN — Medication 1 CAPSULE: at 12:18

## 2021-08-25 RX ADMIN — NAFCILLIN SODIUM 2000 MG: 2 INJECTION, POWDER, LYOPHILIZED, FOR SOLUTION INTRAMUSCULAR; INTRAVENOUS at 07:10

## 2021-08-25 RX ADMIN — PANTOPRAZOLE SODIUM 40 MG: 40 TABLET, DELAYED RELEASE ORAL at 08:51

## 2021-08-25 RX ADMIN — Medication 1 CAPSULE: at 08:51

## 2021-08-25 RX ADMIN — IBUPROFEN 800 MG: 800 TABLET, FILM COATED ORAL at 08:51

## 2021-08-25 RX ADMIN — OXYCODONE HYDROCHLORIDE 20 MG: 5 TABLET ORAL at 00:09

## 2021-08-25 RX ADMIN — OXYCODONE HYDROCHLORIDE 20 MG: 5 TABLET ORAL at 16:43

## 2021-08-25 RX ADMIN — NAFCILLIN SODIUM 2000 MG: 2 INJECTION, POWDER, LYOPHILIZED, FOR SOLUTION INTRAMUSCULAR; INTRAVENOUS at 14:38

## 2021-08-25 RX ADMIN — NAFCILLIN SODIUM 2000 MG: 2 INJECTION, POWDER, LYOPHILIZED, FOR SOLUTION INTRAMUSCULAR; INTRAVENOUS at 10:32

## 2021-08-25 ASSESSMENT — PAIN SCALES - GENERAL
PAINLEVEL_OUTOF10: 7
PAINLEVEL_OUTOF10: 6
PAINLEVEL_OUTOF10: 7
PAINLEVEL_OUTOF10: 5
PAINLEVEL_OUTOF10: 7

## 2021-08-25 NOTE — PROGRESS NOTES
CLINICAL PHARMACY NOTE: MEDS TO BEDS    Total # of Prescriptions Filled: 1   The following medications were delivered to the patient:  · Ondansetron ODT 4mg    Additional Documentation: delivered to patient in room 2024 8/25 at 12:29pm. No co-pay.

## 2021-08-25 NOTE — PROGRESS NOTES
[facility]  Office: [address], DO, Owen Alfaro, DO, Saray Rico, DO, Armida Souza Blood, DO, Sanjeev Christiansen MD, Ken Truong MD, Erwin Alvarez MD, Yolande Nevarez MD, Myles Miller MD, Latisha Dean MD, Julienne Galeano MD, Jean Carlos Bledsoe, DO, Lacey Majano MD, Yuri Watkins DO, Thiago Goldman MD,  Abigail Cornelius, DO, Sue Doran MD, Wayne Naylor MD, Mayco Rinaldi MD, Eduardo Farias MD, Murray Bueno MD, Tete Eli MD, Nae Bartholomew MD, Andra Sanchez, [facility], [facility], CNP, Cesar Melton, CNP, Ralph Garcia, CNS, Noah Barthel, CNP, Jose Vidal, CNP, Jorge Regan, CNP, Gudelia Alegre, CNP, Sharon Oliva, CNP, Brenda Perez PA-C, [facility], [facility], Shanel Levin, CNP, Natacha Russo, CNP, Clay Garza, CNP, Dee Brian, CNP, Kole Suazo, CNP, Bennett Aguilar, CNP, Jeffrey Brooks, [facility], Juanis Leslie, [address] [place] [address]    Progress Note    8/25/2021    11:27 AM    Name:   Francie Ng  MRN:     [id number]     [place]:      [de-identified]   Room:   2024/2024-01   Day:  3  Admit Date:  8/22/2021  4:09 AM    PCP:   Corey Jay MD  Code Status:  Full Code    Subjective:     C/C: chest pain    Interval History Status: not changed. Pt seen and examined this morning. Episode of significant chest heaviness/shortness of breath last night. Evaluated by overnight NP. Troponins drawn and EKG reviewed. No acute cardiac issues noted. Issues resolved with oxygen therapy, although the patient was not hypoxic. Chest pain is under good control. Brief History:     Patient is a 42-year-old  male with PMH of recent mitral valve endocarditis, MSSA bacteremia and UTI in July 2021 at [facility]. The patient was admitted July/17/20218/2/2021 with 3 days of fever and diarrhea after eating chicken. He was noted to be septic and had 2/2 blood culture showing positive MSSA. The patient was started on IV antibiotics including nafcillin 2 g IV every 4 hours, and rifampin. Patient was seen by cardiology, cardiothoracic surgery, infectious diseases and antibiotics were to be continued through September 14, 2021.     Patient was also evaluated by neurology and ophthalmology due to concern for septic emboli found on MRI brain. Patient had PICC line placed on 8/2/2021 to continue the nafcillin and rifampin. The patient does experience some intermittent nausea with the rifampin. He continues to have loose stools, but takes probiotics frequently. He denies any recurrent fevers, chills. The patient however developed severe left upper quadrant abdominal pain over the last day or 2. He did note some increased work of breathing and went to the emergency department at an outside facility.     Patient was found to have a moderate-sized pericardial effusion on CT chest.  He has been transferred to [address] [place] [address] since his cardiologist know him and he needs an echocardiogram.  His wife is present.     Review of Systems:     Constitutional:  negative for chills, fevers, sweats  Respiratory:  negative for cough, dyspnea on exertion, shortness of breath, wheezing  Cardiovascular: Reports improvement of chest pain, negative chest pressure/discomfort, no lower extremity edema, palpitations  Gastrointestinal:  negative for abdominal pain, constipation, diarrhea, nausea, vomiting  Neurological:  negative for dizziness, headache    Medications: Allergies:     Allergies   Allergen Reactions    Morphine Other (See Comments)     Pt report muscle cramps       Current Meds:   Scheduled Meds:    ibuprofen  800 mg Oral TID AC    sodium chloride flush  5-40 mL IntraVENous 2 times per day    sodium chloride flush  5-40 mL IntraVENous 2 times per day    nafcillin  2,000 mg IntraVENous Q4H    rifAMPin  600 mg Oral Daily    lactobacillus  1 capsule Oral TID WC    pantoprazole  40 mg Oral BID AC    metoprolol tartrate  12.5 mg Oral BID     Continuous Infusions:    sodium chloride      sodium chloride       PRN Meds: polyethylene glycol, oxyCODONE **OR** oxyCODONE, sodium chloride flush, sodium chloride, sodium chloride, sodium chloride flush, sodium chloride, acetaminophen **OR** acetaminophen, melatonin, ondansetron    Data:     Past Medical History:   has a past medical history of Diverticulosis, Hyperlipidemia, Hypertension, MESSI (obstructive sleep apnea), and Research subject. Social History:   reports that he has never smoked. He has never used smokeless tobacco. He reports current alcohol use of about 20.0 standard drinks of alcohol per week. He reports that he does not use drugs. Family History:   Family History   Problem Relation Age of Onset    No Known Problems Mother     Other Father         diverticulitis       Vitals:  /76   Pulse 95   Temp 98.2 °F (36.8 °C) (Oral)   Resp 20   Ht 5' 6\" (1.676 m)   Wt 172 lb (78 kg)   SpO2 97%   BMI 27.76 kg/m²   Temp (24hrs), Av °F (36.7 °C), Min:97 °F (36.1 °C), Max:98.6 °F (37 °C)    No results for input(s): POCGLU in the last 72 hours. I/O (24Hr):   No intake or output data in the 24 hours ending 21 1127    Labs:  Hematology:  Recent Labs     21  0749   WBC 3.0*   RBC 3.09*   HGB 9.0*   HCT 27.2*   MCV 88.0   MCH 29.1   MCHC 33.1   RDW 14.2      MPV 9.0   INR 1.1     Chemistry:  Recent Labs     21  0749 21  2218 21  0534 21  0945     --   --   --    K 3.7  --   --   --      --   --   --    CO2 22  --   --   --    GLUCOSE 85  --   --   --    BUN 4*  --   --   --    CREATININE 0.79  --   --   --    MG 2.0  --   --   --    ANIONGAP 11  --   --   --    LABGLOM >60  --   --   --    GFRAA >60  --   --   --    CALCIUM 8.6  --   --   --    CAION 1.17  --   --   --    PHOS 3.5  --   --   --    TROPHS  --  10 10 10   MYOGLOBIN  --  <21* <21* <21*     Recent Labs 08/23/21  0749   PROT 5.8*   LABALBU 3.2*   AST 10   ALT 8   ALKPHOS 60   BILITOT 0.21*     ABG:  Lab Results   Component Value Date    POCPH 7.486 07/18/2021    POCPCO2 28.6 07/18/2021    POCPO2 134.5 07/18/2021    POCHCO3 21.6 07/18/2021    NBEA 1 07/18/2021    PBEA NOT REPORTED 07/18/2021    WGD2WRT NOT REPORTED 07/18/2021    RYYM6PWK 99 07/18/2021    FIO2 INFORMATION NOT PROVIDED 08/24/2021     Lab Results   Component Value Date/Time    SPECIAL 10ML LT HAND 08/22/2021 07:36 AM     Lab Results   Component Value Date/Time    CULTURE NO GROWTH 3 DAYS 08/22/2021 07:36 AM       Radiology:  XR CHEST PORTABLE    Result Date: 8/22/2021  Right-sided PICC line terminates in right shoulder area at the right axillary line. The findings were sent to the Radiology Results [address] at 4:44 pm on 8/22/2021to be communicated to a licensed caregiver.        Physical Examination:        General appearance:  alert, cooperative and no distress  Mental Status:  oriented to person, place and time and normal affect  Lungs:  clear to auscultation bilaterally, normal effort  Heart: regular rate, regular rhythm, improvement of his 3/6 blowing systolic murmur  Abdomen:  soft, nontender, nondistended, normal bowel sounds, no masses, hepatomegaly, splenomegaly  Extremities:  no edema, redness, tenderness in the calves  Skin:  no gross lesions, rashes, induration    Assessment:        Hospital Problems         Last Modified POA    * (Principal) Pericardial effusion 8/22/2021 Yes    Diarrhea 8/22/2021 Yes    MSSA bacteremia 8/22/2021 Yes    Severe mitral regurgitation 8/22/2021 Yes    Normocytic normochromic anemia 8/22/2021 Yes    Essential hypertension 8/22/2021 Yes    Subacute endocarditis 8/22/2021 Yes    Endocarditis of mitral valve 8/23/2021 Yes    Endocarditis due to methicillin susceptible Staphylococcus aureus (MSSA) 8/23/2021 Yes    Pleurisy 8/23/2021 Yes          Plan:        Pericardial effusion with recent mitral valve

## 2021-08-25 NOTE — DISCHARGE SUMMARY
2021 who presented to [facility] for evaluation of RUQ abdominal pain on 8/22/2021. The patient was admitted July/17/20218/2/2021 with 3 days of fever and diarrhea after eating chicken. Destiny Holt was noted to be septic and had 2/2 blood culture showing positive MSSA.  The patient was started on IV antibiotics including nafcillin 2 g IV every 4 hours, and rifampin.  Patient was seen by cardiology, cardiothoracic surgery, infectious diseases and antibiotics were to be continued through September 14, 2021.     Patient was also evaluated by neurology and ophthalmology due to concern for septic emboli found on MRI brain.  Patient had PICC line placed on 8/2/2021 to continue the nafcillin and rifampin.  The patient does experience some intermittent nausea with the rifampin. Destiny Holt continues to have loose stools, but takes probiotics frequently. Destiny Holt denies any recurrent fevers, chills.  The patient however developed severe left upper quadrant abdominal pain over the last day or 2.  He did note some increased work of breathing and went to the emergency department at an outside facility. There, he was found to have a moderate-sized pericardial effusion on CT chest. He was subsequently transferred to [facility] for continuity of care. 2D echo was performed at our facility which revealed a small pericardial effusion, along with EF 60%, posterior leaflet prolapse of the mitral valve, previous vegetation is no longer seen, small echogenicity improved on the posterior leaflet, severe MR. He was started on oral NSAID therapy and resumed his previous PPI therapy. He will be maintained on NSAID therapy for another 30 days. Pain was also controlled with oral roxicodone. The patient also has a small nodular density noted in the left lower lung field on CT imaging the outside facility. CT needs to be reperformed in 3 to 6 months to reevaluate these nodular densities.   Discussed with patient and family and note has been made in the d/c instructions. Ultimately, he will resume his antibiotic therapy for another 20 days and complete nafcillin / rifampin. He will be following up with CT surgery thereafter for evaluation on mitral valve intervention. He is appropriate for d/c today. Significant therapeutic interventions:   - Cardiology and ID evaluations  - Pain control  - Supportive care    Significant Diagnostic Studies:   Labs / Micro:  CBC:   Lab Results   Component Value Date    WBC 3.0 08/23/2021    RBC 3.09 08/23/2021    HGB 9.0 08/23/2021    HCT 27.2 08/23/2021    MCV 88.0 08/23/2021    MCH 29.1 08/23/2021    MCHC 33.1 08/23/2021    RDW 14.2 08/23/2021     08/23/2021     CMP:    Lab Results   Component Value Date    GLUCOSE 85 08/23/2021     08/23/2021    K 3.7 08/23/2021     08/23/2021    CO2 22 08/23/2021    BUN 4 08/23/2021    CREATININE 0.79 08/23/2021    ANIONGAP 11 08/23/2021    ALKPHOS 60 08/23/2021    ALT 8 08/23/2021    AST 10 08/23/2021    BILITOT 0.21 08/23/2021    LABALBU 3.2 08/23/2021    ALBUMIN 1.2 08/23/2021    LABGLOM >60 08/23/2021    GFRAA >60 08/23/2021    GFR      08/23/2021    GFR NOT REPORTED 08/23/2021    PROT 5.8 08/23/2021    CALCIUM 8.6 08/23/2021       Radiology:  XR CHEST PORTABLE    Result Date: 8/24/2021  1. Cardiomegaly, with interval worsening of CHF 2. New opacity, right lung base. Differential considerations would include coalescing edema, atelectasis, versus pneumonia 3. Right upper extremity PICC line in place, tip at the junction of the SVC and right atrium     XR CHEST PORTABLE    Result Date: 8/22/2021  Right-sided PICC line terminates in right shoulder area at the right axillary line. The findings were sent to the Radiology Results [address] at 4:44 pm on 8/22/2021to be communicated to a licensed caregiver.        Consultations:    Consults:     Final Specialist Recommendations/Findings:   IP CONSULT TO INFECTIOUS DISEASES  IP CONSULT TO CARDIOLOGY  IP CONSULT TO HOME CARE NEEDS      The patient was seen and examined on day of discharge and this discharge summary is in conjunction with any daily progress note from day of discharge. Discharge plan:     Disposition: Home    Physician Follow Up:     [address] [address]  [address] [facility], [address] [facility]  [phone]    Schedule an appointment as soon as possible for a visit in 1 week  hospital discharge follow-up    Mag Martinez, [address]  [address] [employer] [address]  [phone]    On 9/7/2021 September 7th at [facility].         Requiring Further Evaluation/Follow Up POST HOSPITALIZATION/Incidental Findings:   - Follow up with PCP for repeat CT of the chest to re-evaluate nodular densities in 3-6 months. - Follow up with the above noted physicians. Diet: regular diet    Activity: As tolerated    Instructions to Patient: None    Discharge Medications:      Medication List      START taking these medications    ibuprofen 600 MG tablet  Commonly known as: ADVIL;MOTRIN  Take 1 tablet by mouth 3 times daily (before meals)     oxyCODONE HCl 10 MG immediate release tablet  Commonly known as: OXY-IR  Take 2 tablets by mouth every 4 hours as needed for Pain for up to 3 days.  Intended supply: 30 days  Replaces: oxyCODONE 5 MG capsule        CHANGE how you take these medications    metoprolol tartrate 25 MG tablet  Commonly known as: LOPRESSOR  Take 0.5 tablets by mouth 2 times daily  What changed:   · medication strength  · how much to take     ondansetron 4 MG disintegrating tablet  Commonly known as: ZOFRAN-ODT  Place 1 tablet under the tongue 3 times daily as needed for Nausea or Vomiting  What changed:   · how to take this  · when to take this        CONTINUE taking these medications    lactobacillus capsule  Take 1 capsule by mouth 3 times daily (with meals) melatonin 3 MG Tabs tablet  Take 2 tablets by mouth nightly as needed (sleep)     nafcillin  infusion  Infuse 2,000 mg intravenously every 4 hours for 20 days Compound per protocol     pantoprazole 40 MG tablet  Commonly known as: PROTONIX  Take 1 tablet by mouth 2 times daily (before meals)     polyethylene glycol 17 g packet  Commonly known as: GLYCOLAX  Take 17 g by mouth daily as needed for Constipation     rifAMPin 300 MG capsule  Commonly known as: Rifadin  Take 1 capsule by mouth 2 times daily     sodium chloride 0.65 % nasal spray  Commonly known as: OCEAN  1 spray by Nasal route as needed for Congestion        STOP taking these medications    oxyCODONE 5 MG capsule  Replaced by: oxyCODONE HCl 10 MG immediate release tablet           Where to Get Your Medications      These medications were sent to [facility]/pharmacy #[id number] Tania Alegre, [address]  [address] [place] [address] [address]    Phone: [phone]   · ibuprofen 600 MG tablet  · metoprolol tartrate 25 MG tablet  · oxyCODONE HCl 10 MG immediate release tablet     These medications were sent to [facility] [address], [place] [address]    Phone: [phone]   · ondansetron 4 MG disintegrating tablet     You can get these medications from any pharmacy    Bring a paper prescription for each of these medications  · nafcillin  infusion         No discharge procedures on file. Time Spent on discharge is  45 mins in patient examination, evaluation, counseling as well as medication reconciliation, prescriptions for required medications, discharge plan and follow up. Electronically signed by   Louis Roy DO  8/25/2021  11:44 AM      Thank you Dr. Savannah Frances MD for the opportunity to be involved in this patient's care.

## 2021-08-25 NOTE — DISCHARGE INSTR - COC
Continuity of Care Form    Patient Name: Marylee Bees   :  1980  MRN:  [id number]    Admit date:  2021  Discharge date:  21    Code Status Order: Full Code   Advance Directives:      Admitting Physician:  Jeremie Cortez DO  PCP: Halima Wall MD    Discharging Nurse: Luiz Burleson Unit/Room#:   Discharging Unit Phone Number: [phone]    Emergency Contact:   Extended Emergency Contact Information  Primary Emergency Contact: Stephanie Higuera  Mobile Phone: [phone]  Relation: Spouse    Past Surgical History:  Past Surgical History:   Procedure Laterality Date    ABDOMEN SURGERY      large bowel resection    ANTERIOR CRUCIATE LIGAMENT REPAIR Right     APPENDECTOMY  1997    COLECTOMY      2012 - D/T Dverticulitis    DILATATION, ESOPHAGUS      HC  PICC [facility] DOUBLE  2021            Immunization History: There is no immunization history on file for this patient. Active Problems:  Patient Active Problem List   Diagnosis Code    Acute dehydration E86.0    Hypomagnesemia E83.42    Hypokalemia E87.6    Epistaxis R04.0    Thrombocytopenia (HCC) D69.6    Hyperglycemia R73.9    Essential hypertension I10    Sepsis with acute organ dysfunction and septic shock (HCC) A41.9, R65.21    Delusions ([address] F22    Hypophosphatemia E83.39    MSSA bacteremia R78.81, B95.61    Severe mitral regurgitation I34.0    Acute respiratory failure with hypoxia (HCC) J96.01    GI bleed K92.2    Normocytic normochromic anemia D64.9    Obesity (BMI 30-39. 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    Guaiac positive stools R19.5    Subacute endocarditis I33.9    Cerebral septic emboli (HCC) I76, I66.9    Cerebral multi-infarct state I69.30    Pericardial effusion I31.3    Endocarditis of mitral valve I05.8    Endocarditis due to methicillin susceptible Staphylococcus aureus (MSSA) I33.0, B95.61    Pleurisy R09.1       Isolation/Infection:   Isolation            No Isolation          Patient Infection Status       Infection Onset Added Last Indicated Last Indicated By Review Planned Expiration Resolved Resolved By    None active    Resolved    C-diff Rule Out 07/19/21 07/19/21 07/19/21 C DIFF TOXIN/ANTIGEN (Ordered)   07/19/21 Rule-Out Test Resulted    COVID-19 Rule Out 07/17/21 07/17/21 07/17/21 Respiratory Panel, Molecular, with COVID-19 (Restricted: peds pts or suitable admitted adults) (Ordered)   07/17/21 Rule-Out Test Resulted    C-diff Rule Out 07/17/21 07/17/21 07/17/21 Gastrointestinal Panel, Molecular (Ordered)   07/18/21 Rule-Out Test Resulted    COVID-19 Rule Out 07/17/21 07/17/21 07/17/21 COVID-19, Rapid (Ordered)   07/17/21 Rule-Out Test Resulted            Nurse Assessment:  Last Vital Signs: /76   Pulse 95   Temp 98.6 °F (37 °C) (Oral)   Resp 22   Ht 5' 6\" (1.676 m)   Wt 172 lb (78 kg)   SpO2 95%   BMI 27.76 kg/m²     Last documented pain score (0-10 scale): Pain Level: 7  Last Weight:   Wt Readings from Last 1 Encounters:   08/22/21 172 lb (78 kg)     Mental Status:  oriented, alert, logical and thought processes intact    IV Access:  - PICC - site  R Basilic, insertion date: 8/23/21    Nursing Mobility/ADLs:  Walking   Independent  Transfer  Independent  Bathing  Independent  Dressing  Independent  Toileting  Independent  Feeding  Independent  Med Admin  Assisted  Med Delivery   whole    Wound Care Documentation and Therapy:  Wound 08/23/21 Back Medial;Right old healed wound with scar (Active)   Wound Etiology Burn 08/25/21 0000   Wound Assessment Pink/red 08/24/21 1600   Drainage Amount None 08/24/21 1600   Number of days: 2        Elimination:  Continence:   · Bowel:  Yes  · Bladder: Yes  Urinary Catheter: None   Colostomy/Ileostomy/Ileal Conduit: No       Date of Last BM: 8/24/21  No intake or output data in SIGNATURE:  Electronically signed by Adrianne Valdez DO on 8/25/21 at 11:44 AM EDT

## 2021-08-25 NOTE — PROGRESS NOTES
Was called to evaluate the patietn secondary to acute onset of midsternal chest pain which was non-radiating, with associated shortness of breath. Patient was placed on NRB by nursing staff prior to my arrival;    On exam patient no tachypnic,     117/86- 99 100% NRB with respirations of 22    Lungs diminished posterior  + murmur without acute muffling of sounds         Will check trop, EKG and vbg.  Update cardiology with EKG.   Titrate the NRB

## 2021-08-25 NOTE — CARE COORDINATION
Transitional planning-called [facility] and talked with Gita-informed her of patient's discharge.  They will collect needed information from [facility]

## 2021-08-25 NOTE — PROGRESS NOTES
HISTORY:     Admission of 7/16/2021:  Patient initially presented to [facility] a few days ago with complaints of  abdominal pain, nausea, watery diarrhea and vomiting for the past 5 days after eating some chicken. The patient states that his wife also had episodes of emesis after eating the chicken but her symptoms resolved shortly after. A CT abdomen was unremarkable and he was diagnosed with viral gastroenteritis and sent home with Norco, Zofran, and potassium supplement for hypokalemia. On 7/16/2021, he presented to [facility] with worsening symptoms, including fever, chills, fatigue, and worsening abdominal pain with continued vomiting, right big toe pain. Work-up in the ED revealed a fever of 102.6, tachycardia with a heart rate up to 170, dyspnea with hypoxia, blood cultures showed MSSA x2, and urine cultures grew staph aureus ,000 CFU/mL. Occult blood stool positive. ID was consulted for MSSA septicemia    His work-up showed the presence of a mitral valve endocarditis with a mitral valve vegetation measuring 2.5 X 2.4 cm 7/20/2021. Echocardiogram completed on 7/18/2021 revealed posterior mitral valve leaflet prolapse, severe mitral regurgitation, and possible small vegetation on posterior leaflet. Presence of vegetation on mitral valve would help explain the emboli to the brain. MONROE on 7/20 showed a Large vegetation measuring 2.5 X 2.4 cm noted on the posterior leaflet of the mitral valve. Posterior leaflet is flair and is associated with severe anteriorly directed eccentric regurgitation. He also developed acute septic embolic infarcts to the brain on 7/21/21. MRI brain 7-86-27: Acute embolic infarctions in supra and infratentorial structures. There was a lot of difficulty controlling the MSSA septicemia as the patient had bacterial growth in the blood from 7/17 through 7/27/2021 despite intensive treatment.   In order to help control the ongoing septicemia he 7-20-21 shows the presence of a few pneumatoceles likely associated to infection of the lung from prior septicemia . CT of 8-22-21 shows a larger pneumatocele on Lt base which may account for the area of pleurisy. Labs, X rays reviewed: 8/25/2021    BUN:9-->4  Cr:0.61-->0.72-->0.61-->0.79    CRP: 260.5-->56.0  LDH: 437    WBC:8.7-->3.9-->8.2-->3.0  Hb:9.3-->8.6-->9.0  Plat: 340-->186-->223-->215    Haptoglobin: 256  Sed rate: 31  Lipase 299>228  Lactate: 1.6-->2.3-->1.5-->2.5    Cultures:  Urine:  ·   Blood:  · 7/17/2021: MSSA x2  · 7/18/2021: MSSA  · 7/20/21: MSSA  · 7/22/2021: MSSA  · 7-24-21: MSSA  · 7-25-21: MSSA  · 7/27/21: 2/2 MSSA   · 7/28/21: No growth   · 7/30/21: no growth  · 7/31/21: no growth  · 8-22-21: Pending    Sputum :  ·   Wound:      I have personally reviewed the past medical history, past surgical history, medications, social history, and family history, and I have updated the database accordingly.   Past Medical History:     Past Medical History:   Diagnosis Date    Diverticulosis     Hyperlipidemia     Hypertension     MESSI (obstructive sleep apnea)     Has PSG study done Pos for Mod MESSI, never had cpap tritration done for machine    Research subject 07/24/2021    EIND [id number] Exebacase for persistent bacteremia expected date of completion 8/25/2021       Past Surgical  History:     Past Surgical History:   Procedure Laterality Date    ABDOMEN SURGERY      large bowel resection    ANTERIOR CRUCIATE LIGAMENT REPAIR Right     APPENDECTOMY  07/09/1997    COLECTOMY      2012 - D/T Dverticulitis    DILATATION, ESOPHAGUS      HC  PICC [facility] DOUBLE  8/2/2021            Medications:      ibuprofen  600 mg Oral TID AC    sodium chloride flush  5-40 mL IntraVENous 2 times per day    sodium chloride flush  5-40 mL IntraVENous 2 times per day    nafcillin  2,000 mg IntraVENous Q4H    rifAMPin  600 mg Oral Daily    lactobacillus  1 capsule Oral TID WC    pantoprazole  40 mg Oral BID inflammation. ENT: No sore throat or runny nose. . No hearing loss, tinnitus or vertigo. Cardiovascular: No chest pain or palpitations. No shortness of breath. No CASTRO  Lung: No shortness of breath or cough. No sputum production. Reports pleuritic pain with deep inspiration  Abdomen: No nausea, vomiting, diarrhea, left upper quadrant abdominal pain  Genitourinary: No increased urinary frequency, or dysuria. No hematuria. No suprapubic or CVA pain  Musculoskeletal: No muscle aches or pains. No joint effusions, swelling or deformities  Hematologic: No bleeding or bruising. Neurologic: No headache, weakness, numbness, or tingling. Integument: Scattered small reddened bumps and bruises  Psychiatric: No depression. Endocrine: No polyuria, no polydipsia, no polyphagia. Physical Examination :     Patient Vitals for the past 8 hrs:   BP Temp Temp src Pulse Resp SpO2   08/25/21 1145 102/65 98.6 °F (37 °C) Oral 93 12 93 %   08/25/21 0901 107/76   95     08/25/21 0854 107/76 98.6 °F (37 °C) Oral 97 22 95 %     General Appearance: Awake, alert, and in no apparent distress  Head:  Normocephalic, no trauma  Eyes: Pupils equal, round, reactive to light and accommodation; extraocular movements intact; sclera anicteric; conjunctivae pink. No embolic phenomena. ENT: Oropharynx clear, without erythema, exudate, or thrush. No tenderness of sinuses. Mouth/throat: mucosa pink and moist. No lesions. Dentition in good repair. Neck:Supple, without lymphadenopathy. Thyroid normal, No bruits. Pulmonary/Chest: Clear to auscultation, without wheezes, rales, or rhonchi. No dullness to percussion. Cardiovascular: Normal sinus tachycardia with murmur, No rubs, or gallops. Abdomen: Soft, non tender. Bowel sounds normal. No organomegaly  All four Extremities: No cyanosis, clubbing, edema, or effusions. Neurologic: No gross sensory or motor deficits. Skin: Warm and dry with good turgor. No signs of peripheral arterial or venous insufficiency. No ulcerations. No open wounds. Medical Decision Making -Laboratory:   I have independently reviewed/ordered the following labs:    CBC with Differential:   Recent Labs     08/23/21  0749   WBC 3.0*   HGB 9.0*   HCT 27.2*        BMP:   Recent Labs     08/23/21  0749      K 3.7      CO2 22   BUN 4*   CREATININE 0.79   MG 2.0     Hepatic Function Panel:   Recent Labs     08/23/21  0749   PROT 5.8*   LABALBU 3.2*   BILITOT 0.21*   ALKPHOS 60   ALT 8   AST 10     No results for input(s): RPR in the last 72 hours. No results for input(s): HIV in the last 72 hours. No results for input(s): BC in the last 72 hours. Lab Results   Component Value Date    MUCUS NOT REPORTED 07/17/2021    RBC 3.09 08/23/2021    TRICHOMONAS NOT REPORTED 07/17/2021    WBC 3.0 08/23/2021    YEAST NOT REPORTED 07/17/2021    TURBIDITY CLEAR 07/17/2021     Lab Results   Component Value Date    CREATININE 0.79 08/23/2021    GLUCOSE 85 08/23/2021       Medical Decision Making-Imaging:     Echocardiogram 8/23/21  Summary  Left ventricle is normal in size with normal systolic function globally. Calculated ejection fraction is 60%  Left atrial dilatation. Right atrium is normal in size. Mild buckling of the right atrial wall. Mild aortic insufficiency. Prolapse of the of posterior leaflet of the mitral valve. Large vegetations noted on the MONROE done 7/20/21 are no longer identified . Small echogenicity is noted on the posterior leaflet, maybe improving  vegetation. Severe mitral regurgitation with pulmonary vein flow reversal.  Consider MONROE when indicated. Mild tricuspid regurgitation. Estimated right ventricular systolic pressure is 49 mmHg, suggests mild Pulm  HTN. Small circumferential pericardial effusion.   No signs of echocariographic tamponade.        MRI Brain 7/2121   Impression       Findings suggestive of acute embolic infarctions involving supra and   infratentorial structures as described.     Subacute to chronic encephalomalacia with chronic blood products within the   left superior parietal lobule and right inferior parietal lobule.       There is no acute intracranial hemorrhage, or intracranial mass lesion.           EXAMINATION:   CT OF THE CHEST, ABDOMEN, AND PELVIS WITH CONTRAST 7/18/2021 10:53 am       TECHNIQUE:   CT of the chest, abdomen and pelvis was performed with the administration of   intravenous contrast. Multiplanar reformatted images are provided for review. Dose modulation, iterative reconstruction, and/or weight based adjustment of   the mA/kV was utilized to reduce the radiation dose to as low as reasonably   achievable.       COMPARISON:   None       HISTORY:   ORDERING SYSTEM PROVIDED HISTORY: MSSA bacteremia, ? DIC   TECHNOLOGIST PROVIDED HISTORY:   MSSA bacteremia, ? DIC       Reason for Exam: sepsis with acute organ dysfunction   Acuity: Unknown   Type of Exam: Unknown       FINDINGS:   Lungs/pleura: The central airways are patent.  There are no suspicious   pulmonary nodules       Mediastinum: There is no acute mediastinal abnormality       Soft Tissues/Bones: No suspicious osteolytic or osteoblastic lesions       Abdominal organs: The liver, spleen, adrenal glands, kidneys, and pancreas   demonstrate no acute abnormality.       GI/Bowel: The visualized portions of the small bowel are unremarkable. There   is borderline dilation of the colon.  There are multiple air-fluid levels   throughout the colon.       Peritoneum/Retroperitoneum: There is a solid amount of retroperitoneal fluid   along the distal ureters bilaterally.       Pelvis:  The bladder is moderately distended.       Bones: No suspicious osseous lesions are identified           Impression   Moderate distention of the colon multiple air-fluid levels, findings may be   seen with acute enterocolitis.             XR FOOT LEFT (2 VIEWS) [[id number]] Collected: 07/18/21 1411      Order Status: Completed Updated: 07/19/21 0759     Narrative:       EXAMINATION:   TWO XRAY VIEWS OF THE LEFT FOOT     7/18/2021 2:05 pm     COMPARISON:   None. HISTORY:   ORDERING SYSTEM PROVIDED HISTORY: Left big toe pain and erythema. Looking for   source of MSSA sepsis   TECHNOLOGIST PROVIDED HISTORY:   Left big toe pain and erythema. Looking for source of MSSA sepsis     FINDINGS:   AP and lateral views of the foot obtained.  Normal alignment and   mineralization.  No abnormal periosteal reaction.  No erosions.  No fracture. No aggressive lytic or blastic lesions.  Mild soft tissue swelling of the   great toe noted.      Impression:       No radiographic evidence for acute osteomyelitis.  Mild soft tissue swelling   of the great toe possibly cellulitis.       EXAMINATION:   MRI OF THE BRAIN WITHOUT CONTRAST  7/21/2021 7:03 pm       TECHNIQUE:   Multiplanar multisequence MRI of the brain was performed without the   administration of intravenous contrast.       COMPARISON:   Head CT of 07/20/2021       HISTORY:   ORDERING SYSTEM PROVIDED HISTORY: episode of vertigo, dipolopia, has   infecticive endocadrditis, r/o septic emboli, abscess   TECHNOLOGIST PROVIDED HISTORY:   episode of vertigo, dipolopia, has infecticive endocadrditis, r/o septic   emboli, abscess   Reason for Exam: VERTIGO, MSSA BACTEREMIA, R/O SEPTIC EMBOLI       FINDINGS:   MRI brain:       There are punctate areas of restricted diffusion within the left superior   frontal gyrus subcortical white matter, right superior frontal gyrus   subcortical white matter, left superior parietal lobule right inferior   frontal gyrus subcortical white matter, left inferior frontal gyrus   subcortical white matter and bilateral cerebellar hemisphere, suggestive of   acute embolic infarctions.       There are focus of encephalomalacia with susceptibility artifact and   increased signal on DWI within left superior parietal lobule and right   inferior parietal lobule suggestive of subacute to chronic infarct with   chronic blood products.       There is no acute intracranial hemorrhage, or intracranial mass lesion. No   mass effect, midline shift, or extra-axial collection is noted.       The brain parenchyma is otherwise normal.  The pituitary gland is normal in   appearance. The cerebellar tonsils are in normal position.       The ventricles, sulci, and cisterns are within normal size and range.  No   significant volume loss. .  The intracranial flow voids are preserved.       The globes and orbits are within normal limits. The visualized extracranial   structures including paranasal sinuses and mastoid air cells are unremarkable.           Impression       Findings suggestive of acute embolic infarctions involving supra and   infratentorial structures as described.       Subacute to chronic encephalomalacia with chronic blood products within the   left superior parietal lobule and right inferior parietal lobule.       There is no acute intracranial hemorrhage, or intracranial mass lesion.       The findings were sent to the Radiology Results [address] at 8:24   pm on 7/21/2021to be communicated to a licensed caregiver.                 Medical Decision [id number]:     Component Collected Lab   Specimen Description 07/17/2021  8:48  [address]   . BLOOD    Special Requests 07/17/2021  8:48  [address]   10ML LFA    Culture Abnormal  07/17/2021  8:48 AM [address] Blood Culture Results called to and read back by: Mohinder Fontanez RN AT 2045 ON 07.17.2021    Culture 07/17/2021  8:48 AM [address] FROM BOTTLE: GRAM POSITIVE COCCI IN CLUSTERS    Culture Abnormal  07/17/2021  8:48  [facility] [address]   Staphylococcus aureus Detected: mecA/C and MREJ Not Detected Methodology- Polymerase Chain Reaction (PCR)   Culture Abnormal  07/17/2021  8:48  [address] STAPHYLOCOCCUS AUREUS This isolate is methicillin susceptible. Testing Performed By    Manjinder Watson Name Director Address Valid Date Range   [address]-Jessica Adams MD [address] [facility] [address] 08/30/17 0801-Present   Susceptibility    Staphylococcus aureus (4)    Antibiotic Interpretation DAVID Status    penicillin Resistant  Final     >=0.5   RESISTANT   cefoxitin screen  NOT REPORTED Final    ciprofloxacin   Final     NOT REPORTED    clindamycin Sensitive  Final     <=0.25   SUSCEPTIBLE   erythromycin Sensitive  Final     <=0.25   SUSCEPTIBLE   gentamicin Sensitive  Final     <=0.5   SUSCEPTIBLE   gentamicin Sensitive Gentamicin is used only in combination with other active agents that test susceptible. Final    Induced Clind Resist  NOT REPORTED Final    levofloxacin Sensitive  Final     0.25   SUSCEPTIBLE   linezolid   Final     NOT REPORTED    moxifloxacin   Final     NOT REPORTED    nitrofurantoin   Final     NOT REPORTED    oxacillin Sensitive  Final     0.5   SUSCEPTIBLE   Synercid   Final     NOT REPORTED    rifampin   Final     NOT REPORTED    tetracycline Sensitive  Final     <=1   SUSCEPTIBLE   tigecycline   Final     NOT REPORTED    trimethoprim-sulfamethoxazole Sensitive  Final     <=10   SUSCEPTIBLE   vancomycin   Final     NOT REPORTED        Medical Decision Making-Other:     Note:  · Labs, medications, radiologic studies were reviewed with personal review of films  · Large amounts of data were reviewed  · Discussed with nursing Staff, Discharge planner  · Infection Control and Prevention measures reviewed  · All prior entries were reviewed  · Administer medications as ordered  · Prognosis: Guarded  · Discharge planning reviewed  · Follow up as outpatient. Thank you for allowing us to participate in the care of this patient. Please call with questions.     MARIKA Hall - CNP     ATTESTATION:    I have discussed the case, including pertinent history and exam findings with the APRN. I have evaluated the  History, physical findings and pictures of the patient and the key elements of the encounter have been performed by me. I have reviewed the laboratory data, other diagnostic studies and discussed them with the APRN. I have updated the medical record where necessary. I agree with the assessment, plan and orders as documented by the APRN.     Jovani Anderson MD.          Pager: [phone] - Office: [phone]

## 2021-08-26 LAB
EKG ATRIAL RATE: 111 BPM
EKG P AXIS: 50 DEGREES
EKG P-R INTERVAL: 160 MS
EKG Q-T INTERVAL: 342 MS
EKG QRS DURATION: 86 MS
EKG QTC CALCULATION (BAZETT): 465 MS
EKG R AXIS: 33 DEGREES
EKG T AXIS: 46 DEGREES
EKG VENTRICULAR RATE: 111 BPM

## 2021-08-26 PROCEDURE — 93010 ELECTROCARDIOGRAM REPORT: CPT | Performed by: INTERNAL MEDICINE

## 2021-08-27 ENCOUNTER — OFFICE VISIT (OUTPATIENT)
Dept: GASTROENTEROLOGY | Age: 41
End: 2021-08-27
Payer: COMMERCIAL

## 2021-08-27 VITALS — DIASTOLIC BLOOD PRESSURE: 71 MMHG | SYSTOLIC BLOOD PRESSURE: 110 MMHG | WEIGHT: 175 LBS | BODY MASS INDEX: 28.25 KG/M2

## 2021-08-27 DIAGNOSIS — K92.2 GASTROINTESTINAL HEMORRHAGE, UNSPECIFIED GASTROINTESTINAL HEMORRHAGE TYPE: Primary | ICD-10-CM

## 2021-08-27 DIAGNOSIS — R19.5 LOOSE BOWEL MOVEMENTS: ICD-10-CM

## 2021-08-27 DIAGNOSIS — K59.03 DRUG-INDUCED CONSTIPATION: ICD-10-CM

## 2021-08-27 PROCEDURE — 99203 OFFICE O/P NEW LOW 30 MIN: CPT | Performed by: INTERNAL MEDICINE

## 2021-08-27 RX ORDER — DOCUSATE SODIUM 100 MG/1
100 CAPSULE, LIQUID FILLED ORAL 2 TIMES DAILY
Qty: 60 CAPSULE | Refills: 2 | Status: SHIPPED | OUTPATIENT
Start: 2021-08-27 | End: 2021-09-08

## 2021-08-27 ASSESSMENT — ENCOUNTER SYMPTOMS
ALLERGIC/IMMUNOLOGIC NEGATIVE: 1
CHEST TIGHTNESS: 1
CONSTIPATION: 1
SHORTNESS OF BREATH: 1
DIARRHEA: 1
NAUSEA: 1
EYES NEGATIVE: 1

## 2021-08-27 NOTE — PROGRESS NOTES
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 States he will be on antibioitcs for 3 more weeks and then will hopefully move forward with Mitral valve replacement.  H&P     Patient has had 7 inches of Lg. Intestines removed/ diverticulosis/    Diarrhea     Patient believes loose bowel movements are due to antibiotics.  Constipation     Patient feels he is constipated due to narcotics. Is taking miralax PRN to facilitate bowel movements. 1. Gastrointestinal hemorrhage, unspecified gastrointestinal hemorrhage type    2. Drug-induced constipation    3. Loose bowel movements          HISTORY OF PRESENT ILLNESS: Caron Callaway is a 36 y.o. male with a past history remarkable for slow transit constipation likely related to use of oxycodone, referred for evaluation of constipation symptoms and a remote history of fecal occult testing. 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 2 episode of diverticulitis this past year. Prior history of severe diverticulitis reported by the patient where he eventually underwent segmental resection of the sigmoid colon approximately 9 years ago after 3 severe recurrent episodes of diverticulitis. No recent endoscopic evaluation performed.   Denies any active GI symptoms at this time.    Smoking: None   Abdominal surgery-- Segmental resection of the sigmoid colon 2/2 diverticulitis. 9 yrs ago. 3 episodes of diverticulitis. Colonoscopy--- none since surgery     EGD--none since surgery    FH of GI issues:      Past Medical,Family, and Social History reviewed and does contribute to the patient presenting condition. Patient's PMH/PSH,SH,PSYCH Hx, MEDs, ALLERGIES, and ROS were all reviewed and updated in the appropriate sections. PAST MEDICAL HISTORY:  Past Medical History:   Diagnosis Date    Diverticulosis     Hyperlipidemia     Hypertension     MESSI (obstructive sleep apnea)     Has PSG study done Pos for Mod MESSI, never had cpap tritration done for machine    Research subject 07/24/2021    EIND [id number] Exebac[employer] for persistent bacteremia expected date of completion 8/25/2021       Past Surgical History:   Procedure Laterality Date    ABDOMEN SURGERY      large bowel resection    ANTERIOR CRUCIATE LIGAMENT REPAIR Right     APPENDECTOMY  07/09/1997    COLECTOMY      2012 - D/T Dverticulitis    DILATATION, ESOPHAGUS      HC  PICC [facility] DOUBLE  8/2/2021            CURRENT MEDICATIONS:    Current Outpatient Medications:     metoprolol tartrate (LOPRESSOR) 25 MG tablet, Take 0.5 tablets by mouth 2 times daily, Disp: 30 tablet, Rfl: 0    ibuprofen (ADVIL;MOTRIN) 600 MG tablet, Take 1 tablet by mouth 3 times daily (before meals), Disp: 90 tablet, Rfl: 0    oxyCODONE HCl (OXY-IR) 10 MG immediate release tablet, Take 2 tablets by mouth every 4 hours as needed for Pain for up to 3 days.  Intended supply: 30 days, Disp: 36 tablet, Rfl: 0    nafcillin infusion, Infuse 2,000 mg intravenously every 4 hours for 20 days Compound per protocol, Disp: 240 g, Rfl: 0    ondansetron (ZOFRAN-ODT) 4 MG disintegrating tablet, Place 1 tablet under the tongue 3 times daily as needed for Nausea or Vomiting, Disp: 93 tablet, Rfl: 0    melatonin 3 MG TABS tablet, Take 2 tablets by mouth nightly [employer] Services:     Active Member of Clubs or Organizations:     Attends Club or Organization Meetings:     Marital Status:    Intimate Partner Violence:     Fear of Current or Ex-Partner:     Emotionally Abused:     Physically Abused:     Sexually Abused:        REVIEW OF SYSTEMS: A 12-point review of systems was obtained and pertinent positives and negatives were listed below. REVIEW OF SYSTEMS:     Constitutional: No fever, no chills, no lethargy, no weakness. HEENT:  No headache, otalgia, itchy eyes, nasal discharge or sore throat. Cardiac:  No chest pain, dyspnea, orthopnea or PND. Chest:   No cough, phlegm or wheezing. Abdomen:      Detailed by MA   Neuro:  No focal weakness, abnormal movements or seizure like activity. Skin:   No rashes, no itching. :   No hematuria, no pyuria, no dysuria, no flank pain. Extremities:  No swelling or joint pains. ROS was otherwise negative    Review of Systems   Constitutional: Positive for fatigue. HENT: Negative. Eyes: Negative. Respiratory: Positive for chest tightness and shortness of breath. Cardiovascular: Positive for palpitations. Gastrointestinal: Positive for constipation, diarrhea and nausea. Endocrine: Negative. Genitourinary: Negative. Musculoskeletal: Negative. Skin: Negative. Allergic/Immunologic: Negative. Neurological: Positive for weakness. Hematological: Negative. Psychiatric/Behavioral: Negative. All other systems reviewed and are negative. PHYSICAL EXAMINATION: Vital signs reviewed per the nursing documentation. /71   Wt 175 lb (79.4 kg)   BMI 28.25 kg/m²   Body mass index is 28.25 kg/m². Physical Exam    Physical Exam   Constitutional: Patient is oriented to person, place, and time. Patient appears well-developed and well-nourished. HENT:   Head: Normocephalic and atraumatic. Eyes: Pupils are equal, round, and reactive to light.  EOM are normal.   Neck: Normal range of motion. Neck supple. No JVD present. No tracheal deviation present. No thyromegaly present. Cardiovascular: Normal rate, regular rhythm, normal heart sounds and intact distal pulses. Pulmonary/Chest: Effort normal and breath sounds normal. No stridor. No respiratory distress. He has no wheezes. He has no rales. He exhibits no tenderness. Abdominal: Soft. Bowel sounds are normal. He exhibits no distension and no mass. There is no tenderness. There is no rebound and no guarding. No hernia. Musculoskeletal: Normal range of motion. Lymphadenopathy:    Patient has no cervical adenopathy. Neurological: Patient is alert and oriented to person, place, and time. Psychiatric: Patient has a normal mood and affect.  Patient behavior is normal.       LABORATORY DATA: Reviewed  Lab Results   Component Value Date    WBC 3.0 (L) 08/23/2021    HGB 9.0 (L) 08/23/2021    HCT 27.2 (L) 08/23/2021    MCV 88.0 08/23/2021     08/23/2021     08/23/2021    K 3.7 08/23/2021     08/23/2021    CO2 22 08/23/2021    BUN 4 (L) 08/23/2021    CREATININE 0.79 08/23/2021    LABALBU 3.2 (L) 08/23/2021    BILITOT 0.21 (L) 08/23/2021    ALKPHOS 60 08/23/2021    AST 10 08/23/2021    ALT 8 08/23/2021    INR 1.1 08/23/2021         Lab Results   Component Value Date    RBC 3.09 (L) 08/23/2021    HGB 9.0 (L) 08/23/2021    MCV 88.0 08/23/2021    MCH 29.1 08/23/2021    MCHC 33.1 08/23/2021    RDW 14.2 08/23/2021    MPV 9.0 08/23/2021    BASOPCT 1 08/02/2021    LYMPHSABS 1.63 08/02/2021    MONOSABS 0.77 08/02/2021    NEUTROABS 6.11 08/02/2021    EOSABS 0.04 08/02/2021    BASOSABS 0.08 08/02/2021         DIAGNOSTIC TESTING:     ECHO Complete 2D W Doppler W Color    Result Date: 8/24/2021  Transthoracic Echocardiography Report (TTE)  Patient Name Ama Ramos  Date of Study                 08/23/2021               NANCI HERRON   Date of      1980  Gender                        Male  Birth   Age          36 year(s)  Race Kel(Sonographer) on 08/23/2021 03:17  PM ---------------------------------------------------------------------------- ----------------------------------------------------------------------------  Electronically signed by Neri Blackman(Interpreting physician) on 08/24/2021  09:18 AM ---------------------------------------------------------------------------- FINDINGS Left Atrium Left atrial dilatation. Left Ventricle Left ventricle is normal in size with normal systolic function globally. Calculated ejection fraction is 60% Right Atrium Right atrium is normal in size. Mild buckling of the right atrial wall. Right Ventricle Normal right ventricular size and function. Mitral Valve Prolapse of the of posterior leaflet of the mitral valve. Large vegetations noted on the MONROE done 7/20/21 are no longer identified . Small echogenicity is noted on the posterior leaflet, maybe improving vegetation. Severe mitral regurgitation with pulmonary vein flow reversal. PISA radius 1.6 cm MR volume 111 ml Consider MONROE when indicated. Aortic Valve Aortic valve is trileaflet and opens normally. Mild aortic insufficiency. Tricuspid Valve Tricuspid valve structure is normal. Mild tricuspid regurgitation. Estimated right ventricular systolic pressure is 49 mmHg, suggests mild Pulm HTN. Pulmonic Valve The pulmonic valve is normal in structure. Pericardial Effusion Small circumferential pericardial effusion. No signs of echocariographic tamponade. Miscellaneous Normal aortic root dimension.  M-mode / 2D Measurements & Calculations:   LVIDd:5.8 cm(3.7 - 5.6 cm)       Diastolic WREHJL:946 ml  QUEW:7.8 cm(0.6 - 1.1 cm)        Systolic ZCZJMQ:64 ml  PDEUH:7.6 cm(0.6 - 1.1 cm)       Aortic Root:2.9 cm(2.0 - 3.7 cm)                                   LA Dimension: 5.6 cm(1.9 - 4.0 cm)  Calculated LVEF (%): 60.8 %      LA volume/Index: 109 ml /58m^2                                   LVOT:2 cm                                   RVDd:2.5 cm Aortic   Valve Area (P1/2-Time): 4.68 cm^2       Peak Velocity: 1.36 m/s                                          Mean Velocity: 0.80 m/s                                          Peak Gradient: 7.4 mmHg  Mean Gradient: 6 mmHg                   Mean Gradient: 3 mmHg  P1/2t: 47 msec   MR Velocity: 4.35 m/s                   Area (continuity): 3.27 cm^2  YAIR Volumetric: 1.13 cm^2               AV VTI: 19 cm  Area (continuity): 1.94 cm^2  Mean Velocity: 1.10 m/s  MR VTI: 97.6 cm   Tricuspid:                              Pulmonic:   Estimated RVSP: 49 mmHg                 Peak Velocity: 0.81 m/s  Peak TR Velocity: 3.13 m/s              Peak Gradient: 2.65 mmHg  Peak TR Gradient: 39.1876 mmHg  Estimated RA Pressure: 10 mmHg                                           Estimated PASP: 49.19 mmHg      XR CHEST PORTABLE    Result Date: 8/24/2021  EXAMINATION: ONE XRAY VIEW OF THE CHEST 8/24/2021 4:16 pm COMPARISON: August 22, 2021 HISTORY: ORDERING SYSTEM PROVIDED HISTORY: acute shortness of breath TECHNOLOGIST PROVIDED HISTORY: acute shortness of breath Reason for Exam: shortness of breath, mid chest pain   upright port FINDINGS: Cardiomegaly is present, with interval worsening of the interstitial and early alveolar opacities, consistent with worsening edema. Kerley B lines are noted bilaterally. Focal opacity is now present within the right lung base, and may represent coalescing edema, pneumonia or atelectasis. Right upper extremity PICC line is in place, tip at the junction of the SVC and right atrium. No pneumothorax is noted. Osseous structures are stable. 1. Cardiomegaly, with interval worsening of CHF 2. New opacity, right lung base. Differential considerations would include coalescing edema, atelectasis, versus pneumonia 3.  Right upper extremity PICC line in place, tip at the junction of the SVC and right atrium     XR CHEST PORTABLE    Result Date: 8/22/2021  EXAMINATION: ONE XRAY VIEW OF THE CHEST 8/22/2021 3:42 pm COMPARISON: 23 July 2021 HISTORY: ORDERING SYSTEM PROVIDED HISTORY: confirm picc line placement TECHNOLOGIST PROVIDED HISTORY: confirm picc line placement FINDINGS: AP portable view of the chest time stamped at 1505 hours demonstrates overlying cardiac monitoring electrodes. Heart size is stable. What appears to be a right-sided PICC line terminates at the right axillary line. No effusion or pneumothorax. Right-sided PICC line terminates in right shoulder area at the right axillary line. The findings were sent to the Radiology Results [address] at 4:44 pm on 8/22/2021to be communicated to a licensed caregiver. IMPRESSION: Rodrigo Quintanilla is a 36 y.o. male with a past history remarkable for slow transit constipation likely related to use of oxycodone, referred for evaluation of constipation symptoms and a remote history of fecal occult testing. 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 2 episode of diverticulitis this past year. Prior history of severe diverticulitis reported by the patient where he eventually underwent segmental resection of the sigmoid colon approximately 9 years ago after 3 severe recurrent episodes of diverticulitis. No recent endoscopic evaluation performed. Denies any active GI symptoms at this time. Assessment  1. Gastrointestinal hemorrhage, unspecified gastrointestinal hemorrhage type    2. Drug-induced constipation    3. Loose bowel movements        PLAN:    1) refractory GERD symptomsProtonix 40mg BID to continue at this time. 2) slow transit constipation likely contributed by intermittent use of oxycodoneMiralax if patient fails to have bowel movements after 48 hours.   Would recommend docusate 100 mg as needed, up to twice daily if significant straining is reported. 3) we will hold off on any endoscopic evaluation until patient is completed with his antibiotic course. His last colonoscopy was approximately 9 years ago after his segmental resection of his colon. Evaluated for colonoscopy after cleared from ID standpoint. Currently the patient denies any overt bleeding at this time. RTC in 2 months. Thank you for allowing me to participate in the care of Mr. Hadley Coto. For any further questions please do not hesitate to contact me. I have reviewed and agree with the ROS entered by the MA/LPN from today's encounter documented in a separate note. Jac Maddox MD, MPH   [facility]  Office #: [phone]    this note is created with the assistance of a speech recognition program.  While intending to generate a document that actually reflects the content of the visit, the document can still have some errors including those of syntax and sound a like substitutions which may escape proof reading. It such instances, actual meaning can be extrapolated by contextual diversion.

## 2021-08-28 LAB
CULTURE: NORMAL
Lab: NORMAL
SPECIMEN DESCRIPTION: NORMAL

## 2021-09-07 ENCOUNTER — OFFICE VISIT (OUTPATIENT)
Dept: INFECTIOUS DISEASES | Age: 41
End: 2021-09-07
Payer: COMMERCIAL

## 2021-09-07 VITALS
DIASTOLIC BLOOD PRESSURE: 76 MMHG | TEMPERATURE: 97.9 F | RESPIRATION RATE: 18 BRPM | SYSTOLIC BLOOD PRESSURE: 118 MMHG | HEIGHT: 66 IN | HEART RATE: 100 BPM | OXYGEN SATURATION: 98 % | BODY MASS INDEX: 27.29 KG/M2 | WEIGHT: 169.8 LBS

## 2021-09-07 DIAGNOSIS — E78.5 HYPERLIPIDEMIA, UNSPECIFIED HYPERLIPIDEMIA TYPE: ICD-10-CM

## 2021-09-07 DIAGNOSIS — B95.61 ENDOCARDITIS DUE TO METHICILLIN SUSCEPTIBLE STAPHYLOCOCCUS AUREUS (MSSA): ICD-10-CM

## 2021-09-07 DIAGNOSIS — B95.61 MSSA BACTEREMIA: Primary | ICD-10-CM

## 2021-09-07 DIAGNOSIS — R78.81 MSSA BACTEREMIA: Primary | ICD-10-CM

## 2021-09-07 DIAGNOSIS — I76 CEREBRAL SEPTIC EMBOLI (HCC): ICD-10-CM

## 2021-09-07 DIAGNOSIS — I10 ESSENTIAL HYPERTENSION: ICD-10-CM

## 2021-09-07 DIAGNOSIS — I66.9 CEREBRAL SEPTIC EMBOLI (HCC): ICD-10-CM

## 2021-09-07 DIAGNOSIS — I33.0 ENDOCARDITIS DUE TO METHICILLIN SUSCEPTIBLE STAPHYLOCOCCUS AUREUS (MSSA): ICD-10-CM

## 2021-09-07 PROCEDURE — 99214 OFFICE O/P EST MOD 30 MIN: CPT | Performed by: INTERNAL MEDICINE

## 2021-09-07 RX ORDER — MULTIVIT WITH MINERALS/LUTEIN
750 TABLET ORAL DAILY
COMMUNITY

## 2021-09-07 RX ORDER — LORAZEPAM 1 MG/1
TABLET ORAL
COMMUNITY
Start: 2021-08-03 | End: 2021-09-15 | Stop reason: ALTCHOICE

## 2021-09-07 NOTE — Clinical Note
[facility] [place] [address]  Phone: [phone]  Fax: [phone]    Hallie Piek MD        September 7, 2021     Patient: Hosey Homans   YOB: 1980   Date of Visit: 9/7/2021       To Whom It May Concern: It is my medical opinion that Carie Nallely {Work release (duty restriction):40323}. If you have any questions or concerns, please don't hesitate to call.     Sincerely,        Hallie Pike MD

## 2021-09-07 NOTE — PROGRESS NOTES
[facility] - Initial Office Consult Note  Today's Date and Time: 9/7/2021, 4:11 PM    Diagnostic Impression :     1. MSSA bacteremia    2. Cerebral septic emboli (HCC)    3. Endocarditis due to methicillin susceptible Staphylococcus aureus (MSSA)    4. Hyperlipidemia, unspecified hyperlipidemia type    5. Essential hypertension        Recommendations   · Discontinuation of antibiotic treatment on 9/7/2021  · Repeat blood cultures x2 on 9/14/2021  · Follow-up visit with Dr. Silvia Hernandez for the cardiac valve replacement    Chief complaint/reason for consultation:     Chief Complaint   Patient presents with    Vascular Access Problem     stated his picc line is plugged having problems flushing        History of Present Illness:   Denisse Suresh is a 36y.o.-year-old  male who was initially evaluated on 9/7/2021. Patient seen at the request of Dr. Juan Manuel Farris. INITIAL HISTORY:    Patient known to us from previous admissions to [place].    He has experienced the following problems:  · Pericardial effusion  · Prior MSSA septicemia 7-17-21  ? Persistent growth of bacteria in blood 7-17-21 through 7-27-21 despite treatment  · Prior Sepsis with acute organ dysfunction and septic shock  · Mitral Valve endocarditis. Mitral valve vegetation 2.5 X 2.4 cm 7/20/2021  · Acute septic embolic infarcts to the brain 7/21/21    Patient also had prior medical diagnoses:  · History of diverticulitis resulting in partial hemicolectomy with reanastomosis  · History of appendectomy  · History of right ACL repair with screw in place  · Hx systolic cardiac murmur  · Hyperlipidemia  · Elevated inflammatory markers    The patient has been receiving antibiotic treatment at home. · Nafcillin 2000 mg IV every 4 hours until 9/7/2021 for MSSA septicemia and to promote antibiotic penetration into brain. He has reached 6 weeks of treatment from first negative blood cultures.   · Rifampin 600 mg daily added 7/22/21 until 9/7/21 for synergy    CURRENT EVALUATION : 9/7/2021    Patient was seen in the office in the company of his wife. He indicates that he has been doing well. Prior left sided chest pain that brought him to the hospital on May 22, 2021 have subsided. The pain was attributed to the formation of a pneumatocele. He has not experienced any fevers chills or any other problems. He indicates that he is experiencing recurrent problems with the right-sided PICC line. Clotting has developed on several ocassions. He has had to force the line with saline to open it up and allow the infusions to continue. The line site does not appear inflamed. I discussed with him or his wife that it would be preferable to remove the line after stopped antibiotics at this point in time, she has already reached the 6-week time for the antibiotic infusions after his last positive blood culture. I am concerned that he may develop pulmonary emboli from clots in the line or he may develop a secondary bacterial infection as a result of phlebitis. We will plan to repeat blood cultures in a week. His ointment with Dr. Whitney Rivera will be scheduled at an earlier date. Line was removed in the office without any problems. I have personally reviewed the past medical history, past surgical history, medications, social history, and family history, and I have updated the database accordingly.   Past Medical History:     Past Medical History:   Diagnosis Date    Diverticulosis     Hyperlipidemia     Hypertension     MESSI (obstructive sleep apnea)     Has PSG study done Pos for Mod MSESI, never had cpap tritration done for machine    Research subject 07/24/2021    EIND 338183 [employer] for persistent bacteremia expected date of completion 8/25/2021       Past Surgical  History:     Past Surgical History:   Procedure Laterality Date    ABDOMEN SURGERY      large bowel resection    ANTERIOR CRUCIATE LIGAMENT REPAIR Right     APPENDECTOMY  07/09/1997    COLECTOMY      2012 - D/T Dverticulitis    DILATATION, ESOPHAGUS      HC  PICC [facility]  8/2/2021            Medications:     Current Outpatient Medications:     LORazepam (ATIVAN) 1 MG tablet, TAKE 1 TABLET BY MOUTH EVERY 6 HOURS AS NEEDED, Disp: , Rfl:     Ascorbic Acid (VITAMIN C) 250 MG tablet, Take 250 mg by mouth daily, Disp: , Rfl:     Lactobacillus Rhamnosus, GG, (CULTURELLE PO), Take by mouth, Disp: , Rfl:     metoprolol tartrate (LOPRESSOR) 25 MG tablet, Take 0.5 tablets by mouth 2 times daily, Disp: 30 tablet, Rfl: 0    nafcillin infusion, Infuse 2,000 mg intravenously every 4 hours for 20 days Compound per protocol, Disp: 240 g, Rfl: 0    ondansetron (ZOFRAN-ODT) 4 MG disintegrating tablet, Place 1 tablet under the tongue 3 times daily as needed for Nausea or Vomiting, Disp: 93 tablet, Rfl: 0    pantoprazole (PROTONIX) 40 MG tablet, Take 1 tablet by mouth 2 times daily (before meals), Disp: 30 tablet, Rfl: 3    rifAMPin (RIFADIN) 300 MG capsule, Take 1 capsule by mouth 2 times daily, Disp: 86 capsule, Rfl: 0    docusate sodium (COLACE) 100 MG capsule, Take 1 capsule by mouth 2 times daily (Patient not taking: Reported on 9/7/2021), Disp: 60 capsule, Rfl: 2    ibuprofen (ADVIL;MOTRIN) 600 MG tablet, Take 1 tablet by mouth 3 times daily (before meals) (Patient not taking: Reported on 9/7/2021), Disp: 90 tablet, Rfl: 0    melatonin 3 MG TABS tablet, Take 2 tablets by mouth nightly as needed (sleep), Disp: 60 tablet, Rfl: 0     Social History:     Social History     Socioeconomic History    Marital status:      Spouse name: Not on file    Number of children: Not on file    Years of education: Not on file    Highest education level: Not on file   Occupational History    Not on file   Tobacco Use    Smoking status: Never Smoker    Smokeless tobacco: Never Used   Vaping Use    Vaping Use: Never assessed   Substance and Sexual Activity    Alcohol use: Yes     Alcohol/week: 20.0 standard drinks     Types: 20 Cans of beer per week     Comment: 20/ week, average every other day    Drug use: Never    Sexual activity: Not on file   Other Topics Concern    Not on file   Social History Narrative    Not on file     Social Determinants of Health     Financial Resource Strain:     Difficulty of Paying Living Expenses:    Food Insecurity:     Worried About Running Out of Food in the Last Year:     [address] in the Last Year:    Transportation Needs:     Lack of Transportation (Medical):  Lack of Transportation (Non-Medical):    Physical Activity:     Days of Exercise per Week:     Minutes of Exercise per Session:    Stress:     Feeling of Stress :    Social Connections:     Frequency of Communication with Friends and Family:     Frequency of Social Gatherings with Friends and Family:     Attends [employer] witness Services:     Active Member of Clubs or Organizations:     Attends Club or Organization Meetings:     Marital Status:    Intimate Partner Violence:     Fear of Current or Ex-Partner:     Emotionally Abused:     Physically Abused:     Sexually Abused:        Family History:     Family History   Problem Relation Age of Onset    No Known Problems Mother     Other Father         diverticulitis        Allergies:   Morphine     Review of Systems:   Constitutional: No fevers or chills. No systemic complaints  Head: No headaches  Eyes: No double vision or blurry vision. ENT: No sore throat or runny nose. . No hearing loss, tinnitus or vertigo. Cardiovascular: No chest pain or palpitations. No shortness of breath. No CASTRO  Lung: No shortness of breath or cough. No sputum production  Abdomen: No nausea, vomiting, diarrhea, or abdominal pain. .  Genitourinary: No increased urinary frequency, or dysuria. No hematuria. No suprapubic or CVA pain  Musculoskeletal: No muscle aches or pains.  No joint effusions, swelling or deformities  Hematologic: No bleeding or bruising. Neurologic: No headache, weakness, numbness, or tingling. Physical Examination :   /76 (Site: Left Upper Arm, Position: Sitting, Cuff Size: Medium Adult)   Pulse 100   Temp 97.9 °F (36.6 °C) (Temporal)   Resp 18   Ht 5' 6\" (1.676 m)   Wt 169 lb 12.8 oz (77 kg)   SpO2 98% Comment: room air at rest  BMI 27.41 kg/m²    General Appearance: Awake, alert, and in no apparent distress  Head:  Normocephalic, no trauma  Eyes: Pupils equal, round, reactive, to light and accommodation; extraocular movements intact; sclera anicteric; conjunctivae pink. No embolic phenomena. ENT: Oropharynx clear, without erythema, exudate, or thrush. No tenderness of sinuses. Mouth/throat: mucosa pink and moist. No lesions. Dentition in good repair. Neck:Supple, without lymphadenopathy. Thyroid normal, No bruits. Pulmonary/Chest: Clear to auscultation, without wheezes, rales, or rhonchi. No dullness to percussion. Cardiovascular: Regular rate and rhythm without murmurs, rubs, or gallops. Abdomen: Soft, non tender. Bowel sounds normal. No organomegaly  All four Extremities: No cyanosis, clubbing, edema, or effusions. Neurologic: No gross sensory or motor deficits. Skin: Warm and dry with good turgor. No signs of peripheral arterial or venous insufficiency.     Medical Decision Making:   I have independently reviewed/ordered the following labs:    CBC with Differential:  Lab Results   Component Value Date    WBC 3.0 08/23/2021    WBC 5.0 08/22/2021    HGB 9.0 08/23/2021    HGB 8.6 08/22/2021    HCT 27.2 08/23/2021    HCT 27.5 08/22/2021     08/23/2021     08/22/2021    LYMPHOPCT 19 08/02/2021    LYMPHOPCT 18 08/01/2021    MONOPCT 9 08/02/2021    MONOPCT 9 08/01/2021     BMP:   Lab Results   Component Value Date     08/23/2021     08/22/2021    K 3.7 08/23/2021    K 3.7 08/22/2021     08/23/2021     08/22/2021    CO2 22 08/23/2021 CO2 22 08/22/2021    BUN 4 08/23/2021    BUN 5 08/22/2021    CREATININE 0.79 08/23/2021    CREATININE 0.76 08/22/2021    MG 2.0 08/23/2021    MG 2.0 07/29/2021     Hepatic Function Panel:  Lab Results   Component Value Date    PROT 5.8 08/23/2021    PROT 6.8 08/01/2021    LABALBU 3.2 08/23/2021    LABALBU 3.2 08/02/2021    BILIDIR 0.11 08/01/2021    BILIDIR 0.13 07/30/2021    IBILI 0.14 08/01/2021    IBILI 0.24 07/30/2021    BILITOT 0.21 08/23/2021    BILITOT 0.25 08/01/2021    ALKPHOS 60 08/23/2021    ALKPHOS 64 08/01/2021    ALT 8 08/23/2021    ALT 17 08/01/2021    AST 10 08/23/2021    AST 15 08/01/2021     No results found for: RPR  No results found for: HIV  No results found for: [facility]  Lab Results   Component Value Date    MUCUS NOT REPORTED 07/17/2021    RBC 3.09 08/23/2021    TRICHOMONAS NOT REPORTED 07/17/2021    WBC 3.0 08/23/2021    YEAST NOT REPORTED 07/17/2021    TURBIDITY CLEAR 07/17/2021     Lab Results   Component Value Date    CREATININE 0.79 08/23/2021    GLUCOSE 85 08/23/2021       Thank you for allowing us to participate in the care of this patient. Please call with questions.     Abril Mariano MD  Pager: [phone] - Office: [phone]

## 2021-09-07 NOTE — LETTER
[facility] [address]  Phone: [phone]  Fax: [phone]    Fran Dave MD    September 7, 2021     Juan Kessler MD  [address] [place] [address]    Patient: Chito Sanford   MR Number: [id number]   YOB: 1980   Date of Visit: 9/7/2021       Dear Juan Kessler: Thank you for referring Elsie Estrada to me for evaluation/treatment. Below are the relevant portions of my assessment and plan of care. If you have questions, please do not hesitate to call me. I look forward to following Wesley Burciaga along with you.     Sincerely,        Fran Dave MD

## 2021-09-08 ENCOUNTER — OFFICE VISIT (OUTPATIENT)
Dept: CARDIOTHORACIC SURGERY | Age: 41
End: 2021-09-08
Payer: COMMERCIAL

## 2021-09-08 VITALS
DIASTOLIC BLOOD PRESSURE: 86 MMHG | HEIGHT: 66 IN | OXYGEN SATURATION: 100 % | BODY MASS INDEX: 27.32 KG/M2 | RESPIRATION RATE: 18 BRPM | WEIGHT: 170 LBS | TEMPERATURE: 98.7 F | HEART RATE: 99 BPM | SYSTOLIC BLOOD PRESSURE: 123 MMHG

## 2021-09-08 DIAGNOSIS — I34.0 SEVERE MITRAL REGURGITATION: Primary | ICD-10-CM

## 2021-09-08 PROCEDURE — 99215 OFFICE O/P EST HI 40 MIN: CPT | Performed by: NURSE PRACTITIONER

## 2021-09-08 ASSESSMENT — ENCOUNTER SYMPTOMS
COLOR CHANGE: 0
SHORTNESS OF BREATH: 1
CONSTIPATION: 0
DIARRHEA: 0
ABDOMINAL PAIN: 0

## 2021-09-08 NOTE — PROGRESS NOTES
[facility] Cardiothoracic Surgery  Office Visit  Pre-operative Follow Up      CC: Endocarditis, sub-acute    HPI: Shi Cao is a 36y.o. year old  male who presented to the emergency room at [facility] in July with complaints of abdominal pain, confusion, fatigue, diarrhea, emesis and body aches for approximately 6 days. He was treated for hypokalemia and discharged him home. He ultimately presented to [facility] ER 4 days later and was noted to be tachycardic with elevated troponin levels. Cardiology was consulted and a 2D echo was ordered and showed a posterior mitral leaflet prolapse and severe MR with an eccentric anterior directed jet with a possible small vegetation on the posterior leaflet. The patient underwent a MONROE on 7/20 which revealed a large vegetation measuring 2.5 X 2.4 cm noted on the posterior leaflet of the mitral valve. Posterior leaflet was flaired with severe anteriorly directed eccentric regurgitation. Pertinent medical history includes HTN, hyperlipidemia, diverticulosis resulting in a partial hemicolectomy with reanastomoses, and an appendectomy. Blood cultures were noted to be positive for MSSA. Infectious disease followed and patient has completed antibiotic therapy. During his admission, he experienced a sudden onset of severe vertigo with memory loss/confusion. 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 Neurology was consulted, and patient has recovered fully without intervention. Patient is scheduled for an outpatient follow up later this month and will need surgical clearance before proceeding. PMH:   has a past medical history of Diverticulosis, Hyperlipidemia, Hypertension, MESSI (obstructive sleep apnea), and Research subject (07/24/2021). PSH:   has a past surgical history that includes Anterior cruciate ligament repair (Right); Appendectomy (07/09/1997); colectomy; Dilatation, esophagus;  Abdomen surgery; and   picc powerpicc double (8/2/2021). Allergies: Allergies   Allergen Reactions    Morphine Other (See Comments)     Pt report muscle cramps       Medications:   Current Outpatient Medications:     LORazepam (ATIVAN) 1 MG tablet, TAKE 1 TABLET BY MOUTH EVERY 6 HOURS AS NEEDED, Disp: , Rfl:     Ascorbic Acid (VITAMIN C) 250 MG tablet, Take 250 mg by mouth daily, Disp: , Rfl:     Lactobacillus Rhamnosus, GG, (CULTURELLE PO), Take by mouth, Disp: , Rfl:     nafcillin infusion, Infuse 2,000 mg intravenously every 4 hours for 20 days Compound per protocol, Disp: 240 g, Rfl: 0    ondansetron (ZOFRAN-ODT) 4 MG disintegrating tablet, Place 1 tablet under the tongue 3 times daily as needed for Nausea or Vomiting, Disp: 93 tablet, Rfl: 0    pantoprazole (PROTONIX) 40 MG tablet, Take 1 tablet by mouth 2 times daily (before meals), Disp: 30 tablet, Rfl: 3    docusate sodium (COLACE) 100 MG capsule, Take 1 capsule by mouth 2 times daily (Patient not taking: Reported on 9/7/2021), Disp: 60 capsule, Rfl: 2    ibuprofen (ADVIL;MOTRIN) 600 MG tablet, Take 1 tablet by mouth 3 times daily (before meals) (Patient not taking: Reported on 9/7/2021), Disp: 90 tablet, Rfl: 0    melatonin 3 MG TABS tablet, Take 2 tablets by mouth nightly as needed (sleep), Disp: 60 tablet, Rfl: 0    Social Hx:   reports that he has never smoked. He has never used smokeless tobacco.    Family Hx: family history includes No Known Problems in his mother; Other in his father. ROS:    Review of Systems   Constitutional: Positive for activity change. Negative for appetite change, chills, fatigue and fever. HENT: Negative for congestion. Eyes: Negative for visual disturbance. Respiratory: Positive for shortness of breath. Cardiovascular: Positive for palpitations. Negative for chest pain and leg swelling. Gastrointestinal: Negative for abdominal pain, constipation and diarrhea. Genitourinary: Negative for dysuria.    Musculoskeletal: Negative for gait problem. Skin: Negative for color change. Neurological: Positive for weakness. Negative for dizziness. Psychiatric/Behavioral: Negative for suicidal ideas. The patient is not nervous/anxious. Physical Examination   Vitals:  Vitals:    09/08/21 0947   BP: 123/86   Pulse: 99   Resp: 18   Temp: 98.7 °F (37.1 °C)   SpO2: 100%     Physical Exam  Constitutional:       General: He is not in acute distress. Appearance: Normal appearance. He is not ill-appearing. HENT:      Head: Normocephalic. Nose: Nose normal.      Mouth/Throat:      Mouth: Mucous membranes are moist.      Pharynx: Oropharynx is clear. Eyes:      Extraocular Movements: Extraocular movements intact. Conjunctiva/sclera: Conjunctivae normal.      Pupils: Pupils are equal, round, and reactive to light. Cardiovascular:      Rate and Rhythm: Normal rate and regular rhythm. Pulses: Normal pulses. Heart sounds: Normal heart sounds. Pulmonary:      Effort: Pulmonary effort is normal. No respiratory distress. Breath sounds: Normal breath sounds. Abdominal:      Palpations: Abdomen is soft. Tenderness: There is no abdominal tenderness. Musculoskeletal:         General: Normal range of motion. Cervical back: Normal range of motion. Skin:     General: Skin is warm and dry. Capillary Refill: Capillary refill takes less than 2 seconds. Neurological:      General: No focal deficit present. Mental Status: He is alert and oriented to person, place, and time. Psychiatric:         Mood and Affect: Mood normal.         Behavior: Behavior normal.         Labs:   CBC: No results for input(s): WBC, HGB, HCT, MCV, PLT in the last 72 hours. BMP:No results for input(s): NA, K, CL, CO2, PHOS, BUN, CREATININE, MG in the last 72 hours. Invalid input(s): CA  Accucheck Glucoses:No results for input(s): POCGLU in the last 72 hours.   Cardiac Enzymes: No results for input(s): CKTOTAL, CKMB, CKMBINDEX, TROPONINI in the last 72 hours. PT/INR: No results for input(s): PROTIME, INR in the last 72 hours. APTT: No results for input(s): APTT in the last 72 hours. Liver Profile:  Lab Results   Component Value Date    AST 10 08/23/2021    ALT 8 08/23/2021    BILIDIR 0.11 08/01/2021    BILITOT 0.21 08/23/2021    ALKPHOS 60 08/23/2021   No results found for: CHOL, HDL, TRIG  TSH:   Lab Results   Component Value Date    TSH 1.86 07/20/2021     UA:   Lab Results   Component Value Date    COLORU YELLOW 07/17/2021    PHUR 6.0 07/17/2021    WBCUA 5 TO 10 07/17/2021    RBCUA 5 TO 10 07/17/2021    MUCUS NOT REPORTED 07/17/2021    TRICHOMONAS NOT REPORTED 07/17/2021    YEAST NOT REPORTED 07/17/2021    BACTERIA NOT REPORTED 07/17/2021    SPECGRAV 1.014 07/17/2021    LEUKOCYTESUR NEGATIVE 07/17/2021    UROBILINOGEN Normal 07/17/2021    BILIRUBINUR NEGATIVE 07/17/2021    GLUCOSEU NEGATIVE 07/17/2021    AMORPHOUS NOT REPORTED 07/17/2021         Testing:  Echo:   Left ventricle is normal in size with normal systolic function globally. Calculated ejection fraction is 60%  Left atrial dilatation. Right atrium is normal in size. Mild buckling of the right atrial wall. Mild aortic insufficiency. Prolapse of the of posterior leaflet of the mitral valve. Large vegetations noted on the MONROE done 7/20/21 are no longer identified . Small echogenicity is noted on the posterior leaflet, maybe improving  vegetation. Severe mitral regurgitation with pulmonary vein flow reversal.  Consider MONROE when indicated. Mild tricuspid regurgitation. Estimated right ventricular systolic pressure is 49 mmHg, suggests mild Pulm  HTN. Small circumferential pericardial effusion.   No signs of echocariographic tamponade.     Signature  ----------------------------------------------------------------------------   Electronically signed by Angel BhattiSonographer) on 08/23/2021 03:17 PM  ----------------------------------------------------------------------------     ----------------------------------------------------------------------------   Electronically signed by Neri Blackman(Interpreting physician) on 08/24/2021   09:18 AM  ----------------------------------------------------------------------------  FINDINGS  Left Atrium  Left atrial dilatation. Left Ventricle  Left ventricle is normal in size with normal systolic function globally. Calculated ejection fraction is 60%  Right Atrium  Right atrium is normal in size. Mild buckling of the right atrial wall. Right Ventricle  Normal right ventricular size and function. Mitral Valve  Prolapse of the of posterior leaflet of the mitral valve. Large vegetations noted on the MONROE done 7/20/21 are no longer identified . Small echogenicity is noted on the posterior leaflet, maybe improving  vegetation. Severe mitral regurgitation with pulmonary vein flow reversal.  PISA radius 1.6 cm  MR volume 111 ml  Consider MONROE when indicated. Aortic Valve  Aortic valve is trileaflet and opens normally. Mild aortic insufficiency. Tricuspid Valve  Tricuspid valve structure is normal.  Mild tricuspid regurgitation. Estimated right ventricular systolic pressure is 49 mmHg, suggests mild Pulm  HTN. Pulmonic Valve  The pulmonic valve is normal in structure. Pericardial Effusion  Small circumferential pericardial effusion. No signs of echocariographic tamponade.     Miscellaneous  Normal aortic root dimension.       CXR:  ONE XRAY VIEW OF THE CHEST       8/24/2021 4:16 pm       COMPARISON:   August 22, 2021       HISTORY:   ORDERING SYSTEM PROVIDED HISTORY: acute shortness of breath   TECHNOLOGIST PROVIDED HISTORY:   acute shortness of breath   Reason for Exam: shortness of breath, mid chest pain   upright port       FINDINGS:   Cardiomegaly is present, with interval worsening of the interstitial and   early alveolar opacities, consistent with worsening edema.  Kerley B lines   are noted bilaterally.  Focal opacity is now present within the right lung   base, and may represent coalescing edema, pneumonia or atelectasis.  Right   upper extremity PICC line is in place, tip at the junction of the SVC and   right atrium.  No pneumothorax is noted.  Osseous structures are stable.           Impression   1. Cardiomegaly, with interval worsening of CHF   2. New opacity, right lung base.  Differential considerations would include   coalescing edema, atelectasis, versus pneumonia   3. Right upper extremity PICC line in place, tip at the junction of the SVC   and right atrium           CT scan:  8/21/2021 [facility]  IMPRESSION:   1. No evidence of pulmonary embolism. 2. Moderate pericardial effusion. Consider echocardiography. 3. 3 mm nodular density along the right major fissure, most likely a benign   perifissural lymph node. There is also a 4 mm left lower lobe nodule which is   indeterminate but may also represent a benign intrapulmonary lymph node or   granuloma and is stable since 10/17/2020. Electronically authenticated by: Safia Guard Date: 2021-08-21 12:31     Imaging Studies:  I have personally reviewed the testing/imaging above with Dr Nicolette Snellen, he is in agreement with the findings listed above. In summary, Mr. Nneka Chaves is a 36y.o. year-old male with severe mitral regurgitation.     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 Micehal Lee would benefit optimally from mitral valve repair versus replacement. Dr Trenton Joseph explained the possibility of need for valve replacement at length and the pros and cons of both bioprosthetic and mechanical valve replacement options. Patient and spouse to discuss preference valve type if replacement is needed and notify surgical team of decision the morning of the procedure. I have discussed the proposed procedure, along with its risk, benefits, and therapeutic alternatives. Specific risks discussedincluded death, stroke, mediastinitis, re-operation for bleeding, and atrial fibrillation. We have also discussed the potential need for blood transfusion, along with its risks and benefits. He appears to understand,and consents to proceed. Surgical intervention has been tentatively scheduled for 9/23/2021 if surgical clearance can be obtained from Neurology. On this date 9/8/2021 I have spent 50 minutes reviewing previous notes, test results and face to face with the patient discussing the diagnosis and importance of compliance with the treatment plan as well as documenting on the day of the visit. At least 50% of the time documented was spent with the patient to provide counseling and/or coordination of care.     MANOHAR Sotomayor, APRN - CNP

## 2021-09-08 NOTE — PATIENT INSTRUCTIONS
Patient Education        Mitral Valve Repair: Before Your Surgery  What is mitral valve repair? Mitral valve repair is an open-heart surgery that repairs a mitral valve that is not working as it should. The mitral valve opens and closes to keep blood flowing in the proper direction through your heart. If the mitral valve doesn't close properly, it's called mitral valve regurgitation. If the valve is very tight and narrow, it's called mitral valve stenosis. In both of these cases, blood doesn't flow through the heart the right way. The doctor will make a cut in the skin over your breastbone (sternum). This cut is called an incision. Then the doctor will cut through your sternum to reach your heart. The doctor will connect you to a heart-lung bypass machine. It adds oxygen to your blood and moves the blood through your body. This machine will allow the doctor to stop your heartbeat while working on your heart. How the repair is done depends on how the mitral valve is damaged. Your doctor can tell you how your mitral valve will be repaired. After repairing the valve, the doctor will restart your heartbeat. Then the doctor may use wire to put your sternum back together. Your incision will be closed with stitches or staples. The wire will stay in your chest. The incision will leave a scar that will fade with time. You may stay in the hospital for 3 to 8 days after surgery. How do you prepare for surgery? Surgery can be stressful. This information will help you understand what you can expect. And it will help you safely prepare for surgery. Preparing for surgery    · Be sure you have someone to take you home.  Anesthesia and pain medicine will make it unsafe for you to drive or get home on your own.     · Understand exactly what surgery is planned, along with the risks, benefits, and other options.     · If you take aspirin or some other blood thinner, ask your doctor if you should stop taking it before your surgery. Make sure that you understand exactly what your doctor wants you to do. These medicines increase the risk of bleeding.     · Tell your doctor ALL the medicines, vitamins, supplements, and herbal remedies you take. Some may increase the risk of problems during your surgery. Your doctor will tell you if you should stop taking any of them before the surgery and how soon to do it.     · Make sure your doctor and the hospital have a copy of your advance directive. If you don't have one, you may want to prepare one. It lets others know your health care wishes. It's a good thing to have before any type of surgery or procedure. What happens on the day of surgery? · Follow the instructions exactly about when to stop eating and drinking. If you don't, your surgery may be canceled. If your doctor told you to take your medicines on the day of surgery, take them with only a sip of water.     · Take a bath or shower before you come in for your surgery. Do not apply lotions, perfumes, deodorants, or nail polish.     · Do not shave the surgical site yourself.     · Take off all jewelry and piercings. And take out contact lenses, if you wear them. At the hospital or surgery center   · Bring a picture ID.     · The area for surgery is often marked to make sure there are no errors.     · You will be kept comfortable and safe by your anesthesia provider. You will be asleep during the surgery.     · The surgery will take about 3 to 5 hours. After surgery    · You will go to the intensive care unit (ICU) right after surgery. You will probably stay in the ICU for 1 or 2 days before you go to your regular hospital room.     · You will have a breathing tube down your throat. This is usually removed within 6 hours after surgery. You will not be able to talk or drink liquids while the tube is in your throat. After the tube is removed, your throat will feel dry and scratchy.  Your nurse will tell you when it is safe to drink liquids again.     · As you wake up in the ICU, the nurse will check to be sure you are stable and comfortable. It is important for you to tell your doctor and nurse how you feel and ask questions about any concerns you may have.     · You will have a thin plastic tube in a vein in your neck. This tube is called a catheter. It is used to keep track of how well your heart is working. This is usually removed in 1 to 3 days.     · You will have chest tubes to drain fluid and blood after surgery. The fluid and extra blood are normal. They usually last for only a few days. The chest tubes are usually removed in 1 or 2 days.     · You will have several thin wires coming out of your chest near your incision. These wires can help keep your heartbeat steady after surgery. They will be removed before you go home. When should you call your doctor? · You have questions or concerns.     · You don't understand how to prepare for your surgery.     · You become ill before the surgery (such as fever, flu, or a cold).     · You need to reschedule or have changed your mind about having the surgery. Where can you learn more? Go to [email or web address].[email or web address] org and sign in to your [employer] account. Enter D234 in the [email or web address].com box to learn more about \"Mitral Valve Repair: Before Your Surgery. \"     If you do not have an account, please click on the \"Sign Up Now\" link. Current as of: August 31, 2020               Content Version: 12.9  © 2006-2021 [employer]. Care instructions adapted under license by [facility] ([facility]). If you have questions about a medical condition or this instruction, always ask your healthcare professional. [place] [address] any warranty or liability for your use of this information. Patient Education        Mitral Valve Replacement: Before Your Surgery  What is mitral valve replacement surgery?      Mitral valve replacement is most often done as an open-heart surgery. Minimally invasive types of surgery may be another option. The damaged mitral valve is removed and replaced with an artificial heart valve. The damaged valve is cut out. Then the new valve is sewn in place. The new valve may be mechanical or made of animal tissue. You and your doctor can decide before surgery which type of valve is best for you. The mitral valve opens and closes to keep blood flowing in the proper direction through your heart. When the mitral valve does not close properly, it's called mitral valve regurgitation. If the valve is very tight and narrow, it's called mitral valve stenosis. In both of these cases, blood does not flow through the heart the right way. During valve surgery, you are given general anesthesia. The doctor will make a cut in the skin over your breastbone (sternum). This cut is called an incision. Then the doctor will cut through your sternum to reach your heart. The doctor will connect you to a heart-lung bypass machine. It adds oxygen to your blood and moves the blood through your body. This machine will allow the doctor to stop your heartbeat while working on your heart. After replacing your mitral valve, the doctor will restart your heartbeat. Then the doctor may use wire to put your sternum back together. Your incision will be closed with stitches or staples. The wire will stay in your chest. The incision will leave a scar that will fade with time. You may stay in the hospital for 3 to 8 days after surgery. Follow-up care is a key part of your treatment and safety. Be sure to make and go to all appointments, and call your doctor if you are having problems. It's also a good idea to know your test results and keep a list of the medicines you take. How do you prepare for surgery? Surgery can be stressful. This information will help you understand what you can expect. And it will help you safely prepare for surgery.   Preparing for surgery    · Be sure you have someone to take you home. Anesthesia and pain medicine will make it unsafe for you to drive or get home on your own.     · Understand exactly what surgery is planned, along with the risks, benefits, and other options.     · If you take aspirin or some other blood thinner, ask your doctor if you should stop taking it before your surgery. Make sure that you understand exactly what your doctor wants you to do. These medicines increase the risk of bleeding.     · Tell your doctor ALL the medicines, vitamins, supplements, and herbal remedies you take. Some may increase the risk of problems during your surgery. Your doctor will tell you if you should stop taking any of them before the surgery and how soon to do it.     · Make sure your doctor and the hospital have a copy of your advance directive. If you don't have one, you may want to prepare one. It lets others know your health care wishes. It's a good thing to have before any type of surgery or procedure. What happens on the day of surgery? · Follow the instructions exactly about when to stop eating and drinking. If you don't, your surgery may be canceled. If your doctor told you to take your medicines on the day of surgery, take them with only a sip of water.     · Take a bath or shower before you come in for your surgery. Do not apply lotions, perfumes, deodorants, or nail polish.     · Do not shave the surgical site yourself.     · Take off all jewelry and piercings. And take out contact lenses, if you wear them. At the hospital or surgery center   · Bring a picture ID.     · The area for surgery is often marked to make sure there are no errors.     · You will be kept comfortable and safe by your anesthesia provider. You will be asleep during the surgery.     · The surgery will take about 3 to 5 hours. After surgery    · You will go to the intensive care unit (ICU) right after surgery.  You will probably stay in the ICU for 1 or 2 days before you go to your regular hospital room.     · You will have a breathing tube down your throat. This is usually removed within 6 hours after surgery. You will not be able to talk or drink liquids while the tube is in your throat. After the tube is removed, your throat will feel dry and scratchy. Your nurse will tell you when it is safe to drink liquids again.     · As you wake up in the ICU, the nurse will check to be sure you are stable and comfortable. It is important for you to tell your doctor and nurse how you feel and ask questions about any concerns you may have.     · You will have a thin plastic tube in a vein in your neck. This tube is called a catheter. It is used to keep track of how well your heart is working. This is usually removed in 1 to 3 days.     · You will have chest tubes to drain fluid and blood after surgery. The fluid and extra blood are normal. They usually last for only a few days. The chest tubes are usually removed in 1 or 2 days.     · You will have several thin wires coming out of your chest near your incision. These wires can help keep your heartbeat steady after surgery. They will be removed before you go home. When should you call your doctor? · You have questions or concerns.     · You don't understand how to prepare for your surgery.     · You become ill before the surgery (such as fever, flu, or a cold).     · You need to reschedule or have changed your mind about having the surgery. Where can you learn more? Go to https://Vetr.CogniTens. org and sign in to your [employer] account. Enter V123 in the [employer] box to learn more about \"Mitral Valve Replacement: Before Your Surgery. \"     If you do not have an account, please click on the \"Sign Up Now\" link. Current as of: August 31, 2020               Content Version: 12.9  © 2006-2021 [employer]. Care instructions adapted under license by [facility] ([facility]).  If you have questions about a medical condition or this instruction, always ask your healthcare professional. [place] [address] any warranty or liability for your use of this information.

## 2021-09-13 ENCOUNTER — OFFICE VISIT (OUTPATIENT)
Dept: NEUROLOGY | Age: 41
End: 2021-09-13
Payer: COMMERCIAL

## 2021-09-13 VITALS
DIASTOLIC BLOOD PRESSURE: 63 MMHG | HEIGHT: 66 IN | SYSTOLIC BLOOD PRESSURE: 95 MMHG | BODY MASS INDEX: 27.32 KG/M2 | OXYGEN SATURATION: 97 % | WEIGHT: 170 LBS | HEART RATE: 84 BPM

## 2021-09-13 DIAGNOSIS — I33.0 ENDOCARDITIS DUE TO METHICILLIN SUSCEPTIBLE STAPHYLOCOCCUS AUREUS (MSSA): ICD-10-CM

## 2021-09-13 DIAGNOSIS — B95.61 ENDOCARDITIS DUE TO METHICILLIN SUSCEPTIBLE STAPHYLOCOCCUS AUREUS (MSSA): ICD-10-CM

## 2021-09-13 DIAGNOSIS — I76 CEREBRAL SEPTIC EMBOLI (HCC): Primary | ICD-10-CM

## 2021-09-13 DIAGNOSIS — I66.9 CEREBRAL SEPTIC EMBOLI (HCC): Primary | ICD-10-CM

## 2021-09-13 DIAGNOSIS — I63.9 CARDIOEMBOLIC STROKE (HCC): ICD-10-CM

## 2021-09-13 DIAGNOSIS — I34.0 SEVERE MITRAL REGURGITATION: ICD-10-CM

## 2021-09-13 PROCEDURE — 99214 OFFICE O/P EST MOD 30 MIN: CPT | Performed by: FAMILY MEDICINE

## 2021-09-13 NOTE — PROGRESS NOTES
[address] [place] [address] # [address] [id number]  Dept: [phone]  Dept Fax: [phone]    NEUROLOGY NEW PATIENT NOTE       PATIENT NAME: Nicki Bradford  PATIENT MRN: [id number]  PRIMARY CARE PHYSICIAN: Rene Zazueta MD      HPI:      I have the pleasure to evaluate Nicki Bradford who is a 36 y.o. Right-handed male patient with PMH of HTN and HLD who came for evaluation in follow up of hospital consultation for metabolic encephalopathy with hallucinations and vertigo in the setting of MSSA sepsis and subacute endocarditis with embolic infarctions. Had 2D Echo 7/18/2021  CONCLUSIONS     Summary  Global left ventricular systolic function is normal. Calculated ejection  fraction 48% by Dubois's method. Visually estimated EF 50%. Posterior mitral valve leaflet prolapse. Severe mitral regurgitation. Eccentric anterior diredted jet. EOA = 0.7cm2. PISA radius is 1.3cm. Vena contracta is 0.99 cm. MR volume is  58ml. Possible small vegetation on posterior leaflet. Consider MONROE.     Signature  ----------------------------------------------------------------------------   Electronically signed by Renu Hughes(Sonographer) on 07/18/2021   01:41 PM  ----------------------------------------------------------------------------     ----------------------------------------------------------------------------   Electronically signed by Oneil Li(Interpreting physician) on   07/18/2021 01:45 PM  ----------------------------------------------------------------------------    Had repeat 2D Echo on 8/23/2021  CONCLUSIONS     Summary  Left ventricle is normal in size with normal systolic function globally. Calculated ejection fraction is 60%  Left atrial dilatation. Right atrium is normal in size. Mild buckling of the right atrial wall. Mild aortic insufficiency. Prolapse of the of posterior leaflet of the mitral valve.   Large vegetations noted on the MONROE done 7/20/21 are no longer identified . Small echogenicity is noted on the posterior leaflet, maybe improving  vegetation. Severe mitral regurgitation with pulmonary vein flow reversal.  Consider MONROE when indicated. Mild tricuspid regurgitation. Estimated right ventricular systolic pressure is 49 mmHg, suggests mild Pulm  HTN. Small circumferential pericardial effusion. No signs of echocariographic tamponade.     Signature  ----------------------------------------------------------------------------   Electronically signed by Kel Silverman(Sonographer) on 08/23/2021 03:17   PM  ----------------------------------------------------------------------------     ----------------------------------------------------------------------------   Electronically signed by Neri Blackman(Interpreting physician) on 08/24/2021   09:18 AM  ----------------------------------------------------------------------------      MONROE on 7/20/2021  CONCLUSIONS     Summary  Normal left ventricular size and systolic function with an estimated  ejection fraction is 55%  Large vegetation (2.5 cm x 2.4 cm) on the posterior mitral leaflet with  partial leaflet destruction and prolapse. Severe regurgitation with and anteriorly directed eccentric jet. Normal other valves with no obvious vegetation     Signature  ----------------------------------------------------------------------------   Electronically signed by Kel Silverman(Sonographer) on 07/20/2021 03:56   PM  ----------------------------------------------------------------------------     ----------------------------------------------------------------------------   Electronically signed by Remi Alfaro(Interpreting physician) on   09/08/2021 01:37 PM  ----------------------------------------------------------------------------          MRI Brain WO Contrast 7/20: Acute embolic infarctions involving supra and infratentorial structures as described.   MRI Brain WO Contrast 7/27: Interval development of additional punctate areas of restricted diffusion suggesting continuing embolic showering. Additional imaging as noted below. Was evaluated by cardiology, CTS, and ID and was continued on Abx until seen by ID on 9/7/2021 and discontinued. Evaluated by CTS on 9/8/2021 and recommended MV repair vs replacement. Today, he denies any issues or concerns. No longer has any dizziness, gait instability, vertigo, or hallucinations. 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 Lives in a 2 story home. PREVIOUS WORKUP:     Lab Results   Component Value Date    WBC 3.0 (L) 08/23/2021    HGB 9.0 (L) 08/23/2021    HCT 27.2 (L) 08/23/2021    MCV 88.0 08/23/2021     08/23/2021       Past Medical History:   Diagnosis Date    Diverticulosis     Hyperlipidemia     Hypertension     MESSI (obstructive sleep apnea)     Has PSG study done Pos for Mod MESSI, never had cpap tritration done for machine    Research subject 07/24/2021    EIND 381946 Exebacase for persistent bacteremia expected date of completion 8/25/2021        Past Surgical History:   Procedure Laterality Date    ABDOMEN SURGERY      large bowel resection    ANTERIOR CRUCIATE LIGAMENT REPAIR Right     APPENDECTOMY  07/09/1997    COLECTOMY      2012 - D/T Dverticulitis    DILATATION, ESOPHAGUS      HC  PICC [facility] DOUBLE  8/2/2021             Social History     Socioeconomic History    Marital status:      Spouse name: Not on file    Number of children: Not on file    Years of education: Not on file    Highest education level: Not on file   Occupational History    Not on file   Tobacco Use    Smoking status: Never Smoker    Smokeless tobacco: Never Used   Vaping Use    Vaping Use: Never assessed   Substance and Sexual Activity    Alcohol use:  Yes Alcohol/week: 20.0 standard drinks     Types: 20 Cans of beer per week     Comment: 20/ week, average every other day    Drug use: Never    Sexual activity: Not on file   Other Topics Concern    Not on file   Social History Narrative    Not on file     Social Determinants of Health     Financial Resource Strain:     Difficulty of Paying Living Expenses:    Food Insecurity:     Worried About Running Out of Food in the Last Year:     [address] in the Last Year:    Transportation Needs:     Lack of Transportation (Medical):      Lack of Transportation (Non-Medical):    Physical Activity:     Days of Exercise per Week:     Minutes of Exercise per Session:    Stress:     Feeling of Stress :    Social Connections:     Frequency of Communication with Friends and Family:     Frequency of Social Gatherings with Friends and Family:     Attends Denominational Services:     Active Member of Clubs or Organizations:     Attends Club or Organization Meetings:     Marital Status:    Intimate Partner Violence:     Fear of Current or Ex-Partner:     Emotionally Abused:     Physically Abused:     Sexually Abused:         Current Outpatient Medications   Medication Sig Dispense Refill    METOPROLOL TARTRATE PO Take 1 tablet by mouth 2 times daily      Ascorbic Acid (VITAMIN C) 250 MG tablet Take 250 mg by mouth daily      Lactobacillus Rhamnosus, GG, (CULTURELLE PO) Take by mouth      LORazepam (ATIVAN) 1 MG tablet TAKE 1 TABLET BY MOUTH EVERY 6 HOURS AS NEEDED      nafcillin infusion Infuse 2,000 mg intravenously every 4 hours for 20 days Compound per protocol 240 g 0    ondansetron (ZOFRAN-ODT) 4 MG disintegrating tablet Place 1 tablet under the tongue 3 times daily as needed for Nausea or Vomiting (Patient not taking: Reported on 9/13/2021) 93 tablet 0    pantoprazole (PROTONIX) 40 MG tablet Take 1 tablet by mouth 2 times daily (before meals) 30 tablet 3     No current facility-administered medications for this visit. Allergies   Allergen Reactions    Morphine Other (See Comments)     Pt report muscle cramps        REVIEW OF SYSTEMS:     Constitutional  Negative for fever and chills    HEENT  Negative for ear discharge, ear pain, nosebleed    Eyes  Negative for photophobia, pain and discharge    Respiratory  Negative for hemoptysis and sputum    Cardiovascular  Negative for orthopnea, claudication and PND    Gastrointestinal  Negative for abdominal pain, diarrhea, blood in stool    Musculoskeletal  Negative for joint pain, negative for myalgia    Skin  Negative for rash or itching    Neurological Negative for seizures, weakness, headache, dysarthria, aphasia   Endo/heme/allergies  Negative for polydipsia, environmental allergy    Psychiatric/behavioral  Negative for suicidal ideation. Patient is not anxious        VITALS  BP 95/63   Pulse 84   Ht 5' 6\" (1.676 m)   Wt 170 lb (77.1 kg)   SpO2 97%   BMI 27.44 kg/m²      PHYSICAL EXAMINATION:     Physical Exam   General appearance: cooperative  Skin: no rash or skin lesions. HEENT: normocephalic  Optic Fundi: deferred  Neck: supple, no cervcical adenopathy or carotid bruit  Lungs: clear to auscultation  Heart: Regular rate and rhythm, normal S1, S2. No murmurs, clicks or gallops.   Peripheral pulses: radial pulses palpable  Abdominal: BS present, soft, NT, ND  Extremities: no edema    NEUROLOGICAL EXAMINATION:     GENERAL  Appears comfortable and in no distress   HEENT  NC/ AT   HEART  S1 and S2 heard; palpation of pulses: radial pulse    NECK  Supple and no bruits heard   MENTAL STATUS:  Alert, oriented, intact memory, no confusion, normal speech, normal language, no hallucination or delusion   CRANIAL NERVES: II     -      Visual fields intact to confrontation  III,IV,VI -  PERR, EOMs full, no ptosis  V     -     Normal facial sensation   VII    -     Normal facial symmetry  VIII   -     Intact hearing   IX,X -     Symmetrical palate  XI    - Symmetrical shoulder shrug  XII   -     Midline tongue, no atrophy    MOTOR FUNCTION: RUE: Significant for good strength of grade 5/5 in proximal and distal muscle groups   LUE: Significant for good strength of grade 5/5 in proximal and distal muscle groups   RLE: Significant for good strength of grade 5/5 in proximal and distal muscle groups   LLE: Significant for good strength of grade 5/5 in proximal and distal muscle groups      Normal bulk, normal tone and no involuntary movements, no tremor   SENSORY FUNCTION:  Normal touch, normal pin, normal vibration, normal proprioception   CEREBELLAR FUNCTION:  Intact fine motor control over upper limbs and lower limbs   REFLEX FUNCTION:  Symmetric in upper and lower extremities, no Babinski sign   STATION and GAIT  Normal gait and tandem station, normal tip toes and heel walking     IMAGING:     CT Head 7/20/2021  Impression   No acute intracranial abnormality.  MRI may be obtained if clinically   indicated. MRI Brain WO Contrast 7/21/2021  Impression       Findings suggestive of acute embolic infarctions involving supra and   infratentorial structures as described.       Subacute to chronic encephalomalacia with chronic blood products within the   left superior parietal lobule and right inferior parietal lobule.       There is no acute intracranial hemorrhage, or intracranial mass lesion.       The findings were sent to the Radiology Results [address] at 8:24   pm on 7/21/2021to be communicated to a licensed caregiver. CTA Head Neck W Contrast 7/22/2021  Impression   1. Unremarkable CT angiogram of the head and neck. 2. Sequelae of embolic phenomenon as seen on the prior MRI of the brain. Given the degree of vasogenic edema surrounding several of these areas of   restricted diffusion, septic emboli is favored. The findings were sent to the Radiology Results [address] at 9:48   am on 7/22/2021to be communicated to a licensed caregiver. CT Cervical Spine W Contrast 7/23/2021  CT Thoracic Spine W Contrast 7/23/2021  CT Lumbar Spine W Contrast 7/23/2021  Impression   1. Please note, CT does not adequately assess for early   discitis/osteomyelitis or an epidural abscess. 2. No convincing acute osseous abnormality seen of the cervical, thoracic or   lumbar spine. 3. Scattered degenerative changes noted. 4. Trace pleural effusions bilaterally. 5. Nonspecific stranding along the retroperitoneum bilaterally. 6. No convincing evidence of a drainable fluid collection. MRI Cervical Spine WO Contrast 7/23/2021  Impression   1. Extensive patient motion limits evaluation. 2. No convincing abnormal bone marrow signal or abnormal signal along the   disc spaces of the cervical spine. 3. There is suggestion of abnormal T2 signal within the right medulla. Abnormal T2 signal is seen within the right inferior cerebellum. 4. No convincing abnormal signal within the cervical cord given the   limitations of patient motion.  No significant spinal canal stenosis seen. MRI Brain WO Contrast 7/27/2021  Impression   Interval development of additional punctate areas of restricted diffusion   suggesting continuing embolic showering. ASSESSMENT:      Case of a 40M who presents as post hospital follow up for further evaluation of his symptoms. Was seen in consultation for metabolic encephalopathy with hallucinations and vertigo in the setting of MSSA sepsis and subacute endocarditis with embolic infarctions. Symptoms have now resolved. 1. Cardioembolic stroke ([address]    2. Cerebral septic emboli (HCC)    3. Endocarditis due to methicillin susceptible Staphylococcus aureus (MSSA)    4. Severe mitral regurgitation        PLAN:     1. Completed course of antibiotics as indicated by ID  2. Recommend surgical management of MR to decrease recurrent stroke risk. Clear from neurologic standpoint.      Luis Dorsey received counseling on the following healthy behaviors: medical compliance, smoking cessation, blood pressure control, regular follow up with primary doctor.         Electronically signed by Nuha Soriano MD on 9/13/2021 at 1:07 PM       Nuha Soriano MD  Neurology PGY-3 Resident  9/13/21

## 2021-09-14 RX ORDER — SODIUM CHLORIDE, SODIUM LACTATE, POTASSIUM CHLORIDE, CALCIUM CHLORIDE 600; 310; 30; 20 MG/100ML; MG/100ML; MG/100ML; MG/100ML
1000 INJECTION, SOLUTION INTRAVENOUS CONTINUOUS
Status: CANCELLED | OUTPATIENT
Start: 2021-09-14

## 2021-09-15 ENCOUNTER — HOSPITAL ENCOUNTER (OUTPATIENT)
Dept: PREADMISSION TESTING | Age: 41
Discharge: HOME | End: 2021-09-19
Payer: COMMERCIAL

## 2021-09-15 ENCOUNTER — HOSPITAL ENCOUNTER (OUTPATIENT)
Dept: GENERAL RADIOLOGY | Age: 41
Discharge: HOME | End: 2021-09-17
Payer: COMMERCIAL

## 2021-09-15 VITALS
HEIGHT: 66 IN | WEIGHT: 172 LBS | OXYGEN SATURATION: 98 % | DIASTOLIC BLOOD PRESSURE: 83 MMHG | TEMPERATURE: 97.2 F | SYSTOLIC BLOOD PRESSURE: 133 MMHG | RESPIRATION RATE: 16 BRPM | HEART RATE: 112 BPM | BODY MASS INDEX: 27.64 KG/M2

## 2021-09-15 LAB
ALBUMIN SERPL-MCNC: 4.4 G/DL (ref 3.5–5.2)
ALBUMIN/GLOBULIN RATIO: 1.8 (ref 1–2.5)
ALLEN TEST: POSITIVE
ALP BLD-CCNC: 92 U/L (ref 40–129)
ALT SERPL-CCNC: 16 U/L (ref 5–41)
ANION GAP SERPL CALCULATED.3IONS-SCNC: 14 MMOL/L (ref 9–17)
AST SERPL-CCNC: 19 U/L
BILIRUB SERPL-MCNC: 0.4 MG/DL (ref 0.3–1.2)
BILIRUBIN URINE: NEGATIVE
BUN BLDV-MCNC: 15 MG/DL (ref 6–20)
BUN/CREAT BLD: NORMAL (ref 9–20)
CALCIUM SERPL-MCNC: 9.2 MG/DL (ref 8.6–10.4)
CHLORIDE BLD-SCNC: 105 MMOL/L (ref 98–107)
CO2: 21 MMOL/L (ref 20–31)
COLOR: YELLOW
COMMENT UA: ABNORMAL
CREAT SERPL-MCNC: 0.87 MG/DL (ref 0.7–1.2)
FIO2: ABNORMAL
GFR AFRICAN AMERICAN: >60 ML/MIN
GFR NON-AFRICAN AMERICAN: >60 ML/MIN
GFR SERPL CREATININE-BSD FRML MDRD: NORMAL ML/MIN/{1.73_M2}
GFR SERPL CREATININE-BSD FRML MDRD: NORMAL ML/MIN/{1.73_M2}
GLUCOSE BLD-MCNC: 91 MG/DL (ref 70–99)
GLUCOSE URINE: NEGATIVE
INR BLD: 1
KETONES, URINE: ABNORMAL
LEUKOCYTE ESTERASE, URINE: NEGATIVE
MODE: ABNORMAL
NEGATIVE BASE EXCESS, ART: ABNORMAL (ref 0–2)
NITRITE, URINE: NEGATIVE
O2 DEVICE/FLOW/%: ABNORMAL
PARTIAL THROMBOPLASTIN TIME: 24.2 SEC (ref 20.5–30.5)
PATIENT TEMP: ABNORMAL
PH UA: 5.5 (ref 5–8)
POC HCO3: 22.6 MMOL/L (ref 21–28)
POC O2 SATURATION: 98 % (ref 94–98)
POC PCO2 TEMP: ABNORMAL MM HG
POC PCO2: 31.4 MM HG (ref 35–48)
POC PH TEMP: ABNORMAL
POC PH: 7.46 (ref 7.35–7.45)
POC PO2 TEMP: ABNORMAL MM HG
POC PO2: 101.1 MM HG (ref 83–108)
POSITIVE BASE EXCESS, ART: 0 (ref 0–3)
POTASSIUM SERPL-SCNC: 3.7 MMOL/L (ref 3.7–5.3)
PROTEIN UA: NEGATIVE
PROTHROMBIN TIME: 10.5 SEC (ref 9.1–12.3)
SAMPLE SITE: ABNORMAL
SODIUM BLD-SCNC: 140 MMOL/L (ref 135–144)
SPECIFIC GRAVITY UA: 1.02 (ref 1–1.03)
TCO2 (CALC), ART: ABNORMAL MMOL/L (ref 22–29)
TOTAL PROTEIN: 6.9 G/DL (ref 6.4–8.3)
TURBIDITY: CLEAR
URINE HGB: NEGATIVE
UROBILINOGEN, URINE: NORMAL

## 2021-09-15 PROCEDURE — 81003 URINALYSIS AUTO W/O SCOPE: CPT

## 2021-09-15 PROCEDURE — 85730 THROMBOPLASTIN TIME PARTIAL: CPT

## 2021-09-15 PROCEDURE — 87641 MR-STAPH DNA AMP PROBE: CPT

## 2021-09-15 PROCEDURE — 36415 COLL VENOUS BLD VENIPUNCTURE: CPT

## 2021-09-15 PROCEDURE — 71046 X-RAY EXAM CHEST 2 VIEWS: CPT

## 2021-09-15 PROCEDURE — 85610 PROTHROMBIN TIME: CPT

## 2021-09-15 PROCEDURE — 82803 BLOOD GASES ANY COMBINATION: CPT

## 2021-09-15 PROCEDURE — 93005 ELECTROCARDIOGRAM TRACING: CPT | Performed by: THORACIC SURGERY (CARDIOTHORACIC VASCULAR SURGERY)

## 2021-09-15 PROCEDURE — 80053 COMPREHEN METABOLIC PANEL: CPT

## 2021-09-15 PROCEDURE — 87086 URINE CULTURE/COLONY COUNT: CPT

## 2021-09-15 NOTE — PROGRESS NOTES
Anesthesia Focused Assessment    Hx of anesthesia complications:  no  Family hx of anesthesia complications:  no      Prior + Covid-19 test? Dec 2020  Patient vaccinated: no      STOP-BANG Sleep Apnea Questionnaire    SNORE loudly (heard through closed doors)? No  TIRED, fatigued, sleepy during daytime? No  OBSERVED stopping breathing during sleep? No  High blood PRESSURE or being treated? Yes    BMI over 35? No  AGE over 48? No  NECK circumference over 16\"? No  GENDER (male)? Yes             Total 2  High risk 5-8  Intermediate risk 3-4  Low risk 0-2    ----------------------------------------------------------------------------------------------------------------------  MESSI:                                             Mild , per sleep study in 2021, 20 lb heavier then  If yes, machine?:                          no    Type 1 DM:                                   no  T2DM:                                           no    Coronary Artery Disease:             no  Hypertension:                               yes  Defib / AICD / Pacemaker:           no    Renal Failure:                               no  If yes, on dialysis? :                           Active smoker:                              no  Drinks Alcohol:                              Social   Illicit drugs:                                    no    Dentition:                                      Dental bridge upper right, several crowns x3, nothing loose     Past Medical History:   Diagnosis Date    Bacteremia     MSSA bacteremia   Dr. Rhonda Carias  9/7/21 last visit    Cardioembolic stroke [facility])     Cerebral artery occlusion with cerebral infarction [facility])     cerebral septic emboli 8/2021    Cerebral septic emboli [facility])     Dr. Parmjit Mccormick neuro last seen 9/13/21    Chronic right shoulder pain     since 2020, fell.     Diverticulosis     Endocarditis     mitral valve endocarditis w/ pericardial effusion   8/2021 [facility]   

## 2021-09-16 LAB
CULTURE: NO GROWTH
EKG ATRIAL RATE: 115 BPM
EKG P AXIS: 50 DEGREES
EKG P-R INTERVAL: 162 MS
EKG Q-T INTERVAL: 340 MS
EKG QRS DURATION: 82 MS
EKG QTC CALCULATION (BAZETT): 470 MS
EKG R AXIS: 26 DEGREES
EKG T AXIS: 56 DEGREES
EKG VENTRICULAR RATE: 115 BPM
Lab: NORMAL
MRSA, DNA, NASAL: NORMAL
SPECIMEN DESCRIPTION: NORMAL
SPECIMEN DESCRIPTION: NORMAL

## 2021-09-16 PROCEDURE — 93010 ELECTROCARDIOGRAM REPORT: CPT | Performed by: INTERNAL MEDICINE

## 2021-09-20 ENCOUNTER — HOSPITAL ENCOUNTER (OUTPATIENT)
Dept: PREADMISSION TESTING | Age: 41
Setting detail: SPECIMEN
Discharge: HOME | End: 2021-09-24
Payer: COMMERCIAL

## 2021-09-20 DIAGNOSIS — Z01.818 PREOP TESTING: Primary | ICD-10-CM

## 2021-09-20 PROCEDURE — U0003 INFECTIOUS AGENT DETECTION BY NUCLEIC ACID (DNA OR RNA); SEVERE ACUTE RESPIRATORY SYNDROME CORONAVIRUS 2 (SARS-COV-2) (CORONAVIRUS DISEASE [COVID-19]), AMPLIFIED PROBE TECHNIQUE, MAKING USE OF HIGH THROUGHPUT TECHNOLOGIES AS DESCRIBED BY CMS-2020-01-R: HCPCS

## 2021-09-20 PROCEDURE — U0005 INFEC AGEN DETEC AMPLI PROBE: HCPCS

## 2021-09-20 PROCEDURE — C9803 HOPD COVID-19 SPEC COLLECT: HCPCS

## 2021-09-21 LAB
SARS-COV-2: NORMAL
SARS-COV-2: NOT DETECTED
SOURCE: NORMAL

## 2021-09-21 PROCEDURE — APPNB30 APP NON BILLABLE TIME 0-30 MINS: Performed by: NURSE PRACTITIONER

## 2021-09-21 ASSESSMENT — ENCOUNTER SYMPTOMS
COLOR CHANGE: 0
DIARRHEA: 0
SHORTNESS OF BREATH: 1
CONSTIPATION: 0
ABDOMINAL PAIN: 0

## 2021-09-21 NOTE — H&P
[facility] Cardiothoracic Surgery  Office Visit  History & Physical      CC: Endocarditis, sub-acute    HPI: Barrington Garcia is a 36y.o. year old  male who presented to the emergency room at [facility] in July with complaints of abdominal pain, confusion, fatigue, diarrhea, emesis and body aches for approximately 6 days. He was treated for hypokalemia and discharged him home. He ultimately presented to [address] [place] [address] ER 4 days later and was noted to be tachycardic with elevated troponin levels. Cardiology was consulted and a 2D echo was ordered and showed a posterior mitral leaflet prolapse and severe MR with an eccentric anterior directed jet with a possible small vegetation on the posterior leaflet. The patient underwent a MONROE on 7/20 which revealed a large vegetation measuring 2.5 X 2.4 cm noted on the posterior leaflet of the mitral valve. Posterior leaflet was flaired with severe anteriorly directed eccentric regurgitation. Pertinent medical history includes HTN, hyperlipidemia, diverticulosis resulting in a partial hemicolectomy with reanastomoses, and an appendectomy. Blood cultures were noted to be positive for MSSA. Infectious disease followed and patient has completed antibiotic therapy. During his admission, he experienced a sudden onset of severe vertigo with memory loss/confusion. 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 Neurology was consulted, and patient has recovered fully without intervention. Patient is scheduled for an outpatient follow up later this month and will need surgical clearance before proceeding.         PMH:   has a past medical history of Bacteremia, Cardioembolic stroke [facility]), Cerebral artery occlusion with cerebral infarction [facility]), Cerebral septic emboli [facility]), Chronic right shoulder pain, Diverticulosis, Endocarditis, History of COVID-19 (12/2020), Hyperlipidemia, Hypertension, MSSA bacteremia, MESSI (obstructive sleep apnea), Platelets decreased [facility]), Research subject (07/24/2021), Under care of team (09/15/2021), Under care of team, Under care of team, Under care of team, Under care of team, and Wears contact lenses. PSH:   has a past surgical history that includes Anterior cruciate ligament repair (Right); Appendectomy (07/09/1997); colectomy; Dilatation, esophagus; Abdomen surgery; and hc  picc powerpicc double (08/02/2021). Allergies: Allergies   Allergen Reactions    Morphine Other (See Comments)     Pt report muscle cramps       Medications:   Current Outpatient Medications:     METOPROLOL TARTRATE PO, Take 1 tablet by mouth 2 times daily 12.5 mg bid, Disp: , Rfl:     Ascorbic Acid (VITAMIN C) 250 MG tablet, Take 250 mg by mouth daily Emergen C 750 mg QD, Disp: , Rfl:     Lactobacillus Rhamnosus, GG, (CULTURELLE PO), Take by mouth, Disp: , Rfl:     Social Hx:   reports that he has quit smoking. His smoking use included cigarettes. He smoked 1.00 pack per day. He quit smokeless tobacco use about 4 years ago. His smokeless tobacco use included chew and snuff. Family Hx: family history includes No Known Problems in his mother; Other in his father. ROS:    Review of Systems   Constitutional: Positive for activity change. Negative for appetite change, chills, fatigue and fever. HENT: Negative for congestion. Eyes: Negative for visual disturbance. Respiratory: Positive for shortness of breath. Cardiovascular: Positive for palpitations. Negative for chest pain and leg swelling. Gastrointestinal: Negative for abdominal pain, constipation and diarrhea. Genitourinary: Negative for dysuria. Musculoskeletal: Negative for gait problem. Skin: Negative for color change. Neurological: Positive for weakness. Negative for dizziness. Psychiatric/Behavioral: Negative for suicidal ideas. The patient is not nervous/anxious. Physical Examination   Vitals: There were no vitals filed for this visit.   Physical Exam  Constitutional:       General: He is not in acute distress. Appearance: Normal appearance. He is not ill-appearing. HENT:      Head: Normocephalic. Nose: Nose normal.      Mouth/Throat:      Mouth: Mucous membranes are moist.      Pharynx: Oropharynx is clear. Eyes:      Extraocular Movements: Extraocular movements intact. Conjunctiva/sclera: Conjunctivae normal.      Pupils: Pupils are equal, round, and reactive to light. Cardiovascular:      Rate and Rhythm: Normal rate and regular rhythm. Pulses: Normal pulses. Heart sounds: Normal heart sounds. Pulmonary:      Effort: Pulmonary effort is normal. No respiratory distress. Breath sounds: Normal breath sounds. Abdominal:      Palpations: Abdomen is soft. Tenderness: There is no abdominal tenderness. Musculoskeletal:         General: Normal range of motion. Cervical back: Normal range of motion. Skin:     General: Skin is warm and dry. Capillary Refill: Capillary refill takes less than 2 seconds. Neurological:      General: No focal deficit present. Mental Status: He is alert and oriented to person, place, and time. Psychiatric:         Mood and Affect: Mood normal.         Behavior: Behavior normal.         Labs:   CBC: No results for input(s): WBC, HGB, HCT, MCV, PLT in the last 72 hours. BMP:No results for input(s): NA, K, CL, CO2, PHOS, BUN, CREATININE, MG in the last 72 hours. Invalid input(s): CA  Accucheck Glucoses:No results for input(s): POCGLU in the last 72 hours. Cardiac Enzymes: No results for input(s): CKTOTAL, CKMB, CKMBINDEX, TROPONINI in the last 72 hours. PT/INR: No results for input(s): PROTIME, INR in the last 72 hours. APTT: No results for input(s): APTT in the last 72 hours.   Liver Profile:  Lab Results   Component Value Date    AST 19 09/15/2021    ALT 16 09/15/2021    BILIDIR 0.11 08/01/2021    BILITOT 0.40 09/15/2021    ALKPHOS 92 09/15/2021   No results found size with normal systolic function globally. Calculated ejection fraction is 60%  Right Atrium  Right atrium is normal in size. Mild buckling of the right atrial wall. Right Ventricle  Normal right ventricular size and function. Mitral Valve  Prolapse of the of posterior leaflet of the mitral valve. Large vegetations noted on the MONROE done 7/20/21 are no longer identified . Small echogenicity is noted on the posterior leaflet, maybe improving  vegetation. Severe mitral regurgitation with pulmonary vein flow reversal.  PISA radius 1.6 cm  MR volume 111 ml  Consider MONROE when indicated. Aortic Valve  Aortic valve is trileaflet and opens normally. Mild aortic insufficiency. Tricuspid Valve  Tricuspid valve structure is normal.  Mild tricuspid regurgitation. Estimated right ventricular systolic pressure is 49 mmHg, suggests mild Pulm  HTN. Pulmonic Valve  The pulmonic valve is normal in structure. Pericardial Effusion  Small circumferential pericardial effusion. No signs of echocariographic tamponade.     Miscellaneous  Normal aortic root dimension. CXR:  ONE XRAY VIEW OF THE CHEST       8/24/2021 4:16 pm       COMPARISON:   August 22, 2021       HISTORY:   ORDERING SYSTEM PROVIDED HISTORY: acute shortness of breath   TECHNOLOGIST PROVIDED HISTORY:   acute shortness of breath   Reason for Exam: shortness of breath, mid chest pain   upright port       FINDINGS:   Cardiomegaly is present, with interval worsening of the interstitial and   early alveolar opacities, consistent with worsening edema.  Kerley B lines   are noted bilaterally.  Focal opacity is now present within the right lung   base, and may represent coalescing edema, pneumonia or atelectasis.  Right   upper extremity PICC line is in place, tip at the junction of the SVC and   right atrium.  No pneumothorax is noted.  Osseous structures are stable.           Impression   1. Cardiomegaly, with interval worsening of CHF   2.  New opacity, right proposed procedure, along with its risk, benefits, and therapeutic alternatives. Specific risks discussedincluded death, stroke, mediastinitis, re-operation for bleeding, and atrial fibrillation. We have also discussed the potential need for blood transfusion, along with its risks and benefits. He appears to understand,and consents to proceed. Surgical intervention has been tentatively scheduled for 9/23/2021 if surgical clearance can be obtained from Neurology. On this date 9/8/2021 I have spent 50 minutes reviewing previous notes, test results and face to face with the patient discussing the diagnosis and importance of compliance with the treatment plan as well as documenting on the day of the visit. At least 50% of the time documented was spent with the patient to provide counseling and/or coordination of care.     Eliana Cho, MARIKA - CNP

## 2021-09-22 ENCOUNTER — ANESTHESIA EVENT (OUTPATIENT)
Dept: OPERATING ROOM | Age: 41
DRG: 219 | End: 2021-09-22
Payer: COMMERCIAL

## 2021-09-23 ENCOUNTER — ANESTHESIA (OUTPATIENT)
Dept: OPERATING ROOM | Age: 41
DRG: 219 | End: 2021-09-23
Payer: COMMERCIAL

## 2021-09-23 ENCOUNTER — APPOINTMENT (OUTPATIENT)
Dept: GENERAL RADIOLOGY | Age: 41
DRG: 219 | End: 2021-09-23
Attending: THORACIC SURGERY (CARDIOTHORACIC VASCULAR SURGERY)
Payer: COMMERCIAL

## 2021-09-23 ENCOUNTER — HOSPITAL ENCOUNTER (INPATIENT)
Age: 41
LOS: 5 days | Discharge: HOME | DRG: 219 | End: 2021-09-28
Attending: THORACIC SURGERY (CARDIOTHORACIC VASCULAR SURGERY) | Admitting: THORACIC SURGERY (CARDIOTHORACIC VASCULAR SURGERY)
Payer: COMMERCIAL

## 2021-09-23 VITALS — OXYGEN SATURATION: 100 % | TEMPERATURE: 98.3 F | DIASTOLIC BLOOD PRESSURE: 67 MMHG | SYSTOLIC BLOOD PRESSURE: 85 MMHG

## 2021-09-23 DIAGNOSIS — Z95.2 MITRAL VALVE REPLACED: Primary | ICD-10-CM

## 2021-09-23 LAB
ALLEN TEST: ABNORMAL
ANION GAP SERPL CALCULATED.3IONS-SCNC: 10 MMOL/L (ref 9–17)
ANION GAP: 12 MMOL/L (ref 7–16)
ANION GAP: 13 MMOL/L (ref 7–16)
BLOOD BANK SPECIMEN: NORMAL
BUN BLDV-MCNC: 15 MG/DL (ref 6–20)
BUN/CREAT BLD: ABNORMAL (ref 9–20)
CALCIUM IONIZED: 1.23 MMOL/L (ref 1.13–1.33)
CALCIUM SERPL-MCNC: 8.6 MG/DL (ref 8.6–10.4)
CHLORIDE BLD-SCNC: 108 MMOL/L (ref 98–107)
CO2: 22 MMOL/L (ref 20–31)
CREAT SERPL-MCNC: 0.88 MG/DL (ref 0.7–1.2)
FIO2: ABNORMAL
GFR AFRICAN AMERICAN: >60 ML/MIN
GFR NON-AFRICAN AMERICAN: >60 ML/MIN
GFR NON-AFRICAN AMERICAN: >60 ML/MIN
GFR SERPL CREATININE-BSD FRML MDRD: >60 ML/MIN
GFR SERPL CREATININE-BSD FRML MDRD: ABNORMAL ML/MIN/{1.73_M2}
GFR SERPL CREATININE-BSD FRML MDRD: ABNORMAL ML/MIN/{1.73_M2}
GFR SERPL CREATININE-BSD FRML MDRD: NORMAL ML/MIN/{1.73_M2}
GLUCOSE BLD-MCNC: 118 MG/DL (ref 74–100)
GLUCOSE BLD-MCNC: 122 MG/DL (ref 75–110)
GLUCOSE BLD-MCNC: 124 MG/DL (ref 75–110)
GLUCOSE BLD-MCNC: 125 MG/DL (ref 74–100)
GLUCOSE BLD-MCNC: 133 MG/DL (ref 74–100)
GLUCOSE BLD-MCNC: 135 MG/DL (ref 74–100)
GLUCOSE BLD-MCNC: 137 MG/DL (ref 75–110)
GLUCOSE BLD-MCNC: 149 MG/DL (ref 74–100)
GLUCOSE BLD-MCNC: 152 MG/DL (ref 70–99)
GLUCOSE BLD-MCNC: 164 MG/DL (ref 74–100)
GLUCOSE BLD-MCNC: 218 MG/DL (ref 74–100)
GLUCOSE BLD-MCNC: 230 MG/DL (ref 74–100)
HCT VFR BLD CALC: 28.7 % (ref 40.7–50.3)
HEMOGLOBIN: 9.4 G/DL (ref 13–17)
INR BLD: 1.1
MAGNESIUM: 2.7 MG/DL (ref 1.6–2.6)
MCH RBC QN AUTO: 29.3 PG (ref 25.2–33.5)
MCHC RBC AUTO-ENTMCNC: 32.8 G/DL (ref 28.4–34.8)
MCV RBC AUTO: 89.4 FL (ref 82.6–102.9)
MODE: ABNORMAL
NEGATIVE BASE EXCESS, ART: 1 (ref 0–2)
NEGATIVE BASE EXCESS, ART: 1 (ref 0–2)
NEGATIVE BASE EXCESS, ART: 3 (ref 0–2)
NEGATIVE BASE EXCESS, ART: 3 (ref 0–2)
NEGATIVE BASE EXCESS, ART: 4 (ref 0–2)
NRBC AUTOMATED: 0 PER 100 WBC
O2 DEVICE/FLOW/%: ABNORMAL
PARTIAL THROMBOPLASTIN TIME: 23.6 SEC (ref 20.5–30.5)
PATIENT TEMP: ABNORMAL
PDW BLD-RTO: 14.6 % (ref 11.8–14.4)
PLATELET # BLD: 119 K/UL (ref 138–453)
PMV BLD AUTO: 9.6 FL (ref 8.1–13.5)
POC BUN: 13 MG/DL (ref 8–26)
POC CHLORIDE: 106 MMOL/L (ref 98–107)
POC CHLORIDE: 106 MMOL/L (ref 98–107)
POC CREATININE: 0.87 MG/DL (ref 0.51–1.19)
POC HCO3: 20.2 MMOL/L (ref 21–28)
POC HCO3: 21.1 MMOL/L (ref 21–28)
POC HCO3: 21.3 MMOL/L (ref 21–28)
POC HCO3: 21.5 MMOL/L (ref 21–28)
POC HCO3: 21.9 MMOL/L (ref 21–28)
POC HCO3: 22.4 MMOL/L (ref 21–28)
POC HCO3: 23.5 MMOL/L (ref 21–28)
POC HCO3: 24.1 MMOL/L (ref 21–28)
POC HEMATOCRIT: 18 % (ref 41–53)
POC HEMATOCRIT: 24 % (ref 41–53)
POC HEMATOCRIT: 24 % (ref 41–53)
POC HEMATOCRIT: 25 % (ref 41–53)
POC HEMATOCRIT: 25 % (ref 41–53)
POC HEMATOCRIT: 27 % (ref 41–53)
POC HEMOGLOBIN: 6.3 G/DL (ref 13.5–17.5)
POC HEMOGLOBIN: 8 G/DL (ref 13.5–17.5)
POC HEMOGLOBIN: 8.1 G/DL (ref 13.5–17.5)
POC HEMOGLOBIN: 8.4 G/DL (ref 13.5–17.5)
POC HEMOGLOBIN: 8.6 G/DL (ref 13.5–17.5)
POC HEMOGLOBIN: 9.1 G/DL (ref 13.5–17.5)
POC IONIZED CALCIUM: 1 MMOL/L (ref 1.15–1.33)
POC IONIZED CALCIUM: 1.02 MMOL/L (ref 1.15–1.33)
POC IONIZED CALCIUM: 1.03 MMOL/L (ref 1.15–1.33)
POC IONIZED CALCIUM: 1.16 MMOL/L (ref 1.15–1.33)
POC IONIZED CALCIUM: 1.29 MMOL/L (ref 1.15–1.33)
POC IONIZED CALCIUM: 1.4 MMOL/L (ref 1.15–1.33)
POC LACTIC ACID: 1.71 MMOL/L (ref 0.56–1.39)
POC O2 SATURATION: 100 % (ref 94–98)
POC O2 SATURATION: 87 % (ref 94–98)
POC O2 SATURATION: 92 % (ref 94–98)
POC O2 SATURATION: 92 % (ref 94–98)
POC O2 SATURATION: 99 % (ref 94–98)
POC O2 SATURATION: 99 % (ref 94–98)
POC PCO2 TEMP: ABNORMAL MM HG
POC PCO2: 34 MM HG (ref 35–48)
POC PCO2: 34.3 MM HG (ref 35–48)
POC PCO2: 35.1 MM HG (ref 35–48)
POC PCO2: 38.3 MM HG (ref 35–48)
POC PCO2: 40.2 MM HG (ref 35–48)
POC PCO2: 40.6 MM HG (ref 35–48)
POC PCO2: 40.8 MM HG (ref 35–48)
POC PCO2: 41.6 MM HG (ref 35–48)
POC PH TEMP: ABNORMAL
POC PH: 7.34 (ref 7.35–7.45)
POC PH: 7.34 (ref 7.35–7.45)
POC PH: 7.35 (ref 7.35–7.45)
POC PH: 7.37 (ref 7.35–7.45)
POC PH: 7.38 (ref 7.35–7.45)
POC PH: 7.39 (ref 7.35–7.45)
POC PH: 7.4 (ref 7.35–7.45)
POC PH: 7.4 (ref 7.35–7.45)
POC PO2 TEMP: ABNORMAL MM HG
POC PO2: 146.8 MM HG (ref 83–108)
POC PO2: 147.1 MM HG (ref 83–108)
POC PO2: 224 MM HG (ref 83–108)
POC PO2: 239.9 MM HG (ref 83–108)
POC PO2: 309.3 MM HG (ref 83–108)
POC PO2: 54.7 MM HG (ref 83–108)
POC PO2: 63.6 MM HG (ref 83–108)
POC PO2: 67.7 MM HG (ref 83–108)
POC POTASSIUM: 3 MMOL/L (ref 3.5–4.5)
POC POTASSIUM: 3.1 MMOL/L (ref 3.5–4.5)
POC POTASSIUM: 3.2 MMOL/L (ref 3.5–4.5)
POC POTASSIUM: 3.4 MMOL/L (ref 3.5–4.5)
POC POTASSIUM: 3.7 MMOL/L (ref 3.5–4.5)
POC POTASSIUM: 3.9 MMOL/L (ref 3.5–4.5)
POC POTASSIUM: 4.2 MMOL/L (ref 3.5–4.5)
POC SODIUM: 140 MMOL/L (ref 138–146)
POC SODIUM: 140 MMOL/L (ref 138–146)
POC TCO2: 22 MMOL/L (ref 22–30)
POC TCO2: 23 MMOL/L (ref 22–30)
POSITIVE BASE EXCESS, ART: ABNORMAL (ref 0–3)
POTASSIUM SERPL-SCNC: 3.6 MMOL/L (ref 3.7–5.3)
POTASSIUM SERPL-SCNC: 4 MMOL/L (ref 3.7–5.3)
PROTHROMBIN TIME: 11.5 SEC (ref 9.1–12.3)
RBC # BLD: 3.21 M/UL (ref 4.21–5.77)
SAMPLE SITE: ABNORMAL
SODIUM BLD-SCNC: 140 MMOL/L (ref 135–144)
TCO2 (CALC), ART: ABNORMAL MMOL/L (ref 22–29)
WBC # BLD: 15.4 K/UL (ref 3.5–11.3)

## 2021-09-23 PROCEDURE — 2580000003 HC RX 258: Performed by: NURSE PRACTITIONER

## 2021-09-23 PROCEDURE — 87102 FUNGUS ISOLATION CULTURE: CPT

## 2021-09-23 PROCEDURE — 84132 ASSAY OF SERUM POTASSIUM: CPT

## 2021-09-23 PROCEDURE — 87206 SMEAR FLUORESCENT/ACID STAI: CPT

## 2021-09-23 PROCEDURE — 87255 GENET VIRUS ISOLATE HSV: CPT

## 2021-09-23 PROCEDURE — 94761 N-INVAS EAR/PLS OXIMETRY MLT: CPT

## 2021-09-23 PROCEDURE — 2700000000 HC OXYGEN THERAPY PER DAY

## 2021-09-23 PROCEDURE — 2580000003 HC RX 258: Performed by: NURSE ANESTHETIST, CERTIFIED REGISTERED

## 2021-09-23 PROCEDURE — 2500000003 HC RX 250 WO HCPCS: Performed by: NURSE PRACTITIONER

## 2021-09-23 PROCEDURE — 94002 VENT MGMT INPAT INIT DAY: CPT

## 2021-09-23 PROCEDURE — 85347 COAGULATION TIME ACTIVATED: CPT

## 2021-09-23 PROCEDURE — 82947 ASSAY GLUCOSE BLOOD QUANT: CPT

## 2021-09-23 PROCEDURE — 87205 SMEAR GRAM STAIN: CPT

## 2021-09-23 PROCEDURE — 3600000018 HC SURGERY OHS ADDTL 15MIN: Performed by: THORACIC SURGERY (CARDIOTHORACIC VASCULAR SURGERY)

## 2021-09-23 PROCEDURE — 87140 CULTURE TYPE IMMUNOFLUORESC: CPT

## 2021-09-23 PROCEDURE — 87070 CULTURE OTHR SPECIMN AEROBIC: CPT

## 2021-09-23 PROCEDURE — 37799 UNLISTED PX VASCULAR SURGERY: CPT

## 2021-09-23 PROCEDURE — 87075 CULTR BACTERIA EXCEPT BLOOD: CPT

## 2021-09-23 PROCEDURE — 3700000001 HC ADD 15 MINUTES (ANESTHESIA): Performed by: THORACIC SURGERY (CARDIOTHORACIC VASCULAR SURGERY)

## 2021-09-23 PROCEDURE — 82330 ASSAY OF CALCIUM: CPT

## 2021-09-23 PROCEDURE — 86900 BLOOD TYPING SEROLOGIC ABO: CPT

## 2021-09-23 PROCEDURE — 87252 VIRUS INOCULATION TISSUE: CPT

## 2021-09-23 PROCEDURE — 6360000002 HC RX W HCPCS: Performed by: NURSE PRACTITIONER

## 2021-09-23 PROCEDURE — 83735 ASSAY OF MAGNESIUM: CPT

## 2021-09-23 PROCEDURE — 2780000006 HC MISC HEART VALVE: Performed by: THORACIC SURGERY (CARDIOTHORACIC VASCULAR SURGERY)

## 2021-09-23 PROCEDURE — 2709999900 HC NON-CHARGEABLE SUPPLY: Performed by: THORACIC SURGERY (CARDIOTHORACIC VASCULAR SURGERY)

## 2021-09-23 PROCEDURE — 85576 BLOOD PLATELET AGGREGATION: CPT

## 2021-09-23 PROCEDURE — 82803 BLOOD GASES ANY COMBINATION: CPT

## 2021-09-23 PROCEDURE — 5A1221Z PERFORMANCE OF CARDIAC OUTPUT, CONTINUOUS: ICD-10-PCS | Performed by: THORACIC SURGERY (CARDIOTHORACIC VASCULAR SURGERY)

## 2021-09-23 PROCEDURE — B246ZZ4 ULTRASONOGRAPHY OF RIGHT AND LEFT HEART, TRANSESOPHAGEAL: ICD-10-PCS | Performed by: THORACIC SURGERY (CARDIOTHORACIC VASCULAR SURGERY)

## 2021-09-23 PROCEDURE — 93005 ELECTROCARDIOGRAM TRACING: CPT | Performed by: THORACIC SURGERY (CARDIOTHORACIC VASCULAR SURGERY)

## 2021-09-23 PROCEDURE — 84295 ASSAY OF SERUM SODIUM: CPT

## 2021-09-23 PROCEDURE — 85384 FIBRINOGEN ACTIVITY: CPT

## 2021-09-23 PROCEDURE — 2500000003 HC RX 250 WO HCPCS: Performed by: NURSE ANESTHETIST, CERTIFIED REGISTERED

## 2021-09-23 PROCEDURE — 80051 ELECTROLYTE PANEL: CPT

## 2021-09-23 PROCEDURE — 6370000000 HC RX 637 (ALT 250 FOR IP): Performed by: NURSE PRACTITIONER

## 2021-09-23 PROCEDURE — 94640 AIRWAY INHALATION TREATMENT: CPT

## 2021-09-23 PROCEDURE — C9113 INJ PANTOPRAZOLE SODIUM, VIA: HCPCS | Performed by: NURSE PRACTITIONER

## 2021-09-23 PROCEDURE — 85027 COMPLETE CBC AUTOMATED: CPT

## 2021-09-23 PROCEDURE — 86850 RBC ANTIBODY SCREEN: CPT

## 2021-09-23 PROCEDURE — 7100000001 HC PACU RECOVERY - ADDTL 15 MIN

## 2021-09-23 PROCEDURE — 3700000000 HC ANESTHESIA ATTENDED CARE: Performed by: THORACIC SURGERY (CARDIOTHORACIC VASCULAR SURGERY)

## 2021-09-23 PROCEDURE — P9041 ALBUMIN (HUMAN),5%, 50ML: HCPCS | Performed by: NURSE PRACTITIONER

## 2021-09-23 PROCEDURE — 7100000000 HC PACU RECOVERY - FIRST 15 MIN

## 2021-09-23 PROCEDURE — 6360000002 HC RX W HCPCS: Performed by: NURSE ANESTHETIST, CERTIFIED REGISTERED

## 2021-09-23 PROCEDURE — 2000000000 HC ICU R&B

## 2021-09-23 PROCEDURE — 6360000002 HC RX W HCPCS: Performed by: THORACIC SURGERY (CARDIOTHORACIC VASCULAR SURGERY)

## 2021-09-23 PROCEDURE — 80048 BASIC METABOLIC PNL TOTAL CA: CPT

## 2021-09-23 PROCEDURE — 2720000010 HC SURG SUPPLY STERILE: Performed by: THORACIC SURGERY (CARDIOTHORACIC VASCULAR SURGERY)

## 2021-09-23 PROCEDURE — 85730 THROMBOPLASTIN TIME PARTIAL: CPT

## 2021-09-23 PROCEDURE — 86901 BLOOD TYPING SEROLOGIC RH(D): CPT

## 2021-09-23 PROCEDURE — 3600000008 HC SURGERY OHS BASE: Performed by: THORACIC SURGERY (CARDIOTHORACIC VASCULAR SURGERY)

## 2021-09-23 PROCEDURE — 85014 HEMATOCRIT: CPT

## 2021-09-23 PROCEDURE — 82565 ASSAY OF CREATININE: CPT

## 2021-09-23 PROCEDURE — 5A1935Z RESPIRATORY VENTILATION, LESS THAN 24 CONSECUTIVE HOURS: ICD-10-PCS | Performed by: THORACIC SURGERY (CARDIOTHORACIC VASCULAR SURGERY)

## 2021-09-23 PROCEDURE — 85390 FIBRINOLYSINS SCREEN I&R: CPT

## 2021-09-23 PROCEDURE — 85610 PROTHROMBIN TIME: CPT

## 2021-09-23 PROCEDURE — 86920 COMPATIBILITY TEST SPIN: CPT

## 2021-09-23 PROCEDURE — 83605 ASSAY OF LACTIC ACID: CPT

## 2021-09-23 PROCEDURE — 82374 ASSAY BLOOD CARBON DIOXIDE: CPT

## 2021-09-23 PROCEDURE — 71045 X-RAY EXAM CHEST 1 VIEW: CPT

## 2021-09-23 PROCEDURE — 02RG0JZ REPLACEMENT OF MITRAL VALVE WITH SYNTHETIC SUBSTITUTE, OPEN APPROACH: ICD-10-PCS | Performed by: THORACIC SURGERY (CARDIOTHORACIC VASCULAR SURGERY)

## 2021-09-23 PROCEDURE — 6370000000 HC RX 637 (ALT 250 FOR IP): Performed by: NURSE ANESTHETIST, CERTIFIED REGISTERED

## 2021-09-23 PROCEDURE — 82435 ASSAY OF BLOOD CHLORIDE: CPT

## 2021-09-23 PROCEDURE — 84520 ASSAY OF UREA NITROGEN: CPT

## 2021-09-23 PROCEDURE — 87015 SPECIMEN INFECT AGNT CONCNTJ: CPT

## 2021-09-23 PROCEDURE — 33430 REPLACEMENT OF MITRAL VALVE: CPT | Performed by: THORACIC SURGERY (CARDIOTHORACIC VASCULAR SURGERY)

## 2021-09-23 DEVICE — VALVE MI H17.8MM OD31-33MM ID23.4MM SEW RNG DIA36MM PTFE W/: Type: IMPLANTABLE DEVICE | Site: HEART | Status: FUNCTIONAL

## 2021-09-23 RX ORDER — PROPOFOL 10 MG/ML
10 INJECTION, EMULSION INTRAVENOUS CONTINUOUS
Status: DISCONTINUED | OUTPATIENT
Start: 2021-09-23 | End: 2021-09-24

## 2021-09-23 RX ORDER — PROTAMINE SULFATE 10 MG/ML
INJECTION, SOLUTION INTRAVENOUS
Status: COMPLETED
Start: 2021-09-23 | End: 2021-09-23

## 2021-09-23 RX ORDER — DEXTROSE MONOHYDRATE 25 G/50ML
12.5 INJECTION, SOLUTION INTRAVENOUS PRN
Status: DISCONTINUED | OUTPATIENT
Start: 2021-09-23 | End: 2021-09-28 | Stop reason: HOSPADM

## 2021-09-23 RX ORDER — ATORVASTATIN CALCIUM 80 MG/1
80 TABLET, FILM COATED ORAL NIGHTLY
Status: DISCONTINUED | OUTPATIENT
Start: 2021-09-23 | End: 2021-09-23 | Stop reason: HOSPADM

## 2021-09-23 RX ORDER — POLYETHYLENE GLYCOL 3350 17 G/17G
17 POWDER, FOR SOLUTION ORAL DAILY
Status: DISCONTINUED | OUTPATIENT
Start: 2021-09-24 | End: 2021-09-28 | Stop reason: HOSPADM

## 2021-09-23 RX ORDER — NICOTINE POLACRILEX 4 MG
15 LOZENGE BUCCAL PRN
Status: DISCONTINUED | OUTPATIENT
Start: 2021-09-23 | End: 2021-09-28 | Stop reason: HOSPADM

## 2021-09-23 RX ORDER — SODIUM CHLORIDE 0.9 % (FLUSH) 0.9 %
10 SYRINGE (ML) INJECTION EVERY 12 HOURS SCHEDULED
Status: DISCONTINUED | OUTPATIENT
Start: 2021-09-23 | End: 2021-09-28 | Stop reason: HOSPADM

## 2021-09-23 RX ORDER — LIDOCAINE HYDROCHLORIDE 10 MG/ML
INJECTION, SOLUTION EPIDURAL; INFILTRATION; INTRACAUDAL; PERINEURAL PRN
Status: DISCONTINUED | OUTPATIENT
Start: 2021-09-23 | End: 2021-09-23 | Stop reason: SDUPTHER

## 2021-09-23 RX ORDER — VANCOMYCIN HYDROCHLORIDE 1 G/20ML
INJECTION, POWDER, LYOPHILIZED, FOR SOLUTION INTRAVENOUS PRN
Status: DISCONTINUED | OUTPATIENT
Start: 2021-09-23 | End: 2021-09-23 | Stop reason: HOSPADM

## 2021-09-23 RX ORDER — PROPOFOL 10 MG/ML
INJECTION, EMULSION INTRAVENOUS CONTINUOUS PRN
Status: DISCONTINUED | OUTPATIENT
Start: 2021-09-23 | End: 2021-09-23 | Stop reason: SDUPTHER

## 2021-09-23 RX ORDER — OXYCODONE HYDROCHLORIDE AND ACETAMINOPHEN 5; 325 MG/1; MG/1
2 TABLET ORAL EVERY 4 HOURS PRN
Status: DISCONTINUED | OUTPATIENT
Start: 2021-09-23 | End: 2021-09-24

## 2021-09-23 RX ORDER — SODIUM CHLORIDE 9 MG/ML
10 INJECTION INTRAVENOUS DAILY
Status: DISCONTINUED | OUTPATIENT
Start: 2021-09-23 | End: 2021-09-28

## 2021-09-23 RX ORDER — CLOPIDOGREL BISULFATE 75 MG/1
75 TABLET ORAL DAILY
Status: DISCONTINUED | OUTPATIENT
Start: 2021-09-24 | End: 2021-09-24

## 2021-09-23 RX ORDER — NOREPINEPHRINE BIT/0.9 % NACL 16MG/250ML
0.2 INFUSION BOTTLE (ML) INTRAVENOUS CONTINUOUS PRN
Status: DISCONTINUED | OUTPATIENT
Start: 2021-09-23 | End: 2021-09-24

## 2021-09-23 RX ORDER — ASPIRIN 81 MG/1
81 TABLET ORAL DAILY
Status: DISCONTINUED | OUTPATIENT
Start: 2021-09-23 | End: 2021-09-23 | Stop reason: HOSPADM

## 2021-09-23 RX ORDER — VANCOMYCIN HYDROCHLORIDE 1 G/200ML
1000 INJECTION, SOLUTION INTRAVENOUS
Status: COMPLETED | OUTPATIENT
Start: 2021-09-23 | End: 2021-09-23

## 2021-09-23 RX ORDER — PROTAMINE SULFATE 10 MG/ML
INJECTION, SOLUTION INTRAVENOUS PRN
Status: DISCONTINUED | OUTPATIENT
Start: 2021-09-23 | End: 2021-09-23 | Stop reason: SDUPTHER

## 2021-09-23 RX ORDER — ASPIRIN 81 MG/1
81 TABLET ORAL DAILY
Status: DISCONTINUED | OUTPATIENT
Start: 2021-09-24 | End: 2021-09-28 | Stop reason: HOSPADM

## 2021-09-23 RX ORDER — HYDRALAZINE HYDROCHLORIDE 20 MG/ML
5 INJECTION INTRAMUSCULAR; INTRAVENOUS EVERY 5 MIN PRN
Status: DISCONTINUED | OUTPATIENT
Start: 2021-09-23 | End: 2021-09-28 | Stop reason: HOSPADM

## 2021-09-23 RX ORDER — SODIUM CHLORIDE 9 MG/ML
INJECTION, SOLUTION INTRAVENOUS CONTINUOUS PRN
Status: DISCONTINUED | OUTPATIENT
Start: 2021-09-23 | End: 2021-09-23 | Stop reason: SDUPTHER

## 2021-09-23 RX ORDER — PROTAMINE SULFATE 10 MG/ML
50 INJECTION, SOLUTION INTRAVENOUS
Status: ACTIVE | OUTPATIENT
Start: 2021-09-23 | End: 2021-09-23

## 2021-09-23 RX ORDER — OXYCODONE HYDROCHLORIDE AND ACETAMINOPHEN 5; 325 MG/1; MG/1
1 TABLET ORAL EVERY 4 HOURS PRN
Status: DISCONTINUED | OUTPATIENT
Start: 2021-09-23 | End: 2021-09-24

## 2021-09-23 RX ORDER — ACETAMINOPHEN 325 MG/1
650 TABLET ORAL EVERY 4 HOURS PRN
Status: DISCONTINUED | OUTPATIENT
Start: 2021-09-23 | End: 2021-09-28 | Stop reason: HOSPADM

## 2021-09-23 RX ORDER — INSULIN GLARGINE 100 [IU]/ML
0.15 INJECTION, SOLUTION SUBCUTANEOUS NIGHTLY
Status: DISCONTINUED | OUTPATIENT
Start: 2021-09-24 | End: 2021-09-28

## 2021-09-23 RX ORDER — PANTOPRAZOLE SODIUM 40 MG/1
40 TABLET, DELAYED RELEASE ORAL DAILY
Status: DISCONTINUED | OUTPATIENT
Start: 2021-09-24 | End: 2021-09-28 | Stop reason: HOSPADM

## 2021-09-23 RX ORDER — SODIUM CHLORIDE 0.9 % (FLUSH) 0.9 %
10 SYRINGE (ML) INJECTION PRN
Status: DISCONTINUED | OUTPATIENT
Start: 2021-09-23 | End: 2021-09-28 | Stop reason: HOSPADM

## 2021-09-23 RX ORDER — CALCIUM CHLORIDE 100 MG/ML
INJECTION INTRAVENOUS; INTRAVENTRICULAR PRN
Status: DISCONTINUED | OUTPATIENT
Start: 2021-09-23 | End: 2021-09-23 | Stop reason: SDUPTHER

## 2021-09-23 RX ORDER — VANCOMYCIN HYDROCHLORIDE 1 G/20ML
INJECTION, POWDER, LYOPHILIZED, FOR SOLUTION INTRAVENOUS
Status: DISCONTINUED
Start: 2021-09-23 | End: 2021-09-23

## 2021-09-23 RX ORDER — FENTANYL CITRATE 50 UG/ML
50 INJECTION, SOLUTION INTRAMUSCULAR; INTRAVENOUS
Status: DISCONTINUED | OUTPATIENT
Start: 2021-09-23 | End: 2021-09-28

## 2021-09-23 RX ORDER — SODIUM CHLORIDE, SODIUM LACTATE, POTASSIUM CHLORIDE, CALCIUM CHLORIDE 600; 310; 30; 20 MG/100ML; MG/100ML; MG/100ML; MG/100ML
INJECTION, SOLUTION INTRAVENOUS CONTINUOUS PRN
Status: DISCONTINUED | OUTPATIENT
Start: 2021-09-23 | End: 2021-09-23 | Stop reason: SDUPTHER

## 2021-09-23 RX ORDER — PROPOFOL 10 MG/ML
INJECTION, EMULSION INTRAVENOUS
Status: COMPLETED
Start: 2021-09-23 | End: 2021-09-23

## 2021-09-23 RX ORDER — ATORVASTATIN CALCIUM 20 MG/1
20 TABLET, FILM COATED ORAL NIGHTLY
Status: DISCONTINUED | OUTPATIENT
Start: 2021-09-24 | End: 2021-09-28 | Stop reason: HOSPADM

## 2021-09-23 RX ORDER — SODIUM CHLORIDE 9 MG/ML
25 INJECTION, SOLUTION INTRAVENOUS PRN
Status: DISCONTINUED | OUTPATIENT
Start: 2021-09-23 | End: 2021-09-28 | Stop reason: HOSPADM

## 2021-09-23 RX ORDER — VANCOMYCIN HYDROCHLORIDE 1 G/200ML
1000 INJECTION, SOLUTION INTRAVENOUS EVERY 12 HOURS
Status: COMPLETED | OUTPATIENT
Start: 2021-09-23 | End: 2021-09-25

## 2021-09-23 RX ORDER — ONDANSETRON 2 MG/ML
4 INJECTION INTRAMUSCULAR; INTRAVENOUS EVERY 8 HOURS PRN
Status: DISCONTINUED | OUTPATIENT
Start: 2021-09-23 | End: 2021-09-28 | Stop reason: HOSPADM

## 2021-09-23 RX ORDER — AMIODARONE HYDROCHLORIDE 200 MG/1
200 TABLET ORAL 3 TIMES DAILY
Status: DISCONTINUED | OUTPATIENT
Start: 2021-09-23 | End: 2021-09-28 | Stop reason: HOSPADM

## 2021-09-23 RX ORDER — FENTANYL CITRATE 50 UG/ML
25 INJECTION, SOLUTION INTRAMUSCULAR; INTRAVENOUS
Status: DISCONTINUED | OUTPATIENT
Start: 2021-09-23 | End: 2021-09-28

## 2021-09-23 RX ORDER — AMIODARONE HYDROCHLORIDE 200 MG/1
200 TABLET ORAL
Status: DISCONTINUED | OUTPATIENT
Start: 2021-09-23 | End: 2021-09-23 | Stop reason: HOSPADM

## 2021-09-23 RX ORDER — PHENYLEPHRINE HCL IN 0.9% NACL 0.5 MG/5ML
SYRINGE (ML) INTRAVENOUS PRN
Status: DISCONTINUED | OUTPATIENT
Start: 2021-09-23 | End: 2021-09-23 | Stop reason: SDUPTHER

## 2021-09-23 RX ORDER — CHLORHEXIDINE GLUCONATE 0.12 MG/ML
15 RINSE ORAL ONCE
Status: COMPLETED | OUTPATIENT
Start: 2021-09-23 | End: 2021-09-23

## 2021-09-23 RX ORDER — IPRATROPIUM BROMIDE AND ALBUTEROL SULFATE 2.5; .5 MG/3ML; MG/3ML
1 SOLUTION RESPIRATORY (INHALATION)
Status: DISCONTINUED | OUTPATIENT
Start: 2021-09-23 | End: 2021-09-24

## 2021-09-23 RX ORDER — EPHEDRINE SULFATE/0.9% NACL/PF 50 MG/5 ML
SYRINGE (ML) INTRAVENOUS PRN
Status: DISCONTINUED | OUTPATIENT
Start: 2021-09-23 | End: 2021-09-23 | Stop reason: SDUPTHER

## 2021-09-23 RX ORDER — MAGNESIUM SULFATE 1 G/100ML
1000 INJECTION INTRAVENOUS PRN
Status: DISCONTINUED | OUTPATIENT
Start: 2021-09-23 | End: 2021-09-28 | Stop reason: HOSPADM

## 2021-09-23 RX ORDER — POTASSIUM CHLORIDE 29.8 MG/ML
20 INJECTION INTRAVENOUS PRN
Status: DISCONTINUED | OUTPATIENT
Start: 2021-09-23 | End: 2021-09-28 | Stop reason: HOSPADM

## 2021-09-23 RX ORDER — VASOPRESSIN 20 [USP'U]/ML
INJECTION, SOLUTION INTRAMUSCULAR; SUBCUTANEOUS PRN
Status: DISCONTINUED | OUTPATIENT
Start: 2021-09-23 | End: 2021-09-23 | Stop reason: SDUPTHER

## 2021-09-23 RX ORDER — MIDAZOLAM HYDROCHLORIDE 1 MG/ML
INJECTION INTRAMUSCULAR; INTRAVENOUS PRN
Status: DISCONTINUED | OUTPATIENT
Start: 2021-09-23 | End: 2021-09-23 | Stop reason: SDUPTHER

## 2021-09-23 RX ORDER — METOPROLOL TARTRATE 5 MG/5ML
2.5 INJECTION INTRAVENOUS EVERY 10 MIN PRN
Status: DISCONTINUED | OUTPATIENT
Start: 2021-09-23 | End: 2021-09-28 | Stop reason: HOSPADM

## 2021-09-23 RX ORDER — FENTANYL CITRATE 0.05 MG/ML
INJECTION, SOLUTION INTRAMUSCULAR; INTRAVENOUS PRN
Status: DISCONTINUED | OUTPATIENT
Start: 2021-09-23 | End: 2021-09-23 | Stop reason: SDUPTHER

## 2021-09-23 RX ORDER — PANTOPRAZOLE SODIUM 40 MG/10ML
40 INJECTION, POWDER, LYOPHILIZED, FOR SOLUTION INTRAVENOUS DAILY
Status: DISCONTINUED | OUTPATIENT
Start: 2021-09-23 | End: 2021-09-28

## 2021-09-23 RX ORDER — CHLORHEXIDINE GLUCONATE 4 G/100ML
SOLUTION TOPICAL SEE ADMIN INSTRUCTIONS
Status: DISCONTINUED | OUTPATIENT
Start: 2021-09-23 | End: 2021-09-23 | Stop reason: HOSPADM

## 2021-09-23 RX ORDER — GLYCOPYRROLATE 1 MG/5 ML
SYRINGE (ML) INTRAVENOUS PRN
Status: DISCONTINUED | OUTPATIENT
Start: 2021-09-23 | End: 2021-09-23 | Stop reason: SDUPTHER

## 2021-09-23 RX ORDER — SODIUM PHOSPHATE, DIBASIC AND SODIUM PHOSPHATE, MONOBASIC 7; 19 G/133ML; G/133ML
1 ENEMA RECTAL DAILY PRN
Status: DISCONTINUED | OUTPATIENT
Start: 2021-09-23 | End: 2021-09-28 | Stop reason: HOSPADM

## 2021-09-23 RX ORDER — HEPARIN SODIUM 1000 [USP'U]/ML
INJECTION, SOLUTION INTRAVENOUS; SUBCUTANEOUS PRN
Status: DISCONTINUED | OUTPATIENT
Start: 2021-09-23 | End: 2021-09-23 | Stop reason: SDUPTHER

## 2021-09-23 RX ORDER — DIPHENHYDRAMINE HCL 25 MG
25 TABLET ORAL NIGHTLY PRN
Status: DISCONTINUED | OUTPATIENT
Start: 2021-09-24 | End: 2021-09-28 | Stop reason: HOSPADM

## 2021-09-23 RX ORDER — ROCURONIUM BROMIDE 10 MG/ML
INJECTION, SOLUTION INTRAVENOUS PRN
Status: DISCONTINUED | OUTPATIENT
Start: 2021-09-23 | End: 2021-09-23 | Stop reason: SDUPTHER

## 2021-09-23 RX ORDER — DEXTROSE MONOHYDRATE 50 MG/ML
100 INJECTION, SOLUTION INTRAVENOUS PRN
Status: DISCONTINUED | OUTPATIENT
Start: 2021-09-23 | End: 2021-09-28 | Stop reason: HOSPADM

## 2021-09-23 RX ORDER — ALBUMIN, HUMAN INJ 5% 5 %
25 SOLUTION INTRAVENOUS PRN
Status: DISCONTINUED | OUTPATIENT
Start: 2021-09-23 | End: 2021-09-28 | Stop reason: HOSPADM

## 2021-09-23 RX ADMIN — FENTANYL CITRATE 50 MCG: 50 INJECTION, SOLUTION INTRAMUSCULAR; INTRAVENOUS at 16:08

## 2021-09-23 RX ADMIN — SODIUM CHLORIDE, POTASSIUM CHLORIDE, SODIUM LACTATE AND CALCIUM CHLORIDE: 600; 310; 30; 20 INJECTION, SOLUTION INTRAVENOUS at 09:16

## 2021-09-23 RX ADMIN — VASOPRESSIN 1 UNITS: 20 INJECTION INTRAVENOUS at 12:50

## 2021-09-23 RX ADMIN — Medication 50 MCG: at 10:50

## 2021-09-23 RX ADMIN — ROCURONIUM BROMIDE 10 MG: 10 INJECTION INTRAVENOUS at 14:05

## 2021-09-23 RX ADMIN — SODIUM CHLORIDE 2 UNITS/HR: 9 INJECTION, SOLUTION INTRAVENOUS at 12:09

## 2021-09-23 RX ADMIN — FENTANYL CITRATE 50 MCG: 50 INJECTION, SOLUTION INTRAMUSCULAR; INTRAVENOUS at 22:06

## 2021-09-23 RX ADMIN — Medication 100 MCG: at 10:41

## 2021-09-23 RX ADMIN — PROTAMINE SULFATE 50 MG: 10 INJECTION, SOLUTION INTRAVENOUS at 13:48

## 2021-09-23 RX ADMIN — VANCOMYCIN HYDROCHLORIDE 1000 MG: 1 INJECTION, SOLUTION INTRAVENOUS at 10:05

## 2021-09-23 RX ADMIN — ROCURONIUM BROMIDE 20 MG: 10 INJECTION INTRAVENOUS at 11:51

## 2021-09-23 RX ADMIN — Medication 50 MCG: at 10:52

## 2021-09-23 RX ADMIN — Medication 50 MCG: at 10:55

## 2021-09-23 RX ADMIN — EPINEPHRINE 0.02 MCG/KG/MIN: 1 INJECTION PARENTERAL at 12:48

## 2021-09-23 RX ADMIN — ALBUMIN (HUMAN) 25 G: 12.5 INJECTION, SOLUTION INTRAVENOUS at 15:59

## 2021-09-23 RX ADMIN — Medication 50 MCG: at 10:57

## 2021-09-23 RX ADMIN — INSULIN HUMAN 2 UNITS: 100 INJECTION, SOLUTION PARENTERAL at 12:09

## 2021-09-23 RX ADMIN — Medication 81 MG: at 08:26

## 2021-09-23 RX ADMIN — SODIUM CHLORIDE, PRESERVATIVE FREE 10 ML: 5 INJECTION INTRAVENOUS at 20:07

## 2021-09-23 RX ADMIN — CEFAZOLIN 2000 MG: 10 INJECTION, POWDER, FOR SOLUTION INTRAVENOUS at 10:20

## 2021-09-23 RX ADMIN — FENTANYL CITRATE 50 MCG: 50 INJECTION, SOLUTION INTRAMUSCULAR; INTRAVENOUS at 19:09

## 2021-09-23 RX ADMIN — Medication 100 MCG: at 09:35

## 2021-09-23 RX ADMIN — POTASSIUM CHLORIDE 20 MEQ: 29.8 INJECTION, SOLUTION INTRAVENOUS at 16:24

## 2021-09-23 RX ADMIN — CHLORHEXIDINE GLUCONATE 0.12% ORAL RINSE 15 ML: 1.2 LIQUID ORAL at 07:54

## 2021-09-23 RX ADMIN — Medication 100 MCG: at 10:32

## 2021-09-23 RX ADMIN — POTASSIUM CHLORIDE 20 MEQ: 29.8 INJECTION, SOLUTION INTRAVENOUS at 19:58

## 2021-09-23 RX ADMIN — OXYCODONE HYDROCHLORIDE AND ACETAMINOPHEN 2 TABLET: 5; 325 TABLET ORAL at 14:31

## 2021-09-23 RX ADMIN — FENTANYL CITRATE 50 MCG: 50 INJECTION, SOLUTION INTRAMUSCULAR; INTRAVENOUS at 14:34

## 2021-09-23 RX ADMIN — Medication 100 MCG: at 10:23

## 2021-09-23 RX ADMIN — MUPIROCIN: 20 OINTMENT TOPICAL at 21:06

## 2021-09-23 RX ADMIN — SODIUM CHLORIDE 10 ML: 9 INJECTION INTRAMUSCULAR; INTRAVENOUS; SUBCUTANEOUS at 17:48

## 2021-09-23 RX ADMIN — HEPARIN SODIUM 26000 UNITS: 1000 INJECTION, SOLUTION INTRAVENOUS; SUBCUTANEOUS at 10:49

## 2021-09-23 RX ADMIN — FENTANYL CITRATE 100 MCG: 50 INJECTION INTRAVENOUS at 09:44

## 2021-09-23 RX ADMIN — Medication 100 MCG: at 10:35

## 2021-09-23 RX ADMIN — MUPIROCIN: 20 OINTMENT TOPICAL at 07:54

## 2021-09-23 RX ADMIN — CALCIUM CHLORIDE 0.5 G: 100 INJECTION, SOLUTION INTRAVENOUS; INTRAVENTRICULAR at 13:33

## 2021-09-23 RX ADMIN — MIDAZOLAM HYDROCHLORIDE 2 MG: 1 INJECTION, SOLUTION INTRAMUSCULAR; INTRAVENOUS at 09:16

## 2021-09-23 RX ADMIN — IPRATROPIUM BROMIDE AND ALBUTEROL SULFATE 1 AMPULE: .5; 3 SOLUTION RESPIRATORY (INHALATION) at 20:12

## 2021-09-23 RX ADMIN — FENTANYL CITRATE 50 MCG: 50 INJECTION, SOLUTION INTRAMUSCULAR; INTRAVENOUS at 17:17

## 2021-09-23 RX ADMIN — VASOPRESSIN 2 UNITS/HR: 20 INJECTION INTRAVENOUS at 11:46

## 2021-09-23 RX ADMIN — FENTANYL CITRATE 150 MCG: 50 INJECTION INTRAVENOUS at 14:09

## 2021-09-23 RX ADMIN — SODIUM CHLORIDE, POTASSIUM CHLORIDE, SODIUM LACTATE AND CALCIUM CHLORIDE: 600; 310; 30; 20 INJECTION, SOLUTION INTRAVENOUS at 13:18

## 2021-09-23 RX ADMIN — Medication 100 MCG: at 10:27

## 2021-09-23 RX ADMIN — Medication 100 MCG: at 10:19

## 2021-09-23 RX ADMIN — ROCURONIUM BROMIDE 50 MG: 10 INJECTION INTRAVENOUS at 09:55

## 2021-09-23 RX ADMIN — SODIUM CHLORIDE, POTASSIUM CHLORIDE, SODIUM LACTATE AND CALCIUM CHLORIDE: 600; 310; 30; 20 INJECTION, SOLUTION INTRAVENOUS at 11:16

## 2021-09-23 RX ADMIN — SODIUM BICARBONATE 50 MEQ: 84 INJECTION, SOLUTION INTRAVENOUS at 14:37

## 2021-09-23 RX ADMIN — ALBUMIN (HUMAN) 25 G: 12.5 INJECTION, SOLUTION INTRAVENOUS at 14:21

## 2021-09-23 RX ADMIN — SODIUM CHLORIDE: 900 INJECTION, SOLUTION INTRAVENOUS at 10:29

## 2021-09-23 RX ADMIN — AMIODARONE HYDROCHLORIDE 200 MG: 200 TABLET ORAL at 21:05

## 2021-09-23 RX ADMIN — Medication 100 MCG: at 10:09

## 2021-09-23 RX ADMIN — FENTANYL CITRATE 200 MCG: 50 INJECTION INTRAVENOUS at 10:45

## 2021-09-23 RX ADMIN — PROPOFOL 25 MCG/KG/MIN: 10 INJECTION, EMULSION INTRAVENOUS at 13:58

## 2021-09-23 RX ADMIN — ROCURONIUM BROMIDE 50 MG: 10 INJECTION INTRAVENOUS at 10:51

## 2021-09-23 RX ADMIN — VANCOMYCIN HYDROCHLORIDE 1000 MG: 1 INJECTION, SOLUTION INTRAVENOUS at 22:40

## 2021-09-23 RX ADMIN — VASOPRESSIN 1 UNITS: 20 INJECTION INTRAVENOUS at 12:52

## 2021-09-23 RX ADMIN — PANTOPRAZOLE SODIUM 40 MG: 40 INJECTION, POWDER, FOR SOLUTION INTRAVENOUS at 17:47

## 2021-09-23 RX ADMIN — Medication 100 MCG: at 10:38

## 2021-09-23 RX ADMIN — FENTANYL CITRATE 100 MCG: 50 INJECTION INTRAVENOUS at 14:11

## 2021-09-23 RX ADMIN — Medication 100 MCG: at 10:04

## 2021-09-23 RX ADMIN — AMINOCAPROIC ACID 10 G/HR: 250 INJECTION, SOLUTION INTRAVENOUS at 10:08

## 2021-09-23 RX ADMIN — Medication 10 MG: at 13:29

## 2021-09-23 RX ADMIN — FENTANYL CITRATE 150 MCG: 50 INJECTION INTRAVENOUS at 09:21

## 2021-09-23 RX ADMIN — METOPROLOL TARTRATE 12.5 MG: 25 TABLET, FILM COATED ORAL at 07:54

## 2021-09-23 RX ADMIN — FENTANYL CITRATE 100 MCG: 50 INJECTION INTRAVENOUS at 14:05

## 2021-09-23 RX ADMIN — Medication 0.4 MG: at 12:56

## 2021-09-23 RX ADMIN — Medication 100 MCG: at 10:14

## 2021-09-23 RX ADMIN — Medication 100 MCG: at 09:33

## 2021-09-23 RX ADMIN — ALBUMIN (HUMAN) 25 G: 12.5 INJECTION, SOLUTION INTRAVENOUS at 15:01

## 2021-09-23 RX ADMIN — LIDOCAINE HYDROCHLORIDE 50 MG: 10 INJECTION, SOLUTION EPIDURAL; INFILTRATION; INTRACAUDAL; PERINEURAL at 09:21

## 2021-09-23 RX ADMIN — Medication 100 MCG: at 10:47

## 2021-09-23 RX ADMIN — OXYCODONE HYDROCHLORIDE AND ACETAMINOPHEN 2 TABLET: 5; 325 TABLET ORAL at 17:05

## 2021-09-23 RX ADMIN — ROCURONIUM BROMIDE 20 MG: 10 INJECTION INTRAVENOUS at 13:32

## 2021-09-23 RX ADMIN — FENTANYL CITRATE 50 MCG: 50 INJECTION, SOLUTION INTRAMUSCULAR; INTRAVENOUS at 20:07

## 2021-09-23 RX ADMIN — Medication 100 MCG: at 10:01

## 2021-09-23 RX ADMIN — PROTAMINE SULFATE 250 MG: 10 INJECTION, SOLUTION INTRAVENOUS at 13:14

## 2021-09-23 RX ADMIN — AMIODARONE HYDROCHLORIDE 200 MG: 200 TABLET ORAL at 07:54

## 2021-09-23 RX ADMIN — OXYCODONE HYDROCHLORIDE AND ACETAMINOPHEN 2 TABLET: 5; 325 TABLET ORAL at 21:06

## 2021-09-23 RX ADMIN — AMIODARONE HYDROCHLORIDE 200 MG: 200 TABLET ORAL at 15:09

## 2021-09-23 RX ADMIN — CEFAZOLIN 2000 MG: 10 INJECTION, POWDER, FOR SOLUTION INTRAVENOUS at 14:11

## 2021-09-23 RX ADMIN — MIDAZOLAM HYDROCHLORIDE 2 MG: 1 INJECTION, SOLUTION INTRAMUSCULAR; INTRAVENOUS at 12:02

## 2021-09-23 RX ADMIN — Medication 100 MCG: at 10:44

## 2021-09-23 RX ADMIN — DEXTROSE MONOHYDRATE 2000 MG: 50 INJECTION, SOLUTION INTRAVENOUS at 22:06

## 2021-09-23 RX ADMIN — POTASSIUM CHLORIDE 20 MEQ: 29.8 INJECTION, SOLUTION INTRAVENOUS at 14:41

## 2021-09-23 ASSESSMENT — PULMONARY FUNCTION TESTS
PIF_VALUE: 1
PIF_VALUE: 19
PIF_VALUE: 1
PIF_VALUE: 18
PIF_VALUE: 20
PIF_VALUE: 18
PIF_VALUE: 0
PIF_VALUE: 20
PIF_VALUE: 1
PIF_VALUE: 19
PIF_VALUE: 1
PIF_VALUE: 1
PIF_VALUE: 19
PIF_VALUE: 1
PIF_VALUE: 19
PIF_VALUE: 1
PIF_VALUE: 3
PIF_VALUE: 21
PIF_VALUE: 1
PIF_VALUE: 18
PIF_VALUE: 21
PIF_VALUE: 19
PIF_VALUE: 20
PIF_VALUE: 19
PIF_VALUE: 1
PIF_VALUE: 19
PIF_VALUE: 19
PIF_VALUE: 20
PIF_VALUE: 19
PIF_VALUE: 1
PIF_VALUE: 19
PIF_VALUE: 19
PIF_VALUE: 15
PIF_VALUE: 1
PIF_VALUE: 17
PIF_VALUE: 1
PIF_VALUE: 18
PIF_VALUE: 18
PIF_VALUE: 20
PIF_VALUE: 21
PIF_VALUE: 1
PIF_VALUE: 18
PIF_VALUE: 1
PIF_VALUE: 19
PIF_VALUE: 18
PIF_VALUE: 1
PIF_VALUE: 20
PIF_VALUE: 17
PIF_VALUE: 18
PIF_VALUE: 18
PIF_VALUE: 16
PIF_VALUE: 16
PIF_VALUE: 1
PIF_VALUE: 1
PIF_VALUE: 19
PIF_VALUE: 16
PIF_VALUE: 1
PIF_VALUE: 20
PIF_VALUE: 16
PIF_VALUE: 17
PIF_VALUE: 1
PIF_VALUE: 18
PIF_VALUE: 1
PIF_VALUE: 17
PIF_VALUE: 1
PIF_VALUE: 1
PIF_VALUE: 18
PIF_VALUE: 16
PIF_VALUE: 17
PIF_VALUE: 1
PIF_VALUE: 1
PIF_VALUE: 18
PIF_VALUE: 17
PIF_VALUE: 16
PIF_VALUE: 17
PIF_VALUE: 18
PIF_VALUE: 21
PIF_VALUE: 19
PIF_VALUE: 19
PIF_VALUE: 20
PIF_VALUE: 22
PIF_VALUE: 19
PIF_VALUE: 1
PIF_VALUE: 1
PIF_VALUE: 19
PIF_VALUE: 19
PIF_VALUE: 1
PIF_VALUE: 16
PIF_VALUE: 1
PIF_VALUE: 1
PIF_VALUE: 18
PIF_VALUE: 1
PIF_VALUE: 20
PIF_VALUE: 1
PIF_VALUE: 0
PIF_VALUE: 1
PIF_VALUE: 1
PIF_VALUE: 18
PIF_VALUE: 16
PIF_VALUE: 16
PIF_VALUE: 19
PIF_VALUE: 17
PIF_VALUE: 19
PIF_VALUE: 18
PIF_VALUE: 17
PIF_VALUE: 22
PIF_VALUE: 1
PIF_VALUE: 18
PIF_VALUE: 1
PIF_VALUE: 20
PIF_VALUE: 0
PIF_VALUE: 21
PIF_VALUE: 1
PIF_VALUE: 20
PIF_VALUE: 18
PIF_VALUE: 20
PIF_VALUE: 1
PIF_VALUE: 18
PIF_VALUE: 1
PIF_VALUE: 1
PIF_VALUE: 16
PIF_VALUE: 1
PIF_VALUE: 20
PIF_VALUE: 8
PIF_VALUE: 20
PIF_VALUE: 1
PIF_VALUE: 1
PIF_VALUE: 19
PIF_VALUE: 19
PIF_VALUE: 1
PIF_VALUE: 17
PIF_VALUE: 18
PIF_VALUE: 15
PIF_VALUE: 1
PIF_VALUE: 16
PIF_VALUE: 5
PIF_VALUE: 1
PIF_VALUE: 19
PIF_VALUE: 35
PIF_VALUE: 1
PIF_VALUE: 20
PIF_VALUE: 19
PIF_VALUE: 19
PIF_VALUE: 1
PIF_VALUE: 19
PIF_VALUE: 18
PIF_VALUE: 20
PIF_VALUE: 16
PIF_VALUE: 1
PIF_VALUE: 19
PIF_VALUE: 19
PIF_VALUE: 16
PIF_VALUE: 1
PIF_VALUE: 18
PIF_VALUE: 1
PIF_VALUE: 1
PIF_VALUE: 15
PIF_VALUE: 1
PIF_VALUE: 17
PIF_VALUE: 1
PIF_VALUE: 17
PIF_VALUE: 19
PIF_VALUE: 18
PIF_VALUE: 1
PIF_VALUE: 18
PIF_VALUE: 17
PIF_VALUE: 18
PIF_VALUE: 19
PIF_VALUE: 19
PIF_VALUE: 1
PIF_VALUE: 16
PIF_VALUE: 20
PIF_VALUE: 1
PIF_VALUE: 1
PIF_VALUE: 17
PIF_VALUE: 1
PIF_VALUE: 1
PIF_VALUE: 21
PIF_VALUE: 0
PIF_VALUE: 20
PIF_VALUE: 22
PIF_VALUE: 21
PIF_VALUE: 1
PIF_VALUE: 21
PIF_VALUE: 1
PIF_VALUE: 19
PIF_VALUE: 1
PIF_VALUE: 18
PIF_VALUE: 22
PIF_VALUE: 21
PIF_VALUE: 18
PIF_VALUE: 1
PIF_VALUE: 1
PIF_VALUE: 20
PIF_VALUE: 0
PIF_VALUE: 21
PIF_VALUE: 1
PIF_VALUE: 19
PIF_VALUE: 16
PIF_VALUE: 1
PIF_VALUE: 17
PIF_VALUE: 1
PIF_VALUE: 19
PIF_VALUE: 1
PIF_VALUE: 20
PIF_VALUE: 15
PIF_VALUE: 20
PIF_VALUE: 1
PIF_VALUE: 25
PIF_VALUE: 18
PIF_VALUE: 16
PIF_VALUE: 17
PIF_VALUE: 16
PIF_VALUE: 20
PIF_VALUE: 18
PIF_VALUE: 19
PIF_VALUE: 1
PIF_VALUE: 1
PIF_VALUE: 19
PIF_VALUE: 16
PIF_VALUE: 19
PIF_VALUE: 16
PIF_VALUE: 17
PIF_VALUE: 18
PIF_VALUE: 20
PIF_VALUE: 16
PIF_VALUE: 1
PIF_VALUE: 1
PIF_VALUE: 2
PIF_VALUE: 23
PIF_VALUE: 17
PIF_VALUE: 1
PIF_VALUE: 18
PIF_VALUE: 20
PIF_VALUE: 21
PIF_VALUE: 22
PIF_VALUE: 20
PIF_VALUE: 19
PIF_VALUE: 19
PIF_VALUE: 18
PIF_VALUE: 1
PIF_VALUE: 21
PIF_VALUE: 19
PIF_VALUE: 1
PIF_VALUE: 1
PIF_VALUE: 19
PIF_VALUE: 18
PIF_VALUE: 17
PIF_VALUE: 19
PIF_VALUE: 2
PIF_VALUE: 1
PIF_VALUE: 17
PIF_VALUE: 19
PIF_VALUE: 1
PIF_VALUE: 17
PIF_VALUE: 1
PIF_VALUE: 18
PIF_VALUE: 18
PIF_VALUE: 1
PIF_VALUE: 18
PIF_VALUE: 21
PIF_VALUE: 16
PIF_VALUE: 17
PIF_VALUE: 19
PIF_VALUE: 19
PIF_VALUE: 1
PIF_VALUE: 1
PIF_VALUE: 20
PIF_VALUE: 1
PIF_VALUE: 1
PIF_VALUE: 18
PIF_VALUE: 21
PIF_VALUE: 18
PIF_VALUE: 1
PIF_VALUE: 18
PIF_VALUE: 18
PIF_VALUE: 20
PIF_VALUE: 19
PIF_VALUE: 17
PIF_VALUE: 17
PIF_VALUE: 19
PIF_VALUE: 20
PIF_VALUE: 23

## 2021-09-23 ASSESSMENT — PAIN SCALES - GENERAL
PAINLEVEL_OUTOF10: 9
PAINLEVEL_OUTOF10: 8
PAINLEVEL_OUTOF10: 7
PAINLEVEL_OUTOF10: 7
PAINLEVEL_OUTOF10: 9
PAINLEVEL_OUTOF10: 7
PAINLEVEL_OUTOF10: 7
PAINLEVEL_OUTOF10: 9
PAINLEVEL_OUTOF10: 9

## 2021-09-23 ASSESSMENT — ENCOUNTER SYMPTOMS: TACHYPNEA: 1

## 2021-09-23 NOTE — ANESTHESIA PRE PROCEDURE
Department of Anesthesiology  Preprocedure Note       Name:  Lance Bojorquez   Age:  36 y.o.  :  1980                                          MRN:  [id number]         Date:  2021      Surgeon: Curt Perkins):  Zana Guajardo MD    Procedure: Procedure(s):  MITRAL VALVE REPAIR OR REPLACE    Medications prior to admission:   Prior to Admission medications    Medication Sig Start Date End Date Taking? Authorizing Provider   METOPROLOL TARTRATE PO Take 1 tablet by mouth 2 times daily 12.5 mg bid 21   Historical Provider, MD   Ascorbic Acid (VITAMIN C) 250 MG tablet Take 250 mg by mouth daily Emergen C 750 mg QD    Historical Provider, MD   Lactobacillus Rhamnosus, GG, (CULTURELLE PO) Take by mouth    Historical Provider, MD       Current medications:    No current facility-administered medications for this encounter. Current Outpatient Medications   Medication Sig Dispense Refill    METOPROLOL TARTRATE PO Take 1 tablet by mouth 2 times daily 12.5 mg bid      Ascorbic Acid (VITAMIN C) 250 MG tablet Take 250 mg by mouth daily Emergen C 750 mg QD      Lactobacillus Rhamnosus, GG, (CULTURELLE PO) Take by mouth         Allergies: Allergies   Allergen Reactions    Morphine Other (See Comments)     Pt report muscle cramps       Problem List:    Patient Active Problem List   Diagnosis Code    Acute dehydration E86.0    Hypomagnesemia E83.42    Hypokalemia E87.6    Epistaxis R04.0    Thrombocytopenia (HCC) D69.6    Hyperglycemia R73.9    Essential hypertension I10    Sepsis with acute organ dysfunction and septic shock ([facility] [address] A41.9, R65.21    Delusions ([facility] [address] F22    Hypophosphatemia E83.39    MSSA bacteremia R78.81, B95.61    Severe mitral regurgitation I34.0    Acute respiratory failure with hypoxia (HCC) J96.01    GI bleed K92.2    Normocytic normochromic anemia D64.9    Obesity (BMI 30-39. 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 Carisa Bang  9/7/21 last visit    Cardioembolic stroke [facility])     Cerebral artery occlusion with cerebral infarction [facility])     cerebral septic emboli 8/2021    Cerebral septic emboli [facility])     Dr. Solis Shows neuro last seen 9/13/21    Chronic right shoulder pain     since 2020, fell.  Diverticulosis     Endocarditis     mitral valve endocarditis w/ pericardial effusion   8/2021 [facility]    History of COVID-19 12/2020    Hyperlipidemia     hx of.  No longer on rx    Hypertension     Nov 2020        MSSA bacteremia     Dr. Laree Najjar in epic from 9/7/21    MESSI (obstructive sleep apnea)     Has PSG study done Pos for Mod MESSI, never had cpap tritration done for machine    Platelets decreased [facility])     July 2021  had infusions at [facility]. V's no reactions    Research subject 07/24/2021    EIND [id number] Exebacase for persistent bacteremia expected date of completion 8/25/2021    Under care of team 09/15/2021    PCP: Edy West, last visit 9/15/2021    Under care of team     Dr. Henny Rosas   9/8/2021    Under care of team     gastroenterology   8/27/21  Dr. Patience Hernadez. V's    Under care of team     Dr. Will Dickinson   neuro  last seen 9/13/21    Under care of team     Has seen Dr. Ernesto Jones 8/2021   Humberto Gauthier Wears contact lenses        Past Surgical History:        Procedure Laterality Date    ABDOMEN SURGERY      large bowel resection    ANTERIOR CRUCIATE LIGAMENT REPAIR Right     APPENDECTOMY 07/09/1997    COLECTOMY      2012 - D/T Dverticulitis    DILATATION, ESOPHAGUS      HC  PICC [address] DOUBLE  08/02/2021    removed   8/2021       Social History:    Social History     Tobacco Use    Smoking status: Former Smoker     Packs/day: 1.00     Types: Cigarettes    Smokeless tobacco: Former User     Types: Chew, Snuff     Quit date: 9/15/2017    Tobacco comment: smoked 10 yrs. Quit in 2014   Substance Use Topics    Alcohol use: Yes     Alcohol/week: 20.0 standard drinks     Types: 20 Cans of beer per week     Comment: 20/ week, average every other day                                Counseling given: Not Answered  Comment: smoked 10 yrs. Quit in 2014      Vital Signs (Current): There were no vitals filed for this visit. BP Readings from Last 3 Encounters:   09/15/21 133/83   09/13/21 95/63   09/08/21 123/86       NPO Status:                                                                                 BMI:   Wt Readings from Last 3 Encounters:   09/15/21 172 lb (78 kg)   09/13/21 170 lb (77.1 kg)   09/08/21 170 lb (77.1 kg)     There is no height or weight on file to calculate BMI.    CBC:   Lab Results   Component Value Date    WBC 3.0 08/23/2021    RBC 3.09 08/23/2021    HGB 9.0 08/23/2021    HCT 27.2 08/23/2021    MCV 88.0 08/23/2021    RDW 14.2 08/23/2021     08/23/2021       CMP:   Lab Results   Component Value Date     09/15/2021    K 3.7 09/15/2021     09/15/2021    CO2 21 09/15/2021    BUN 15 09/15/2021    CREATININE 0.87 09/15/2021    GFRAA >60 09/15/2021    LABGLOM >60 09/15/2021    GLUCOSE 91 09/15/2021    PROT 6.9 09/15/2021    CALCIUM 9.2 09/15/2021    BILITOT 0.40 09/15/2021    ALKPHOS 92 09/15/2021    AST 19 09/15/2021    ALT 16 09/15/2021       POC Tests: No results for input(s): POCGLU, POCNA, POCK, POCCL, POCBUN, POCHEMO, POCHCT in the last 72 hours.     Coags:   Lab Results   Component Value Date    PROTIME 10.5 09/15/2021    INR 1.0 09/15/2021    APTT 24.2 09/15/2021       HCG (If Applicable): No results found for: PREGTESTUR, PREGSERUM, HCG, HCGQUANT     ABGs: No results found for: PHART, PO2ART, HYJ0MDH, OTT1KXM, BEART, E1PFJBKA     Type & Screen (If Applicable):  No results found for: LABABO, LABRH    Drug/Infectious Status (If Applicable):  Lab Results   Component Value Date    HEPCAB NONREACTIVE 07/19/2021       COVID-19 Screening (If Applicable):   Lab Results   Component Value Date    COVID19 Not Detected 09/20/2021    COVID19 Not Detected 07/17/2021           Anesthesia Evaluation  Patient summary reviewed no history of anesthetic complications:   Airway: Mallampati: II        Dental:          Pulmonary:   (+) sleep apnea: on noncompliant,  decreased breath sounds,                            ROS comment: Hx Acute respiratory failure with hypoxia   History of COVID-19   Cardiovascular:    (+) hypertension:, valvular problems/murmurs: MR, murmur,         Rhythm: regular                   ROS comment: Subacute endocarditis  Endocarditis of mitral valve     Neuro/Psych:   (+) CVA:,              ROS comment: Metabolic encephalopathy  Cerebral septic emboli   Cerebral artery occlusion with cerebral infarction ([facility] cerebral septic emboli   GI/Hepatic/Renal:             Endo/Other:    (+) blood dyscrasia: thrombocytopenia:., .                 Abdominal:             Vascular:   + PVD, aortic or cerebral, . Other Findings:             Anesthesia Plan      general     ASA 4       Induction: intravenous. arterial line, BIS, central line, CVP and MONROE  MIPS: Postoperative opioids intended and Postoperative ventilation. Anesthetic plan and risks discussed with patient. Plan discussed with CRNA.             Narrative & Impression    Sinus tachycardia  Otherwise normal ECG  When compared with ECG of 24-AUG-2021 21:25,  No significant change was found      Specimen Collected: 09/15/21 14:40 CONCLUSIONS     Summary  Left ventricle is normal in size with normal systolic function globally. Calculated ejection fraction is 60%  Left atrial dilatation. Right atrium is normal in size. Mild buckling of the right atrial wall. Mild aortic insufficiency. Prolapse of the of posterior leaflet of the mitral valve. Large vegetations noted on the MONROE done 7/20/21 are no longer identified . Small echogenicity is noted on the posterior leaflet, maybe improving  vegetation. Severe mitral regurgitation with pulmonary vein flow reversal.  Consider MONROE when indicated. Mild tricuspid regurgitation. Estimated right ventricular systolic pressure is 49 mmHg, suggests mild Pulm  HTN. Small circumferential pericardial effusion. No signs of echocariographic tamponade      Neurology  Attending Physician Statement  I have discussed the case of Funmilayo Mcfarland including pertinent history and exam findings with the resident/ CNP. I have seen and examined the patient and the key elements of the encounter have been performed by me. I agree with the assessment, plan and orders as documented by the resident or CNP with changes made to the note.        Briefly, this is a  36 y.o. male with hx of recent hosp for SBE and resultant multiinfarct state.      BP 95/63   Pulse 84   Ht 5' 6\" (1.676 m)   Wt 170 lb (77.1 kg)   SpO2 97%   BMI 27.44 kg/m²     neuro exam significant for non focal exam.        Impression and Plan: Mr. Funmilayo Mcfarland is a 36 y.o. male with   Septic cerebral embolism; multiinfarct state; bacterial endocarditis; completed Abx therapy as per ID recommendations; on schedule for Mitral valve repair. Not on antiplatelets/anticoagulants. No sequelae from prior septic embolic ischemic strokes. Patient understood that there's always a risk of stroke recurrence. From neurological perspective; he should be okay for upcoming surgical intervention.   Patient will also be explained by primary CTS team that there are CNS risks associated with valve replacement surgery.   Follow-up in neurology clinic in 6-8 weeks.       Ana Carson MD   9/23/2021

## 2021-09-23 NOTE — CARE COORDINATION
Case Management Initial Discharge Plan  Logan Valadez,             Met with:patient to discuss discharge plans. Information verified: address, contacts, phone number, , insurance Yes  Insurance Provider: Rylan Crystal    Emergency Contact/Next of Kin name & number: Juventino August wife, [phone]  Who are involved in patient's support system? Wife, family members    PCP: Brayan Vasquez MD  Date of last visit: week ago      Discharge Planning    Living Arrangements:  Spouse/Significant Other     Home has 2 stories  4 stairs to climb to get into front door, 1 flight stairs to climb to reach second floor  Location of bedroom/bathroom in home main level    Patient able to perform ADL's:Independent    Current Services (outpatient & in home) has used [facility] in the past  DME equipment:    DME provider:      Is patient receiving oral anticoagulation therapy? No    If indicated:   Physician managing anticoagulation treatment:    Where does patient obtain lab work for ATC treatment? Potential Assistance Needed:       Patient agreeable to home care: No  [place] of choice provided:  n/a    Prior SNF/Rehab Placement and Facility:    Agreeable to SNF/Rehab: No  [place] of choice provided: n/a     Evaluation: no    Expected Discharge date:       Patient expects to be discharged to: If home: is the family and/or caregiver wiling & able to provide support at home? yes  Who will be providing this support? wife    Follow Up Appointment: Best Day/ Time:      Transportation provider: wife  Transportation arrangements needed for discharge: No     Readmission Risk              Risk of Unplanned Readmission:  11             Does patient have a readmission risk score greater than 14?: No  If yes, follow-up appointment must be made within 7 days of discharge.      Goals of Care:       Educated patient and his wife at bedside on transitional options, provided freedom of choice and are agreeable with plan      Discharge Plan: home with wife, declines HC at this time, has a  ride          Electronically signed by Jennifer Turcios RN on 9/23/21 at 8:39 AM EDT

## 2021-09-23 NOTE — PROGRESS NOTES
Patient arrived to room from [place] [address] accompanied by 2 CVOR RNs, CRNA, and SAULO. Report given by CRNA. All drips reviewed. Patient on hardwire monitor. All questions/concerns addressed.

## 2021-09-23 NOTE — PLAN OF CARE
Problem: Non-Violent Restraints  Goal: Removal from restraints as soon as assessed to be safe  Outcome: Completed  Goal: No harm/injury to patient while restraints in use  Outcome: Completed  Goal: Patient's dignity will be maintained  Outcome: Completed

## 2021-09-23 NOTE — ANESTHESIA PROCEDURE NOTES
Central Venous Line:    A central venous line was placed using surface landmarks, in the OR for the following indication(s):     Sterility preparation included the following: hand hygiene performed prior to procedure, maximum sterile barriers used and sterile technique used to drape from head to toe. The patient was placed in Trendelenburg position. The right internal jugular vein was prepped. The site was prepped with Betadine. A 9 Fr (size), 10 (length), introducer slick was placed. During the procedure, the following specific steps were taken: target vein identified, needle advanced into vein and blood aspirated and guidewire advanced into vein. Intravenous verification was obtained by venous blood return. Post insertion care included: all ports aspirated, all ports flushed easily, guidewire removed intact, Biopatch applied, line sutured in place and dressing applied. During the procedure the patient experienced: patient tolerated procedure well with no complications.       Anesthesia type: general..No  Staffing  Performed: Anesthesiologist   Anesthesiologist: Earl Jorgensen MD  Preanesthetic Checklist  Completed: patient identified, IV checked, site marked, risks and benefits discussed, surgical consent, monitors and equipment checked, pre-op evaluation, timeout performed, anesthesia consent given, oxygen available and patient being monitored
Replacement size 31/33 mm. Well seated. No MR and MS. Mean gradient is 6 mmHg. 4. No Aortic dissection  5. Significant findings were communicated to CTS.     Electronically signed by J Carlos Cameron MD on 9/23/2021 at 1:18 PM

## 2021-09-23 NOTE — PROGRESS NOTES
Order obtained for extubation. SpO2 of 96 on 40% FiO2. Patient extubated and placed on 6 liters/min via nasal cannula. Post extubation SpO2 is 98% with HR  103 bpm and RR 14 breaths/min. Patient had strong cough that was productive of white and yellow sputum. Extubation Well tolerated by patient. .   Breath Sounds: clear    Erlene Pin, RCP   5:13 PM

## 2021-09-23 NOTE — BRIEF OP NOTE
Brief Postoperative Note      Patient: Nicki Bradford  YOB: 1980  MRN: [id number]    Date of Procedure: 9/23/2021    Pre-Op Diagnosis: MITRAL VALVE REGURGITATION    Post-Op Diagnosis: Same       Procedure(s):  MITRAL VALVE REPLACEMENT - ON-X SIZE 31/33    Surgeon(s):  Rachel Patterson MD    Assistant:  First Assistant: Yessy Flores RN    Anesthesia: General    Estimated Blood Loss (mL): N/A    Complications: None    Specimens:   ID Type Source Tests Collected by Time Destination   1 : mitral valve endocarditis Tissue Heart CULTURE, FUNGUS, CULTURE, TISSUE, FUNGAL STAIN, CULTURE, ANAEROBIC AND AEROBIC (Canceled), CULTURE, VIRUS, NON RESPIRATORY Rachel Patterson MD 9/23/2021 1128        Implants:  Implant Name Type Inv. Item Serial No.  Lot No. LRB No. Used Action   VALVE MI H17.8MM [id number] ID23.4MM SEW RNG [id number] PTFE W/ - [id number] Mitral valves VALVE MI H17.8MM [id number] ID23.4MM SEW RNG [id number] PTFE W/ [id number] [employer]-  N/A 1 Implanted         Drains:   Chest Tube 1 Anterior 24 Polish (Active)       Chest Tube 2 Left; Anterior 24 [employer] Denita (Active)       Urethral Catheter Temperature probe (Active)       Findings: 31/33mm On X mechanical MVR with no MONROE evidence of significant gradient or PVL, well seated, normal ventricular function.     Electronically signed by Rachel Patterson MD on 9/23/2021 at 2:01 PM

## 2021-09-23 NOTE — PROGRESS NOTES
Patient alert and following commands. Tolerated CPAP well for 1 hour. Post CPAP ABG reviewed.  Order obtained for extubation at this time

## 2021-09-24 ENCOUNTER — APPOINTMENT (OUTPATIENT)
Dept: GENERAL RADIOLOGY | Age: 41
DRG: 219 | End: 2021-09-24
Attending: THORACIC SURGERY (CARDIOTHORACIC VASCULAR SURGERY)
Payer: COMMERCIAL

## 2021-09-24 LAB
ABO/RH: NORMAL
ANION GAP SERPL CALCULATED.3IONS-SCNC: 13 MMOL/L (ref 9–17)
ANTIBODY SCREEN: NEGATIVE
ARM BAND NUMBER: NORMAL
BLD PROD TYP BPU: NORMAL
BUN BLDV-MCNC: 12 MG/DL (ref 6–20)
BUN/CREAT BLD: ABNORMAL (ref 9–20)
CALCIUM SERPL-MCNC: 8.1 MG/DL (ref 8.6–10.4)
CHLORIDE BLD-SCNC: 107 MMOL/L (ref 98–107)
CO2: 16 MMOL/L (ref 20–31)
CREAT SERPL-MCNC: 0.74 MG/DL (ref 0.7–1.2)
CROSSMATCH RESULT: NORMAL
DIRECT EXAM: NORMAL
DISPENSE STATUS BLOOD BANK: NORMAL
EKG ATRIAL RATE: 97 BPM
EKG P AXIS: 75 DEGREES
EKG P-R INTERVAL: 180 MS
EKG Q-T INTERVAL: 398 MS
EKG QRS DURATION: 82 MS
EKG QTC CALCULATION (BAZETT): 505 MS
EKG R AXIS: 78 DEGREES
EKG T AXIS: 39 DEGREES
EKG VENTRICULAR RATE: 97 BPM
EXPIRATION DATE: NORMAL
GFR AFRICAN AMERICAN: >60 ML/MIN
GFR NON-AFRICAN AMERICAN: >60 ML/MIN
GFR SERPL CREATININE-BSD FRML MDRD: ABNORMAL ML/MIN/{1.73_M2}
GFR SERPL CREATININE-BSD FRML MDRD: ABNORMAL ML/MIN/{1.73_M2}
GLUCOSE BLD-MCNC: 101 MG/DL (ref 75–110)
GLUCOSE BLD-MCNC: 102 MG/DL (ref 75–110)
GLUCOSE BLD-MCNC: 116 MG/DL (ref 75–110)
GLUCOSE BLD-MCNC: 118 MG/DL (ref 75–110)
GLUCOSE BLD-MCNC: 120 MG/DL (ref 75–110)
GLUCOSE BLD-MCNC: 125 MG/DL (ref 70–99)
GLUCOSE BLD-MCNC: 129 MG/DL (ref 75–110)
GLUCOSE BLD-MCNC: 137 MG/DL (ref 75–110)
GLUCOSE BLD-MCNC: 148 MG/DL (ref 75–110)
GLUCOSE BLD-MCNC: 93 MG/DL (ref 75–110)
HCT VFR BLD CALC: 28.2 % (ref 40.7–50.3)
HEMOGLOBIN: 9.2 G/DL (ref 13–17)
INR BLD: 1
Lab: NORMAL
MAGNESIUM: 2 MG/DL (ref 1.6–2.6)
MCH RBC QN AUTO: 28.5 PG (ref 25.2–33.5)
MCHC RBC AUTO-ENTMCNC: 32.6 G/DL (ref 28.4–34.8)
MCV RBC AUTO: 87.3 FL (ref 82.6–102.9)
NRBC AUTOMATED: 0 PER 100 WBC
PDW BLD-RTO: 15 % (ref 11.8–14.4)
PLATELET # BLD: 132 K/UL (ref 138–453)
PMV BLD AUTO: 10 FL (ref 8.1–13.5)
POTASSIUM SERPL-SCNC: 3.9 MMOL/L (ref 3.7–5.3)
POTASSIUM SERPL-SCNC: 4.4 MMOL/L (ref 3.7–5.3)
PROTHROMBIN TIME: 10.6 SEC (ref 9.1–12.3)
RBC # BLD: 3.23 M/UL (ref 4.21–5.77)
SODIUM BLD-SCNC: 136 MMOL/L (ref 135–144)
SPECIMEN DESCRIPTION: NORMAL
TRANSFUSION STATUS: NORMAL
UNIT DIVISION: 0
UNIT NUMBER: NORMAL
WBC # BLD: 17.3 K/UL (ref 3.5–11.3)

## 2021-09-24 PROCEDURE — 84132 ASSAY OF SERUM POTASSIUM: CPT

## 2021-09-24 PROCEDURE — 97535 SELF CARE MNGMENT TRAINING: CPT

## 2021-09-24 PROCEDURE — 94761 N-INVAS EAR/PLS OXIMETRY MLT: CPT

## 2021-09-24 PROCEDURE — 85610 PROTHROMBIN TIME: CPT

## 2021-09-24 PROCEDURE — 94669 MECHANICAL CHEST WALL OSCILL: CPT

## 2021-09-24 PROCEDURE — 6360000002 HC RX W HCPCS: Performed by: NURSE PRACTITIONER

## 2021-09-24 PROCEDURE — 6370000000 HC RX 637 (ALT 250 FOR IP): Performed by: NURSE PRACTITIONER

## 2021-09-24 PROCEDURE — 94640 AIRWAY INHALATION TREATMENT: CPT

## 2021-09-24 PROCEDURE — 2580000003 HC RX 258: Performed by: NURSE PRACTITIONER

## 2021-09-24 PROCEDURE — 97530 THERAPEUTIC ACTIVITIES: CPT

## 2021-09-24 PROCEDURE — 82947 ASSAY GLUCOSE BLOOD QUANT: CPT

## 2021-09-24 PROCEDURE — APPNB30 APP NON BILLABLE TIME 0-30 MINS: Performed by: NURSE PRACTITIONER

## 2021-09-24 PROCEDURE — 80048 BASIC METABOLIC PNL TOTAL CA: CPT

## 2021-09-24 PROCEDURE — 2060000000 HC ICU INTERMEDIATE R&B

## 2021-09-24 PROCEDURE — 83735 ASSAY OF MAGNESIUM: CPT

## 2021-09-24 PROCEDURE — 99024 POSTOP FOLLOW-UP VISIT: CPT | Performed by: NURSE PRACTITIONER

## 2021-09-24 PROCEDURE — 2700000000 HC OXYGEN THERAPY PER DAY

## 2021-09-24 PROCEDURE — 97162 PT EVAL MOD COMPLEX 30 MIN: CPT

## 2021-09-24 PROCEDURE — 85027 COMPLETE CBC AUTOMATED: CPT

## 2021-09-24 PROCEDURE — 94664 DEMO&/EVAL PT USE INHALER: CPT

## 2021-09-24 PROCEDURE — 71045 X-RAY EXAM CHEST 1 VIEW: CPT

## 2021-09-24 PROCEDURE — 97166 OT EVAL MOD COMPLEX 45 MIN: CPT

## 2021-09-24 RX ORDER — KETOROLAC TROMETHAMINE 15 MG/ML
15 INJECTION, SOLUTION INTRAMUSCULAR; INTRAVENOUS EVERY 6 HOURS PRN
Status: DISCONTINUED | OUTPATIENT
Start: 2021-09-24 | End: 2021-09-28 | Stop reason: HOSPADM

## 2021-09-24 RX ORDER — HYDROCODONE BITARTRATE AND ACETAMINOPHEN 5; 325 MG/1; MG/1
2 TABLET ORAL EVERY 4 HOURS PRN
Status: DISCONTINUED | OUTPATIENT
Start: 2021-09-24 | End: 2021-09-28 | Stop reason: HOSPADM

## 2021-09-24 RX ORDER — WARFARIN SODIUM 5 MG/1
5 TABLET ORAL
Status: COMPLETED | OUTPATIENT
Start: 2021-09-24 | End: 2021-09-24

## 2021-09-24 RX ORDER — HYDROCODONE BITARTRATE AND ACETAMINOPHEN 5; 325 MG/1; MG/1
1 TABLET ORAL EVERY 4 HOURS PRN
Status: DISCONTINUED | OUTPATIENT
Start: 2021-09-24 | End: 2021-09-28 | Stop reason: HOSPADM

## 2021-09-24 RX ORDER — KETOROLAC TROMETHAMINE 30 MG/ML
30 INJECTION, SOLUTION INTRAMUSCULAR; INTRAVENOUS ONCE
Status: COMPLETED | OUTPATIENT
Start: 2021-09-24 | End: 2021-09-24

## 2021-09-24 RX ORDER — FUROSEMIDE 10 MG/ML
20 INJECTION INTRAMUSCULAR; INTRAVENOUS ONCE
Status: COMPLETED | OUTPATIENT
Start: 2021-09-24 | End: 2021-09-24

## 2021-09-24 RX ADMIN — ENOXAPARIN SODIUM 80 MG: 80 INJECTION SUBCUTANEOUS at 11:17

## 2021-09-24 RX ADMIN — WARFARIN SODIUM 5 MG: 5 TABLET ORAL at 18:45

## 2021-09-24 RX ADMIN — OXYCODONE HYDROCHLORIDE AND ACETAMINOPHEN 2 TABLET: 5; 325 TABLET ORAL at 01:02

## 2021-09-24 RX ADMIN — IPRATROPIUM BROMIDE AND ALBUTEROL SULFATE 1 AMPULE: .5; 3 SOLUTION RESPIRATORY (INHALATION) at 12:00

## 2021-09-24 RX ADMIN — OXYCODONE HYDROCHLORIDE AND ACETAMINOPHEN 2 TABLET: 5; 325 TABLET ORAL at 05:06

## 2021-09-24 RX ADMIN — METOPROLOL TARTRATE 25 MG: 25 TABLET ORAL at 20:13

## 2021-09-24 RX ADMIN — KETOROLAC TROMETHAMINE 15 MG: 15 INJECTION, SOLUTION INTRAMUSCULAR; INTRAVENOUS at 22:05

## 2021-09-24 RX ADMIN — KETOROLAC TROMETHAMINE 30 MG: 30 INJECTION, SOLUTION INTRAMUSCULAR; INTRAVENOUS at 07:43

## 2021-09-24 RX ADMIN — AMIODARONE HYDROCHLORIDE 200 MG: 200 TABLET ORAL at 09:20

## 2021-09-24 RX ADMIN — FUROSEMIDE 20 MG: 10 INJECTION, SOLUTION INTRAMUSCULAR; INTRAVENOUS at 15:23

## 2021-09-24 RX ADMIN — ONDANSETRON 4 MG: 2 INJECTION INTRAMUSCULAR; INTRAVENOUS at 04:16

## 2021-09-24 RX ADMIN — HYDROCODONE BITARTRATE AND ACETAMINOPHEN 2 TABLET: 5; 325 TABLET ORAL at 22:46

## 2021-09-24 RX ADMIN — HYDROCODONE BITARTRATE AND ACETAMINOPHEN 2 TABLET: 5; 325 TABLET ORAL at 09:21

## 2021-09-24 RX ADMIN — METOPROLOL TARTRATE 12.5 MG: 25 TABLET ORAL at 09:20

## 2021-09-24 RX ADMIN — IPRATROPIUM BROMIDE AND ALBUTEROL SULFATE 1 AMPULE: .5; 3 SOLUTION RESPIRATORY (INHALATION) at 08:00

## 2021-09-24 RX ADMIN — FENTANYL CITRATE 50 MCG: 50 INJECTION, SOLUTION INTRAMUSCULAR; INTRAVENOUS at 20:11

## 2021-09-24 RX ADMIN — Medication 81 MG: at 09:20

## 2021-09-24 RX ADMIN — FENTANYL CITRATE 50 MCG: 50 INJECTION, SOLUTION INTRAMUSCULAR; INTRAVENOUS at 00:20

## 2021-09-24 RX ADMIN — HYDROCODONE BITARTRATE AND ACETAMINOPHEN 2 TABLET: 5; 325 TABLET ORAL at 18:45

## 2021-09-24 RX ADMIN — FENTANYL CITRATE 50 MCG: 50 INJECTION, SOLUTION INTRAMUSCULAR; INTRAVENOUS at 13:10

## 2021-09-24 RX ADMIN — DEXTROSE MONOHYDRATE 2000 MG: 50 INJECTION, SOLUTION INTRAVENOUS at 06:05

## 2021-09-24 RX ADMIN — AMIODARONE HYDROCHLORIDE 200 MG: 200 TABLET ORAL at 20:13

## 2021-09-24 RX ADMIN — ATORVASTATIN CALCIUM 20 MG: 20 TABLET, FILM COATED ORAL at 20:13

## 2021-09-24 RX ADMIN — POTASSIUM CHLORIDE 20 MEQ: 29.8 INJECTION, SOLUTION INTRAVENOUS at 01:02

## 2021-09-24 RX ADMIN — MUPIROCIN: 20 OINTMENT TOPICAL at 20:13

## 2021-09-24 RX ADMIN — SODIUM CHLORIDE, PRESERVATIVE FREE 10 ML: 5 INJECTION INTRAVENOUS at 20:11

## 2021-09-24 RX ADMIN — FENTANYL CITRATE 50 MCG: 50 INJECTION, SOLUTION INTRAMUSCULAR; INTRAVENOUS at 18:46

## 2021-09-24 RX ADMIN — FENTANYL CITRATE 50 MCG: 50 INJECTION, SOLUTION INTRAMUSCULAR; INTRAVENOUS at 06:04

## 2021-09-24 RX ADMIN — AMIODARONE HYDROCHLORIDE 200 MG: 200 TABLET ORAL at 14:08

## 2021-09-24 RX ADMIN — ACETAMINOPHEN 650 MG: 325 TABLET ORAL at 06:18

## 2021-09-24 RX ADMIN — ENOXAPARIN SODIUM 80 MG: 80 INJECTION SUBCUTANEOUS at 22:06

## 2021-09-24 RX ADMIN — CLOPIDOGREL 75 MG: 75 TABLET, FILM COATED ORAL at 09:20

## 2021-09-24 RX ADMIN — DEXTROSE MONOHYDRATE 2000 MG: 50 INJECTION, SOLUTION INTRAVENOUS at 14:08

## 2021-09-24 RX ADMIN — DEXTROSE MONOHYDRATE 2000 MG: 50 INJECTION, SOLUTION INTRAVENOUS at 22:05

## 2021-09-24 RX ADMIN — FENTANYL CITRATE 50 MCG: 50 INJECTION, SOLUTION INTRAMUSCULAR; INTRAVENOUS at 03:14

## 2021-09-24 RX ADMIN — VANCOMYCIN HYDROCHLORIDE 1000 MG: 1 INJECTION, SOLUTION INTRAVENOUS at 09:51

## 2021-09-24 RX ADMIN — IPRATROPIUM BROMIDE AND ALBUTEROL SULFATE 1 AMPULE: .5; 3 SOLUTION RESPIRATORY (INHALATION) at 16:48

## 2021-09-24 RX ADMIN — HYDROCODONE BITARTRATE AND ACETAMINOPHEN 2 TABLET: 5; 325 TABLET ORAL at 13:09

## 2021-09-24 RX ADMIN — KETOROLAC TROMETHAMINE 15 MG: 15 INJECTION, SOLUTION INTRAMUSCULAR; INTRAVENOUS at 15:29

## 2021-09-24 RX ADMIN — VANCOMYCIN HYDROCHLORIDE 1000 MG: 1 INJECTION, SOLUTION INTRAVENOUS at 22:46

## 2021-09-24 RX ADMIN — PANTOPRAZOLE SODIUM 40 MG: 40 TABLET, DELAYED RELEASE ORAL at 09:20

## 2021-09-24 ASSESSMENT — PAIN DESCRIPTION - PAIN TYPE
TYPE: ACUTE PAIN;SURGICAL PAIN
TYPE: ACUTE PAIN;SURGICAL PAIN

## 2021-09-24 ASSESSMENT — PAIN SCALES - GENERAL
PAINLEVEL_OUTOF10: 10
PAINLEVEL_OUTOF10: 7
PAINLEVEL_OUTOF10: 10
PAINLEVEL_OUTOF10: 8
PAINLEVEL_OUTOF10: 9
PAINLEVEL_OUTOF10: 8
PAINLEVEL_OUTOF10: 9
PAINLEVEL_OUTOF10: 10
PAINLEVEL_OUTOF10: 8
PAINLEVEL_OUTOF10: 8
PAINLEVEL_OUTOF10: 5
PAINLEVEL_OUTOF10: 9
PAINLEVEL_OUTOF10: 8
PAINLEVEL_OUTOF10: 9
PAINLEVEL_OUTOF10: 7
PAINLEVEL_OUTOF10: 6

## 2021-09-24 ASSESSMENT — PAIN DESCRIPTION - LOCATION
LOCATION: CHEST;INCISION
LOCATION: CHEST;INCISION

## 2021-09-24 ASSESSMENT — PAIN DESCRIPTION - DESCRIPTORS
DESCRIPTORS: ACHING;DISCOMFORT;SORE
DESCRIPTORS: ACHING;DISCOMFORT;SORE

## 2021-09-24 NOTE — PROGRESS NOTES
Physical Therapy    Facility/Department: [facility] CAR 1  Initial Assessment    NAME: Marylee Bees  : 1980  MRN: [id number]  S/p CABG   Date of Service: 2021    Discharge Recommendations: Further therapy recommended at discharge. PT Equipment Recommendations  Equipment Needed: No (pt reports owning RW)    Assessment   Body structures, Functions, Activity limitations: Decreased functional mobility ; Decreased balance;Decreased endurance;Decreased strength  Assessment: The pt ambulated 150ft with RW and CGA, limited by endurance deficits. Recommend continued acute PT to address deficits and progress toward prior level of independence. Prognosis: Good  Decision Making: Medium Complexity  PT Education: Goals;PT Role;Plan of Care; Functional Mobility Training  REQUIRES PT FOLLOW UP: Yes  Activity Tolerance  Activity Tolerance: Patient limited by endurance       Patient Diagnosis(es): There were no encounter diagnoses. has a past medical history of Bacteremia, Cardioembolic stroke ([facility] [address] Cerebral artery occlusion with cerebral infarction [facility]), Cerebral septic emboli ([facility] [address] Chronic right shoulder pain, Diverticulosis, Endocarditis, History of COVID-19, Hyperlipidemia, Hypertension, MSSA bacteremia, MESSI (obstructive sleep apnea), Platelets decreased ([facility] [address] Research subject, Under care of team, Under care of team, Under care of team, Under care of team, Under care of team, and Wears contact lenses. has a past surgical history that includes Anterior cruciate ligament repair (Right); Appendectomy (1997); colectomy; Dilatation, esophagus; Abdomen surgery; and   picc powerpicc double (2021).     Restrictions  Restrictions/Precautions  Restrictions/Precautions: Fall Risk, Surgical Protocols  Required Braces or Orthoses?: Yes  Required Braces or Orthoses  Other: Heart Hugger Brace  Position Activity Restriction  Sternal Precautions: No Pushing, No Pulling, 5# Lifting Restrictions  Other position/activity restrictions: ambulate pt, s/p MVR 9/23  Vision/Hearing  Vision: Impaired  Vision Exceptions: Wears glasses at all times  Hearing: Within functional limits     Subjective  General  Patient assessed for rehabilitation services?: Yes  Response To Previous Treatment: Not applicable  Family / Caregiver Present: No  Follows Commands: Within Functional Limits  General Comment  Comments: 6L O2 via NC. Subjective  Subjective: RN and pt agreeable to PT. Pt sitting in bedside chair upon arrival, very pleasant and cooperative throughout. Pain Screening  Patient Currently in Pain: Yes  Pain Assessment  Pain Assessment: 0-10  Pain Level: 8  Pain Type: Acute pain;Surgical pain  Pain Location: Chest;Incision  Pain Descriptors: Aching;Discomfort; Sore  Non-Pharmaceutical Pain Intervention(s): Ambulation/Increased Activity  Response to Pain Intervention: Patient Satisfied  Vital Signs  Patient Currently in Pain: Yes       Orientation  Orientation  Overall Orientation Status: Within Functional Limits  Social/Functional History  Social/Functional History  Lives With: Family (wife, and 5,2,4 year old children)  Type of Home: House  Home Layout: Two level, Able to Live on Main level with bedroom/bathroom, Performs ADL's on one level  Home Access: Stairs to enter with rails  Entrance Stairs - Number of Steps: 4  Entrance Stairs - Rails: Right  Bathroom Shower/Tub: Tub/Shower unit  Bathroom Toilet: Standard  Bathroom Equipment: Shower chair  Home Equipment: Rolling walker (ambulates without AD at baseline)  ADL Assistance: [address]: Independent  Homemaking Responsibilities: Yes  Ambulation Assistance: Independent  Transfer Assistance: Independent  Active : Yes  Mode of Transportation: Car  Occupation: Full time employment  Type of occupation:   Additional Comments: Pt reports wife is able to provide 24hr assistance upon discharge; brother and parents live nearby and also able to assist PRN.   Cognition   Cognition  Overall Cognitive Status: WFL    Objective          Joint Mobility  Spine: WFL  ROM RLE: WFL  ROM LLE: WFL  ROM RUE: WFL within sternal precautions  ROM LUE: WFL within sternal precautions  Strength RLE  Strength RLE: WFL  Strength LLE  Strength LLE: WFL  Strength RUE  Strength RUE: WFL  Comment: within sternal precautions  Strength LUE  Strength LUE: WFL  Comment: within sternal precautions  Tone RLE  RLE Tone: Normotonic  Tone LLE  LLE Tone: Normotonic  Motor Control  Gross Motor?: WFL  Sensation  Overall Sensation Status: WFL  Bed mobility  Scooting: Stand by assistance  Comment: Pt in bedside chair upon arrival and retired to bedside chair upon exit  Transfers  Sit to Stand: Contact guard assistance  Stand to sit: Contact guard assistance  Comment: Transfers performed with RW, verbal cues for UE placement and use of heart hugger with good return  Ambulation  Ambulation?: Yes  Ambulation 1  Surface: level tile  Device: Rolling Walker  Other Apparatus: O2 (6L)  Assistance: Contact guard assistance  Quality of Gait: steady, no LOB  Gait Deviations: Slow Melinda;Decreased step length;Decreased step height  Distance: 150ft  Stairs/Curb  Stairs?: No     Balance  Posture: Good  Sitting - Static: Good  Sitting - Dynamic: Good;-  Standing - Static: Fair;+  Standing - Dynamic: Fair  Comments: standing balance assessed with RW        Plan   Plan  Times per week: 6-7x/wk  Current Treatment Recommendations: Strengthening, ROM, Balance Training, Functional Mobility Training, Transfer Training, Gait Training, Stair training, Endurance Training, Home Exercise Program, Safety Education & Training, Patient/Caregiver Education & Training  Safety Devices  Type of devices: Nurse notified, Call light within reach, Gait belt, Left in chair  Restraints  Initially in place: No    AM-PAC Score  AM-PAC Inpatient Mobility Raw Score : 18 (09/24/21 1353)  AM-PAC Inpatient T-Scale Score : 43.63 (09/24/21 [facility])  Mobility Inpatient CMS 0-100% Score: 46.58 (09/24/21 1353)  Mobility Inpatient CMS G-Code Modifier : CK (09/24/21 1353)          Goals  Short term goals  Time Frame for Short term goals: 14 visits  Short term goal 1: Perform bed mobility and functional transfers independently  Short term goal 2: Ambulate 300ft independently  Short term goal 3: Ascend/descend 4 steps with HR and SBA  Short term goal 4: Demo Good dynamic standing balance to decrease risk of falls  Short term goal 5: Perform CR HEP independently       Therapy Time   Individual Concurrent Group Co-treatment   Time In 0923         Time Out 0947         Minutes 24         Timed Code Treatment Minutes: 8 Minutes       Medardo Flight, PT

## 2021-09-24 NOTE — PROGRESS NOTES
Occupational Therapy   Occupational Therapy Initial Assessment  Date: 2021   Patient Name: Vazquez Silverio  MRN: [id number]     : 1980    Date of Service: 2021     S/p mitral valve replacement on      Discharge Recommendations:  Home with assist PRN, Continue to assess pending progress  OT Equipment Recommendations  Equipment Needed: Yes  Mobility Devices: ADL Assistive Devices  ADL Assistive Devices: Shower Chair with back;Grab Bars - shower    Assessment   Performance deficits / Impairments: Decreased endurance;Decreased high-level IADLs;Decreased ADL status; Decreased functional mobility   Assessment: Pt in bedside chair upon arrival and completed functional sit<>stand transfers with CGA and functional mobility with CGA. Pt became lightheaded during mobility and was provided RW for support. No LOB throughout. Pt declined ADL tasks this date despite encouragement. Educated on proper use of heart hugger and sternal precautions with good return. Pt is expected to require skilled OT services during their acute hospitalization stay to address the above noted deficits through skilled occupational therapy intervention for promotion of increased independence throughout ADLs, IADLs and functional mobility tasks. Reports having good support home upon discharge as needed from wife and family. Prognosis: Good  Decision Making: Medium Complexity  OT Education: Energy Conservation;OT Role;Plan of Care;Precautions;Transfer Training  Patient Education: activity promotion, sternal precautions, proper use of heart hugger, purpose of utilizing RW-good return  REQUIRES OT FOLLOW UP: Yes  Activity Tolerance  Activity Tolerance: Patient limited by fatigue;Patient Tolerated treatment well  Safety Devices  Safety Devices in place: Yes  Type of devices: Gait belt;Call light within reach; Left in chair;Nurse notified  Restraints  Initially in place: No           Patient Diagnosis(es): There were no encounter diagnoses. has a past medical history of Bacteremia, Cardioembolic stroke ([facility] [address] Cerebral artery occlusion with cerebral infarction [facility]), Cerebral septic emboli ([facility] [address] Chronic right shoulder pain, Diverticulosis, Endocarditis, History of COVID-19, Hyperlipidemia, Hypertension, MSSA bacteremia, MESSI (obstructive sleep apnea), Platelets decreased ([facility] [address] Research subject, Under care of team, Under care of team, Under care of team, Under care of team, Under care of team, and Wears contact lenses. has a past surgical history that includes Anterior cruciate ligament repair (Right); Appendectomy (07/09/1997); colectomy; Dilatation, esophagus; Abdomen surgery; and hc  picc powerpicc double (08/02/2021). Restrictions  Restrictions/Precautions  Restrictions/Precautions: Fall Risk, Surgical Protocols  Required Braces or Orthoses?: Yes  Required Braces or Orthoses  Other: Heart Hugger Brace  Position Activity Restriction  Sternal Precautions: No Pushing, No Pulling, 5# Lifting Restrictions  Other position/activity restrictions: ambulate pt, s/p MVR 9/23    Subjective   General  Patient assessed for rehabilitation services?: Yes  Family / Caregiver Present: No  General Comment  Comments: RN ok'd for OT/PT eval this AM. Pt agreeable to session, pleasent/cooperative throughout. Patient Currently in Pain: Yes  Pain Assessment  Pain Assessment: 0-10  Pain Level: 8  Pain Type: Acute pain;Surgical pain  Pain Location: Chest;Incision  Pain Descriptors: Aching;Discomfort; Sore  Non-Pharmaceutical Pain Intervention(s): Ambulation/Increased Activity  Response to Pain Intervention: Patient Satisfied  Vital Signs  Patient Currently in Pain: Yes  Oxygen Therapy  SpO2: 96 %  Social/Functional History  Social/Functional History  Lives With: Family (wife, and 5,2,4 year old children)  Type of Home: House  Home Layout: Two level, Able to Live on Main level with bedroom/bathroom, Performs ADL's on one level  Home Access: Stairs to enter with rails  Entrance Stairs - Number of Steps: 4  Entrance Stairs - Rails: Right  Bathroom Shower/Tub: Tub/Shower unit  Bathroom Toilet: Standard  Bathroom Equipment: Shower chair  Home Equipment: Rolling walker (ambulates without AD at baseline)  ADL Assistance: [address]: Independent  Homemaking Responsibilities: Yes  Ambulation Assistance: Independent  Transfer Assistance: Independent  Active : Yes  Mode of Transportation: Car  Occupation: Full time employment  Type of occupation:   Additional Comments: Pt reports wife is able to provide 24hr assistance upon discharge; brother and parents live nearby and also able to assist PRN. Objective   Vision: Impaired  Vision Exceptions: Wears glasses at all times  Hearing: Within functional limits    Orientation  Overall Orientation Status: Within Functional Limits     Balance  Sitting Balance: Stand by assistance (Seated EOB unsupported ~5 minutes)  Standing Balance: Contact guard assistance  Standing Balance  Time: 8 minutes  Activity: static standing, functional transfers, functional mobility  Functional Mobility  Functional - Mobility Device: Rolling Walker  Activity: Other  Assist Level: Contact guard assistance  Functional Mobility Comments: Completed func mobility in room and hallway to practice house hold distances. Initially began w/out RW, but prior to reaching door pt became light headed requiring static standing brace for ~2 minutes. Pt provided RW and was able to continue completing mobility. No LOB throughout. ADL  Feeding: Independent;Setup  Grooming: Modified independent ;Setup  UE Bathing: Setup; Increased time to complete;Stand by assistance  LE Bathing: Minimal assistance;Setup; Increased time to complete  UE Dressing: Minimal assistance;Setup; Increased time to complete  LE Dressing: Minimal assistance;Setup; Increased time to complete  Toileting: Contact guard assistance;Setup; Increased time to complete  Additional Comments: Pt declined ADL tasks despite encouragement d/t fatigue. Pt educated on heart hugger and sternal precautions with good return. Demonstrates good use of heart hugger throughout. Tone RUE  RUE Tone: Normotonic  Tone LUE  LUE Tone: Normotonic  Coordination  Movements Are Fluid And Coordinated: Yes     Bed mobility  Supine to Sit: Unable to assess  Sit to Supine: Unable to assess  Scooting: Stand by assistance  Comment: Pt on bedside chair upon arrival and retired to bedside chair upon exit. Transfers  Sit to stand: Contact guard assistance  Stand to sit: Contact guard assistance  Transfer Comments: Use of heart hugger. Sit to stand w/out RW, stand to sit w/ RW.      Cognition  Overall Cognitive Status: WFL        Sensation  Overall Sensation Status: WFL (Denies any numbness/tingling)        LUE AROM (degrees)  LUE AROM : WFL  Left Hand AROM (degrees)  Left Hand AROM: WFL  RUE AROM (degrees)  RUE AROM : WFL  Right Hand AROM (degrees)  Right Hand AROM: WFL  LUE Strength  L Hand General: 4+/5  LUE Strength Comment: Not formally assessed d/t sternal precautions  RUE Strength  R Hand General: 4+/5  RUE Strength Comment: Not formally assessed d/t sternal precautions                   Plan   Plan  Times per week: 3-5x/wk  Current Treatment Recommendations: Safety Education & Training, Balance Training, Patient/Caregiver Education & Training, Self-Care / ADL, Functional Mobility Training, Equipment Evaluation, Education, & procurement, Home Management Training, Endurance Training    AM-PAC Score        AM-[facility] Inpatient Daily Activity Raw Score: 20 (09/24/21 1319)  AM-PAC Inpatient ADL T-Scale Score : 42.03 (09/24/21 1319)  ADL Inpatient CMS 0-100% Score: 38.32 (09/24/21 1319)  ADL Inpatient CMS G-Code Modifier : Whitney Lamaring (09/24/21 1319)    Goals  Short term goals  Time Frame for Short term goals: By discharge, pt will:  Short term goal 1: Demo functional sit<>stand transfers with SUP, using LRD PRN  Short term goal 2: Demo functional mobility with SUP, using LRD PRN  Short term goal 3: Demo donning/doffing heart hugger with Min A  Short term goal 4: Demo +15 minutes of standing tolerance throughout ADLs/functional task with SBA  Short term goal 5: Demo UB ADLs with SBA  Short term goal 6: Demo LB ADLs with CGA, use of DME PRN  Short term goal 7: Demo adhrence to sternal precautions with  100% accuracy throughout all functional tasks with 0 VCs       Therapy Time   Individual Concurrent Group Co-treatment   Time In 0920         Time Out 0948         Minutes 28         Timed Code Treatment Minutes: 10 Minutes       Dwayne Lundborg, OTR/L

## 2021-09-24 NOTE — PROGRESS NOTES
[facility]  Daily Progress Note    Surgeon: Dr Heydi Toledo   S/P: Mitral valve replacement with On-X valve  POD#: 1  EF: 60%      Subjective:  Mr. Jesus Manuel Cazares feels well today with no acute complaints. Pain is controlled on current medication regimen. OOBTC and ambulating. Last BMs prior to surgery, bowel regimen in place. Decreased appetite. Denies chest pain or shortness of breath. Plan of care reviewed and questions answered. Physical Exam  Vital Signs: /80   Pulse 92   Temp 97.7 °F (36.5 °C) (Bladder)   Resp 25   Ht 5' 6\" (1.676 m)   Wt 177 lb 11.1 oz (80.6 kg)   SpO2 92%   BMI 28.68 kg/m²  O2 Flow Rate (L/min): 3 L/min   Admit Weight: Weight: 163 lb 9.3 oz (74.2 kg)   WEIGHTWeight: 177 lb 11.1 oz (80.6 kg)     General: alert and oriented to person, place and time, well-developed and well-nourished, in no acute distress. Up in chair  Heart: Normal S1 and S2.  Regular rhythm. No murmurs, gallops, or rubs. Pacing Wires Yes   Lungs: clear to auscultation bilaterally and diminished breath sounds bibasilar  Abdomen: soft, non tender, non distended, BSx4  Extremities: 2+, pitting and generalized edema  Wounds: clean and dry, healing appropriately.       Scheduled Meds:    ketorolac  30 mg IntraVENous Once    sodium chloride flush  10 mL IntraVENous 2 times per day    aspirin  81 mg Oral Daily    clopidogrel  75 mg Oral Daily    amiodarone  200 mg Oral TID    mupirocin   Nasal BID    polyethylene glycol  17 g Oral Daily    metoprolol tartrate  25 mg Oral BID    atorvastatin  20 mg Oral Nightly    pantoprazole  40 mg Oral Daily    pantoprazole  40 mg IntraVENous Daily    And    sodium chloride (PF)  10 mL IntraVENous Daily    ceFAZolin (ANCEF) IVPB  2,000 mg IntraVENous Q8H    vancomycin (VANCOCIN) IV  1,000 mg IntraVENous Q12H    insulin glargine  0.15 Units/kg SubCUTAneous Nightly    ipratropium-albuterol  1 ampule Inhalation Q4H WA     Continuous Infusions:  sodium chloride      propofol Stopped (09/23/21 1629)    norepinephrine Stopped (09/23/21 2300)    insulin 2.05 Units/hr (09/24/21 0600)    dextrose      vasopressin (Septic Shock) infusion Stopped (09/23/21 2200)       Data:  CBC:   Recent Labs     09/23/21  1456 09/24/21  0357   WBC 15.4* 17.3*   HGB 9.4* 9.2*   HCT 28.7* 28.2*   MCV 89.4 87.3   * 132*     BMP:   Recent Labs     09/23/21  1430 09/23/21  1456 09/23/21  1456 09/23/21  1810 09/24/21  0021 09/24/21  0357   NA  --  140  --   --   --  136   K  --  3.6*   < > 4.0 3.9 4.4   CL  --  108*  --   --   --  107   CO2  --  22  --   --   --  16*   BUN  --  15  --   --   --  12   CREATININE 0.87 0.88  --   --   --  0.74    < > = values in this interval not displayed. PT/INR:   Recent Labs     09/23/21  1456 09/24/21  0357   PROTIME 11.5 10.6   INR 1.1 1.0     APTT:   Recent Labs     09/23/21  1456   APTT 23.6       Chest X-Ray:  ONE XRAY VIEW OF THE CHEST       9/24/2021 5:53 am       COMPARISON:   23 September 2021       HISTORY:   ORDERING SYSTEM PROVIDED HISTORY: pl effusions   TECHNOLOGIST PROVIDED HISTORY:   pl effusions   Reason for Exam: uprt       FINDINGS:   AP portable view of the chest time stamped at 534 hours demonstrates   overlying cardiac monitoring electrodes, prior median sternotomy, right   internal jugular catheter terminating at the cavoatrial junction, mediastinal   drain in site 2 and left-sided chest tube terminating in the left upper lung   field. Destini Haring endotracheal and intestinal tubes have been removed.       Cardiomegaly is noted.  Small left effusion is noted.  Lung volumes are low   with bibasilar opacities favoring atelectasis.  Suspect trace right effusion. No extrapleural air is seen.           Impression   The patient has been extubated.  Interval removal of intestinal tube. Support tubes and lines as above.  Stable cardiac size with low lung volumes,   effusions and bibasilar opacities.          I/O:  I/O last 3 completed shifts: In: 7472 [P.O.:1200; I.V.:5772; Blood:500]  Out: 12 [Urine:2120; Blood:300; Chest Tube:570]      Assessment:  · Severe mitral regurgitation s/p MVR  ? POD 1, without complication  · Acute blood loss anemia secondary to above  · Mild aortic valve insufficiency  · Mild tricuspid regurgitation  · Subacute endocarditis secondary to MSSA  · Cerebral septic emboli secondary to above  · Pericardial effusion  · Pulmonary nodule  ? Noted on CT chest at [facility], no surgical intervention indicated  ? Recommend follow up imaging in one year   · Essential hypertension  · Hyperlipidemia    Plan:    Remains inpatient on telemetry, level of care is currently Cardiac CCU    Monitor vitals closely including continuous pulse oximetry   Oxygen as needed via nasal cannula to maintain SpO2 > 92%   Chest x-ray daily   Maintain Chest tubes to low intermittent wall suction   Discontinue Cárden[place] for strict I&Os   Encourage incentive spirometry    Monitor CBC, BMP, INR and Mag daily   wean drips   Currently taking metoprolol, metoprolol added to regimen   Patient is on BB, ACE-I/ARB, ASA, Statin therapy per protocol    Amiodarone 200 mg twice daily   Norco for pain control   SCDs while stationary    Protonix for GI prophylaxis   Replace electrolytes as needed per sliding scale and recheck per policy   PT/OT evalutation for discharge recommendation and ambulation 3x daily   Case Management consult for discharge planning        The above recommendations including medications and orders were discussed and agreed upon with Dr. Zakiya Lopez, the attending on service for the cardiothoracic surgery group today.      Electronically signed by MARIKA Olivares CNP on 9/24/2021 at 7:09 AM    On this date 9/24/2021 I have spent 28 minutes reviewing previous notes, test results and face to face with the patient discussing the diagnosis and importance of compliance with the treatment plan as well as documenting on the day of the visit. At least 50% of the time documented was spent with the patient to provide counseling and/or coordination of care. This note was created with the assistance of a speech-recognition program.  Although the intention is to generate a document that actually reflects the content of the visit, no guarantees can be provided that every mistake has been identified and corrected by editing. Note was updated later by me after  physical examination and  completion of the assessment.

## 2021-09-24 NOTE — PLAN OF CARE
Problem: Falls - Risk of:  Goal: Will remain free from falls  Description: Will remain free from falls  9/24/2021 0825 by Collette Ricker, RN  Outcome: Ongoing  9/23/2021 2000 by Christ Joshi RN  Outcome: Ongoing  9/23/2021 1959 by Christ Joshi RN  Outcome: Ongoing  Goal: Absence of physical injury  Description: Absence of physical injury  9/24/2021 0825 by Collette Ricker, RN  Outcome: Ongoing  9/23/2021 2000 by Christ Joshi RN  Outcome: Ongoing  9/23/2021 1959 by Christ Joshi RN  Outcome: Ongoing     Problem: Discharge Planning:  Goal: Discharged to appropriate level of care  Description: Discharged to appropriate level of care  9/24/2021 0825 by Collette Ricker, RN  Outcome: Ongoing  9/23/2021 2000 by Christ Joshi RN  Outcome: Ongoing     Problem: Cardiac Output - Decreased:  Goal: Cardiac output within specified parameters  Description: Cardiac output within specified parameters  9/24/2021 0825 by Collette Ricker, RN  Outcome: Ongoing  9/23/2021 2000 by Christ Joshi RN  Outcome: Ongoing     Problem: Infection - Surgical Site:  Goal: Will show no infection signs and symptoms  Description: Will show no infection signs and symptoms  9/24/2021 0825 by Collette Ricker, RN  Outcome: Ongoing  9/23/2021 2000 by Christ Joshi RN  Outcome: Ongoing     Problem: Pain - Acute:  Goal: Pain level will decrease  Description: Pain level will decrease  9/24/2021 0825 by Collette Ricker, RN  Outcome: Ongoing  9/23/2021 2000 by Christ Joshi RN  Outcome: Ongoing     Problem: Venous Thromboembolism:  Goal: Will show no signs or symptoms of venous thromboembolism  Description: Will show no signs or symptoms of venous thromboembolism  9/24/2021 0825 by Collette Ricker, RN  Outcome: Ongoing  9/23/2021 2000 by hCrist Joshi RN  Outcome: Ongoing  Goal: Absence of signs or symptoms of impaired coagulation  Description: Absence of signs or symptoms of impaired coagulation  9/24/2021 0825 by Collette Ricker, RN  Outcome: Ongoing  9/23/2021 2000 by Eddie Jaeger RN  Outcome: Ongoing

## 2021-09-24 NOTE — ANESTHESIA POSTPROCEDURE EVALUATION
Department of Anesthesiology  Postprocedure Note    Patient: Gallo Crowley  MRN: [id number]  YOB: 1980  Date of evaluation: 9/24/2021  Time:  6:43 AM     Procedure Summary     Date: 09/23/21 Room / Location: [address] [place] [address]    Anesthesia Start: 1705 Anesthesia Stop: 5857    Procedure: MITRAL VALVE REPLACEMENT - ON-X SIZE 31/33 (N/A ) Diagnosis: (MITRAL VALVE REGURGITATION)    Surgeons: Tanisha Ring MD Responsible Provider: Estefania Maciel MD    Anesthesia Type: general ASA Status: 4          Anesthesia Type: general    Bouchra Phase I: Bouchra Score: 5    Bouchra Phase II:      Last vitals: Reviewed and per EMR flowsheets.        Anesthesia Post Evaluation    Patient location during evaluation: ICU  Patient participation: complete - patient cannot participate  Level of consciousness: sedated and ventilated  Pain score: 0  Airway patency: patent  Nausea & Vomiting: no nausea and no vomiting  Complications: no  Cardiovascular status: blood pressure returned to baseline  Respiratory status: intubated  Hydration status: euvolemic  Comments:   Mechanical Ventilation Data  SETTINGS (Comprehensive)  Vent Information  $Ventilation: (S) Off Vent (pt extubated)  Skin Assessment: Clean, dry, & intact  Vent Type: Servo i  Vent Mode: (S) CPAP  Vt Ordered: 550 mL  Pressure Ordered: 10  Rate Set: 16 bmp  Pressure Support: 10 cmH20  FiO2 : 40 %  SpO2: 92 %  SpO2/FiO2 ratio: 232.5  Sensitivity: 5  PEEP/CPAP: 5  I Time/ I Time %: 0.9 s  Humidification Source: HME  Nitric Oxide/Epoprostenol In Use?: No  Additional Respiratory  Assessments  Pulse: 92  Resp: 25  SpO2: 92 %  End Tidal CO2: 40 (%)  Position: Semi-Lang's  Humidification Source: HME  Oral Care Completed?: Yes  Oral Care: Mouthwash, Mouthwash with chlorhexidine, Mouth moisturizer, Mouth suctioned  Subglottic Suction Done?: No    ABG   No results found for: PH, PCO2, PO2, HCO3, O2SAT  Lab Results       Component Value               Date                       MODE                     CPAP/PS             09/23/2021

## 2021-09-24 NOTE — CONSULTS
Attestation signed by      Attending Physician Statement:    I have discussed the care of  Emilia Rachel , including pertinent history and exam findings, with the Cardiology fellow/resident. I have seen and examined the patient and the key elements of all parts of the encounter have been performed by me. I agree with the assessment, plan and orders as documented by the fellow/resident, after I modified exam findings and plan of treatments, and the final version is my approved version of the assessment. Additional Comments: The patient was seen and examined, agree with below. S/p MV replacement. Feels good, extubated, off pressors. Continue current medications. PT/OT and routine post surgery orders. [facility]   Consultation Note               Today's Date: 9/24/2021  Patient Name: Emilia Rachel  Date of admission: 9/23/2021  6:57 AM  Patient's age: 36 y.o., 1980  Admission Dx: Mitral valve replaced [Z95.2]    Reason for Consult:  S/p MVR    Requesting Physician: Torrie Taylor MD    CHIEF COMPLAINT:  No chief complaint on file. History Obtained From:  EMR and patient    HISTORY OF PRESENT ILLNESS:      The patient is a 36 y.o. with recent MSSA bacteremia, septic emboli to brain and endocarditis involving mitral valve last month was admitted on 9/21 and underwent elective mechanical mitral valve replacement on 9/23        Past Medical History:   has a past medical history of Bacteremia, Cardioembolic stroke ([address] Cerebral artery occlusion with cerebral infarction ([address] Cerebral septic emboli ([address] Chronic right shoulder pain, Diverticulosis, Endocarditis, History of COVID-19, Hyperlipidemia, Hypertension, MSSA bacteremia, MESSI (obstructive sleep apnea), Platelets decreased ([address] Research subject, Under care of team, Under care of team, Under care of team, Under care of team, Under care of team, and Wears contact lenses.       Past Surgical History: has a past surgical history that includes Anterior cruciate ligament repair (Right); Appendectomy (07/09/1997); colectomy; Dilatation, esophagus; Abdomen surgery; and   picc powerpicc double (08/02/2021). Home Medications:    Prior to Admission medications    Medication Sig Start Date End Date Taking?  Authorizing Provider   METOPROLOL TARTRATE PO Take 1 tablet by mouth 2 times daily 12.5 mg bid 8/25/21   Historical Provider, MD   Ascorbic Acid (VITAMIN C) 250 MG tablet Take 250 mg by mouth daily Emergen C 750 mg QD    Historical Provider, MD   Lactobacillus Rhamnosus, GG, (CULTURELLE PO) Take by mouth    Historical Provider, MD      Current Facility-Administered Medications: ketorolac (TORADOL) injection 15 mg, 15 mg, IntraVENous, Q6H PRN  HYDROcodone-acetaminophen (NORCO) 5-325 MG per tablet 1 tablet, 1 tablet, Oral, Q4H PRN **OR** HYDROcodone-acetaminophen (NORCO) 5-325 MG per tablet 2 tablet, 2 tablet, Oral, Q4H PRN  enoxaparin (LOVENOX) injection 80 mg, 1 mg/kg, SubCUTAneous, BID  sodium chloride flush 0.9 % injection 10 mL, 10 mL, IntraVENous, 2 times per day  sodium chloride flush 0.9 % injection 10 mL, 10 mL, IntraVENous, PRN  0.9 % sodium chloride infusion, 25 mL, IntraVENous, PRN  ondansetron (ZOFRAN) injection 4 mg, 4 mg, IntraVENous, Q8H PRN  aspirin EC tablet 81 mg, 81 mg, Oral, Daily  clopidogrel (PLAVIX) tablet 75 mg, 75 mg, Oral, Daily  acetaminophen (TYLENOL) tablet 650 mg, 650 mg, Oral, Q4H PRN  fentaNYL (SUBLIMAZE) injection 25 mcg, 25 mcg, IntraVENous, Q1H PRN **OR** fentaNYL (SUBLIMAZE) injection 50 mcg, 50 mcg, IntraVENous, Q1H PRN  amiodarone (CORDARONE) tablet 200 mg, 200 mg, Oral, TID  hydrALAZINE (APRESOLINE) injection 5 mg, 5 mg, IntraVENous, Q5 Min PRN  metoprolol (LOPRESSOR) injection 2.5 mg, 2.5 mg, IntraVENous, Q10 Min PRN  mupirocin (BACTROBAN) 2 % ointment, , Nasal, BID  propofol injection, 10 mcg/kg/min, IntraVENous, Continuous  sodium bicarbonate 8.4 % injection 50 mEq, 50 mEq, IntraVENous, Q30 Min PRN  diphenhydrAMINE (BENADRYL) tablet 25 mg, 25 mg, Oral, Nightly PRN  polyethylene glycol (GLYCOLAX) packet 17 g, 17 g, Oral, Daily  bisacodyl (DULCOLAX) EC tablet 5 mg, 5 mg, Oral, Daily PRN  fleet rectal enema 1 enema, 1 enema, Rectal, Daily PRN  metoprolol tartrate (LOPRESSOR) tablet 25 mg, 25 mg, Oral, BID  atorvastatin (LIPITOR) tablet 20 mg, 20 mg, Oral, Nightly  pantoprazole (PROTONIX) tablet 40 mg, 40 mg, Oral, Daily  pantoprazole (PROTONIX) injection 40 mg, 40 mg, IntraVENous, Daily **AND** sodium chloride (PF) 0.9 % injection 10 mL, 10 mL, IntraVENous, Daily  ceFAZolin (ANCEF) 2000 mg in dextrose 5 % 50 mL IVPB, 2,000 mg, IntraVENous, Q8H  vancomycin (VANCOCIN) 1000 mg in dextrose 5% 200 mL IVPB, 1,000 mg, IntraVENous, Q12H  calcium chloride 1,000 mg in sodium chloride 0.9 % 100 mL IVPB, 1,000 mg, IntraVENous, PRN  magnesium sulfate 1000 mg in dextrose 5% 100 mL IVPB, 1,000 mg, IntraVENous, PRN  potassium chloride 20 mEq/50 mL IVPB (Central Line), 20 mEq, IntraVENous, PRN  albumin human 5 % IV solution 25 g, 25 g, IntraVENous, PRN  norepinephrine (LEVOPHED) 16 mg in sodium chloride 0.9 % 250 mL infusion, 0.2 mcg/kg/min, IntraVENous, Continuous PRN  insulin regular (HUMULIN R;NOVOLIN R) 100 Units in sodium chloride 0.9 % 100 mL infusion, 1 Units/hr, IntraVENous, Continuous  insulin glargine (LANTUS) injection vial 11 Units, 0.15 Units/kg, SubCUTAneous, Nightly  glucose (GLUTOSE) 40 % oral gel 15 g, 15 g, Oral, PRN  dextrose 50 % IV solution, 12.5 g, IntraVENous, PRN  glucagon (rDNA) injection 1 mg, 1 mg, IntraMUSCular, PRN  dextrose 5 % solution, 100 mL/hr, IntraVENous, PRN  ipratropium-albuterol (DUONEB) nebulizer solution 1 ampule, 1 ampule, Inhalation, Q4H WA  vasopressin 20 Units in dextrose 5 % 100 mL infusion, 1 Units/hr, IntraVENous, Continuous      Allergies:  Morphine      Social History:   reports that he has quit smoking. His smoking use included cigarettes.  He smoked 1.00 pack per day. He quit smokeless tobacco use about 4 years ago. His smokeless tobacco use included chew and snuff. He reports current alcohol use of about 20.0 standard drinks of alcohol per week. He reports that he does not use drugs. Family History: family history includes No Known Problems in his mother; Other in his father. REVIEW OF SYSTEMS:    · Constitutional: there has been no unanticipated weight loss. · Eyes: No visual changes or diplopia. · ENT: No Headaches  · Cardiovascular: see above  · Respiratory: No previous pulmonary problems, No cough  · Gastrointestinal: No abdominal pain. No change in bowel or bladder habits. · Genitourinary: No dysuria, trouble voiding, or hematuria. · Musculoskeletal:  No gait disturbance, No weakness or joint complaints. · Integumentary: No rash or pruritis. · Neurological: No headache, diplopia      PHYSICAL EXAM:      /78   Pulse 97   Temp 97.7 °F (36.5 °C) (Bladder)   Resp 18   Ht 5' 6\" (1.676 m)   Wt 177 lb 11.1 oz (80.6 kg)   SpO2 97%   BMI 28.68 kg/m²    Constitutional and General Appearance: Alert, no distress  Respiratory:  · No increased work of breathing. · Clear to auscultation bilaterally. No wheeze or crackles. Cardiovascular:  · Normal S1 and S2.   · Jugular venous pulsation Normal  Abdomen:   · Soft  · No tenderness  Extremities:  · No lower extremity edema  Neurologic:  · Alert and oriented. · Moves all extremities well      DATA:    Diagnostics:    Labs:     CBC:   Recent Labs     09/23/21  1456 09/24/21  0357   WBC 15.4* 17.3*   HGB 9.4* 9.2*   HCT 28.7* 28.2*   * 132*     BMP:   Recent Labs     09/23/21  1456 09/23/21  1810 09/24/21  0021 09/24/21  0357     --   --  136   K 3.6*   < > 3.9 4.4   CO2 22  --   --  16*   BUN 15  --   --  12   CREATININE 0.88  --   --  0.74   LABGLOM >60  --   --  >60   GLUCOSE 152*  --   --  125*    < > = values in this interval not displayed.      BNP: No results for input(s): BNP in the last 72 hours. PT/INR:   Recent Labs     09/23/21  1456 09/24/21  0357   PROTIME 11.5 10.6   INR 1.1 1.0     APTT:  Recent Labs     09/23/21  1456   APTT 23.6     CARDIAC ENZYMES:  No results for input(s): TROPHS in the last 72 hours. No results for input(s): CKTOTAL, CKMB, CKMBINDEX, TROPONINI in the last 72 hours. Invalid input(s):  TROPONINT  No results for input(s): TROPONINT in the last 72 hours. FASTING LIPID PANEL:No results found for: HDL, LDLDIRECT, LDLCALC, TRIG  LIVER PROFILE:No results for input(s): AST, ALT, LABALBU in the last 72 hours. EKG:    ECHO 7/20:  Left ventricle is normal in size with normal systolic function globally. Calculated ejection fraction is 60%  Left atrial dilatation. Right atrium is normal in size. Mild buckling of the right atrial wall. Mild aortic insufficiency. Prolapse of the of posterior leaflet of the mitral valve. Large vegetations noted on the MONROE done 7/20/21 are no longer identified . Small echogenicity is noted on the posterior leaflet, maybe improving  vegetation. Severe mitral regurgitation with pulmonary vein flow reversal.  Consider MONROE when indicated. Mild tricuspid regurgitation. Estimated right ventricular systolic pressure is 49 mmHg, suggests mild Pulm  HTN. Small circumferential pericardial effusion. No signs of echocariographic tamponade. Intra-op MONROE:    1. A postop MONROE was performed without complications. 2. LVEF 55 % postop  3. Post-op patient has underwent a On-X MV Replacement size 31/33 mm. Well seated. No MR and MS. Mean gradient is 6 mmHg. 4. No Aortic dissection            IMPRESSION:    1. Hx of MSSA bacteremia  2. Hx of infective endocarditis involving mitral valve leading to MR  3. S/p mechanical MVR  4. HTN  5. HL        RECOMMENDATIONS:  1. Lovenox and coumadin bridge per CT surgery  2. Continue IV cefazolin per ID recs  3. Amiodarone 200 TID per CT surgery  4.  Post op management per CT surgery recs         Thank you for allowing us to participate in [facility]. Will follow with you.       Electronically signed on 09/24/21 at 9:59 AM by:    Feroz Bautista MD, MD   Fellow, [address]

## 2021-09-24 NOTE — PROGRESS NOTES
Acetaminophen PRN  Amiodarone (new start)  Aspirin  Cefazolin  Clopidogrel  Enoxaparin  Ketorlac PRN    Date             INR           Dose   9/24/2021        1.0       5 mg    Daily PT/INR while inpatient. PT/INR ordered to start 09/25/2021 @ 0600 daily. Thank you for the consult. Will continue to follow. Scratch note completed on 09/24/2021.     Raoul Lance, PharmD  PGY2 Ambulatory Care Pharmacy Resident    9/24/2021 11:55 AM

## 2021-09-24 NOTE — DISCHARGE INSTR - COC
Continuity of Care Form    Patient Name: Funmilayo Mcfarland   :  1980  MRN:  [id number]    Admit date:  2021  Discharge date:  ***    Code Status Order: Full Code   Advance Directives:      Admitting Physician:  Jimi Lee MD  PCP: Saida German MD    Discharging Nurse: [facility] Unit/Room#: 1009/1009-01  Discharging Unit Phone Number: ***    Emergency Contact:   Extended Emergency Contact Information  Primary Emergency Contact: Aneesh Garcia  Mobile Phone: [phone]  Relation: Spouse    Past Surgical History:  Past Surgical History:   Procedure Laterality Date    ABDOMEN SURGERY      large bowel resection    ANTERIOR CRUCIATE LIGAMENT REPAIR Right     APPENDECTOMY  1997    COLECTOMY      2012 - D/T Dverticulitis    DILATATION, ESOPHAGUS      HC  PICC [facility] DOUBLE  2021    removed   2021       Immunization History: There is no immunization history on file for this patient. Active Problems:  Patient Active Problem List   Diagnosis Code    Acute dehydration E86.0    Hypomagnesemia E83.42    Hypokalemia E87.6    Epistaxis R04.0    Thrombocytopenia (HCC) D69.6    Hyperglycemia R73.9    Essential hypertension I10    Sepsis with acute organ dysfunction and septic shock (HCC) A41.9, R65.21    Delusions ([address] F22    Hypophosphatemia E83.39    MSSA bacteremia R78.81, B95.61    Severe mitral regurgitation I34.0    Acute respiratory failure with hypoxia (HCC) J96.01    GI bleed K92.2    Normocytic normochromic anemia D64.9    Obesity (BMI 30-39. 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    Guaiac positive stools R19.5    Subacute endocarditis I33.9    Cerebral septic emboli (HCC) I76, I66.9    Cerebral multi-infarct state I69.30    Pericardial effusion I31.3    Endocarditis of mitral valve I05.8    Endocarditis due to methicillin susceptible Staphylococcus aureus (MSSA) I33.0, B95.61    Pleurisy R09.1    Hyperlipidemia E78.5    Mitral valve replaced Z95.2       Isolation/Infection:   Isolation            No Isolation          Patient Infection Status       Infection Onset Added Last Indicated Last Indicated By Review Planned Expiration Resolved Resolved By    None active    Resolved    COVID-19 Rule Out 09/20/21 09/20/21 09/20/21 COVID-19 (Ordered)   09/21/21 Rule-Out Test Resulted    C-diff Rule Out 07/19/21 07/19/21 07/19/21 C DIFF TOXIN/ANTIGEN (Ordered)   07/19/21 Rule-Out Test Resulted    COVID-19 Rule Out 07/17/21 07/17/21 07/17/21 Respiratory Panel, Molecular, with COVID-19 (Restricted: peds pts or suitable admitted adults) (Ordered)   07/17/21 Rule-Out Test Resulted    C-diff Rule Out 07/17/21 07/17/21 07/17/21 Gastrointestinal Panel, Molecular (Ordered)   07/18/21 Rule-Out Test Resulted    COVID-19 Rule Out 07/17/21 07/17/21 07/17/21 COVID-19, Rapid (Ordered)   07/17/21 Rule-Out Test Resulted            Nurse Assessment:  Last Vital Signs: /80   Pulse 92   Temp 97.7 °F (36.5 °C) (Bladder)   Resp 25   Ht 5' 6\" (1.676 m)   Wt 177 lb 11.1 oz (80.6 kg)   SpO2 92%   BMI 28.68 kg/m²     Last documented pain score (0-10 scale): Pain Level:  (temp 38.5)  Last Weight:   Wt Readings from Last 1 Encounters:   09/24/21 177 lb 11.1 oz (80.6 kg)     Mental Status:  {IP PT MENTAL STATUS:20030:::0}    IV Access:  { MELISA IV ACCESS:502554580:::0}    Nursing Mobility/ADLs:  Walking   {CHP DME ADLs:628243340:::0}  Transfer  {CHP DME ADLs:919413373:::0}  Bathing  {CHP DME ADLs:845019277:::0}  Dressing  {CHP DME ADLs:473577763:::0}  Toileting  {CHP DME ADLs:543375128:::0}  Feeding  {CHP DME ADLs:820985403:::0}  Med Admin  {CHP DME ADLs:942801290:::0}  Med Delivery   { MELISA MED Delivery:004845843:::0}    Wound Care Documentation and Therapy:  Wound 08/23/21 Back Medial;Right old healed wound with scar (Active)   Wound Etiology Burn 08/25/21 0000   Wound Assessment Pink/red 21 1630   Drainage Amount None 21 1630   Number of days: 31        Elimination:  Continence: Bowel: {YES / FK:44293}  Bladder: {YES / BN:16549}  Urinary Catheter: {Urinary Catheter:190928580:::0}   Colostomy/Ileostomy/Ileal Conduit: {YES / WJ:94980}       Date of Last BM: ***    Intake/Output Summary (Last 24 hours) at 2021 0647  Last data filed at 2021 0600  Gross per 24 hour   Intake 7472 ml   Output 2990 ml   Net 4482 ml     I/O last 3 completed shifts: In: 8746 [I.V.:4350;  Blood:500]  Out: 8686 [Urine:1585; Blood:300; Chest Tube:380]    Safety Concerns:     [employer] Safety Concerns:055020092:::0}    Impairments/Disabilities:      [employer] Impairments/Disabilities:875540224:::0}    Nutrition Therapy:  Current Nutrition Therapy:   [employer] Diet List:721234217:::0}    Routes of Feeding: {CHP DME Other Feedings:417759352:::0}  Liquids: {Slp liquid thickness:57204}  Daily Fluid Restriction: {CHP DME Yes amt example:162012447:::0}  Last Modified Barium Swallow with Video (Video Swallowing Test): {Done Not Done BSUS:225847344:::3}    Treatments at the Time of Hospital Discharge:   Respiratory Treatments: ***  Oxygen Therapy:  {Therapy; copd oxygen:04434:::0}  Ventilator:    {[facility] Vent List:873693866:::0}    Rehab Therapies: {THERAPEUTIC INTERVENTION:3089401256}  Weight Bearing Status/Restrictions: [employer] Weight Bearin:::0}  Other Medical Equipment (for information only, NOT a DME order):  {EQUIPMENT:722587761}  Other Treatments: ***    Patient's personal belongings (please select all that are sent with patient):  {CHP DME Belongings:387005013:::0}    RN SIGNATURE:  {Esignature:234384421:::0}    CASE MANAGEMENT/SOCIAL WORK SECTION    Inpatient Status Date: ***    Readmission Risk Assessment Score:  Readmission Risk              Risk of Unplanned Readmission:  22           Discharging to Facility/ Agency   Name:   Address:  Phone:  Fax:    Dialysis Facility (if applicable) Name:  Address:  Dialysis Schedule:  Phone:  Fax:    / signature: {Esignature:470573004:::0}    PHYSICIAN SECTION    Prognosis: Good    Condition at Discharge: Stable    Rehab Potential (if transferring to Rehab): Good    Recommended Labs or Other Treatments After Discharge:   Skilled nursing cardio/pulmonary assessment EVERY shift/visit with vital signs and SaO2 call abnormal results to Surgeon's office  Notify Surgeon's office for temp >101.5 F  Daily weight and record. Call for weight gain of 3 lbs in 3 days or 5-7lbs in 7 days    Monitor surgical incisions for signs of infection (redness, warmth, pain, purulent drainage, odor) and call with abnormal findings. Leave incisions open to air unless drainage is prohibitive. Call with any signs of separation or dehiscence. Shower daily with warm water and mild soap. Pat dry with clean towel, do not rub. Heart Hugger and Surgical bra on during daytime  Large breasted patients need to wear surgical or soft sports bra at all times to reduce tension on sternal incision  Renny hose on during day time, rinse and dry overnight to prevent infection of leg incisions    Strict Sternal Precautions: No lifting/pushing/pulling over 5lbs x 4 weeks, may increase 5-10 lbs per week after that as tolerated. Physical therapy BID, plus ambulation 3x per day in addition. Up in chair for all meals  Reinforce Cardiac Diet, medication compliance and activity instructions    Arrange for transportation to all appointments  Patient needs to see Surgeon and Cardiologist within 2 weeks of discharge. Physician Certification: I certify the above information and transfer of Bobo Morin  is necessary for the continuing treatment of the diagnosis listed and that he requires [address] for less 30 days.      Update Admission H&P: No change in H&P    PHYSICIAN SIGNATURE:  Electronically signed by Nasim Avalos MD on 9/28/21 at 7:05 AM EDT

## 2021-09-24 NOTE — PLAN OF CARE
Problem: Falls - Risk of:  Goal: Will remain free from falls  Description: Will remain free from falls  9/23/2021 2000 by Aditi Scruggs RN  Outcome: Ongoing  9/23/2021 1959 by Aditi Scruggs RN  Outcome: Ongoing  Goal: Absence of physical injury  Description: Absence of physical injury  9/23/2021 2000 by Aditi Scruggs RN  Outcome: Ongoing  9/23/2021 1959 by Aditi Scruggs RN  Outcome: Ongoing     Problem: Discharge Planning:  Goal: Discharged to appropriate level of care  Description: Discharged to appropriate level of care  Outcome: Ongoing     Problem: Cardiac Output - Decreased:  Goal: Cardiac output within specified parameters  Description: Cardiac output within specified parameters  Outcome: Ongoing     Problem: Infection - Surgical Site:  Goal: Will show no infection signs and symptoms  Description: Will show no infection signs and symptoms  Outcome: Ongoing     Problem: Pain - Acute:  Goal: Pain level will decrease  Description: Pain level will decrease  Outcome: Ongoing     Problem: Venous Thromboembolism:  Goal: Will show no signs or symptoms of venous thromboembolism  Description: Will show no signs or symptoms of venous thromboembolism  Outcome: Ongoing  Goal: Absence of signs or symptoms of impaired coagulation  Description: Absence of signs or symptoms of impaired coagulation  Outcome: Ongoing

## 2021-09-25 ENCOUNTER — APPOINTMENT (OUTPATIENT)
Dept: GENERAL RADIOLOGY | Age: 41
DRG: 219 | End: 2021-09-25
Attending: THORACIC SURGERY (CARDIOTHORACIC VASCULAR SURGERY)
Payer: COMMERCIAL

## 2021-09-25 LAB
ANION GAP SERPL CALCULATED.3IONS-SCNC: 13 MMOL/L (ref 9–17)
BUN BLDV-MCNC: 11 MG/DL (ref 6–20)
BUN/CREAT BLD: ABNORMAL (ref 9–20)
CALCIUM SERPL-MCNC: 8.5 MG/DL (ref 8.6–10.4)
CHLORIDE BLD-SCNC: 100 MMOL/L (ref 98–107)
CO2: 18 MMOL/L (ref 20–31)
CREAT SERPL-MCNC: 0.75 MG/DL (ref 0.7–1.2)
GFR AFRICAN AMERICAN: >60 ML/MIN
GFR NON-AFRICAN AMERICAN: >60 ML/MIN
GFR SERPL CREATININE-BSD FRML MDRD: ABNORMAL ML/MIN/{1.73_M2}
GFR SERPL CREATININE-BSD FRML MDRD: ABNORMAL ML/MIN/{1.73_M2}
GLUCOSE BLD-MCNC: 110 MG/DL (ref 75–110)
GLUCOSE BLD-MCNC: 111 MG/DL (ref 75–110)
GLUCOSE BLD-MCNC: 114 MG/DL (ref 75–110)
GLUCOSE BLD-MCNC: 118 MG/DL (ref 70–99)
GLUCOSE BLD-MCNC: 124 MG/DL (ref 75–110)
HCT VFR BLD CALC: 31.4 % (ref 40.7–50.3)
HEMOGLOBIN: 9.5 G/DL (ref 13–17)
INR BLD: 1.1
MAGNESIUM: 2.2 MG/DL (ref 1.6–2.6)
MCH RBC QN AUTO: 28.9 PG (ref 25.2–33.5)
MCHC RBC AUTO-ENTMCNC: 30.3 G/DL (ref 28.4–34.8)
MCV RBC AUTO: 95.4 FL (ref 82.6–102.9)
NRBC AUTOMATED: 0 PER 100 WBC
PDW BLD-RTO: 14.6 % (ref 11.8–14.4)
PLATELET # BLD: 112 K/UL (ref 138–453)
PMV BLD AUTO: 9.7 FL (ref 8.1–13.5)
POTASSIUM SERPL-SCNC: 3.8 MMOL/L (ref 3.7–5.3)
PROTHROMBIN TIME: 11.5 SEC (ref 9.1–12.3)
RBC # BLD: 3.29 M/UL (ref 4.21–5.77)
SODIUM BLD-SCNC: 131 MMOL/L (ref 135–144)
WBC # BLD: 13.1 K/UL (ref 3.5–11.3)

## 2021-09-25 PROCEDURE — 6360000002 HC RX W HCPCS: Performed by: NURSE PRACTITIONER

## 2021-09-25 PROCEDURE — 99024 POSTOP FOLLOW-UP VISIT: CPT | Performed by: PHYSICIAN ASSISTANT

## 2021-09-25 PROCEDURE — 6370000000 HC RX 637 (ALT 250 FOR IP): Performed by: NURSE PRACTITIONER

## 2021-09-25 PROCEDURE — 80048 BASIC METABOLIC PNL TOTAL CA: CPT

## 2021-09-25 PROCEDURE — 2060000000 HC ICU INTERMEDIATE R&B

## 2021-09-25 PROCEDURE — 97530 THERAPEUTIC ACTIVITIES: CPT

## 2021-09-25 PROCEDURE — 97116 GAIT TRAINING THERAPY: CPT

## 2021-09-25 PROCEDURE — 2580000003 HC RX 258: Performed by: NURSE PRACTITIONER

## 2021-09-25 PROCEDURE — 36415 COLL VENOUS BLD VENIPUNCTURE: CPT

## 2021-09-25 PROCEDURE — 85610 PROTHROMBIN TIME: CPT

## 2021-09-25 PROCEDURE — 71045 X-RAY EXAM CHEST 1 VIEW: CPT

## 2021-09-25 PROCEDURE — 82947 ASSAY GLUCOSE BLOOD QUANT: CPT

## 2021-09-25 PROCEDURE — 85027 COMPLETE CBC AUTOMATED: CPT

## 2021-09-25 PROCEDURE — 83735 ASSAY OF MAGNESIUM: CPT

## 2021-09-25 RX ORDER — FUROSEMIDE 10 MG/ML
20 INJECTION INTRAMUSCULAR; INTRAVENOUS ONCE
Status: DISCONTINUED | OUTPATIENT
Start: 2021-09-25 | End: 2021-09-28 | Stop reason: HOSPADM

## 2021-09-25 RX ORDER — WARFARIN SODIUM 5 MG/1
5 TABLET ORAL
Status: COMPLETED | OUTPATIENT
Start: 2021-09-25 | End: 2021-09-25

## 2021-09-25 RX ADMIN — FENTANYL CITRATE 50 MCG: 50 INJECTION, SOLUTION INTRAMUSCULAR; INTRAVENOUS at 17:58

## 2021-09-25 RX ADMIN — FENTANYL CITRATE 50 MCG: 50 INJECTION, SOLUTION INTRAMUSCULAR; INTRAVENOUS at 15:36

## 2021-09-25 RX ADMIN — METOPROLOL TARTRATE 25 MG: 25 TABLET ORAL at 20:04

## 2021-09-25 RX ADMIN — FENTANYL CITRATE 50 MCG: 50 INJECTION, SOLUTION INTRAMUSCULAR; INTRAVENOUS at 13:00

## 2021-09-25 RX ADMIN — ENOXAPARIN SODIUM 80 MG: 80 INJECTION SUBCUTANEOUS at 20:04

## 2021-09-25 RX ADMIN — PANTOPRAZOLE SODIUM 40 MG: 40 TABLET, DELAYED RELEASE ORAL at 08:40

## 2021-09-25 RX ADMIN — SODIUM CHLORIDE, PRESERVATIVE FREE 10 ML: 5 INJECTION INTRAVENOUS at 20:05

## 2021-09-25 RX ADMIN — POTASSIUM CHLORIDE 20 MEQ: 29.8 INJECTION, SOLUTION INTRAVENOUS at 06:37

## 2021-09-25 RX ADMIN — Medication 81 MG: at 08:40

## 2021-09-25 RX ADMIN — FENTANYL CITRATE 50 MCG: 50 INJECTION, SOLUTION INTRAMUSCULAR; INTRAVENOUS at 21:55

## 2021-09-25 RX ADMIN — ATORVASTATIN CALCIUM 20 MG: 20 TABLET, FILM COATED ORAL at 20:04

## 2021-09-25 RX ADMIN — HYDROCODONE BITARTRATE AND ACETAMINOPHEN 2 TABLET: 5; 325 TABLET ORAL at 23:05

## 2021-09-25 RX ADMIN — AMIODARONE HYDROCHLORIDE 200 MG: 200 TABLET ORAL at 08:40

## 2021-09-25 RX ADMIN — SODIUM CHLORIDE, PRESERVATIVE FREE 10 ML: 5 INJECTION INTRAVENOUS at 08:41

## 2021-09-25 RX ADMIN — FENTANYL CITRATE 50 MCG: 50 INJECTION, SOLUTION INTRAMUSCULAR; INTRAVENOUS at 11:54

## 2021-09-25 RX ADMIN — ACETAMINOPHEN 650 MG: 325 TABLET ORAL at 20:05

## 2021-09-25 RX ADMIN — MUPIROCIN: 20 OINTMENT TOPICAL at 08:41

## 2021-09-25 RX ADMIN — FENTANYL CITRATE 50 MCG: 50 INJECTION, SOLUTION INTRAMUSCULAR; INTRAVENOUS at 10:21

## 2021-09-25 RX ADMIN — FENTANYL CITRATE 50 MCG: 50 INJECTION, SOLUTION INTRAMUSCULAR; INTRAVENOUS at 20:06

## 2021-09-25 RX ADMIN — WARFARIN SODIUM 5 MG: 5 TABLET ORAL at 17:58

## 2021-09-25 RX ADMIN — HYDROCODONE BITARTRATE AND ACETAMINOPHEN 2 TABLET: 5; 325 TABLET ORAL at 14:20

## 2021-09-25 RX ADMIN — FENTANYL CITRATE 50 MCG: 50 INJECTION, SOLUTION INTRAMUSCULAR; INTRAVENOUS at 08:40

## 2021-09-25 RX ADMIN — ENOXAPARIN SODIUM 80 MG: 80 INJECTION SUBCUTANEOUS at 10:21

## 2021-09-25 RX ADMIN — ONDANSETRON 4 MG: 2 INJECTION INTRAMUSCULAR; INTRAVENOUS at 05:06

## 2021-09-25 RX ADMIN — AMIODARONE HYDROCHLORIDE 200 MG: 200 TABLET ORAL at 20:04

## 2021-09-25 RX ADMIN — AMIODARONE HYDROCHLORIDE 200 MG: 200 TABLET ORAL at 14:20

## 2021-09-25 RX ADMIN — FENTANYL CITRATE 50 MCG: 50 INJECTION, SOLUTION INTRAMUSCULAR; INTRAVENOUS at 00:04

## 2021-09-25 RX ADMIN — DEXTROSE MONOHYDRATE 2000 MG: 50 INJECTION, SOLUTION INTRAVENOUS at 05:58

## 2021-09-25 RX ADMIN — FENTANYL CITRATE 50 MCG: 50 INJECTION, SOLUTION INTRAMUSCULAR; INTRAVENOUS at 05:06

## 2021-09-25 ASSESSMENT — PAIN SCALES - GENERAL
PAINLEVEL_OUTOF10: 9
PAINLEVEL_OUTOF10: 9
PAINLEVEL_OUTOF10: 8
PAINLEVEL_OUTOF10: 8
PAINLEVEL_OUTOF10: 5
PAINLEVEL_OUTOF10: 9
PAINLEVEL_OUTOF10: 9
PAINLEVEL_OUTOF10: 8
PAINLEVEL_OUTOF10: 5
PAINLEVEL_OUTOF10: 9
PAINLEVEL_OUTOF10: 9
PAINLEVEL_OUTOF10: 8
PAINLEVEL_OUTOF10: 8
PAINLEVEL_OUTOF10: 9
PAINLEVEL_OUTOF10: 6
PAINLEVEL_OUTOF10: 5
PAINLEVEL_OUTOF10: 9
PAINLEVEL_OUTOF10: 9
PAINLEVEL_OUTOF10: 5

## 2021-09-25 ASSESSMENT — PAIN DESCRIPTION - PAIN TYPE
TYPE: ACUTE PAIN;SURGICAL PAIN
TYPE: ACUTE PAIN;SURGICAL PAIN

## 2021-09-25 ASSESSMENT — PAIN DESCRIPTION - DESCRIPTORS
DESCRIPTORS: ACHING;DISCOMFORT;SORE
DESCRIPTORS: ACHING;CONSTANT

## 2021-09-25 ASSESSMENT — PAIN DESCRIPTION - FREQUENCY: FREQUENCY: INTERMITTENT

## 2021-09-25 ASSESSMENT — PAIN DESCRIPTION - LOCATION
LOCATION: CHEST;INCISION
LOCATION: CHEST;INCISION

## 2021-09-25 ASSESSMENT — PAIN DESCRIPTION - ORIENTATION: ORIENTATION: RIGHT;LOWER

## 2021-09-25 NOTE — OP NOTE
[address]                  [facility]                                OPERATIVE REPORT    PATIENT NAME: Verona Rivera                 :        1980  MED REC NO:   [id number]                             ROOM:       [id number]  ACCOUNT NO:   [de-identified]                           ADMIT DATE: 2021  PROVIDER:     Shanell Stack MD    DATE OF PROCEDURE:  2021    PRIMARY ATTENDING SURGEON:  Shanell Stack MD    OTHER ASSISTANT:  Included Elio Hobbs RN, RFA    PREOPERATIVE DIAGNOSES:  Severe wide open mitral regurgitation, status  post acute bacterial endocarditis of the mitral valve, CHF, volume  overload. POSTOPERATIVE DIAGNOSES:  Severe wide open mitral regurgitation, status  post acute bacterial endocarditis of the mitral valve, CHF, volume  overload. PROCEDURES PERFORMED:  Median sternotomy, aorto-bicaval cardiopulmonary  bypass, MONROE, mitral valve replacement with a 31/33 mm On-X mitral valve. COMPLICATIONS:  None. CONDITION:  Stable. DISPOSITION:  To CVICU. ESTIMATED BLOOD LOSS:  Not applicable. ANESTHESIA:  General endotracheal with Dr. Kallie Bond. CARDIOPULMONARY BYPASS TIME:  129 minutes. CROSS-CLAMP TIME:  85 minutes. INDICATIONS FOR SURGERY:  The patient is a 80-year-old man who suffered  a terrible bout of mitral valve endocarditis, which destroyed his valve  causing multiple perforations and free prolapse of the posterior reflex  which resulted in wide open mitral regurgitation. He was managed with  antibiotics and completed his course and had his congestive heart  failure managed medically and is now felt to be an appropriate timing  for surgical replacement of his valve. Based on the appearance, I  consulted with him that repair was probably not in the cards, but I  would take one more look at the time of surgery before replacing it.    When asked to him what procedures he wanted and going over the options,  he was adamant a mechanical valve is used as he did not want to come to  surgery again in his lifetime unless absolutely necessary. I thus took  him for mitral valve repair versus replacement with the consent of he  and his family on 09/23/2021. FINDINGS AT SURGERY:  Inspection of the valve revealed a very friable  subacute infection with residual friable vegetation all over the  posterior leaflet. There were at least two perforations of the  posterior leaflet including a complete de-hinging of the P2 segment  directly of the mitral valve. The de-hinging area was extremely friable  and would not held sutures that I attempted to repair. In addition,  there was free rupture of elements of P1 and P2, which resulted in P1  and P2 prolapse. Despite the sparing of the anterior leaflet, there was  no way that an acceptable repair could be performed under these  circumstances _____ replaced his valve. I sized for a 31/33 mm On-X  valve and the MONROE appearance revealed a well-seated valve with no  perivalvular leak, and no significant residual gradient. SURGERY IN DETAIL:  The patient was identified in his bed in the CVICU,  was transported to the operating room where he was induced for general  endotracheal anesthesia without difficulty. This included an uneventful  endotracheal intubation, the appropriate placement of lines and access  for cardiac surgery. He was prepped and draped in normal sterile  fashion, and a time-out was performed and documented. The operation did  begin with the performance of midline median sternotomy. A pericardial  well was created and a preliminary dissection was performed in order to  achieve aorto-bicaval cardiopulmonary bypass, which was achieved with  excellent flows and drainage after appropriate heparinization.   I placed  an ascending aorta cardioplegia needle on the ascending aorta, and I  placed caval tourniquets on the SVC and IVC. A cross-clamp was applied  and a dose of cold del Nido solution was given to achieve an adequate  diastolic arrest.  I looked about the heart, and the above-mentioned  findings were found. I placed topical ice and I dissected an open  Waterston's groove. A Rupa-Diamond retractor was used to expose the  mitral valve and the above-mentioned findings were found upon  inspection. I therefore conducted the replacement as described above. Once this was done, I closed the Waterston's groove with running 4-0  Prolene suture. The heart was de-aired, the cross-clamp was removed and  there was eventual return of normal sinus rhythm. I was able to wean  the patient from cardiopulmonary bypass with good hemodynamics on  minimal inotropic support. Satisfied with this, I thus decannulated and  administered protamine. Hemostasis was achieved and verified. Chest  tubes were placed. Ultimately, his chest was closed with my double  stainless steel wire technique. Please include that there were no adequately trained or available  residents for assistance in this operation, and the presence of  Yudelka Garner was critical for the first assistance necessary to  complete the operation. UYEN Dos Santos MD    D: 09/24/2021 13:15:42       T: 09/24/2021 23:19:04     [email or web address]  Job#: [id number]     Doc#: [id number]    CC:

## 2021-09-25 NOTE — PROGRESS NOTES
Physical Therapy  Facility/Department: [facility]  Daily Treatment Note  NAME: Lian Morales  : 1980  MRN: [id number]    Date of Service: 2021    Discharge Recommendations:      PT Equipment Recommendations  Equipment Needed: No (pt reports owning RW)    Assessment   Body structures, Functions, Activity limitations: Decreased functional mobility ; Decreased balance;Decreased endurance;Decreased strength  Assessment: The pt ambulated 250ft with RW and SBA, limited by endurance deficits. Recommend continued acute PT to address deficits and progress toward prior level of independence. Specific instructions for Next Treatment: Progress gait without RW once chest tube discontinued. Prognosis: Good  Decision Making: Medium Complexity  REQUIRES PT FOLLOW UP: Yes  Activity Tolerance  Activity Tolerance: Patient limited by endurance     Patient Diagnosis(es): There were no encounter diagnoses. has a past medical history of Bacteremia, Cardioembolic stroke ([facility] [address] Cerebral artery occlusion with cerebral infarction [facility]), Cerebral septic emboli ([facility] [address] Chronic right shoulder pain, Diverticulosis, Endocarditis, History of COVID-19, Hyperlipidemia, Hypertension, MSSA bacteremia, MESSI (obstructive sleep apnea), Platelets decreased ([facility] [address] Research subject, Under care of team, Under care of team, Under care of team, Under care of team, Under care of team, and Wears contact lenses. has a past surgical history that includes Anterior cruciate ligament repair (Right); Appendectomy (1997); colectomy; Dilatation, esophagus; Abdomen surgery; and hc  picc powerpicc double (2021).     Restrictions  Restrictions/Precautions  Restrictions/Precautions: Fall Risk, Surgical Protocols  Required Braces or Orthoses?: Yes  Required Braces or Orthoses  Other: Heart Hugger Brace  Position Activity Restriction  Sternal Precautions: No Pushing, No Pulling, 5# Lifting Restrictions  Other position/activity restrictions: ambulate pt, s/p MVR 9/23  Subjective   General  Response To Previous Treatment: Not applicable  Family / Caregiver Present: No  Subjective  Subjective: RN and pt agreeable to PT. Pt sitting in bedside chair upon arrival, very pleasant and cooperative throughout. General Comment  Comments: 4L O2 via NC. Pain Screening  Patient Currently in Pain: Yes  Pain Assessment  Pain Assessment: 0-10  Pain Level: 9  Patient's Stated Pain Goal: 2 (with coughing)  Pain Type: Acute pain;Surgical pain  Pain Location: Chest;Incision  Pain Descriptors: Aching;Discomfort; Sore  Response to Pain Intervention: Patient Satisfied  Vital Signs  BP Location: Left upper arm  Level of Consciousness: Alert (0)  Patient Currently in Pain: Yes  Oxygen Therapy  SpO2: 99 %  O2 Device: Nasal cannula  O2 Flow Rate (L/min): 4 L/min       Orientation  Orientation  Overall Orientation Status: Within Functional Limits  Cognition      Objective   Bed mobility  Scooting: Stand by assistance  Comment: [facility] Transfers  Sit to Stand: Contact guard assistance;Stand by assistance  Stand to sit: Contact guard assistance;Stand by assistance  Comment: Transfers performed with RW, verbal cues for UE placement and use of heart hugger with good return  Ambulation  Ambulation?: Yes  Ambulation 1  Surface: level tile  Device: Rolling Walker  Other Apparatus: O2 (4L)  Assistance: Stand by assistance  Quality of Gait: steady, no LOB, sao2 reminas 99 to 100 % after activity. Gait Deviations: Slow Melinda;Decreased step length;Decreased step height  Distance: 250ft  Comments: Pt asking for pain pills as finishing ambulation, RN notified.    Stairs/Curb  Stairs?: No     Balance  Posture: Good  Sitting - Static: Good  Sitting - Dynamic: Good;-  Standing - Static: Good  Standing - Dynamic: Fair;+  Comments: standing balance assessed with RW         Strength RLE  Strength RLE: WFL  Strength LLE  Strength LLE: WFL  Strength RUE  Strength RUE: WFL  Comment: within sternal precautions  Strength LUE  Strength LUE: WFL  Comment: within sternal precautions                 G-Code     OutComes Score                                                     AM-PAC Score             Goals  Short term goals  Time Frame for Short term goals: 14 visits  Short term goal 1: Perform bed mobility and functional transfers independently  Short term goal 2: Ambulate 300ft independently  Short term goal 3: Ascend/descend 4 steps with HR and SBA  Short term goal 4: Demo Good dynamic standing balance to decrease risk of falls  Short term goal 5: Perform CR HEP independently    Plan    Plan  Times per week: 6-7x/wk  Specific instructions for Next Treatment: Progress gait without RW once chest tube discontinued.   Current Treatment Recommendations: Strengthening, ROM, Balance Training, Functional Mobility Training, Transfer Training, Gait Training, Stair training, Endurance Training, Home Exercise Program, Safety Education & Training, Patient/Caregiver Education & Training  Safety Devices  Type of devices: Nurse notified, Call light within reach, Gait belt, Left in chair  Restraints  Initially in place: No     Therapy Time   Individual Concurrent Group Co-treatment   Time In 0944         Time Out 1008         Minutes 24                 Chandler Martinez, PT

## 2021-09-25 NOTE — PROGRESS NOTES
Pharmacy Note  Warfarin Consult follow-up      Recent Labs     09/25/21  0512   INR 1.1     Recent Labs     09/23/21  1456 09/24/21  0357 09/25/21  0512   HGB 9.4* 9.2* 9.5*   HCT 28.7* 28.2* 31.4*   * 132* 112*       Significant Drug-Drug Interactions:  New warfarin drug-drug interactions: NA  Discontinued drug-drug interactions: NA      Notes:                     Daily PT/INR while inpatient. INR=1.1, Will give 5 mg today.      Geno Youssef PharmD 9/25/2021 11:23 AM

## 2021-09-25 NOTE — PLAN OF CARE
BRONCHOSPASM/BRONCHOCONSTRICTION     [x]         IMPROVE AERATION/BREATH SOUNDS  [x]   ADMINISTER BRONCHODILATOR THERAPY AS APPROPRIATE  [x]   ASSESS BREATH SOUNDS  [x]   IMPLEMENT AEROSOL/MDI PROTOCOL  [x]   PATIENT EDUCATION AS NEEDED   Flor De La Rosa RCPPatient Assessment complete. Mitral valve replaced [Z95.2] . Vitals:    09/24/21 1957   BP: 113/74   Pulse: 89   Resp:    Temp: 98.6 °F (37 °C)   SpO2: 92%   . Patients home meds are   Prior to Admission medications    Medication Sig Start Date End Date Taking?  Authorizing Provider   METOPROLOL TARTRATE PO Take 1 tablet by mouth 2 times daily 12.5 mg bid 8/25/21   Historical Provider, MD   Ascorbic Acid (VITAMIN C) 250 MG tablet Take 250 mg by mouth daily Emergen C 750 mg QD    Historical Provider, MD   Lactobacillus Rhamnosus, GG, (CULTURELLE PO) Take by mouth    Historical Provider, MD   .  Recent Surgical History: Thoracic or Pulmonary Resection = 4     Assessment non smoker  No home o2 or CPAP  No tx's at home  No hx of COPD , Asthma or MESSI per patient  RR 18  Breath Sounds: clear      Bronchodilator assessment at level  1  Hyperinflation assessment at level   Secretion Management assessment at level      [x]    Bronchodilator Assessment  BRONCHODILATOR ASSESSMENT SCORE  Score 0 1 2 3 4 5   Breath Sounds   []  Patient Baseline [x]  No Wheeze good aeration []  Faint, scattered wheezing, good aeration []  Expiratory Wheezing and or moderately diminished []  Insp/Exp wheeze and/or very diminished []  Insp/Exp and/ or marked distress   Respiratory Rate   []  Patient Baseline [x]  Less than 20 []  Less than 20 []  20-25 []  Greater than 25 []  Greater than 25   Peak flow % of Pred or PB [x]  NA   []  Greater than 90%  []  81-90% []  71-80% []  Less than or equal to 70%  or unable to perform []  Unable due to Respiratory Distress   Dyspnea re []  Patient Baseline [x]  No SOB []  No SOB []  SOB on exertion []  SOB min activity []  At rest/acute   e FEV% 3. 49 3.73 3.98 4.24   54 - 57 2.21 2.38 2.57 2.75 2.95 3.14 3.35 3.56 54 - 57 2.46 2.67 2.89 3.12 3.36 3.60 3.85 4.11   58 - 61 2.10 2.28 2.46 2.65 2.84 3.04 3.24 3.45 58 - 61 2.32 2.54 2.76 2.99 3.23 3.47 3.72 3.98   62 - 65 1.99 2.17 2.35 2.54 2.73 2.93 3.13 3.34 62 - 65 2.19 2.40 2.62 2.85 3.09 3.33 3.58 3.84   66 - 69 1.88 2.05 2.23 2.42 2.61 2.81 3.02 3.23 66 - 69 2.04 2.26 2.48 2.71 2.95 3.19 3.44 3.70   70+ 1.82 1.99 2.17 2.36 2.55 2.75 2.95 3.16 70+ 1.97 2.19 2.41 2.64 2.87 3.12 3.37 3.62             Predicted Peak Expiratory Flow Rate                                       Height (in)  Female       Height (in) Male           Age 64 63 56 57 57 66 78 70 Age            21 344 357 372 387 402 417 432 446  60 62 64 66 68 70 72 74 76   25 337 352 366 381 396 411 426 441 25 447 476 [phone] [phone]   30 329 344 359 374 389 404 419 434 30 437 466 494 523 [phone] 667   35 322 337 351 366 381 396 411 426 35 426 455 484 512 541 [phone]   40 314 329 344 359 374 389 404 419 40 416 445 473 [phone] 617 647   45 307 322 336 351 366 381 396 411 45 405 434 463 491 520 549 577 606 636   50 299 314 329 344 359 374 389 404 50 395 424 452 481 510 538 567 596 625   55 292 307 321 336 351 366 381 396 55 384 413 442 470 499 528 556 585 615   60 284 299 314 329 344 359 374 389 60 374 403 431 460 489 517 546 575 605   65 277 292 306 321 336 351 366 381 65 363 392 421 449 478 507 535 564 594   70 269 284 299 314 329 344 359 374 70 353 382 410 439 468 496 525 554 583   75 261 274 289 305 319 334 348 364 75 344 372 400 429 458 487 515 544 573   80 253 266 282 296 312 327 342 356 80 335 362 390 419 448 476 [phone]

## 2021-09-25 NOTE — PLAN OF CARE
Problem: Falls - Risk of:  Goal: Will remain free from falls  Description: Will remain free from falls  Outcome: Ongoing     Problem: Falls - Risk of:  Goal: Absence of physical injury  Description: Absence of physical injury  Outcome: Ongoing     Problem: Discharge Planning:  Goal: Discharged to appropriate level of care  Description: Discharged to appropriate level of care  Outcome: Ongoing     Problem: Cardiac Output - Decreased:  Goal: Cardiac output within specified parameters  Description: Cardiac output within specified parameters  Outcome: Ongoing     Problem: Infection - Surgical Site:  Goal: Will show no infection signs and symptoms  Description: Will show no infection signs and symptoms  Outcome: Ongoing     Problem: Pain - Acute:  Goal: Pain level will decrease  Description: Pain level will decrease  Outcome: Ongoing     Problem: Pain:  Goal: Pain level will decrease  Description: Pain level will decrease  Outcome: Ongoing   Electronically signed by Benjamin German RN on 9/25/2021 at 2:01 PM

## 2021-09-25 NOTE — PROGRESS NOTES
[facility]  Daily Progress Note    Surgeon: Dr Mandy Gibbons   S/P: Mitral valve replacement with On-X valve  POD#: 2  EF: 60%    Subjective:  Mr. Che Real no complaints    Physical Exam  Vital Signs: /69   Pulse 76   Temp 99.8 °F (37.7 °C) (Oral)   Resp 18   Ht 5' 6\" (1.676 m)   Wt 181 lb (82.1 kg)   SpO2 97%   BMI 29.21 kg/m²  O2 Flow Rate (L/min): 4 L/min   Admit Weight: Weight: 163 lb 9.3 oz (74.2 kg)   WEIGHTWeight: 181 lb (82.1 kg)     Scheduled Meds:    enoxaparin  1 mg/kg SubCUTAneous BID    insulin lispro  0-6 Units SubCUTAneous TID WC    insulin lispro  0-3 Units SubCUTAneous Nightly    warfarin (COUMADIN) daily dosing (placeholder)   Other RX Placeholder    sodium chloride flush  10 mL IntraVENous 2 times per day    aspirin  81 mg Oral Daily    amiodarone  200 mg Oral TID    mupirocin   Nasal BID    polyethylene glycol  17 g Oral Daily    metoprolol tartrate  25 mg Oral BID    atorvastatin  20 mg Oral Nightly    pantoprazole  40 mg Oral Daily    pantoprazole  40 mg IntraVENous Daily    And    sodium chloride (PF)  10 mL IntraVENous Daily    insulin glargine  0.15 Units/kg SubCUTAneous Nightly     Continuous Infusions:    sodium chloride      dextrose       Data:  CBC:   Recent Labs     09/23/21  1456 09/24/21  0357 09/25/21  0512   WBC 15.4* 17.3* 13.1*   HGB 9.4* 9.2* 9.5*   HCT 28.7* 28.2* 31.4*   MCV 89.4 87.3 95.4   * 132* 112*     BMP:   Recent Labs     09/23/21  1456 09/23/21  1810 09/24/21  0021 09/24/21  0357 09/25/21  0512     --   --  136 131*   K 3.6*   < > 3.9 4.4 3.8   *  --   --  107 100   CO2 22  --   --  16* 18*   BUN 15  --   --  12 11   CREATININE 0.88  --   --  0.74 0.75    < > = values in this interval not displayed.      PT/INR:   Recent Labs     09/23/21  1456 09/24/21  0357 09/25/21  0512   PROTIME 11.5 10.6 11.5   INR 1.1 1.0 1.1     APTT:   Recent Labs     09/23/21  1456   APTT 23.6     I/O:  I/O last 3 completed shifts:  In: -   Out: 2075 [YXAMA:4542; Chest Tube:600]    A/P:  Doing well  INR 1.1 pharmacy to dose coumadin  DEEP BREATH/AMBULATE  Keep chest tubes today.  130 combined last shift  Diuresis 20 lasix IV  Hgb 9.5    Milas Wing Byrne

## 2021-09-25 NOTE — PROGRESS NOTES
[facility]   Progress Note                   Date:   9/25/2021  Patient name: Sujit Jacobson  Date of admission:  9/23/2021  6:57 AM  MRN:   [id number]  YOB: 1980  PCP: Sunni Fisher MD    Reason for Admission:      Subjective: There were no acute events overnight, remained hemodynamically stable, denies chest pain, dyspnea, orthopnea or palpitations. Medications:   Scheduled Meds:   enoxaparin  1 mg/kg SubCUTAneous BID    insulin lispro  0-6 Units SubCUTAneous TID WC    insulin lispro  0-3 Units SubCUTAneous Nightly    warfarin (COUMADIN) daily dosing (placeholder)   Other RX Placeholder    sodium chloride flush  10 mL IntraVENous 2 times per day    aspirin  81 mg Oral Daily    amiodarone  200 mg Oral TID    mupirocin   Nasal BID    polyethylene glycol  17 g Oral Daily    metoprolol tartrate  25 mg Oral BID    atorvastatin  20 mg Oral Nightly    pantoprazole  40 mg Oral Daily    pantoprazole  40 mg IntraVENous Daily    And    sodium chloride (PF)  10 mL IntraVENous Daily    insulin glargine  0.15 Units/kg SubCUTAneous Nightly       Continuous Infusions:   sodium chloride      dextrose         CBC:   Recent Labs     09/23/21  1456 09/24/21  0357 09/25/21  0512   WBC 15.4* 17.3* 13.1*   HGB 9.4* 9.2* 9.5*   * 132* 112*     BMP:    Recent Labs     09/23/21  1456 09/23/21  1810 09/24/21  0021 09/24/21  0357 09/25/21  0512     --   --  136 131*   K 3.6*   < > 3.9 4.4 3.8   *  --   --  107 100   CO2 22  --   --  16* 18*   BUN 15  --   --  12 11   CREATININE 0.88  --   --  0.74 0.75   GLUCOSE 152*  --   --  125* 118*    < > = values in this interval not displayed. Hepatic: No results for input(s): AST, ALT, ALB, BILITOT, ALKPHOS in the last 72 hours. Troponin: No results for input(s): TROPONINI in the last 72 hours. BNP: No results for input(s): BNP in the last 72 hours.   Lipids: No results for input(s): CHOL, HDL in the last 72 hours.    Invalid input(s): LDLCALCU  INR:   Recent Labs     09/23/21  1456 09/24/21  0357 09/25/21  0512   INR 1.1 1.0 1.1       Objective:   Vitals: BP 99/68   Pulse 76   Temp 97.8 °F (36.6 °C) (Oral)   Resp 18   Ht 5' 6\" (1.676 m)   Wt 181 lb (82.1 kg)   SpO2 96%   BMI 29.21 kg/m²     General appearance: awake, alert, in no apparent respiratory distress   HEENT: Head: Normocephalic, no lesions, without obvious abnormality  Neck: no JVD  Lungs: clear to auscultation bilaterally, no basilar rales, no wheezing   Heart: regular rate and rhythm, S1, S2 normal, no murmur, click, rub or gallop  Abdomen: soft, non-tender; bowel sounds normal  Extremities: No LE edema  Neurologic: Mental status: Alert, oriented. Motor and sensory not done. Summary  Left ventricle is normal in size with normal systolic function globally. Calculated ejection fraction is 60%  Left atrial dilatation. Right atrium is normal in size. Mild buckling of the right atrial wall. Mild aortic insufficiency. Prolapse of the of posterior leaflet of the mitral valve. Large vegetations noted on the MONROE done 7/20/21 are no longer identified . Small echogenicity is noted on the posterior leaflet, maybe improving  vegetation. Severe mitral regurgitation with pulmonary vein flow reversal.  Consider MONROE when indicated. Mild tricuspid regurgitation. Estimated right ventricular systolic pressure is 49 mmHg, suggests mild Pulm  HTN. Small circumferential pericardial effusion. No signs of echocariographic tamponade. Assessment / Acute Cardiac Problems:   1. Severe MR secondary to infective endocarditis s/p mechanical mitral valve replacement  2. Hypertension  3. Hyperlipidemia      Plan of Treatment:   Aspirin and Coumadin. Amiodarone 200 mg BID  Good glycemic control. High intensity Statin   Continue metoprolol tartrate 25 mg BID  Continue routine postoperative care per CT surgery.   Monitor H&H and platelets  Monitor WBC and fever Continue to monitor I/O with a goal of even/negative fluid balance. Discussed with patient and nursing. Eileen Palencia MD MD  Fellow, [facility]   Pager - [phone]    I performed a history and physical examination of the patient and discussed management with the resident. I reviewed the residents note and agree with the documented findings and plan of care. Any areas of disagreement are noted on the chart. I was personally present for the key portions of any procedures. I have documented in the chart those procedures where I was not present during the key portions. I have personally evaluated this patient and have completed at least one if not all key elements of the E/M (history, physical exam, and MDM). Additional findings are as noted. . Fluid restriction 1.5L. no fluid overload on exam. continue current medications.     Vikas Albert MD

## 2021-09-26 ENCOUNTER — APPOINTMENT (OUTPATIENT)
Dept: GENERAL RADIOLOGY | Age: 41
DRG: 219 | End: 2021-09-26
Attending: THORACIC SURGERY (CARDIOTHORACIC VASCULAR SURGERY)
Payer: COMMERCIAL

## 2021-09-26 LAB
ANION GAP SERPL CALCULATED.3IONS-SCNC: 10 MMOL/L (ref 9–17)
BUN BLDV-MCNC: 9 MG/DL (ref 6–20)
BUN/CREAT BLD: ABNORMAL (ref 9–20)
CALCIUM SERPL-MCNC: 8.3 MG/DL (ref 8.6–10.4)
CHLORIDE BLD-SCNC: 98 MMOL/L (ref 98–107)
CO2: 20 MMOL/L (ref 20–31)
CREAT SERPL-MCNC: 0.54 MG/DL (ref 0.7–1.2)
GFR AFRICAN AMERICAN: >60 ML/MIN
GFR NON-AFRICAN AMERICAN: >60 ML/MIN
GFR SERPL CREATININE-BSD FRML MDRD: ABNORMAL ML/MIN/{1.73_M2}
GFR SERPL CREATININE-BSD FRML MDRD: ABNORMAL ML/MIN/{1.73_M2}
GLUCOSE BLD-MCNC: 102 MG/DL (ref 70–99)
GLUCOSE BLD-MCNC: 125 MG/DL (ref 75–110)
GLUCOSE BLD-MCNC: 87 MG/DL (ref 75–110)
HCT VFR BLD CALC: 26.2 % (ref 40.7–50.3)
HEMOGLOBIN: 8.3 G/DL (ref 13–17)
INR BLD: 2
MAGNESIUM: 2 MG/DL (ref 1.6–2.6)
MCH RBC QN AUTO: 29 PG (ref 25.2–33.5)
MCHC RBC AUTO-ENTMCNC: 31.7 G/DL (ref 28.4–34.8)
MCV RBC AUTO: 91.6 FL (ref 82.6–102.9)
NRBC AUTOMATED: 0 PER 100 WBC
PDW BLD-RTO: 14 % (ref 11.8–14.4)
PLATELET # BLD: 157 K/UL (ref 138–453)
PMV BLD AUTO: 10.1 FL (ref 8.1–13.5)
POTASSIUM SERPL-SCNC: 3.6 MMOL/L (ref 3.7–5.3)
PROTHROMBIN TIME: 20 SEC (ref 9.1–12.3)
RBC # BLD: 2.86 M/UL (ref 4.21–5.77)
SODIUM BLD-SCNC: 128 MMOL/L (ref 135–144)
WBC # BLD: 9.3 K/UL (ref 3.5–11.3)

## 2021-09-26 PROCEDURE — 6370000000 HC RX 637 (ALT 250 FOR IP): Performed by: THORACIC SURGERY (CARDIOTHORACIC VASCULAR SURGERY)

## 2021-09-26 PROCEDURE — 97116 GAIT TRAINING THERAPY: CPT

## 2021-09-26 PROCEDURE — 99024 POSTOP FOLLOW-UP VISIT: CPT | Performed by: PHYSICIAN ASSISTANT

## 2021-09-26 PROCEDURE — 85610 PROTHROMBIN TIME: CPT

## 2021-09-26 PROCEDURE — 36415 COLL VENOUS BLD VENIPUNCTURE: CPT

## 2021-09-26 PROCEDURE — 71045 X-RAY EXAM CHEST 1 VIEW: CPT

## 2021-09-26 PROCEDURE — 82947 ASSAY GLUCOSE BLOOD QUANT: CPT

## 2021-09-26 PROCEDURE — 6360000002 HC RX W HCPCS: Performed by: NURSE PRACTITIONER

## 2021-09-26 PROCEDURE — 6370000000 HC RX 637 (ALT 250 FOR IP): Performed by: NURSE PRACTITIONER

## 2021-09-26 PROCEDURE — 2580000003 HC RX 258: Performed by: NURSE PRACTITIONER

## 2021-09-26 PROCEDURE — 80048 BASIC METABOLIC PNL TOTAL CA: CPT

## 2021-09-26 PROCEDURE — 6360000002 HC RX W HCPCS: Performed by: STUDENT IN AN ORGANIZED HEALTH CARE EDUCATION/TRAINING PROGRAM

## 2021-09-26 PROCEDURE — 85027 COMPLETE CBC AUTOMATED: CPT

## 2021-09-26 PROCEDURE — 97535 SELF CARE MNGMENT TRAINING: CPT

## 2021-09-26 PROCEDURE — 83735 ASSAY OF MAGNESIUM: CPT

## 2021-09-26 PROCEDURE — 2060000000 HC ICU INTERMEDIATE R&B

## 2021-09-26 PROCEDURE — 97110 THERAPEUTIC EXERCISES: CPT

## 2021-09-26 RX ORDER — POTASSIUM CHLORIDE 20 MEQ/1
40 TABLET, EXTENDED RELEASE ORAL PRN
Status: DISCONTINUED | OUTPATIENT
Start: 2021-09-26 | End: 2021-09-28 | Stop reason: HOSPADM

## 2021-09-26 RX ORDER — WARFARIN SODIUM 2 MG/1
2 TABLET ORAL
Status: COMPLETED | OUTPATIENT
Start: 2021-09-26 | End: 2021-09-26

## 2021-09-26 RX ORDER — FUROSEMIDE 10 MG/ML
20 INJECTION INTRAMUSCULAR; INTRAVENOUS 2 TIMES DAILY
Status: DISCONTINUED | OUTPATIENT
Start: 2021-09-26 | End: 2021-09-26

## 2021-09-26 RX ORDER — POTASSIUM CHLORIDE 7.45 MG/ML
10 INJECTION INTRAVENOUS PRN
Status: DISCONTINUED | OUTPATIENT
Start: 2021-09-26 | End: 2021-09-28 | Stop reason: HOSPADM

## 2021-09-26 RX ORDER — FUROSEMIDE 10 MG/ML
20 INJECTION INTRAMUSCULAR; INTRAVENOUS DAILY
Status: DISCONTINUED | OUTPATIENT
Start: 2021-09-26 | End: 2021-09-28 | Stop reason: HOSPADM

## 2021-09-26 RX ADMIN — MUPIROCIN: 20 OINTMENT TOPICAL at 20:12

## 2021-09-26 RX ADMIN — FENTANYL CITRATE 50 MCG: 50 INJECTION, SOLUTION INTRAMUSCULAR; INTRAVENOUS at 09:56

## 2021-09-26 RX ADMIN — FENTANYL CITRATE 50 MCG: 50 INJECTION, SOLUTION INTRAMUSCULAR; INTRAVENOUS at 08:09

## 2021-09-26 RX ADMIN — METOPROLOL TARTRATE 25 MG: 25 TABLET ORAL at 20:13

## 2021-09-26 RX ADMIN — AMIODARONE HYDROCHLORIDE 200 MG: 200 TABLET ORAL at 21:53

## 2021-09-26 RX ADMIN — FUROSEMIDE 20 MG: 10 INJECTION, SOLUTION INTRAMUSCULAR; INTRAVENOUS at 12:26

## 2021-09-26 RX ADMIN — POLYETHYLENE GLYCOL 3350 17 G: 17 POWDER, FOR SOLUTION ORAL at 08:09

## 2021-09-26 RX ADMIN — FENTANYL CITRATE 50 MCG: 50 INJECTION, SOLUTION INTRAMUSCULAR; INTRAVENOUS at 01:28

## 2021-09-26 RX ADMIN — AMIODARONE HYDROCHLORIDE 200 MG: 200 TABLET ORAL at 08:10

## 2021-09-26 RX ADMIN — FENTANYL CITRATE 50 MCG: 50 INJECTION, SOLUTION INTRAMUSCULAR; INTRAVENOUS at 12:26

## 2021-09-26 RX ADMIN — HYDROCODONE BITARTRATE AND ACETAMINOPHEN 2 TABLET: 5; 325 TABLET ORAL at 19:35

## 2021-09-26 RX ADMIN — PANTOPRAZOLE SODIUM 40 MG: 40 TABLET, DELAYED RELEASE ORAL at 08:09

## 2021-09-26 RX ADMIN — HYDROCODONE BITARTRATE AND ACETAMINOPHEN 2 TABLET: 5; 325 TABLET ORAL at 14:44

## 2021-09-26 RX ADMIN — Medication 81 MG: at 08:10

## 2021-09-26 RX ADMIN — HYDROCODONE BITARTRATE AND ACETAMINOPHEN 2 TABLET: 5; 325 TABLET ORAL at 23:34

## 2021-09-26 RX ADMIN — FENTANYL CITRATE 50 MCG: 50 INJECTION, SOLUTION INTRAMUSCULAR; INTRAVENOUS at 06:09

## 2021-09-26 RX ADMIN — HYDROCODONE BITARTRATE AND ACETAMINOPHEN 2 TABLET: 5; 325 TABLET ORAL at 11:08

## 2021-09-26 RX ADMIN — SODIUM CHLORIDE, PRESERVATIVE FREE 10 ML: 5 INJECTION INTRAVENOUS at 20:12

## 2021-09-26 RX ADMIN — AMIODARONE HYDROCHLORIDE 200 MG: 200 TABLET ORAL at 14:44

## 2021-09-26 RX ADMIN — MUPIROCIN: 20 OINTMENT TOPICAL at 08:09

## 2021-09-26 RX ADMIN — POTASSIUM CHLORIDE 40 MEQ: 1500 TABLET, EXTENDED RELEASE ORAL at 06:08

## 2021-09-26 RX ADMIN — ATORVASTATIN CALCIUM 20 MG: 20 TABLET, FILM COATED ORAL at 20:13

## 2021-09-26 RX ADMIN — WARFARIN SODIUM 2 MG: 2 TABLET ORAL at 17:41

## 2021-09-26 RX ADMIN — FENTANYL CITRATE 50 MCG: 50 INJECTION, SOLUTION INTRAMUSCULAR; INTRAVENOUS at 15:52

## 2021-09-26 RX ADMIN — HYDROCODONE BITARTRATE AND ACETAMINOPHEN 2 TABLET: 5; 325 TABLET ORAL at 03:31

## 2021-09-26 RX ADMIN — ENOXAPARIN SODIUM 80 MG: 80 INJECTION SUBCUTANEOUS at 08:11

## 2021-09-26 ASSESSMENT — PAIN SCALES - GENERAL
PAINLEVEL_OUTOF10: 5
PAINLEVEL_OUTOF10: 9
PAINLEVEL_OUTOF10: 8
PAINLEVEL_OUTOF10: 8
PAINLEVEL_OUTOF10: 9
PAINLEVEL_OUTOF10: 7
PAINLEVEL_OUTOF10: 8
PAINLEVEL_OUTOF10: 8
PAINLEVEL_OUTOF10: 9
PAINLEVEL_OUTOF10: 5
PAINLEVEL_OUTOF10: 4
PAINLEVEL_OUTOF10: 9
PAINLEVEL_OUTOF10: 7
PAINLEVEL_OUTOF10: 9
PAINLEVEL_OUTOF10: 5
PAINLEVEL_OUTOF10: 9
PAINLEVEL_OUTOF10: 9

## 2021-09-26 ASSESSMENT — PAIN DESCRIPTION - PAIN TYPE
TYPE: ACUTE PAIN;SURGICAL PAIN
TYPE: ACUTE PAIN;SURGICAL PAIN

## 2021-09-26 ASSESSMENT — PAIN DESCRIPTION - LOCATION
LOCATION: INCISION;CHEST
LOCATION: INCISION;CHEST

## 2021-09-26 ASSESSMENT — PAIN DESCRIPTION - ORIENTATION: ORIENTATION: MID

## 2021-09-26 NOTE — PROGRESS NOTES
Pharmacy Note  Warfarin Consult follow-up      Recent Labs     09/26/21  0420   INR 2.0     Recent Labs     09/24/21  0357 09/25/21  0512 09/26/21  0420   HGB 9.2* 9.5* 8.3*   HCT 28.2* 31.4* 26.2*   * 112* 157       Significant Drug-Drug Interactions:  New warfarin drug-drug interactions: NA  Discontinued drug-drug interactions: NA      Notes:                     INR=2.0, up markedly from 1.1 yesterday, will give 2 mg today. Daily PT/INR while inpatient.      Patrick Rivera, PharmD 9/26/2021 12:11 PM

## 2021-09-26 NOTE — PROGRESS NOTES
Physical Therapy  Facility/Department: [facility] CAR 1  Daily Treatment Note  NAME: Dannielle Montoya  : 1980  MRN: [id number]    Date of Service: 2021    Discharge Recommendations:  Patient would benefit from continued therapy after discharge   PT Equipment Recommendations  Equipment Needed: No    Assessment   Assessment: Pt ambulated 300ft with no AD CGA, and performed stairs with good technique and sequencing. Pt steady throughout all mobility with no LOB noted. Pt would benefit from continued PT to address further endurance deficits and return to prior level of independence. Specific instructions for Next Treatment: standing exercises  Prognosis: Good  PT Education: Goals;PT Role;Plan of Care; Functional Mobility Training  REQUIRES PT FOLLOW UP: Yes  Activity Tolerance  Activity Tolerance: Patient Tolerated treatment well;Patient limited by pain     Patient Diagnosis(es): There were no encounter diagnoses. has a past medical history of Bacteremia, Cardioembolic stroke ([facility] [address] Cerebral artery occlusion with cerebral infarction [facility]), Cerebral septic emboli ([facility] [address] Chronic right shoulder pain, Diverticulosis, Endocarditis, History of COVID-19, Hyperlipidemia, Hypertension, MSSA bacteremia, MESSI (obstructive sleep apnea), Platelets decreased ([facility] [address] Research subject, Under care of team, Under care of team, Under care of team, Under care of team, Under care of team, and Wears contact lenses. has a past surgical history that includes Anterior cruciate ligament repair (Right); Appendectomy (1997); colectomy; Dilatation, esophagus; Abdomen surgery; and   picc powerpicc double (2021).     Restrictions  Restrictions/Precautions  Restrictions/Precautions: Fall Risk, Surgical Protocols  Required Braces or Orthoses?: Yes  Required Braces or Orthoses  Other: Heart Hugger Brace  Position Activity Restriction  Sternal Precautions: No Pushing, No Pulling, 5# Lifting Restrictions  Other position/activity restrictions: ambulate pt, s/p MVR 9/23, chest tube  Subjective   General  Chart Reviewed: Yes  Response To Previous Treatment: Patient with no complaints from previous session. Family / Caregiver Present: No  Subjective  Subjective: Pt and RN agreeable to PT this morning. Pt seated in chair upon arrival with c/o 8/10 pain. Pt very pleasant and cooperative throughout session. General Comment  Comments: Pt returned to chair at end of session with call light in reach. Pt on 2L O2 via NC, removed for ambulation and returned following ambulation. Pain Screening  Patient Currently in Pain: Yes  Pain Assessment  Pain Assessment: 0-10  Pain Level: 8  Pain Type: Acute pain;Surgical pain  Pain Location: Incision; Chest  Pain Orientation: Mid  Vital Signs  Patient Currently in Pain: Yes       Orientation  Orientation  Overall Orientation Status: Within Functional Limits  Cognition   Cognition  Overall Cognitive Status: WFL  Objective   Bed mobility  Supine to Sit: Unable to assess  Sit to Supine: Unable to assess  Scooting: Stand by assistance  Comment: Pt was up in chair upon entry/exit this date. Transfers  Sit to Stand: Contact guard assistance;Stand by assistance  Stand to sit: Stand by assistance  Comment: no AD used for transfer, pt utilized heart hugger without cueing needed. Ambulation  Ambulation?: Yes  Ambulation 1  Surface: level tile  Device: No Device  Assistance: Contact guard assistance (for safety)  Quality of Gait: steady, no LOB  Gait Deviations: Slow Melinda;Decreased step length  Distance: 300ft  Stairs/Curb  Stairs?: Yes  Stairs  # Steps : 10  Stairs Height: 6\"  Rails: Right ascending  Device: No Device  Assistance: Contact guard assistance  Comment: Pt performed stairs with step-through gait pattern, with good technique and sequencing.      Balance  Posture: Good  Sitting - Static: Good  Sitting - Dynamic: Good;-  Standing - Static: Good  Standing - Dynamic: Fair;+  Comments: standing balance assessed with no AD  Exercises  Hip Flexion: seated marches: x15  Hip Abduction: x15  Knee Long Arc Quad: x15  Ankle Pumps: x15  Comments: LE exercises performed while seated in chair. Pt performed with good technique and sequencing.         AM-PAC Score  AM-PAC Inpatient Mobility Raw Score : 18 (09/26/21 1224)  AM-PAC Inpatient T-Scale Score : 43.63 (09/26/21 1224)  Mobility Inpatient CMS 0-100% Score: 46.58 (09/26/21 1224)  Mobility Inpatient CMS G-Code Modifier : CK (09/26/21 1224)          Goals  Short term goals  Time Frame for Short term goals: 14 visits  Short term goal 1: Perform bed mobility and functional transfers independently  Short term goal 2: Ambulate 300ft independently  Short term goal 3: Ascend/descend 4 steps with HR and SBA  Short term goal 4: Demo Good dynamic standing balance to decrease risk of falls  Short term goal 5: Perform CR HEP independently    Plan    Plan  Times per week: 6-7x/wk  Specific instructions for Next Treatment: standing exercises  Current Treatment Recommendations: Strengthening, ROM, Balance Training, Functional Mobility Training, Transfer Training, Gait Training, Stair training, Endurance Training, Home Exercise Program, Safety Education & Training, Patient/Caregiver Education & Training  Safety Devices  Type of devices: Nurse notified, Call light within reach, Gait belt, Left in chair  Restraints  Initially in place: No     Therapy Time   Individual Concurrent Group Co-treatment   Time In 0930         Time Out 0954         Minutes 24         Timed Code Treatment Minutes: 24 Minutes       Inetta Listen, PTA

## 2021-09-26 NOTE — PROGRESS NOTES
[facility]  Daily Progress Note    Surgeon: Dr Bala Chen   S/P: Mitral valve replacement with On-X valve  POD#: 3  EF: 60%    Subjective:  Mr. Abraham Araujo no complaints    Physical Exam  Vital Signs: /75   Pulse 85   Temp 98.1 °F (36.7 °C) (Oral)   Resp 16   Ht 5' 6\" (1.676 m)   Wt 181 lb (82.1 kg)   SpO2 92%   BMI 29.21 kg/m²  O2 Flow Rate (L/min): 4 L/min   Admit Weight: Weight: 163 lb 9.3 oz (74.2 kg)   WEIGHTWeight: 181 lb (82.1 kg)     Scheduled Meds:    furosemide  20 mg IntraVENous Once    enoxaparin  1 mg/kg SubCUTAneous BID    insulin lispro  0-6 Units SubCUTAneous TID WC    insulin lispro  0-3 Units SubCUTAneous Nightly    warfarin (COUMADIN) daily dosing (placeholder)   Other RX Placeholder    sodium chloride flush  10 mL IntraVENous 2 times per day    aspirin  81 mg Oral Daily    amiodarone  200 mg Oral TID    mupirocin   Nasal BID    polyethylene glycol  17 g Oral Daily    metoprolol tartrate  25 mg Oral BID    atorvastatin  20 mg Oral Nightly    pantoprazole  40 mg Oral Daily    pantoprazole  40 mg IntraVENous Daily    And    sodium chloride (PF)  10 mL IntraVENous Daily    insulin glargine  0.15 Units/kg SubCUTAneous Nightly     Continuous Infusions:    sodium chloride      dextrose       Data:  CBC:   Recent Labs     09/24/21 0357 09/25/21  0512 09/26/21  0420   WBC 17.3* 13.1* 9.3   HGB 9.2* 9.5* 8.3*   HCT 28.2* 31.4* 26.2*   MCV 87.3 95.4 91.6   * 112* 157     BMP:   Recent Labs     09/24/21 0357 09/25/21  0512 09/26/21  0420    131* 128*   K 4.4 3.8 3.6*    100 98   CO2 16* 18* 20   BUN 12 11 9   CREATININE 0.74 0.75 0.54*     PT/INR:   Recent Labs     09/24/21 0357 09/25/21  0512 09/26/21  0420   PROTIME 10.6 11.5 20.0*   INR 1.0 1.1 2.0     APTT:   Recent Labs     09/23/21  1456   APTT 23.6     I/O:  I/O last 3 completed shifts: In: 3184 [P.O.:1440; I.V.:1744]  Out: 6425 [Urine:3425;  Chest Tube:50]    A/P:  Doing well  INR 2.0 pharmacy to dose coumadin  D/C chest tubes later today if less [id number] for 8 hours  Ambulate  Cut pacing wires tomorrow  D/C planning [address] [place], [place]

## 2021-09-26 NOTE — PROGRESS NOTES
[facility]   Progress Note                   Date:   9/26/2021  Patient name: Chito Sanford  Date of admission:  9/23/2021  6:57 AM  MRN:   [id number]  YOB: 1980  PCP: Juan Kessler MD    Reason for Admission:      Subjective: There were no acute events overnight, remained hemodynamically stable, denies chest pain, dyspnea, orthopnea or palpitations. \  Sodium 128 this morning. +1.8 L since admission    Medications:   Scheduled Meds:   furosemide  20 mg IntraVENous Once    enoxaparin  1 mg/kg SubCUTAneous BID    insulin lispro  0-6 Units SubCUTAneous TID WC    insulin lispro  0-3 Units SubCUTAneous Nightly    warfarin (COUMADIN) daily dosing (placeholder)   Other RX Placeholder    sodium chloride flush  10 mL IntraVENous 2 times per day    aspirin  81 mg Oral Daily    amiodarone  200 mg Oral TID    mupirocin   Nasal BID    polyethylene glycol  17 g Oral Daily    metoprolol tartrate  25 mg Oral BID    atorvastatin  20 mg Oral Nightly    pantoprazole  40 mg Oral Daily    pantoprazole  40 mg IntraVENous Daily    And    sodium chloride (PF)  10 mL IntraVENous Daily    insulin glargine  0.15 Units/kg SubCUTAneous Nightly       Continuous Infusions:   sodium chloride      dextrose         CBC:   Recent Labs     09/24/21  0357 09/25/21  0512 09/26/21  0420   WBC 17.3* 13.1* 9.3   HGB 9.2* 9.5* 8.3*   * 112* 157     BMP:    Recent Labs     09/24/21  0357 09/25/21  0512 09/26/21  0420    131* 128*   K 4.4 3.8 3.6*    100 98   CO2 16* 18* 20   BUN 12 11 9   CREATININE 0.74 0.75 0.54*   GLUCOSE 125* 118* 102*     Hepatic: No results for input(s): AST, ALT, ALB, BILITOT, ALKPHOS in the last 72 hours. Troponin: No results for input(s): TROPONINI in the last 72 hours. BNP: No results for input(s): BNP in the last 72 hours. Lipids: No results for input(s): CHOL, HDL in the last 72 hours.     Invalid input(s): LDLCALCU  INR:   Recent Labs 09/24/21  0357 09/25/21  0512 09/26/21  0420   INR 1.0 1.1 2.0       Objective:   Vitals: /75   Pulse 77   Temp 98.1 °F (36.7 °C) (Oral)   Resp 16   Ht 5' 6\" (1.676 m)   Wt 181 lb (82.1 kg)   SpO2 98%   BMI 29.21 kg/m²     General appearance: awake, alert, in no apparent respiratory distress   HEENT: Head: Normocephalic, no lesions, without obvious abnormality  Neck: no JVD  Lungs: clear to auscultation bilaterally, no basilar rales, no wheezing   Heart: regular rate and rhythm, S1, S2 normal, no murmur, click, rub or gallop  Abdomen: soft, non-tender; bowel sounds normal  Extremities: No LE edema  Neurologic: Mental status: Alert, oriented. Motor and sensory not done. Summary  Left ventricle is normal in size with normal systolic function globally. Calculated ejection fraction is 60%  Left atrial dilatation. Right atrium is normal in size. Mild buckling of the right atrial wall. Mild aortic insufficiency. Prolapse of the of posterior leaflet of the mitral valve. Large vegetations noted on the MONROE done 7/20/21 are no longer identified . Small echogenicity is noted on the posterior leaflet, maybe improving  vegetation. Severe mitral regurgitation with pulmonary vein flow reversal.  Consider MONROE when indicated. Mild tricuspid regurgitation. Estimated right ventricular systolic pressure is 49 mmHg, suggests mild Pulm  HTN. Small circumferential pericardial effusion. No signs of echocariographic tamponade. Assessment / Acute Cardiac Problems:   1. Severe MR secondary to infective endocarditis s/p mechanical mitral valve replacement  2. Hypertension  3. Hyperlipidemia  4. Hyponatremia       Plan of Treatment:   Aspirin and Coumadin. INR 2.0 today   Amiodarone 200 mg BID  Good glycemic control. High intensity Statin   Continue metoprolol tartrate 25 mg BID  Continue routine postoperative care per CT surgery.   Monitor H&H and platelets  Monitor WBC and fever   Continue to monitor I/O with a goal of even/negative fluid balance. Will order 40 mg IV lasix        Discussed with patient and nursing. Berny Hoffman MD MD  Fellow, [address] [facility]   Pager - [phone]    I performed a history and physical examination of the patient and discussed management with the resident. I reviewed the residents note and agree with the documented findings and plan of care. Any areas of disagreement are noted on the chart. I was personally present for the key portions of any procedures. I have documented in the chart those procedures where I was not present during the key portions. I have personally evaluated this patient and have completed at least one if not all key elements of the E/M (history, physical exam, and MDM). Additional findings are as noted. . IV lasix 20mg qday. Monitor electrolytes and replace. Fluid restriction.       Nawaf Guzmán MD

## 2021-09-26 NOTE — PROGRESS NOTES
Activity Restriction  Sternal Precautions: No Pushing, No Pulling, 5# Lifting Restrictions  Other position/activity restrictions: ambulate pt, s/p MVR 9/23, chest tube  Subjective   General  Chart Reviewed: Yes  Patient assessed for rehabilitation services?: Yes  Family / Caregiver Present: Yes (Wife, mother, father present. Very supportive)  General Comment  Comments: RN ok'd for OT tx this PM. Pt agreeable to session, pleasent/cooperative throughout. Pain Assessment  Pain Level: 4  Pain Type: Acute pain;Surgical pain  Pain Location: Incision; Chest  Non-Pharmaceutical Pain Intervention(s): Ambulation/Increased Activity;Repositioned;Rest;Therapeutic presence  Vital Signs  Patient Currently in Pain: Yes   Orientation  Orientation  Overall Orientation Status: Within Functional Limits  Objective    ADL  Grooming: Modified independent ;Setup (Pt washed face sitting in chair)  UE Dressing: Increased time to complete;Stand by assistance (Pt adjusted gown and heart hugger sitting in chair)  LE Dressing: Increased time to complete;Stand by assistance;Contact guard assistance (Pt donned/doffed socks sitting in chair, utilized 4 figure tech to complete. Pt donned/doffed underwear standing in bathroom. CGA for safety. Pt displays good balance)  Toileting: Setup; Increased time to complete;Stand by assistance (Pt completed toilet transfer on/off toilet in bathroom, demo G use of heart hugger for transfer. No grab bars utilized. Pt reports not having them at home and wanted to attempt transfer without using them)  Additional Comments: Pt displayed good use of heart hugger throughout        Balance  Sitting Balance: Modified independent  (Seated in chair)  Standing Balance: Contact guard assistance (CGA progressing to SBA)  Standing Balance  Time: Approx 9 min  Activity: Static standing, func mobility, func mobility To/From bathroom  Comment: No AD utilized, no LOB noted.  Pt displays G balance this date  Functional Mobility  Functional - Mobility Device: No device  Activity: Other; To/from bathroom  Assist Level: Stand by assistance  Toilet Transfers  Toilet - Technique: Ambulating  Equipment Used: Standard toilet  Toilet Transfer: Stand by assistance  Toilet Transfers Comments: Pt demo G use of heart hugger for safe transfer on/off toilet  Bed mobility  Supine to Sit: Unable to assess  Sit to Supine: Unable to assess  Scooting: Stand by assistance  Comment: Pt in chair upon arrival and departure  Transfers  Sit to stand: Stand by assistance  Stand to sit: Stand by assistance  Transfer Comments: G use of heart hugger, no AD utilized     Cognition  Overall Cognitive Status: [facility]     Plan   Plan  Times per week: 3-5x/wk  Current Treatment Recommendations: Safety Education & Training, Balance Training, Patient/Caregiver Education & Training, Self-Care / ADL, Functional Mobility Training, Equipment Evaluation, Education, & procurement, Home Management Training, Endurance Training  AM-PAC Score        AM-[facility] Inpatient Daily Activity Raw Score: 20 (09/26/21 [facility])  AM-PAC Inpatient ADL T-Scale Score : 42.03 (09/26/21 [facility])  ADL Inpatient CMS 0-100% Score: 38.32 (09/26/21 [facility])  ADL Inpatient CMS G-Code Modifier : Shruthi García (09/26/21 [facility])    Goals  Short term goals  Time Frame for Short term goals: By discharge, pt will:  Short term goal 1: Demo functional sit<>stand transfers with SUP, using LRD PRN  Short term goal 2: Demo functional mobility with SUP, using LRD PRN  Short term goal 3: Demo donning/doffing heart hugger with Min A  Short term goal 4: Demo +15 minutes of standing tolerance throughout ADLs/functional task with SBA  Short term goal 5: Demo UB ADLs with SBA  Short term goal 6: Demo LB ADLs with CGA, use of DME PRN  Short term goal 7: Demo adhrence to sternal precautions with  100% accuracy throughout all functional tasks with 0 VCs       Therapy Time   Individual Concurrent Group Co-treatment   Time In 1128         Time Out 1206 Minutes 38         Timed Code Treatment Minutes: 45 Minutes   Pt in chair upon arrival, pleasant and agreeable to tx this date. Pt retired to chair at end of session, call light within reach, RN notified.      EMILE Jack

## 2021-09-26 NOTE — PLAN OF CARE
Problem: Falls - Risk of:  Goal: Will remain free from falls  Description: Will remain free from falls  Outcome: Ongoing  Goal: Absence of physical injury  Description: Absence of physical injury  Outcome: Ongoing     Problem: Discharge Planning:  Goal: Discharged to appropriate level of care  Description: Discharged to appropriate level of care  Outcome: Ongoing     Problem: Cardiac Output - Decreased:  Goal: Cardiac output within specified parameters  Description: Cardiac output within specified parameters  Outcome: Ongoing     Problem: Infection - Surgical Site:  Goal: Will show no infection signs and symptoms  Description: Will show no infection signs and symptoms  Outcome: Ongoing     Problem: Pain - Acute:  Goal: Pain level will decrease  Description: Pain level will decrease  Outcome: Ongoing     Problem: Venous Thromboembolism:  Goal: Will show no signs or symptoms of venous thromboembolism  Description: Will show no signs or symptoms of venous thromboembolism  Outcome: Ongoing  Goal: Absence of signs or symptoms of impaired coagulation  Description: Absence of signs or symptoms of impaired coagulation  Outcome: Ongoing     Problem: Pain:  Goal: Pain level will decrease  Description: Pain level will decrease  Outcome: Ongoing  Goal: Control of acute pain  Description: Control of acute pain  Outcome: Ongoing  Goal: Control of chronic pain  Description: Control of chronic pain  Outcome: Ongoing

## 2021-09-26 NOTE — PLAN OF CARE
Problem: Falls - Risk of:  Goal: Will remain free from falls  Description: Will remain free from falls  9/26/2021 1401 by Dipti Shepherd RN  Outcome: Ongoing     Problem: Falls - Risk of:  Goal: Absence of physical injury  Description: Absence of physical injury  9/26/2021 1401 by Dipti Shepherd RN  Outcome: Ongoing     Problem: Discharge Planning:  Goal: Discharged to appropriate level of care  Description: Discharged to appropriate level of care  9/26/2021 1401 by Dipti Shepherd RN  Outcome: Ongoing     Problem: Cardiac Output - Decreased:  Goal: Cardiac output within specified parameters  Description: Cardiac output within specified parameters  9/26/2021 1401 by Dipti Shepherd RN  Outcome: Ongoing     Problem: Infection - Surgical Site:  Goal: Will show no infection signs and symptoms  Description: Will show no infection signs and symptoms  9/26/2021 1401 by Dipti Shepherd RN  Outcome: Ongoing   Electronically signed by Dipti Shepherd RN on 9/26/2021 at 2:01 PM

## 2021-09-27 ENCOUNTER — APPOINTMENT (OUTPATIENT)
Dept: GENERAL RADIOLOGY | Age: 41
DRG: 219 | End: 2021-09-27
Attending: THORACIC SURGERY (CARDIOTHORACIC VASCULAR SURGERY)
Payer: COMMERCIAL

## 2021-09-27 LAB
ANION GAP SERPL CALCULATED.3IONS-SCNC: 11 MMOL/L (ref 9–17)
BUN BLDV-MCNC: 7 MG/DL (ref 6–20)
BUN/CREAT BLD: ABNORMAL (ref 9–20)
CALCIUM SERPL-MCNC: 8.5 MG/DL (ref 8.6–10.4)
CHLORIDE BLD-SCNC: 102 MMOL/L (ref 98–107)
CO2: 22 MMOL/L (ref 20–31)
CREAT SERPL-MCNC: 0.57 MG/DL (ref 0.7–1.2)
GFR AFRICAN AMERICAN: >60 ML/MIN
GFR NON-AFRICAN AMERICAN: >60 ML/MIN
GFR SERPL CREATININE-BSD FRML MDRD: ABNORMAL ML/MIN/{1.73_M2}
GFR SERPL CREATININE-BSD FRML MDRD: ABNORMAL ML/MIN/{1.73_M2}
GLUCOSE BLD-MCNC: 109 MG/DL (ref 75–110)
GLUCOSE BLD-MCNC: 122 MG/DL (ref 75–110)
GLUCOSE BLD-MCNC: 133 MG/DL (ref 75–110)
GLUCOSE BLD-MCNC: 133 MG/DL (ref 75–110)
GLUCOSE BLD-MCNC: 98 MG/DL (ref 70–99)
HCT VFR BLD CALC: 25.9 % (ref 40.7–50.3)
HEMOGLOBIN: 8.2 G/DL (ref 13–17)
INR BLD: 2.1
MAGNESIUM: 1.9 MG/DL (ref 1.6–2.6)
MCH RBC QN AUTO: 28.2 PG (ref 25.2–33.5)
MCHC RBC AUTO-ENTMCNC: 31.7 G/DL (ref 28.4–34.8)
MCV RBC AUTO: 89 FL (ref 82.6–102.9)
NRBC AUTOMATED: 0 PER 100 WBC
PDW BLD-RTO: 13.7 % (ref 11.8–14.4)
PLATELET # BLD: 175 K/UL (ref 138–453)
PMV BLD AUTO: 9.5 FL (ref 8.1–13.5)
POC ANGLE TEG W HEP: 65.4 DEG (ref 59–74)
POC ANGLE TEG W HEP: 70.4 DEG (ref 59–74)
POC ANGLE TEG: 66.4 DEG (ref 59–74)
POC ANGLE TEG: 71.1 DEG (ref 59–74)
POC EPL TEG W/HEP: 1.5 % (ref 0–15)
POC EPL TEG W/HEP: NORMAL % (ref 0–15)
POC EPL TEG: 0 % (ref 0–15)
POC EPL TEG: NORMAL % (ref 0–15)
POC KINETICS TEG W HEP: 1.3 MIN (ref 1–3)
POC KINETICS TEG W HEP: 1.8 MIN (ref 1–3)
POC KINETICS TEG: 1.2 MIN (ref 1–3)
POC KINETICS TEG: 1.8 MIN (ref 1–3)
POC LY30(LYSIS) TEG W HEP: 1.5 % (ref 0–8)
POC LY30(LYSIS) TEG W HEP: NORMAL % (ref 0–8)
POC LY30(LYSIS) TEG: 0 % (ref 0–8)
POC LY30(LYSIS) TEG: NORMAL % (ref 0–8)
POC MA(MAX CLOT) TEG: 60.7 MM (ref 55–74)
POC MA(MAX CLOT) TEG: 71.5 MM (ref 55–74)
POC MAX CLOT TEG W HEP: 59.4 MM (ref 55–74)
POC MAX CLOT TEG W HEP: 69.1 MM (ref 55–74)
POC REACTION TIME TEG W HEP: 7 MIN (ref 4–9)
POC REACTION TIME TEG W HEP: 7.5 MIN (ref 4–9)
POC REACTION TIME TEG: 6.6 MIN (ref 4–9)
POC REACTION TIME TEG: 7.5 MIN (ref 4–9)
POTASSIUM SERPL-SCNC: 3.7 MMOL/L (ref 3.7–5.3)
PROTHROMBIN TIME: 20.8 SEC (ref 9.1–12.3)
RBC # BLD: 2.91 M/UL (ref 4.21–5.77)
SODIUM BLD-SCNC: 135 MMOL/L (ref 135–144)
TEG COMMENT: NORMAL
WBC # BLD: 8.3 K/UL (ref 3.5–11.3)

## 2021-09-27 PROCEDURE — 94761 N-INVAS EAR/PLS OXIMETRY MLT: CPT

## 2021-09-27 PROCEDURE — 82947 ASSAY GLUCOSE BLOOD QUANT: CPT

## 2021-09-27 PROCEDURE — 99024 POSTOP FOLLOW-UP VISIT: CPT | Performed by: NURSE PRACTITIONER

## 2021-09-27 PROCEDURE — 71045 X-RAY EXAM CHEST 1 VIEW: CPT

## 2021-09-27 PROCEDURE — 6370000000 HC RX 637 (ALT 250 FOR IP): Performed by: NURSE PRACTITIONER

## 2021-09-27 PROCEDURE — 97530 THERAPEUTIC ACTIVITIES: CPT

## 2021-09-27 PROCEDURE — 6370000000 HC RX 637 (ALT 250 FOR IP): Performed by: THORACIC SURGERY (CARDIOTHORACIC VASCULAR SURGERY)

## 2021-09-27 PROCEDURE — 87040 BLOOD CULTURE FOR BACTERIA: CPT

## 2021-09-27 PROCEDURE — 83735 ASSAY OF MAGNESIUM: CPT

## 2021-09-27 PROCEDURE — 6360000002 HC RX W HCPCS: Performed by: STUDENT IN AN ORGANIZED HEALTH CARE EDUCATION/TRAINING PROGRAM

## 2021-09-27 PROCEDURE — 80048 BASIC METABOLIC PNL TOTAL CA: CPT

## 2021-09-27 PROCEDURE — 2700000000 HC OXYGEN THERAPY PER DAY

## 2021-09-27 PROCEDURE — 85610 PROTHROMBIN TIME: CPT

## 2021-09-27 PROCEDURE — 2140000001 HC CVICU INTERMEDIATE R&B

## 2021-09-27 PROCEDURE — 97116 GAIT TRAINING THERAPY: CPT

## 2021-09-27 PROCEDURE — APPNB30 APP NON BILLABLE TIME 0-30 MINS: Performed by: NURSE PRACTITIONER

## 2021-09-27 PROCEDURE — 36415 COLL VENOUS BLD VENIPUNCTURE: CPT

## 2021-09-27 PROCEDURE — 97110 THERAPEUTIC EXERCISES: CPT

## 2021-09-27 PROCEDURE — 2580000003 HC RX 258: Performed by: NURSE PRACTITIONER

## 2021-09-27 PROCEDURE — 6360000002 HC RX W HCPCS: Performed by: NURSE PRACTITIONER

## 2021-09-27 PROCEDURE — 85027 COMPLETE CBC AUTOMATED: CPT

## 2021-09-27 PROCEDURE — 99254 IP/OBS CNSLTJ NEW/EST MOD 60: CPT | Performed by: INTERNAL MEDICINE

## 2021-09-27 RX ORDER — POTASSIUM CHLORIDE 20 MEQ/1
20 TABLET, EXTENDED RELEASE ORAL DAILY
Qty: 60 TABLET | Refills: 0 | Status: SHIPPED | OUTPATIENT
Start: 2021-09-27 | End: 2021-10-11 | Stop reason: ALTCHOICE

## 2021-09-27 RX ORDER — PANTOPRAZOLE SODIUM 40 MG/1
40 TABLET, DELAYED RELEASE ORAL DAILY
Qty: 30 TABLET | Refills: 0 | Status: SHIPPED | OUTPATIENT
Start: 2021-09-28 | End: 2021-10-11 | Stop reason: ALTCHOICE

## 2021-09-27 RX ORDER — WARFARIN SODIUM 2 MG/1
2 TABLET ORAL
Status: COMPLETED | OUTPATIENT
Start: 2021-09-27 | End: 2021-09-27

## 2021-09-27 RX ORDER — FUROSEMIDE 20 MG/1
20 TABLET ORAL DAILY
Qty: 60 TABLET | Refills: 0 | Status: SHIPPED | OUTPATIENT
Start: 2021-09-27 | End: 2021-10-11 | Stop reason: ALTCHOICE

## 2021-09-27 RX ORDER — ATORVASTATIN CALCIUM 20 MG/1
20 TABLET, FILM COATED ORAL NIGHTLY
Qty: 30 TABLET | Refills: 3 | Status: SHIPPED | OUTPATIENT
Start: 2021-09-27 | End: 2021-10-12 | Stop reason: SDUPTHER

## 2021-09-27 RX ORDER — ASPIRIN 81 MG/1
81 TABLET ORAL DAILY
Qty: 30 TABLET | Refills: 3 | Status: SHIPPED | OUTPATIENT
Start: 2021-09-28 | End: 2021-10-12 | Stop reason: ALTCHOICE

## 2021-09-27 RX ORDER — AMIODARONE HYDROCHLORIDE 200 MG/1
200 TABLET ORAL 2 TIMES DAILY
Qty: 60 TABLET | Refills: 0 | Status: SHIPPED | OUTPATIENT
Start: 2021-09-27 | End: 2021-10-06

## 2021-09-27 RX ORDER — HYDROCODONE BITARTRATE AND ACETAMINOPHEN 5; 325 MG/1; MG/1
1 TABLET ORAL EVERY 6 HOURS PRN
Qty: 28 TABLET | Refills: 0 | Status: SHIPPED | OUTPATIENT
Start: 2021-09-27 | End: 2021-10-04

## 2021-09-27 RX ORDER — WARFARIN SODIUM 1 MG/1
TABLET ORAL
Qty: 60 TABLET | Refills: 0 | Status: SHIPPED | OUTPATIENT
Start: 2021-09-27 | End: 2022-01-27

## 2021-09-27 RX ORDER — DOCUSATE SODIUM 100 MG/1
100 CAPSULE, LIQUID FILLED ORAL 2 TIMES DAILY
Status: DISCONTINUED | OUTPATIENT
Start: 2021-09-27 | End: 2021-09-28 | Stop reason: HOSPADM

## 2021-09-27 RX ADMIN — HYDROCODONE BITARTRATE AND ACETAMINOPHEN 2 TABLET: 5; 325 TABLET ORAL at 14:29

## 2021-09-27 RX ADMIN — POTASSIUM CHLORIDE 40 MEQ: 1500 TABLET, EXTENDED RELEASE ORAL at 06:31

## 2021-09-27 RX ADMIN — FUROSEMIDE 20 MG: 10 INJECTION, SOLUTION INTRAMUSCULAR; INTRAVENOUS at 10:44

## 2021-09-27 RX ADMIN — DOCUSATE SODIUM 100 MG: 100 CAPSULE ORAL at 21:04

## 2021-09-27 RX ADMIN — AMIODARONE HYDROCHLORIDE 200 MG: 200 TABLET ORAL at 10:45

## 2021-09-27 RX ADMIN — AMIODARONE HYDROCHLORIDE 200 MG: 200 TABLET ORAL at 13:21

## 2021-09-27 RX ADMIN — POLYETHYLENE GLYCOL 3350 17 G: 17 POWDER, FOR SOLUTION ORAL at 10:45

## 2021-09-27 RX ADMIN — METOPROLOL TARTRATE 25 MG: 25 TABLET ORAL at 21:05

## 2021-09-27 RX ADMIN — SODIUM CHLORIDE, PRESERVATIVE FREE 10 ML: 5 INJECTION INTRAVENOUS at 10:46

## 2021-09-27 RX ADMIN — HYDROCODONE BITARTRATE AND ACETAMINOPHEN 2 TABLET: 5; 325 TABLET ORAL at 18:37

## 2021-09-27 RX ADMIN — HYDROCODONE BITARTRATE AND ACETAMINOPHEN 2 TABLET: 5; 325 TABLET ORAL at 05:51

## 2021-09-27 RX ADMIN — HYDROCODONE BITARTRATE AND ACETAMINOPHEN 2 TABLET: 5; 325 TABLET ORAL at 10:45

## 2021-09-27 RX ADMIN — AMIODARONE HYDROCHLORIDE 200 MG: 200 TABLET ORAL at 21:05

## 2021-09-27 RX ADMIN — Medication 81 MG: at 10:44

## 2021-09-27 RX ADMIN — MAGNESIUM SULFATE HEPTAHYDRATE 1000 MG: 1 INJECTION, SOLUTION INTRAVENOUS at 06:35

## 2021-09-27 RX ADMIN — PANTOPRAZOLE SODIUM 40 MG: 40 TABLET, DELAYED RELEASE ORAL at 10:45

## 2021-09-27 RX ADMIN — DIPHENHYDRAMINE HCL 25 MG: 25 TABLET ORAL at 22:55

## 2021-09-27 RX ADMIN — METOPROLOL TARTRATE 25 MG: 25 TABLET ORAL at 10:45

## 2021-09-27 RX ADMIN — WARFARIN SODIUM 2 MG: 2 TABLET ORAL at 18:37

## 2021-09-27 RX ADMIN — ATORVASTATIN CALCIUM 20 MG: 20 TABLET, FILM COATED ORAL at 21:04

## 2021-09-27 RX ADMIN — HYDROCODONE BITARTRATE AND ACETAMINOPHEN 2 TABLET: 5; 325 TABLET ORAL at 22:54

## 2021-09-27 ASSESSMENT — PAIN SCALES - GENERAL
PAINLEVEL_OUTOF10: 6
PAINLEVEL_OUTOF10: 8
PAINLEVEL_OUTOF10: 7
PAINLEVEL_OUTOF10: 8
PAINLEVEL_OUTOF10: 7
PAINLEVEL_OUTOF10: 7
PAINLEVEL_OUTOF10: 6

## 2021-09-27 NOTE — PLAN OF CARE
Problem: Falls - Risk of:  Goal: Will remain free from falls  Description: Will remain free from falls  9/26/2021 2106 by Susana Flores RN  Outcome: Ongoing  9/26/2021 1401 by Sherly Murphy RN  Outcome: Ongoing  9/26/2021 0726 by Teressa Schaffer RN  Outcome: Ongoing  Goal: Absence of physical injury  Description: Absence of physical injury  9/26/2021 2106 by Susana Flores RN  Outcome: Ongoing  9/26/2021 1401 by Sherly Murphy RN  Outcome: Ongoing  9/26/2021 0726 by Teressa Schaffer RN  Outcome: Ongoing     Problem: Discharge Planning:  Goal: Discharged to appropriate level of care  Description: Discharged to appropriate level of care  9/26/2021 2106 by Susana Flores RN  Outcome: Ongoing  9/26/2021 1401 by Sherly Murphy RN  Outcome: Ongoing  9/26/2021 0726 by Teressa Schaffer RN  Outcome: Ongoing     Problem: Cardiac Output - Decreased:  Goal: Cardiac output within specified parameters  Description: Cardiac output within specified parameters  9/26/2021 2106 by Susana Flores RN  Outcome: Ongoing  9/26/2021 1401 by Sherly Murphy RN  Outcome: Ongoing  9/26/2021 0726 by Teressa Schaffer RN  Outcome: Ongoing     Problem: Infection - Surgical Site:  Goal: Will show no infection signs and symptoms  Description: Will show no infection signs and symptoms  9/26/2021 2106 by Susana Flores RN  Outcome: Ongoing  9/26/2021 1401 by Sherly Murphy RN  Outcome: Ongoing  9/26/2021 0726 by Teressa Schaffer RN  Outcome: Ongoing     Problem: Pain - Acute:  Goal: Pain level will decrease  Description: Pain level will decrease  9/26/2021 2106 by Susana Flores RN  Outcome: Ongoing  9/26/2021 1401 by Sherly Murphy RN  Outcome: Ongoing  9/26/2021 0726 by Teressa Schaffer RN  Outcome: Ongoing     Problem: Venous Thromboembolism:  Goal: Will show no signs or symptoms of venous thromboembolism  Description: Will show no signs or symptoms of venous thromboembolism  9/26/2021 2106 by Susana Flores RN  Outcome: Ongoing  9/26/2021 0726 by Melissa Mustaaf Sawyer Velásquez RN  Outcome: Ongoing  Goal: Absence of signs or symptoms of impaired coagulation  Description: Absence of signs or symptoms of impaired coagulation  9/26/2021 2106 by Chiqui Wilson RN  Outcome: Ongoing  9/26/2021 0726 by Elly Plaza RN  Outcome: Ongoing     Problem: Pain:  Goal: Pain level will decrease  Description: Pain level will decrease  9/26/2021 2106 by Chiqui Wilson RN  Outcome: Ongoing  9/26/2021 1401 by Monae Culp RN  Outcome: Ongoing  9/26/2021 0726 by Elly Plaza RN  Outcome: Ongoing  Goal: Control of acute pain  Description: Control of acute pain  9/26/2021 2106 by Chiqui Wilson RN  Outcome: Ongoing  9/26/2021 0726 by Elly Plaza RN  Outcome: Ongoing  Goal: Control of chronic pain  Description: Control of chronic pain  9/26/2021 2106 by Chiqui Wilson RN  Outcome: Ongoing  9/26/2021 0726 by Elly Plaza RN  Outcome: Ongoing

## 2021-09-27 NOTE — PROGRESS NOTES
Pharmacy Note  Warfarin Consult follow-up      Recent Labs     09/27/21  0429   INR 2.1     Recent Labs     09/25/21  0512 09/26/21  0420 09/27/21  0429   HGB 9.5* 8.3* 8.2*   HCT 31.4* 26.2* 25.9*   * 157 175       Significant Drug-Drug Interactions:  New warfarin drug-drug interactions: none  Discontinued drug-drug interactions: enoxaparin, cefazolin, clopidogrel  Current warfarin drug-drug interactions:  Acetaminophen PRN  Amiodarone (new start)  Aspirin  Ketorlac PRN    Date INR Dose   9/24/2021 1.0 5 mg    9/25/2021 1.1 5 mg   9/26/2021 2.0 2 mg   9/27/2021 2.1 2 mg        Notes: Will order warfarin 2 mg for this evening. INR remains in goal of 2-3. Daily PT/INR while inpatient. [address] [place] [address]  Ph., CACP, Clinical Pharmacist  Anticoagulation Services, [address]  9/27/2021  8:52 AM

## 2021-09-27 NOTE — PROGRESS NOTES
[facility]   Progress Note                   Date:   9/27/2021  Patient name: Winifred Bermudez  Date of admission:  9/23/2021  6:57 AM  MRN:   [id number]  YOB: 1980  PCP: Yusra Vela MD    Reason for Admission:      Subjective:       Seen & examined in room with spouse. No acute events overnight. Denies chest pain, dyspnea, orthopnea or palpitations. Labs, vitals, & tele reviewed. Plans for discharge tomorrow likely per RN. Medications:   Scheduled Meds:   warfarin  2 mg Oral Once    furosemide  20 mg IntraVENous Daily    furosemide  20 mg IntraVENous Once    insulin lispro  0-6 Units SubCUTAneous TID WC    insulin lispro  0-3 Units SubCUTAneous Nightly    warfarin (COUMADIN) daily dosing (placeholder)   Other RX Placeholder    sodium chloride flush  10 mL IntraVENous 2 times per day    aspirin  81 mg Oral Daily    amiodarone  200 mg Oral TID    mupirocin   Nasal BID    polyethylene glycol  17 g Oral Daily    metoprolol tartrate  25 mg Oral BID    atorvastatin  20 mg Oral Nightly    pantoprazole  40 mg Oral Daily    pantoprazole  40 mg IntraVENous Daily    And    sodium chloride (PF)  10 mL IntraVENous Daily    insulin glargine  0.15 Units/kg SubCUTAneous Nightly       Continuous Infusions:   sodium chloride      dextrose         CBC:   Recent Labs     09/25/21  0512 09/26/21  0420 09/27/21  0429   WBC 13.1* 9.3 8.3   HGB 9.5* 8.3* 8.2*   * 157 175     BMP:    Recent Labs     09/25/21  0512 09/26/21  0420 09/27/21  0429   * 128* 135   K 3.8 3.6* 3.7    98 102   CO2 18* 20 22   BUN 11 9 7   CREATININE 0.75 0.54* 0.57*   GLUCOSE 118* 102* 98     Hepatic: No results for input(s): AST, ALT, ALB, BILITOT, ALKPHOS in the last 72 hours. Troponin: No results for input(s): TROPONINI in the last 72 hours. BNP: No results for input(s): BNP in the last 72 hours. Lipids: No results for input(s): CHOL, HDL in the last 72 hours.     Invalid input(s): LDLCALCU  INR:   Recent Labs     09/25/21  0512 09/26/21  0420 09/27/21  0429   INR 1.1 2.0 2.1       Objective:   Vitals: /71   Pulse 91   Temp 98.1 °F (36.7 °C) (Oral)   Resp 18   Ht 5' 6\" (1.676 m)   Wt 185 lb 3 oz (84 kg)   SpO2 92%   BMI 29.89 kg/m²     General appearance: awake, alert, in no apparent respiratory distress   HEENT: Head: Normocephalic, no lesions, without obvious abnormality  Neck: no JVD  Lungs: clear to auscultation bilaterally, no basilar rales, no wheezing   Heart: regular rate and rhythm, S1, S2 normal, no murmur, click, rub or gallop  Abdomen: soft, non-tender; bowel sounds normal  Extremities: No LE edema  Neurologic: Mental status: Alert, oriented. Motor and sensory not done. Summary  Left ventricle is normal in size with normal systolic function globally. Calculated ejection fraction is 60%  Left atrial dilatation. Right atrium is normal in size. Mild buckling of the right atrial wall. Mild aortic insufficiency. Prolapse of the of posterior leaflet of the mitral valve. Large vegetations noted on the MONROE done 7/20/21 are no longer identified . Small echogenicity is noted on the posterior leaflet, maybe improving  vegetation. Severe mitral regurgitation with pulmonary vein flow reversal.  Consider MONROE when indicated. Mild tricuspid regurgitation. Estimated right ventricular systolic pressure is 49 mmHg, suggests mild Pulm  HTN. Small circumferential pericardial effusion. No signs of echocariographic tamponade. Assessment / Acute Cardiac Problems:   1. Severe MR secondary to infective endocarditis s/p mechanical mitral - OnX valve replacement  2. Hypertension  3. Hyperlipidemia  4. Hyponatremia       Plan of Treatment:   Stable. On Coumadin. INR 2.1 today   Continue PO ASA, statin, Amio, & BB. Continue metoprolol tartrate 25 mg BID  Continue routine postoperative care per CT surgery. D/C planning per CTS. Plans for home with VNS. NA normalized.  HGB low but stable. No objection to discharge from CV standpoint with OP f/u with his established cardiologist (per pt/family request) in [facility] in 2 weeks.

## 2021-09-27 NOTE — PROGRESS NOTES
[facility]  Daily Progress Note    Surgeon: Dr Safia Chan   S/P: Mitral valve replacement with On-X valve  POD#: 4  EF: 60%    Subjective:  Mr. Carrizales Congress no complaints    Physical Exam  Vital Signs: /71   Pulse 91   Temp 98.1 °F (36.7 °C) (Oral)   Resp 18   Ht 5' 6\" (1.676 m)   Wt 185 lb 3 oz (84 kg)   SpO2 92%   BMI 29.89 kg/m²  O2 Flow Rate (L/min): 2 L/min   Admit Weight: Weight: 163 lb 9.3 oz (74.2 kg)   WEIGHTWeight: 185 lb 3 oz (84 kg)     Scheduled Meds:    warfarin  2 mg Oral Once    furosemide  20 mg IntraVENous Daily    furosemide  20 mg IntraVENous Once    insulin lispro  0-6 Units SubCUTAneous TID WC    insulin lispro  0-3 Units SubCUTAneous Nightly    warfarin (COUMADIN) daily dosing (placeholder)   Other RX Placeholder    sodium chloride flush  10 mL IntraVENous 2 times per day    aspirin  81 mg Oral Daily    amiodarone  200 mg Oral TID    mupirocin   Nasal BID    polyethylene glycol  17 g Oral Daily    metoprolol tartrate  25 mg Oral BID    atorvastatin  20 mg Oral Nightly    pantoprazole  40 mg Oral Daily    pantoprazole  40 mg IntraVENous Daily    And    sodium chloride (PF)  10 mL IntraVENous Daily    insulin glargine  0.15 Units/kg SubCUTAneous Nightly     Continuous Infusions:    sodium chloride      dextrose       Data:  CBC:   Recent Labs     09/25/21 0512 09/26/21 0420 09/27/21 0429   WBC 13.1* 9.3 8.3   HGB 9.5* 8.3* 8.2*   HCT 31.4* 26.2* 25.9*   MCV 95.4 91.6 89.0   * 157 175     BMP:   Recent Labs     09/25/21 0512 09/26/21 0420 09/27/21 0429   * 128* 135   K 3.8 3.6* 3.7    98 102   CO2 18* 20 22   BUN 11 9 7   CREATININE 0.75 0.54* 0.57*     PT/INR:   Recent Labs     09/25/21 0512 09/26/21 0420 09/27/21 0429   PROTIME 11.5 20.0* 20.8*   INR 1.1 2.0 2.1     APTT:   No results for input(s): APTT in the last 72 hours. I/O:  I/O last 3 completed shifts:  In: -   Out: 5157 [Urine:2150;  Chest Tube:325]    A/P:  Doing well  INR 2.0 pharmacy to dose coumadin  D/C chest tubes later today if less [id number] for 8 hours  Ambulate  Cut pacing wires tomorrow  D/C planning [id number] [facility], APRN - CNP

## 2021-09-27 NOTE — CARE COORDINATION
Transitional Planning  Spoke with pt about home care. He does not feel he needs it at this time. Informed him if he changes his mind to let his nurse know.

## 2021-09-27 NOTE — CONSULTS
[facility] - Initial Consult Note  Today's Date and Time: 9/27/2021, 3:19 PM    Impression :     · Prior Mitral Valve Endocarditis due to MSSA   · Prior MSSA septecemia  · S/P Mitral valve replacement 9-23-21  · Prior Cerebral septic embolic (HCC)  · Cerebral artery occlusion with cerebral infarction (HCC)  · Hyperlipidemia   · Essential Hypertension     Recommendations:   · Monitor off antibiotics  · Blood cultures x 2 before discharge  · Follow up in the office in 6 weeks. Medical Decision Making/Summary/Discussion:9/27/2021     ·   Infection Control Recommendations   · Holstein Precautions    Antimicrobial Stewardship Recommendations     · Simplification of therapy  · Targeted therapy    Coordination of Outpatient Care:   · Estimated Length of IV antimicrobials:    Chief complaint/reason for consultation:   · Endocarditis due to MSSA, s/p mitral valve replacement     History of Present Illness:   Denisse Suresh is a 36y.o.-year-old  male who was initially admitted on 9/23/2021. Patient seen at the request of MARIKA Leo CNP. INITIAL HISTORY:      Patient known to us from previous admissions to [facility].     He has experienced the following problems:  · Pericardial effusion  · Prior MSSA septicemia 7-17-21  ? Persistent growth of bacteria in blood 7-17-21 through 7-27-21 despite treatment  · Prior Sepsis with acute organ dysfunction and septic shock  · Mitral Valve endocarditis.  Mitral valve vegetation 2.5 X 2.4 cm 7/20/2021  · Acute septic embolic infarcts to the brain 7/21/21     Patient also had prior medical diagnoses:  · History of diverticulitis resulting in partial hemicolectomy with reanastomosis  · History of appendectomy  · History of right ACL repair with screw in place  · Hx systolic cardiac murmur  · Hyperlipidemia  · Elevated inflammatory markers    He has received antibiotic treatment for his MSSA septicemia including Nafcillin 2000 mg IV q4hrs to promote antibiotic penetration into the brain, from 7/22/2021 through 9/7/2021. Rifampin 600 mg daily was also also added on 7/22/2021 and stopped on 9/7/2021 for synergy. CURRENT EVALUATION :9/27/2021    Patient is seen and examined at bedside in the company of his wife. He is POD#4 from mitral valve replacement with On-X valve. He states he is feeling well and had his chest tubes taken out earlier today. He is ambulating well without difficulty and denies SOB, chest pain. He is on 2L NC. He is tolerating a regular diet and is having BM and voiding regularly. CT surgery has cleared him for discharge today and consulted ID for antibiotic recommendations given his history of MSSA endocarditis. Cultures of the valve show no growth on arerobic, fungal and AFB cultures  Outpatient blood cultures had shown no growth prior to surgery. Plan is to discharge home tomorrow morning. Labs, X rays reviewed: 9/27/2021    BUN: 7  Cr: 0.57    WBC: 15.4 --> 17.3 --> 13.1 --> 9.3 --> 8.3  Hb: 9.4 --> 9.2 --> 9.5 --> 8.3 --> 8.2  Plat:  119 --> 132 --> 112 --> 157 --> 175    Cultures:  Urine:  ·   Blood:  · 9-27-21: Pending  Sputum :  ·   Wound:  ·     Discussed with patient, RN, family. I have personally reviewed the past medical history, past surgical history, medications, social history, and family history, and I have updated the database accordingly. Past Medical History:     Past Medical History:   Diagnosis Date    Bacteremia     MSSA bacteremia   Dr. Robert Mulligan  9/7/21 last visit    Cardioembolic stroke [facility])     Cerebral artery occlusion with cerebral infarction [facility])     cerebral septic emboli 8/2021    Cerebral septic emboli [facility])     Dr. Peter Newsome neuro last seen 9/13/21    Chronic right shoulder pain     since 2020, fell.     Diverticulosis     Endocarditis     mitral valve endocarditis w/ pericardial effusion   8/2021 [facility]    History of COVID-19 pantoprazole  40 mg Oral Daily    pantoprazole  40 mg IntraVENous Daily    And    sodium chloride (PF)  10 mL IntraVENous Daily    insulin glargine  0.15 Units/kg SubCUTAneous Nightly       Social History:     Social History     Socioeconomic History    Marital status:      Spouse name: Not on file    Number of children: Not on file    Years of education: Not on file    Highest education level: Not on file   Occupational History    Not on file   Tobacco Use    Smoking status: Former Smoker     Packs/day: 1.00     Types: Cigarettes    Smokeless tobacco: Former User     Types: Chew, Snuff     Quit date: 9/15/2017    Tobacco comment: smoked 10 yrs. Quit in 2014   Vaping Use    Vaping Use: Never used   Substance and Sexual Activity    Alcohol use: Yes     Alcohol/week: 20.0 standard drinks     Types: 20 Cans of beer per week     Comment: 20/ week, average every other day    Drug use: Never    Sexual activity: Not on file   Other Topics Concern    Not on file   Social History Narrative    Not on file     Social Determinants of Health     Financial Resource Strain:     Difficulty of Paying Living Expenses:    Food Insecurity:     Worried About Running Out of Food in the Last Year:     [address] in the Last Year:    Transportation Needs:     Lack of Transportation (Medical):      Lack of Transportation (Non-Medical):    Physical Activity:     Days of Exercise per Week:     Minutes of Exercise per Session:    Stress:     Feeling of Stress :    Social Connections:     Frequency of Communication with Friends and Family:     Frequency of Social Gatherings with Friends and Family:     Attends [employer] Services:     Active Member of Clubs or Organizations:     Attends Club or Organization Meetings:     Marital Status:    Intimate Partner Violence:     Fear of Current or Ex-Partner:     Emotionally Abused:     Physically Abused:     Sexually Abused:        Family History:     Family History   Problem Relation Age of Onset    No Known Problems Mother     Other Father         diverticulitis        Allergies:   Morphine     Review of Systems:   Constitutional: No fevers or chills. No systemic complaints  Head: No headaches  Eyes: No double vision or blurry vision. No conjunctival inflammation. ENT: No sore throat or runny nose. . No hearing loss, tinnitus or vertigo. Cardiovascular: No chest pain or palpitations. No shortness of breath. No CASTRO  Lung: No shortness of breath or cough. No sputum production  Abdomen: No nausea, vomiting, diarrhea, or abdominal pain. Roslyn Sharper No cramps. Genitourinary: No increased urinary frequency, or dysuria. No hematuria. No suprapubic or CVA pain  Musculoskeletal: No muscle aches or pains. No joint effusions, swelling or deformities  Hematologic: No bleeding or bruising. Neurologic: No headache, weakness, numbness, or tingling. Integument: No rash, no ulcers. Psychiatric: No depression. Endocrine: No polyuria, no polydipsia, no polyphagia. Physical Examination :     Patient Vitals for the past 8 hrs:   BP Temp Temp src Pulse SpO2   09/27/21 0815 116/71 98.1 °F (36.7 °C) Oral 91 92 %     General Appearance: Awake, alert, and in no apparent distress  Head:  Normocephalic, no trauma  Eyes: Pupils equal, round, reactive to light and accommodation; extraocular movements intact; sclera anicteric; conjunctivae pink. No embolic phenomena. ENT: Oropharynx clear, without erythema, exudate, or thrush. No tenderness of sinuses. Mouth/throat: mucosa pink and moist. No lesions. Dentition in good repair. Neck:Supple, without lymphadenopathy. Thyroid normal, No bruits. Pulmonary/Chest: Clear to auscultation, without wheezes, rales, or rhonchi. No dullness to percussion. Cardiovascular: Regular rate and rhythm without murmurs, rubs, or gallops. Abdomen: Soft, non tender.  Bowel sounds normal. No organomegaly  All four Extremities: No cyanosis, clubbing, edema, or effusions. Neurologic: No gross sensory or motor deficits. Skin: Warm and dry with good turgor. No signs of peripheral arterial or venous insufficiency. No ulcerations. No open wounds. Medical Decision Making -Laboratory:   I have independently reviewed/ordered the following labs:    CBC with Differential:   Recent Labs     09/26/21 0420 09/27/21 0429   WBC 9.3 8.3   HGB 8.3* 8.2*   HCT 26.2* 25.9*    175     BMP:   Recent Labs     09/26/21 0420 09/27/21 0429   * 135   K 3.6* 3.7   CL 98 102   CO2 20 22   BUN 9 7   CREATININE 0.54* 0.57*   MG 2.0 1.9     Hepatic Function Panel: No results for input(s): PROT, LABALBU, BILIDIR, IBILI, BILITOT, ALKPHOS, ALT, AST in the last 72 hours. No results for input(s): RPR in the last 72 hours. No results for input(s): HIV in the last 72 hours. No results for input(s): BC in the last 72 hours. Lab Results   Component Value Date    MUCUS NOT REPORTED 07/17/2021    RBC 2.91 09/27/2021    TRICHOMONAS NOT REPORTED 07/17/2021    WBC 8.3 09/27/2021    YEAST NOT REPORTED 07/17/2021    TURBIDITY CLEAR 09/15/2021     Lab Results   Component Value Date    CREATININE 0.57 09/27/2021    GLUCOSE 98 09/27/2021       Medical Decision Making-Imaging:     EXAMINATION:   ONE XRAY VIEW OF THE CHEST       9/27/2021 6:16 am       COMPARISON:   Chest x-ray 09/26/2021       HISTORY:   ORDERING SYSTEM PROVIDED HISTORY: pl effusions   TECHNOLOGIST PROVIDED HISTORY:   pl effusions   Reason for Exam: pleural effusions   upright port       FINDINGS:   Left-sided chest tube is no longer seen. No pneumothorax.  Sternotomy wires   are present. Bibasilar opacities are not significantly changed.  Costophrenic   angles are clear. Cardiac silhouette is unchanged.           Impression   1.  Persistent bibasilar opacities, likely atelectasis.          Medical Decision [id number]:       Medical Decision Making-Other:     Note:  · Labs, medications, radiologic studies were reviewed with personal review of films  · Moderate Large amounts of data were reviewed  · Discussed with nursing Staff, Discharge planner  · Infection Control and Prevention measures reviewed  · All prior entries were reviewed  · Administer medications as ordered  · Prognosis: Good  · Discharge planning reviewed  · Follow up as outpatient. Thank you for allowing us to participate in the care of this patient. Please call with questions. Maggy Blakely, MS3, evaluated the patient under my direct supervision. ATTESTATION:    I have discussed the case, including pertinent history and exam findings with the residents and students. I have seen and examined the patient and the key elements of the encounter have been performed by me. I was present when the student obtained his information or examined the patient. I have reviewed the laboratory data, other diagnostic studies and discussed them with the residents. I have updated the medical record where necessary. I agree with the assessment, plan and orders as documented by the resident/ student.     Linda Lopez MD.      Pager: [phone] - Office: [phone]

## 2021-09-27 NOTE — PROGRESS NOTES
Physical Therapy  Facility/Department: [facility]  Daily Treatment Note  NAME: Sujit Jacobson  : 1980  MRN: [id number]    Date of Service: 2021    Discharge Recommendations:  No further therapy required at discharge. PT Equipment Recommendations  Equipment Needed: No   Assessment     Pt ambulated 600ft with no AD SBA, and performed standing ex's with good technique. Pt steady throughout all mobility with no LOB noted. Pt would benefit from continued PT to address further endurance deficits and return to prior level of independence. Specific instructions for Next Treatment: standing exercises  Prognosis: Good  PT Education: Goals;PT Role;Plan of Care; Functional Mobility Training,Stairs,Cardiac HEP,  REQUIRES PT FOLLOW UP: Yes  Activity Tolerance  Activity Tolerance: Patient Tolerated treatment well;Patient limited by pain         Patient Diagnosis(es): There were no encounter diagnoses. has a past medical history of Bacteremia, Cardioembolic stroke ([facility] [address] Cerebral artery occlusion with cerebral infarction [facility]), Cerebral septic emboli ([facility] [address] Chronic right shoulder pain, Diverticulosis, Endocarditis, History of COVID-19, Hyperlipidemia, Hypertension, MSSA bacteremia, MESSI (obstructive sleep apnea), Platelets decreased ([facility] [address] Research subject, Under care of team, Under care of team, Under care of team, Under care of team, Under care of team, and Wears contact lenses. has a past surgical history that includes Anterior cruciate ligament repair (Right); Appendectomy (1997); colectomy; Dilatation, esophagus; Abdomen surgery; and   picc powerpicc double (2021).     Restrictions  Restrictions/Precautions  Restrictions/Precautions: Fall Risk, Surgical Protocols  Required Braces or Orthoses?: Yes  Required Braces or Orthoses  Other: Heart Hugger Brace  Position Activity Restriction  Sternal Precautions: No Pushing, No Pulling, 5# Lifting Restrictions  Other position/activity restrictions: ambulate pt, s/p MVR 9/23, chest tube  Subjective   Pt getting out of bed going to the restroom and then for a walk. Pt has no c/o pain. Orientation    Overall Orientation Status: Within Functional Limits  Objective     Bed mobility  Supine to Sit: Supervision  Scooting: Supervision  Transfers  Sit to Stand: Supervision  Stand to sit: Supervision  Comment: no AD used for transfer, pt utilized heart hugger without cueing needed. Ambulation  Ambulation?: Yes  Ambulation 1  Surface: level tile  Device: No Device  Assistance: Supervision  Quality of Gait: steady, no LOB  Gait Deviations: Normal Melinda; Increased step length  Distance: 600ft  Stairs/Curb  Stairs?: Yes,9/26  Balance  Posture: Good  Sitting - Static: Good  Sitting - Dynamic: Good  Standing - Static: Good  Standing - Dynamic: Good  Comments: standing balance assessed with no AD  Exercises  Standing exercise program: Heel/toe raises, hip flexion, hip abduction, mini squats, hip extension, and hamstring curls. Reps: 10 x each with 2 lb wt on B LE's. Pt was educated on his Cardiac HEP. Pt voiced understanding and has no further questions at this time. AM-PAC Score  AM-PAC Inpatient Mobility Raw Score : 24 (09/27/21 0841)  AM-PAC Inpatient T-Scale Score : 61.14 (09/27/21 0841)  Mobility Inpatient CMS 0-100% Score: 0 (09/27/21 0841)  Mobility Inpatient CMS G-Code Modifier : [facility] (09/27/21 4789)      Goals  Short term goals  Time Frame for Short term goals: 14 visits  Short term goal 1: Perform bed mobility and functional transfers independently  Short term goal 2: Ambulate 300ft independently  Short term goal 3: Ascend/descend 4 steps with HR and SBA  Short term goal 4: Demo Good dynamic standing balance to decrease risk of falls  Short term goal 5: Perform CR HEP independently    Plan    Plan  Times per week: 6-7x/wk  Pt has met all goals. Pt will be discharged from PT. Pt instructed to ambulate the unit 2 laps in the AM,afternoon and evening.  Pt voiced understanding.   Current Treatment Recommendations: Strengthening, ROM, Balance Training, Functional Mobility Training, Transfer Training, Gait Training, Stair training, Endurance Training, Home Exercise Program, Safety Education & Training, Patient/Caregiver Education & Training  Safety Devices  Type of devices: Nurse notified, Call light within reach, Gait belt, Left in chair  Restraints  Initially in place: No     Therapy Time   Individual Concurrent Group Co-treatment   Time In  745         Time Out  825         Minutes  [address]

## 2021-09-28 ENCOUNTER — ANTI-COAG VISIT (OUTPATIENT)
Dept: PHARMACY | Age: 41
End: 2021-09-28

## 2021-09-28 ENCOUNTER — APPOINTMENT (OUTPATIENT)
Dept: GENERAL RADIOLOGY | Age: 41
DRG: 219 | End: 2021-09-28
Attending: THORACIC SURGERY (CARDIOTHORACIC VASCULAR SURGERY)
Payer: COMMERCIAL

## 2021-09-28 VITALS
WEIGHT: 185.19 LBS | RESPIRATION RATE: 20 BRPM | TEMPERATURE: 98.4 F | HEIGHT: 66 IN | OXYGEN SATURATION: 96 % | BODY MASS INDEX: 29.76 KG/M2 | HEART RATE: 82 BPM | SYSTOLIC BLOOD PRESSURE: 109 MMHG | DIASTOLIC BLOOD PRESSURE: 75 MMHG

## 2021-09-28 LAB
ANION GAP SERPL CALCULATED.3IONS-SCNC: 14 MMOL/L (ref 9–17)
BUN BLDV-MCNC: 7 MG/DL (ref 6–20)
BUN/CREAT BLD: ABNORMAL (ref 9–20)
CALCIUM SERPL-MCNC: 8.5 MG/DL (ref 8.6–10.4)
CHLORIDE BLD-SCNC: 104 MMOL/L (ref 98–107)
CO2: 20 MMOL/L (ref 20–31)
CREAT SERPL-MCNC: 0.68 MG/DL (ref 0.7–1.2)
CULTURE: NORMAL
DIRECT EXAM: NORMAL
DIRECT EXAM: NORMAL
GFR AFRICAN AMERICAN: >60 ML/MIN
GFR NON-AFRICAN AMERICAN: >60 ML/MIN
GFR SERPL CREATININE-BSD FRML MDRD: ABNORMAL ML/MIN/{1.73_M2}
GFR SERPL CREATININE-BSD FRML MDRD: ABNORMAL ML/MIN/{1.73_M2}
GLUCOSE BLD-MCNC: 102 MG/DL (ref 70–99)
HCT VFR BLD CALC: 27.3 % (ref 40.7–50.3)
HEMOGLOBIN: 8.6 G/DL (ref 13–17)
INR BLD: 1.5
Lab: NORMAL
Lab: NORMAL
MAGNESIUM: 2.2 MG/DL (ref 1.6–2.6)
MCH RBC QN AUTO: 28.3 PG (ref 25.2–33.5)
MCHC RBC AUTO-ENTMCNC: 31.5 G/DL (ref 28.4–34.8)
MCV RBC AUTO: 89.8 FL (ref 82.6–102.9)
NRBC AUTOMATED: 0 PER 100 WBC
PDW BLD-RTO: 13.8 % (ref 11.8–14.4)
PLATELET # BLD: 215 K/UL (ref 138–453)
PMV BLD AUTO: 9.4 FL (ref 8.1–13.5)
POTASSIUM SERPL-SCNC: 4.1 MMOL/L (ref 3.7–5.3)
PROTHROMBIN TIME: 15.1 SEC (ref 9.1–12.3)
RBC # BLD: 3.04 M/UL (ref 4.21–5.77)
SODIUM BLD-SCNC: 138 MMOL/L (ref 135–144)
SPECIMEN DESCRIPTION: NORMAL
SPECIMEN DESCRIPTION: NORMAL
WBC # BLD: 6.2 K/UL (ref 3.5–11.3)

## 2021-09-28 PROCEDURE — 6370000000 HC RX 637 (ALT 250 FOR IP): Performed by: NURSE PRACTITIONER

## 2021-09-28 PROCEDURE — 85610 PROTHROMBIN TIME: CPT

## 2021-09-28 PROCEDURE — APPNB30 APP NON BILLABLE TIME 0-30 MINS: Performed by: NURSE PRACTITIONER

## 2021-09-28 PROCEDURE — 6360000002 HC RX W HCPCS: Performed by: STUDENT IN AN ORGANIZED HEALTH CARE EDUCATION/TRAINING PROGRAM

## 2021-09-28 PROCEDURE — 99024 POSTOP FOLLOW-UP VISIT: CPT | Performed by: NURSE PRACTITIONER

## 2021-09-28 PROCEDURE — 83735 ASSAY OF MAGNESIUM: CPT

## 2021-09-28 PROCEDURE — 2580000003 HC RX 258: Performed by: NURSE PRACTITIONER

## 2021-09-28 PROCEDURE — 99253 IP/OBS CNSLTJ NEW/EST LOW 45: CPT | Performed by: INTERNAL MEDICINE

## 2021-09-28 PROCEDURE — 71045 X-RAY EXAM CHEST 1 VIEW: CPT

## 2021-09-28 PROCEDURE — 80048 BASIC METABOLIC PNL TOTAL CA: CPT

## 2021-09-28 PROCEDURE — 85027 COMPLETE CBC AUTOMATED: CPT

## 2021-09-28 PROCEDURE — 36415 COLL VENOUS BLD VENIPUNCTURE: CPT

## 2021-09-28 RX ORDER — WARFARIN SODIUM 2 MG/1
4 TABLET ORAL DAILY
Status: DISCONTINUED | OUTPATIENT
Start: 2021-09-28 | End: 2021-09-28 | Stop reason: HOSPADM

## 2021-09-28 RX ADMIN — DOCUSATE SODIUM 100 MG: 100 CAPSULE ORAL at 08:24

## 2021-09-28 RX ADMIN — HYDROCODONE BITARTRATE AND ACETAMINOPHEN 2 TABLET: 5; 325 TABLET ORAL at 12:26

## 2021-09-28 RX ADMIN — MUPIROCIN: 20 OINTMENT TOPICAL at 08:26

## 2021-09-28 RX ADMIN — METOPROLOL TARTRATE 25 MG: 25 TABLET ORAL at 08:25

## 2021-09-28 RX ADMIN — HYDROCODONE BITARTRATE AND ACETAMINOPHEN 2 TABLET: 5; 325 TABLET ORAL at 08:17

## 2021-09-28 RX ADMIN — Medication 81 MG: at 08:24

## 2021-09-28 RX ADMIN — AMIODARONE HYDROCHLORIDE 200 MG: 200 TABLET ORAL at 12:28

## 2021-09-28 RX ADMIN — HYDROCODONE BITARTRATE AND ACETAMINOPHEN 2 TABLET: 5; 325 TABLET ORAL at 03:57

## 2021-09-28 RX ADMIN — HYDROCODONE BITARTRATE AND ACETAMINOPHEN 2 TABLET: 5; 325 TABLET ORAL at 13:34

## 2021-09-28 RX ADMIN — AMIODARONE HYDROCHLORIDE 200 MG: 200 TABLET ORAL at 08:24

## 2021-09-28 RX ADMIN — POLYETHYLENE GLYCOL 3350 17 G: 17 POWDER, FOR SOLUTION ORAL at 08:31

## 2021-09-28 RX ADMIN — SODIUM CHLORIDE, PRESERVATIVE FREE 10 ML: 5 INJECTION INTRAVENOUS at 08:26

## 2021-09-28 RX ADMIN — PANTOPRAZOLE SODIUM 40 MG: 40 TABLET, DELAYED RELEASE ORAL at 08:24

## 2021-09-28 RX ADMIN — FUROSEMIDE 20 MG: 10 INJECTION, SOLUTION INTRAMUSCULAR; INTRAVENOUS at 08:25

## 2021-09-28 ASSESSMENT — PAIN SCALES - GENERAL
PAINLEVEL_OUTOF10: 6
PAINLEVEL_OUTOF10: 7

## 2021-09-28 NOTE — CARE COORDINATION
Met with patient to discuss transitional planning. Plan is home with wife, declining home care.   Left Vm with [facility] coumadin [facility] to set up appointment, requested return call    [phone] call from Esther Lang at [facility] coumadin clinic, appointment scheduled for 10 am tomorrow, AVS updated    Discharge Report    [facility] Case Management Department  Written by: Sharri Foote RN    Patient Name: Safia Muse  Attending Provider: Tanvir Fraire MD  Admit Date: 2021  6:57 AM  MRN: [id number]  Account: [de-identified]                     : 1980  Discharge Date:  2021        Disposition: home    Sharri Foote RN

## 2021-09-28 NOTE — PROGRESS NOTES
CLINICAL PHARMACY NOTE: MEDS TO BEDS    Total # of Prescriptions Filled: 9   The following medications were delivered to the patient:  · Aspirin 81mg tablet  · Metoprolol tartrate 25mg tablet  · Furosemide 20mg tablet  · Amiodarone 200mg tablet  · Atorvastatin 20mg tablet  · Potassium chloride er 20mEq tablet  · norco 5/325mg tablet  · Warfarin 1mg tablet  · Pantoprazole 40mg tablet    Additional Documentation: medications delivered to the pt in room 1009 on 09.28 21 at 09:54am, co pay was $17.98, pt paid with a credit card on the clover, the wife was in the room at the time of delivery, then the RN came into the room.

## 2021-09-28 NOTE — PROGRESS NOTES
Pt discharged home with anticoagulation clinic accompanied by wife at this time. Pt was discharged home with all personal belongings admitted with, post [facility] education book, printed AVS, IS, Acapella, chlorhexidine soap, x2 sets WINDY hose with slider, and heart hugger. CVICU RN provided thorough post [facility] discharge instructions including, but not limited to, incision care, daily weights/temperature, medications, freq walks and IS usage, and things to monitor for and complications to call surgeon for if experienced after discharge. All questions and concerns answered and addressed.

## 2021-09-28 NOTE — PLAN OF CARE
Problem: Falls - Risk of:  Goal: Will remain free from falls  9/28/2021 0803 by Kevin Morales RN  Outcome: Ongoing  9/28/2021 0010 by Alice Alfred RN  Outcome: Ongoing  Goal: Absence of physical injury  9/28/2021 0803 by Kevin Morales RN  Outcome: Ongoing  9/28/2021 0010 by Alice Alfred RN  Outcome: Ongoing     Problem: Discharge Planning:  Goal: Discharged to appropriate level of care  9/28/2021 0803 by Kevin Morales RN  Outcome: Ongoing  9/28/2021 0010 by Alice Alfred RN  Outcome: Ongoing     Problem: Cardiac Output - Decreased:  Goal: Cardiac output within specified parameters  9/28/2021 0803 by Kevin Morales RN  Outcome: Ongoing  9/28/2021 0010 by Alice Alfred RN  Outcome: Ongoing     Problem: Infection - Surgical Site:  Goal: Will show no infection signs and symptoms  9/28/2021 0803 by Kevin Morales RN  Outcome: Ongoing  9/28/2021 0010 by Alice Alfred RN  Outcome: Ongoing     Problem: Pain - Acute:  Goal: Pain level will decrease  9/28/2021 0803 by Kevin Morales RN  Outcome: Ongoing  9/28/2021 0010 by Alice Alfred RN  Outcome: Ongoing     Problem: Venous Thromboembolism:  Goal: Will show no signs or symptoms of venous thromboembolism  9/28/2021 0803 by Kevin Morales RN  Outcome: Ongoing  9/28/2021 0010 by Alice Alfred RN  Outcome: Ongoing  Goal: Absence of signs or symptoms of impaired coagulation  9/28/2021 0803 by Kevin Morales RN  Outcome: Ongoing  9/28/2021 0010 by Alice Alfred RN  Outcome: Ongoing     Problem: Pain:  Description: Pain management should include both nonpharmacologic and pharmacologic interventions.   Goal: Pain level will decrease  9/28/2021 0803 by Kevin Morales RN  Outcome: Ongoing  9/28/2021 0010 by Alice Alfred RN  Outcome: Ongoing  Goal: Control of acute pain  9/28/2021 0803 by Kevin Morales RN  Outcome: Ongoing  9/28/2021 0010 by Alice Alfred RN  Outcome: Ongoing  Goal: Control of chronic pain  9/28/2021 0803 by Kevin Morales RN  Outcome: Ongoing  9/28/2021 0010 by Debra Mcqueen RN  Outcome: Ongoing

## 2021-09-28 NOTE — DISCHARGE SUMMARY
[id number] [facility] Cardiothoracic Surgery  Discharge Summary    Patient's Name/Date of Birth: Wood Chauhan / 1980 (62 y.o.)    Admission Date: 9/23/2021  6:57 AM  Discharge Date: 9/28/2021  1:36 PM  Discharge Physician: Dr. Yang Or  Discharge Unit: CAR1  Discharge condition: Stable  Disposition: Home      Reason For Admission: Mitral valve endocarditis    HPI: Cleve Ni is a 36y.o. year old  male who presented to the emergency room at [facility] in July with complaints of abdominal pain, confusion, fatigue, diarrhea, emesis and body aches for approximately 6 days. He was treated for hypokalemia and discharged him home. He ultimately presented to [address] [place] [address] ER 4 days later and was noted to be tachycardic with elevated troponin levels. Cardiology was consulted and a 2D echo was ordered and showed a posterior mitral leaflet prolapse and severe MR with an eccentric anterior directed jet with a possible small vegetation on the posterior leaflet. The patient underwent a MONROE on 7/20 which revealed a large vegetation measuring 2.5 X 2.4 cm noted on the posterior leaflet of the mitral valve. Posterior leaflet was flaired with severe anteriorly directed eccentric regurgitation. Pertinent medical history includes HTN, hyperlipidemia, diverticulosis resulting in a partial hemicolectomy with reanastomoses, and an appendectomy. Blood cultures were noted to be positive for MSSA. Infectious disease followed and patient has completed antibiotic therapy. During his admission, he experienced a sudden onset of severe vertigo with memory loss/confusion. 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 Neurology was consulted, and patient has recovered fully without intervention. Patient was referred for valve replacement. Procedures completed in hospital: Median sternotomy. Aorto bicaval cardiopulmonary bypass. MONROE. Mitral valve replacement with 31/33 mm On-X mitral valve.     Brief Review of Hospital Course:   Patient was admitted the morning of 9/23/2021 and underwent mitral valve. Placement with Dr. Mandy Gibbons at [facility]. Surgical intervention went well without complication. Patient progressed through the postoperative recovery as expected with appropriate consultations placed as needed. Coumadin was initiated. He was referred to outpatient Coumadin clinic at [facility] for continued management after discharge. Patient will discharge home today and return to clinic in 1 week. Review of Systems   Constitutional: Positive for activity change and fatigue. Negative for appetite change, chills and fever. HENT: Negative for congestion. Respiratory: Positive for shortness of breath. Negative for chest tightness. Cardiovascular: Negative for chest pain and leg swelling. Gastrointestinal: Negative for abdominal pain, constipation and diarrhea. Genitourinary: Negative for dysuria. Musculoskeletal: Negative for back pain and gait problem. Skin: Negative for color change. Neurological: Positive for weakness. Negative for dizziness. Psychiatric/Behavioral: Negative for suicidal ideas. The patient is nervous/anxious. Physical Exam:  Vitals:    09/28/21 1100   BP:    Pulse:    Resp:    Temp: 98.4 °F (36.9 °C)   SpO2:      Weight: Weight: 185 lb 3 oz (84 kg)    Weight: 163 lb 9.3 oz (74.2 kg)    No intake/output data recorded. General: alert and oriented to person, place and time, well-developed and well-nourished, in no acute distress. Up in chair, No apparent distress. Heart:Normal S1 and S2.  Regular rhythm. No murmurs, gallops, or rubs. Pacing Wires Yes, clipped  Lungs: clear to auscultation bilaterally  Abdomen: soft, non tender, non distended, BSx4  Extremities: Trace edema  Wounds: clean and dry, healing appropriately.       Past Medical History:   Diagnosis Date    Bacteremia     MSSA bacteremia   Dr. Cirilo Ortez  9/7/21 last visit    Cardioembolic stroke [facility])     Cerebral artery occlusion with cerebral infarction [facility])     cerebral septic emboli 8/2021    Cerebral septic emboli [facility])     Dr. Gurmeet Javier neuro last seen 9/13/21    Chronic right shoulder pain     since 2020, fell.  Diverticulosis     Endocarditis     mitral valve endocarditis w/ pericardial effusion   8/2021 [facility]    History of COVID-19 12/2020    Hyperlipidemia     hx of.  No longer on rx    Hypertension     Nov 2020        MSSA bacteremia     Dr. Simeon Núñez in epic from 9/7/21    MESSI (obstructive sleep apnea)     Has PSG study done Pos for Mod MESSI, never had cpap tritration done for machine    Platelets decreased [facility])     July 2021  had infusions at [facility]. V's no reactions    Research subject 07/24/2021    EIND [id number] Exebacase for persistent bacteremia expected date of completion 8/25/2021    Under care of team 09/15/2021    PCP: Xu Schuler, last visit 9/15/2021    Under care of team     Dr. Nora Meléndez   9/8/2021    Under care of team     gastroenterology   8/27/21  Dr. Vimal Dias. V's    Under care of team     Dr. Gurmeet Javier   neuro  last seen 9/13/21    Under care of team     Has seen Dr. Nazario Jimenez 8/2021   Rosa Oliveros Wears contact lenses      Past Surgical History:   Procedure Laterality Date    ABDOMEN SURGERY      large bowel resection    ANTERIOR CRUCIATE LIGAMENT REPAIR Right     APPENDECTOMY  07/09/1997    COLECTOMY      2012 - D/T Dverticulitis    DILATATION, ESOPHAGUS      HC  PICC [address] DOUBLE  08/02/2021    removed   8/2021    MITRAL VALVE REPAIR N/A 9/23/2021    MITRAL VALVE REPLACEMENT - ON-X SIZE 31/33 performed by Nora Meléndez MD at [address] [place] [address]   Allergen Reactions    Morphine Other (See Comments)     Pt report muscle cramps     Family History   Problem Relation Age of Onset    No Known Problems Mother     Other Father         diverticulitis     Social History     Socioeconomic History    Marital status:      Spouse name: Not on file    Number of children: Not on file    Years of education: Not on file    Highest education level: Not on file   Occupational History    Not on file   Tobacco Use    Smoking status: Former Smoker     Packs/day: 1.00     Types: Cigarettes    Smokeless tobacco: Former User     Types: Chew, Snuff     Quit date: 9/15/2017    Tobacco comment: smoked 10 yrs. Quit in 2014   Vaping Use    Vaping Use: Never used   Substance and Sexual Activity    Alcohol use: Yes     Alcohol/week: 20.0 standard drinks     Types: 20 Cans of beer per week     Comment: 20/ week, average every other day    Drug use: Never    Sexual activity: Not on file   Other Topics Concern    Not on file   Social History Narrative    Not on file     Social Determinants of Health     Financial Resource Strain:     Difficulty of Paying Living Expenses:    Food Insecurity:     Worried About Running Out of Food in the Last Year:     [address] in the Last Year:    Transportation Needs:     Lack of Transportation (Medical):      Lack of Transportation (Non-Medical):    Physical Activity:     Days of Exercise per Week:     Minutes of Exercise per Session:    Stress:     Feeling of Stress :    Social Connections:     Frequency of Communication with Friends and Family:     Frequency of Social Gatherings with Friends and Family:     Attends Mosque Services:     Active Member of Clubs or Organizations:     Attends Club or Organization Meetings:     Marital Status:    Intimate Partner Violence:     Fear of Current or Ex-Partner:     Emotionally Abused:     Physically Abused:     Sexually Abused:           Medication List      START taking these medications    amiodarone 200 MG tablet  Commonly known as: CORDARONE  Take 1 tablet by mouth 2 times daily     aspirin 81 MG EC tablet  Take 1 tablet by mouth daily     atorvastatin 20 MG tablet  Commonly known as: LIPITOR  Take 1 tablet by mouth nightly     furosemide 20 MG tablet  Commonly known as: Lasix  Take 1 tablet by mouth daily for 14 days     HYDROcodone-acetaminophen 5-325 MG per tablet  Commonly known as: NORCO  Take 1 tablet by mouth every 6 hours as needed for Pain for up to 7 days. pantoprazole 40 MG tablet  Commonly known as: PROTONIX  Take 1 tablet by mouth daily     potassium chloride 20 MEQ extended release tablet  Commonly known as: KLOR-CON M  Take 1 tablet by mouth daily for 14 days     warfarin 1 MG tablet  Commonly known as: COUMADIN  Take as directed. If you are unsure how to take this medication, talk to your nurse or doctor.   Original instructions: [facility] INR 2.0 to 3.0        CHANGE how you take these medications    metoprolol tartrate 25 MG tablet  Commonly known as: LOPRESSOR  Take 1 tablet by mouth 2 times daily  What changed:   · medication strength  · how much to take  · additional instructions        CONTINUE taking these medications    CULTURELLE PO     vitamin C 250 MG tablet           Where to Get Your Medications      These medications were sent to [facility] [address] [facility], [address] [place] [id number]    Phone: [phone]   · amiodarone 200 MG tablet  · aspirin 81 MG EC tablet  · atorvastatin 20 MG tablet  · furosemide 20 MG tablet  · HYDROcodone-acetaminophen 5-325 MG per tablet  · metoprolol tartrate 25 MG tablet  · pantoprazole 40 MG tablet  · potassium chloride 20 MEQ extended release tablet  · warfarin 1 MG tablet           Data:    CBC:   Recent Labs     09/28/21  0536   WBC 6.2   HGB 8.6*   HCT 27.3*   MCV 89.8        BMP:   Recent Labs     09/28/21  0536      K 4.1      CO2 20   BUN 7   CREATININE 0.68*   MG 2.2     Accucheck Glucoses:   Recent Labs     09/27/21  1319 09/27/21  1603 09/27/21  2103   POCGLU 133* 109 122*     Cardiac Enzymes: No results for input(s): CKTOTAL, CKMB, CKMBINDEX, TROPONINI in the last 72 hours. PTT/PT/INR:   Recent Labs     09/28/21  0536 09/29/21  1013   PROTIME 15.1*  --    INR 1.5 1.4     No results for input(s): APTT in the last 72 hours. Liver Profile:  Lab Results   Component Value Date    AST 19 09/15/2021    ALT 16 09/15/2021    BILIDIR 0.11 08/01/2021    BILITOT 0.40 09/15/2021    ALKPHOS 92 09/15/2021   No results found for: CHOL, HDL, TRIG  TSH:   Lab Results   Component Value Date    TSH 1.86 07/20/2021     UA:   Lab Results   Component Value Date    COLORU YELLOW 09/15/2021    PHUR 5.5 09/15/2021    WBCUA 5 TO 10 07/17/2021    RBCUA 5 TO 10 07/17/2021    MUCUS NOT REPORTED 07/17/2021    TRICHOMONAS NOT REPORTED 07/17/2021    YEAST NOT REPORTED 07/17/2021    BACTERIA NOT REPORTED 07/17/2021    SPECGRAV 1.019 09/15/2021    LEUKOCYTESUR NEGATIVE 09/15/2021    UROBILINOGEN Normal 09/15/2021    BILIRUBINUR NEGATIVE 09/15/2021    GLUCOSEU NEGATIVE 09/15/2021    AMORPHOUS NOT REPORTED 07/17/2021       Problem List Items Addressed This Visit     Mitral valve replaced - Primary    Relevant Medications    HYDROcodone-acetaminophen (Desma Ree) 5-325 MG per tablet    Other Relevant Orders    [facility]) Medication Mgmt (Anticoagulation) - [facility] [place]    Protime-INR    Protime-INR          Imaging Studies:  CXR:   ONE XRAY VIEW OF THE CHEST       9/28/2021 5:57 am       COMPARISON:   09/27/2021, 09/26/2021       HISTORY:   ORDERING SYSTEM PROVIDED HISTORY: pl effusions   TECHNOLOGIST PROVIDED HISTORY:   pl effusions   Reason for Exam: uprt       FINDINGS:   The cardiac and mediastinal contours appear unchanged. Basilar opacities and   small pleural effusions, left greater than right, are again demonstrated   without appreciable change. No new airspace disease identified in the   interval.  No evidence for pneumothorax.           Impression   No significant interval change.          CT:    CTA OF THE CHEST 7/20/2021 1:22 pm       TECHNIQUE:   CTA of the chest was performed after the administration of intravenous   contrast.  Multiplanar reformatted images are provided for review.  MIP   images are provided for review. Dose modulation, iterative reconstruction,   and/or weight based adjustment of the mA/kV was utilized to reduce the   radiation dose to as low as reasonably achievable.       COMPARISON:   07/18/2021       HISTORY:   ORDERING SYSTEM PROVIDED HISTORY: elevated d-dimer, hypoxia, need to r/o PE   TECHNOLOGIST PROVIDED HISTORY:   To be performed after MONROE   elevated d-dimer, hypoxia, need to r/o PE       FINDINGS:   Pulmonary Arteries: This study is nondiagnostic for pulmonary embolism. There   is excessive respiratory motion artifact degrading image resolution. There is   not significant contrast within the pulmonary artery system to adequately   assess for pulmonary embolus.       Mediastinum: No evidence of mediastinal lymphadenopathy.  The heart and   pericardium demonstrate no acute abnormality.  There is no acute abnormality   of the thoracic aorta.       Lungs/pleura: Small bilateral pleural effusions and mild bibasilar   atelectasis  No evidence of  pneumothorax.       Upper Abdomen: Limited images of the upper abdomen are unremarkable.       Soft Tissues/Bones: No acute bone or soft tissue abnormality.           Impression   Small bilateral pleural effusions and mild bibasilar atelectasis       This study is nondiagnostic for pulmonary embolism. There is excessive   respiratory motion artifact degrading image resolution. There is not   significant contrast within the pulmonary artery system to adequately assess   for pulmonary embolus.       Recommend either repeat exam with improved bolus timing, and decreased   patient respiratory motion or VQ scan. Echo:   Summary  Left ventricle is normal in size with normal systolic function globally. Calculated ejection fraction is 60%  Left atrial dilatation. Right atrium is normal in size. Mild buckling of the right atrial wall. Mild aortic insufficiency. Prolapse of the of posterior leaflet of the mitral valve. Large vegetations noted on the MONROE done 7/20/21 are no longer identified . Small echogenicity is noted on the posterior leaflet, maybe improving  vegetation. Severe mitral regurgitation with pulmonary vein flow reversal.  Consider MONROE when indicated. Mild tricuspid regurgitation. Estimated right ventricular systolic pressure is 49 mmHg, suggests mild Pulm  HTN. Small circumferential pericardial effusion. No signs of echocariographic tamponade.     Signature  ----------------------------------------------------------------------------   Electronically signed by Kel Ridley(Sonographer) on 08/23/2021 03:17   PM  ----------------------------------------------------------------------------     ----------------------------------------------------------------------------   Electronically signed by Neri Blackman(Interpreting physician) on 08/24/2021   09:18 AM  ----------------------------------------------------------------------------  FINDINGS  Left Atrium  Left atrial dilatation. Left Ventricle  Left ventricle is normal in size with normal systolic function globally. Calculated ejection fraction is 60%  Right Atrium  Right atrium is normal in size. Mild buckling of the right atrial wall. Right Ventricle  Normal right ventricular size and function. Mitral Valve  Prolapse of the of posterior leaflet of the mitral valve. Large vegetations noted on the MONROE done 7/20/21 are no longer identified . Small echogenicity is noted on the posterior leaflet, maybe improving  vegetation. Severe mitral regurgitation with pulmonary vein flow reversal.  PISA radius 1.6 cm  MR volume 111 ml  Consider MONROE when indicated. Aortic Valve  Aortic valve is trileaflet and opens normally. Mild aortic insufficiency.   Tricuspid Valve  Tricuspid valve structure is normal.  Mild tricuspid

## 2021-09-28 NOTE — PROGRESS NOTES
Writer discussed discharge instructions, follow up appointments and new/changed medications. IV was d/c'd with no complications and just awaiting cardiac nurse for final instructions. Spouse at bedside aware and updated.

## 2021-09-28 NOTE — CONSULTS
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 Medical Decision Making/Summary/Discussion:9/28/2021     ·   Infection Control Recommendations   · Valley Precautions    Antimicrobial Stewardship Recommendations     · Simplification of therapy  · Targeted therapy    Coordination of Outpatient Care:   · Estimated Length of IV antimicrobials:    Chief complaint/reason for consultation:   · Endocarditis due to MSSA, s/p mitral valve replacement     History of Present Illness:   Sujit Jacobson is a 36y.o.-year-old  male who was initially admitted on 9/23/2021. Patient seen at the request of MARIKA Harman CNP. INITIAL HISTORY:      Patient known to us from previous admissions to [facility].     He has experienced the following problems:  · Pericardial effusion  · Prior MSSA septicemia 7-17-21  ? Persistent growth of bacteria in blood 7-17-21 through 7-27-21 despite treatment  · Prior Sepsis with acute organ dysfunction and septic shock  · Mitral Valve endocarditis.  Mitral valve vegetation 2.5 X 2.4 cm 7/20/2021  · Acute septic embolic infarcts to the brain 7/21/21     Patient also had prior medical diagnoses:  · History of diverticulitis resulting in partial hemicolectomy with reanastomosis  · History of appendectomy  · History of right ACL repair with screw in place  · Hx systolic cardiac murmur  · Hyperlipidemia  · Elevated inflammatory markers    He has received antibiotic treatment for his MSSA septicemia including Nafcillin 2000 mg IV q4hrs to promote antibiotic penetration into the brain, from 7/22/2021 through 9/7/2021. Rifampin 600 mg daily was also also added on 7/22/2021 and stopped on 9/7/2021 for synergy. CURRENT EVALUATION :9/28/2021    Patient is seen and examined at bedside in the company of his wife. He is had a from mitral valve replacement with On-X valve done on 9/23/2021. He is doing well today. He is ambulating well without difficulty and denies SOB, chest pain. He is tolerating a regular diet and is having BM and voiding regularly. CT surgery has cleared him for discharge today. Cultures of the valve show no growth on arerobic, fungal and AFB cultures  Outpatient blood cultures had shown no growth prior to surgery. His current blood cultures prior to discharge so far have been negative. Plan is to discharge this morning. Labs, X rays reviewed: 9/28/2021    BUN: 7 --> 7  Cr: 0.57 --> 0.68    WBC: 15.4 --> 17.3 --> 13.1 --> 9.3 --> 8.3 --> 6.2  Hb: 9.4 --> 9.2 --> 9.5 --> 8.3 --> 8.2 --> 8.6  Plat:  119 --> 132 --> 112 --> 157 --> 175 --> 215    Cultures:  Urine:  ·   Blood:  · 9-27-21: Pending  Sputum :  ·   Wound:  ·     Discussed with patient, RN, family. I have personally reviewed the past medical history, past surgical history, medications, social history, and family history, and I have updated the database accordingly. Past Medical History:     Past Medical History:   Diagnosis Date    Bacteremia     MSSA bacteremia   Dr. Rhonda Carias  9/7/21 last visit    Cardioembolic stroke [facility])     Cerebral artery occlusion with cerebral infarction [facility])     cerebral septic emboli 8/2021    Cerebral septic emboli [facility])     Dr. Parmjit Mccormick neuro last seen 9/13/21    Chronic right shoulder pain     since 2020, fell.     Diverticulosis     Endocarditis     mitral valve endocarditis w/ pericardial effusion   [facility]21 [facility]    History of COVID-19 12/2020    Hyperlipidemia     hx of.  No longer on rx    Hypertension     Nov 2020        MSSA bacteremia     Dr. Franciso Severe in epic from 9/7/21    MESSI (obstructive sleep apnea)     Has PSG study done Pos for Mod MESSI, never had cpap tritration done for machine    Platelets decreased [facility])     July 2021  had infusions at [facility]. V's no reactions    Research subject 07/24/2021    EIND [id number] Exebacase for persistent bacteremia expected date of completion 8/25/2021    Under care of team 09/15/2021    PCP: Finn Mcgill, last visit 9/15/2021    Under care of team     Dr. Britni Wu   9/8/2021    Under care of team     gastroenterology   8/27/21  Dr. Chan Dominguez. V's    Under care of team     Dr. Rock Joshi   neuro  last seen 9/13/21    Under care of team     Has seen Dr. Gabrielle Lerma 8/2021   Guru Corrales Wears contact lenses        Past Surgical  History:     Past Surgical History:   Procedure Laterality Date    ABDOMEN SURGERY      large bowel resection    ANTERIOR CRUCIATE LIGAMENT REPAIR Right     APPENDECTOMY  07/09/1997    COLECTOMY      2012 - D/T Dverticulitis    DILATATION, ESOPHAGUS      HC  PICC [address] DOUBLE  08/02/2021    removed   8/2021    MITRAL VALVE REPAIR N/A 9/23/2021    MITRAL VALVE REPLACEMENT - ON-X SIZE 31/33 performed by Britni Wu MD at [address]       Medications:      warfarin  4 mg Oral Daily    docusate sodium  100 mg Oral BID    furosemide  20 mg IntraVENous Daily    furosemide  20 mg IntraVENous Once    warfarin (COUMADIN) daily dosing (placeholder)   Other RX Placeholder    sodium chloride flush  10 mL IntraVENous 2 times per day    aspirin  81 mg Oral Daily    amiodarone  200 mg Oral TID    mupirocin   Nasal BID    polyethylene glycol  17 g Oral Daily    metoprolol tartrate  25 mg Oral BID    atorvastatin  20 mg Oral Nightly    pantoprazole  40 mg Oral Daily       Social History:     Social History     Socioeconomic History    Marital status:      Spouse name: Not on file    Number of children: Not on file    Years of education: Not on file    Highest education level: Not on file   Occupational History    Not on file   Tobacco Use    Smoking status: Former Smoker     Packs/day: 1.00     Types: Cigarettes    Smokeless tobacco: Former User     Types: Chew, Snuff     Quit date: 9/15/2017    Tobacco comment: smoked 10 yrs. Quit in 2014   Vaping Use    Vaping Use: Never used   Substance and Sexual Activity    Alcohol use: Yes     Alcohol/week: 20.0 standard drinks     Types: 20 Cans of beer per week     Comment: 20/ week, average every other day    Drug use: Never    Sexual activity: Not on file   Other Topics Concern    Not on file   Social History Narrative    Not on file     Social Determinants of Health     Financial Resource Strain:     Difficulty of Paying Living Expenses:    Food Insecurity:     Worried About Running Out of Food in the Last Year:     [address] in the Last Year:    Transportation Needs:     Lack of Transportation (Medical):  Lack of Transportation (Non-Medical):    Physical Activity:     Days of Exercise per Week:     Minutes of Exercise per Session:    Stress:     Feeling of Stress :    Social Connections:     Frequency of Communication with Friends and Family:     Frequency of Social Gatherings with Friends and Family:     Attends Muslim Services:     Active Member of Clubs or Organizations:     Attends Club or Organization Meetings:     Marital Status:    Intimate Partner Violence:     Fear of Current or Ex-Partner:     Emotionally Abused:     Physically Abused:     Sexually Abused:        Family History:     Family History   Problem Relation Age of Onset    No Known Problems Mother     Other Father         diverticulitis        Allergies:   Morphine     Review of Systems:   Constitutional: No fevers or chills.  No systemic complaints  Head: No headaches  Eyes: No double vision or independently reviewed/ordered the following labs:    CBC with Differential:   Recent Labs     09/27/21  0429 09/28/21  0536   WBC 8.3 6.2   HGB 8.2* 8.6*   HCT 25.9* 27.3*    215     BMP:   Recent Labs     09/27/21  0429 09/28/21  0536    138   K 3.7 4.1    104   CO2 22 20   BUN 7 7   CREATININE 0.57* 0.68*   MG 1.9 2.2     Hepatic Function Panel: No results for input(s): PROT, LABALBU, BILIDIR, IBILI, BILITOT, ALKPHOS, ALT, AST in the last 72 hours. No results for input(s): RPR in the last 72 hours. No results for input(s): HIV in the last 72 hours. No results for input(s): BC in the last 72 hours. Lab Results   Component Value Date    MUCUS NOT REPORTED 07/17/2021    RBC 3.04 09/28/2021    TRICHOMONAS NOT REPORTED 07/17/2021    WBC 6.2 09/28/2021    YEAST NOT REPORTED 07/17/2021    TURBIDITY CLEAR 09/15/2021     Lab Results   Component Value Date    CREATININE 0.68 09/28/2021    GLUCOSE 102 09/28/2021       Medical Decision Making-Imaging:     EXAMINATION:   ONE XRAY VIEW OF THE CHEST       9/27/2021 6:16 am       COMPARISON:   Chest x-ray 09/26/2021       HISTORY:   ORDERING SYSTEM PROVIDED HISTORY: pl effusions   TECHNOLOGIST PROVIDED HISTORY:   pl effusions   Reason for Exam: pleural effusions   upright port       FINDINGS:   Left-sided chest tube is no longer seen. No pneumothorax.  Sternotomy wires   are present. Bibasilar opacities are not significantly changed.  Costophrenic   angles are clear. Cardiac silhouette is unchanged.           Impression   1.  Persistent bibasilar opacities, likely atelectasis.          Medical Decision [facility]:       Medical Decision Making-Other:     Note:  · Labs, medications, radiologic studies were reviewed with personal review of films  · Moderate Large amounts of data were reviewed  · Discussed with nursing Staff, Discharge planner  · Infection Control and Prevention measures reviewed  · All prior entries were reviewed  · Administer medications as ordered  · Prognosis: Good  · Discharge planning reviewed  · Follow up as outpatient. Thank you for allowing us to participate in the care of this patient. Please call with questions. Jonnathan Harley, MS3, evaluated the patient under my direct supervision. ATTESTATION:    I have discussed the case, including pertinent history and exam findings with the residents and students. I have seen and examined the patient and the key elements of the encounter have been performed by me. I was present when the student obtained his information or examined the patient. I have reviewed the laboratory data, other diagnostic studies and discussed them with the residents. I have updated the medical record where necessary. I agree with the assessment, plan and orders as documented by the resident/ student.     Adebayo Adames MD.      Pager: [phone] - Office: [phone]

## 2021-09-29 ENCOUNTER — HOSPITAL ENCOUNTER (OUTPATIENT)
Dept: PHARMACY | Age: 41
Setting detail: THERAPIES SERIES
Discharge: HOME | End: 2021-09-29
Payer: COMMERCIAL

## 2021-09-29 VITALS
SYSTOLIC BLOOD PRESSURE: 113 MMHG | HEART RATE: 83 BPM | DIASTOLIC BLOOD PRESSURE: 71 MMHG | WEIGHT: 173 LBS | BODY MASS INDEX: 27.92 KG/M2

## 2021-09-29 LAB — INR BLD: 1.4

## 2021-09-29 PROCEDURE — 85610 PROTHROMBIN TIME: CPT

## 2021-09-29 PROCEDURE — 99213 OFFICE O/P EST LOW 20 MIN: CPT

## 2021-09-29 RX ORDER — DIPHENHYDRAMINE HCL 25 MG
50 TABLET ORAL NIGHTLY PRN
COMMUNITY
End: 2021-12-23 | Stop reason: ALTCHOICE

## 2021-09-29 NOTE — PROGRESS NOTES
[facility] [address] [place] [address]/[place]  Medication Management  ANTICOAGULATION    Referring Provider: Fatemeh Reagan CNP    GOAL INR: 2.0 - 3.0    TODAY'S INR: 1.4    WARFARIN Dosage: Take warfarin 2 tablets for 4 mg today then increase to 1.5 tablets for 3 mg daily. INR (no units)   Date Value   09/28/2021 1.5   09/27/2021 2.1   09/26/2021 2.0   09/25/2021 1.1   09/24/2021 1.0   09/23/2021 1.1   09/15/2021 1.0     Medication changes:  AMIODARONE new start in hospital (will effect INR for 3 months)  Lasix 20 mg for 14 days  Potassium 20 mEq daily for 14 days  Lovenox 80 mg sq BID for 5 days    Notes:    Fingerstick INR drawn per clinic protocol. Patient states no visible blood in urine and no black tarry stool. Denies any missed doses of warfarin. No change in other maintenance medications or in diet. Will recheck INR in 1 week. Patient acknowledges working in consult agreement with pharmacist as referred by his/her physician. Patient had On-X heart valve place in Mitral position. History of MSSA bacteremia, infective endocarditis involving the mitral valve leading to replacement. Patient had part of his intestine removed due to infectious diverticulitis about 8 years ago. Retired [employer]. Works at [employer]. Carin Pace, wife, stays home with 3 kids ages 10 yo, 2 yo, 1.6 yo. Called in new RX to [address] [facility] ([phone]) to [facility] for Lovenox 80 mg sq Q12 hr #10 per Fatemeh Reagan CNP. Lovenox required for at least 5 days AND therapeutic INR. Warfarin Patient Education: The following education has been conducted during the clinic appointment as indicated. [place] [facility] 9/29/2021, 9:37 AM  1. Patient has been educated that warfarin is a blood thinner. 2. Patient has been educated on why he or she has been prescribed warfarin. 3. Patient has been educated that warfarin can cause bleeding.    4. Patient has been educated on the necessity of regular INR monitoring. 5. Patient has been educated to take the exact amount of warfarin prescribed each day and that the dosing regimen could possibly vary from day to day. 6. Patient has been educated that medication aids such as pill boxes, calendars, etc., are a good idea to aid with warfarin therapy. 7. Patient has been educated on his or her target INR range. 8. Patient has been educated on the signs of bleeding problems. 9. Patient has been educated to seek immediate medical attention for severe bleeding issues. 10. Patient has been educated that warfarin has many drug interactions. 11. Patient has been educated to notify their provider managing warfarin prior to taking any new medications, including over-the-counter products and herbal products. 12. Patient has been educated to always keep a current medications list.    13. Patient has been educated on the effect of vitamin K on warfarin. 14. Patient has been educated on what foods contain high amounts of vitamin K.    15. Patient has been encouraged to eat a diet with a stable amount of vitamin K intake. 16. Patient has been educated on the effects of alcohol (ethanol) on warfarin. 16. Patient has been educated that warfarin is not intended for pregnant patients or those who plan to become pregnant. 18. Patient has been educated to take warfarin at the same time every day, preferably in the evening. 19. Patient has been educated on what to do if he or she misses a dose of warfarin. 20. Patient has been educated to call the healthcare provider managing warfarin is he or she gets diarrhea, has an infection, or has a fever, as this can affect warfarin responsiveness. 21. Patient has been educated to contact the healthcare provider managing warfarin if he or she will have any planned surgeries or other medical and dental procedures.     22. Patient has been educated to seek medical attention for serious falls, particularly if the fall injures his or her head. 23. Patient has been educated to avoid contact sports and activities that may cause serious injury. 24. Patient has been educated that he or she should either carry an ID badge or bracelet saying he or she is on warfarin. 25. Patient has been instructed that special arrangements may need to be made for monitoring during extended travel and that all extended travel plans should be shared early with his or her anticoagulation provider. 26. Patient has been given written warfarin education materials to supplement verbal education.         For Pharmacy Admin Tracking Only     Intervention Detail: Adherence Monitorin, Dose Adjustment: 2, reason: Therapy Optimization and New Rx: 4, reason: Needs Additional Therapy   Total # of Interventions Recommended: 6   Total # of Interventions Accepted: 6   Time Spent (min): Hussain Beck [facility], PharmD

## 2021-09-29 NOTE — PATIENT INSTRUCTIONS
Continue to monitor urine and stool. Continue to monitor for signs of bleeding. Return to clinic in 1 week. Take warfarin 2 tablets for 4 mg today then increase to 1.5 tablets for 3 mg daily.

## 2021-09-30 ASSESSMENT — ENCOUNTER SYMPTOMS
ABDOMINAL PAIN: 0
CHEST TIGHTNESS: 0
SHORTNESS OF BREATH: 1
COLOR CHANGE: 0
CONSTIPATION: 0
DIARRHEA: 0
BACK PAIN: 0

## 2021-10-01 ENCOUNTER — TELEPHONE (OUTPATIENT)
Dept: PHARMACY | Age: 41
End: 2021-10-01

## 2021-10-01 NOTE — TELEPHONE ENCOUNTER
Recent Travel Screening and Travel History documentation:     Travel Screening     Question   Response    In the last month, have you been in contact with someone who was confirmed or suspected to have Coronavirus / COVID-19? No / Unsure    Have you had a COVID-19 viral test in the last 14 days? Yes - Negative result    Do you have any of the following new or worsening symptoms? None of these    Have you traveled internationally or domestically in the last month?   No      Travel History   Travel since 09/01/21     No documented travel since 09/01/21         Jones Johnson, PharmD 10/1/2021 1:59 PM

## 2021-10-04 ENCOUNTER — HOSPITAL ENCOUNTER (OUTPATIENT)
Dept: PHARMACY | Age: 41
Setting detail: THERAPIES SERIES
Discharge: HOME | End: 2021-10-04
Payer: COMMERCIAL

## 2021-10-04 VITALS
HEART RATE: 79 BPM | SYSTOLIC BLOOD PRESSURE: 98 MMHG | BODY MASS INDEX: 26.63 KG/M2 | WEIGHT: 165 LBS | DIASTOLIC BLOOD PRESSURE: 59 MMHG

## 2021-10-04 DIAGNOSIS — Z95.2 MITRAL VALVE REPLACED: Primary | ICD-10-CM

## 2021-10-04 LAB — INR BLD: 1.3

## 2021-10-04 PROCEDURE — 99212 OFFICE O/P EST SF 10 MIN: CPT

## 2021-10-04 PROCEDURE — 85610 PROTHROMBIN TIME: CPT

## 2021-10-04 NOTE — PROGRESS NOTES
[facility]/[place]  Medication Management  ANTICOAGULATION    Referring Provider: Daryl Flores CNP     GOAL INR: 2.0 - 3.0     TODAY'S INR: 1.3     WARFARIN Dosage: Take warfarin 5 tablets for 5 mg today and tomorrow then increase to 4 tablets for 4 mg on , and 3 mg all other days. INR (no units)   Date Value   10/04/2021 1.3   2021 1.4   2021 1.5   2021 2.1   2021 2.0   2021 1.1   2021 1.0     Medication changes:  Called in more Lovenox (5 more days)    Notes:    Fingerstick INR drawn per clinic protocol. Patient states no visible blood in urine and no black tarry stool. Denies any missed doses of warfarin. No change in other maintenance medications or in diet. Will recheck INR in 4 days. Patient acknowledges working in consult agreement with pharmacist as referred by his/her physician. Patient had On-X heart valve place in Mitral position. History of MSSA bacteremia, infective endocarditis involving the mitral valve leading to replacement.        Patient had part of his intestine removed due to infectious diverticulitis about 8 years ago.     Retired [employer] Works at [employer]. Ifrah Bradley, wife, stays home with 3 kids ages 10 yo, 2 yo, 1.4 yo.      Called in new RX to [facility] ([phone])  for Lovenox 80 mg sq Q12 hr #10 per Daryl Flores CNP.       For Pharmacy Admin Tracking Only     Intervention Detail: Adherence Monitorin, Dose Adjustment: 3, reason: Therapy Optimization and New Rx: 1, reason: Needs Additional Therapy   Total # of Interventions Recommended: 4   Total # of Interventions Accepted: 4   Time Spent (min): [facility], PharmD

## 2021-10-04 NOTE — PATIENT INSTRUCTIONS
Continue to monitor urine and stool. Continue to monitor for signs of bleeding. Return to clinic in 4 days. Take warfarin 5 tablets for 5 mg today and tomorrow then increase to 4 tablets for 4 mg on M,F and 3 mg all other days. Continue Lovenox 80 mg sq twice daily for 5 more days.

## 2021-10-05 ENCOUNTER — HOSPITAL ENCOUNTER (OUTPATIENT)
Dept: CARDIAC REHAB | Age: 41
Setting detail: THERAPIES SERIES
Discharge: HOME | End: 2021-10-05
Payer: COMMERCIAL

## 2021-10-05 VITALS
WEIGHT: 166.25 LBS | HEART RATE: 68 BPM | HEIGHT: 66 IN | OXYGEN SATURATION: 98 % | RESPIRATION RATE: 20 BRPM | DIASTOLIC BLOOD PRESSURE: 66 MMHG | BODY MASS INDEX: 26.72 KG/M2 | SYSTOLIC BLOOD PRESSURE: 98 MMHG

## 2021-10-05 RX ORDER — HYDROCODONE BITARTRATE AND ACETAMINOPHEN 5; 325 MG/1; MG/1
1 TABLET ORAL EVERY 6 HOURS PRN
COMMUNITY
End: 2021-10-11 | Stop reason: ALTCHOICE

## 2021-10-05 ASSESSMENT — PATIENT HEALTH QUESTIONNAIRE - PHQ9
SUM OF ALL RESPONSES TO PHQ QUESTIONS 1-9: 2
SUM OF ALL RESPONSES TO PHQ QUESTIONS 1-9: 2

## 2021-10-05 NOTE — PROGRESS NOTES
Cardiac Rehab Initial History and Assessment    Zhane Martinez   1980  [id number]  10/5/2021    Primary Diagnosis: MVR 9/23/2021  Living Will: [x] Yes   [] No  On File: [] Yes   [x] No   [] N/A  Durable Power of : [x] Yes   [] No    Medical History  Past Medical History:   Diagnosis Date    Bacteremia     MSSA bacteremia   Dr. Kandi Kearns  9/7/21 last visit    Cardioembolic stroke [facility])     Cerebral artery occlusion with cerebral infarction [facility])     cerebral septic emboli 8/2021    Cerebral septic emboli [facility])     Dr. Linda Kelly neuro last seen 9/13/21    Chronic right shoulder pain     since 2020, fell.  Diverticulosis     Endocarditis     mitral valve endocarditis w/ pericardial effusion   [facility]21 [facility]    History of COVID-19 12/2020    Hyperlipidemia     hx of.  No longer on rx    Hypertension     Nov 2020        MSSA bacteremia     Dr. Pinky Lehman in epic from 9/7/21    MESSI (obstructive sleep apnea)     Has PSG study done Pos for Mod MESSI, never had cpap tritration done for machine    Platelets decreased [facility])     July 2021  had infusions at [facility]. V's no reactions    Research subject 07/24/2021    EIND [id number] Exebacase for persistent bacteremia expected date of completion 8/25/2021    Under care of team 09/15/2021    PCP: Selam Contreras, last visit 9/15/2021    Under care of team     Dr. Samantha Schultz   9/8/2021    Under care of team     gastroenterology   8/27/21  Dr. Deepak Fajardo. V's    Under care of team     Dr. Linda amin  last seen 9/13/21    Under care of team     Has seen Dr. Wilfredo Palma 8/2021   [facility]    Wears contact lenses      Past Surgical History:   Procedure Laterality Date    ABDOMEN SURGERY      large bowel resection    ANTERIOR CRUCIATE LIGAMENT REPAIR Right     APPENDECTOMY  07/09/1997    COLECTOMY      2012 - D/T Dverticulitis    DILATATION, ESOPHAGUS      HC  PICC [address] DOUBLE  08/02/2021    removed   8/2021    MITRAL VALVE REPAIR N/A 9/23/2021    MITRAL VALVE REPLACEMENT - ON-X SIZE 31/33 performed by Radha Mccracken MD at [address] History  Family History   Problem Relation Age of Onset    No Known Problems Mother     Other Father         diverticulitis         Symptoms:  1. Angina   [x] None   [] Tightness   [] Shortness of Breath   [] Pressure    [] Nausea   [] Sharp, Stabbing  [] Pallor   [] Indigestion, Heartburn [] Sweaty    Where was discomfort located? NA  Precipitating Factors? NA  Relieved by: NA    2. Arrhythmia   [x] None   [] Irregular Beats (skips) [] Pacer    [] Atrial Fibrillation  [] AICD    On any Medications? No      3. Congestive Heart Failure   [x] None   [] Pedal Edema  [] Unusual weight gain   [] SOB with mild exertion [] Fatigue    4. Vascular   [x] None   [] Carotid Narrowing  [] R [] L   [] Peripheral claudication [] R [] L    5. Musculoskeletal    [] None   [x] Back Pain  Where? Lower back - herniated disc 20 years ago   [x] Joint discomfort Where? New ACL left knee - right rotator cuff issues     Socio-Economic    Marital Status:     Nutrition    Appetite:  []  Too Good    [x]  Good  []  Poor    Diet: Regular  Eating out 0 times/wk  Alcohol Consumption: [x] Yes [] No   Type: Beer  Frequency: Occasionally    Caffeine: [] Yes [x] No  Type:  Amount:    Water intake per day: 8 glasses per day  Vitamins/Natural herbal products: Vitamin C and Culturelle    Psychological    [] Depression  [] Tearful  [] Fearful  [] Cheerful  [] Anxious  [x] Motivated  [] Overwhelmed    Treatment: None    Diabetes    [] Yes  [x] No  How long:   Latest HbA1c: 5.5 on 7/18/2021   Frequency of Checks:  Medication:     Stress    Source: Work  Relaxation techniques: Work on house  Hobbies: Cook    Level of Education    [] 8th Grade  [] Associates  [] Masters  [x] [employer] [] Bachelor  []  Other:    Depression Screening:  [x] PHQ9 completed - score: 2    Cardiac Rehab Pre - Test :  Score 90%  1.  The heart is a limitations- only as per above  History of heart mumur and got an infection leading to surgery    Patient Goals: Be back to normal - increase physical activity and ensure heart \"good to go\"      Program Goals:   - Increase stamina/strength to 30-50 total exercise by increasing 1-2 level/wk and 1-5 min/wk  to achieve THR and RPE 12-16. Increase average aerobic functional capacity by at least 0.5-1.0 METs in the next 30 days as tolerated for > 31 minutes, remaining within exercise prescription targets.     - Decrease body weight 2-4 pounds over the next 30 days - goal of BMI < 25     - After week 4, Introduce 8-12 bilateral UE progressive resistance exercises at 1-3 sets per lift, on 2-3 non-consecutive days using weights and/or TheraBands for 10-15 reps to progressive overload by increasing resistance once reps progressed to at least 15 reps on at least 2 occasions     - Maintain/achieve optimal BP     - Improved Lipids      - Develop regular exercise 30 min daily, at least 3-5 days per seek consistently over the next 30 days    - Reduce self-reported psycho-social feelings of stress over next 30 days    - To eat at least 2 servings of fruit per day and at least 4-5 servings of vegetables per day

## 2021-10-06 ENCOUNTER — OFFICE VISIT (OUTPATIENT)
Dept: CARDIOTHORACIC SURGERY | Age: 41
End: 2021-10-06

## 2021-10-06 ENCOUNTER — TELEPHONE (OUTPATIENT)
Dept: PHARMACY | Age: 41
End: 2021-10-06

## 2021-10-06 VITALS
HEIGHT: 66 IN | TEMPERATURE: 97.3 F | RESPIRATION RATE: 17 BRPM | BODY MASS INDEX: 26.52 KG/M2 | WEIGHT: 165 LBS | HEART RATE: 77 BPM | SYSTOLIC BLOOD PRESSURE: 97 MMHG | DIASTOLIC BLOOD PRESSURE: 72 MMHG | OXYGEN SATURATION: 99 %

## 2021-10-06 DIAGNOSIS — Z95.2 S/P MVR (MITRAL VALVE REPLACEMENT): Primary | ICD-10-CM

## 2021-10-06 PROCEDURE — 99024 POSTOP FOLLOW-UP VISIT: CPT | Performed by: NURSE PRACTITIONER

## 2021-10-06 RX ORDER — HYDROCODONE BITARTRATE AND ACETAMINOPHEN 5; 325 MG/1; MG/1
1 TABLET ORAL EVERY 6 HOURS PRN
Qty: 15 TABLET | Refills: 0 | Status: SHIPPED | OUTPATIENT
Start: 2021-10-06 | End: 2021-10-11 | Stop reason: ALTCHOICE

## 2021-10-06 RX ORDER — AMIODARONE HYDROCHLORIDE 200 MG/1
200 TABLET ORAL DAILY
Qty: 60 TABLET | Refills: 0
Start: 2021-10-06 | End: 2021-10-11 | Stop reason: ALTCHOICE

## 2021-10-06 ASSESSMENT — ENCOUNTER SYMPTOMS
SHORTNESS OF BREATH: 0
COLOR CHANGE: 0
ABDOMINAL DISTENTION: 0
CONSTIPATION: 0
DIARRHEA: 0
BACK PAIN: 0

## 2021-10-06 NOTE — PROGRESS NOTES
[facility]  Office Visit      Subjective:  Mr. Evangelista Gaspar is a 36 y.o. male s/p mitral valve replacement on 9/23/2021 with Dr. Adama Olvera, at [address] [place] [address]. He feels well today. Pain is controlled, He is requiring narcotics for pain relief. he is taking Norco as needed. Denies chest pain or shortness of breath. Increasing activity as tolerated. Appetite is  returning to normal. Bowel movements are regular. Denies fever, chills, or signs of infection at incision sites. Plan of care reviewed and questions answered. Mr. Evangelista Gaspar is recovering at home, rehab -[facility]. ROS  Review of Systems   Constitutional: Negative for activity change, appetite change, chills, fatigue and fever. HENT: Negative for congestion. Eyes: Negative for visual disturbance. Respiratory: Negative for shortness of breath. Cardiovascular: Positive for chest pain. Negative for leg swelling. Mild incision/sternal   Gastrointestinal: Negative for abdominal distention, constipation and diarrhea. Genitourinary: Negative for dysuria. Musculoskeletal: Negative for back pain and gait problem. Skin: Negative for color change. Neurological: Negative for dizziness and weakness. Psychiatric/Behavioral: Negative for suicidal ideas. The patient is not nervous/anxious. Physical Exam  Vitals:  Vitals:    10/06/21 1146   BP: 97/72   Pulse: 77   Resp: 17   Temp: 97.3 °F (36.3 °C)   SpO2: 99%       Physical Exam  Vitals reviewed. Constitutional:       General: He is not in acute distress. Appearance: Normal appearance. He is normal weight. He is not ill-appearing. HENT:      Head: Normocephalic. Nose: Nose normal.      Mouth/Throat:      Mouth: Mucous membranes are moist.      Pharynx: Oropharynx is clear. Eyes:      Extraocular Movements: Extraocular movements intact.       Conjunctiva/sclera: Conjunctivae normal.      Pupils: Pupils are equal, round, Instructions Coumadin Clinic  3 mg daily Target INR 2.0 to 3.0), Disp: 60 tablet, Rfl: 0    atorvastatin (LIPITOR) 20 MG tablet, Take 1 tablet by mouth nightly, Disp: 30 tablet, Rfl: 3    metoprolol tartrate (LOPRESSOR) 25 MG tablet, Take 1 tablet by mouth 2 times daily, Disp: 60 tablet, Rfl: 3    furosemide (LASIX) 20 MG tablet, Take 1 tablet by mouth daily for 14 days, Disp: 60 tablet, Rfl: 0    pantoprazole (PROTONIX) 40 MG tablet, Take 1 tablet by mouth daily, Disp: 30 tablet, Rfl: 0    potassium chloride (KLOR-CON M) 20 MEQ extended release tablet, Take 1 tablet by mouth daily for 14 days, Disp: 60 tablet, Rfl: 0    Ascorbic Acid (VITAMIN C) 250 MG tablet, Take 750 mg by mouth daily Emergen C 750 mg QD, Disp: , Rfl:     Lactobacillus Rhamnosus, GG, (CULTURELLE PO), Take 1 tablet by mouth daily , Disp: , Rfl:     Past Surgical History:   Procedure Laterality Date    ABDOMEN SURGERY      large bowel resection    ANTERIOR CRUCIATE LIGAMENT REPAIR Right     APPENDECTOMY  07/09/1997    COLECTOMY      2012 - D/T Dverticulitis    DILATATION, ESOPHAGUS      [facility]  [facility]    removed   8/2021    MITRAL VALVE REPAIR N/A 9/23/2021    MITRAL VALVE REPLACEMENT - ON-X SIZE 31/33 performed by Mitch Hernandez MD at [address] [place] [address] Hx: reports that he has quit smoking. His smoking use included cigarettes. He smoked 1.00 pack per day. He quit smokeless tobacco use about 4 years ago. His smokeless tobacco use included chew and snuff. Assessment & Plan:   Genna Rebollar was seen today for post-op check. Diagnoses and all orders for this visit:    S/P MVR (mitral valve replacement)  -     HYDROcodone-acetaminophen (NORCO) 5-325 MG per tablet; Take 1 tablet by mouth every 6 hours as needed for Pain for up to 5 days. Intended supply: 3 days. Take lowest dose possible to manage pain  -     Amb External Referral To Cardiology    Other orders  -     amiodarone (CORDARONE) 200 MG tablet;  Take 1 tablet by mouth daily Once daily for 7 days then discontinue         1. Incisions healing well  2. Continue current medications: wean amiodarone, Lasix and potassium as discussed. Continue to follow with Coumadin clinic for INR management  3. Follow up with cardiology (Dr. Osman Conte), and PCP as instructed at hospital discharge  4. Follow up with CT surgery group as needed    On this date 10/6/2021 I have spent 28 minutes reviewing previous notes, test results and face to face with the patient discussing the diagnosis and importance of compliance with the treatment plan as well as documenting on the day of the visit.       Stephanie Roque, APRN - CNP,APRN CNP

## 2021-10-06 NOTE — PATIENT INSTRUCTIONS
Patient Education        Heart Valve Surgery: What to Expect at [id number]  [facility] valve surgery repairs or replaces a damaged heart valve. Your doctor did the surgery through a cut, called an incision, in your chest.  You will feel tired and sore for the first few weeks after surgery. You may have some brief, sharp pains on either side of your chest. Your chest, shoulders, and upper back may ache. The incision in your chest may be sore or swollen. These symptoms usually get better after 4 to 6 weeks. You will probably be able to do many of your usual activities after 4 to 6 weeks. At first you may notice that you get tired easily and need to rest often. It may take 1 to 2 months to get your energy back. Some people find that they are more emotional after this surgery. You may cry easily or show emotion in ways that are unusual for you. This is common and may last for up to a year. Some people get depressed after this surgery. Talk with your doctor if you have sadness that continues or you are concerned about how you are feeling. Treatment and other support can help you feel better. Even though the surgery repaired or replaced your heart valve, it's still important to eat a heart-healthy diet, get regular exercise, stay at a healthy weight, take your heart medicines, and not smoke. Your doctor may suggest that you attend a cardiac rehab program. In cardiac rehab, a team of health professionals provides education and support to help you recover and prevent problems with your heart. Ask your doctor if rehab is right for you. This care sheet gives you a general idea about how long it will take for you to recover. But each person recovers at a different pace. Follow the steps below to get better as quickly as possible. How can you care for yourself at home? Activity    · Rest when you feel tired. Getting enough sleep will help you recover.  Try to sleep on your back while you heal. If your breastbone (sternum) was cut, healing usually takes about 4 to 6 weeks.     · Try to walk each day. Start by walking a little more than you did the day before. Bit by bit, increase the amount you walk. Walking boosts blood flow and helps prevent pneumonia and constipation.     · Avoid strenuous activities, such as bicycle riding, jogging, weight lifting, or heavy aerobic exercise, until your doctor says it is okay.     · For 3 months, avoid activities that strain your chest or upper arm muscles. This includes pushing a  or vacuum, mopping floors, or swinging a golf club or tennis racquet.     · For at least 6 weeks, avoid lifting anything that would make you strain. This may include heavy grocery bags and milk containers, a heavy briefcase or backpack, cat litter or dog food bags, a vacuum , or a child.     · For at least 6 weeks, avoid pushing yourself up out of a bed or chair using your arms. Do not use your arms to pull yourself into or out of a vehicle.     · Hold a pillow firmly over your chest incision when you cough or take deep breaths. This will support your chest and reduce your pain.     · Do breathing exercises at home as instructed by your doctor. This will help prevent pneumonia.     · Ask your doctor when you can drive again.     · You may need to take 4 to 12 weeks off from work. It depends on the type of work you do and how you feel.     · Ask your doctor when it is okay for you to have sex. Diet    · Eat a heart-healthy diet. If you have not been eating this way, talk to your doctor. You also may want to talk to a dietitian. A dietitian can help you plan meals and learn about healthy foods.     · Drink plenty of fluids (unless your doctor tells you not to).     · You may notice that your bowel movements are not regular right after your surgery. This is common. Try to avoid constipation and straining with bowel movements. You may want to take a fiber supplement every day.  If you have not had rubs against clothing. Change the bandage every day.     · You can take showers with your back to the showerhead. Allow the warm and soapy water to run across your shoulders and down over the incision. Pat the incision dry with a clean towel.     · Do not take a bath for the first 3 weeks, or until your doctor tells you it is okay.     · Do not swim or use a hot tub for at least 1 month, or until your doctor says it is okay.     · Do not use any creams, lotions, powders, ointments, or oils unless your doctor tells you it is okay. Other instructions    · Keep track of your weight. Weigh yourself every day at the same time of day, on the same scale, in the same amount of clothing. A sudden increase in weight can be a sign of a problem with your heart. Tell your doctor if you suddenly gain weight, such as 3 pounds or more in 2 to 3 days.     · Be sure to tell all your doctors and your dentist that you have had a valve repaired or replaced. This is important because you may need to take antibiotics before certain procedures to prevent infection. Follow-up care is a key part of your treatment and safety. Be sure to make and go to all appointments, and call your doctor if you are having problems. It's also a good idea to know your test results and keep a list of the medicines you take. When should you call for help? Call 911  anytime you think you may need emergency care. For example, call if:    · You passed out (lost consciousness).     · You have severe trouble breathing.     · You have severe pain in your chest.     · You have sudden chest pain and shortness of breath, or you cough up blood.     · You have symptoms of a stroke. These may include:  ? Sudden numbness, tingling, weakness, or loss of movement in your face, arm, or leg, especially on only one side of your body. ? Sudden vision changes. ? Sudden trouble speaking. ? Sudden confusion or trouble understanding simple statements.   ? Sudden problems with walking or balance. ? A sudden, severe headache that is different from past headaches.     · You have symptoms of a heart attack. These may include:  ? Chest pain or pressure, or a strange feeling in the chest.  ? Sweating. ? Shortness of breath. ? Nausea or vomiting. ? Pain, pressure, or a strange feeling in the back, neck, jaw, or upper belly or in one or both shoulders or arms. ? Lightheadedness or sudden weakness. ? A fast or irregular heartbeat. Call your doctor now or seek immediate medical care if:    · You have pain that does not get better after you take pain medicine.     · You have loose stitches, or your incision comes open.     · You are bleeding a lot from the incision.     · You have signs of infection, such as:  ? Increased pain, swelling, warmth, or redness. ? Red streaks leading from the incision. ? Pus draining from the incision. ? A fever.     · Your heartbeat feels very fast, skips beats, or flutters.     · You have signs of a blood clot, such as:  ? Pain in your calf, back of the knee, thigh, or groin. ? Redness and swelling in your leg or groin.     · You have symptoms of heart failure, such as:  ? Swelling in your legs, ankles, or feet. ? Sudden weight gain, such as more than 2 to 3 pounds in a day or 5 pounds in a week. (Your doctor may suggest a different range of weight gain.)     · You are sick to your stomach or cannot keep fluids down. Watch closely for changes in your health, and be sure to contact your doctor if:    · You do not get better as expected. Where can you learn more? Go to https://[employer]. org and sign in to your [employer] account. Enter D767 in the [facility] box to learn more about \"Heart Valve Surgery: What to Expect at Home. \"     If you do not have an account, please click on the \"Sign Up Now\" link. Current as of: December 2, 2020               Content Version: 13.0  © 1224-2482 Healthwise, [facility].    Care instructions adapted under license by [facility] ([facility]). If you have questions about a medical condition or this instruction, always ask your healthcare professional. [address] any warranty or liability for your use of this information.

## 2021-10-06 NOTE — TELEPHONE ENCOUNTER
Recent Travel Screening and Travel History documentation:     Travel Screening     Question   Response    In the last month, have you been in contact with someone who was confirmed or suspected to have Coronavirus / COVID-19? Unable to assess    Have you had a COVID-19 viral test in the last 14 days? Unable to assess    Do you have any of the following new or worsening symptoms? Unable to assess    Have you traveled internationally or domestically in the last month?   Unable to assess      Travel History   Travel since 09/06/21     No documented travel since 09/06/21

## 2021-10-07 ENCOUNTER — HOSPITAL ENCOUNTER (OUTPATIENT)
Dept: PHARMACY | Age: 41
Setting detail: THERAPIES SERIES
Discharge: HOME | End: 2021-10-07
Payer: COMMERCIAL

## 2021-10-07 VITALS — DIASTOLIC BLOOD PRESSURE: 64 MMHG | SYSTOLIC BLOOD PRESSURE: 99 MMHG | HEART RATE: 71 BPM

## 2021-10-07 DIAGNOSIS — Z95.2 MITRAL VALVE REPLACED: Primary | ICD-10-CM

## 2021-10-07 LAB — INR BLD: 1.5

## 2021-10-07 PROCEDURE — 99212 OFFICE O/P EST SF 10 MIN: CPT | Performed by: FAMILY MEDICINE

## 2021-10-07 PROCEDURE — 85610 PROTHROMBIN TIME: CPT | Performed by: FAMILY MEDICINE

## 2021-10-07 NOTE — PROGRESS NOTES
[address]/[place]  Medication Management  ANTICOAGULATION    Referring Provider: Cayla Ruiz CNP    GOAL INR: 2.0-3.0    TODAY'S INR: 1.5    WARFARIN Dosage: 5mg x1, 4mg x1, 3mg x2    INR (no units)   Date Value   10/07/2021 1.5   10/04/2021 1.3   09/29/2021 1.4   09/28/2021 1.5   09/27/2021 2.1   09/26/2021 2.0   09/25/2021 1.1       Medication changes:  As of 10/6/21-amiodarone decreased to 200mg daily for 7 days then d/c  Lasix decreased to 10mg daily for 7 days then d/c  KCl to discontinue when lasix discontinued  To discontinue pantoprazole when stops Norco  Continue lovenox at this time    Notes:    Fingerstick INR drawn per clinic protocol. Patient states no visible blood in urine and no black tarry stool. Denies any missed doses of warfarin. No change in other maintenance medications or in diet. Will recheck INR in 4 days after cardiac rehab. Patient was seen yesterday by Cayla Ruiz CNP and provided above medication instructions. Patient states they will not need to return to cardiothoracic office unless needed. Patient is scheduled for initial visit with Dr Marquis Vinson on Tuesday, 10/12. Patient is doing cardiac rehab at [facility]. Lovenox will continue at this time until INR therapeutic. Patient has enough syringes until next visit on Monday. A prescription for warfarin 1mg tablets is called to [facility] doctor line at [phone] for warfarin 1mg #450 take 5 tablets daily or as instructed by coumadin clinic 3 refills at this time (patient requested remaining with 1mg tablets for now and would like 90 day supply). Patient acknowledges working in consult agreement with pharmacist as referred by his/her physician.                   For Pharmacy Admin Tracking Only     Intervention Detail: Dose Adjustment: 2, reason: Therapy Optimization and Refill(s) Provided   Total # of Interventions Recommended: 4   Total # of Interventions Accepted: 4   Time Spent (min): 30        Mary Helen Mathis, R.Ph., 10/7/2021,12:07 PM

## 2021-10-07 NOTE — PATIENT INSTRUCTIONS
Please take 5mg warfarin today  Please take 4mg Friday  Please take 3mg Saturday and Sunday  Please take 3mg on Monday until seen on Monday  Continue lovenox until you return to coumadin clinic

## 2021-10-08 ENCOUNTER — TELEPHONE (OUTPATIENT)
Dept: PHARMACY | Age: 41
End: 2021-10-08

## 2021-10-08 NOTE — ADT AUTH CERT
Utilization Reviews       Cardiac Valve Replacement or Repair - Care Day 5 (9/27/2021) by Chnatale Medina RN       Review Status Review Entered   Completed 10/8/2021 11:34      Criteria Review      Care Day: 5 Care Date: 9/27/2021 Level of Care:    Guideline Day 1    Level Of Care    (X) OR to ICU    Clinical Status    ( ) * Clinical Indications met    Routes    (X) IV medications    10/8/2021 11:34 AM EDT by Josué Cummings      Lasix 20 mg IV    PRN  Norco 2 tablets po x 5  Magnesium sulfate 1000 mg iv x 1  KCl 40 meq po x 1    (X) Liquid diet    Interventions    (X) Chest tube    (X) Oxygen    (X) Possible arterial line    Medications    (X) Prophylactic antibiotics    * Milestone   Additional Notes   9/27/2021       Pacing wires clipped and incisions assessed in preparation for discharge. Incisions well approximated with no erythema, edema or drainage noted. Steri-strips to chest tube sites.  Incisions may remain open to air.         · warfarin  2 mg Oral Once   · furosemide  20 mg IntraVENous Daily   · furosemide  20 mg IntraVENous Once   · insulin lispro  0-6 Units SubCUTAneous TID WC   · insulin lispro  0-3 Units SubCUTAneous Nightly   · warfarin (COUMADIN) daily dosing (placeholder)   Other RX Placeholder   · sodium chloride flush  10 mL IntraVENous 2 times per day   · aspirin  81 mg Oral Daily   · amiodarone  200 mg Oral TID   · mupirocin   Nasal BID   · polyethylene glycol  17 g Oral Daily   · metoprolol tartrate  25 mg Oral BID   · atorvastatin  20 mg Oral Nightly   · pantoprazole  40 mg Oral Daily   · pantoprazole  40 mg IntraVENous Daily     And   · sodium chloride (PF)  10 mL IntraVENous Daily   · insulin glargine  0.15 Units/kg SubCUTAneous Nightly              Continuous Infusions:   Infusions Meds    · sodium chloride     · dextrose                CBC:       Recent Labs     09/25/21   0512 09/26/21   0420 09/27/21   0429   WBC 13.1* 9.3 8.3   HGB 9.5* 8.3* 8.2*   * 157 175       BMP:        Recent Labs     09/25/21   0512 09/26/21   0420 09/27/21   0429   * 128* 135   K 3.8 3.6* 3.7    98 102   CO2 18* 20 22   BUN 11 9 7   CREATININE 0.75 0.54* 0.57*   GLUCOSE 118* 102* 98       Hepatic: No results for input(s): AST, ALT, ALB, BILITOT, ALKPHOS in the last 72 hours. Troponin: No results for input(s): TROPONINI in the last 72 hours. BNP: No results for input(s): BNP in the last 72 hours. Lipids: No results for input(s): CHOL, HDL in the last 72 hours. Objective:   Vitals: /71   Pulse 91   Temp 98.1 °F (36.7 °C) (Oral)   Resp 18   Ht 5' 6\" (1.676 m)   Wt 185 lb 3 oz (84 kg)   SpO2 92%   BMI 29.89 kg/m²        General appearance: awake, alert, in no apparent respiratory distress    HEENT: Head: Normocephalic, no lesions, without obvious abnormality   Neck: no JVD   Lungs: clear to auscultation bilaterally, no basilar rales, no wheezing    Heart: regular rate and rhythm, S1, S2 normal, no murmur, click, rub or gallop   Abdomen: soft, non-tender; bowel sounds normal   Extremities: No LE edema   Neurologic: Mental status: Alert, oriented. Motor and sensory not done.            Summary   Left ventricle is normal in size with normal systolic function globally. Calculated ejection fraction is 60%   Left atrial dilatation. Right atrium is normal in size. Mild buckling of the right atrial wall. Mild aortic insufficiency. Prolapse of the of posterior leaflet of the mitral valve. Large vegetations noted on the MONROE done 7/20/21 are no longer identified . Small echogenicity is noted on the posterior leaflet, maybe improving   vegetation. Severe mitral regurgitation with pulmonary vein flow reversal.   Consider MONROE when indicated. Mild tricuspid regurgitation. Estimated right ventricular systolic pressure is 49 mmHg, suggests mild Pulm   HTN. Small circumferential pericardial effusion.    No signs of echocariographic tamponade.           Assessment / Acute Cardiac Problems:   1. 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 Hypertension   3. Hyperlipidemia   4. Hyponatremia            Plan of Treatment:   1. Stable. On Coumadin. INR 2.1 today    2. Continue PO ASA, statin, Amio, & BB.   3. Continue metoprolol tartrate 25 mg BID   4. Continue routine postoperative care per CT surgery. 5. D/C planning per CTS. Plans for home with VNS. 6. NA normalized. HGB low but stable. 7. No objection to discharge from CV standpoint with OP f/u with his established cardiologist (per pt/family request) in [place] in 2 weeks.               Cardiac Valve Replacement or Repair - Care Day 4 (9/26/2021) by Iker Miller RN       Review Status Review Entered   Completed 9/27/2021 13:07      Criteria Review      Care Day: 4 Care Date: 9/26/2021 Level of Care:    Guideline Day 1    Level Of Care    (X) OR to ICU    9/27/2021 1:07 PM EDT by Raeann Moore      ICU    Clinical Status    ( ) * Clinical Indications met    Routes    (X) IV medications    9/27/2021 1:07 PM EDT by Raeann Moore      Lasix 20 mg IV x 1  Fentanyl 50 mcg IV prn x 6  Norco 2 tablets po prn  5  KCl 40 meq po x 1    (X) Liquid diet    9/27/2021 1:07 PM EDT by Raeann Moore      Regular    Interventions    (X) Chest tube    (X) Oxygen    (X) Possible arterial line    Medications    (X) Prophylactic antibiotics    * Milestone   Additional Notes   9/26/2021        Subjective:                              There were no acute events overnight, remained hemodynamically stable, denies chest pain, dyspnea, orthopnea or palpitations. \   Sodium 128 this morning.  +1.8 L since admission      S/P: Mitral valve replacement with On-X valve   POD#: 3   EF: 60%       Subjective:  Mr. Ni no complaints       Physical Exam   Vital Signs: /75   Pulse 85   Temp 98.1 °F (36.7 °C) (Oral)   Resp 16   Ht 5' 6\" (1.676 m)   Wt 181 lb (82.1 kg)   SpO2 92%   BMI 29.21 kg/m²  O2 Flow Rate (L/min): 4 L/min    Admit Weight: Weight: 163 lb 9.3 oz (74.2 kg)              WEIGHTWeight: 181 lb (82.1 kg)           · furosemide  20 mg IntraVENous Once   · enoxaparin  1 mg/kg SubCUTAneous BID   · insulin lispro  0-6 Units SubCUTAneous TID WC   · insulin lispro  0-3 Units SubCUTAneous Nightly   · warfarin (COUMADIN) daily dosing (placeholder)   Other RX Placeholder   · sodium chloride flush  10 mL IntraVENous 2 times per day   · aspirin  81 mg Oral Daily   · amiodarone  200 mg Oral TID   · mupirocin   Nasal BID   · polyethylene glycol  17 g Oral Daily   · metoprolol tartrate  25 mg Oral BID   · atorvastatin  20 mg Oral Nightly   · pantoprazole  40 mg Oral Daily   · pantoprazole  40 mg IntraVENous Daily     And   · sodium chloride (PF)  10 mL IntraVENous Daily   · insulin glargine  0.15 Units/kg SubCUTAneous Nightly             09/24/21 0357 09/25/21 0512 09/26/21   0420   WBC 17.3* 13.1* 9.3   HGB 9.2* 9.5* 8.3*   HCT 28.2* 31.4* 26.2*   MCV 87.3 95.4 91.6   * 112* 157       BMP:       Recent Labs     09/24/21 0357 09/25/21   0512 09/26/21   0420    131* 128*   K 4.4 3.8 3.6*    100 98   CO2 16* 18* 20   BUN 12 11 9   CREATININE 0.74 0.75 0.54*         09/24/21 0357 09/25/21 0512 09/26/21   0420   PROTIME   10.6   11.5   20.0*      I/O:  I/O last 3 completed shifts: In: 3184 [P.O.:1440; I.V.:1744]   Out: 0486 [Urine:3425;  Chest Tube:50]       A/P:   Doing well   INR 2.0 pharmacy to dose coumadin   D/C chest tubes later today if less [id number] for 8 hours   Ambulate   Cut pacing wires tomorrow   D/C planning [facility]

## 2021-10-11 ENCOUNTER — HOSPITAL ENCOUNTER (OUTPATIENT)
Dept: PHARMACY | Age: 41
Setting detail: THERAPIES SERIES
Discharge: HOME | End: 2021-10-11
Payer: COMMERCIAL

## 2021-10-11 VITALS
SYSTOLIC BLOOD PRESSURE: 107 MMHG | HEART RATE: 72 BPM | WEIGHT: 170 LBS | BODY MASS INDEX: 27.44 KG/M2 | DIASTOLIC BLOOD PRESSURE: 70 MMHG

## 2021-10-11 DIAGNOSIS — Z95.2 MITRAL VALVE REPLACED: Primary | ICD-10-CM

## 2021-10-11 LAB — INR BLD: 1.4

## 2021-10-11 PROCEDURE — 85610 PROTHROMBIN TIME: CPT

## 2021-10-11 PROCEDURE — 99212 OFFICE O/P EST SF 10 MIN: CPT

## 2021-10-11 RX ORDER — ACETAMINOPHEN 500 MG
1000 TABLET ORAL EVERY 6 HOURS PRN
COMMUNITY
End: 2022-09-01

## 2021-10-11 NOTE — PATIENT INSTRUCTIONS
Continue to monitor urine and stool. Continue to monitor for signs of bleeding. Return to clinic on Friday. Take warfarin 6 tablets for 6 mg today and tomorrow then increase dosage to 5 tablets for 5 mg on MF and 4 tablets for 4 mg all other days. Continue Lovenox injections twice daily until return to clinic.

## 2021-10-11 NOTE — PROGRESS NOTES
[facility]  Medication Management  ANTICOAGULATION    Referring Provider: Conchis Grove CNP     GOAL INR: 2.0-3.0     TODAY'S INR: 1.4     WARFARIN Dosage: Take warfarin 6 tablets for 6 mg today and tomorrow then increase dosage to 5 tablets for 5 mg on MF and 4 tablets for 4 mg all other days. INR (no units)   Date Value   10/07/2021 1.5   10/04/2021 1.3   2021 1.4   2021 1.5   2021 2.1   2021 2.0   2021 1.1       Medication changes:  Stopped AMIODARONE (so will require more warfarin)  Stopped Lasix  Stopped potassium  Stopped Protonix  Stopped Norco  Started Tylenol ES 2 tablets q 6 hr    Notes:    Fingerstick INR drawn per clinic protocol. Patient states no visible blood in urine and no black tarry stool. Denies any missed doses of warfarin. No change in other maintenance medications or in diet. Will recheck INR on Friday. Patient acknowledges working in consult agreement with pharmacist as referred by his/her physician. Patient had steak and asparagus Saturday. Called more Lovenox into [facility].  Stopped AMIODARONE (so will require more warfarin)    Called new RX into [facility] ([phone]) voicemail for Lovenox 80 mg sq twice daily #20 with 1 refill per Conchis Grove CNP.     For Pharmacy Admin Tracking Only     Intervention Detail: Adherence Monitorin, Dose Adjustment: 4, reason: Therapy Optimization and New Rx: 7, reason: Needs Additional Therapy   Total # of Interventions Recommended: 10   Total # of Interventions Accepted: 10   Time Spent (min): [address] [facility], [facility], PharmD

## 2021-10-12 ENCOUNTER — OFFICE VISIT (OUTPATIENT)
Dept: CARDIOLOGY | Age: 41
End: 2021-10-12
Payer: COMMERCIAL

## 2021-10-12 VITALS
SYSTOLIC BLOOD PRESSURE: 117 MMHG | HEART RATE: 75 BPM | DIASTOLIC BLOOD PRESSURE: 82 MMHG | RESPIRATION RATE: 16 BRPM | WEIGHT: 169 LBS | HEIGHT: 66 IN | BODY MASS INDEX: 27.16 KG/M2

## 2021-10-12 DIAGNOSIS — Z86.79 HISTORY OF ENDOCARDITIS IN ADULTHOOD: ICD-10-CM

## 2021-10-12 DIAGNOSIS — Z79.01 ENCOUNTER FOR CURRENT LONG-TERM USE OF ANTICOAGULANTS: ICD-10-CM

## 2021-10-12 DIAGNOSIS — I10 ESSENTIAL HYPERTENSION: ICD-10-CM

## 2021-10-12 DIAGNOSIS — Z95.2 H/O MITRAL VALVE REPLACEMENT WITH MECHANICAL VALVE: ICD-10-CM

## 2021-10-12 DIAGNOSIS — E78.2 MIXED HYPERLIPIDEMIA: ICD-10-CM

## 2021-10-12 DIAGNOSIS — I34.0 SEVERE MITRAL REGURGITATION: Primary | ICD-10-CM

## 2021-10-12 PROCEDURE — 99204 OFFICE O/P NEW MOD 45 MIN: CPT | Performed by: FAMILY MEDICINE

## 2021-10-12 RX ORDER — ATORVASTATIN CALCIUM 20 MG/1
20 TABLET, FILM COATED ORAL NIGHTLY
Qty: 90 TABLET | Refills: 3 | Status: SHIPPED | OUTPATIENT
Start: 2021-10-12

## 2021-10-12 NOTE — PATIENT INSTRUCTIONS
SURVEY:    You may be receiving a survey from [employer] regarding your visit today. Please complete the survey to enable us to provide the highest quality of care to you and your family. If you cannot score us a very good on any question, please call the office to discuss how we could have made your experience a very good one. Thank you.     Anjali Delgado RN :)

## 2021-10-12 NOTE — PROGRESS NOTES
Jimbo Lawson RN am scribing for and in the presence of Emmy Carbajal MD.      Patient: Lisa Kiran  : 1980  Date of Visit: 2021    REASON FOR VISIT / CONSULTATION: New Patient (Mitral replacement on 2021-by Dr Tova Tejada. Pt reports that he is sore still btu can tell a big difference and says his heart feels great. Denies CP, SOB, Palpitations, dizziness, lighrtheadedness. )      History of Present Illness:        Dear Jose Roberto Underwood MD    I had the pleasure of seeing your patient Lisa Kiran in consultation today. As you know, Mr. Breonna Loco is a 36 y.o. male who presents with a history of severe mitral vegetation associated with severe mitral regurgitation on a MONROE as well. 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 His MSSA bactremia (endocarditis) led to his severe mitral vegetation. Which led to a mechanical mitral valve replacement (kwame) on 2021 and is on Warfarin at this time with Lovenox injections to get get him to his goal of INR. Exercise Tolerance: Mr. Breonna Loco reports that he has a fair exercise tolerance. His says that he could walk 1 mile without developing chest discomfort or significant shortness of breath. Mr. Breonna Loco says since he has gotten his mitral valve replacement, he is feeling better than he has in a long time other than some soreness from the surgery. However he does complain of restless leg syndrome at night time. He denied any current or recent abdominal pain, bleeding problems, problems with his medications or any other concerns at this time.      Wt Readings from Last 3 Encounters:   10/12/21 169 lb (76.7 kg)   10/11/21 170 lb (77.1 kg)   10/06/21 165 lb (74.8 kg)       PAST MEDICAL HISTORY:        Past Medical History:   Diagnosis Date    Bacteremia     MSSA bacteremia    Zenon Rinaldi  9/7/21 last visit    Cardioembolic stroke [facility])     Cerebral artery occlusion with cerebral infarction [facility])     cerebral septic emboli 8/2021    Cerebral septic emboli [facility])     Dr. Ingrid Mai neuro last seen 9/13/21    Chronic right shoulder pain     since 2020, fell.  Diverticulosis     Endocarditis     mitral valve endocarditis w/ pericardial effusion   8/2021 [facility]    History of COVID-19 12/2020    Hyperlipidemia     hx of. No longer on rx    Hypertension     Nov 2020        MSSA bacteremia     Dr. Marii Ware in epic from 9/7/21    MESSI (obstructive sleep apnea)     Has PSG study done Pos for Mod MESSI, never had cpap tritration done for machine    Platelets decreased [facility])     July 2021  had infusions at [facility]. V's no reactions    Research subject 07/24/2021    EIND 404143 [employer] for persistent bacteremia expected date of completion 8/25/2021    Under care of team 09/15/2021    PCP: Huong Samuel, last visit 9/15/2021    Under care of team     Dr. Karlie Ralph   9/8/2021    Under care of team     gastroenterology   8/27/21  Dr. Mabelene Gowers. V's    Under care of team     Dr. Ingrid Mai   neuro  last seen 9/13/21    Under care of team     Has seen Dr. Hari Martins 8/2021   Tom Code Wears contact lenses        CURRENT ALLERGIES: Morphine REVIEW OF SYSTEMS: 14 systems were reviewed. Pertinent positives and negatives as above, all else negative.      Past Surgical History:   Procedure Laterality Date    ABDOMEN SURGERY      large bowel resection    ANTERIOR CRUCIATE LIGAMENT REPAIR Right     APPENDECTOMY  07/09/1997    COLECTOMY      2012 - D/T Dverticulitis    DILATATION, ESOPHAGUS      HC  PICC [address] DOUBLE  08/02/2021    removed   8/2021    MITRAL VALVE REPAIR N/A 9/23/2021    MITRAL VALVE REPLACEMENT - ON-X SIZE 31/33 performed by Karlie Ralph MD at [facility] History:  Social History     Tobacco Use    Smoking status: Former Smoker Packs/day: 1.00     Types: Cigarettes    Smokeless tobacco: Former User     Types: Chew, Snuff     Quit date: 9/15/2017    Tobacco comment: smoked 10 yrs. Quit in 2014   Vaping Use    Vaping Use: Never used   Substance Use Topics    Alcohol use: Yes     Alcohol/week: 20.0 standard drinks     Types: 20 Cans of beer per week     Comment: 20/ week, average every other day    Drug use: Never        CURRENT MEDICATIONS:        Outpatient Medications Marked as Taking for the 10/12/21 encounter (Office Visit) with Dorita De Jesus MD   Medication Sig Dispense Refill    enoxaparin (LOVENOX) 80 MG/0.8ML injection       acetaminophen (TYLENOL) 500 MG tablet Take 1,000 mg by mouth every 6 hours as needed for Pain      diphenhydrAMINE (BENADRYL) 25 MG tablet Take 50 mg by mouth nightly as needed for Sleep 1-2 tablets as needed for sleep      aspirin 81 MG EC tablet Take 1 tablet by mouth daily 30 tablet 3    warfarin (COUMADIN) 1 MG tablet Target INR 2.0 to 3.0 (Patient taking differently: Take 3 mg by mouth See Admin Instructions [facility]  3 mg daily  Target INR 2.0 to 3.0) 60 tablet 0    atorvastatin (LIPITOR) 20 MG tablet Take 1 tablet by mouth nightly 30 tablet 3    metoprolol tartrate (LOPRESSOR) 25 MG tablet Take 1 tablet by mouth 2 times daily 60 tablet 3    Ascorbic Acid (VITAMIN C) 250 MG tablet Take 750 mg by mouth daily Emergen C 750 mg QD      Lactobacillus Rhamnosus, GG, (CULTURELLE PO) Take 1 tablet by mouth daily          FAMILY HISTORY: family history includes No Known Problems in his mother; Other in his father. Physical Examination:      /82 (Site: Right Upper Arm, Position: Sitting, Cuff Size: Medium Adult)   Pulse 75   Resp 16   Ht 5' 6\" (1.676 m)   Wt 169 lb (76.7 kg)   BMI 27.28 kg/m²  Body mass index is 27.28 kg/m². Constitutional: He is oriented to person, place, and time. He appears well-developed and well-nourished. In no acute distress.    HEENT: Normocephalic and tartrate (Lopressor)   · ACE Inibitor/ARB: Not indicated at this time. · Anticoagulation: Continue warfarin (Coumadin) take as directed. I do have a call out to Dr. Tiny Mejia to discuss his further INR goal and what he recommends for this. I asked him to  watch black tarry stools or bleeding issues and if this were to occur to stop the medication  and/or go to the emergency room. · Antiplatelet: Stop ASA. · I advised them to try and keep their legs up whenever possible and to limit salt in their diet. · Cardiac rehab referral: Also, given his recent mechanical mitral valve replacement, I  recommended that he consider going through cardiac rehabilitation program. After hearing  the potential benefits, he was agreeable and therefore I wrote him a prescription for this. · We did discuss him going back to work and both agreed upon December 1st, 2021 for his  return date. · Essential Hypertension: Controlled   Beta Blocker: Continue Metoprolol tartrate (Lopressor) 25 mg bid. · Hyperlipidemia: Mixed  · Cholesterol Reduction Therapy: Continue Atorvastatin (Lipitor) 20 mg nightly. · Since we do not have a documented recent lipid panel on file for him, I do go ahead an order a lipid panel to assess his cholesterol. · Restless Leg Syndrome  · More than likely related to him having cardio embolic strokes. I do believe this will take time but eventually will improve. · If symptoms persist I told him to follow up with you about this. In the meantime I asked him to continue taking his other medications and follow up with you as previously scheduled. FOLLOW UP:   I told Mr. Gardner Necessary to call my office if he had any problems, but otherwise told him to Return in about 6 weeks (around 11/23/2021). However, I would be happy to see him sooner should the need arise. Once again, thank you for allowing me to participate in this patients care.  Please do not hesitate to contact me could I be of further assistance. Sincerely,  Liss Contreras. Brandon SKINNER, MS, F.A.C.C. [facility] Card[facility] Specialist     [facility]  [facility], [address] [place] [address]  Phone: [phone], Fax: [phone]     I believe that the risk of significant morbidity and mortality related to the patient's current medical conditions are: Intermediate. >30 minutes were spent during prep work, discussion and exam of the patient, and follow up documentation and all of their questions were answered. The documentation recorded by the scribe, accurately and completely reflects the services I personally performed and the decisions made by me. Cristine Russell MD, MS, F.A.C.C.  October 12, 2021

## 2021-10-13 ENCOUNTER — HOSPITAL ENCOUNTER (OUTPATIENT)
Dept: CARDIAC REHAB | Age: 41
Discharge: HOME | End: 2021-10-13

## 2021-10-13 PROCEDURE — 93798 PHYS/QHP OP CAR RHAB W/ECG: CPT

## 2021-10-13 NOTE — PROGRESS NOTES
Phase II Cardiac Rehabilitation   Physician Order Annie Thompson  1980  [id number]  10/13/2021    [x] Phase 2 ECG Monitored Cardiac Rehabilitation    [] MI   [] PTCA with/without stents  [] CABG  [x] Heart Valve Repair/Replaced - MVR  [] Stable Angina [] Other:   [] CHF  Onset Date: 9/23/2021                    Cardiac Education Goals: (see individualized treatment plan for specific goals, progression & compliance)    [x] Hypertension [x] Physical Inactivity  [x] A & P  [] Smoking  [x] Coping/Depression [x] Medications  [] Diabetes  [x] Obesity   [x] Angina  [x] Hyperlipidemia [x] Stress   [x] Home Exercise                     Prescribed Exercise Plan:    Target Hr: 148-156 bpm (60-70% HRR)       Duration: 31 - 60 Minutes  Frequency: 3 Days per week  Initial Met Level: 5.5-6.0 METs  Limitations: None    Modalities:  [x]Treadmill   [x] UBE  [x] Seated Stepper  [x] Rowing Machine  [x] Weights/therabands  [x] Elliptical    · Aerobic exercise to total 31-60 minutes. Progressing by 1-5 minutes per week and/or 1-2 levels per week per patient tolerance using various modalities; according to Claire Scale 12-16 and THR  · After week 4, introduce 8-15 bilateral UE and LE progressive resistance exercises at 1-3 sets per lift, on 2-3 non-consecutive days TheraBands and weights to 8-24 # for 8-15 reps to progressive overload by increasing resistance once reps progressed to at least 15 reps on at least 2 occasions                 Per patient symptoms use:  · Appropriate ACLS Algorhythm for Cardiac Events. · Nitroglycerine 0.4mg SLq 5mins X 3 for angina pain. · 12 lead EKG for c/o chest pain or change in rhythm. · Nasal O2 for SaO2 <90% or symptoms warranted. · Blood sugar monitoring for Hyper/Hypoglycemia symptoms.

## 2021-10-13 NOTE — PROGRESS NOTES
Phase II Cardiac Rehab Individualized Treatment Plan-Initial     Patient Name: Alesha Peng  Date of Initial Assessment: 10/13/2021  ACCOUNT #: [id number]  Diagnosis: Mitral Valve Replacement   Onset Date: 9/23/2021  Referring Physician: Dr. Jumana Smith  Risk Stratification: Medium  Session Number: 1   EXERCISE    Stages of Change:   [] pre-contemplation  [x] Action   [] Contemplate    [] Maintainence   [x] Prep   [] Relapse          Exercise Prescription:  Mode: [x] TM [x] B [x] STP  [] R   [] UBE   [x] EL  Frequency: 3 days per week  Duration: 31-60 minutes  Intensity: 5.5-6.0 METs                        THRR: 148-156 (60-70% HRR)   Progression:   Based on risk stratification, may increase duration per F.I.T.T. protocol parameters on average 5-10 minutes every 1-2 weeks for the first 4-6 weeks. After 3-4 weeks completed, continue to gradually increase F.I.T.T. parameters gradually at the established duration on average 0.5-1.0 METs per 30 days over the course of the remaining program, as established by patient centered goals and guidelines, maintaining RPE 12-16. [] Angina with Exertion    [x] Resistance Training  Introduce 8-12 bilateral upper extremity resistance exercise at 1-3 sets per lift, on 2-3 non-consecutive days using TheraBand/Weights to 8-15 reps on at lease 2 occasions, maintaining RPE 12-16. Once repetition maximum has been achieved, additional weight sets may be added. Hypertension:  [x] Yes  [] No  Resting BP: 116/64  Peak Exercise BP: 148/64  BP Meds: None    Intervention:  Home Exercise:  Type: Walking, cycling  Duration: >= 30 minutes  Frequency: At least 2 non-rehab days per week     [x] Resistance Training  Progression:  After week 4, Introduce 8-15 bilateral upper extremity resistance exercise at 1-3 sets per lift, on 2 non-consecutive days using TheraBand/Weights to 8-15 reps on at lease 2 occasions.   Once repetition maximum has been achieved, additional weight sets may be added.    Education:   [x] Equipment [place]  [] Proper use weights/therabands   [x] S/S to report  [] Low Na Diet    [x] Warm up/ Cool down  [] BP Medication    [x] RPE Scale   [] Understand BP   [x] Ex Safety   [] Home Exercise         Target Goal:   -Individual Exercise Plan  -BP<140/90 or <130/80 if DM   -Aerobic active 30 + minutes 5-7 days per week    Nutrition    Stages of Change:   [] pre-contemplation  [x] Action   [] Contemplate    [] Maintainence   [x] Prep    [] Relapse    Lipids: Pending  Total Cholesterol:  Triglycerides:   HDL:   LDL:   Lipid Meds: Lipitor     Diabetes:  [] Yes  [x] No  FBS:   HbA1c:  Diabetes Medication:  [] Monitor BS at home   Frequency?:     Weight Management:  Weight: 166.4 pounds  Height: 66 inches  BMI: 26.79     Waist Circumference:35 inches  Wt Goal: Reduce weight 1-2 lbs/wk  Alcohol:   Social History     Substance and Sexual Activity   Alcohol Use Yes    Alcohol/week: 20.0 standard drinks    Types: 20 Cans of beer per week    Comment: 20/ week, average every other day     Diet Assessment Tool:   []  Food Diary:  Pending  Rate My Plate Score: 52  Special Diet: 2 gram NA+, 30% total fat, <10% saturated fat, 25-35 grams fiber, reduced cholesterol, balanced nutrition    Intervention:   [] Dietitian Consult       [] Nurse/Patient Discussion     [] Diabetes           [] Referred to Diabetes Education     Education:  [] S&S hypo/hyperglycemia  [] Low fat/low cholesterol diet  [] Weight loss       [] Relate Diabetes/CAD     [] Eating heart healthy handout    Target Goal:  -LDL-C<100 if triglycerides are > 200  -LDL-C < 70 for high risk patients  -HbA1c < 7%  -BMI < 25   -Waist Circumference < 35 inches women/40 inches men  Education    Stages of Change:    [] pre-contemplation  [x] Action   [] Contemplate    [] Maintainence   [x] Prep    [] Relapse    Learning Barriers:   [] Speech   [] Cognitive   [] Literacy   [] Vision   [] Hearing    [] Readiness to Learn    Knowledge test score: 90%      Family support: [x] Yes  [] No    Tobacco use:   Social History     Tobacco Use   Smoking Status Former Smoker    Packs/day: 1.00    Types: Cigarettes   Smokeless Tobacco Former User    Types: Chew, Snuff    Quit date: 9/15/2017   Tobacco Comment    smoked 10 yrs. Quit in 2014       Intervention:  [] Referred to physician for smoking cessation    [] Individual education and counseling  [] Tobacco adjunct      Education:   [] Risk Factors   [] Psychological aspects  [] Angina    [] Medications  [] CHF      [] Cardiac A&P    Target Goal:  -Complete cessation of tobacco use (if applicable)  -Continued risk factor modifications  -Recognizing signs/symptoms to report  -Proper use of meds    Psychosocial  Stages of Change:    [] pre-contemplation  [x] Action   [] Contemplate    [] Maintainence   [x] Prep    [] Relapse    Psychosocial Test:  Tool Used:   Azalea Clark Quality of Life Score: 27/24/27/30/30  PHQ9 score: 3    Intervention:   [] Psych Consult/   [] Uses stress management skills    [] Physician Referral     Medications: None    Education:    [] Stress Management   [] Depression    Target Goal:  -Assess presence or absence of depression using a valid screening tool. -Maximize coping skills.  -Positive support system. Preventative Medication:   [] Aspirin       [] Beta Blockade      [x] Statin or other lipid lowering agent     [] Clopidogrel   [] ACE Inhibitor   [x] Other anticoagulation medications Warfarin/Lovenox  Medication compliance: Yes    Fall Risk assess: [x] Yes  [] No   Low Risk  Assistive Device:    [] Cane  [] Dulcie Sicks [] Wheel Chair  [] Gait belt    [] NA    Patient Goals: Be back to normal - increase physical activity and ensure heart \"good to [employer]    Program goal:   Increase stamina/strength to 30-50 total exercise minutes exercise 3-5 times per week by increasing 1-2 level/week and 1-5 minutes per week to achieve THR and RPE.   Increase average aerobic functional capacity by at least 0.5-1.0 METs within THRR in the next 30 days. Introduce strength training exercises after week 4    Complete abstinence from tobacco use     Decrease weight 2-4 pounds over next 30 days    Maintain optimal BP    Improve lipids      Reduce self-reported psycho-social feelings of stress and/or depression in the next 30 days    Eat at least 4-5 servings of vegetable per day and at least 2 servings of fruit    Evaluation:   Exercised at 5.8 METs for a total of 31 minutes without ECG changes, chest pain, or SOB   Will instruct to return food diary and forward to dietician for review   Educated on exercise safety and initial instructions   Great potential for progress    JEN Gutiérrez RN  Cardiac Rehab Staff    Physician Changes/Comments:

## 2021-10-14 ENCOUNTER — TELEPHONE (OUTPATIENT)
Dept: CARDIAC REHAB | Age: 41
End: 2021-10-14

## 2021-10-14 ENCOUNTER — HOSPITAL ENCOUNTER (OUTPATIENT)
Dept: CARDIAC REHAB | Age: 41
Setting detail: THERAPIES SERIES
Discharge: HOME | End: 2021-10-14
Payer: COMMERCIAL

## 2021-10-14 ENCOUNTER — TELEPHONE (OUTPATIENT)
Dept: PHARMACY | Age: 41
End: 2021-10-14

## 2021-10-14 ENCOUNTER — TELEPHONE (OUTPATIENT)
Dept: CARDIOLOGY | Age: 41
End: 2021-10-14

## 2021-10-14 PROCEDURE — 93798 PHYS/QHP OP CAR RHAB W/ECG: CPT

## 2021-10-14 NOTE — TELEPHONE ENCOUNTER
COVID-19 phone screening     Call placed to screen patient prior to upcoming Medication Management visit for Anticoagulation on 10/15/21. Does patient have any of the following symptoms? [] Fever    [] Lower respiratory symptoms (SOB, difficulty breathing, cough)  [x] None    Travel Screening completed.

## 2021-10-14 NOTE — TELEPHONE ENCOUNTER
Per  he spoke with  and confirmed that an INR goal between 2-3 is ok. Pt aware and verbalized understanding.

## 2021-10-14 NOTE — TELEPHONE ENCOUNTER
If someone has a resting HR >100 needing a note, I think a ECG would be helpful. Lets watch it for now but if it remains elevated please get ECG and if HR remains up may restart beta-blocker. Thanks.

## 2021-10-14 NOTE — TELEPHONE ENCOUNTER
UNTOWARD EVENT PHYSICIAN NOTIFICATION  Date of event: 10/14/2021     Reason for report:              [x] Findings exceed acceptable parameters    Type of event:   [x] Resting -104 at both 10/13/2021 and 10/14/2021 encounters. Beta blocker discontinued 10/12/2021.      Description of event:   Patient presented at rehab both session thus far with a resting HR >=100 bpm   Exercise progression may be submaximal due to RHR    Description of action:   [x] Managed by rehab staff - instructed to decrease workloads to maintain exercise HR < 153 bpm (153bpm = 85% APMHR)   [] Transferred to ED   [] Sent to physician's office   [] Office visit scheduled with    Treatment: NA    Patient Disposition: Home    Status upon departure:    [x] Stable    [] Unstable   [] Other    Physician Orders (PLEASE SELECT RESPONSE BELOW, SIGN, AND ROUTE BACK TO [place] [address]):  [] Medication adjustment               [] Patient to schedule office visit   [] No follow up needed   Additional physician orders or comments:

## 2021-10-15 ENCOUNTER — HOSPITAL ENCOUNTER (OUTPATIENT)
Dept: PHARMACY | Age: 41
Setting detail: THERAPIES SERIES
Discharge: HOME | End: 2021-10-15
Payer: COMMERCIAL

## 2021-10-15 ENCOUNTER — HOSPITAL ENCOUNTER (OUTPATIENT)
Age: 41
Discharge: HOME | End: 2021-10-15
Payer: COMMERCIAL

## 2021-10-15 VITALS
DIASTOLIC BLOOD PRESSURE: 73 MMHG | WEIGHT: 168 LBS | HEART RATE: 99 BPM | SYSTOLIC BLOOD PRESSURE: 127 MMHG | BODY MASS INDEX: 27.12 KG/M2

## 2021-10-15 DIAGNOSIS — Z95.2 MITRAL VALVE REPLACED: Primary | ICD-10-CM

## 2021-10-15 DIAGNOSIS — E78.2 MIXED HYPERLIPIDEMIA: ICD-10-CM

## 2021-10-15 DIAGNOSIS — I34.0 SEVERE MITRAL REGURGITATION: ICD-10-CM

## 2021-10-15 DIAGNOSIS — Z95.2 H/O MITRAL VALVE REPLACEMENT WITH MECHANICAL VALVE: ICD-10-CM

## 2021-10-15 DIAGNOSIS — I10 ESSENTIAL HYPERTENSION: ICD-10-CM

## 2021-10-15 LAB
CHOLESTEROL/HDL RATIO: 4
CHOLESTEROL: 209 MG/DL
HDLC SERPL-MCNC: 52 MG/DL
INR BLD: 2
LDL CHOLESTEROL: 87 MG/DL (ref 0–130)
TRIGL SERPL-MCNC: 350 MG/DL
VLDLC SERPL CALC-MCNC: ABNORMAL MG/DL (ref 1–30)

## 2021-10-15 PROCEDURE — 99212 OFFICE O/P EST SF 10 MIN: CPT

## 2021-10-15 PROCEDURE — 36415 COLL VENOUS BLD VENIPUNCTURE: CPT

## 2021-10-15 PROCEDURE — 85610 PROTHROMBIN TIME: CPT

## 2021-10-15 PROCEDURE — 80061 LIPID PANEL: CPT

## 2021-10-15 NOTE — PATIENT INSTRUCTIONS
Stop Lovenox injections. Increase current dose of warfarin as instructed on dosing calendar provided - 4 mg every Monday, Wednesday, Friday and 5 mg all other days. Continue to monitor urine and stool for signs and symptoms of bleeding. Please notify the clinic of any medication changes. Please remember to bring all medications (both prescription and OTC) to your next visit. Kindly notify the clinic if you are unable to make to your next appointment.

## 2021-10-15 NOTE — PROGRESS NOTES
[facility]  Medication Management  ANTICOAGULATION    Referring Provider: Marcel Parr CNP    GOAL INR: 2-3    TODAY'S INR: 2    WARFARIN Dosage: Increase warfarin to 4 mg po every MWF; 5 mg po all other days    INR (no units)   Date Value   10/15/2021 2   10/11/2021 1.4   10/07/2021 1.5   10/04/2021 1.3   09/29/2021 1.4   09/28/2021 1.5   09/27/2021 2.1       Medication changes:  Stop ASA  Stop metoprolol tartrate    Notes:    Fingerstick INR drawn per clinic protocol. Patient states no visible blood in urine and no black tarry stool. Denies any missed doses of warfarin. No change in other maintenance medications or in diet. Will recheck INR in 5 days after cardiac rehab visit. Patient acknowledges working in consult agreement with pharmacist as referred by his/her physician. Patient will stop Lovenox injections. Patient will increase warfarin dose as above as he will require more warfarin due to recent discontinuation of amiodarone. For Pharmacy Admin Tracking Only     Intervention Detail: Dose Adjustment: 1, reason: Therapy Optimization   Total # of Interventions Recommended: 1   Total # of Interventions Accepted: 1   Time Spent (min): [address]  [place] [facility]

## 2021-10-18 ENCOUNTER — TELEPHONE (OUTPATIENT)
Dept: CARDIOLOGY | Age: 41
End: 2021-10-18

## 2021-10-18 ENCOUNTER — HOSPITAL ENCOUNTER (OUTPATIENT)
Dept: CARDIAC REHAB | Age: 41
Setting detail: THERAPIES SERIES
Discharge: HOME | End: 2021-10-18
Payer: COMMERCIAL

## 2021-10-18 LAB
EKG ATRIAL RATE: 105 BPM
EKG P AXIS: 58 DEGREES
EKG P-R INTERVAL: 188 MS
EKG Q-T INTERVAL: 360 MS
EKG QRS DURATION: 84 MS
EKG QTC CALCULATION (BAZETT): 475 MS
EKG R AXIS: 48 DEGREES
EKG T AXIS: 71 DEGREES
EKG VENTRICULAR RATE: 105 BPM

## 2021-10-18 PROCEDURE — 93798 PHYS/QHP OP CAR RHAB W/ECG: CPT

## 2021-10-18 PROCEDURE — 93005 ELECTROCARDIOGRAM TRACING: CPT

## 2021-10-18 RX ORDER — METOPROLOL SUCCINATE 25 MG/1
25 TABLET, EXTENDED RELEASE ORAL DAILY
Qty: 90 TABLET | Refills: 3 | Status: SHIPPED | OUTPATIENT
Start: 2021-10-18 | End: 2021-10-25

## 2021-10-18 NOTE — PROGRESS NOTES
Patient's resting HR noted to be elevated on arrival to cardiac rehab again. EKG completed and shown to Dr. Gallo Porter. Doctor putting order in for Metoprolol XL 25mg; patient phoned and updated on med change. Instructed to phone Dr. Elijah Noe office any questions regarding this change.

## 2021-10-18 NOTE — TELEPHONE ENCOUNTER
----- Message from Pritesh Bradford MD sent at 10/17/2021  9:55 PM EDT -----  Let Mr. Henrietta Rowan know their test result was ok. Will discuss at next visit. Thanks.

## 2021-10-19 ENCOUNTER — TELEPHONE (OUTPATIENT)
Dept: PHARMACY | Age: 41
End: 2021-10-19

## 2021-10-19 NOTE — TELEPHONE ENCOUNTER
COVID-19 phone screening     Call placed to screen patient prior to upcoming Medication Management visit for Anticoagulation on 10/20/21. Does patient have any of the following symptoms? [] Fever    [] Lower respiratory symptoms (SOB, difficulty breathing, cough)  [] None  Left message    Travel Screening completed.

## 2021-10-20 ENCOUNTER — HOSPITAL ENCOUNTER (OUTPATIENT)
Dept: CARDIAC REHAB | Age: 41
Setting detail: THERAPIES SERIES
Discharge: HOME | End: 2021-10-20
Payer: COMMERCIAL

## 2021-10-20 ENCOUNTER — HOSPITAL ENCOUNTER (OUTPATIENT)
Dept: PHARMACY | Age: 41
Setting detail: THERAPIES SERIES
Discharge: HOME | End: 2021-10-20
Payer: COMMERCIAL

## 2021-10-20 VITALS
BODY MASS INDEX: 26.83 KG/M2 | WEIGHT: 166.2 LBS | HEART RATE: 97 BPM | SYSTOLIC BLOOD PRESSURE: 125 MMHG | DIASTOLIC BLOOD PRESSURE: 73 MMHG

## 2021-10-20 DIAGNOSIS — Z95.2 MITRAL VALVE REPLACED: Primary | ICD-10-CM

## 2021-10-20 LAB — INR BLD: 1.7

## 2021-10-20 PROCEDURE — 99212 OFFICE O/P EST SF 10 MIN: CPT

## 2021-10-20 PROCEDURE — 85610 PROTHROMBIN TIME: CPT

## 2021-10-20 PROCEDURE — 93798 PHYS/QHP OP CAR RHAB W/ECG: CPT

## 2021-10-20 NOTE — PROGRESS NOTES
[facility]/[place]  Medication Management  ANTICOAGULATION    Referring Provider: Jade Howard CNP    GOAL INR: 2-3    TODAY'S INR: 1.7    WARFARIN Dosage: Increase warfarin to 4 mg po every Monday; 5 mg po all other days  Patient will take warfarin 6 mg po today for 1 dose. INR (no units)   Date Value   10/20/2021 1.7   10/15/2021 2   10/11/2021 1.4   10/07/2021 1.5   10/04/2021 1.3   09/29/2021 1.4   09/28/2021 1.5       Medication changes:  Metoprolol succinate (Toprol XL) 25 mg po daily added on 10/18/21    Notes:    Fingerstick INR drawn per clinic protocol. Patient states no visible blood in urine and no black tarry stool. Denies any missed doses of warfarin. No change in other maintenance medications or in diet. Will recheck INR in 1 week. Patient acknowledges working in consult agreement with pharmacist as referred by his/her physician. For Pharmacy Admin Tracking Only     Intervention Detail: Dose Adjustment: 2, reason: Therapy Optimization   Total # of Interventions Recommended: 2   Total # of Interventions Accepted: 2   Time Spent (min): 280 [facility].  Patricia Basilio

## 2021-10-21 ENCOUNTER — HOSPITAL ENCOUNTER (OUTPATIENT)
Dept: CARDIAC REHAB | Age: 41
Setting detail: THERAPIES SERIES
Discharge: HOME | End: 2021-10-21
Payer: COMMERCIAL

## 2021-10-21 PROCEDURE — 93798 PHYS/QHP OP CAR RHAB W/ECG: CPT

## 2021-10-25 ENCOUNTER — HOSPITAL ENCOUNTER (OUTPATIENT)
Dept: CARDIAC REHAB | Age: 41
Setting detail: THERAPIES SERIES
Discharge: HOME | End: 2021-10-25
Payer: COMMERCIAL

## 2021-10-25 ENCOUNTER — TELEPHONE (OUTPATIENT)
Dept: CARDIOLOGY | Age: 41
End: 2021-10-25

## 2021-10-25 LAB
CULTURE: NORMAL
Lab: NORMAL
SPECIMEN DESCRIPTION: NORMAL

## 2021-10-25 PROCEDURE — 93798 PHYS/QHP OP CAR RHAB W/ECG: CPT

## 2021-10-25 RX ORDER — METOPROLOL SUCCINATE 50 MG/1
50 TABLET, EXTENDED RELEASE ORAL DAILY
Qty: 90 TABLET | Refills: 3 | Status: SHIPPED | OUTPATIENT
Start: 2021-10-25 | End: 2021-11-15

## 2021-10-27 ENCOUNTER — TELEPHONE (OUTPATIENT)
Dept: PHARMACY | Age: 41
End: 2021-10-27

## 2021-10-27 ENCOUNTER — HOSPITAL ENCOUNTER (OUTPATIENT)
Dept: CARDIAC REHAB | Age: 41
Setting detail: THERAPIES SERIES
Discharge: HOME | End: 2021-10-27
Payer: COMMERCIAL

## 2021-10-27 PROCEDURE — 93798 PHYS/QHP OP CAR RHAB W/ECG: CPT

## 2021-10-27 NOTE — PROGRESS NOTES
Cardiopulmonary Rehab   Medical Nutrition Therapy Food Diary Evaluation    Patient Name: Alexus Geller  Registered Dietitian:  Phil Richter RD, KWADWO, RDN, LD  Date:  10/27/2021    Dear Aleta,    Thank you for sharing your information about your eating patterns, it serves not only to help me see what you are eating, but you as well. Eating 3 meals a day is great way to start, I am pleased to see that you are doing that. Whole grains, fruits and vegetables, along with lean meats and low fat dairy are the cornerstones of your health. It was good to see oatmeal, pineapple, green peppers, and salad on your food diary. In addition, it was great to see you are drinking water with meals. Try to avoid large portions of meat as the larger the portion the more fat/calories it has. Avoid meats such as pepperoni/beef jerky, as they are higher in sodium as well as fat. I did not see any mention of physical activity on your food diary, aim for a minimum of 30 minutes at least 5 days each week. With that in mind, look below for some suggestions. Your Rate Your Plate (RYP) Score was: 55, which means: you are making many healthy choices. Recommendations from the Dietitian based on your RYP and Food Diary / activity record to improve health and decrease risk factors    1. Boost whole grains in the diet such as whole grain bread, brown rice, etc.  2. Increase whole grains in the daily diet: whole grain toast, brown rice, etc.   3. Maintain vegetable consumption aiming for a minimum of 2 servings of non starchy vegetables such as carrots, green beans, etc. Daily. 4.  Increase water consumption to 8-8 oz glasses or 64 oz water every day. 5. Avoid large portions of meat aiming for a portion ~ palm of a woman's hand. 6. Limit high sodium/high fat foods such as pepperoni, ham, sausage, etc. Choose lower sodium options such as Healthy Choice low sodium turkey.    7. Include a minimum of 30 minutes of physical activity, 5 days each week. If there are many things to change, try making change with one recommendation, then move on from there. Recommendations are based on guidelines from the American Heart Association, American Diabetes Association and current literature.     Feel free to call with questions or concerns [phone] or [phone]          [facility], RD, LD RDN, LD

## 2021-10-27 NOTE — TELEPHONE ENCOUNTER
Recent Travel Screening and Travel History documentation:     Travel Screening     Question   Response    In the last month, have you been in contact with someone who was confirmed or suspected to have Coronavirus / COVID-19? No / Unsure    Have you had a COVID-19 viral test in the last 14 days? No    Do you have any of the following new or worsening symptoms? None of these    Have you traveled internationally or domestically in the last month?   No      Travel History   Travel since 09/27/21     No documented travel since 09/27/21

## 2021-10-28 ENCOUNTER — HOSPITAL ENCOUNTER (OUTPATIENT)
Dept: PHARMACY | Age: 41
Setting detail: THERAPIES SERIES
Discharge: HOME | End: 2021-10-28
Payer: COMMERCIAL

## 2021-10-28 ENCOUNTER — HOSPITAL ENCOUNTER (OUTPATIENT)
Dept: CARDIAC REHAB | Age: 41
Setting detail: THERAPIES SERIES
Discharge: HOME | End: 2021-10-28
Payer: COMMERCIAL

## 2021-10-28 VITALS
WEIGHT: 170 LBS | HEART RATE: 97 BPM | BODY MASS INDEX: 27.44 KG/M2 | DIASTOLIC BLOOD PRESSURE: 75 MMHG | SYSTOLIC BLOOD PRESSURE: 110 MMHG

## 2021-10-28 DIAGNOSIS — Z95.2 MITRAL VALVE REPLACED: Primary | ICD-10-CM

## 2021-10-28 LAB — INR BLD: 2

## 2021-10-28 PROCEDURE — 99211 OFF/OP EST MAY X REQ PHY/QHP: CPT

## 2021-10-28 PROCEDURE — 85610 PROTHROMBIN TIME: CPT

## 2021-10-28 PROCEDURE — 93798 PHYS/QHP OP CAR RHAB W/ECG: CPT

## 2021-10-28 NOTE — PROGRESS NOTES
[facility] [address] [place] [address]/[place]  Medication Management  ANTICOAGULATION    Referring Provider: Danie Driscoll CNP     GOAL INR: 2-3     TODAY'S INR: 2.0     WARFARIN Dosage: Increase warfarin to 5 mg (1 mg tablet x 5) daily. INR (no units)   Date Value   10/20/2021 1.7   10/15/2021 2   10/11/2021 1.4   10/07/2021 1.5   10/04/2021 1.3   2021 1.4   2021 1.5     Medication changes:  Increased Metoprolol (Toprol) to 50 mg daily. Notes:    Fingerstick INR drawn per clinic protocol. Patient states no visible blood in urine and no black tarry stool. Denies any missed doses of warfarin. No change in other maintenance medications or in diet. Will recheck INR in 2 weeks. Patient acknowledges working in consult agreement with pharmacist as referred by his/her physician. Patient is doing well in cardio rehab.             For Pharmacy Admin Tracking Only     Intervention Detail: Adherence Monitorin, Dose Adjustment: 1, reason: Therapy Optimization and New Rx: 1, reason: Needs Additional Therapy   Total # of Interventions Recommended: 2   Total # of Interventions Accepted: 2   Time Spent (min): 601 [facility], [address] [place] [address], Kamaljit

## 2021-10-28 NOTE — PATIENT INSTRUCTIONS
Continue to monitor urine and stool. Continue to monitor for signs of bleeding. Return to clinic in 2 weeks. Increase warfarin to 5 mg (1 mg tablet x 5) daily.

## 2021-11-01 ENCOUNTER — HOSPITAL ENCOUNTER (OUTPATIENT)
Dept: CARDIAC REHAB | Age: 41
Setting detail: THERAPIES SERIES
Discharge: HOME | End: 2021-11-01
Payer: COMMERCIAL

## 2021-11-01 PROCEDURE — 93798 PHYS/QHP OP CAR RHAB W/ECG: CPT

## 2021-11-03 ENCOUNTER — HOSPITAL ENCOUNTER (OUTPATIENT)
Dept: CARDIAC REHAB | Age: 41
Setting detail: THERAPIES SERIES
Discharge: HOME | End: 2021-11-03
Payer: COMMERCIAL

## 2021-11-03 PROCEDURE — 93798 PHYS/QHP OP CAR RHAB W/ECG: CPT

## 2021-11-04 ENCOUNTER — HOSPITAL ENCOUNTER (OUTPATIENT)
Dept: CARDIAC REHAB | Age: 41
Setting detail: THERAPIES SERIES
End: 2021-11-04
Payer: COMMERCIAL

## 2021-11-08 ENCOUNTER — TELEPHONE (OUTPATIENT)
Dept: GASTROENTEROLOGY | Age: 41
End: 2021-11-08

## 2021-11-08 ENCOUNTER — HOSPITAL ENCOUNTER (OUTPATIENT)
Dept: CARDIAC REHAB | Age: 41
Setting detail: THERAPIES SERIES
Discharge: HOME | End: 2021-11-08
Payer: COMMERCIAL

## 2021-11-08 ENCOUNTER — OFFICE VISIT (OUTPATIENT)
Dept: GASTROENTEROLOGY | Age: 41
End: 2021-11-08
Payer: COMMERCIAL

## 2021-11-08 VITALS — SYSTOLIC BLOOD PRESSURE: 130 MMHG | DIASTOLIC BLOOD PRESSURE: 88 MMHG | HEART RATE: 106 BPM

## 2021-11-08 DIAGNOSIS — K92.2 GASTROINTESTINAL HEMORRHAGE, UNSPECIFIED GASTROINTESTINAL HEMORRHAGE TYPE: Primary | ICD-10-CM

## 2021-11-08 PROCEDURE — 93798 PHYS/QHP OP CAR RHAB W/ECG: CPT

## 2021-11-08 PROCEDURE — 99213 OFFICE O/P EST LOW 20 MIN: CPT | Performed by: INTERNAL MEDICINE

## 2021-11-08 ASSESSMENT — ENCOUNTER SYMPTOMS
ALLERGIC/IMMUNOLOGIC NEGATIVE: 1
RESPIRATORY NEGATIVE: 1
EYES NEGATIVE: 1
GASTROINTESTINAL NEGATIVE: 1

## 2021-11-08 NOTE — PROGRESS NOTES
GI FOLLOW UP    INTERVAL HISTORY:         Chief Complaint   Patient presents with    Follow-up     2 month follow up     Diarrhea     Patient states being of abx therapy and having normal bowel movements. Denies any abd pain.  Gastroesophageal Reflux     Patient not taking protonix. States he is not having any symptosm       1. Gastrointestinal hemorrhage, unspecified gastrointestinal hemorrhage type          HISTORY OF PRESENT ILLNESS: Vianey Teran is a 36 y.o. male with a past history remarkable for slow transit constipation likely related to use of oxycodone, referred for evaluation of constipation symptoms and a remote history of fecal occult testing. 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     2 episode of diverticulitis this past year. Prior history of severe diverticulitis reported by the patient where he eventually underwent segmental resection of the sigmoid colon approximately 9 years ago after 3 severe recurrent episodes of diverticulitis.     No recent endoscopic evaluation performed. Denies any active GI symptoms at this time.     Smoking: None   Abdominal surgery-- Segmental resection of the sigmoid colon 2/2 diverticulitis. 9 yrs ago. 3 episodes of diverticulitis. Colonoscopy--- none since surgery     EGD--none since surgery    FH of GI issues:      Past Medical,Family, and Social History reviewed and does contribute to the patient presenting condition. Patient's PMH/PSH,SH,PSYCH Hx, MEDs, ALLERGIES, and ROS were all reviewed and updated in the appropriate sections.     PAST MEDICAL HISTORY:  Past Medical History:   Diagnosis Date    Bacteremia     MSSA bacteremia   Dr. Bethany Mccormick  9/7/21 last visit    Cardioembolic stroke ([facility] [address]     Cerebral artery occlusion with cerebral infarction [facility])     cerebral septic emboli 8/2021    Cerebral septic emboli [facility])     Dr. Alexandro Mcdermott neuro last seen 9/13/21    Chronic right shoulder pain     since 2020, fell.  Diverticulosis     Endocarditis     mitral valve endocarditis w/ pericardial effusion   8/2021 [facility]    History of COVID-19 12/2020    Hyperlipidemia     hx of.  No longer on rx    Hypertension     Nov 2020        MSSA bacteremia     Dr. Cecelia Davis in epic from 9/7/21    MESSI (obstructive sleep apnea)     Has PSG study done Pos for Mod MESSI, never had cpap tritration done for machine    Platelets decreased [facility])     July 2021  had infusions at [address] V's no reactions    Research subject 07/24/2021    EIND 000866 Exebacase for persistent bacteremia expected date of completion 8/25/2021    Under care of team 09/15/2021    PCP: Lam Cueto, last visit 9/15/2021    Under care of team     Dr. Ramya Espino   9/8/2021    Under care of team     gastroenterology   8/27/21  Dr. Grant Has. V's    Under care of team     Dr. Alexandro Mcdermott   neuro  last seen 9/13/21    Under care of team     Has seen Dr. Earl Catalan 8/2021   Aurora Sotelo Wears contact lenses        Past Surgical History:   Procedure Laterality Date    ABDOMEN SURGERY      large bowel resection    ANTERIOR CRUCIATE LIGAMENT REPAIR Right     APPENDECTOMY  07/09/1997    COLECTOMY      2012 - D/T Dverticulitis    DILATATION, ESOPHAGUS      HC  PICC [address] DOUBLE  08/02/2021    removed   8/2021    MITRAL VALVE REPAIR N/A 9/23/2021    MITRAL VALVE REPLACEMENT - ON-X SIZE 31/33 performed by Ramya Espino MD at [address] [place] [address] [place]:    Current Outpatient Medications:     metoprolol succinate (TOPROL XL) 50 MG extended release tablet, Take 1 tablet by mouth daily, Disp: 90 tablet, Rfl: 3    atorvastatin (LIPITOR) 20 MG tablet, Take 1 tablet by mouth nightly, Disp: 90 tablet, Rfl: 3   acetaminophen (TYLENOL) 500 MG tablet, Take 1,000 mg by mouth every 6 hours as needed for Pain, Disp: , Rfl:     diphenhydrAMINE (BENADRYL) 25 MG tablet, Take 50 mg by mouth nightly as needed for Sleep 1-2 tablets as needed for sleep, Disp: , Rfl:     warfarin (COUMADIN) 1 MG tablet, [facility] INR 2.0 to 3.0 (Patient taking differently: Take 3 mg by mouth See Admin Instructions [facility]  4 mg po every Monday; 5 mg po all other days Target INR 2.0 to 3.0), Disp: 60 tablet, Rfl: 0    Ascorbic Acid (VITAMIN C) 250 MG tablet, Take 750 mg by mouth daily Emergen C 750 mg QD, Disp: , Rfl:     Lactobacillus Rhamnosus, GG, (CULTURELLE PO), Take 1 tablet by mouth daily , Disp: , Rfl:     ALLERGIES:   Allergies   Allergen Reactions    Morphine Other (See Comments)     Pt report muscle cramps       FAMILY HISTORY:       Problem Relation Age of Onset    No Known Problems Mother     Other Father         diverticulitis         SOCIAL HISTORY:   Social History     Socioeconomic History    Marital status:      Spouse name: Not on file    Number of children: Not on file    Years of education: Not on file    Highest education level: Not on file   Occupational History    Not on file   Tobacco Use    Smoking status: Former Smoker     Packs/day: 1.00     Types: Cigarettes    Smokeless tobacco: Former User     Types: Chew, Snuff     Quit date: 9/15/2017    Tobacco comment: smoked 10 yrs. Quit in 2014   Vaping Use    Vaping Use: Never used   Substance and Sexual Activity    Alcohol use:  Yes     Alcohol/week: 20.0 standard drinks     Types: 20 Cans of beer per week     Comment: 20/ week, average every other day    Drug use: Never    Sexual activity: Not on file   Other Topics Concern    Not on file   Social History Narrative    Not on file     Social Determinants of Health     Financial Resource Strain:     Difficulty of Paying Living Expenses: Not on file   Food Insecurity:     Worried About Running Out of Genitourinary: Negative. Musculoskeletal: Negative. Skin: Negative. Allergic/Immunologic: Negative. Neurological: Negative. Hematological: Bruises/bleeds easily. Psychiatric/Behavioral: Negative. All other systems reviewed and are negative. PHYSICAL EXAMINATION: Vital signs reviewed per the nursing documentation. /88   Pulse 106   There is no height or weight on file to calculate BMI. Physical Exam    Physical Exam   Constitutional: Patient is oriented to person, place, and time. Patient appears well-developed and well-nourished. HENT:   Head: Normocephalic and atraumatic. Eyes: Pupils are equal, round, and reactive to light. EOM are normal.   Neck: Normal range of motion. Neck supple. No JVD present. No tracheal deviation present. No thyromegaly present. Cardiovascular: Normal rate, regular rhythm, normal heart sounds and intact distal pulses. Pulmonary/Chest: Effort normal and breath sounds normal. No stridor. No respiratory distress. He has no wheezes. He has no rales. He exhibits no tenderness. Abdominal: Soft. Bowel sounds are normal. He exhibits no distension and no mass. There is no tenderness. There is no rebound and no guarding. No hernia. Musculoskeletal: Normal range of motion. Lymphadenopathy:    Patient has no cervical adenopathy. Neurological: Patient is alert and oriented to person, place, and time. Psychiatric: Patient has a normal mood and affect.  Patient behavior is normal.       LABORATORY DATA: Reviewed  Lab Results   Component Value Date    WBC 6.2 09/28/2021    HGB 8.6 (L) 09/28/2021    HCT 27.3 (L) 09/28/2021    MCV 89.8 09/28/2021     09/28/2021     09/28/2021    K 4.1 09/28/2021     09/28/2021    CO2 20 09/28/2021    BUN 7 09/28/2021    CREATININE 0.68 (L) 09/28/2021    LABALBU 4.4 09/15/2021    BILITOT 0.40 09/15/2021    ALKPHOS 92 09/15/2021    AST 19 09/15/2021    ALT 16 09/15/2021    INR 2 10/28/2021         Lab Results   Component Value Date    RBC 3.04 (L) 09/28/2021    HGB 8.6 (L) 09/28/2021    MCV 89.8 09/28/2021    MCH 28.3 09/28/2021    MCHC 31.5 09/28/2021    RDW 13.8 09/28/2021    MPV 9.4 09/28/2021    BASOPCT 1 08/02/2021    LYMPHSABS 1.63 08/02/2021    MONOSABS 0.77 08/02/2021    NEUTROABS 6.11 08/02/2021    EOSABS 0.04 08/02/2021    BASOSABS 0.08 08/02/2021         DIAGNOSTIC TESTING:     No results found. IMPRESSION: Jessica Fowler is a 36 y.o. male with a past history remarkable for slow transit constipation likely related to use of oxycodone, referred for evaluation of constipation symptoms and a remote history of fecal occult testing. 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     2 episode of diverticulitis this past year. Prior history of severe diverticulitis reported by the patient where he eventually underwent segmental resection of the sigmoid colon approximately 9 years ago after 3 severe recurrent episodes of diverticulitis.     No recent endoscopic evaluation performed. Denies any active GI symptoms at this time.     Patient currently on Coumadin for valvular disease and replacement (mechanical)    Assessment  1. Gastrointestinal hemorrhage, unspecified gastrointestinal hemorrhage type        PLAN:    1) prior history of diverticulitis. History of MSSA bacteremia resulting in mechanical mitral valve replacement and anticoagulation long-term. Patient is pending colonoscopy however needs to be bridged with Lovenox therapy and clearance will need to be provided by cardiology. 2) currently patient denies any active GI symptoms. Will send for basic labs which include CBC, Iron, BMP. 3)  RTC in 3 months. Thank you for allowing me to participate in the care of Mr. Brady Quintanilla. For any further questions please do not hesitate to contact me.     I have reviewed and agree with the ROS entered by the MA/LPN from today's encounter documented in a separate note. Robert Conroy MD, MPH   [facility]  Office #: [phone]    this note is created with the assistance of a speech recognition program.  While intending to generate a document that actually reflects the content of the visit, the document can still have some errors including those of syntax and sound a like substitutions which may escape proof reading. It such instances, actual meaning can be extrapolated by contextual diversion.

## 2021-11-08 NOTE — TELEPHONE ENCOUNTER
Patient seeing today at [facility] for f/u. Per Dr Ethel Spencer patient needs EGD/Colon but needs Cardiology Clearance before scheduling procedure. Clearance sent to Dr Montalvo Carry Cardiology. Patient on Coumadin and needs to bridge with lovanox. Will await for clearance them call patient to schedule procedure.

## 2021-11-09 ENCOUNTER — TELEPHONE (OUTPATIENT)
Dept: PHARMACY | Age: 41
End: 2021-11-09

## 2021-11-09 NOTE — PROGRESS NOTES
Proper use weights/TheraBands   [x] S/S to report  [] Low Na Diet    [x] Warm up/ Cool down  [] BP Medication    [x] RPE Scale   [] Understand BP   [x] Ex Safety   [x] Home Exercise         Target Goal:   -Individual Exercise Plan  -BP<140/90 or <130/80 if DM   -Aerobic active 30 + minutes 5-7 days per week    Nutrition    Stages of Change:   [] pre-contemplation  [x] Action   [] Contemplate    [] Maintainence   [] Prep    [] Relapse    Lipids:    [x] Repeated - Date: 10/15/2021   [] Not repeated  Total Cholesterol:209  Triglycerides: 350  HDL: 52  LDL: 87  Lipid Meds: Lipitor    Diabetes:  [] Yes  [x] No  FBS:   HbA1c:  Diabetes Medication:  [] Monitor BS at home   Frequency?:     Weight Management:  Weight:  171.1  Height: 66 inches  BMI: 27.6  Wt Goal: 130-155 pounds  Alcohol:   Social History     Substance and Sexual Activity   Alcohol Use Yes    Alcohol/week: 20.0 standard drinks    Types: 20 Cans of beer per week    Comment: 20/ week, average every other day     Diet Assessment Tool: Food Diary/Rate Your Plate  Special Diet:  2 gram NA+, 30% total fat, <10% saturated fat, 25-35 grams fiber, reduced cholesterol, balanced nutrition    Intervention:   [x] Dietitian Consult - pending      [] Nurse/Patient Discussion     [] Diabetes           [] Referred to Diabetes Education     Education:  [] S&S hypo/hyperglycemia  [] Low fat/low cholesterol diet  [] Weight loss       [] Relate Diabetes/CAD     [] Eating heart healthy handout    Target Goal:  -LDL-C<100 if triglycerides are > 200  -LDL-C < 70 for high risk patients  -HbA1c < 7%  -BMI < 25   -Waist Circumference < 35 inches women/40 inches men  Education    Stages of Change:    [] pre-contemplation  [x] Action   [] Contemplate    [] Maintainence   [] Prep    [] Relapse    Learning Barriers:   [] None   [] Cognitive   [] Literacy   [] Vision   [] Hearing    [] Readiness to Learn    Family support: [x] Yes  [] No    Tobacco use:   Social History     Tobacco Use Smoking Status Former Smoker    Packs/day: 1.00    Types: Cigarettes   Smokeless Tobacco Former User    Types: Chew, Snuff    Quit date: 9/15/2017   Tobacco Comment    smoked 10 yrs. Quit in 2014       Intervention:  [] Referred to physician for smoking cessation    [] Individual education and counseling  [] Tobacco adjunct      Education:   [] Risk Factors   [] Psychological aspects  [x] Angina    [] Medications  [] CHF      [x] Cardiac A&P    Target Goal:  -Complete cessation of tobacco use (if applicable)  -Continued risk factor modifications  -Recognizing signs/symptoms to report  -Proper use of meds    Psychosocial  Stages of Change:    [] pre-contemplation  [x] Action   [] Contemplate    [] Maintainence   [] Prep    [] Relapse    Psychosocial Test:  Tool Used: Azalea Clark Quality of Life/PHQ9    Intervention:   [] Psych Consult/   [] Uses stress management skills    [] Physician Referral     Medications:     Education:    [] Stress Management   [] Depression    Target Goal:  -Assess presence or absence of depression using a valid screening tool. -Maximize coping skills.  -Positive support system. Preventative Medication:   [] Aspirin       [x] Beta Blockade      [x] Statin or other lipid lowering agent     [] Clopidogrel   [] ACE Inhibitor/ARB   [x] Other anticoagulation medications Coumadin  Medication compliance: 100% reported    Fall Risk assess: [x] Yes  [] No    Low Risk  Assistive Device:    [] Cane  [] Druscilla Covert [] Wheel Chair  [] Gait belt    [] NA    Patient Goals: Be back to normal - increase physical activity and ensure heart \"good to [employer]    30-Day Program Goals: Increase stamina, strength, and flexibility by exercising 31-50 total minutes by engaging in aerobic, resistance, and flexibility workout modalities with the goal of progressively achieving at least 0.5 to 1.0 metabolic equivalant improvement in the next 30 days as evidenced by daily session reports.     Achieve and progress prescribed exercise frequency, intensity, time, and type based upon initial evaluation and/or submaximal graded exercise results as evidenced by the attaining and maintaining the prescribed target heart rate range, a Claire rating of perceived exertion between 11 and 16, duration of >30 - 50 minutes using multiple exercise modes for at least 3 - 5 days per week to accumulate a minimum total of 2.5 hours per week of moderate aerobic intensity exercise, as tolerated, evidenced by daily session reports and home workout log. Gradually lose 1 - 4 lb of body weight over the program, through moderating nutrional intake and performing regular aerobic and strength training exercises as prescribed with improvement evidenced by daily session report comparison. Maintain waist circumference by program completion if waist measurement is > or = 40 inches (male)     Introduce and progress 6-10 different bilateral UE and LE progressive resistance exercises focused on major muscle groups using 1-3 sets each per lift, on 2-3 non-consecutive days implementing free and machine weights and/or TheraBands, as appropriate, with a resistance of 40-60% 1-repetition maximum or 10 -15 repetitions to progressive overload and increasing resistance once repetitions have progressed to 15 reps and feel fairly light on 2 prior occasions. Achieve and maintain an optimal average resting blood pressure of <130 / 80 mmHg, or as indicated by this patient's referring provider. Strive for blood lipid optimization with an LDL-C of <100 mg/dL or  LDL 70 mg/dL, an HDL-C of > or = 40 mg/dL for men and > or = 50 mg/dL for women, and a triglyceride level of <150 mg/dL via lifestyle education, behavioral modification, and medication compliance.      Develop regular home aerobic exercise program for 20 - 60 minutes at least 2 non-rehab days per week, excluding  5 - 10 minutes warm-up and cool-down periods, within the next 30 days, being tracked on home workout log. Minimize self-reported psycho-social feelings of stress in the next 30 days as evidenced by pre- and post- surveys and by routine rounding with patient to ascertain subjective improvements. Establish heart healthy diet per dietician's recommendations over the next 30 days, including:      Recommendations from the Dietitian based on your RYP and Food Diary / activity record to improve health and decrease risk factors   1. Boost whole grains in the diet such as whole grain bread, brown rice, etc.  2. Increase whole grains in the daily diet: whole grain toast, brown rice, etc.   3. Maintain vegetable consumption aiming for a minimum of 2 servings of non starchy vegetables such as carrots, green beans, etc. Daily. 4.  Increase water consumption to 8-8 oz glasses or 64 oz water every day. 5. Avoid large portions of meat aiming for a portion ~ palm of a woman's hand. 6. Limit high sodium/high fat foods such as pepperoni, ham, sausage, etc. Choose lower sodium options such as Healthy Choice low sodium turkey. as evidenced by pre- and post-program nutriton survey and routine rounding with patient to ascertain progression toward goal per patient's self report, food diary, and Rate-your-Plate screening survey. Demonstrate knowledge about risk factor reduction, lifestyle modification, and heart health strategies via verbal feedback and/or with an increase of >=5% accuracy via pre- and post-program education assessment screening tool. Evaluation:   Shruthi Douglass has increased the intensity and duration of exercise over the past month, and is currently working at an average MET level of ~ 5.  Strength training will be introduced over the next 30 days. His lipids showed a significantly high triglyceride level and an elevated total cholesterol level and he has agreed to an 1:1 consult with the dietician to address.   He has demonstrated no psychosocial issues that warrant further intervention. He has participated in education on home exercise, diet, fats and cholesterol. He is progressing very well.     Electronically signed by [place] RN on 11/9/21 at 11:35 AM EST    Physician Changes/Comments:

## 2021-11-09 NOTE — TELEPHONE ENCOUNTER
COVID-19 phone screening     Call placed to screen patient prior to upcoming Medication Management visit for Anticoagulation on 11/10/21. Does patient have any of the following symptoms? [] Fever    [] Lower respiratory symptoms (SOB, difficulty breathing, cough)  [x] None    Travel Screening completed.

## 2021-11-10 ENCOUNTER — HOSPITAL ENCOUNTER (OUTPATIENT)
Dept: PHARMACY | Age: 41
Setting detail: THERAPIES SERIES
Discharge: HOME | End: 2021-11-10
Payer: COMMERCIAL

## 2021-11-10 ENCOUNTER — HOSPITAL ENCOUNTER (OUTPATIENT)
Dept: CARDIAC REHAB | Age: 41
Setting detail: THERAPIES SERIES
Discharge: HOME | End: 2021-11-10
Payer: COMMERCIAL

## 2021-11-10 ENCOUNTER — HOSPITAL ENCOUNTER (OUTPATIENT)
Age: 41
Discharge: HOME | End: 2021-11-10
Payer: COMMERCIAL

## 2021-11-10 VITALS
HEART RATE: 99 BPM | SYSTOLIC BLOOD PRESSURE: 137 MMHG | DIASTOLIC BLOOD PRESSURE: 86 MMHG | WEIGHT: 174 LBS | BODY MASS INDEX: 28.08 KG/M2

## 2021-11-10 DIAGNOSIS — K92.2 GASTROINTESTINAL HEMORRHAGE, UNSPECIFIED GASTROINTESTINAL HEMORRHAGE TYPE: ICD-10-CM

## 2021-11-10 DIAGNOSIS — Z95.2 MITRAL VALVE REPLACED: Primary | ICD-10-CM

## 2021-11-10 LAB
ABSOLUTE EOS #: 0.08 K/UL (ref 0–0.44)
ABSOLUTE IMMATURE GRANULOCYTE: <0.03 K/UL (ref 0–0.3)
ABSOLUTE LYMPH #: 1.56 K/UL (ref 1.1–3.7)
ABSOLUTE MONO #: 0.74 K/UL (ref 0.1–1.2)
ANION GAP SERPL CALCULATED.3IONS-SCNC: 14 MMOL/L (ref 9–17)
BASOPHILS # BLD: 1 % (ref 0–2)
BASOPHILS ABSOLUTE: 0.04 K/UL (ref 0–0.2)
BUN BLDV-MCNC: 16 MG/DL (ref 6–20)
BUN/CREAT BLD: 19 (ref 9–20)
CALCIUM SERPL-MCNC: 9.8 MG/DL (ref 8.6–10.4)
CHLORIDE BLD-SCNC: 101 MMOL/L (ref 98–107)
CO2: 22 MMOL/L (ref 20–31)
CREAT SERPL-MCNC: 0.83 MG/DL (ref 0.7–1.2)
DIFFERENTIAL TYPE: ABNORMAL
EOSINOPHILS RELATIVE PERCENT: 2 % (ref 1–4)
GFR AFRICAN AMERICAN: >60 ML/MIN
GFR NON-AFRICAN AMERICAN: >60 ML/MIN
GFR SERPL CREATININE-BSD FRML MDRD: NORMAL ML/MIN/{1.73_M2}
GFR SERPL CREATININE-BSD FRML MDRD: NORMAL ML/MIN/{1.73_M2}
GLUCOSE BLD-MCNC: 94 MG/DL (ref 70–99)
HCT VFR BLD CALC: 40.6 % (ref 40.7–50.3)
HEMOGLOBIN: 13.3 G/DL (ref 13–17)
IMMATURE GRANULOCYTES: 0 %
INR BLD: 3.3
IRON SATURATION: 30 % (ref 20–55)
IRON: 104 UG/DL (ref 59–158)
LYMPHOCYTES # BLD: 30 % (ref 24–43)
MCH RBC QN AUTO: 26.7 PG (ref 25.2–33.5)
MCHC RBC AUTO-ENTMCNC: 32.8 G/DL (ref 28.4–34.8)
MCV RBC AUTO: 81.5 FL (ref 82.6–102.9)
MONOCYTES # BLD: 14 % (ref 3–12)
NRBC AUTOMATED: 0 PER 100 WBC
PDW BLD-RTO: 13.6 % (ref 11.8–14.4)
PLATELET # BLD: 201 K/UL (ref 138–453)
PLATELET ESTIMATE: ABNORMAL
PMV BLD AUTO: 8.8 FL (ref 8.1–13.5)
POTASSIUM SERPL-SCNC: 4.5 MMOL/L (ref 3.7–5.3)
RBC # BLD: 4.98 M/UL (ref 4.21–5.77)
RBC # BLD: ABNORMAL 10*6/UL
SEG NEUTROPHILS: 53 % (ref 36–65)
SEGMENTED NEUTROPHILS ABSOLUTE COUNT: 2.78 K/UL (ref 1.5–8.1)
SODIUM BLD-SCNC: 137 MMOL/L (ref 135–144)
TOTAL IRON BINDING CAPACITY: 352 UG/DL (ref 250–450)
UNSATURATED IRON BINDING CAPACITY: 248 UG/DL (ref 112–347)
WBC # BLD: 5.2 K/UL (ref 3.5–11.3)
WBC # BLD: ABNORMAL 10*3/UL

## 2021-11-10 PROCEDURE — 36415 COLL VENOUS BLD VENIPUNCTURE: CPT

## 2021-11-10 PROCEDURE — 80048 BASIC METABOLIC PNL TOTAL CA: CPT

## 2021-11-10 PROCEDURE — 83540 ASSAY OF IRON: CPT

## 2021-11-10 PROCEDURE — 85610 PROTHROMBIN TIME: CPT

## 2021-11-10 PROCEDURE — 99212 OFFICE O/P EST SF 10 MIN: CPT

## 2021-11-10 PROCEDURE — 83550 IRON BINDING TEST: CPT

## 2021-11-10 PROCEDURE — 93798 PHYS/QHP OP CAR RHAB W/ECG: CPT

## 2021-11-10 PROCEDURE — 85025 COMPLETE CBC W/AUTO DIFF WBC: CPT

## 2021-11-10 NOTE — PATIENT INSTRUCTIONS
Continue to monitor urine and stool. Continue to monitor for signs of bleeding. Return to clinic in 2 weeks. Hold warfarin today then continue warfarin whole 5 mg tablet daily.

## 2021-11-10 NOTE — PROGRESS NOTES
[facility] [address] [place] [address]/[place]  Medication Management  ANTICOAGULATION    Referring Provider: Stephanie Roque CNP     GOAL INR: 2-3     TODAY'S INR: 3.3     WARFARIN Dosage: Hold warfarin today then continue warfarin whole 5 mg tablet daily. INR (no units)   Date Value   10/28/2021 2   10/20/2021 1.7   10/15/2021 2   10/11/2021 1.4   10/07/2021 1.5   10/04/2021 1.3   2021 1.4     Medication changes:  None    Notes:    Fingerstick INR drawn per clinic protocol. Patient states no visible blood in urine and no black tarry stool. Denies any missed doses of warfarin. No change in other maintenance medications or in diet. Will recheck INR in 2 weeks. Patient acknowledges working in consult agreement with pharmacist as referred by his/her physician. Had a couple beers this weekend during football game which could increase INR.               For Pharmacy Admin Tracking Only     Intervention Detail: Adherence Monitorin and Dose Adjustment: 1, reason: Therapy Optimization   Total # of Interventions Recommended: 1   Total # of Interventions Accepted: 1   Time Spent (min):  Jazz Watson [facility], PharmD

## 2021-11-11 ENCOUNTER — HOSPITAL ENCOUNTER (OUTPATIENT)
Dept: CARDIAC REHAB | Age: 41
Setting detail: THERAPIES SERIES
Discharge: HOME | End: 2021-11-11
Payer: COMMERCIAL

## 2021-11-11 ENCOUNTER — OFFICE VISIT (OUTPATIENT)
Dept: INFECTIOUS DISEASES | Age: 41
End: 2021-11-11
Payer: COMMERCIAL

## 2021-11-11 VITALS
SYSTOLIC BLOOD PRESSURE: 145 MMHG | HEART RATE: 106 BPM | HEIGHT: 66 IN | BODY MASS INDEX: 27.83 KG/M2 | WEIGHT: 173.2 LBS | TEMPERATURE: 97.7 F | DIASTOLIC BLOOD PRESSURE: 94 MMHG

## 2021-11-11 DIAGNOSIS — B95.61 MSSA BACTEREMIA: Primary | ICD-10-CM

## 2021-11-11 DIAGNOSIS — I33.0 ENDOCARDITIS DUE TO METHICILLIN SUSCEPTIBLE STAPHYLOCOCCUS AUREUS (MSSA): ICD-10-CM

## 2021-11-11 DIAGNOSIS — B95.61 ENDOCARDITIS DUE TO METHICILLIN SUSCEPTIBLE STAPHYLOCOCCUS AUREUS (MSSA): ICD-10-CM

## 2021-11-11 DIAGNOSIS — E78.5 HYPERLIPIDEMIA, UNSPECIFIED HYPERLIPIDEMIA TYPE: ICD-10-CM

## 2021-11-11 DIAGNOSIS — I10 ESSENTIAL HYPERTENSION: ICD-10-CM

## 2021-11-11 DIAGNOSIS — I76 CEREBRAL SEPTIC EMBOLI (HCC): ICD-10-CM

## 2021-11-11 DIAGNOSIS — R78.81 MSSA BACTEREMIA: Primary | ICD-10-CM

## 2021-11-11 DIAGNOSIS — I66.9 CEREBRAL SEPTIC EMBOLI (HCC): ICD-10-CM

## 2021-11-11 PROCEDURE — 99212 OFFICE O/P EST SF 10 MIN: CPT | Performed by: INTERNAL MEDICINE

## 2021-11-11 PROCEDURE — 93798 PHYS/QHP OP CAR RHAB W/ECG: CPT

## 2021-11-11 NOTE — PROGRESS NOTES
[facility] - Office Note  Today's Date and Time: 11/11/2021, 1:31 PM    Diagnostic Impression :     1. MSSA bacteremia    2. Endocarditis due to methicillin susceptible Staphylococcus aureus (MSSA)    3. Cerebral septic emboli ([address]    4. Essential hypertension    5. Hyperlipidemia, unspecified hyperlipidemia type        Recommendations   · S/P mitral cardiac valve replacement on 9-23-21 because of prior MSSA endocarditis. Patient has done well and has no signs of any residual infection. · No need for any antimicrobial treatment  · Patient to continue his cardiac rehabilitation program.   · Patient to contact the office in the future should any problems arise. Chief complaint/reason for consultation:     Chief Complaint   Patient presents with    Frequent Infections     Post [place] Bacteremia       History of Present Illness:   Marcella Parmar is a 36y.o.-year-old  male who was initially evaluated on 11/11/2021. Patient seen at the request of Dr. Ariana Fowler. INITIAL HISTORY:    Patient known to us from previous admissions to [place].    He has experienced the following problems:  · Pericardial effusion  · Prior MSSA septicemia 7-17-21  ? Persistent growth of bacteria in blood 7-17-21 through 7-27-21 despite treatment  · Prior Sepsis with acute organ dysfunction and septic shock  · Mitral Valve endocarditis. Mitral valve vegetation 2.5 X 2.4 cm 7/20/2021  · Acute septic embolic infarcts to the brain 7/21/21    Patient also had prior medical diagnoses:  · History of diverticulitis resulting in partial hemicolectomy with reanastomosis  · History of appendectomy  · History of right ACL repair with screw in place  · Hx systolic cardiac murmur  · Hyperlipidemia  · Elevated inflammatory markers    The patient received a period of antibiotic treatment at home.   · Nafcillin 2000 mg IV every 4 hours until 9/7/2021 for MSSA septicemia and to promote antibiotic penetration into brain. He completed 6 weeks of treatment from first negative blood cultures. · Rifampin 600 mg daily added 7/22/21 until 9/7/21 for synergy    CURRENT EVALUATION : 11/11/2021    Patient was seen in the office in the company of his wife. on 11-11-21    He underwent successful mitral valve replacement on 9-23-21 and was discharged home on 9-28-21. Cultures of the heart tissue yielded no growth on 9-23-21  Blood cultures showed no growth on 9-28-21. He was seen by Dr. Jeff Hines on a follow-up visit. He was released from his care after determining everything had gone well. Patient was referred to Dr. Gallo Porter,  Cardiology in [facility] with whom he is continuing a cardiac rehabilitation program.  Today he was able to run a mile. Patient currently only taking metoprolol, Lipitor and Coumadin. ID wise no additional problems are present. Patient to call the office in the future should additional problems develop. I have personally reviewed the past medical history, past surgical history, medications, social history, and family history, and I have updated the database accordingly. Past Medical History:     Past Medical History:   Diagnosis Date    Bacteremia     MSSA bacteremia   Dr. Natasha Shepherd  9/7/21 last visit    Cardioembolic stroke [facility])     Cerebral artery occlusion with cerebral infarction [facility])     cerebral septic emboli 8/2021    Cerebral septic emboli [facility])     Dr. Aquiles Ramos neuro last seen 9/13/21    Chronic right shoulder pain     since 2020, fell.  Diverticulosis     Endocarditis     mitral valve endocarditis w/ pericardial effusion   8/2021 [facility]    History of COVID-19 12/2020    Hyperlipidemia     hx of.  No longer on rx    Hypertension     Nov 2020        MSSA bacteremia     Dr. Odilon Garcia in epic from 9/7/21    MESSI (obstructive sleep apnea)     Has PSG study done Pos for Mod MESSI, never had cpap tritration done for machine    Platelets decreased [facility])     July 2021  had infusions at [facility]. V[facility] no reactions    Research subject 07/24/2021    EIND [id number] Exebacase for persistent bacteremia expected date of completion 8/25/2021    Under care of team 09/15/2021    PCP: Roscoe Martinez, last visit 9/15/2021    Under care of team     Dr. Princess Ortiz   9/8/2021    Under care of team     gastroenterology   8/27/21  Dr. Juliane Aschoff. V's    Under care of team     Dr. Garima Levin   neuro  last seen 9/13/21    Under care of team     Has seen Dr. Cuco Licea 8/2021   Nakita Stein Wears contact lenses        Past Surgical  History:     Past Surgical History:   Procedure Laterality Date    ABDOMEN SURGERY      large bowel resection    ANTERIOR CRUCIATE LIGAMENT REPAIR Right     APPENDECTOMY  07/09/1997    COLECTOMY      2012 - D/T Dverticulitis    DILATATION, ESOPHAGUS      HC  PICC [address] DOUBLE  08/02/2021    removed   8/2021    MITRAL VALVE REPAIR N/A 9/23/2021    MITRAL VALVE REPLACEMENT - ON-X SIZE 31/33 performed by Princess Ortiz MD at [facility]       Medications:     Current Outpatient Medications:     metoprolol succinate (TOPROL XL) 50 MG extended release tablet, Take 1 tablet by mouth daily, Disp: 90 tablet, Rfl: 3    atorvastatin (LIPITOR) 20 MG tablet, Take 1 tablet by mouth nightly, Disp: 90 tablet, Rfl: 3    acetaminophen (TYLENOL) 500 MG tablet, Take 1,000 mg by mouth every 6 hours as needed for Pain, Disp: , Rfl:     diphenhydrAMINE (BENADRYL) 25 MG tablet, Take 50 mg by mouth nightly as needed for Sleep 1-2 tablets as needed for sleep, Disp: , Rfl:     warfarin (COUMADIN) 1 MG tablet, Target INR 2.0 to 3.0 (Patient taking differently: Take 3 mg by mouth See Admin Instructions Coumadin Clinic  4 mg po every Monday; 5 mg po all other days Target INR 2.0 to 3.0), Disp: 60 tablet, Rfl: 0    Ascorbic Acid (VITAMIN C) 250 MG tablet, Take 750 mg by mouth daily Emergen C 750 mg QD, Disp: , Rfl:     Lactobacillus Rhamnosus, GG, (CULTURELLE PO), Take 1 tablet by mouth daily , Disp: , Rfl:      Social History:     Social History     Socioeconomic History    Marital status:      Spouse name: Not on file    Number of children: Not on file    Years of education: Not on file    Highest education level: Not on file   Occupational History    Not on file   Tobacco Use    Smoking status: Former Smoker     Packs/day: 1.00     Types: Cigarettes    Smokeless tobacco: Former User     Types: Chew, Snuff     Quit date: 9/15/2017    Tobacco comment: smoked 10 yrs. Quit in 2014   Vaping Use    Vaping Use: Never used   Substance and Sexual Activity    Alcohol use: Yes     Alcohol/week: 20.0 standard drinks     Types: 20 Cans of beer per week     Comment: 20/ week, average every other day    Drug use: Never    Sexual activity: Not on file   Other Topics Concern    Not on file   Social History Narrative    Not on file     Social Determinants of Health     Financial Resource Strain:     Difficulty of Paying Living Expenses: Not on file   Food Insecurity:     Worried About Running Out of Food in the Last Year: Not on file    Kevin of Food in the Last Year: Not on file   Transportation Needs:     Lack of Transportation (Medical): Not on file    Lack of Transportation (Non-Medical):  Not on file   Physical Activity:     Days of Exercise per Week: Not on file    Minutes of Exercise per Session: Not on file   Stress:     Feeling of Stress : Not on file   Social Connections:     Frequency of Communication with Friends and Family: Not on file    Frequency of Social Gatherings with Friends and Family: Not on file    Attends [employer] Services: Not on file    Active Member of Clubs or Organizations: Not on file    Attends Club or Organization Meetings: Not on file    Marital Status: Not on file   Intimate Partner Violence:     Fear of Current or Ex-Partner: Not on file    Emotionally Abused: Not on file    Physically Abused: Not on file    Sexually Abused: Not on file   Housing Stability:     Unable to Pay for Housing in the Last Year: Not on file    Number of Places Lived in the Last Year: Not on file    Unstable Housing in the Last Year: Not on file       Family History:     Family History   Problem Relation Age of Onset    No Known Problems Mother     Other Father         diverticulitis        Allergies:   Morphine     Review of Systems:   Constitutional: No fevers or chills. No systemic complaints  Head: No headaches  Eyes: No double vision or blurry vision. ENT: No sore throat or runny nose. . No hearing loss, tinnitus or vertigo. Cardiovascular: No chest pain or palpitations. No shortness of breath. No CASTRO  Lung: No shortness of breath or cough. No sputum production  Abdomen: No nausea, vomiting, diarrhea, or abdominal pain. .  Genitourinary: No increased urinary frequency, or dysuria. No hematuria. No suprapubic or CVA pain  Musculoskeletal: No muscle aches or pains. No joint effusions, swelling or deformities  Hematologic: No bleeding or bruising. Neurologic: No headache, weakness, numbness, or tingling. Physical Examination :   BP (!) 145/94   Pulse 106   Temp 97.7 °F (36.5 °C)   Ht 5' 6\" (1.676 m)   Wt 173 lb 3.2 oz (78.6 kg)   BMI 27.96 kg/m²    General Appearance: Awake, alert, and in no apparent distress  Head:  Normocephalic, no trauma  Eyes: Pupils equal, round, reactive, to light and accommodation; extraocular movements intact; sclera anicteric; conjunctivae pink. No embolic phenomena. ENT: Oropharynx clear, without erythema, exudate, or thrush. No tenderness of sinuses. Mouth/throat: mucosa pink and moist. No lesions. Dentition in good repair. Neck:Supple, without lymphadenopathy. Thyroid normal, No bruits. Pulmonary/Chest: Clear to auscultation, without wheezes, rales, or rhonchi. No dullness to percussion. Chest incision looks fine.   Has healed well  Cardiovascular: Regular rate and rhythm without murmurs, rubs, or gallops. He has a metallic click from the artificial valve. Abdomen: Soft, non tender. Bowel sounds normal. No organomegaly  All four Extremities: No cyanosis, clubbing, edema, or effusions. Neurologic: No gross sensory or motor deficits. Skin: Warm and dry with good turgor. No signs of peripheral arterial or venous insufficiency.     Medical Decision Making:   I have independently reviewed/ordered the following labs:    CBC with Differential:  Lab Results   Component Value Date    WBC 5.2 11/10/2021    WBC 6.2 09/28/2021    HGB 13.3 11/10/2021    HGB 8.6 09/28/2021    HCT 40.6 11/10/2021    HCT 27.3 09/28/2021     11/10/2021     09/28/2021    LYMPHOPCT 30 11/10/2021    LYMPHOPCT 19 08/02/2021    MONOPCT 14 11/10/2021    MONOPCT 9 08/02/2021     BMP:   Lab Results   Component Value Date     11/10/2021     09/28/2021    K 4.5 11/10/2021    K 4.1 09/28/2021     11/10/2021     09/28/2021    CO2 22 11/10/2021    CO2 20 09/28/2021    BUN 16 11/10/2021    BUN 7 09/28/2021    CREATININE 0.83 11/10/2021    CREATININE 0.68 09/28/2021    MG 2.2 09/28/2021    MG 1.9 09/27/2021     Hepatic Function Panel:  Lab Results   Component Value Date    PROT 6.9 09/15/2021    PROT 5.8 08/23/2021    LABALBU 4.4 09/15/2021    LABALBU 3.2 08/23/2021    BILIDIR 0.11 08/01/2021    BILIDIR 0.13 07/30/2021    IBILI 0.14 08/01/2021    IBILI 0.24 07/30/2021    BILITOT 0.40 09/15/2021    BILITOT 0.21 08/23/2021    ALKPHOS 92 09/15/2021    ALKPHOS 60 08/23/2021    ALT 16 09/15/2021    ALT 8 08/23/2021    AST 19 09/15/2021    AST 10 08/23/2021     No results found for: RPR  No results found for: HIV  No results found for: [facility]  Lab Results   Component Value Date    MUCUS NOT REPORTED 07/17/2021    RBC 4.98 11/10/2021    TRICHOMONAS NOT REPORTED 07/17/2021    WBC 5.2 11/10/2021    YEAST NOT REPORTED 07/17/2021    TURBIDITY CLEAR 09/15/2021     Lab Results   Component Value Date    CREATININE 0.83 11/10/2021    GLUCOSE 94 11/10/2021       Thank you for allowing us to participate in the care of this patient. Please call with questions.     Dominick Kumari MD  Pager: [phone] - Office: [phone]

## 2021-11-15 ENCOUNTER — HOSPITAL ENCOUNTER (OUTPATIENT)
Dept: CARDIAC REHAB | Age: 41
Setting detail: THERAPIES SERIES
End: 2021-11-15
Payer: COMMERCIAL

## 2021-11-15 ENCOUNTER — OFFICE VISIT (OUTPATIENT)
Dept: CARDIOLOGY | Age: 41
End: 2021-11-15
Payer: COMMERCIAL

## 2021-11-15 VITALS
HEART RATE: 106 BPM | DIASTOLIC BLOOD PRESSURE: 85 MMHG | BODY MASS INDEX: 28.93 KG/M2 | HEIGHT: 66 IN | SYSTOLIC BLOOD PRESSURE: 152 MMHG | RESPIRATION RATE: 18 BRPM | OXYGEN SATURATION: 99 % | WEIGHT: 180 LBS

## 2021-11-15 DIAGNOSIS — E78.2 MIXED HYPERLIPIDEMIA: ICD-10-CM

## 2021-11-15 DIAGNOSIS — I05.9 ENDOCARDITIS OF MITRAL VALVE: Primary | ICD-10-CM

## 2021-11-15 DIAGNOSIS — Z01.818 PRE-OPERATIVE CLEARANCE: ICD-10-CM

## 2021-11-15 DIAGNOSIS — I10 ESSENTIAL HYPERTENSION: ICD-10-CM

## 2021-11-15 DIAGNOSIS — D50.0 NORMOCYTIC ANEMIA DUE TO BLOOD LOSS: ICD-10-CM

## 2021-11-15 DIAGNOSIS — R00.0 SINUS TACHYCARDIA: ICD-10-CM

## 2021-11-15 DIAGNOSIS — Z95.2 H/O MITRAL VALVE REPLACEMENT WITH MECHANICAL VALVE: ICD-10-CM

## 2021-11-15 PROCEDURE — 93000 ELECTROCARDIOGRAM COMPLETE: CPT | Performed by: FAMILY MEDICINE

## 2021-11-15 PROCEDURE — 99214 OFFICE O/P EST MOD 30 MIN: CPT | Performed by: FAMILY MEDICINE

## 2021-11-15 RX ORDER — METOPROLOL SUCCINATE 100 MG/1
100 TABLET, EXTENDED RELEASE ORAL DAILY
Qty: 90 TABLET | Refills: 3 | Status: CANCELLED | OUTPATIENT
Start: 2021-11-15

## 2021-11-15 RX ORDER — METOPROLOL SUCCINATE 100 MG/1
100 TABLET, EXTENDED RELEASE ORAL DAILY
Qty: 90 TABLET | Refills: 3 | Status: SHIPPED | OUTPATIENT
Start: 2021-11-15 | End: 2021-12-29 | Stop reason: SDUPTHER

## 2021-11-15 NOTE — PROGRESS NOTES
Leeanna Glover am scribing for and in the presence of Adan Taylor MD, MS, F.A.C.C. Patient: Angle Ruano  : 1980  Date of Visit: November 15, 2021    REASON FOR VISIT / CONSULTATION: Follow-up (HX: Severe Mitral Reg, S/p VR, HTn, HLD. Pre Op for colonscopy. C/o; Palpitaitons. Lighteaded and dizziness no falls or passing out. Denies: Cp, SOB)      History of Present Illness:        Dear Bula Phalen, MD    I had the pleasure of seeing your patient Angle Ruano in consultation today. As you know, Mr. Kristen Batista is a 36 y.o. male who presents with a history of severe mitral vegetation associated with severe mitral regurgitation on a MONROE as well. 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 His MSSA bactremia (endocarditis) led to his severe mitral vegetation. Which led to a mechanical mitral valve replacement (kwame) on 2021 and is on Warfarin at this time with Lovenox injections to get get him to his goal of INR. Mr. Kristen Batista reports doing fairly well since his last visit. He is here today for pre op clearance for having a possible colonoscopy done. He has had some hemoglobin issues and also he has a history of diverticulitis. He denied any chest pain, pressure or tightness. He also denied any lightheaded or dizziness or heart palpitations. He also denied any shortness of breath. He denied any current or recent abdominal pain, bleeding problems, problems with his medications or any other concerns at this time. Exercise Tolerance: Mr. Kristen Batista reports that he has an excellent exercise tolerance. His says that he could run a mile without developing chest discomfort or significant shortness of breath.     PAST MEDICAL HISTORY:        Past Medical History:   Diagnosis Date    Bacteremia     MSSA bacteremia   Dr. Julián Gordillo  21 last visit    Cardioembolic stroke [facility])     Cerebral artery occlusion with cerebral infarction [facility])     cerebral septic emboli 8/2021    Cerebral septic emboli [facility])     Dr. Gisela Patterson neuro last seen 9/13/21    Chronic right shoulder pain     since 2020, fell.  Diverticulosis     Endocarditis     mitral valve endocarditis w/ pericardial effusion   8/2021 [facility]    History of COVID-19 12/2020    Hyperlipidemia     hx of. No longer on rx    Hypertension     Nov 2020        MSSA bacteremia     Dr. Tahira Farris in epic from 9/7/21    MESSI (obstructive sleep apnea)     Has PSG study done Pos for Mod MESSI, never had cpap tritration done for machine    Platelets decreased [facility])     July 2021  had infusions at [facility]. V's no reactions    Research subject 07/24/2021    EIND [id number] [employer] for persistent bacteremia expected date of completion 8/25/2021    Under care of team 09/15/2021    PCP: Molly Schmidt, last visit 9/15/2021    Under care of team     Dr. Talya Dave   9/8/2021    Under care of team     gastroenterology   8/27/21  Dr. Javy Mack. V's    Under care of team     Dr. Gisela amin  last seen 9/13/21    Under care of team     Has seen Dr. Baron Muse 8/2021   [facility] Daily Wears contact lenses        CURRENT ALLERGIES: Morphine REVIEW OF SYSTEMS: 14 systems were reviewed. Pertinent positives and negatives as above, all else negative.      Past Surgical History:   Procedure Laterality Date    ABDOMEN SURGERY      large bowel resection    ANTERIOR CRUCIATE LIGAMENT REPAIR Right     APPENDECTOMY  07/09/1997    COLECTOMY      2012 - D/T Dverticulitis    DILATATION, ESOPHAGUS      HC  PICC [address] DOUBLE  08/02/2021    removed   8/2021    MITRAL VALVE REPAIR N/A 9/23/2021    MITRAL VALVE REPLACEMENT - ON-X SIZE 31/33 performed by Talya Dave MD at [facility] History:  Social History     Tobacco Use    Smoking status: Former Smoker     Packs/day: 1.00     Types: Cigarettes    Smokeless tobacco: Former User     Types: Chew, Snuff     Quit date: 9/15/2017    Tobacco comment: smoked 10 yrs. Quit in 2014   Vaping Use    Vaping Use: Never used   Substance Use Topics    Alcohol use: Yes     Alcohol/week: 20.0 standard drinks     Types: 20 Cans of beer per week     Comment: 20/ week, average every other day    Drug use: Never        CURRENT MEDICATIONS:        Outpatient Medications Marked as Taking for the 11/15/21 encounter (Office Visit) with Naima Canales MD   Medication Sig Dispense Refill    metoprolol succinate (TOPROL XL) 50 MG extended release tablet Take 1 tablet by mouth daily 90 tablet 3    atorvastatin (LIPITOR) 20 MG tablet Take 1 tablet by mouth nightly 90 tablet 3    acetaminophen (TYLENOL) 500 MG tablet Take 1,000 mg by mouth every 6 hours as needed for Pain      diphenhydrAMINE (BENADRYL) 25 MG tablet Take 50 mg by mouth nightly as needed for Sleep 1-2 tablets as needed for sleep      warfarin (COUMADIN) 1 MG tablet Target INR 2.0 to 3.0 (Patient taking differently: Take 3 mg by mouth See Admin Instructions [facility]  4 mg po every Monday; 5 mg po all other days  Target INR 2.0 to 3.0) 60 tablet 0    Ascorbic Acid (VITAMIN C) 250 MG tablet Take 750 mg by mouth daily Emergen C 750 mg QD      Lactobacillus Rhamnosus, GG, (CULTURELLE PO) Take 1 tablet by mouth daily          FAMILY HISTORY: family history includes No Known Problems in his mother; Other in his father. Physical Examination:      BP (!) 152/85 (Site: Right Upper Arm, Position: Sitting, Cuff Size: Medium Adult)   Pulse 106   Resp 18   Ht 5' 6\" (1.676 m)   Wt 180 lb (81.6 kg)   SpO2 99%   BMI 29.05 kg/m²  Body mass index is 29.05 kg/m². Constitutional: He is oriented to person, place, and time. He appears well-developed and well-nourished. In no acute distress. HEENT: Normocephalic and atraumatic. No JVD present. Carotid bruit is not present.  No mass and no thyromegaly present. No lymphadenopathy present. Cardiovascular: Normal rate, regular rhythm, normal heart sounds. Exam reveals no gallop and no friction rubs. 1/6 systolic murmur, 4th intercostal space on the LEFT must lateral to the sternal border Crisp S2 closing click noted maximal at the apex. Pulmonary/Chest: Effort normal and breath sounds normal. No respiratory distress. He has no wheezes, rhonchi or rales. Abdominal: Soft, non-tender. Bowel sounds and aorta are normal. He exhibits no organomegaly, mass or bruit. Extremities: None. No cyanosis or clubbing. 2+ radial and carotid pulses. Distal extremity pulses: 2+ bilaterally. .  Neurological: He is alert and oriented to person, place, and time. No evidence of gross cranial nerve deficit. Coordination appeared normal.   Skin: Skin is warm and dry. There is no rash or diaphoresis. Psychiatric: He has a normal mood and affect. His speech is normal and behavior is normal.      MOST RECENT LABS ON RECORD:   Lab Results   Component Value Date    WBC 5.2 11/10/2021    HGB 13.3 11/10/2021    HCT 40.6 (L) 11/10/2021     11/10/2021    CHOL 209 (H) 10/15/2021    TRIG 350 (H) 10/15/2021    HDL 52 10/15/2021    ALT 16 09/15/2021    AST 19 09/15/2021     11/10/2021    K 4.5 11/10/2021     11/10/2021    CREATININE 0.83 11/10/2021    BUN 16 11/10/2021    CO2 22 11/10/2021    TSH 1.86 07/20/2021    INR 3.3 11/10/2021    LABA1C 5.5 07/17/2021         ASSESSMENT:     1. Endocarditis of mitral valve    2. H/O mitral valve replacement with mechanical valve    3. Pre-operative clearance    4. Sinus tachycardia    5. Essential hypertension    6. Mixed hyperlipidemia    7. Normocytic anemia due to blood loss       PLAN:      · Pre-Op Clearance:  For a possible colonoscopy   · Pre-Operative Risk assessment using 2014 ACC/AHA guidelines   · Emergent procedure No  · Active Cardiac Condition No (decompensated HF, Arrhythmia, MI <3 weeks, severe valve disease)  · Risk Level of Procedure Low Risk (endoscopy, superficial skin, breast, ambulatory, or cataract, etc.)  · Revised Cardiac Risk Index Risk factors: None  · Measurement of Exercise Tolerance before Surgery >4 Yes  · According to the 2014 ACC/AHA pre-operative risk assessment guidelines Jameson Juan is at low risk for major cardiac complications during a low risk procedure and may continue as planned. Specific medication recommendations are listed below. Medications recommended to continue should be taken with a sip of water even when NPO.  Medical management to reduce perioperative risk:   Anticoagulant Agent: I would suggest holding his Warfarin for 5 days prior to the procedure with Lovenox bridging before and after when INR is below 1.5 and off Lovenox ideally less than 12 hours prior to the planned colonoscopy.  Additional Recommendations: I would also suggest that he continue his beta blocker and statin throughout the perioperative period. · History of Severe Mitral Vegetation Secondary to MSSA bactremia (endocarditis) of unknown Etiology: S/P: Mitral Mechanical Valve Replacement (SAMEER)  asymptomatic  · Beta Blocker: INCREASE to Metoprolol succinate (Toprol XL) 100 mg daily. I also discussed the potential side effects of this medication including lightheadedness and dizziness and instructed them to stop the medication of this occurs and call our office if this occurs. · Anticoagulation: Continue warfarin (Coumadin) take as directed. · Sinus Tachycardia:   · Beta Blocker: INCREASE to Metoprolol succinate (Toprol XL) 100 mg daily. · Essential Hypertension: Controlled   Beta Blocker: INCREASE to Metoprolol succinate (Toprol XL) 100 mg daily. · Hyperlipidemia: Mixed, LDL done on 10/15/2021 was 87 mg/dL   · Cholesterol Reduction Therapy: Continue Atorvastatin (Lipitor) 20 mg nightly.     In the meantime I asked him to continue taking his other medications and follow up with you as previously scheduled. FOLLOW UP:   I told Mr. Deronda Schwab to call my office if he had any problems, but otherwise told him to Return in about 1 year (around 11/15/2022). However, I would be happy to see him sooner should the need arise. Once again, thank you for allowing me to participate in this patients care. Please do not hesitate to contact me could I be of further assistance. Sincerely,  Osmin Taylor MD, MS, F.A.C.C. [facility] Cardiology Specialist    [address] [place] [address]  Phone: [phone], Fax: [phone]     I believe that the risk of significant morbidity and mortality related to the patient's current medical conditions are: Intermediate. The documentation recorded by the scribe, accurately and completely reflects the services I personally performed and the decisions made by me. Ceferino Driver MD, MS, F.A.C.C.  November 15, 2021

## 2021-11-15 NOTE — TELEPHONE ENCOUNTER
Writer spoke to Marika Polanco from Dr Joo haskins in regards to status of clearance for procedure. She states pt has an appt today, 11/15/21, for clearance. She will fax clearance after visit after clearance to filled out.

## 2021-11-15 NOTE — PATIENT INSTRUCTIONS
SURVEY:    You may be receiving a survey from [facility] regarding your visit today. Please complete the survey to enable us to provide the highest quality of care to you and your family. If you cannot score us a very good on any question, please call the office to discuss how we could have made your experience a very good one. Thank you.

## 2021-11-16 ENCOUNTER — TELEPHONE (OUTPATIENT)
Dept: GASTROENTEROLOGY | Age: 41
End: 2021-11-16

## 2021-11-16 NOTE — TELEPHONE ENCOUNTER
Rec'd clearance Brandon, pt is at low risk for major cardiac complicatoin during a low risk proc and may continue as planned. Per Dr Seda Lim directive, I would suggest holding his Warfarin for 5 days prior to the proc w/ Lovenox bridging before and after when INR is below 1.5 and off Lovenox ideally less than 12 hours prior to the planned colonoscopy. Pt needs to be sched in December per Patria's note on clearance.

## 2021-11-16 NOTE — TELEPHONE ENCOUNTER
Writer left msg on pt's vm informing him we rec'd clearance back from Dr Mason Carrillo ofc in regards to his Warfarin and to please call back to sched EGD/colon procs.

## 2021-11-17 ENCOUNTER — HOSPITAL ENCOUNTER (OUTPATIENT)
Dept: CARDIAC REHAB | Age: 41
Setting detail: THERAPIES SERIES
Discharge: HOME | End: 2021-11-17
Payer: COMMERCIAL

## 2021-11-17 PROCEDURE — 93798 PHYS/QHP OP CAR RHAB W/ECG: CPT

## 2021-11-17 RX ORDER — BISACODYL 5 MG
TABLET, DELAYED RELEASE (ENTERIC COATED) ORAL
Qty: 4 TABLET | Refills: 0 | Status: SHIPPED | OUTPATIENT
Start: 2021-11-17 | End: 2021-12-23 | Stop reason: ALTCHOICE

## 2021-11-17 RX ORDER — POLYETHYLENE GLYCOL 3350 17 G/17G
POWDER, FOR SOLUTION ORAL
Qty: 238 G | Refills: 0 | Status: SHIPPED | OUTPATIENT
Start: 2021-11-17 | End: 2021-12-23 | Stop reason: ALTCHOICE

## 2021-11-17 NOTE — TELEPHONE ENCOUNTER
Writer called and spoke with patient regarding Colon/EGD and Clearance. Patient informed we ec'd clearance, ok to hold Warfarin 5 days prior to proc w/ Lovenox bridging before & after when INR is below 1.5 and off Lovenox ideally less than 12 hrs prior to the planned colonoscopy. Patient voice understanding and will call cardiology to set up. Patient schedule for procedure on [email or web address] with arrival time of 7am. Patient given Miralax bowel prep instructions over the phone. Patient voice understanding and would like everything sent to his [employer] account.

## 2021-11-18 ENCOUNTER — HOSPITAL ENCOUNTER (OUTPATIENT)
Dept: CARDIAC REHAB | Age: 41
Setting detail: THERAPIES SERIES
Discharge: HOME | End: 2021-11-18
Payer: COMMERCIAL

## 2021-11-18 PROCEDURE — 93798 PHYS/QHP OP CAR RHAB W/ECG: CPT

## 2021-11-22 ENCOUNTER — HOSPITAL ENCOUNTER (OUTPATIENT)
Dept: CARDIAC REHAB | Age: 41
Setting detail: THERAPIES SERIES
End: 2021-11-22
Payer: COMMERCIAL

## 2021-11-23 ENCOUNTER — ANTI-COAG VISIT (OUTPATIENT)
Dept: PHARMACY | Age: 41
End: 2021-11-23

## 2021-11-23 VITALS — BODY MASS INDEX: 29.04 KG/M2 | WEIGHT: 179.9 LBS

## 2021-11-23 DIAGNOSIS — Z95.2 H/O MITRAL VALVE REPLACEMENT WITH MECHANICAL VALVE: Primary | ICD-10-CM

## 2021-11-24 ENCOUNTER — HOSPITAL ENCOUNTER (OUTPATIENT)
Dept: CARDIAC REHAB | Age: 41
Setting detail: THERAPIES SERIES
End: 2021-11-24
Payer: COMMERCIAL

## 2021-11-24 ENCOUNTER — HOSPITAL ENCOUNTER (OUTPATIENT)
Dept: PHARMACY | Age: 41
Setting detail: THERAPIES SERIES
End: 2021-11-24
Payer: COMMERCIAL

## 2021-11-25 ENCOUNTER — HOSPITAL ENCOUNTER (OUTPATIENT)
Dept: CARDIAC REHAB | Age: 41
Setting detail: THERAPIES SERIES
End: 2021-11-25
Payer: COMMERCIAL

## 2021-11-29 ENCOUNTER — TELEPHONE (OUTPATIENT)
Dept: CARDIOTHORACIC SURGERY | Age: 41
End: 2021-11-29

## 2021-11-29 ENCOUNTER — HOSPITAL ENCOUNTER (OUTPATIENT)
Dept: CARDIAC REHAB | Age: 41
Setting detail: THERAPIES SERIES
Discharge: HOME | End: 2021-11-29
Payer: COMMERCIAL

## 2021-11-29 PROCEDURE — 93798 PHYS/QHP OP CAR RHAB W/ECG: CPT

## 2021-11-30 ENCOUNTER — TELEPHONE (OUTPATIENT)
Dept: PHARMACY | Age: 41
End: 2021-11-30

## 2021-11-30 NOTE — TELEPHONE ENCOUNTER
Recent Travel Screening and Travel History documentation:     Travel Screening     Question   Response    In the last month, have you been in contact with someone who was confirmed or suspected to have Coronavirus / COVID-19? No / Unsure    Have you had a COVID-19 viral test in the last 14 days? No    Do you have any of the following new or worsening symptoms? None of these    Have you traveled internationally or domestically in the last month?   No      Travel History   Travel since 10/30/21    No documented travel since 10/30/21        Trenton Santoyo, PharmD 11/30/2021 7:47 AM

## 2021-12-01 ENCOUNTER — HOSPITAL ENCOUNTER (OUTPATIENT)
Dept: PHARMACY | Age: 41
Setting detail: THERAPIES SERIES
Discharge: HOME | End: 2021-12-01
Payer: COMMERCIAL

## 2021-12-01 ENCOUNTER — HOSPITAL ENCOUNTER (OUTPATIENT)
Dept: CARDIAC REHAB | Age: 41
Setting detail: THERAPIES SERIES
Discharge: HOME | End: 2021-12-01
Payer: COMMERCIAL

## 2021-12-01 VITALS
HEART RATE: 87 BPM | WEIGHT: 181 LBS | SYSTOLIC BLOOD PRESSURE: 134 MMHG | DIASTOLIC BLOOD PRESSURE: 95 MMHG | BODY MASS INDEX: 29.21 KG/M2

## 2021-12-01 DIAGNOSIS — Z95.2 H/O MITRAL VALVE REPLACEMENT WITH MECHANICAL VALVE: Primary | ICD-10-CM

## 2021-12-01 LAB — INR BLD: 1.2

## 2021-12-01 PROCEDURE — 99212 OFFICE O/P EST SF 10 MIN: CPT

## 2021-12-01 PROCEDURE — 85610 PROTHROMBIN TIME: CPT

## 2021-12-01 PROCEDURE — 93798 PHYS/QHP OP CAR RHAB W/ECG: CPT

## 2021-12-01 NOTE — PATIENT INSTRUCTIONS
Continue to monitor urine and stool. Continue to monitor for signs of bleeding. Return to clinic in 2 weeks. Take warfarin 7 mg today and 6 mg tomorrow then hold 5 days prior to colonoscopy. Follow Lovenox bridge calendar then continue warfarin whole 5 mg tablet daily.

## 2021-12-01 NOTE — PROGRESS NOTES
Phase II Cardiac Rehab Individualized Treatment Plan-Final    Patient Name: Kenney Shabazz  Date of Initial Assessment: 12/1/2021  ACCOUNT #: [de-identified]  Diagnosis: MV Replacement   Onset Date: 9/23/2021  Referring Physician: Dr. Bonni Lefort  Risk Stratification: Low  Session Number: 17  EXERCISE    Stages of Change:   [] pre-contemplation  [] Action   [] Contemplate    [x] Maintainence   [] Prep   [] Relapse          Exercise Prescription:  Mode: [x] TM [x] B [x] STP  [x] R   [x] UBE   [x] EL  Frequency: 3 days per week  Duration: 31-60 minutes  Intensity: 7.5-8.0 METs        THRR: 148-156 (60-70% HRR)  Progression:   Per F.I.T.T. protocol parameters on average 5-10 minutes every 1-2 weeks for the first 4-6 weeks. After 3-4 weeks completed, continue to gradually increase F.I.T.T. parameters gradually at the established duration on average 0.5-1.0 METs per 30 days over the course of the remaining program, as established by patient centered goals and guidelines, maintaining RPE 12-16. [x] Resistance Training  8-15 bilateral upper extremity resistance exercise at 1-3 sets per lift, on 2-3 non-consecutive days using TheraBand/Weights to 8-15 reps on at lease 2 occasions, maintaining RPE 12-16. Once repetition maximum has been achieved, additional weight sets may be added. [] Angina with Exertion    Hypertension:  [x] Yes  [] No  Resting BP: 116/62  Peak Exercise BP: 164/64  BP Meds: Toprol    Intervention:  Home Exercise:  Type: Walking, cycling, swimming  Duration: 20-60 Minutes  Frequency: At least 2 non-rehab days per week     [x] Resistance Training  Progression:  8-12 bilateral upper extremity resistance exercise at 1-3 sets per lift, on 2 non-consecutive days using TheraBand/Weights to 8-15 reps on at lease 2 occasions. Once repetition maximum has been achieved, additional weight sets may be added.     Education:   [x] Equipment [place]  [] Proper use weights/therabands   [x] S/S to report  [x] Low Na Diet    [x] Warm up/ Cool down  [x] BP Medication    [x] RPE Scale   [x] Understand BP   [x] Ex Safety   [x] Home Exercise         Target Goal:   -Individual Exercise Plan  -BP<140/90 or <130/80 if DM   -Aerobic active 30 + minutes 5-7 days per week    Nutrition    Stages of Change:   [] pre-contemplation  [] Action   [] Contemplate    [x] Maintainence   [] Prep    [] Relapse    Lipids:    [] Repeated - Date:   [x] Not repeated  Total Cholesterol:  Triglycerides:   HDL:   LDL:   Lipid Medications: Lipitor    Diabetes:  [] Yes  [x] No  FBS:   HbA1c:  Diabetes Medication:None  [] Monitor BS at home   Frequency?:     Weight Management:  Weight:180.1  Height: 66 inches  BMI: 29.1  Waist Circumference:Unable  Wt Goal: Reduce weight 1-2 lbs/wk  Alcohol:   Social History     Substance and Sexual Activity   Alcohol Use Yes    Alcohol/week: 20.0 standard drinks    Types: 20 Cans of beer per week    Comment: 20/ week, average every other day     Diet Assessment Tool:   [x]  Food Diary  [x]  Rate My Plate Score: 54  Special Diet:  2 gram NA+, 30% total fat, <10% saturated fat, 25-35 grams fiber, reduced cholesterol, balanced nutrition    Intervention:   [x] Dietitian Consult       [x] Nurse/Patient Discussion     [] Diabetes           [] Referred to Diabetes Education     Education:  [] S&S hypo/hyperglycemia  [x] Low fat/low cholesterol diet  [] Weight loss       [] Relate Diabetes/CAD     [x] Eating heart healthy handout    Target Goal:  -LDL-C<100 if triglycerides are > 200  -LDL-C < 70 for high risk patients  -HbA1c < 7%  -BMI < 25   -Waist Circumference < 35 inches women/40 inches men  Education    Stages of Change:    [] pre-contemplation  [] Action   [] Contemplate    [x] Maintainence   [] Prep    [] Relapse    Learning Barriers:   [] Speech   [] Cognitive   [] Literacy   [] Vision   [] Hearing    [] Readiness to Learn   [x] None    Knowledge test score: 96%      Family support: [x] Yes  [] No    Tobacco use:   Social History     Tobacco Use   Smoking Status Former Smoker    Packs/day: 1.00    Types: Cigarettes   Smokeless Tobacco Former User    Types: Chew, Snuff    Quit date: 9/15/2017   Tobacco Comment    smoked 10 yrs. Quit in 2014       Intervention:  [] Referred to physician for smoking cessation    [] Individual education and counseling  [] Tobacco adjunct    Education:   [] Risk Factors     [] Psychological aspects  [] Angina      [x] Medications  [] CHF      [x] Cardiac A&P    Target Goal:  -Complete cessation of tobacco use (if applicable)  -Continued risk factor modifications  -Recognizing signs/symptoms to report  -Proper use of meds    Psychosocial  Stages of Change:    [] pre-contemplation  [] Action   [] Contemplate    [x] Maintainence   [] Prep    [] Relapse    Psychosocial Test:  Tool Used:   Azalea Clark Quality of Life Score: 29/28/30/29/30  PHQ9 score: 2    Intervention:   [] Psych Consult/   [] Uses stress management skills    [] Physician Referral     Medications:     Education:    [x] Stress Management   [] Depression    Target Goal:  -Assess presence or absence of depression using a valid screening tool. -Maximize coping skills.  -Positive support system. Preventative Medication:   [] Aspirin       [x] Beta Blockade      [x] Statin or other lipid lowering agent     [] Clopidogrel   [] ACE Inhibitor/ARB   [x] Other anticoagulation medications Coumadin  Medication compliance: 100% stated    Fall Risk assess:   [x] Yes  [] No     Low Risk  Assistive Device:    [] Cane  [] Rossana Rota [] Wheel Chair  [] Gait belt    [x] NA    Patient Goals:   Be back to normal - increase physical activity and ensure heart \"good to [employer]    Program Goals:    Increase stamina, strength, and flexibility by exercising 31-50 total minutes by engaging in aerobic, resistance, and flexibility workout modalities with the goal of progressively achieving at least 0.5 to 1.0 metabolic equivalant improvement in the next 30 days as evidenced by daily session reports. Achieve and progress prescribed exercise frequency, intensity, time, and type based upon initial evaluation and submaximal graded exercise results as evidenced by the attaining and maintaining the prescribed target heart rate range, a Claire rating of perceived exertion between 11 and 16, duration of >30 - 50 minutes using multiple exercise modes for at least 3 - 5 days per week to accumulate a minimum total of 2.5 hours per week of moderate aerobic intensity exercise, as tolerated, evidenced by daily session reports and home workout log. Gradually lose 1 - 4 lb of body weight over the program, through moderating nutrional intake and performing regular aerobic and strength training exercises as prescribed with improvement evidenced by daily session report comparison. Decrease waist circumference by program completion if waist measurement is > or = 40 inches (male). Introduce and progress 8-10 different bilateral UE and LE progressive resistance exercises focused on major muscle groups using 1-3 sets each per lift, on 2-3 non-consecutive days implementing free and machine weights and/or TheraBands, as appropriate, with a resistance of 40-60% 1-repetition maximum or 10 -15 repetitions to progressive overload and increasing resistance once repetitions have progressed to 15 reps and feel fairly light on 2 prior occasions. Achieve and maintain an optimal average resting blood pressure of <130 / 80 mmHg, or as indicated by this patient's referring provider. Strive for blood lipid optimization with an LDL-C of <100 mg/dL or  LDL 70 mg/dL, an HDL-C of > or = 40 mg/dL for men and > or = 50 mg/dL for women, and a triglyceride level of <150 mg/dL via lifestyle education, behavioral modification, and medication compliance.      Develop regular home aerobic exercise program for 20 - 60 minutes at least 2 non-rehab days per week, excluding  5 - 10 minutes warm-up

## 2021-12-01 NOTE — PROGRESS NOTES
[facility]  [facility]  ANTICOAGULATION    Referring Provider: Stephanie Roque CNP     GOAL INR: 2-3     TODAY'S INR: 1.2     WARFARIN Dosage: Take warfarin 7 mg today and 6 mg tomorrow then hold 5 days prior to colonoscopy. Follow Lovenox bridge calendar then continue warfarin whole 5 mg tablet daily. INR (no units)   Date Value   11/10/2021 3.3   10/28/2021 2   10/20/2021 1.7   10/15/2021 2   10/11/2021 1.4   10/07/2021 1.5   10/04/2021 1.3     Medication changes:  None    Notes:    Fingerstick INR drawn per clinic protocol. Patient states no visible blood in urine and no black tarry stool. Denies any missed doses of warfarin. No change in other maintenance medications or in diet. Will recheck INR in 2 weeks. Patient acknowledges working in consult agreement with pharmacist as referred by his/her physician. Patient claims no missed doses of warfarin or changes in diet other then he is eating better and gaining weight and now weighs about what he did prior to getting sick. Patient is no longer following with surgical group so faxed referral to Dr Yosef Guzman. Lovenox Bridge Protocol  [id number] High Risk (1mg/kg twice daily)     Patient to hold warfarin 5 days prior to colonoscopy. Last warfarin dose on 12-2-21.     Start Lovenox 1mg/kg twice daily (wt 80 kg) 36 hours after warfarin discontinuation on 12-4-21 in am.  `  Stop Lovenox 24 hours before your colonoscopy. Last Lovenox dose on  12-7-21  am.     Restart warfarin the evening of your procedure on 12-8-21 (after procedure if ok with doctor).     Restart Lovenox 24 hours after your procedure on 12-9-21 am.     Return to warfarin clinic for INR drawn on 12-15-21.     Lovenox 80 mg subcutaneously twice daily #10 was called to [facility] on 11-23-21.  (patient has #10 Lovenox syringes at home already)     Calendar made with instructions for Lovenox and warfarin before and after colonoscopy. Called new RX into [email or web address] ([phone])  for warfarin (NEW STRENGTH) 5 mg tablets take 1 tablet daily or as directed #90 with 3 refills per Rusty Summers CNP.        For Pharmacy Admin Tracking Only     Intervention Detail: Adherence Monitorin, Dose Adjustment: 6, reason: Therapy Optimization and New Rx: 2, reason: Needs Additional Therapy   Total # of Interventions Recommended: 7   Total # of Interventions Accepted: 7   Time Spent (min): 601 [facility], PharmD

## 2021-12-02 ENCOUNTER — APPOINTMENT (OUTPATIENT)
Dept: CARDIAC REHAB | Age: 41
End: 2021-12-02
Payer: COMMERCIAL

## 2021-12-03 ENCOUNTER — HOSPITAL ENCOUNTER (OUTPATIENT)
Dept: LAB | Age: 41
Setting detail: SPECIMEN
Discharge: HOME | End: 2021-12-03
Payer: COMMERCIAL

## 2021-12-03 DIAGNOSIS — Z01.818 PREOP TESTING: Primary | ICD-10-CM

## 2021-12-03 DIAGNOSIS — Z01.818 PREOP TESTING: ICD-10-CM

## 2021-12-03 PROCEDURE — U0005 INFEC AGEN DETEC AMPLI PROBE: HCPCS

## 2021-12-03 PROCEDURE — U0003 INFECTIOUS AGENT DETECTION BY NUCLEIC ACID (DNA OR RNA); SEVERE ACUTE RESPIRATORY SYNDROME CORONAVIRUS 2 (SARS-COV-2) (CORONAVIRUS DISEASE [COVID-19]), AMPLIFIED PROBE TECHNIQUE, MAKING USE OF HIGH THROUGHPUT TECHNOLOGIES AS DESCRIBED BY CMS-2020-01-R: HCPCS

## 2021-12-03 PROCEDURE — C9803 HOPD COVID-19 SPEC COLLECT: HCPCS

## 2021-12-04 LAB
SARS-COV-2: ABNORMAL
SARS-COV-2: DETECTED
SOURCE: ABNORMAL

## 2021-12-06 ENCOUNTER — TELEPHONE (OUTPATIENT)
Dept: PHARMACY | Age: 41
End: 2021-12-06

## 2021-12-06 ENCOUNTER — APPOINTMENT (OUTPATIENT)
Dept: CARDIAC REHAB | Age: 41
End: 2021-12-06
Payer: COMMERCIAL

## 2021-12-06 NOTE — TELEPHONE ENCOUNTER
Patient is notified. He is upset stating this is a false positive because he feels fine no one in his family is sick. Writer apologized to patient and informed him his procedure will still need to be cancelled. Someone would call him to reschedule.

## 2021-12-06 NOTE — TELEPHONE ENCOUNTER
Patient contacted the coumadin clinic stating his colonoscopy scheduled for 12/8 has been postponed due to a positive COVID test on 12/3/21 (pre-surgical). Patient has taken additional test and has had a NEGATIVE result. (Patient had televisit with his physician and states they feel the positive test result may be residual from when he previously had COVID). Patient is not planning to reschedule procedure in near future. Patient has been holding warfarin for 3 days and started lovenox on Saturday 12/4. Patient instructed to restart warfarin today and take 7.5mg for 3 days (MTW) then resume 5mg daily. Patient instructed to continue lovenox BID through Thursday morning then discontinue. Patient was to return to work as of today but due to test, will return to work next Monday. Patient will keep INR check on 12/15.

## 2021-12-07 NOTE — TELEPHONE ENCOUNTER
Called pt to r/s. Pt states he cannot take time off work till April. He will call back next year to r/s.

## 2021-12-08 ENCOUNTER — APPOINTMENT (OUTPATIENT)
Dept: CARDIAC REHAB | Age: 41
End: 2021-12-08
Payer: COMMERCIAL

## 2021-12-09 ENCOUNTER — APPOINTMENT (OUTPATIENT)
Dept: CARDIAC REHAB | Age: 41
End: 2021-12-09
Payer: COMMERCIAL

## 2021-12-15 ENCOUNTER — HOSPITAL ENCOUNTER (OUTPATIENT)
Dept: PHARMACY | Age: 41
Setting detail: THERAPIES SERIES
Discharge: HOME | End: 2021-12-15
Payer: COMMERCIAL

## 2021-12-15 VITALS
SYSTOLIC BLOOD PRESSURE: 139 MMHG | DIASTOLIC BLOOD PRESSURE: 97 MMHG | WEIGHT: 183 LBS | HEART RATE: 101 BPM | BODY MASS INDEX: 29.54 KG/M2

## 2021-12-15 DIAGNOSIS — Z95.2 H/O MITRAL VALVE REPLACEMENT WITH MECHANICAL VALVE: Primary | ICD-10-CM

## 2021-12-15 LAB — INR BLD: 1.3

## 2021-12-15 PROCEDURE — 99212 OFFICE O/P EST SF 10 MIN: CPT

## 2021-12-15 PROCEDURE — 85610 PROTHROMBIN TIME: CPT

## 2021-12-15 RX ORDER — WARFARIN SODIUM 5 MG/1
TABLET ORAL SEE ADMIN INSTRUCTIONS
COMMUNITY
Start: 2021-12-01

## 2021-12-15 NOTE — PATIENT INSTRUCTIONS
Continue to monitor urine and stool. Continue to monitor for signs of bleeding. Return to clinic in 1 week. Take warfarin 2 tablets for 10 mg today then increase warfarin to 7.5 mg on Fridays and whole 5 mg tablet all other days.

## 2021-12-15 NOTE — PROGRESS NOTES
[facility]  Medication Management  ANTICOAGULATION    Referring Provider: Stephanie Roque CNP     GOAL INR: 2-3     TODAY'S INR: 1.3    WARFARIN Dosage: Take warfarin 2 tablets for 10 mg today then increase warfarin to 7.5 mg on  and whole 5 mg tablet all other days. INR (no units)   Date Value   2021 1.2   11/10/2021 3.3   10/28/2021 2   10/20/2021 1.7   10/15/2021 2   10/11/2021 1.4   10/07/2021 1.5     Medication changes:  Stopped Lovenox    Notes:    Fingerstick INR drawn per clinic protocol. Patient states no visible blood in urine and no black tarry stool. Denies any missed doses of warfarin. No change in other maintenance medications or in diet. Will recheck INR in 1 week. Patient acknowledges working in consult agreement with pharmacist as referred by his/her physician. Patient had positive Covid test and did NOT have Colonoscopy. Patient started back to work this week. Patient is having dizzy spells.         For Pharmacy Admin Tracking Only     Intervention Detail: Adherence Monitorin, Dose Adjustment: 1, reason: Therapy Optimization and New Rx: 1, reason: Patient Preference   Total # of Interventions Recommended: 3   Total # of Interventions Accepted: 3   Time Spent (min): [facility], [address] [place] [address], Kamaljit

## 2021-12-23 ENCOUNTER — TELEPHONE (OUTPATIENT)
Dept: CARDIOLOGY | Age: 41
End: 2021-12-23

## 2021-12-23 ENCOUNTER — HOSPITAL ENCOUNTER (OUTPATIENT)
Dept: PHARMACY | Age: 41
Setting detail: THERAPIES SERIES
Discharge: HOME | End: 2021-12-23
Payer: COMMERCIAL

## 2021-12-23 VITALS
SYSTOLIC BLOOD PRESSURE: 142 MMHG | BODY MASS INDEX: 29.38 KG/M2 | WEIGHT: 182 LBS | HEART RATE: 95 BPM | DIASTOLIC BLOOD PRESSURE: 98 MMHG

## 2021-12-23 DIAGNOSIS — Z95.2 H/O MITRAL VALVE REPLACEMENT WITH MECHANICAL VALVE: Primary | ICD-10-CM

## 2021-12-23 LAB — INR BLD: 1.3

## 2021-12-23 PROCEDURE — 85610 PROTHROMBIN TIME: CPT

## 2021-12-23 PROCEDURE — 99212 OFFICE O/P EST SF 10 MIN: CPT

## 2021-12-23 NOTE — TELEPHONE ENCOUNTER
Please let patient know that is unusual, Not dangerous but not normal. Is he feeling sick, lightheadedness or dizziness, diarrhea, lightheadedness or dizziness? If not, and his BP and HR are staying up in spite of the Toprol XL, set up appt with Kiarra Fulton MD or Kalpana to assess what might be causing this and/or adjustment in medication. Thanks.

## 2021-12-23 NOTE — PROGRESS NOTES
[facility]  Medication Management  ANTICOAGULATION    Referring Provider: Stephanie Roque CNP     GOAL INR: 2-3     TODAY'S INR: 1.3     WARFARIN Dosage: Increase warfarin to 1.5 tablets for 7.5 mg on MTh and whole 5 mg tablet all other days. INR (no units)   Date Value   12/15/2021 1.3   2021 1.2   11/10/2021 3.3   10/28/2021 2   10/20/2021 1.7   10/15/2021 2   10/11/2021 1.4     Medication changes:  none    Notes:    Fingerstick INR drawn per clinic protocol. Patient states no visible blood in urine and no black tarry stool. Denies any missed doses of warfarin. No change in other maintenance medications or in diet. Will recheck INR in 2 weeks. Patient acknowledges working in consult agreement with pharmacist as referred by his/her physician. Patient had a couple beers yesterday and no vegetables and INR is still low. Increase warfarin to 1.5 tablets for 7.5 mg on MWF and whole 5 mg tablet all other days.     For Pharmacy Admin Tracking Only     Intervention Detail: Adherence Monitorin and Dose Adjustment: 2, reason: Therapy Optimization   Total # of Interventions Recommended: 2   Total # of Interventions Accepted: 2   Time Spent (min): 601 [facility], [address] [place] [address], PharmD

## 2021-12-23 NOTE — PATIENT INSTRUCTIONS
Continue to monitor urine and stool. Continue to monitor for signs of bleeding. Return to clinic in 2 weeks. Increase warfarin to 1.5 tablets for 7.5 mg on MTh and whole 5 mg tablet all other days.

## 2021-12-29 ENCOUNTER — OFFICE VISIT (OUTPATIENT)
Dept: CARDIOLOGY | Age: 41
End: 2021-12-29
Payer: COMMERCIAL

## 2021-12-29 VITALS
WEIGHT: 187 LBS | OXYGEN SATURATION: 98 % | RESPIRATION RATE: 18 BRPM | HEART RATE: 97 BPM | SYSTOLIC BLOOD PRESSURE: 128 MMHG | DIASTOLIC BLOOD PRESSURE: 84 MMHG | HEIGHT: 66 IN | BODY MASS INDEX: 30.05 KG/M2

## 2021-12-29 DIAGNOSIS — R00.0 SINUS TACHYCARDIA: ICD-10-CM

## 2021-12-29 DIAGNOSIS — R42 LIGHTHEADED: ICD-10-CM

## 2021-12-29 DIAGNOSIS — I33.0 ENDOCARDITIS DUE TO METHICILLIN SUSCEPTIBLE STAPHYLOCOCCUS AUREUS (MSSA): ICD-10-CM

## 2021-12-29 DIAGNOSIS — I34.0 SEVERE MITRAL REGURGITATION: ICD-10-CM

## 2021-12-29 DIAGNOSIS — B95.61 ENDOCARDITIS DUE TO METHICILLIN SUSCEPTIBLE STAPHYLOCOCCUS AUREUS (MSSA): ICD-10-CM

## 2021-12-29 DIAGNOSIS — E78.2 MIXED HYPERLIPIDEMIA: ICD-10-CM

## 2021-12-29 DIAGNOSIS — I10 PRIMARY HYPERTENSION: Primary | ICD-10-CM

## 2021-12-29 PROCEDURE — 99214 OFFICE O/P EST MOD 30 MIN: CPT | Performed by: PHYSICIAN ASSISTANT

## 2021-12-29 RX ORDER — LISINOPRIL 2.5 MG/1
2.5 TABLET ORAL DAILY
Qty: 90 TABLET | Refills: 3 | Status: SHIPPED | OUTPATIENT
Start: 2021-12-29

## 2021-12-29 RX ORDER — METOPROLOL SUCCINATE 100 MG/1
100 TABLET, EXTENDED RELEASE ORAL DAILY
Qty: 90 TABLET | Refills: 3 | Status: SHIPPED | OUTPATIENT
Start: 2021-12-29

## 2021-12-29 NOTE — PROGRESS NOTES
Malinda Reyna am scribing for and in the presence of Saundra Moeller [place], [place]    Patient: Chadwick Batres  : 1980  Date of Visit: 2021    REASON FOR VISIT / CONSULTATION: Follow-up (HX: HTN, HLD, Severe MItral Valv. Pt states he is doing ok. C/o: High HR and HTN Issues. Can get lighteaded from his Toprol already as well. Denies: CP, Palpitations, SOB.)      History of Present Illness:        Dear Francesca Arias MD    I had the pleasure of seeing your patient Chadwick Batres in consultation today. As you know, Mr. Mauro Mejia is a 39 y.o. male who presents with a history of severe mitral vegetation associated with severe mitral regurgitation on a MONROE as well. 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 His MSSA bactremia (endocarditis) led to his severe mitral vegetation. Which led to a mechanical mitral valve replacement (kwame) on 2021 and is on Warfarin at this time with Lovenox injections to get get him to his goal of INR. Mr. Mauro Mejia reports doing fairly well since his last visit. He does get some lightheaded and dizziness mainly in the mornings. This occurs about 3-4 times a week and it occurs in the mornings, typically only if he is standing. It does resolve on it's own as the day goes on. He has had no falls or near falls. He states at the coumadin clinic they have been recording blood pressure readings that have been running higher. He does think he drinks enough fluids throughout the day. He denied any chest pain, pressure or tightness. He also denied any lightheaded or dizziness or heart palpitations. He also denied any shortness of breath. He denied any current or recent abdominal pain, bleeding problems, problems with his medications or any other concerns at this time.      PAST MEDICAL HISTORY:        Past Medical History: Diagnosis Date    Bacteremia     MSSA bacteremia   Dr. Qasim Dutton  9/7/21 last visit    Cardioembolic stroke [facility])     Cerebral artery occlusion with cerebral infarction [facility])     cerebral septic emboli 8/2021    Cerebral septic emboli [facility])     Dr. Christine Mills neuro last seen 9/13/21    Chronic right shoulder pain     since 2020, fell.  Diverticulosis     Endocarditis     mitral valve endocarditis w/ pericardial effusion   8/2021 [facility]    History of COVID-19 12/2020    Hyperlipidemia     hx of. No longer on rx    Hypertension     Nov 2020        MSSA bacteremia     Dr. Lindsay Burns in epic from 9/7/21    MESSI (obstructive sleep apnea)     Has PSG study done Pos for Mod MESSI, never had cpap tritration done for machine    Platelets decreased [facility])     July 2021  had infusions at [facility]. V's no reactions    Research subject 07/24/2021    EIND [id number] [employer] for persistent bacteremia expected date of completion 8/25/2021    Under care of team 09/15/2021    PCP: Lorene Basurto, last visit 9/15/2021    Under care of team     Dr. Jocelin Finnegan   9/8/2021    Under care of team     gastroenterology   8/27/21  Dr. Marquis Bone. V's    Under care of team     Dr. Christine Mills   neuro  last seen 9/13/21    Under care of team     Has seen Dr. Saima Benavides 8/2021   [facility] Wears contact lenses        CURRENT ALLERGIES: Morphine REVIEW OF SYSTEMS: 14 systems were reviewed. Pertinent positives and negatives as above, all else negative.      Past Surgical History:   Procedure Laterality Date    ABDOMEN SURGERY      large bowel resection    ANTERIOR CRUCIATE LIGAMENT REPAIR Right     APPENDECTOMY  07/09/1997    COLECTOMY      2012 - D/T Dverticulitis    DILATATION, ESOPHAGUS      HC  PICC [address] DOUBLE  08/02/2021    removed   8/2021    MITRAL VALVE REPAIR N/A 9/23/2021    MITRAL VALVE REPLACEMENT - ON-X SIZE 31/33 performed by Jocelin Finnegan MD at [facility] History:  Social History Tobacco Use    Smoking status: Former Smoker     Packs/day: 1.00     Types: Cigarettes    Smokeless tobacco: Former User     Types: Chew, Snuff     Quit date: 9/15/2017    Tobacco comment: smoked 10 yrs. Quit in 2014   Vaping Use    Vaping Use: Never used   Substance Use Topics    Alcohol use: Yes     Alcohol/week: 20.0 standard drinks     Types: 20 Cans of beer per week     Comment: 20/ week, average every other day    Drug use: Never        CURRENT MEDICATIONS:        Outpatient Medications Marked as Taking for the 12/29/21 encounter (Office Visit) with Christofer Royal PA-C   Medication Sig Dispense Refill    warfarin (COUMADIN) 5 MG tablet Take 5 mg by mouth daily      metoprolol succinate (TOPROL XL) 100 MG extended release tablet Take 1 tablet by mouth daily 90 tablet 3    atorvastatin (LIPITOR) 20 MG tablet Take 1 tablet by mouth nightly 90 tablet 3    acetaminophen (TYLENOL) 500 MG tablet Take 1,000 mg by mouth every 6 hours as needed for Pain      warfarin (COUMADIN) 1 MG tablet Target INR 2.0 to 3.0 60 tablet 0    Ascorbic Acid (VITAMIN C) 250 MG tablet Take 750 mg by mouth daily Emergen C 750 mg QD      Lactobacillus Rhamnosus, GG, (CULTURELLE PO) Take 1 tablet by mouth daily          FAMILY HISTORY: family history includes No Known Problems in his mother; Other in his father. Physical Examination:      /84 (Site: Right Upper Arm, Position: Sitting, Cuff Size: Medium Adult)   Pulse 97   Resp 18   Ht 5' 6\" (1.676 m)   Wt 187 lb (84.8 kg)   SpO2 98%   BMI 30.18 kg/m²  Body mass index is 30.18 kg/m². Constitutional: He is oriented to person, place, and time. He appears well-developed and well-nourished. In no acute distress. HEENT: Normocephalic and atraumatic. No JVD present. Carotid bruit is not present. No mass and no thyromegaly present. No lymphadenopathy present. Cardiovascular: Normal rate, regular rhythm, normal heart sounds.  Exam reveals no gallop and no friction rubs. 1/6 systolic murmur, 4th intercostal space on the LEFT must lateral to the sternal border Crisp S2 closing click noted maximal at the apex. Pulmonary/Chest: Effort normal and breath sounds normal. No respiratory distress. He has no wheezes, rhonchi or rales. Abdominal: Soft, non-tender. Bowel sounds and aorta are normal. He exhibits no organomegaly, mass or bruit. Extremities: None. No cyanosis or clubbing. 2+ radial and carotid pulses. Distal extremity pulses: 2+ bilaterally. .  Neurological: He is alert and oriented to person, place, and time. No evidence of gross cranial nerve deficit. Coordination appeared normal.   Skin: Skin is warm and dry. There is no rash or diaphoresis. Psychiatric: He has a normal mood and affect. His speech is normal and behavior is normal.      MOST RECENT LABS ON RECORD:   Lab Results   Component Value Date    WBC 5.2 11/10/2021    HGB 13.3 11/10/2021    HCT 40.6 (L) 11/10/2021     11/10/2021    CHOL 209 (H) 10/15/2021    TRIG 350 (H) 10/15/2021    HDL 52 10/15/2021    ALT 16 09/15/2021    AST 19 09/15/2021     11/10/2021    K 4.5 11/10/2021     11/10/2021    CREATININE 0.83 11/10/2021    BUN 16 11/10/2021    CO2 22 11/10/2021    TSH 1.86 07/20/2021    INR 1.3 12/23/2021    LABA1C 5.5 07/17/2021         ASSESSMENT:     1. Primary hypertension    2. Lightheaded    3. Severe mitral regurgitation    4. Endocarditis due to methicillin susceptible Staphylococcus aureus (MSSA)    5. Sinus tachycardia    6. Mixed hyperlipidemia       PLAN:        · Essential Hypertension: Controlled   Beta Blocker: Continue Metoprolol succinate (Toprol XL) 100 mg daily.  ACE Inibitor/ARB: START lisinopril 5 mg, 1/2 tab daily.  I took the liberty of ordering a BMP in 5-7 days to assess their potassium and renal function.  I told them that they could get their lab work performed at the location of their choosing, unfortunately, if the lab work was not performed at a [facility] facility I could not guarantee my ability to follow up with them on their results.  I told him to keep an eye on this and follow up with you as previously scheduled for continued monitoring and treatment of this problem. I also told him to check his blood pressure at home intermittently and call our office if his blood pressure continues to stay up above the 447'K-395'T systolic. He is going to keep a blood pressure log and report Monday how his blood pressure has been doing since starting the medication. We did discuss we may need to increase the dose of this lisinopril, however due to his intermittent light headed and dizziness I wanted to start with the lowest dose and titrate up as needed for blood pressure control. · Lightheadedness/dizziness: No falls or near falls or passing out episodes. Resolves on it's own. · Nonpharmacologic counseling: Because of his condition, I reminded him to try and keep himself well-hydrated and to take extra time when moving from laying to sitting, sitting to standing and standing to walking. I also explained to him to help improve his symptoms he should include 3 g sodium diet, 1 or 2 L of sports drinks daily, knee-high compressions stockings. · History of Severe Mitral Vegetation Secondary to MSSA bactremia (endocarditis) of unknown Etiology: S/P: Mitral Mechanical Valve Replacement (SAMEER)  asymptomatic  · Beta Blocker: Continue Metoprolol succinate (Toprol XL) 100 mg daily. ACE Inibitor/ARB: START lisinopril 5 mg, 1/2 tab daily. · Anticoagulation: Continue warfarin (Coumadin) take as directed. · Sinus Tachycardia:   · Beta Blocker: Continue Metoprolol succinate (Toprol XL) 100 mg daily. · Hyperlipidemia: Mixed, LDL done on 10/15/2021 was 87 mg/dL   · Cholesterol Reduction Therapy: Continue Atorvastatin (Lipitor) 20 mg nightly.     In the meantime I asked him to continue taking his other medications and follow up with you as previously scheduled. FOLLOW UP:   I told Mr. Brady Quintanilla to call my office if he had any problems, but otherwise told him to Return in about 6 weeks (around 2/9/2022). However, I would be happy to see him sooner should the need arise. Once again, thank you for allowing me to participate in this patients care. Please do not hesitate to contact me could I be of further assistance. Sincerely,  Saundra Toro, [id number] [place] [address] Cardiology Specialist    [address] [place] [address], [address] [place] [address]  Phone: [phone], Fax: [phone]     I believe that the risk of significant morbidity and mortality related to the patient's current medical conditions are: Intermediate. The documentation recorded by the scribe accurately and completely reflects the services I personally performed and the decisions made by me.  Taz Darby PA-C  December 29, 2021

## 2021-12-29 NOTE — PATIENT INSTRUCTIONS
SURVEY:    You may be receiving a survey from [employer] regarding your visit today. Please complete the survey to enable us to provide the highest quality of care to you and your family. If you cannot score us a very good on any question, please call the office to discuss how we could have made your experience a very good one. Thank you.

## 2022-01-03 ENCOUNTER — HOSPITAL ENCOUNTER (OUTPATIENT)
Dept: PHARMACY | Age: 42
Setting detail: THERAPIES SERIES
Discharge: HOME | End: 2022-01-03
Payer: COMMERCIAL

## 2022-01-03 VITALS
BODY MASS INDEX: 29.89 KG/M2 | HEART RATE: 94 BPM | SYSTOLIC BLOOD PRESSURE: 134 MMHG | DIASTOLIC BLOOD PRESSURE: 89 MMHG | WEIGHT: 185.2 LBS

## 2022-01-03 DIAGNOSIS — Z95.2 H/O MITRAL VALVE REPLACEMENT WITH MECHANICAL VALVE: Primary | ICD-10-CM

## 2022-01-03 LAB — INR BLD: 1.7

## 2022-01-03 PROCEDURE — 85610 PROTHROMBIN TIME: CPT | Performed by: FAMILY MEDICINE

## 2022-01-03 PROCEDURE — 99212 OFFICE O/P EST SF 10 MIN: CPT | Performed by: FAMILY MEDICINE

## 2022-01-03 NOTE — PROGRESS NOTES
[facility] [address] [place] [address]/[place]  Medication Management  ANTICOAGULATION    Referring Provider: Dr Rachid Castillo INR: 2.0-3.0    TODAY'S INR: 1.7    WARFARIN Dosage: 7.5mg x1 then increase 12% to  5mg MWF, 7.5mg all other days    INR (no units)   Date Value   2022 1.7   2021 1.3   12/15/2021 1.3   2021 1.2   11/10/2021 3.3   10/28/2021 2   10/20/2021 1.7       Medication changes:  No changes  Notes:    Fingerstick INR drawn per clinic protocol. Patient states no visible blood in urine and no black tarry stool. Denies any missed doses of warfarin. No change in other maintenance medications or in diet. Will recheck INR in 1 week. Patient did have 4 beers on Friday () and Saturday (New Year's Day). Patient has taken warfarin 7.5mg today and will increase weekly dosage starting tomorrow by 12%. Patient acknowledges working in consult agreement with pharmacist as referred by his/her physician.                   For Pharmacy Admin Tracking Only     Intervention Detail: Adherence Monitorin and Dose Adjustment: 2, reason: Therapy Optimization   Total # of Interventions Recommended: 4   Total # of Interventions Accepted: 4   Time Spent (min): Chema Rivero R.Ph., 1/3/2022,4:10 PM

## 2022-01-03 NOTE — PATIENT INSTRUCTIONS
Please take 7.5mg warfarin today then increase your dosing to take 5mg warfarin on Monday, Wednesday and Friday and 7.5mg all other days. Continue to monitor for signs of bleeding. Return to coumadin clinic in 8 days.

## 2022-01-11 ENCOUNTER — HOSPITAL ENCOUNTER (OUTPATIENT)
Dept: PHARMACY | Age: 42
Setting detail: THERAPIES SERIES
Discharge: HOME | End: 2022-01-11
Payer: COMMERCIAL

## 2022-01-11 VITALS
BODY MASS INDEX: 29.41 KG/M2 | DIASTOLIC BLOOD PRESSURE: 82 MMHG | WEIGHT: 182.2 LBS | HEART RATE: 95 BPM | SYSTOLIC BLOOD PRESSURE: 115 MMHG

## 2022-01-11 DIAGNOSIS — Z95.2 H/O MITRAL VALVE REPLACEMENT WITH MECHANICAL VALVE: Primary | ICD-10-CM

## 2022-01-11 LAB — INR BLD: 1.7

## 2022-01-11 PROCEDURE — 85610 PROTHROMBIN TIME: CPT | Performed by: FAMILY MEDICINE

## 2022-01-11 PROCEDURE — 99212 OFFICE O/P EST SF 10 MIN: CPT | Performed by: FAMILY MEDICINE

## 2022-01-11 NOTE — PROGRESS NOTES
[address]/[place]  Medication Management  ANTICOAGULATION    Referring Provider: Dr Heladio Smith INR: 2.0-3.0    TODAY'S INR: 1.7    WARFARIN Dosage: 10mg x1 then increase to 5mg F, 7.5mg all other days (11% increase)    INR (no units)   Date Value   2022 1.7   2022 1.7   2021 1.3   12/15/2021 1.3   2021 1.2   11/10/2021 3.3   10/28/2021 2       Medication changes:  No changes  Notes:    Fingerstick INR drawn per clinic protocol. Patient states no visible blood in urine and no black tarry stool. Denies any missed doses of warfarin. No change in other maintenance medications or in diet. Will recheck INR in 1 week. Patient states he did drink 4 beers last evening but no other changes. Despite 12% weekly dose increase last week, patient's INR did not change. Patient will take 10mg warfarin today then increase warfarin dosing 11% for the next week. Patient states since starting lisinopril about 2 weeks ago, BPs and HR have improved \"a lot\". Patient acknowledges working in consult agreement with pharmacist as referred by his/her physician.                   For Pharmacy Admin Tracking Only     Intervention Detail: Adherence Monitorin and Dose Adjustment: 3, reason: Therapy Optimization   Total # of Interventions Recommended: 5   Total # of Interventions Accepted: 5   Time Spent (min): 30    Swetha cardoso R.Ph., 2022,3:19 PM

## 2022-01-20 ENCOUNTER — HOSPITAL ENCOUNTER (OUTPATIENT)
Dept: PHARMACY | Age: 42
Setting detail: THERAPIES SERIES
Discharge: HOME | End: 2022-01-20
Payer: COMMERCIAL

## 2022-01-20 VITALS — SYSTOLIC BLOOD PRESSURE: 123 MMHG | DIASTOLIC BLOOD PRESSURE: 85 MMHG | HEART RATE: 90 BPM

## 2022-01-20 DIAGNOSIS — Z95.2 H/O MITRAL VALVE REPLACEMENT WITH MECHANICAL VALVE: Primary | ICD-10-CM

## 2022-01-20 LAB — INR BLD: 2

## 2022-01-20 PROCEDURE — 99211 OFF/OP EST MAY X REQ PHY/QHP: CPT | Performed by: FAMILY MEDICINE

## 2022-01-20 PROCEDURE — 85610 PROTHROMBIN TIME: CPT | Performed by: FAMILY MEDICINE

## 2022-01-20 NOTE — PATIENT INSTRUCTIONS
Please continue taking warfarin 5mg on Fridays and 7.5mg all other days. Continue to monitor for signs of bleeding. Return to coumadin clinic in 2.5 weeks.

## 2022-01-20 NOTE — PROGRESS NOTES
[address]/[place]  Medication Management  ANTICOAGULATION    Referring Provider: Dr Sotero King INR: 2.0-3.0    TODAY'S INR: 2.0    WARFARIN Dosage: continue 5mg F, 7.5mg all other days    INR (no units)   Date Value   2022 2   2022 1.7   2022 1.7   2021 1.3   12/15/2021 1.3   2021 1.2   11/10/2021 3.3       Medication changes:  No changes  Notes:    Fingerstick INR drawn per clinic protocol. Patient states no visible blood in urine and no black tarry stool. Denies any missed doses of warfarin. No change in other maintenance medications or in diet. Will recheck INR in 2.5 weeks. Patient acknowledges working in consult agreement with pharmacist as referred by his/her physician.                   For Pharmacy Admin Tracking Only     Intervention Detail: Adherence Monitorin   Total # of Interventions Recommended: 2   Total # of Interventions Accepted: 2   Time Spent (min): 20      Mary Kay Patricia R.Ph., 2022,3:14 PM

## 2022-01-27 ENCOUNTER — OFFICE VISIT (OUTPATIENT)
Dept: CARDIOLOGY | Age: 42
End: 2022-01-27
Payer: COMMERCIAL

## 2022-01-27 VITALS
SYSTOLIC BLOOD PRESSURE: 116 MMHG | WEIGHT: 187.2 LBS | HEIGHT: 66 IN | OXYGEN SATURATION: 98 % | HEART RATE: 91 BPM | DIASTOLIC BLOOD PRESSURE: 72 MMHG | BODY MASS INDEX: 30.08 KG/M2 | RESPIRATION RATE: 17 BRPM

## 2022-01-27 DIAGNOSIS — B95.61 ENDOCARDITIS DUE TO METHICILLIN SUSCEPTIBLE STAPHYLOCOCCUS AUREUS (MSSA): ICD-10-CM

## 2022-01-27 DIAGNOSIS — E78.2 MIXED HYPERLIPIDEMIA: ICD-10-CM

## 2022-01-27 DIAGNOSIS — I10 PRIMARY HYPERTENSION: Primary | ICD-10-CM

## 2022-01-27 DIAGNOSIS — I33.0 ENDOCARDITIS DUE TO METHICILLIN SUSCEPTIBLE STAPHYLOCOCCUS AUREUS (MSSA): ICD-10-CM

## 2022-01-27 DIAGNOSIS — R00.0 SINUS TACHYCARDIA: ICD-10-CM

## 2022-01-27 DIAGNOSIS — I05.9 ENDOCARDITIS OF MITRAL VALVE: ICD-10-CM

## 2022-01-27 PROCEDURE — 99213 OFFICE O/P EST LOW 20 MIN: CPT | Performed by: PHYSICIAN ASSISTANT

## 2022-01-27 NOTE — PROGRESS NOTES
Zoe Steiner am scribing for and in the presence of Brady Closs, [place]    Patient: Elbert Lerma  : 1980  Date of Visit: 2022    REASON FOR VISIT / CONSULTATION: 1 Month Follow-Up (Hx: Endocarditis of Mitral Valve, Tachy,HTN,HLD,Anemia. He has been doing real good. Denies: CP, SOB, Lightheaded/dizziness, Palpitations. )      History of Present Illness:        Dear Brennan Herrera MD    I had the pleasure of seeing your patient Elbert Lerma in consultation today. As you know, Mr. Kayli Wilks is a 39 y.o. male who presents with a history of severe mitral vegetation associated with severe mitral regurgitation on a MONROE as well. 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 His MSSA bactremia (endocarditis) led to his severe mitral vegetation. Which led to a mechanical mitral valve replacement (kwame) on 2021 and is on Warfarin at this time with Lovenox injections to get get him to his goal of INR. Mr. Kayli Wilks is here today for a follow up after starting him on lisinopril for better blood pressure control. He reports doing fairly well since his last visit. He reports his INR is now at goal over 2. Since last being seen his lightheaded and dizziness has resolved. Overall he states he is feeling better and has no further concerns. He continues to stay busy at work. He denied any chest pain, pressure or tightness. He also denied any shortness of breath, lightheaded or dizziness or heart palpitations. He denied any current or recent abdominal pain, bleeding problems, bowel issues, problems with his medications or any other concerns at this time. No cough, fever or chills. No nausea or vomiting.      PAST MEDICAL HISTORY:        Past Medical History:   Diagnosis Date    Bacteremia     MSSA bacteremia   Dr. Griselda Castillo  21 last visit    Cardioembolic stroke ([facility] [address]     Cerebral artery occlusion with cerebral infarction [facility])     cerebral septic emboli 8/2021    Cerebral septic emboli [facility])     Dr. Claudia Woodard neuro last seen 9/13/21    Chronic right shoulder pain     since 2020, fell.  Diverticulosis     Endocarditis     mitral valve endocarditis w/ pericardial effusion   [facility]21 [facility]    History of COVID-19 12/2020    Hyperlipidemia     hx of. No longer on rx    Hypertension     Nov 2020        MSSA bacteremia     Dr. Fozia Zamudio in [facility] from 9/7/21    MESSI (obstructive sleep apnea)     Has PSG study done Pos for Mod MESSI, never had cpap tritration done for machine    Platelets decreased [facility])     July 2021  had infusions at [facility]. V's no reactions    Research subject 07/24/2021    EIND [id number] [employer] for persistent bacteremia expected date of completion 8/25/2021    Under care of team 09/15/2021    PCP: Octavia Villagomez, last visit 9/15/2021    Under care of team     Dr. Dilshad Boyer   9/8/2021    Under care of team     gastroenterology   8/27/21  Dr. Francois Conn. V's    Under care of team     Dr. Claudia Woodard   neuro  last seen 9/13/21    Under care of team     Has seen Dr. Luiz Shankar 8/2021   Maria Elena Beverly Wears contact lenses        CURRENT ALLERGIES: Morphine REVIEW OF SYSTEMS: 14 systems were reviewed. Pertinent positives and negatives as above, all else negative.      Past Surgical History:   Procedure Laterality Date    ABDOMEN SURGERY      large bowel resection    ANTERIOR CRUCIATE LIGAMENT REPAIR Right     APPENDECTOMY  07/09/1997    COLECTOMY      2012 - D/T Dverticulitis    DILATATION, ESOPHAGUS      HC  PICC [address] DOUBLE  08/02/2021    removed   8/2021    MITRAL VALVE REPAIR N/A 9/23/2021    MITRAL VALVE REPLACEMENT - ON-X SIZE 31/33 performed by Dilshad Boyer MD at [facility] History:  Social History     Tobacco Use    Smoking status: Former Smoker     Packs/day: 1.00     Types: Cigarettes    Smokeless tobacco: Former User     Types: Chew, Snuff     Quit date: 9/15/2017    Tobacco comment: smoked 10 yrs. Quit in 2014   Vaping Use    Vaping Use: Never used   Substance Use Topics    Alcohol use: Yes     Alcohol/week: 20.0 standard drinks     Types: 20 Cans of beer per week     Comment: 20/ week, average every other day    Drug use: Never        CURRENT MEDICATIONS:        Outpatient Medications Marked as Taking for the 1/27/22 encounter (Office Visit) with Zhang Ness PA-C   Medication Sig Dispense Refill    lisinopril (PRINIVIL;ZESTRIL) 2.5 MG tablet Take 1 tablet by mouth daily 90 tablet 3    metoprolol succinate (TOPROL XL) 100 MG extended release tablet Take 1 tablet by mouth daily 90 tablet 3    warfarin (COUMADIN) 5 MG tablet Take 5 mg by mouth daily 5 mg on Fridays and 7.5 mg on all other days.  atorvastatin (LIPITOR) 20 MG tablet Take 1 tablet by mouth nightly 90 tablet 3    acetaminophen (TYLENOL) 500 MG tablet Take 1,000 mg by mouth every 6 hours as needed for Pain      Ascorbic Acid (VITAMIN C) 250 MG tablet Take 750 mg by mouth daily Emergen C 750 mg QD      Lactobacillus Rhamnosus, GG, (CULTURELLE PO) Take 1 tablet by mouth daily          FAMILY HISTORY: family history includes No Known Problems in his mother; Other in his father. Physical Examination:      /72 (Site: Left Upper Arm, Position: Sitting, Cuff Size: Medium Adult)   Pulse 91   Resp 17   Ht 5' 6\" (1.676 m)   Wt 187 lb 3.2 oz (84.9 kg)   SpO2 98%   BMI 30.21 kg/m²  Body mass index is 30.21 kg/m². Constitutional: He is oriented to person, place, and time. He appears well-developed and well-nourished. In no acute distress. HEENT: Normocephalic and atraumatic. No JVD present. Carotid bruit is not present. No mass and no thyromegaly present. No lymphadenopathy present. Cardiovascular: Normal rate, regular rhythm, normal heart sounds. Exam reveals no gallop and no friction rubs.  1/6 systolic murmur, 4th intercostal space on the LEFT must lateral to the sternal border Crisp S2 closing click noted maximal at the apex. Pulmonary/Chest: Effort normal and breath sounds normal. No respiratory distress. He has no wheezes, rhonchi or rales. Abdominal: Soft, non-tender. Bowel sounds and aorta are normal. He exhibits no organomegaly, mass or bruit. Extremities: None. No cyanosis or clubbing. 2+ radial and carotid pulses. Distal extremity pulses: 2+ bilaterally. Neurological: He is alert and oriented to person, place, and time. No evidence of gross cranial nerve deficit. Coordination appeared normal.   Skin: Skin is warm and dry. There is no rash or diaphoresis. Psychiatric: He has a normal mood and affect. His speech is normal and behavior is normal.      MOST RECENT LABS ON RECORD:   Lab Results   Component Value Date    WBC 5.2 11/10/2021    HGB 13.3 11/10/2021    HCT 40.6 (L) 11/10/2021     11/10/2021    CHOL 209 (H) 10/15/2021    TRIG 350 (H) 10/15/2021    HDL 52 10/15/2021    ALT 16 09/15/2021    AST 19 09/15/2021     11/10/2021    K 4.5 11/10/2021     11/10/2021    CREATININE 0.83 11/10/2021    BUN 16 11/10/2021    CO2 22 11/10/2021    TSH 1.86 07/20/2021    INR 2 01/20/2022    LABA1C 5.5 07/17/2021         ASSESSMENT:     1. Primary hypertension    2. Sinus tachycardia    3. Mixed hyperlipidemia    4. Endocarditis of mitral valve    5. Endocarditis due to methicillin susceptible Staphylococcus aureus (MSSA)       PLAN:        · Essential Hypertension: Controlled   Beta Blocker: Continue Metoprolol succinate (Toprol XL) 100 mg daily.  ACE Inibitor/ARB: Continue lisinopril 5 mg, 1/2 tab daily.  I took the liberty of ordering a BMP for today to assess their potassium and renal function.  I told them that they could get their lab work performed at the location of their choosing, unfortunately, if the lab work was not performed at a [facility]) facility I could not guarantee my ability to follow up with them on their results. · Lightheadedness/dizziness: No further episodes  · Nonpharmacologic counseling: Because of his condition, I reminded him to try and keep himself well-hydrated and to take extra time when moving from laying to sitting, sitting to standing and standing to walking. I also explained to him to help improve his symptoms he should include 3 g sodium diet, 1 or 2 L of sports drinks daily, knee-high compressions stockings. · History of Severe Mitral Vegetation Secondary to MSSA bactremia (endocarditis) of unknown Etiology:   · S/P: Mitral Mechanical Valve Replacement (SAMEER)  asymptomatic  · Beta Blocker: Continue Metoprolol succinate (Toprol XL) 100 mg daily. ACE Inibitor/ARB: Continue lisinopril 5 mg, 1/2 tab daily. · Anticoagulation: Continue warfarin (Coumadin) take as directed. · Sinus Tachycardia:   · Beta Blocker: Continue Metoprolol succinate (Toprol XL) 100 mg daily. · Hyperlipidemia: Mixed - Last LDL done on 10/15/2021 was 87 mg/dL   · Cholesterol Reduction Therapy: Continue Atorvastatin (Lipitor) 20 mg daily. I discussed the potential benefits of statin therapy as well as the potential risks including myalgia as well as the rare but potentially serious complication of liver or kidney damage. Although rare, I told them that this could be serious and therefore told them to stop the medication immediately and call if they developed any severe muscle aches or pains and they agreed to do so. In the meantime I asked him to continue taking his other medications and follow up with you as previously scheduled. FOLLOW UP:   I told Mr. Colton Lemons to call my office if he had any problems, but otherwise told him to Return for scheduled follow up in November . However, I would be happy to see him sooner should the need arise. Once again, thank you for allowing me to participate in this patients care.  Please do not hesitate to contact me could I be of further assistance. Sincerely,  Darvin Sandhu PA-C  [facility] SPECIALTY [facility] Cardiology Specialist    [address]  [facility], [address] [place] [address]  Phone: [phone], Fax: [phone]     I believe that the risk of significant morbidity and mortality related to the patient's current medical conditions are: Intermediate. Between 15-29 minutes were spent during prep work, discussion and exam of the patient, and follow up documentation and all of their questions were answered. The documentation recorded by the scribe, accurately and completely reflects the services I personally performed and the decisions made by me.  Darvin Sandhu PA-C January 27, 2022

## 2022-01-27 NOTE — PATIENT INSTRUCTIONS
SURVEY:    You may be receiving a survey from [employer] regarding your visit today. Please complete the survey to enable us to provide the highest quality of care to you and your family. If you cannot score us a very good on any question, please call the office to discuss how we could have made your experience a very good one. Thank you.

## 2022-02-08 ENCOUNTER — HOSPITAL ENCOUNTER (OUTPATIENT)
Dept: PHARMACY | Age: 42
Setting detail: THERAPIES SERIES
Discharge: HOME | End: 2022-02-08
Payer: COMMERCIAL

## 2022-02-08 ENCOUNTER — HOSPITAL ENCOUNTER (OUTPATIENT)
Age: 42
Discharge: HOME | End: 2022-02-08
Payer: COMMERCIAL

## 2022-02-08 VITALS — SYSTOLIC BLOOD PRESSURE: 122 MMHG | HEART RATE: 85 BPM | DIASTOLIC BLOOD PRESSURE: 81 MMHG

## 2022-02-08 DIAGNOSIS — Z95.2 H/O MITRAL VALVE REPLACEMENT WITH MECHANICAL VALVE: Primary | ICD-10-CM

## 2022-02-08 LAB
ANION GAP SERPL CALCULATED.3IONS-SCNC: 12 MMOL/L (ref 9–17)
BUN BLDV-MCNC: 17 MG/DL (ref 6–20)
BUN/CREAT BLD: 19 (ref 9–20)
CALCIUM SERPL-MCNC: 10 MG/DL (ref 8.6–10.4)
CHLORIDE BLD-SCNC: 106 MMOL/L (ref 98–107)
CO2: 22 MMOL/L (ref 20–31)
CREAT SERPL-MCNC: 0.89 MG/DL (ref 0.7–1.2)
GFR AFRICAN AMERICAN: >60 ML/MIN
GFR NON-AFRICAN AMERICAN: >60 ML/MIN
GFR SERPL CREATININE-BSD FRML MDRD: ABNORMAL ML/MIN/{1.73_M2}
GFR SERPL CREATININE-BSD FRML MDRD: ABNORMAL ML/MIN/{1.73_M2}
GLUCOSE BLD-MCNC: 108 MG/DL (ref 70–99)
INR BLD: 1.8
POTASSIUM SERPL-SCNC: 4.3 MMOL/L (ref 3.7–5.3)
SODIUM BLD-SCNC: 140 MMOL/L (ref 135–144)

## 2022-02-08 PROCEDURE — 85610 PROTHROMBIN TIME: CPT

## 2022-02-08 PROCEDURE — 80048 BASIC METABOLIC PNL TOTAL CA: CPT

## 2022-02-08 PROCEDURE — 99212 OFFICE O/P EST SF 10 MIN: CPT

## 2022-02-08 PROCEDURE — 36415 COLL VENOUS BLD VENIPUNCTURE: CPT

## 2022-02-08 NOTE — PROGRESS NOTES
Rachael [address] [facility]/[place]  Medication Management  ANTICOAGULATION    Referring Provider: Manual Ramming INR: 2-3    TODAY'S INR: 1.8    WARFARIN Dosage: Increase warfarin to 10 mg po every Friday; 7.5 mg po all other days    Patient will take warfarin 10 mg po today for 1 dose    INR (no units)   Date Value   2022 1.8   2022 2   2022 1.7   2022 1.7   2021 1.3   12/15/2021 1.3   2021 1.2       Medication changes:  No changes  Notes:    Fingerstick INR drawn per clinic protocol. Patient states no visible blood in urine and no black tarry stool. Denies any missed doses of warfarin. No change in other maintenance medications or in diet. Will recheck INR in 2 weeks. Patient acknowledges working in consult agreement with pharmacist as referred by his/her physician. For Pharmacy Admin Tracking Only     Intervention Detail: Adherence Monitorin and Dose Adjustment: 2, reason: Therapy Optimization   Total # of Interventions Recommended: 2   Total # of Interventions Accepted: 2   Time Spent (min): 280 [facility].  Denise Garcia, [facility]

## 2022-02-08 NOTE — PATIENT INSTRUCTIONS
Please take warfarin 10 mg today. Increase current dose of warfarin as instructed on dosing calendar provided - 10 mg every Friday and 7.5 mg all other days. Continue to monitor urine and stool for signs and symptoms of bleeding. Please notify the clinic of any medication changes. Please remember to bring all medications (both prescription and OTC) to your next visit. Kindly notify the clinic if you are unable to make to your next appointment.

## 2022-02-09 ENCOUNTER — TELEPHONE (OUTPATIENT)
Dept: CARDIOLOGY | Age: 42
End: 2022-02-09

## 2022-02-09 NOTE — TELEPHONE ENCOUNTER
----- Message from Mj Stewart PA-C sent at 2/8/2022  4:54 PM EST -----  Please let him know his blood work looks good. Thank you.

## 2022-02-16 ENCOUNTER — ANTI-COAG VISIT (OUTPATIENT)
Dept: PHARMACY | Age: 42
End: 2022-02-16

## 2022-02-16 DIAGNOSIS — Z95.2 H/O MITRAL VALVE REPLACEMENT WITH MECHANICAL VALVE: Primary | ICD-10-CM

## 2022-02-16 NOTE — PROGRESS NOTES
Patient contacted clinic via telephone with request for warfarin 5 mg tablet refill.   Prescription called to [facility] for warfarin 5 mg tablets - Take 2 tablets (=10 mg) po every Friday and 1.5 tablets (=7.5 mg) po all other days or as instructed per clinic  Qty:  90 day supply  Refill:  3   Prescriber:  Dr. Kena Smiley

## 2022-02-23 ENCOUNTER — APPOINTMENT (OUTPATIENT)
Dept: PHARMACY | Age: 42
End: 2022-02-23
Payer: COMMERCIAL

## 2022-02-24 ENCOUNTER — HOSPITAL ENCOUNTER (OUTPATIENT)
Dept: PHARMACY | Age: 42
Setting detail: THERAPIES SERIES
Discharge: HOME | End: 2022-02-24
Payer: COMMERCIAL

## 2022-02-24 VITALS
HEART RATE: 94 BPM | DIASTOLIC BLOOD PRESSURE: 94 MMHG | BODY MASS INDEX: 30.02 KG/M2 | WEIGHT: 186 LBS | SYSTOLIC BLOOD PRESSURE: 131 MMHG

## 2022-02-24 DIAGNOSIS — Z95.2 H/O MITRAL VALVE REPLACEMENT WITH MECHANICAL VALVE: Primary | ICD-10-CM

## 2022-02-24 LAB — INR BLD: 3.1

## 2022-02-24 PROCEDURE — 99211 OFF/OP EST MAY X REQ PHY/QHP: CPT

## 2022-02-24 PROCEDURE — 85610 PROTHROMBIN TIME: CPT

## 2022-02-24 NOTE — PROGRESS NOTES
Rachael [address] [place] [address]/[place]  Medication Management  ANTICOAGULATION    Referring Provider: Lg Villalobos INR: 2-3    TODAY'S INR: 3.1    WARFARIN Dosage: Continue warfarin 10 mg po every Friday; 7.5 mg po all other days  Patient will take warfarin 5 mg po tomorrow for 1 dose (he already took warfarin this AM). INR (no units)   Date Value   2022 3.1   2022 1.8   2022 2   2022 1.7   2022 1.7   2021 1.3   12/15/2021 1.3       Medication changes:  No changes  Notes:    Fingerstick INR drawn per clinic protocol. Patient states no visible blood in urine and no black tarry stool. Denies any missed doses of warfarin. No change in other maintenance medications or in diet. Will recheck INR in 5 weeks - per patient request due to his work schedule. Patient acknowledges working in consult agreement with pharmacist as referred by his/her physician. For Pharmacy Admin Tracking Only     Intervention Detail: Adherence Monitorin and Dose Adjustment: 1, reason: Therapy Optimization   Total # of Interventions Recommended: 1   Total # of Interventions Accepted: 1   Time Spent (min): [address]  Rula Tejada, [facility]

## 2022-02-24 NOTE — PATIENT INSTRUCTIONS
Please take warfarin 5 mg tomorrow. Continue current dose of warfarin as instructed on dosing calendar provided. Continue to monitor urine and stool for signs and symptoms of bleeding. Please notify the clinic of any medication changes. Please remember to bring all medications (both prescription and OTC) to your next visit. Kindly notify the clinic if you are unable to make to your next appointment.

## 2022-04-05 ENCOUNTER — HOSPITAL ENCOUNTER (OUTPATIENT)
Dept: PHARMACY | Age: 42
Setting detail: THERAPIES SERIES
Discharge: HOME | End: 2022-04-05
Payer: COMMERCIAL

## 2022-04-05 VITALS
HEART RATE: 84 BPM | WEIGHT: 187 LBS | DIASTOLIC BLOOD PRESSURE: 80 MMHG | BODY MASS INDEX: 30.18 KG/M2 | SYSTOLIC BLOOD PRESSURE: 120 MMHG

## 2022-04-05 DIAGNOSIS — Z95.2 H/O MITRAL VALVE REPLACEMENT WITH MECHANICAL VALVE: Primary | ICD-10-CM

## 2022-04-05 LAB — INR BLD: 4.9

## 2022-04-05 PROCEDURE — 85610 PROTHROMBIN TIME: CPT

## 2022-04-05 PROCEDURE — 99212 OFFICE O/P EST SF 10 MIN: CPT

## 2022-04-05 NOTE — PATIENT INSTRUCTIONS
Please hold warfarin tomorrow. Decrease current dose of warfarin as instructed on dosing calendar provided - 7.5 mg daily. Return to clinic in 2 weeks. Continue to monitor urine and stool for signs and symptoms of bleeding. Please notify the clinic of any medication changes.

## 2022-04-05 NOTE — PROGRESS NOTES
Meliss[address]eline [address] [place] [address]/[place]  Medication Management  ANTICOAGULATION    Referring Provider: Dr. Derek Abarca INR: 2-3    TODAY'S INR: 4.9    WARFARIN Dosage: Decrease warfarin to 7.5 mg po daily. Patient will hold warfarin dose tomorrow (he already took a dose this AM)    INR (no units)   Date Value   2022 4.9   2022 3.1   2022 1.8   2022 2   2022 1.7   2022 1.7   2021 1.3       Medication changes:  No changes  Notes:    Fingerstick INR drawn per clinic protocol. Patient states no visible blood in urine and no black tarry stool. Denies any missed doses of warfarin. No change in other maintenance medications. Patient recently started an intermittent fasting diet. He only eats between 12 noon and 8 pm.  He states he is eating a little less each day - with breakfast being omitted. Will recheck INR in 2 weeks. Patient acknowledges working in consult agreement with pharmacist as referred by his/her physician. For Pharmacy Admin Tracking Only     Intervention Detail: Adherence Monitorin and Dose Adjustment: 2, reason: Therapy Optimization   Total # of Interventions Recommended: 2   Total # of Interventions Accepted: 2   Time Spent (min): 280 [facility].  [place], [address] [facility]

## 2022-04-19 ENCOUNTER — HOSPITAL ENCOUNTER (OUTPATIENT)
Dept: PHARMACY | Age: 42
Setting detail: THERAPIES SERIES
Discharge: HOME | End: 2022-04-19
Payer: COMMERCIAL

## 2022-04-19 VITALS
BODY MASS INDEX: 30.18 KG/M2 | SYSTOLIC BLOOD PRESSURE: 147 MMHG | DIASTOLIC BLOOD PRESSURE: 89 MMHG | WEIGHT: 187 LBS | HEART RATE: 86 BPM

## 2022-04-19 DIAGNOSIS — Z95.2 H/O MITRAL VALVE REPLACEMENT WITH MECHANICAL VALVE: Primary | ICD-10-CM

## 2022-04-19 LAB — INR BLD: 3.9

## 2022-04-19 PROCEDURE — 85610 PROTHROMBIN TIME: CPT | Performed by: FAMILY MEDICINE

## 2022-04-19 PROCEDURE — 99212 OFFICE O/P EST SF 10 MIN: CPT | Performed by: FAMILY MEDICINE

## 2022-04-19 NOTE — PROGRESS NOTES
[facility]  Medication Management  ANTICOAGULATION    Referring Provider: Dr Vandana Romero INR: 2.0-3.0    TODAY'S INR: 3.9    WARFARIN Dosage: hold x1 then decrease 9% to 5mg MF, 7.5mg all other days    INR (no units)   Date Value   2022 3.9   2022 4.9   2022 3.1   2022 1.8   2022 2   2022 1.7   2022 1.7       Medication changes:  No changes  Notes:    Fingerstick INR drawn per clinic protocol. Patient states no visible blood in urine and no black tarry stool. Denies any missed doses of warfarin. No change in other maintenance medications or in diet. Will recheck INR in 2 weeks. Patient continues intermittent fasting which is helping with mental clarity and energy. Patient has taken warfarin today so will hold dose tomorrow then begin new dosing schedule. Patient has taken new position at work and best days for appts is Monday and Friday at 340. Patient acknowledges working in consult agreement with pharmacist as referred by his/her physician.                   For Pharmacy Admin Tracking Only     Intervention Detail: Adherence Monitorin and Dose Adjustment: 3, reason: Therapy Optimization   Total # of Interventions Recommended: 5   Total # of Interventions Accepted: 5   Time Spent (min): [address] BENNIE Ureña.Ph., 2022,4:00 PM

## 2022-04-19 NOTE — PATIENT INSTRUCTIONS
Please do not take warfarin tomorrow then decrease your dosing to take 5mg on Mondays and Fridays and 7.5mg all other days. Continue to monitor for signs of bleeding. Return to coumadin clinic in 2 weeks.

## 2022-05-05 ENCOUNTER — HOSPITAL ENCOUNTER (OUTPATIENT)
Dept: PHARMACY | Age: 42
Setting detail: THERAPIES SERIES
Discharge: HOME | End: 2022-05-05
Payer: COMMERCIAL

## 2022-05-05 VITALS
DIASTOLIC BLOOD PRESSURE: 90 MMHG | SYSTOLIC BLOOD PRESSURE: 132 MMHG | WEIGHT: 186 LBS | HEART RATE: 82 BPM | BODY MASS INDEX: 30.02 KG/M2

## 2022-05-05 DIAGNOSIS — Z95.2 H/O MITRAL VALVE REPLACEMENT WITH MECHANICAL VALVE: Primary | ICD-10-CM

## 2022-05-05 LAB — INR BLD: 2.3

## 2022-05-05 PROCEDURE — 99211 OFF/OP EST MAY X REQ PHY/QHP: CPT

## 2022-05-05 PROCEDURE — 85610 PROTHROMBIN TIME: CPT

## 2022-05-05 NOTE — PROGRESS NOTES
[address]/[place]  Medication Management  ANTICOAGULATION    Referring Provider: Dr Joesph Doran INR: 2.0-3.0     TODAY'S INR: 2.3     WARFARIN Dosage: Continue warfarin whole 5 mg tablet MF and 1.5 tablets for 7.5 mg all other days. INR (no units)   Date Value   2022 3.9   2022 4.9   2022 3.1   2022 1.8   2022 2   2022 1.7   2022 1.7     Medication changes:  none    Notes:    Fingerstick INR drawn per clinic protocol. Patient states no visible blood in urine and no black tarry stool. Denies any missed doses of warfarin. No change in other maintenance medications or in diet. Will recheck INR in 4 weeks. Patient acknowledges working in consult agreement with pharmacist as referred by his/her physician.     Patient has a new position with [employer] and cannot come before 3:40 pm.             For Pharmacy Admin Tracking Only     Intervention Detail: Adherence Monitorin   Total # of Interventions Recommended: 0   Total # of Interventions Accepted: 0   Time Spent (min): 8597 Jazz Watson, [address] [facility], PharmD

## 2022-05-05 NOTE — PATIENT INSTRUCTIONS
Continue to monitor urine and stool. Continue to monitor for signs of bleeding. Return to clinic in 4 weeks. Continue warfarin whole 5 mg tablet MF and 1.5 tablets for 7.5 mg all other days.

## 2022-06-01 ENCOUNTER — HOSPITAL ENCOUNTER (OUTPATIENT)
Dept: PHARMACY | Age: 42
Setting detail: THERAPIES SERIES
Discharge: HOME | End: 2022-06-01
Payer: COMMERCIAL

## 2022-06-01 VITALS — WEIGHT: 188 LBS | BODY MASS INDEX: 30.34 KG/M2

## 2022-06-01 DIAGNOSIS — Z95.2 H/O MITRAL VALVE REPLACEMENT WITH MECHANICAL VALVE: Primary | ICD-10-CM

## 2022-06-01 LAB — INR BLD: 6.8

## 2022-06-01 PROCEDURE — 85610 PROTHROMBIN TIME: CPT

## 2022-06-01 PROCEDURE — 99212 OFFICE O/P EST SF 10 MIN: CPT

## 2022-06-01 NOTE — PATIENT INSTRUCTIONS
Continue to monitor urine and stool. Continue to monitor for signs of bleeding. Return to clinic in 1 week. Hold warfarin for 3 days then decrease warfarin to whole 5 mg tablet MWF and 1.5 tablets for 7.5 mg all other days.

## 2022-06-01 NOTE — PROGRESS NOTES
Rachael [address] [place] [address]/[place]  Medication Management  ANTICOAGULATION      Referring Provider: Dr Taylor     GOAL INR: 2.0-3.0     TODAY'S INR: 6.8     WARFARIN Dosage: Hold warfarin for 3 days then decrease warfarin to whole 5 mg tablet MWF and 1.5 tablets for 7.5 mg all other days. INR (no units)   Date Value   2022 2.3   2022 3.9   2022 4.9   2022 3.1   2022 1.8   2022 2   2022 1.7     Medication changes:  None    Notes:    Fingerstick INR drawn per clinic protocol. Patient states no visible blood in urine and no black tarry stool. Denies any missed doses of warfarin. No change in other maintenance medications or in diet. Will recheck INR in 1 week. Patient acknowledges working in consult agreement with pharmacist as referred by his/her physician. Patient has a new position with [employer] and cannot come before 3:40 pm.   Patient took some Tylenol and drank some beer and [employer]. Educated on NOT taking Tylenol with alcohol.                  For Pharmacy Admin Tracking Only     Intervention Detail: Adherence Monitorin and Dose Adjustment: 3, reason: Therapy Optimization   Total # of Interventions Recommended: 3   Total # of Interventions Accepted: 3   Time Spent (min): [facility], [facility], PharmD

## 2022-06-07 ENCOUNTER — HOSPITAL ENCOUNTER (OUTPATIENT)
Dept: PHARMACY | Age: 42
Setting detail: THERAPIES SERIES
Discharge: HOME | End: 2022-06-07
Payer: COMMERCIAL

## 2022-06-07 VITALS
SYSTOLIC BLOOD PRESSURE: 131 MMHG | WEIGHT: 188 LBS | DIASTOLIC BLOOD PRESSURE: 93 MMHG | HEART RATE: 100 BPM | BODY MASS INDEX: 30.34 KG/M2

## 2022-06-07 DIAGNOSIS — Z95.2 H/O MITRAL VALVE REPLACEMENT WITH MECHANICAL VALVE: Primary | ICD-10-CM

## 2022-06-07 LAB — INR BLD: 1.7

## 2022-06-07 PROCEDURE — 85610 PROTHROMBIN TIME: CPT

## 2022-06-07 PROCEDURE — 99212 OFFICE O/P EST SF 10 MIN: CPT

## 2022-06-07 NOTE — PROGRESS NOTES
Paraslatanya [address] [place] [address]/[place]  Medication Management  ANTICOAGULATION      Referring Provider: Dr Taylor     GOAL INR: 2.0-3.0     TODAY'S INR: 1.7     WARFARIN Dosage: Continue warfarin whole 5 mg tablet MWF and 1.5 tablets for 7.5 mg all other days.     INR (no units)   Date Value   2022 6.8   2022 2.3   2022 3.9   2022 4.9   2022 3.1   2022 1.8   2022 2     Medication changes:  none    Notes:    Fingerstick INR drawn per clinic protocol. Patient states no visible blood in urine and no black tarry stool. Denies any missed doses of warfarin. No change in other maintenance medications or in diet. Will recheck INR in 1.5 weeks. Patient acknowledges working in consult agreement with pharmacist as referred by his/her physician.          Patient has a new position with [employer] and cannot come before 3:40 pm.                For Pharmacy Admin Tracking Only     Intervention Detail: Adherence Monitorin   Total # of Interventions Recommended: 0   Total # of Interventions Accepted: 0   Time Spent (min):  Jazz Watson, [facility], PharmD

## 2022-06-16 ENCOUNTER — HOSPITAL ENCOUNTER (OUTPATIENT)
Dept: PHARMACY | Age: 42
Setting detail: THERAPIES SERIES
Discharge: HOME | End: 2022-06-16
Payer: COMMERCIAL

## 2022-06-16 VITALS
DIASTOLIC BLOOD PRESSURE: 94 MMHG | SYSTOLIC BLOOD PRESSURE: 130 MMHG | WEIGHT: 188 LBS | HEART RATE: 93 BPM | BODY MASS INDEX: 30.34 KG/M2

## 2022-06-16 DIAGNOSIS — Z95.2 H/O MITRAL VALVE REPLACEMENT WITH MECHANICAL VALVE: Primary | ICD-10-CM

## 2022-06-16 LAB — INR BLD: 2.4

## 2022-06-16 PROCEDURE — 99211 OFF/OP EST MAY X REQ PHY/QHP: CPT

## 2022-06-16 PROCEDURE — 85610 PROTHROMBIN TIME: CPT

## 2022-06-16 RX ORDER — LANOLIN ALCOHOL/MO/W.PET/CERES
5 CREAM (GRAM) TOPICAL NIGHTLY
COMMUNITY

## 2022-06-16 NOTE — PROGRESS NOTES
Rachael [address] [place] [address]/[place]  Medication Management  ANTICOAGULATION    Referring Provider: Dr Taylor     GOAL INR: 2.0-3.0     TODAY'S INR: 2.4     WARFARIN Dosage: Continue warfarin whole 5 mg tablet MWF and 1.5 tablets for 7.5 mg all other days.     INR (no units)   Date Value   2022 1.7   2022 6.8   2022 2.3   2022 3.9   2022 4.9   2022 3.1   2022 1.8     Medication changes:  Melatonin    Notes:    Fingerstick INR drawn per clinic protocol. Patient states no visible blood in urine and no black tarry stool. Denies any missed doses of warfarin. No change in other maintenance medications or in diet. Will recheck INR in 3 weeks. Patient acknowledges working in consult agreement with pharmacist as referred by his/her physician.   Vacation July 10 - [address], [address] [facility] Tracking Only     Intervention Detail: Adherence Monitorin   Total # of Interventions Recommended: 0   Total # of Interventions Accepted: 0   Time Spent (min):  Jazz Watson [facility], PharmD

## 2022-06-16 NOTE — PATIENT INSTRUCTIONS
Continue to monitor urine and stool. Continue to monitor for signs of bleeding. Return to clinic in 3 weeks.   Continue warfarin whole 5 mg tablet MWF and 1.5 tablets for 7.5 mg all other days.

## 2022-06-27 ENCOUNTER — TELEPHONE (OUTPATIENT)
Dept: CARDIOLOGY | Age: 42
End: 2022-06-27

## 2022-06-27 NOTE — TELEPHONE ENCOUNTER
Please let patient know that 130 is very normal and as long as less than 140 typically nothing to worry about. Will discuss at next visit but If he needs to be seen sooner can be addressed by PCP or Kalpana. Thanks.

## 2022-07-07 ENCOUNTER — HOSPITAL ENCOUNTER (OUTPATIENT)
Dept: PHARMACY | Age: 42
Setting detail: THERAPIES SERIES
Discharge: HOME | End: 2022-07-07
Payer: COMMERCIAL

## 2022-07-07 VITALS
WEIGHT: 191 LBS | DIASTOLIC BLOOD PRESSURE: 96 MMHG | SYSTOLIC BLOOD PRESSURE: 137 MMHG | BODY MASS INDEX: 30.83 KG/M2 | HEART RATE: 86 BPM

## 2022-07-07 DIAGNOSIS — Z95.2 H/O MITRAL VALVE REPLACEMENT WITH MECHANICAL VALVE: Primary | ICD-10-CM

## 2022-07-07 LAB — INR BLD: 1.7

## 2022-07-07 PROCEDURE — 99212 OFFICE O/P EST SF 10 MIN: CPT

## 2022-07-07 PROCEDURE — 85610 PROTHROMBIN TIME: CPT

## 2022-07-07 NOTE — PROGRESS NOTES
[address]as[address] [place] [address]/[place]  Medication Management  ANTICOAGULATION    Referring Provider: Dr Taylor     GOAL INR: 2.0-3.0     TODAY'S INR: 1.7     WARFARIN Dosage: Continue warfarin whole 5 mg tablet MWF and 1.5 tablets for 7.5 mg all other days.     INR (no units)   Date Value   2022 2.4   2022 1.7   2022 6.8   2022 2.3   2022 3.9   2022 4.9   2022 3.1     Medication changes:  None    Notes:    Fingerstick INR drawn per clinic protocol. Patient states no visible blood in urine and no black tarry stool. Denies any missed doses of warfarin. No change in other maintenance medications or in diet. Will recheck INR in 3 weeks. Patient acknowledges working in consult agreement with pharmacist as referred by his/her physician. Vacation July 10 - [place], [place]  then headed to [place] for a week for work.                       For Pharmacy Admin Tracking Only     Intervention Detail: Adherence Monitorin and Dose Adjustment: 1, reason: Therapy Optimization   Total # of Interventions Recommended: 1   Total # of Interventions Accepted: 1   Time Spent (min):  Jazz Watson, [facility], PharmD

## 2022-08-01 ENCOUNTER — APPOINTMENT (OUTPATIENT)
Dept: PHARMACY | Age: 42
End: 2022-08-01
Payer: COMMERCIAL

## 2022-08-04 ENCOUNTER — APPOINTMENT (OUTPATIENT)
Dept: PHARMACY | Age: 42
End: 2022-08-04
Payer: COMMERCIAL

## 2022-08-25 ENCOUNTER — HOSPITAL ENCOUNTER (OUTPATIENT)
Dept: PHARMACY | Age: 42
Setting detail: THERAPIES SERIES
Discharge: HOME | End: 2022-08-25
Payer: COMMERCIAL

## 2022-08-25 VITALS
DIASTOLIC BLOOD PRESSURE: 95 MMHG | HEART RATE: 79 BPM | SYSTOLIC BLOOD PRESSURE: 142 MMHG | WEIGHT: 190.4 LBS | BODY MASS INDEX: 30.73 KG/M2

## 2022-08-25 DIAGNOSIS — Z95.2 H/O MITRAL VALVE REPLACEMENT WITH MECHANICAL VALVE: Primary | ICD-10-CM

## 2022-08-25 LAB — INR BLD: 2.1

## 2022-08-25 PROCEDURE — 99211 OFF/OP EST MAY X REQ PHY/QHP: CPT | Performed by: FAMILY MEDICINE

## 2022-08-25 PROCEDURE — 85610 PROTHROMBIN TIME: CPT | Performed by: FAMILY MEDICINE

## 2022-08-25 NOTE — PATIENT INSTRUCTIONS
Continue taking warfarin 5mg (1 tablet) on Monday, Wednesday and Friday and 7.5mg (1 1/2 tablets) all other days. Continue to monitor for signs of bleeding. Return to coumadin clinic in 4 weeks.

## 2022-08-25 NOTE — PROGRESS NOTES
[address]/[place]  Medication Management  ANTICOAGULATION    Referring Provider: Dr Marley Braswell INR: 2.0-3.0    TODAY'S INR: 2.1    WARFARIN Dosage: continue 5mg MWF, 7.5mg all other days    INR (no units)   Date Value   2022 2.1   2022 1.7   2022 2.4   2022 1.7   2022 6.8   2022 2.3   2022 3.9       Medication changes:  No changes  Notes:    Fingerstick INR drawn per clinic protocol. Patient states no visible blood in urine and no black tarry stool. Denies any missed doses of warfarin. No change in other maintenance medications or in diet. Will recheck INR in 4 weeks. Patient acknowledges working in consult agreement with pharmacist as referred by his/her physician.                   For Pharmacy Admin Tracking Only    Intervention Detail: Adherence Monitorin  Total # of Interventions Recommended: 2  Total # of Interventions Accepted: 2  Time Spent (min): 30    BENNIE Collins.Ph., 2022,3:50 PM

## 2022-09-01 ENCOUNTER — OFFICE VISIT (OUTPATIENT)
Dept: PRIMARY CARE CLINIC | Age: 42
End: 2022-09-01
Payer: COMMERCIAL

## 2022-09-01 VITALS
BODY MASS INDEX: 31.05 KG/M2 | SYSTOLIC BLOOD PRESSURE: 128 MMHG | RESPIRATION RATE: 18 BRPM | DIASTOLIC BLOOD PRESSURE: 86 MMHG | HEIGHT: 66 IN | WEIGHT: 193.2 LBS

## 2022-09-01 DIAGNOSIS — I10 PRIMARY HYPERTENSION: Primary | ICD-10-CM

## 2022-09-01 DIAGNOSIS — M79.89 SOFT TISSUE MASS: ICD-10-CM

## 2022-09-01 DIAGNOSIS — Z30.09 VASECTOMY EVALUATION: ICD-10-CM

## 2022-09-01 DIAGNOSIS — Z95.2 H/O MITRAL VALVE REPLACEMENT WITH MECHANICAL VALVE: ICD-10-CM

## 2022-09-01 PROCEDURE — 99204 OFFICE O/P NEW MOD 45 MIN: CPT | Performed by: STUDENT IN AN ORGANIZED HEALTH CARE EDUCATION/TRAINING PROGRAM

## 2022-09-01 SDOH — ECONOMIC STABILITY: FOOD INSECURITY: WITHIN THE PAST 12 MONTHS, THE FOOD YOU BOUGHT JUST DIDN'T LAST AND YOU DIDN'T HAVE MONEY TO GET MORE.: NEVER TRUE

## 2022-09-01 SDOH — ECONOMIC STABILITY: TRANSPORTATION INSECURITY
IN THE PAST 12 MONTHS, HAS THE LACK OF TRANSPORTATION KEPT YOU FROM MEDICAL APPOINTMENTS OR FROM GETTING MEDICATIONS?: NO

## 2022-09-01 SDOH — ECONOMIC STABILITY: FOOD INSECURITY: WITHIN THE PAST 12 MONTHS, YOU WORRIED THAT YOUR FOOD WOULD RUN OUT BEFORE YOU GOT MONEY TO BUY MORE.: NEVER TRUE

## 2022-09-01 SDOH — ECONOMIC STABILITY: TRANSPORTATION INSECURITY
IN THE PAST 12 MONTHS, HAS LACK OF TRANSPORTATION KEPT YOU FROM MEETINGS, WORK, OR FROM GETTING THINGS NEEDED FOR DAILY LIVING?: NO

## 2022-09-01 ASSESSMENT — PATIENT HEALTH QUESTIONNAIRE - PHQ9
SUM OF ALL RESPONSES TO PHQ QUESTIONS 1-9: 0
SUM OF ALL RESPONSES TO PHQ9 QUESTIONS 1 & 2: 0
SUM OF ALL RESPONSES TO PHQ QUESTIONS 1-9: 0
2. FEELING DOWN, DEPRESSED OR HOPELESS: 0
1. LITTLE INTEREST OR PLEASURE IN DOING THINGS: 0

## 2022-09-01 ASSESSMENT — SOCIAL DETERMINANTS OF HEALTH (SDOH): HOW HARD IS IT FOR YOU TO PAY FOR THE VERY BASICS LIKE FOOD, HOUSING, MEDICAL CARE, AND HEATING?: NOT HARD AT ALL

## 2022-09-01 NOTE — PROGRESS NOTES
Natalee [address], [address] , [facility], [address] [place] [address] is a 39 y.o. male with  has a past medical history of Bacteremia, Cardioembolic stroke ([facility] [address] Cerebral artery occlusion with cerebral infarction [facility]), Cerebral septic emboli ([facility] [address] Chronic right shoulder pain, Diverticulosis, Endocarditis, History of COVID-19, Hyperlipidemia, Hypertension, MSSA bacteremia, MESSI (obstructive sleep apnea), Platelets decreased ([facility] [address] Research subject, Under care of team, Under care of team, Under care of team, Under care of team, Under care of team, and Wears contact lenses. Presented to the office today for:  Chief Complaint   Patient presents with    Mass     Assessment/Plan   1. Primary hypertension  2. H/O mitral valve replacement with mechanical valve  3. Vasectomy evaluation  -     External Referral To Urology  4. Soft tissue mass    HTN - stable at this time, continue w/ lisinopril 2.5mg, Toprol XL 100mg, and f/u with Cardiology, Coumadin Clinic  Vasectomy evaluation with Urology  Soft tissue  mass - We discussed some of the etiologies and natural histories. We may consider imaging and w/u per patient preference, no further w/u at this time. We discussed the various treatment alternatives including anti-inflammatory medications, physical therapy, injections, further imaging studies and as a last resort surgery. Return in about 6 months (around 3/1/2023) for F/U [facility]. All patient questions answered. Pt voiced understanding. Medications Discontinued During This Encounter   Medication Reason    acetaminophen (TYLENOL) 500 MG tablet LIST CLEANUP       Patient received counseling on the following healthy behaviors: nutrition, exercise and medication adherence. I encouraged and discussed lifestyle modifications including diet and exercise and the patient was agreeable to making positive/beneficial changes to both to help improve their overall health. Discussed use, benefit, and side effects of prescribed medications. Barriers to medication compliance addressed. Patient given educational materials: see patient instructions. HM - HM items completed today as per orders. Outstanding HM items though not limited to immunizations were discussed with the patient today, including risks, benefits and alternatives. The patient will discuss these during the next appointment per their preference. If there are any worsening or concerning signs or symptoms, patient will report to the ED and/or contact [id number] for immediate evaluation. Teach back method was used. Subjective:    HPI  Chief Complaint   Patient presents with    Mass     Patient here today to establish care with concerns regarding a new lump on his left abdominal wall near ribcage. He states he just noticed it about 4 months ago, no changes since the first time he noticed. No fevers, chills noted. No known family history of cancer. No treatment for this yet. Patient would also like information regarding vasectomy at this time. Hypertension, s/p Severe Mitral Vegetation: Patient here for follow-up of elevated blood pressure. He is exercising and is adherent to low salt diet. Blood pressure is well controlled at home. Cardiac symptoms none. Patient denies chest pain, chest pressure/discomfort, claudication, dyspnea, exertional chest pressure/discomfort, fatigue, irregular heart beat, lower extremity edema, near-syncope, orthopnea, palpitations, paroxysmal nocturnal dyspnea, syncope, and tachypnea. The patient is on lisinopril 2.5mg, Toprol 100mg XL and he is following at the Coumadin clinic.     Health Maintenance -   Alcohol/Substance use History - None    Tobacco Use      Smoking status: Former        Packs/day: 1.00        Years: 10.00        Pack years: 10        Types: Cigarettes        Quit date:         Years since quittin.6      Smokeless tobacco: Former      Tobacco comments: smoked 10 and atraumatic. Eyes: Pupils are equal, round, and reactive to light. Conjunctivae are normal. Right eye exhibits no discharge. Left eye exhibits no discharge. Cardiovascular: Normal rate, regular rhythm and abnormal heart sounds/murmur. Pulmonary/Chest: Effort normal and breath sounds normal. No respiratory distress. Patient has no wheezes. Abdominal: Soft. Bowel sounds are normal. Patient exhibits no distension. There is no tenderness. Musculoskeletal:  Patient exhibits no edema and tenderness. Patient exhibits no deformity. Neurological: Patient is alert and oriented to person, place, and time. Skin: Skin is warm and dry. Patient is not diaphoretic. The patient has a soft tissue mass approx size is 0dip7ys; Katya Gull No skin changes noted. Psychiatric: Patient's speech is normal and behavior is normal. Thought content normal.   Vitals reviewed. Lab Results   Component Value Date    WBC 5.2 11/10/2021    HGB 13.3 11/10/2021    HCT 40.6 (L) 11/10/2021     11/10/2021    CHOL 209 (H) 10/15/2021    TRIG 350 (H) 10/15/2021    HDL 52 10/15/2021    ALT 16 09/15/2021    AST 19 09/15/2021     02/08/2022    K 4.3 02/08/2022     02/08/2022    CREATININE 0.89 02/08/2022    BUN 17 02/08/2022    CO2 22 02/08/2022    TSH 1.86 07/20/2021    INR 2.1 08/25/2022    LABA1C 5.5 07/17/2021     Lab Results   Component Value Date    CALCIUM 10.0 02/08/2022    PHOS 3.5 08/23/2021     Lab Results   Component Value Date    LDLCHOLESTEROL 87 10/15/2021       Please note that this chart was generated using voice recognition Dragon dictation software. Although every effort was made to ensure the accuracy of this automated transcription, some errors in transcription may have occurred.     Electronically signed by Dr. Kofi Lim MD on 9/1/2022 at 4:14 PM

## 2022-09-27 ENCOUNTER — HOSPITAL ENCOUNTER (OUTPATIENT)
Dept: PHARMACY | Age: 42
Setting detail: THERAPIES SERIES
Discharge: HOME | End: 2022-09-27
Payer: COMMERCIAL

## 2022-09-27 VITALS — BODY MASS INDEX: 30.96 KG/M2 | WEIGHT: 191.8 LBS

## 2022-09-27 DIAGNOSIS — Z95.2 H/O MITRAL VALVE REPLACEMENT WITH MECHANICAL VALVE: Primary | ICD-10-CM

## 2022-09-27 LAB — INR BLD: 2.8

## 2022-09-27 PROCEDURE — 99211 OFF/OP EST MAY X REQ PHY/QHP: CPT | Performed by: FAMILY MEDICINE

## 2022-09-27 PROCEDURE — 85610 PROTHROMBIN TIME: CPT | Performed by: FAMILY MEDICINE

## 2022-09-27 NOTE — PROGRESS NOTES
[address]/[place]  Medication Management  ANTICOAGULATION    Referring Provider: Dr Juan C Koroma INR:2.0-3.0    TODAY'S INR: 2.8    WARFARIN Dosage: continue 5mg MWF, 7.5mg all other days    INR (no units)   Date Value   2022 2.8   2022 2.1   2022 1.7   2022 2.4   2022 1.7   2022 6.8   2022 2.3       Medication changes:  No changes  Notes:    Fingerstick INR drawn per clinic protocol. Patient states no visible blood in urine and no black tarry stool. Denies any missed doses of warfarin. No change in other maintenance medications or in diet. Will recheck INR in 5 weeks. Patient acknowledges working in consult agreement with pharmacist as referred by his/her physician.                   For Pharmacy Admin Tracking Only    Intervention Detail: Adherence Monitorin  Total # of Interventions Recommended: 2  Total # of Interventions Accepted: 2  Time Spent (min): 20      BENNIE Juares.Ph., 2022,3:54 PM

## 2022-09-27 NOTE — PATIENT INSTRUCTIONS
Continue to take warfarin 5mg on Monday, Wednesday and Friday and 7.5mg (1 1/2 tablets) all other days. Continue to monitor for signs of bleeding. Return to coumadin clinic in 5 weeks.
minimum assist (75% patients effort)

## 2022-11-03 ENCOUNTER — TELEPHONE (OUTPATIENT)
Dept: PHARMACY | Age: 42
End: 2022-11-03

## 2022-11-03 RX ORDER — ENOXAPARIN SODIUM 150 MG/ML
150 INJECTION SUBCUTANEOUS DAILY
Qty: 10 ML | Refills: 0 | OUTPATIENT
Start: 2022-11-03 | End: 2022-11-23 | Stop reason: ALTCHOICE

## 2022-11-03 NOTE — TELEPHONE ENCOUNTER
Patient is scheduled to have vasectomy on 11/18/22. Upon speaking with Dr Abbi Mantilla office, patient is to hold warfarin for 3 days prior to procedure. Patient does require lovenox bridging. Patient is knowledgeable and comfortable with administering Lovenox injections. Prescription for Lovenox 150mg Syringes called to [facility] doctor line at this time. Patient calendar of instruction prepared and emailed to patient at [email or web address] at patient's request.  We have reviewed calendar and instructions and patient states understanding. Patient's last dose of warfarin will be 11/14 and will resume evening on procedure (11/18) unless otherwise indicated by surgeon. Patient will return to coumadin clinic on 11/23 prior to 1 yr follow up with Dr Sarai Garcia.

## 2022-11-14 ENCOUNTER — HOSPITAL ENCOUNTER (OUTPATIENT)
Age: 42
Discharge: HOME | End: 2022-11-14

## 2022-11-14 LAB
EKG ATRIAL RATE: 79 BPM
EKG P AXIS: 55 DEGREES
EKG P-R INTERVAL: 180 MS
EKG Q-T INTERVAL: 398 MS
EKG QRS DURATION: 86 MS
EKG QTC CALCULATION (BAZETT): 456 MS
EKG R AXIS: 42 DEGREES
EKG T AXIS: 60 DEGREES
EKG VENTRICULAR RATE: 79 BPM

## 2022-11-23 ENCOUNTER — HOSPITAL ENCOUNTER (OUTPATIENT)
Dept: PHARMACY | Age: 42
Setting detail: THERAPIES SERIES
Discharge: HOME | End: 2022-11-23
Payer: COMMERCIAL

## 2022-11-23 VITALS
WEIGHT: 192 LBS | HEART RATE: 78 BPM | DIASTOLIC BLOOD PRESSURE: 97 MMHG | BODY MASS INDEX: 30.99 KG/M2 | SYSTOLIC BLOOD PRESSURE: 130 MMHG

## 2022-11-23 DIAGNOSIS — Z95.2 H/O MITRAL VALVE REPLACEMENT WITH MECHANICAL VALVE: Primary | ICD-10-CM

## 2022-11-23 LAB — INR BLD: 2.9

## 2022-11-23 PROCEDURE — 85610 PROTHROMBIN TIME: CPT

## 2022-11-23 PROCEDURE — 99211 OFF/OP EST MAY X REQ PHY/QHP: CPT

## 2022-11-23 NOTE — PROGRESS NOTES
[address]/[place]  Medication Management  ANTICOAGULATION    Referring Provider: Dr Vasquez Pro INR: 2.0-3.0     TODAY'S INR: 2.9     WARFARIN Dosage: Continue warfarin whole 5 mg tablet MWF and 1.5 tablets for 7.5 mg all other days    INR   Date Value   2022 1.02 INR   2022 2.8   2022 2.1   2022 1.7   2022 2.4   2022 1.7   2022 6.8     Medication changes:  Stop Lovenox  Finished Keflex today    Notes:    Fingerstick INR drawn per clinic protocol. Patient states no visible blood in urine and no black tarry stool. Denies any missed doses of warfarin. No change in other maintenance medications or in diet. Will recheck INR in 6 weeks. Patient acknowledges working in consult agreement with pharmacist as referred by his/her physician. Patient had vasectomy on 22. Upon speaking with Dr Nicole Median office, patient held warfarin for 3 days prior to procedure. Patient's last dose of warfarin was  and will resumed evening of procedure ().              For Pharmacy Admin Tracking Only    Intervention Detail: Adherence Monitorin and New Rx: 2, reason: Needs Additional Therapy  Total # of Interventions Recommended: 2  Total # of Interventions Accepted: 2  Time Spent (min): Sary Carney 11, [facility], PharmD

## 2022-11-23 NOTE — PATIENT INSTRUCTIONS
Continue to monitor urine and stool. Continue to monitor for signs of bleeding. Return to clinic in 4 weeks. Patient is scheduled to have vasectomy on 11/18/22. Upon speaking with Dr Ratna Marcos office, patient is to hold warfarin for 3 days prior to procedure. Patient does require lovenox bridging. Patient is knowledgeable and comfortable with administering Lovenox injections. Prescription for Lovenox 150mg Syringes called to [facility] doctor line at this time. Patient calendar of instruction prepared and emailed to patient at [email or web address] at patient's request.  We have reviewed calendar and instructions and patient states understanding. Patient's last dose of warfarin will be 11/14 and will resume evening on procedure (11/18) unless otherwise indicated by surgeon. Patient will return to coumadin clinic on 11/23 prior to 1 yr follow up with Dr Casandra Segura.

## 2022-12-16 DIAGNOSIS — E78.2 MIXED HYPERLIPIDEMIA: ICD-10-CM

## 2022-12-16 DIAGNOSIS — R42 LIGHTHEADED: ICD-10-CM

## 2022-12-16 DIAGNOSIS — B95.61 ENDOCARDITIS DUE TO METHICILLIN SUSCEPTIBLE STAPHYLOCOCCUS AUREUS (MSSA): ICD-10-CM

## 2022-12-16 DIAGNOSIS — I33.0 ENDOCARDITIS DUE TO METHICILLIN SUSCEPTIBLE STAPHYLOCOCCUS AUREUS (MSSA): ICD-10-CM

## 2022-12-16 DIAGNOSIS — I10 PRIMARY HYPERTENSION: ICD-10-CM

## 2022-12-16 DIAGNOSIS — I34.0 SEVERE MITRAL REGURGITATION: ICD-10-CM

## 2022-12-16 DIAGNOSIS — R00.0 SINUS TACHYCARDIA: ICD-10-CM

## 2022-12-17 RX ORDER — ATORVASTATIN CALCIUM 20 MG/1
TABLET, FILM COATED ORAL
Qty: 90 TABLET | Refills: 3 | Status: SHIPPED | OUTPATIENT
Start: 2022-12-17

## 2022-12-17 RX ORDER — LISINOPRIL 2.5 MG/1
TABLET ORAL
Qty: 90 TABLET | Refills: 3 | Status: SHIPPED | OUTPATIENT
Start: 2022-12-17

## 2022-12-30 ENCOUNTER — HOSPITAL ENCOUNTER (OUTPATIENT)
Dept: PHARMACY | Age: 42
Setting detail: THERAPIES SERIES
Discharge: HOME | End: 2022-12-30
Payer: COMMERCIAL

## 2022-12-30 VITALS
HEART RATE: 79 BPM | DIASTOLIC BLOOD PRESSURE: 96 MMHG | SYSTOLIC BLOOD PRESSURE: 137 MMHG | WEIGHT: 190.2 LBS | BODY MASS INDEX: 30.7 KG/M2

## 2022-12-30 DIAGNOSIS — R00.0 SINUS TACHYCARDIA: ICD-10-CM

## 2022-12-30 DIAGNOSIS — I34.0 SEVERE MITRAL REGURGITATION: ICD-10-CM

## 2022-12-30 DIAGNOSIS — I33.0 ENDOCARDITIS DUE TO METHICILLIN SUSCEPTIBLE STAPHYLOCOCCUS AUREUS (MSSA): ICD-10-CM

## 2022-12-30 DIAGNOSIS — R42 LIGHTHEADED: ICD-10-CM

## 2022-12-30 DIAGNOSIS — Z95.2 H/O MITRAL VALVE REPLACEMENT WITH MECHANICAL VALVE: Primary | ICD-10-CM

## 2022-12-30 DIAGNOSIS — E78.2 MIXED HYPERLIPIDEMIA: ICD-10-CM

## 2022-12-30 DIAGNOSIS — B95.61 ENDOCARDITIS DUE TO METHICILLIN SUSCEPTIBLE STAPHYLOCOCCUS AUREUS (MSSA): ICD-10-CM

## 2022-12-30 DIAGNOSIS — I10 PRIMARY HYPERTENSION: ICD-10-CM

## 2022-12-30 LAB — INR BLD: 2.4

## 2022-12-30 PROCEDURE — 99211 OFF/OP EST MAY X REQ PHY/QHP: CPT | Performed by: FAMILY MEDICINE

## 2022-12-30 PROCEDURE — 85610 PROTHROMBIN TIME: CPT | Performed by: FAMILY MEDICINE

## 2022-12-30 RX ORDER — MAGNESIUM GLUCONATE 27 MG(500)
500 TABLET ORAL DAILY
COMMUNITY

## 2022-12-30 RX ORDER — METOPROLOL SUCCINATE 100 MG/1
TABLET, EXTENDED RELEASE ORAL
Qty: 90 TABLET | Refills: 3 | Status: SHIPPED | OUTPATIENT
Start: 2022-12-30

## 2022-12-30 RX ORDER — CHOLECALCIFEROL (VITAMIN D3) 125 MCG
15 CAPSULE ORAL DAILY
COMMUNITY

## 2022-12-30 NOTE — PROGRESS NOTES
[address]/[place]  Medication Management  ANTICOAGULATION    Referring Provider: Dr Yumiko Cortes INR: 2.0-3.0    TODAY'S INR: 2.4    WARFARIN Dosage: continue 5mg MWF, 7.5mg all other days    INR   Date Value   2022 2.4   2022 2.9   2022 1.02 INR   2022 2.8   2022 2.1   2022 1.7   2022 2.4       Medication changes:  No changes  Notes:    Fingerstick INR drawn per clinic protocol. Patient states no visible blood in urine and no black tarry stool. Denies any missed doses of warfarin. No change in other maintenance medications or in diet. Will recheck INR in 6 weeks. Patient acknowledges working in consult agreement with pharmacist as referred by his/her physician.                   For Pharmacy Admin Tracking Only    Intervention Detail: Adherence Monitorin  Total # of Interventions Recommended: 2  Total # of Interventions Accepted: 2  Time Spent (min): 20      Luisa De Jesus R.Ph., 2022,12:13 PM

## 2022-12-30 NOTE — PATIENT INSTRUCTIONS
Continue to take warfarin 5mg (1 tablet) Monday, Wednesday and Friday and 7.5mg (1 1/2 tablets) all other days. Continue to monitor for signs of bleeding. Return to coumadin clinic in 6 weeks.

## 2023-01-18 ENCOUNTER — OFFICE VISIT (OUTPATIENT)
Dept: CARDIOLOGY | Age: 43
End: 2023-01-18
Payer: COMMERCIAL

## 2023-01-18 VITALS
DIASTOLIC BLOOD PRESSURE: 76 MMHG | OXYGEN SATURATION: 98 % | WEIGHT: 190 LBS | HEART RATE: 94 BPM | HEIGHT: 66 IN | SYSTOLIC BLOOD PRESSURE: 118 MMHG | BODY MASS INDEX: 30.53 KG/M2 | RESPIRATION RATE: 18 BRPM

## 2023-01-18 DIAGNOSIS — R42 LIGHTHEADED: ICD-10-CM

## 2023-01-18 DIAGNOSIS — I10 ESSENTIAL HYPERTENSION: Primary | ICD-10-CM

## 2023-01-18 DIAGNOSIS — E78.2 MIXED HYPERLIPIDEMIA: ICD-10-CM

## 2023-01-18 DIAGNOSIS — R00.0 SINUS TACHYCARDIA: ICD-10-CM

## 2023-01-18 DIAGNOSIS — Z95.2 H/O MITRAL VALVE REPLACEMENT WITH MECHANICAL VALVE: ICD-10-CM

## 2023-01-18 DIAGNOSIS — Z79.01 ENCOUNTER FOR CURRENT LONG-TERM USE OF ANTICOAGULANTS: ICD-10-CM

## 2023-01-18 PROCEDURE — 3078F DIAST BP <80 MM HG: CPT | Performed by: FAMILY MEDICINE

## 2023-01-18 PROCEDURE — 3074F SYST BP LT 130 MM HG: CPT | Performed by: FAMILY MEDICINE

## 2023-01-18 PROCEDURE — 99214 OFFICE O/P EST MOD 30 MIN: CPT | Performed by: FAMILY MEDICINE

## 2023-01-18 NOTE — PATIENT INSTRUCTIONS
SURVEY:    You may be receiving a survey from [employer] regarding your visit today. Please complete the survey to enable us to provide the highest quality of care to you and your family. If you cannot score us a very good on any question, please call the office to discuss how we could have made your experience a very good one. Thank you.

## 2023-01-18 NOTE — PROGRESS NOTES
Xavier Condon am scribing for and in the presence of Juliet Gann MD, MS, F.A.C.C. Patient: Denisse Suresh  : 1980  Date of Visit: 2023    REASON FOR VISIT / CONSULTATION: Follow-up (HX: HTN, HLD, Tahy, Endocarditis. Pt is here for a yearly follow up. Pt is doing well. Light headed/dizziness every now and then that comes and goes. Denies palpitations, CP, SOB. )    History of Present Illness:        Dear Rosita Ruth MD,    I had the pleasure of seeing your patient Denisse Suresh in consultation today. As you know, Mr. Kyra Santoyo is a 43 y.o. male who presents with a history of severe mitral vegetation associated with severe mitral regurgitation on a MONROE as well. 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 His MSSA bactremia (endocarditis) led to his severe mitral vegetation. Which led to a mechanical mitral valve replacement (kwame) on 2021 and is on Warfarin at this time with Lovenox injections to get get him to his goal of INR. Mr. Kyra Santoyo is here today for a yearly follow up. He says he is doing very well. He has some light headed/ dizziness that comes and goes but it does not happen enough that it concerns him. Denies chest pain, pressure, or tightness. Denies palpitations or shortness of breath. He could walk a mile if he wanted too with no problems. He denies any leg swelling. Denies any blood in his urine or stool. He denied any current or recent abdominal pain, bleeding problems, bowel issues, problems with his medications or any other concerns at this time. No cough, fever or chills. No nausea or vomiting.      PAST MEDICAL HISTORY:        Past Medical History:   Diagnosis Date    Bacteremia     MSSA bacteremia   Dr. Helder Browning  21 last visit    Cardioembolic stroke [facility])     Cerebral artery occlusion with cerebral infarction [facility])     cerebral septic emboli 8/2021    Cerebral septic emboli [facility])     Dr. Phu Ayers neuro last seen 9/13/21    Chronic right shoulder pain     since 2020, fell. Diverticulosis     Endocarditis     mitral valve endocarditis w/ pericardial effusion   8/2021 [facility]    History of COVID-19 12/2020    Hyperlipidemia     hx of. No longer on rx    Hypertension     Nov 2020        MSSA bacteremia     Dr. Georgina Caballero in epic from 9/7/21    MESSI (obstructive sleep apnea)     Has PSG study done Pos for Mod MESSI, never had cpap tritration done for machine    Platelets decreased [facility])     July 2021  had infusions at [facility]. V's no reactions    Research subject 07/24/2021    EIND 235699 [employer] for persistent bacteremia expected date of completion 8/25/2021    Under care of team 09/15/2021    PCP: Sumanth Puente, last visit 9/15/2021    Under care of team     Dr. Perfecto Davidson   9/8/2021    Under care of team     gastroenterology   8/27/21  Dr. Bertha Julien. V's    Under care of team     Dr. Phu Ayers   neuro  last seen 9/13/21    Under care of team     Has seen Dr. Ashwini Olmedo 8/2021   [facility]    Wears contact lenses        CURRENT ALLERGIES: Morphine REVIEW OF SYSTEMS: 14 systems were reviewed. Pertinent positives and negatives as above, all else negative.      Past Surgical History:   Procedure Laterality Date    ABDOMEN SURGERY      large bowel resection    ANTERIOR CRUCIATE LIGAMENT REPAIR Right     APPENDECTOMY  07/09/1997    COLECTOMY      2012 - D/T Dverticulitis    DILATATION, ESOPHAGUS      HC  PICC [address] DOUBLE  08/02/2021    removed   8/2021    MITRAL VALVE REPAIR N/A 9/23/2021    MITRAL VALVE REPLACEMENT - ON-X SIZE 31/33 performed by Perfecto Davidson MD at [address] [facility] Bilateral 11/18/2022    VASECTOMY performed by Dayday Avendano MD at [facility] OR    Social History:  Social History     Tobacco Use    Smoking status: Former     Packs/day: 1.00     Years: 10.00     Pack years: 10. 00     Types: Cigarettes     Quit date:      Years since quittin.0    Smokeless tobacco: Former     Types: Chew, Snuff     Quit date: 9/15/2017    Tobacco comments:     smoked 10 yrs. Quit in    Vaping Use    Vaping Use: Never used   Substance Use Topics    Alcohol use: Yes     Alcohol/week: 20.0 standard drinks     Types: 20 Cans of beer per week     Comment: 20/ week, average every other day    Drug use: Never        CURRENT MEDICATIONS:        Outpatient Medications Marked as Taking for the 23 encounter (Office Visit) with Vickie Marshall MD   Medication Sig Dispense Refill    metoprolol succinate (TOPROL XL) 100 MG extended release tablet TAKE 1 TABLET BY MOUTH EVERY DAY 90 tablet 3    magnesium gluconate (MAGONATE) 500 MG tablet Take 500 mg by mouth daily      melatonin 5 MG TABS tablet Take 15 mg by mouth daily      atorvastatin (LIPITOR) 20 MG tablet TAKE 1 TABLET BY MOUTH EVERY DAY AT NIGHT 90 tablet 3    lisinopril (PRINIVIL;ZESTRIL) 2.5 MG tablet TAKE 1 TABLET BY MOUTH EVERY DAY 90 tablet 3    warfarin (COUMADIN) 5 MG tablet Take by mouth See Admin Instructions 5 mg MWF, 7.5 mg all other days      Ascorbic Acid (VITAMIN C) 250 MG tablet Take 750 mg by mouth daily Emergen C 750 mg QD      Lactobacillus Rhamnosus, GG, (CULTURELLE PO) Take 1 tablet by mouth daily        FAMILY HISTORY: family history includes No Known Problems in his mother; Other in his father. Physical Examination:      /76 (Site: Right Upper Arm, Position: Sitting, Cuff Size: Large Adult)   Pulse 94   Resp 18   Ht 5' 6\" (1.676 m)   Wt 190 lb (86.2 kg)   SpO2 98%   BMI 30.67 kg/m²  Body mass index is 30.67 kg/m². Constitutional: He is oriented to person, place, and time. He appears well-developed and well-nourished. In no acute distress. HEENT: Normocephalic and atraumatic. No JVD present. Carotid bruit is not present. No mass and no thyromegaly present. No lymphadenopathy present.   Cardiovascular: Normal rate, regular rhythm, normal heart sounds. Exam reveals no gallop and no friction rubs. 1/6 systolic murmur, 4th intercostal space on the LEFT must lateral to the sternal border Crisp S2 closing click noted maximal at the apex. Pulmonary/Chest: Effort normal and breath sounds normal. No respiratory distress. He has no wheezes, rhonchi or rales. Abdominal: Soft, non-tender. Bowel sounds and aorta are normal. He exhibits no organomegaly, mass or bruit. Extremities: None. No cyanosis or clubbing. 2+ radial and carotid pulses. Distal extremity pulses: 2+ bilaterally. Neurological: He is alert and oriented to person, place, and time. No evidence of gross cranial nerve deficit. Coordination appeared normal.   Skin: Skin is warm and dry. There is no rash or diaphoresis. Psychiatric: He has a normal mood and affect. His speech is normal and behavior is normal.      MOST RECENT LABS ON RECORD:   Lab Results   Component Value Date    WBC 5.2 11/10/2021    HGB 13.3 11/10/2021    HCT 40.6 (L) 11/10/2021     11/10/2021    CHOL 209 (H) 10/15/2021    TRIG 350 (H) 10/15/2021    HDL 52 10/15/2021    ALT 16 09/15/2021    AST 19 09/15/2021     02/08/2022    K 4.3 02/08/2022     02/08/2022    CREATININE 0.89 02/08/2022    BUN 17 02/08/2022    CO2 22 02/08/2022    TSH 1.86 07/20/2021    INR 2.4 12/30/2022    LABA1C 5.5 07/17/2021         ASSESSMENT:     1. Essential hypertension    2. Lightheaded    3. H/O mitral valve replacement with mechanical valve    4. Sinus tachycardia    5. Mixed hyperlipidemia    6. Encounter for current long-term use of anticoagulants      PLAN:        Essential Hypertension: Controlled  Beta Blocker: Continue Metoprolol succinate (Toprol XL) 100 mg daily. ACE Inibitor/ARB: STOP lisinopril     Lightheadedness/dizziness: he has light headedness that comes and goes but he says it is not enough that it is concerning.    Nonpharmacologic counseling: Because of his condition, I reminded him to try and keep himself well-hydrated and to take extra time when moving from laying to sitting, sitting to standing and standing to walking. I also explained to him to help improve his symptoms he should include 3 g sodium diet, 1 or 2 L of sports drinks daily, knee-high compressions stockings. Severe Mitral Regurgitation due to Vegetation Secondary to MSSA bactremia (endocarditis) of unknown Etiology: S/P: Mitral Mechanical Valve Replacement (SAMEER)  asymptomatic  Beta Blocker: Continue Metoprolol succinate (Toprol XL) 100 mg daily. ACE Inibitor/ARB: STOP lisinopril  Anticoagulation: Continue warfarin (Coumadin) take as directed. Because of his atrial fibrillation, I also would also recommend that he continue with anticoagulation to decrease his risk of stoke but also reminded him to watch for signs of blood in his stool or black tarry stools and stop the medication immediately if this develops as this could be life threatening. Additional Testing List: I ordered a echocardiogram to better assess for the etiology of this problem and to help guide future management    Sinus Tachycardia: Now better controlled but may be partly due to hypotension  Beta Blocker: Continue Metoprolol succinate (Toprol XL) 100 mg daily. Stop lisinopril    Hyperlipidemia: Mixed - Last LDL done on 10/15/2021 was 87 mg/dL   Cholesterol Reduction Therapy: Continue Atorvastatin (Lipitor) 20 mg daily. In the meantime I asked him to continue taking his other medications and follow up with you as previously scheduled. FOLLOW UP:   I told Mr. Smita Rodríguez to call my office if he had any problems, but otherwise told him to Return in about 1 year (around 1/18/2024). However, I would be happy to see him sooner should the need arise. Once again, thank you for allowing me to participate in this patients care. Please do not hesitate to contact me could I be of further assistance.     Sincerely,  Michelle Mcbride MD, MS, F. A.C.C. [facility] Cardiology Specialist    [address]  [facility], [address] [place] [address]  Phone: [phone], Fax: [phone]     I believe that the risk of significant morbidity and mortality related to the patient's current medical conditions are: Intermediate. The documentation recorded by the scribe, accurately and completely reflects the services I personally performed and the decisions made by me. Lon Zapata MD, MS, F.A.C.C.  January 18, 2023

## 2023-02-10 ENCOUNTER — HOSPITAL ENCOUNTER (OUTPATIENT)
Dept: NON INVASIVE DIAGNOSTICS | Age: 43
Discharge: HOME | End: 2023-02-10
Payer: COMMERCIAL

## 2023-02-10 DIAGNOSIS — R00.0 SINUS TACHYCARDIA: ICD-10-CM

## 2023-02-10 DIAGNOSIS — E78.2 MIXED HYPERLIPIDEMIA: ICD-10-CM

## 2023-02-10 DIAGNOSIS — Z95.2 H/O MITRAL VALVE REPLACEMENT WITH MECHANICAL VALVE: ICD-10-CM

## 2023-02-10 DIAGNOSIS — R42 LIGHTHEADED: ICD-10-CM

## 2023-02-10 DIAGNOSIS — I10 ESSENTIAL HYPERTENSION: ICD-10-CM

## 2023-02-10 LAB
LV EF: 65 %
LVEF MODALITY: NORMAL

## 2023-02-10 PROCEDURE — 93306 TTE W/DOPPLER COMPLETE: CPT

## 2023-02-13 ENCOUNTER — TELEPHONE (OUTPATIENT)
Dept: CARDIOLOGY | Age: 43
End: 2023-02-13

## 2023-02-13 NOTE — TELEPHONE ENCOUNTER
----- Message from Naima Canales MD sent at 2/10/2023 11:24 PM EST -----  Let Patient know echo results looked better. Will discuss at next visit. Thanks.

## 2023-02-22 ENCOUNTER — HOSPITAL ENCOUNTER (OUTPATIENT)
Dept: PHARMACY | Age: 43
Setting detail: THERAPIES SERIES
Discharge: HOME | End: 2023-02-22
Payer: COMMERCIAL

## 2023-02-22 VITALS
DIASTOLIC BLOOD PRESSURE: 84 MMHG | WEIGHT: 193 LBS | HEART RATE: 85 BPM | BODY MASS INDEX: 31.15 KG/M2 | SYSTOLIC BLOOD PRESSURE: 143 MMHG

## 2023-02-22 DIAGNOSIS — Z95.2 H/O MITRAL VALVE REPLACEMENT WITH MECHANICAL VALVE: Primary | ICD-10-CM

## 2023-02-22 LAB — INR BLD: 2.4

## 2023-02-22 PROCEDURE — 85610 PROTHROMBIN TIME: CPT

## 2023-02-22 PROCEDURE — 99211 OFF/OP EST MAY X REQ PHY/QHP: CPT

## 2023-02-22 NOTE — PATIENT INSTRUCTIONS
Continue to monitor urine and stool. Continue to monitor for signs of bleeding. Return to clinic in 6 weeks. Continue warfarin whole 5 mg tablet MWF and 1.5 tablets 7.5mg all other days.

## 2023-02-22 NOTE — PROGRESS NOTES
[facility] [address] [place] [address]/[place]  Medication Management  ANTICOAGULATION    Referring Provider: Dr Li Henley INR: 2.0-3.0     TODAY'S INR: 2.4     WARFARIN Dosage: Continue warfarin whole 5 mg tablet MWF and 1.5 tablets 7.5mg all other days. INR   Date Value   2022 2.4   2022 2.9   2022 1.02 INR   2022 2.8   2022 2.1   2022 1.7   2022 2.4       Medication changes:  Stopped Lisinopril    Notes:    Fingerstick INR drawn per clinic protocol. Patient states no visible blood in urine and no black tarry stool. Denies any missed doses of warfarin. No change in other maintenance medications or in diet. Will recheck INR in 6 weeks. Patient acknowledges working in consult agreement with pharmacist as referred by his/her physician.                   For Pharmacy Admin Tracking Only    Intervention Detail: Adherence Monitorin and New Rx: 1, reason: Patient Preference  Total # of Interventions Recommended: 1  Total # of Interventions Accepted: 1  Time Spent (min):  Jazz Watson, [facility] [place], PharmD

## 2023-04-04 ENCOUNTER — ANTI-COAG VISIT (OUTPATIENT)
Dept: PHARMACY | Age: 43
End: 2023-04-04

## 2023-04-04 DIAGNOSIS — Z95.2 H/O MITRAL VALVE REPLACEMENT WITH MECHANICAL VALVE: Primary | ICD-10-CM

## 2023-04-04 NOTE — PROGRESS NOTES
Patient contacted clinic with request for warfarin refill.   Prescription called to [id number]) for warfarin 5 mg tablets - Take 1 tablet po every MWF; and 1.5 tablets (=7.5 mg) po all other days or as instructed per clinic   Qty:  117 tablets   Refill:  3  Prescriber:  Dr. Sarai Garcia

## 2023-04-19 ENCOUNTER — HOSPITAL ENCOUNTER (OUTPATIENT)
Dept: PHARMACY | Age: 43
Setting detail: THERAPIES SERIES
Discharge: HOME | End: 2023-04-19
Payer: COMMERCIAL

## 2023-04-19 VITALS
DIASTOLIC BLOOD PRESSURE: 102 MMHG | HEART RATE: 71 BPM | BODY MASS INDEX: 31.67 KG/M2 | WEIGHT: 196.2 LBS | SYSTOLIC BLOOD PRESSURE: 153 MMHG

## 2023-04-19 DIAGNOSIS — Z95.2 H/O MITRAL VALVE REPLACEMENT WITH MECHANICAL VALVE: Primary | ICD-10-CM

## 2023-04-19 LAB — INR BLD: 2.3

## 2023-04-19 PROCEDURE — 99211 OFF/OP EST MAY X REQ PHY/QHP: CPT | Performed by: FAMILY MEDICINE

## 2023-04-19 PROCEDURE — 85610 PROTHROMBIN TIME: CPT | Performed by: FAMILY MEDICINE

## 2023-04-19 NOTE — PROGRESS NOTES
[facility]  [facility]  ANTICOAGULATION    Referring Provider: Dr Ned Art INR: 2.0-3.0    TODAY'S INR: 2.3    WARFARIN Dosage: continue 5mg MWF, 7.5mg all other days    INR   Date Value   2023 2.3   2023 2.4   2022 2.4   2022 2.9   2022 1.02 INR   2022 2.8   2022 2.1       Hemoglobin   Date Value Ref Range Status   11/10/2021 13.3 13.0 - 17.0 g/dL Final     Hematocrit   Date Value Ref Range Status   11/10/2021 40.6 (L) 40.7 - 50.3 % Final     ALT   Date Value Ref Range Status   09/15/2021 16 5 - 41 U/L Final     AST   Date Value Ref Range Status   09/15/2021 19 <40 U/L Final       Medication changes:  No changes    Notes:    Fingerstick INR drawn per clinic protocol. Patient states no visible blood in urine and no black tarry stool. Denies any missed doses of warfarin. No change in other maintenance medications or in diet. Will recheck INR in 6 weeks. Patient acknowledges working in consult agreement with pharmacist as referred by his/her physician.                   For Pharmacy Admin Tracking Only    Intervention Detail: Adherence Monitorin  Total # of Interventions Recommended: 2  Total # of Interventions Accepted: 2  Time Spent (min): 20      Su Dobbins R.Ph., 2023,4:00 PM

## 2023-04-19 NOTE — PATIENT INSTRUCTIONS
Continue to take warfarin 5mg (1 tablet) on Monday, Wednesday and Friday and 7.5mg (1 1/2 tablets) all other days. Continue to monitor for signs of bleeding. Return to coumadin clinic in 6 weeks.

## 2023-04-20 ENCOUNTER — TELEPHONE (OUTPATIENT)
Dept: PRIMARY CARE CLINIC | Age: 43
End: 2023-04-20

## 2023-04-24 NOTE — TELEPHONE ENCOUNTER
Left message for patient to return call. 3 unsuccessful attempts to reach patient. Closing encounter.

## 2023-06-02 ENCOUNTER — OFFICE VISIT (OUTPATIENT)
Dept: PRIMARY CARE CLINIC | Age: 43
End: 2023-06-02
Payer: COMMERCIAL

## 2023-06-02 VITALS
OXYGEN SATURATION: 98 % | WEIGHT: 189.4 LBS | HEART RATE: 92 BPM | BODY MASS INDEX: 30.57 KG/M2 | RESPIRATION RATE: 18 BRPM | DIASTOLIC BLOOD PRESSURE: 96 MMHG | SYSTOLIC BLOOD PRESSURE: 138 MMHG

## 2023-06-02 DIAGNOSIS — R00.0 TACHYCARDIA: ICD-10-CM

## 2023-06-02 DIAGNOSIS — Z00.00 WELL ADULT EXAM: Primary | ICD-10-CM

## 2023-06-02 DIAGNOSIS — I10 PRIMARY HYPERTENSION: ICD-10-CM

## 2023-06-02 DIAGNOSIS — Z13.220 LIPID SCREENING: ICD-10-CM

## 2023-06-02 DIAGNOSIS — Z95.2 H/O MITRAL VALVE REPLACEMENT WITH MECHANICAL VALVE: ICD-10-CM

## 2023-06-02 PROCEDURE — 3075F SYST BP GE 130 - 139MM HG: CPT | Performed by: STUDENT IN AN ORGANIZED HEALTH CARE EDUCATION/TRAINING PROGRAM

## 2023-06-02 PROCEDURE — 3080F DIAST BP >= 90 MM HG: CPT | Performed by: STUDENT IN AN ORGANIZED HEALTH CARE EDUCATION/TRAINING PROGRAM

## 2023-06-02 PROCEDURE — 99396 PREV VISIT EST AGE 40-64: CPT | Performed by: STUDENT IN AN ORGANIZED HEALTH CARE EDUCATION/TRAINING PROGRAM

## 2023-06-02 SDOH — ECONOMIC STABILITY: HOUSING INSECURITY
IN THE LAST 12 MONTHS, WAS THERE A TIME WHEN YOU DID NOT HAVE A STEADY PLACE TO SLEEP OR SLEPT IN A SHELTER (INCLUDING NOW)?: NO

## 2023-06-02 SDOH — ECONOMIC STABILITY: INCOME INSECURITY: HOW HARD IS IT FOR YOU TO PAY FOR THE VERY BASICS LIKE FOOD, HOUSING, MEDICAL CARE, AND HEATING?: NOT HARD AT ALL

## 2023-06-02 SDOH — ECONOMIC STABILITY: FOOD INSECURITY: WITHIN THE PAST 12 MONTHS, YOU WORRIED THAT YOUR FOOD WOULD RUN OUT BEFORE YOU GOT MONEY TO BUY MORE.: NEVER TRUE

## 2023-06-02 SDOH — ECONOMIC STABILITY: FOOD INSECURITY: WITHIN THE PAST 12 MONTHS, THE FOOD YOU BOUGHT JUST DIDN'T LAST AND YOU DIDN'T HAVE MONEY TO GET MORE.: NEVER TRUE

## 2023-06-02 ASSESSMENT — PATIENT HEALTH QUESTIONNAIRE - PHQ9
SUM OF ALL RESPONSES TO PHQ QUESTIONS 1-9: 0
2. FEELING DOWN, DEPRESSED OR HOPELESS: 0
SUM OF ALL RESPONSES TO PHQ9 QUESTIONS 1 & 2: 0
1. LITTLE INTEREST OR PLEASURE IN DOING THINGS: NOT AT ALL
SUM OF ALL RESPONSES TO PHQ QUESTIONS 1-9: 0
SUM OF ALL RESPONSES TO PHQ QUESTIONS 1-9: 0
1. LITTLE INTEREST OR PLEASURE IN DOING THINGS: 0
SUM OF ALL RESPONSES TO PHQ9 QUESTIONS 1 & 2: 0
SUM OF ALL RESPONSES TO PHQ QUESTIONS 1-9: 0
2. FEELING DOWN, DEPRESSED OR HOPELESS: NOT AT ALL

## 2023-06-02 NOTE — PROGRESS NOTES
following healthy behaviors: nutrition, exercise and medication adherence. I encouraged and discussed lifestyle modifications including diet and exercise and the patient was agreeable to making positive/beneficial changes to both to help improve their overall health. Discussed use, benefit, and side effects of prescribed medications. Barriers to medication compliance addressed. Patient given educational materials: see patient instructions. HM - HM items completed today as per orders. Outstanding HM items though not limited to immunizations were discussed with the patient today, including risks, benefits and alternatives. The patient will discuss these during the next appointment per their preference. If there are any worsening or concerning signs or symptoms, patient will report to the ED and/or contact [phone] for immediate evaluation. Teach back method was used. Subjective:    HPI  Chief Complaint   Patient presents with    Annual Exam    Fatigue    Tachycardia     Patient is here for an annual exam.     Patient stated he has had more fatigue within the last 4-5 months that is gradually worsening. He has a lot going on at work and at home as well. Denies any depression/anxiety. Patient stated that he has noticed that his resting heart rate has been running between upper 90's-105. Patient here for follow-up of elevated blood pressure. He is exercising and is adherent to low salt diet. Blood pressure is well controlled at home. Cardiac symptoms irregular heart beat and fatigue. Patient denies chest pain, chest pressure/discomfort, claudication, dyspnea, exertional chest pressure/discomfort, lower extremity edema, near-syncope, orthopnea, palpitations, paroxysmal nocturnal dyspnea, syncope, and tachypnea. He is on toprol 100mg XL and takes Warfarin. INR has been stable. He has been running more regularly, 5k recently. He had an ECHO in 01/2023 and follows with Cardiology.     Health Maintenance -

## 2023-06-02 NOTE — PATIENT INSTRUCTIONS
SURVEY:    You may be receiving a survey from [employer] regarding your visit today. Please complete the survey to enable us to provide the highest quality of care to you and your family. If you cannot score us a very good on any question, please call the office to discuss how we could have made your experience a very good one. Thank you.

## 2023-06-08 ENCOUNTER — HOSPITAL ENCOUNTER (OUTPATIENT)
Dept: PHARMACY | Age: 43
Setting detail: THERAPIES SERIES
Discharge: HOME | End: 2023-06-08
Payer: COMMERCIAL

## 2023-06-08 VITALS
SYSTOLIC BLOOD PRESSURE: 165 MMHG | WEIGHT: 195 LBS | HEART RATE: 77 BPM | BODY MASS INDEX: 31.47 KG/M2 | DIASTOLIC BLOOD PRESSURE: 93 MMHG

## 2023-06-08 DIAGNOSIS — Z95.2 H/O MITRAL VALVE REPLACEMENT WITH MECHANICAL VALVE: Primary | ICD-10-CM

## 2023-06-08 LAB — INR BLD: 1.8

## 2023-06-08 PROCEDURE — 85610 PROTHROMBIN TIME: CPT

## 2023-06-08 PROCEDURE — 99212 OFFICE O/P EST SF 10 MIN: CPT

## 2023-06-08 NOTE — PROGRESS NOTES
[address]/[place]  Medication Management  ANTICOAGULATION    Referring Provider: Dr Wagoner First INR: 2 - 3     TODAY'S INR: 1.8     WARFARIN Dosage: Take warfarin 10 mg today then continue 5 mg MWF and 7.5mg all other days    INR   Date Value   2023 2.3   2023 2.4   2022 2.4   2022 2.9   2022 1.02 INR   2022 2.8   2022 2.1       Hemoglobin   Date Value Ref Range Status   11/10/2021 13.3 13.0 - 17.0 g/dL Final     Hematocrit   Date Value Ref Range Status   11/10/2021 40.6 (L) 40.7 - 50.3 % Final     ALT   Date Value Ref Range Status   09/15/2021 16 5 - 41 U/L Final     AST   Date Value Ref Range Status   09/15/2021 19 <40 U/L Final       Medication changes:  none    Notes:    Fingerstick INR drawn per clinic protocol. Patient states no visible blood in urine and no black tarry stool. Denies any missed doses of warfarin. No change in other maintenance medications or in diet. Will recheck INR in 6 weeks. Patient acknowledges working in consult agreement with pharmacist as referred by his/her physician. Patient ate zucchini and squash last night.             For Pharmacy Admin Tracking Only    Intervention Detail: Adherence Monitorin and Dose Adjustment: 1, reason: Therapy Optimization  Total # of Interventions Recommended: 1  Total # of Interventions Accepted: 1  Time Spent (min): Sary Carney 11, [facility], PharmD

## 2023-06-08 NOTE — PATIENT INSTRUCTIONS
Continue to monitor urine and stool for signs and symptoms of bleeding. Please notify the clinic of any medication changes. Please remember to bring all medications (both prescription and OTC) to your next visit. Kindly notify the clinic if you are unable to make to your next appointment. Take warfarin 10 mg today then continue current dose of warfarin as instructed on dosing calendar provided.

## 2023-06-09 ENCOUNTER — HOSPITAL ENCOUNTER (OUTPATIENT)
Dept: NON INVASIVE DIAGNOSTICS | Age: 43
Discharge: HOME | End: 2023-06-09
Payer: COMMERCIAL

## 2023-06-09 DIAGNOSIS — I10 PRIMARY HYPERTENSION: ICD-10-CM

## 2023-06-09 DIAGNOSIS — R00.0 TACHYCARDIA: ICD-10-CM

## 2023-06-09 PROCEDURE — 93242 EXT ECG>48HR<7D RECORDING: CPT

## 2023-06-09 PROCEDURE — 93243 EXT ECG>48HR<7D SCAN A/R: CPT

## 2023-07-02 PROBLEM — Z00.00 WELL ADULT EXAM: Status: RESOLVED | Noted: 2023-06-02 | Resolved: 2023-07-02

## 2023-07-02 PROBLEM — Z13.220 LIPID SCREENING: Status: RESOLVED | Noted: 2023-06-02 | Resolved: 2023-07-02

## 2023-08-04 ENCOUNTER — HOSPITAL ENCOUNTER (OUTPATIENT)
Dept: PHARMACY | Age: 43
Setting detail: THERAPIES SERIES
Discharge: HOME | End: 2023-08-04
Payer: COMMERCIAL

## 2023-08-04 VITALS
DIASTOLIC BLOOD PRESSURE: 90 MMHG | HEART RATE: 81 BPM | WEIGHT: 190 LBS | SYSTOLIC BLOOD PRESSURE: 133 MMHG | BODY MASS INDEX: 30.67 KG/M2

## 2023-08-04 DIAGNOSIS — Z95.2 H/O MITRAL VALVE REPLACEMENT WITH MECHANICAL VALVE: Primary | ICD-10-CM

## 2023-08-04 LAB — INR BLD: 1.8

## 2023-08-04 PROCEDURE — 99212 OFFICE O/P EST SF 10 MIN: CPT

## 2023-08-04 PROCEDURE — 85610 PROTHROMBIN TIME: CPT

## 2023-08-04 NOTE — PROGRESS NOTES
[facility]  Medication Management  ANTICOAGULATION    Referring Provider: Dr uDyen Knight INR: 2 - 3     TODAY'S INR: 1.8     WARFARIN Dosage: Warfarin 7.5 mg po x 1, then continue 5 mg MWF and 7.5mg all other days    INR   Date Value   2023 1.8   2023 1.8   2023 2.3   2023 2.4   2022 2.4   2022 2.9   2022 1.02 INR       Hemoglobin   Date Value Ref Range Status   11/10/2021 13.3 13.0 - 17.0 g/dL Final     Hematocrit   Date Value Ref Range Status   11/10/2021 40.6 (L) 40.7 - 50.3 % Final     ALT   Date Value Ref Range Status   09/15/2021 16 5 - 41 U/L Final     AST   Date Value Ref Range Status   09/15/2021 19 <40 U/L Final       Medication changes:  none    Notes:    Fingerstick INR drawn per clinic protocol. Patient states no visible blood in urine and no black tarry stool. Denies any missed doses of warfarin. No change in other maintenance medications or in diet. Will recheck INR in 5 weeks. Patient acknowledges working in consult agreement with pharmacist as referred by his/her physician. Patient has been eating cucumbers from his garden. For Pharmacy Admin Tracking Only    Intervention Detail: Adherence Monitorin and Dose Adjustment: 1, reason: Improve Adherence, Therapy Optimization  Total # of Interventions Recommended: 2  Total # of Interventions Accepted: 2  Time Spent (min): MO Gauthier [facility]

## 2023-08-06 ENCOUNTER — APPOINTMENT (OUTPATIENT)
Dept: GENERAL RADIOLOGY | Age: 43
End: 2023-08-06
Payer: COMMERCIAL

## 2023-08-06 ENCOUNTER — HOSPITAL ENCOUNTER (EMERGENCY)
Age: 43
Discharge: HOME | End: 2023-08-06
Attending: EMERGENCY MEDICINE
Payer: COMMERCIAL

## 2023-08-06 ENCOUNTER — APPOINTMENT (OUTPATIENT)
Dept: CT IMAGING | Age: 43
End: 2023-08-06
Payer: COMMERCIAL

## 2023-08-06 VITALS
HEART RATE: 85 BPM | OXYGEN SATURATION: 94 % | DIASTOLIC BLOOD PRESSURE: 79 MMHG | RESPIRATION RATE: 11 BRPM | SYSTOLIC BLOOD PRESSURE: 121 MMHG

## 2023-08-06 DIAGNOSIS — S09.90XA CLOSED HEAD INJURY, INITIAL ENCOUNTER: Primary | ICD-10-CM

## 2023-08-06 DIAGNOSIS — R53.81 MALAISE: ICD-10-CM

## 2023-08-06 LAB
ALBUMIN SERPL-MCNC: 4.7 G/DL (ref 3.5–5.2)
ALBUMIN/GLOB SERPL: 1.7 {RATIO} (ref 1–2.5)
ALP SERPL-CCNC: 83 U/L (ref 40–129)
ALT SERPL-CCNC: 100 U/L (ref 5–41)
ANION GAP SERPL CALCULATED.3IONS-SCNC: 14 MMOL/L (ref 9–17)
AST SERPL-CCNC: 50 U/L
BASOPHILS # BLD: 0.04 K/UL (ref 0–0.2)
BASOPHILS NFR BLD: 0 % (ref 0–2)
BILIRUB SERPL-MCNC: 0.2 MG/DL (ref 0.3–1.2)
BUN SERPL-MCNC: 15 MG/DL (ref 6–20)
BUN/CREAT SERPL: 21 (ref 9–20)
CALCIUM SERPL-MCNC: 8.7 MG/DL (ref 8.6–10.4)
CHLORIDE SERPL-SCNC: 106 MMOL/L (ref 98–107)
CO2 SERPL-SCNC: 24 MMOL/L (ref 20–31)
CREAT SERPL-MCNC: 0.7 MG/DL (ref 0.7–1.2)
EKG ATRIAL RATE: 84 BPM
EKG P AXIS: 47 DEGREES
EKG P-R INTERVAL: 184 MS
EKG Q-T INTERVAL: 402 MS
EKG QRS DURATION: 86 MS
EKG QTC CALCULATION (BAZETT): 475 MS
EKG R AXIS: 19 DEGREES
EKG T AXIS: 34 DEGREES
EKG VENTRICULAR RATE: 84 BPM
EOSINOPHIL # BLD: 0.04 K/UL (ref 0–0.44)
EOSINOPHILS RELATIVE PERCENT: 0 % (ref 1–4)
ERYTHROCYTE [DISTWIDTH] IN BLOOD BY AUTOMATED COUNT: 12.6 % (ref 11.8–14.4)
GFR SERPL CREATININE-BSD FRML MDRD: >60 ML/MIN/1.73M2
GLUCOSE SERPL-MCNC: 104 MG/DL (ref 70–99)
HCT VFR BLD AUTO: 43.5 % (ref 40.7–50.3)
HGB BLD-MCNC: 15.4 G/DL (ref 13–17)
IMM GRANULOCYTES # BLD AUTO: 0.03 K/UL (ref 0–0.3)
IMM GRANULOCYTES NFR BLD: 0 %
INR PPP: 2.4
LYMPHOCYTES NFR BLD: 1.62 K/UL (ref 1.1–3.7)
LYMPHOCYTES RELATIVE PERCENT: 18 % (ref 24–43)
MCH RBC QN AUTO: 30.3 PG (ref 25.2–33.5)
MCHC RBC AUTO-ENTMCNC: 35.4 G/DL (ref 28.4–34.8)
MCV RBC AUTO: 85.6 FL (ref 82.6–102.9)
MONOCYTES NFR BLD: 0.67 K/UL (ref 0.1–1.2)
MONOCYTES NFR BLD: 8 % (ref 3–12)
NEUTROPHILS NFR BLD: 74 % (ref 36–65)
NEUTS SEG NFR BLD: 6.59 K/UL (ref 1.5–8.1)
NRBC BLD-RTO: 0 PER 100 WBC
PLATELET # BLD AUTO: 215 K/UL (ref 138–453)
PMV BLD AUTO: 9.2 FL (ref 8.1–13.5)
POTASSIUM SERPL-SCNC: 4.1 MMOL/L (ref 3.7–5.3)
PROT SERPL-MCNC: 7.5 G/DL (ref 6.4–8.3)
PROTHROMBIN TIME: 26 SEC (ref 11.9–14.8)
RBC # BLD AUTO: 5.08 M/UL (ref 4.21–5.77)
SODIUM SERPL-SCNC: 144 MMOL/L (ref 135–144)
TROPONIN I SERPL HS-MCNC: 6 NG/L (ref 0–22)
WBC OTHER # BLD: 9 K/UL (ref 3.5–11.3)

## 2023-08-06 PROCEDURE — 6370000000 HC RX 637 (ALT 250 FOR IP): Performed by: EMERGENCY MEDICINE

## 2023-08-06 PROCEDURE — 85025 COMPLETE CBC W/AUTO DIFF WBC: CPT

## 2023-08-06 PROCEDURE — 80053 COMPREHEN METABOLIC PANEL: CPT

## 2023-08-06 PROCEDURE — 84484 ASSAY OF TROPONIN QUANT: CPT

## 2023-08-06 PROCEDURE — 71045 X-RAY EXAM CHEST 1 VIEW: CPT

## 2023-08-06 PROCEDURE — 70450 CT HEAD/BRAIN W/O DYE: CPT

## 2023-08-06 PROCEDURE — 93005 ELECTROCARDIOGRAM TRACING: CPT | Performed by: EMERGENCY MEDICINE

## 2023-08-06 PROCEDURE — 93010 ELECTROCARDIOGRAM REPORT: CPT | Performed by: INTERNAL MEDICINE

## 2023-08-06 PROCEDURE — 96374 THER/PROPH/DIAG INJ IV PUSH: CPT

## 2023-08-06 PROCEDURE — 99285 EMERGENCY DEPT VISIT HI MDM: CPT

## 2023-08-06 PROCEDURE — 36415 COLL VENOUS BLD VENIPUNCTURE: CPT

## 2023-08-06 PROCEDURE — 6360000002 HC RX W HCPCS: Performed by: EMERGENCY MEDICINE

## 2023-08-06 PROCEDURE — 87040 BLOOD CULTURE FOR BACTERIA: CPT

## 2023-08-06 PROCEDURE — 2580000003 HC RX 258: Performed by: EMERGENCY MEDICINE

## 2023-08-06 PROCEDURE — 85610 PROTHROMBIN TIME: CPT

## 2023-08-06 RX ORDER — ONDANSETRON 4 MG/1
4 TABLET, ORALLY DISINTEGRATING ORAL 3 TIMES DAILY PRN
Qty: 10 TABLET | Refills: 0 | Status: SHIPPED | OUTPATIENT
Start: 2023-08-06

## 2023-08-06 RX ORDER — HYDROCODONE BITARTRATE AND ACETAMINOPHEN 5; 325 MG/1; MG/1
1 TABLET ORAL ONCE
Status: COMPLETED | OUTPATIENT
Start: 2023-08-06 | End: 2023-08-06

## 2023-08-06 RX ORDER — 0.9 % SODIUM CHLORIDE 0.9 %
1000 INTRAVENOUS SOLUTION INTRAVENOUS ONCE
Status: COMPLETED | OUTPATIENT
Start: 2023-08-06 | End: 2023-08-06

## 2023-08-06 RX ORDER — ONDANSETRON 2 MG/ML
4 INJECTION INTRAMUSCULAR; INTRAVENOUS ONCE
Status: COMPLETED | OUTPATIENT
Start: 2023-08-06 | End: 2023-08-06

## 2023-08-06 RX ADMIN — SODIUM CHLORIDE 1000 ML: 9 INJECTION, SOLUTION INTRAVENOUS at 11:57

## 2023-08-06 RX ADMIN — HYDROCODONE BITARTRATE AND ACETAMINOPHEN 1 TABLET: 5; 325 TABLET ORAL at 12:23

## 2023-08-06 RX ADMIN — ONDANSETRON 4 MG: 2 INJECTION INTRAMUSCULAR; INTRAVENOUS at 11:57

## 2023-08-06 ASSESSMENT — PAIN DESCRIPTION - LOCATION
LOCATION: HEAD
LOCATION: HEAD

## 2023-08-06 ASSESSMENT — PAIN SCALES - GENERAL
PAINLEVEL_OUTOF10: 7
PAINLEVEL_OUTOF10: 9

## 2023-08-06 ASSESSMENT — PAIN - FUNCTIONAL ASSESSMENT: PAIN_FUNCTIONAL_ASSESSMENT: 0-10

## 2023-08-06 NOTE — DISCHARGE INSTRUCTIONS
Continue current medications as prescribed. Follow-up with cardiology call to schedule earliest available appointment. Follow-up with primary care provider as well.   Please seek medical attention immediately should you develop any acute concerns

## 2023-08-06 NOTE — ED PROVIDER NOTES
the following components:       Result Value    MCHC 35.4 (*)     Neutrophils % 74 (*)     Lymphocytes % 18 (*)     Eosinophils % 0 (*)     All other components within normal limits   COMPREHENSIVE METABOLIC PANEL - Abnormal; Notable for the following components:    Glucose 104 (*)     Bun/Cre Ratio 21 (*)      (*)     AST 50 (*)     Total Bilirubin 0.2 (*)     All other components within normal limits   PROTIME-INR - Abnormal; Notable for the following components:    Protime 26.0 (*)     All other components within normal limits   CULTURE, BLOOD 1   CULTURE, BLOOD 2   TROPONIN       All other labs were within normal range or not returned as of this dictation. EMERGENCY DEPARTMENT COURSE and DIFFERENTIAL DIAGNOSIS/MDM:   Vitals:    Vitals:    08/06/23 1046 08/06/23 1230 08/06/23 1318 08/06/23 1319   BP: (!) 128/92   121/79   Pulse: 84 84 88 85   Resp: 10 15 15 11   SpO2: 95% 93% 92% 94%         MDM  Number of Diagnoses or Management Options  Diagnosis management comments: Results reviewed. Patient's INR is therapeutic at 2.4. Mild elevation of ALT and AST of 150 respectively. Total bilirubin normal alkaline phosphatase normal.  CMP is otherwise unremarkable. CBC  with differential is normal and troponins negative. CT head without any acute abnormalities. Chest x-ray on markable. Results reviewed and discussed with patient. He was complaining of persistent frontal headache where he had struck his head did receive Norco for pain. Resting comfortably at this time. Spoke with Dr. Joaquin Mcmahan patient's cardiologist.  Patient is afebrile white count is normal.  Blood cultures have been ordered. He is therapeutic on his INR. Follow-up in 2 to drinking. He is not a presyncope or syncope. Patient will be discharged home at this time. Patient was instructed to stay well-hydrated. Follow-up with cardiology.   Continue his current medications as prescribed and seek medical attention immediately for any acute

## 2023-08-06 NOTE — ED NOTES
Attempting to contact Dr Nahomi Dixon per Dr. Emil Grajeda request.  Jasson Tucker losing connection     [facility]  08/06/23 9148

## 2023-08-07 ENCOUNTER — TELEPHONE (OUTPATIENT)
Dept: PRIMARY CARE CLINIC | Age: 43
End: 2023-08-07

## 2023-08-07 NOTE — TELEPHONE ENCOUNTER
[facility] ED Follow up Call    Reason for ED visit:  FALL     8/7/2023     Jeancarlos Mcduffie , this is TEENA from Dr. Robyn Greene office, just calling to see how you are doing after your recent ED visit. Did you receive discharge instructions? Yes  Do you understand the discharge instructions? Yes  Did the ED give you any new prescriptions? No:   Were you able to fill your prescriptions? No:       Do you have one of our red, yellow and green  Zone sheets that help you to determine when you should go to the ED? No      Do you need or want to make a follow up appt with your PCP? No    Do you have any further needs in the home, e.g. equipment?   No        FU appts/Provider:    Future Appointments   Date Time Provider [address]   9/7/2023  3:40 PM Mariam Guaman [facility] [place]   12/4/2023  3:45 PM Ander Ibarra MD [facility]   2/7/2024  3:20 PM Jada Delarosa MD [facility]

## 2023-08-11 LAB
MICROORGANISM SPEC CULT: NORMAL
SERVICE CMNT-IMP: NORMAL
SPECIMEN DESCRIPTION: NORMAL

## 2023-09-07 ENCOUNTER — APPOINTMENT (OUTPATIENT)
Dept: PHARMACY | Age: 43
End: 2023-09-07
Payer: COMMERCIAL

## 2023-09-19 ENCOUNTER — HOSPITAL ENCOUNTER (OUTPATIENT)
Dept: PHARMACY | Age: 43
Setting detail: THERAPIES SERIES
Discharge: HOME | End: 2023-09-19
Payer: COMMERCIAL

## 2023-09-19 VITALS
HEART RATE: 71 BPM | SYSTOLIC BLOOD PRESSURE: 133 MMHG | WEIGHT: 193 LBS | DIASTOLIC BLOOD PRESSURE: 89 MMHG | BODY MASS INDEX: 31.15 KG/M2

## 2023-09-19 DIAGNOSIS — Z95.2 H/O MITRAL VALVE REPLACEMENT WITH MECHANICAL VALVE: Primary | ICD-10-CM

## 2023-09-19 LAB — INR BLD: 1.8

## 2023-09-19 PROCEDURE — 85610 PROTHROMBIN TIME: CPT

## 2023-09-19 PROCEDURE — 99212 OFFICE O/P EST SF 10 MIN: CPT

## 2023-09-19 NOTE — PROGRESS NOTES
[address] [facility]/[place]  Medication Management  ANTICOAGULATION    Referring Provider: Dr. Tasha Rivera INR: 2-3    TODAY'S INR: 1.8    WARFARIN Dosage: 10 mg x 1, then increase warfarin to 5 mg po every Monday and Friday; 7.5 mg po all other days    INR (no units)   Date Value   09/19/2023 1.8   08/06/2023 2.4   08/04/2023 1.8   06/08/2023 1.8   04/19/2023 2.3   02/22/2023 2.4   12/30/2022 2.4       Hemoglobin   Date Value Ref Range Status   08/06/2023 15.4 13.0 - 17.0 g/dL Final     Hematocrit   Date Value Ref Range Status   08/06/2023 43.5 40.7 - 50.3 % Final     ALT   Date Value Ref Range Status   08/06/2023 100 (H) 5 - 41 U/L Final     AST   Date Value Ref Range Status   08/06/2023 50 (H) <40 U/L Final       Medication changes:  No changes    Notes:    Fingerstick INR drawn per clinic protocol. Patient states no visible blood in urine and no black tarry stool. Denies any missed doses of warfarin. No change in other maintenance medications or in diet. Will recheck INR in 2 weeks. Patient acknowledges working in consult agreement with pharmacist as referred by his/her physician. Prescription called to [address] [place] [address] ([phone]) for warfarin 5 mg tablets - Take 1 tablet po every Monday and Friday and 1.5 tablets (=7.5 mg) po all other days or as instructed per clinic   Qty:  90 days   Refill:  3   Prescriber:  Dr. Trevin Pisano Only    Intervention Detail: Dose Adjustment: 1, reason: Therapy Optimization and Refill(s) Provided  Total # of Interventions Recommended: 2  Total # of Interventions Accepted: 2  Time Spent (min): [place].  GRUMS, [facility]

## 2023-09-19 NOTE — PATIENT INSTRUCTIONS
Please take warfarin 10 mg today. Increase current dose of warfarin as instructed on dosing calendar provided. Continue to monitor urine and stool for signs and symptoms of bleeding. Please notify the clinic of any medication changes. Please remember to bring all medications (both prescription and OTC) to your next visit. Kindly notify the clinic if you are unable to make to your next appointment.

## 2023-10-04 ENCOUNTER — HOSPITAL ENCOUNTER (OUTPATIENT)
Dept: PHARMACY | Age: 43
Setting detail: THERAPIES SERIES
Discharge: HOME | End: 2023-10-04
Payer: COMMERCIAL

## 2023-10-04 VITALS
DIASTOLIC BLOOD PRESSURE: 93 MMHG | SYSTOLIC BLOOD PRESSURE: 145 MMHG | WEIGHT: 196 LBS | BODY MASS INDEX: 31.64 KG/M2 | HEART RATE: 90 BPM

## 2023-10-04 DIAGNOSIS — Z95.2 H/O MITRAL VALVE REPLACEMENT WITH MECHANICAL VALVE: Primary | ICD-10-CM

## 2023-10-04 LAB — INR BLD: 2.6

## 2023-10-04 PROCEDURE — 99211 OFF/OP EST MAY X REQ PHY/QHP: CPT

## 2023-10-04 PROCEDURE — 85610 PROTHROMBIN TIME: CPT

## 2023-10-04 NOTE — PROGRESS NOTES
[facility]  Medication Management  ANTICOAGULATION    Referring Provider: Dr. Amanda Morris INR: 2-3    TODAY'S INR: 2.6    WARFARIN Dosage: Continue 5 mg po every Monday and Friday; 7.5 mg po all other days    INR (no units)   Date Value   10/04/2023 2.6   2023 1.8   2023 2.4   2023 1.8   2023 1.8   2023 2.3   2023 2.4       Hemoglobin   Date Value Ref Range Status   2023 15.4 13.0 - 17.0 g/dL Final     Hematocrit   Date Value Ref Range Status   2023 43.5 40.7 - 50.3 % Final     ALT   Date Value Ref Range Status   2023 100 (H) 5 - 41 U/L Final     AST   Date Value Ref Range Status   2023 50 (H) <40 U/L Final       Medication changes:  No changes    Notes:    Fingerstick INR drawn per clinic protocol. Patient states no visible blood in urine and no black tarry stool. Denies any missed doses of warfarin. No change in other maintenance medications or in diet. Will recheck INR in 6 weeks. Patient acknowledges working in consult agreement with pharmacist as referred by his/her physician. For Pharmacy Admin Tracking Only    Intervention Detail: Adherence Monitorin  Total # of Interventions Recommended: 1  Total # of Interventions Accepted: 1  Time Spent (min): [place] PERLA [facility]

## 2023-11-15 ENCOUNTER — HOSPITAL ENCOUNTER (OUTPATIENT)
Dept: PHARMACY | Age: 43
Setting detail: THERAPIES SERIES
Discharge: HOME | End: 2023-11-15
Payer: COMMERCIAL

## 2023-11-15 VITALS
SYSTOLIC BLOOD PRESSURE: 148 MMHG | DIASTOLIC BLOOD PRESSURE: 89 MMHG | BODY MASS INDEX: 32.77 KG/M2 | HEART RATE: 82 BPM | WEIGHT: 203 LBS

## 2023-11-15 DIAGNOSIS — Z95.2 H/O MITRAL VALVE REPLACEMENT WITH MECHANICAL VALVE: Primary | ICD-10-CM

## 2023-11-15 LAB — INR BLD: 2.8

## 2023-11-15 PROCEDURE — 99211 OFF/OP EST MAY X REQ PHY/QHP: CPT

## 2023-11-15 PROCEDURE — 85610 PROTHROMBIN TIME: CPT

## 2023-11-15 NOTE — PROGRESS NOTES
[address] [facility]  Medication Management  ANTICOAGULATION    Referring Provider: Dr. Smiley Rubalcava INR: 2-3    TODAY'S INR: 2.8    WARFARIN Dosage: Continue warfarin 5 mg po every Monday and Friday; 7.5 mg po all other days    INR (no units)   Date Value   11/15/2023 2.8   10/04/2023 2.6   2023 1.8   2023 2.4   2023 1.8   2023 1.8   2023 2.3       Hemoglobin   Date Value Ref Range Status   2023 15.4 13.0 - 17.0 g/dL Final     Hematocrit   Date Value Ref Range Status   2023 43.5 40.7 - 50.3 % Final     ALT   Date Value Ref Range Status   2023 100 (H) 5 - 41 U/L Final     AST   Date Value Ref Range Status   2023 50 (H) <40 U/L Final       Medication changes:  No changes    Notes:    Fingerstick INR drawn per clinic protocol. Patient states no visible blood in urine and no black tarry stool. Denies any missed doses of warfarin. No change in other maintenance medications or in diet. Will recheck INR in 6 weeks. Patient acknowledges working in consult agreement with pharmacist as referred by his/her physician. For Pharmacy Admin Tracking Only    Intervention Detail: Adherence Monitorin  Total # of Interventions Recommended: 1  Total # of Interventions Accepted: 1  Time Spent (min): [address] [place].  [facility], [address] [place] [address]

## 2023-12-04 ENCOUNTER — OFFICE VISIT (OUTPATIENT)
Dept: PRIMARY CARE CLINIC | Age: 43
End: 2023-12-04
Payer: COMMERCIAL

## 2023-12-04 VITALS
DIASTOLIC BLOOD PRESSURE: 82 MMHG | WEIGHT: 202 LBS | BODY MASS INDEX: 32.47 KG/M2 | HEIGHT: 66 IN | SYSTOLIC BLOOD PRESSURE: 126 MMHG | OXYGEN SATURATION: 97 % | HEART RATE: 86 BPM

## 2023-12-04 DIAGNOSIS — L03.90 CELLULITIS, UNSPECIFIED CELLULITIS SITE: Primary | ICD-10-CM

## 2023-12-04 DIAGNOSIS — R73.9 HYPERGLYCEMIA: ICD-10-CM

## 2023-12-04 DIAGNOSIS — M79.642 LEFT HAND PAIN: ICD-10-CM

## 2023-12-04 DIAGNOSIS — R63.5 WEIGHT GAIN: ICD-10-CM

## 2023-12-04 DIAGNOSIS — R61 NIGHT SWEATS: ICD-10-CM

## 2023-12-04 DIAGNOSIS — E78.5 DYSLIPIDEMIA: ICD-10-CM

## 2023-12-04 DIAGNOSIS — R00.0 TACHYCARDIA: ICD-10-CM

## 2023-12-04 DIAGNOSIS — E66.9 OBESITY (BMI 30-39.9): ICD-10-CM

## 2023-12-04 PROCEDURE — 3079F DIAST BP 80-89 MM HG: CPT | Performed by: STUDENT IN AN ORGANIZED HEALTH CARE EDUCATION/TRAINING PROGRAM

## 2023-12-04 PROCEDURE — 99214 OFFICE O/P EST MOD 30 MIN: CPT | Performed by: STUDENT IN AN ORGANIZED HEALTH CARE EDUCATION/TRAINING PROGRAM

## 2023-12-04 PROCEDURE — 3074F SYST BP LT 130 MM HG: CPT | Performed by: STUDENT IN AN ORGANIZED HEALTH CARE EDUCATION/TRAINING PROGRAM

## 2023-12-04 RX ORDER — CHLORHEXIDINE GLUCONATE 213 G/1000ML
SOLUTION TOPICAL
Qty: 473 ML | Refills: 0 | Status: SHIPPED | OUTPATIENT
Start: 2023-12-04 | End: 2023-12-18

## 2023-12-08 ENCOUNTER — HOSPITAL ENCOUNTER (OUTPATIENT)
Age: 43
Discharge: HOME | End: 2023-12-08
Payer: COMMERCIAL

## 2023-12-08 ENCOUNTER — HOSPITAL ENCOUNTER (OUTPATIENT)
Dept: GENERAL RADIOLOGY | Age: 43
End: 2023-12-08
Payer: COMMERCIAL

## 2023-12-08 DIAGNOSIS — M79.642 LEFT HAND PAIN: ICD-10-CM

## 2023-12-08 DIAGNOSIS — Z95.2 H/O MITRAL VALVE REPLACEMENT WITH MECHANICAL VALVE: ICD-10-CM

## 2023-12-08 DIAGNOSIS — R00.0 TACHYCARDIA: ICD-10-CM

## 2023-12-08 DIAGNOSIS — Z13.220 LIPID SCREENING: ICD-10-CM

## 2023-12-08 DIAGNOSIS — R61 NIGHT SWEATS: ICD-10-CM

## 2023-12-08 DIAGNOSIS — R73.9 HYPERGLYCEMIA: ICD-10-CM

## 2023-12-08 DIAGNOSIS — Z00.00 WELL ADULT EXAM: ICD-10-CM

## 2023-12-08 DIAGNOSIS — I10 PRIMARY HYPERTENSION: ICD-10-CM

## 2023-12-08 LAB
ALBUMIN SERPL-MCNC: 4.8 G/DL (ref 3.5–5.2)
ALBUMIN/GLOB SERPL: 1.5 {RATIO} (ref 1–2.5)
ALP SERPL-CCNC: 75 U/L (ref 40–129)
ALT SERPL-CCNC: 124 U/L (ref 5–41)
ANION GAP SERPL CALCULATED.3IONS-SCNC: 11 MMOL/L (ref 9–17)
AST SERPL-CCNC: 71 U/L
BASOPHILS # BLD: 0.06 K/UL (ref 0–0.2)
BASOPHILS NFR BLD: 1 % (ref 0–2)
BILIRUB SERPL-MCNC: 0.3 MG/DL (ref 0.3–1.2)
BUN SERPL-MCNC: 13 MG/DL (ref 6–20)
BUN/CREAT SERPL: 16 (ref 9–20)
CALCIUM SERPL-MCNC: 10 MG/DL (ref 8.6–10.4)
CHLORIDE SERPL-SCNC: 103 MMOL/L (ref 98–107)
CO2 SERPL-SCNC: 22 MMOL/L (ref 20–31)
CREAT SERPL-MCNC: 0.8 MG/DL (ref 0.7–1.2)
CRP SERPL HS-MCNC: <3 MG/L (ref 0–5)
EOSINOPHIL # BLD: 0.09 K/UL (ref 0–0.44)
EOSINOPHILS RELATIVE PERCENT: 1 % (ref 1–4)
ERYTHROCYTE [DISTWIDTH] IN BLOOD BY AUTOMATED COUNT: 12.6 % (ref 11.8–14.4)
ERYTHROCYTE [SEDIMENTATION RATE] IN BLOOD BY PHOTOMETRIC METHOD: 2 MM/HR (ref 0–15)
EST. AVERAGE GLUCOSE BLD GHB EST-MCNC: 100 MG/DL
GFR SERPL CREATININE-BSD FRML MDRD: >60 ML/MIN/1.73M2
GLUCOSE SERPL-MCNC: 99 MG/DL (ref 70–99)
HBA1C MFR BLD: 5.1 % (ref 4–6)
HCT VFR BLD AUTO: 45.3 % (ref 40.7–50.3)
HGB BLD-MCNC: 15.6 G/DL (ref 13–17)
IMM GRANULOCYTES # BLD AUTO: <0.03 K/UL (ref 0–0.3)
IMM GRANULOCYTES NFR BLD: 0 %
INR PPP: 1.5
LYMPHOCYTES NFR BLD: 1.95 K/UL (ref 1.1–3.7)
LYMPHOCYTES RELATIVE PERCENT: 27 % (ref 24–43)
MAGNESIUM SERPL-MCNC: 2 MG/DL (ref 1.6–2.6)
MCH RBC QN AUTO: 29.3 PG (ref 25.2–33.5)
MCHC RBC AUTO-ENTMCNC: 34.4 G/DL (ref 28.4–34.8)
MCV RBC AUTO: 85 FL (ref 82.6–102.9)
MONOCYTES NFR BLD: 0.79 K/UL (ref 0.1–1.2)
MONOCYTES NFR BLD: 11 % (ref 3–12)
NEUTROPHILS NFR BLD: 60 % (ref 36–65)
NEUTS SEG NFR BLD: 4.38 K/UL (ref 1.5–8.1)
NRBC BLD-RTO: 0 PER 100 WBC
PLATELET # BLD AUTO: 205 K/UL (ref 138–453)
PMV BLD AUTO: 9.6 FL (ref 8.1–13.5)
POTASSIUM SERPL-SCNC: 4.1 MMOL/L (ref 3.7–5.3)
PROT SERPL-MCNC: 8 G/DL (ref 6.4–8.3)
PROTHROMBIN TIME: 18.3 SEC (ref 11.9–14.8)
RBC # BLD AUTO: 5.33 M/UL (ref 4.21–5.77)
SODIUM SERPL-SCNC: 136 MMOL/L (ref 135–144)
TSH SERPL DL<=0.05 MIU/L-ACNC: 1.45 UIU/ML (ref 0.3–5)
WBC OTHER # BLD: 7.3 K/UL (ref 3.5–11.3)

## 2023-12-08 PROCEDURE — 82746 ASSAY OF FOLIC ACID SERUM: CPT

## 2023-12-08 PROCEDURE — 84443 ASSAY THYROID STIM HORMONE: CPT

## 2023-12-08 PROCEDURE — 87040 BLOOD CULTURE FOR BACTERIA: CPT

## 2023-12-08 PROCEDURE — 73130 X-RAY EXAM OF HAND: CPT

## 2023-12-08 PROCEDURE — 86140 C-REACTIVE PROTEIN: CPT

## 2023-12-08 PROCEDURE — 83735 ASSAY OF MAGNESIUM: CPT

## 2023-12-08 PROCEDURE — 82607 VITAMIN B-12: CPT

## 2023-12-08 PROCEDURE — 36415 COLL VENOUS BLD VENIPUNCTURE: CPT

## 2023-12-08 PROCEDURE — 85025 COMPLETE CBC W/AUTO DIFF WBC: CPT

## 2023-12-08 PROCEDURE — 82306 VITAMIN D 25 HYDROXY: CPT

## 2023-12-08 PROCEDURE — 83036 HEMOGLOBIN GLYCOSYLATED A1C: CPT

## 2023-12-08 PROCEDURE — 85652 RBC SED RATE AUTOMATED: CPT

## 2023-12-08 PROCEDURE — 80053 COMPREHEN METABOLIC PANEL: CPT

## 2023-12-08 PROCEDURE — 80061 LIPID PANEL: CPT

## 2023-12-08 PROCEDURE — 85610 PROTHROMBIN TIME: CPT

## 2023-12-10 LAB
CHOLEST SERPL-MCNC: 215 MG/DL
CHOLESTEROL/HDL RATIO: 3.6
HDLC SERPL-MCNC: 59 MG/DL
LDLC SERPL CALC-MCNC: 119 MG/DL (ref 0–130)
MICROORGANISM SPEC CULT: NORMAL
MICROORGANISM SPEC CULT: NORMAL
SERVICE CMNT-IMP: NORMAL
SERVICE CMNT-IMP: NORMAL
SPECIMEN DESCRIPTION: NORMAL
SPECIMEN DESCRIPTION: NORMAL
TRIGL SERPL-MCNC: 185 MG/DL

## 2023-12-12 LAB
FOLATE SERPL-MCNC: 17.9 NG/ML (ref 4.8–24.2)
VIT B12 SERPL-MCNC: >2000 PG/ML (ref 232–1245)

## 2023-12-13 LAB
25(OH)D3 SERPL-MCNC: 34.8 NG/ML (ref 30–100)
MICROORGANISM SPEC CULT: NORMAL
MICROORGANISM SPEC CULT: NORMAL
SERVICE CMNT-IMP: NORMAL
SERVICE CMNT-IMP: NORMAL
SPECIMEN DESCRIPTION: NORMAL
SPECIMEN DESCRIPTION: NORMAL

## 2023-12-14 DIAGNOSIS — R79.89 ELEVATED LIVER FUNCTION TESTS: Primary | ICD-10-CM

## 2023-12-27 ENCOUNTER — HOSPITAL ENCOUNTER (OUTPATIENT)
Dept: PHARMACY | Age: 43
Setting detail: THERAPIES SERIES
Discharge: HOME | End: 2023-12-27
Payer: COMMERCIAL

## 2023-12-27 VITALS
DIASTOLIC BLOOD PRESSURE: 99 MMHG | WEIGHT: 200 LBS | BODY MASS INDEX: 32.28 KG/M2 | SYSTOLIC BLOOD PRESSURE: 152 MMHG | HEART RATE: 72 BPM

## 2023-12-27 DIAGNOSIS — Z95.2 H/O MITRAL VALVE REPLACEMENT WITH MECHANICAL VALVE: Primary | ICD-10-CM

## 2023-12-27 LAB — INR BLD: 3

## 2023-12-27 PROCEDURE — 85610 PROTHROMBIN TIME: CPT

## 2023-12-27 PROCEDURE — 99211 OFF/OP EST MAY X REQ PHY/QHP: CPT

## 2023-12-27 NOTE — PROGRESS NOTES
[facility]  [facility]  ANTICOAGULATION    Referring Provider: Dr. Joshua Villagomez INR: 2-3    TODAY'S INR: 3    WARFARIN Dosage: Continue warfarin 5 mg po every Monday and Friday; 7.5 mg po all other days    INR (no units)   Date Value   2023 3   2023 1.5   11/15/2023 2.8   10/04/2023 2.6   2023 1.8   2023 2.4   2023 1.8       Hemoglobin   Date Value Ref Range Status   2023 15.6 13.0 - 17.0 g/dL Final     Hematocrit   Date Value Ref Range Status   2023 45.3 40.7 - 50.3 % Final     ALT   Date Value Ref Range Status   2023 124 (H) 5 - 41 U/L Final     AST   Date Value Ref Range Status   2023 71 (H) <40 U/L Final       Medication changes:  Metformin added for weight loss - patient stopped taking due to abdominal pain  Stop vitamin B-12 supplement    Notes:    Fingerstick INR drawn per clinic protocol. Patient states no visible blood in urine and no black tarry stool. Denies any missed doses of warfarin. No change in other maintenance medications or in diet. Will recheck INR in 6 weeks. Patient acknowledges working in consult agreement with pharmacist as referred by his/her physician. For Pharmacy Admin Tracking Only    Intervention Detail: Adherence Monitorin  Total # of Interventions Recommended: 1  Total # of Interventions Accepted: 1  Time Spent (min): 111  [facility].  [place] PERLA [facility]

## 2023-12-29 RX ORDER — ATORVASTATIN CALCIUM 20 MG/1
TABLET, FILM COATED ORAL
Qty: 90 TABLET | Refills: 3 | Status: SHIPPED | OUTPATIENT
Start: 2023-12-29

## 2023-12-31 DIAGNOSIS — B95.61 ENDOCARDITIS DUE TO METHICILLIN SUSCEPTIBLE STAPHYLOCOCCUS AUREUS (MSSA): ICD-10-CM

## 2023-12-31 DIAGNOSIS — R42 LIGHTHEADED: ICD-10-CM

## 2023-12-31 DIAGNOSIS — I10 PRIMARY HYPERTENSION: ICD-10-CM

## 2023-12-31 DIAGNOSIS — E78.2 MIXED HYPERLIPIDEMIA: ICD-10-CM

## 2023-12-31 DIAGNOSIS — I34.0 SEVERE MITRAL REGURGITATION: ICD-10-CM

## 2023-12-31 DIAGNOSIS — I33.0 ENDOCARDITIS DUE TO METHICILLIN SUSCEPTIBLE STAPHYLOCOCCUS AUREUS (MSSA): ICD-10-CM

## 2023-12-31 DIAGNOSIS — R00.0 SINUS TACHYCARDIA: ICD-10-CM

## 2024-01-02 DIAGNOSIS — R63.5 WEIGHT GAIN: ICD-10-CM

## 2024-01-02 DIAGNOSIS — E66.9 OBESITY (BMI 30-39.9): ICD-10-CM

## 2024-01-02 RX ORDER — METOPROLOL SUCCINATE 100 MG/1
TABLET, EXTENDED RELEASE ORAL
Qty: 90 TABLET | Refills: 3 | Status: SHIPPED | OUTPATIENT
Start: 2024-01-02

## 2024-01-09 ENCOUNTER — OFFICE VISIT (OUTPATIENT)
Dept: PRIMARY CARE CLINIC | Age: 44
End: 2024-01-09
Payer: COMMERCIAL

## 2024-01-09 VITALS
WEIGHT: 198 LBS | BODY MASS INDEX: 31.82 KG/M2 | HEIGHT: 66 IN | OXYGEN SATURATION: 98 % | SYSTOLIC BLOOD PRESSURE: 136 MMHG | HEART RATE: 89 BPM | DIASTOLIC BLOOD PRESSURE: 86 MMHG

## 2024-01-09 DIAGNOSIS — R63.5 WEIGHT GAIN: ICD-10-CM

## 2024-01-09 DIAGNOSIS — E66.9 OBESITY (BMI 30-39.9): ICD-10-CM

## 2024-01-09 DIAGNOSIS — E67.8 HYPERVITAMINOSIS: ICD-10-CM

## 2024-01-09 DIAGNOSIS — E78.5 DYSLIPIDEMIA: ICD-10-CM

## 2024-01-09 DIAGNOSIS — R93.1 ABNORMAL ECHOCARDIOGRAM: Primary | ICD-10-CM

## 2024-01-09 PROCEDURE — 3079F DIAST BP 80-89 MM HG: CPT | Performed by: STUDENT IN AN ORGANIZED HEALTH CARE EDUCATION/TRAINING PROGRAM

## 2024-01-09 PROCEDURE — 3075F SYST BP GE 130 - 139MM HG: CPT | Performed by: STUDENT IN AN ORGANIZED HEALTH CARE EDUCATION/TRAINING PROGRAM

## 2024-01-09 PROCEDURE — 99214 OFFICE O/P EST MOD 30 MIN: CPT | Performed by: STUDENT IN AN ORGANIZED HEALTH CARE EDUCATION/TRAINING PROGRAM

## 2024-01-09 RX ORDER — SEMAGLUTIDE 1.34 MG/ML
0.25 INJECTION, SOLUTION SUBCUTANEOUS WEEKLY
Qty: 4 ADJUSTABLE DOSE PRE-FILLED PEN SYRINGE | Refills: 1 | Status: SHIPPED | OUTPATIENT
Start: 2024-01-09

## 2024-01-09 ASSESSMENT — PATIENT HEALTH QUESTIONNAIRE - PHQ9
2. FEELING DOWN, DEPRESSED OR HOPELESS: 0
SUM OF ALL RESPONSES TO PHQ QUESTIONS 1-9: 0
SUM OF ALL RESPONSES TO PHQ9 QUESTIONS 1 & 2: 0
SUM OF ALL RESPONSES TO PHQ QUESTIONS 1-9: 0
SUM OF ALL RESPONSES TO PHQ QUESTIONS 1-9: 0
1. LITTLE INTEREST OR PLEASURE IN DOING THINGS: 0
DEPRESSION UNABLE TO ASSESS: PT REFUSES
SUM OF ALL RESPONSES TO PHQ QUESTIONS 1-9: 0

## 2024-01-09 NOTE — PROGRESS NOTES
[facility] PRIMARY CARE  [address] [place] [address] , [address]  [place], [place], [address]    Cleve Ni is a 43 y.o. male with  has a past medical history of Bacteremia, Cardioembolic stroke (HCC), Cerebral artery occlusion with cerebral infarction (HCC), Cerebral septic emboli (HCC), Chronic right shoulder pain, Diverticulosis, Endocarditis, History of COVID-19, Hyperlipidemia, Hypertension, MSSA bacteremia, MESSI (obstructive sleep apnea), Platelets decreased (HCC), Research subject, Under care of team, Under care of team, Under care of team, Under care of team, Under care of team, and Wears contact lenses.  Presented to the office today for:  Chief Complaint   Patient presents with    Hyperlipidemia    Weight Management       Assessment/Plan   1. Abnormal echocardiogram  -     Culture, Blood 2; Future  -     Culture, Blood 1; Future  -     Culture, Blood 3; Future  -     CBC with Auto Differential; Future  2. Obesity (BMI 30-39.9)  -     Semaglutide,0.25 or 0.5MG/DOS, (OZEMPIC, 0.25 OR 0.5 MG/DOSE,) 2 MG/1.5ML SOPN; Inject 0.25 mg into the skin once a week, Disp-4 Adjustable Dose Pre-filled Pen Syringe, R-1Normal  3. Weight gain  -     Semaglutide,0.25 or 0.5MG/DOS, (OZEMPIC, 0.25 OR 0.5 MG/DOSE,) 2 MG/1.5ML SOPN; Inject 0.25 mg into the skin once a week, Disp-4 Adjustable Dose Pre-filled Pen Syringe, R-1Normal  4. Dyslipidemia  5. Hypervitaminosis  Return in about 1 month (around 2/9/2024) for F/U Meds/Wt.    Continue w/ statin at the current dose  Repeat CBC, Cultures X3 at this time  F/U with Cardiology per prior plans, discussed w/  / plans pending further review.  Continue w/ diet-exercise changes (150+ min mod-high intensity exercise weekly)  Continue w/ caloric restriction, approx 1500-1600kcal/day  Discussed various options with the patient today, plan to start Ozempic pending insurance approvals/possible restrictions.     All patient questions answered.  Pt voiced understanding.

## 2024-01-12 ENCOUNTER — TELEPHONE (OUTPATIENT)
Dept: PRIMARY CARE CLINIC | Age: 44
End: 2024-01-12

## 2024-01-12 NOTE — TELEPHONE ENCOUNTER
Per Dr. Blue - called pt he plans to get labs drawn on Saturday 1/13/2024  He has not heard from [facility] office yet

## 2024-01-13 ENCOUNTER — HOSPITAL ENCOUNTER (OUTPATIENT)
Age: 44
Discharge: HOME | End: 2024-01-13
Payer: COMMERCIAL

## 2024-01-13 DIAGNOSIS — R93.1 ABNORMAL ECHOCARDIOGRAM: ICD-10-CM

## 2024-01-13 LAB
BASOPHILS # BLD: 0.03 K/UL (ref 0–0.2)
BASOPHILS NFR BLD: 1 % (ref 0–2)
EOSINOPHIL # BLD: 0.09 K/UL (ref 0–0.44)
EOSINOPHILS RELATIVE PERCENT: 1 % (ref 1–4)
ERYTHROCYTE [DISTWIDTH] IN BLOOD BY AUTOMATED COUNT: 12.1 % (ref 11.8–14.4)
HCT VFR BLD AUTO: 44.3 % (ref 40.7–50.3)
HGB BLD-MCNC: 15.3 G/DL (ref 13–17)
IMM GRANULOCYTES # BLD AUTO: <0.03 K/UL (ref 0–0.3)
IMM GRANULOCYTES NFR BLD: 0 %
LYMPHOCYTES NFR BLD: 1.99 K/UL (ref 1.1–3.7)
LYMPHOCYTES RELATIVE PERCENT: 31 % (ref 24–43)
MCH RBC QN AUTO: 28.8 PG (ref 25.2–33.5)
MCHC RBC AUTO-ENTMCNC: 34.5 G/DL (ref 28.4–34.8)
MCV RBC AUTO: 83.4 FL (ref 82.6–102.9)
MONOCYTES NFR BLD: 0.55 K/UL (ref 0.1–1.2)
MONOCYTES NFR BLD: 9 % (ref 3–12)
NEUTROPHILS NFR BLD: 58 % (ref 36–65)
NEUTS SEG NFR BLD: 3.83 K/UL (ref 1.5–8.1)
NRBC BLD-RTO: 0 PER 100 WBC
PLATELET # BLD AUTO: 235 K/UL (ref 138–453)
PMV BLD AUTO: 9.8 FL (ref 8.1–13.5)
RBC # BLD AUTO: 5.31 M/UL (ref 4.21–5.77)
WBC OTHER # BLD: 6.5 K/UL (ref 3.5–11.3)

## 2024-01-13 PROCEDURE — 36415 COLL VENOUS BLD VENIPUNCTURE: CPT

## 2024-01-13 PROCEDURE — 87040 BLOOD CULTURE FOR BACTERIA: CPT

## 2024-01-13 PROCEDURE — 85025 COMPLETE CBC W/AUTO DIFF WBC: CPT

## 2024-01-14 LAB
MICROORGANISM SPEC CULT: NORMAL
SERVICE CMNT-IMP: NORMAL
SPECIMEN DESCRIPTION: NORMAL

## 2024-01-18 LAB
MICROORGANISM SPEC CULT: NORMAL
SERVICE CMNT-IMP: NORMAL
SPECIMEN DESCRIPTION: NORMAL

## 2024-01-18 NOTE — PROGRESS NOTES
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 7/20  · Septic emboli to the brain 7/21  · Thrombocytopenia  · Diarrhea    Other:  ·   Discussion / summary of stay / plan of care   ·   Recommendations   · Nafcillin 2 g every 4 until 8/26, addressing the bacteremia as well as multiple organ infection  · Rifampin added 7/22 till 8/26 - to clear the blood  · Might need longer AB suppression till cardiac surgery   · Received Exebacase compassionate use 7/25  · On probiotics, 3 x per day  · Chart reconsiled - ok for DC - FU Dr Gadiel Hodge 2 weeks    Infection Control Recommendations   · Hubert Precautions    Antimicrobial Stewardship Recommendations   · Simplification of therapy  · Targeted therapy  · Per Kg dosing      Coordination ofOutpatient Care:   · Estimated Length of IV antimicrobials:  · Patient will need Midline / picc Catheter Insertion:   · Patient will need SNF:  · Patient will need outpatient wound care:     History of Present Illness:   Initial history:  Whit Martinez is a 36y.o.-year-old male     Interval changes  8/1/2021   Patient Vitals for the past 8 hrs:   BP Temp Temp src Pulse Resp SpO2   08/01/21 1115 97/67 98.4 °F (36.9 °C) Oral 85 16 98 %     Feels better stronger  No rash  abd soft  Lomotil for diarrhea, resolved  LFT normal 8/1    Last positive blood culture 7/27,  Blood culture 7/28, 7/30, 7/31 remain all negative    MONROE 7/20 shows a large vegetation on the mitral valve 2.5X 2.4 cm  CT brain showing acute embolic infarcts 1/88    CT surgery planning for valve repair after antibiotics are over inpatient stabilizes    I have personally reviewed the past medical history, past surgical history, medications, social history, and family history, and I haveupdated the database accordingly. Allergies:   Morphine     Review of Systems:     Review of Systems   Constitutional: Negative for fatigue and fever. HENT: Negative for congestion. Eyes: Negative for itching. Respiratory: Negative for apnea and choking. Cardiovascular: Negative for chest pain. Gastrointestinal: Negative for abdominal distention. Endocrine: Negative for heat intolerance. Genitourinary: Negative for dysuria and flank pain. Musculoskeletal: Negative for arthralgias. Skin: Negative for color change. Allergic/Immunologic: Negative for immunocompromised state. Neurological: Negative for dizziness. Psychiatric/Behavioral: Negative for agitation. Physical Examination :       Physical Exam  Constitutional:       General: He is not in acute distress. Appearance: Normal appearance. He is not ill-appearing. HENT:      Head: Atraumatic. Nose: Nose normal.      Mouth/Throat:      Mouth: Mucous membranes are moist.   Eyes:      General: No scleral icterus. Conjunctiva/sclera: Conjunctivae normal.   Cardiovascular:      Rate and Rhythm: Normal rate and regular rhythm. Heart sounds: No friction rub. Pulmonary:      Effort: No respiratory distress. Breath sounds: Normal breath sounds. Abdominal:      General: There is no distension. Tenderness: There is no abdominal tenderness. Genitourinary:     Comments: Urine tiara  Musculoskeletal:         General: No swelling or deformity. Cervical back: Neck supple. No rigidity. Skin:     General: Skin is dry. Coloration: Skin is not jaundiced. Neurological:      Mental Status: He is alert and oriented to person, place, and time. Mental status is at baseline. Psychiatric:         Mood and Affect: Mood normal.         Thought Content:  Thought content normal.         Past Medical History:     Past Medical History:   Diagnosis Date    Diverticulosis     Hyperlipidemia     Hypertension     MESSI (obstructive sleep apnea)     Has PSG study done Pos for Mod MESSI, never had cpap tritration done for machine    Research subject 07/24/2021    EIND [id number] Exebacase for persistent bacteremia expected date of completion 8/25/2021       Past Surgical  History:     Past Surgical History:   Procedure Laterality Date    ABDOMEN SURGERY      large bowel resection    ANTERIOR CRUCIATE LIGAMENT REPAIR Right     APPENDECTOMY  07/09/1997    COLECTOMY      2012 - D/T Dverticulitis    DILATATION, ESOPHAGUS         Medications:      lactobacillus  1 capsule Oral TID WC    diphenoxylate-atropine  1 tablet Oral BID    metoprolol tartrate  50 mg Oral BID    potassium chloride  20 mEq Oral BID WC    pantoprazole  40 mg Oral BID AC    rifampin (RIFADIN) IVPB  600 mg Intravenous Q24H    nafcillin  2,000 mg Intravenous Q4H    sodium chloride flush  5-40 mL Intravenous 2 times per day       Social History:     Social History     Socioeconomic History    Marital status:      Spouse name: Not on file    Number of children: Not on file    Years of education: Not on file    Highest education level: Not on file   Occupational History    Not on file   Tobacco Use    Smoking status: Never Smoker    Smokeless tobacco: Never Used   Vaping Use    Vaping Use: Never assessed   Substance and Sexual Activity    Alcohol use:  Yes     Alcohol/week: 20.0 standard drinks     Types: 20 Cans of beer per week     Comment: 20/ week, average every other day    Drug use: Never    Sexual activity: Not on file   Other Topics Concern    Not on file   Social History Narrative    Not on file     Social Determinants of Health     Financial Resource Strain:     Difficulty of Paying Living Expenses:    Food Insecurity:     Worried About Running Out of Food in the Last Year:     [address] in the Last Year:    Transportation Needs:     Lack of Transportation (Medical):  Lack of Transportation (Non-Medical):    Physical Activity:     Days of Exercise per Week:     Minutes of Exercise per Session:    Stress:     Feeling of Stress :    Social Connections:     Frequency of Communication with Friends and Family:     Frequency of Social Gatherings with Friends and Family:     Attends Mu-ism Services:     Active Member of Clubs or Organizations:     Attends Club or Organization Meetings:     Marital Status:    Intimate Partner Violence:     Fear of Current or Ex-Partner:     Emotionally Abused:     Physically Abused:     Sexually Abused:        Family History:     Family History   Problem Relation Age of Onset    No Known Problems Mother     Other Father         diverticulitis      Medical Decision Making:   I have independently reviewed/ordered the following labs:    CBC with Differential:   Recent Labs     07/31/21  0535 08/01/21  0535   WBC 12.6* 10.8   HGB 9.6* 9.0*   HCT 30.6* 28.1*    373   LYMPHOPCT 19* 18*   MONOPCT 9 9     BMP:  Recent Labs     07/31/21  0535 08/01/21  0535    135   K 4.5 3.9    102   CO2 24 23   BUN 8 10   CREATININE 0.74 0.68*     Hepatic Function Panel:   Recent Labs     07/30/21  0351 07/30/21  0351 07/31/21  0535 08/01/21  0535   PROT 6.4  --   --  6.8   LABALBU 2.9*  2.9*   < > 3.1* 3.1*  3.1*   BILIDIR 0.13  --   --  0.11   IBILI 0.24  --   --  0.14   BILITOT 0.37  --   --  0.25*   ALKPHOS 61  --   --  64   ALT 22  --   --  17   AST 20  --   --  15    < > = values in this interval not displayed. No results for input(s): RPR in the last 72 hours. No results for input(s): HIV in the last 72 hours. No results for input(s): BC in the last 72 hours. Lab Results   Component Value Date    CREATININE 0.68 08/01/2021    GLUCOSE 101 08/01/2021       Detailed results: Thank you for allowing us to participate in the care of this patient. Please call with questions.     This note is created with the assistance of a speech recognition program.  While intending to generate adocument that actually reflects the content of the visit, the document can still have some errors including those of syntax and sound a like substitutions which may escape proof reading. It such instances, actual meaningcan be extrapolated by contextual diversion.     Elizabeth Ruth MD  Office: [phone]  Perfect serve / office [phone] negative...

## 2024-01-30 ENCOUNTER — OFFICE VISIT (OUTPATIENT)
Dept: CARDIOLOGY | Age: 44
End: 2024-01-30
Payer: COMMERCIAL

## 2024-01-30 ENCOUNTER — TELEPHONE (OUTPATIENT)
Dept: CARDIOLOGY | Age: 44
End: 2024-01-30

## 2024-01-30 VITALS
RESPIRATION RATE: 18 BRPM | SYSTOLIC BLOOD PRESSURE: 139 MMHG | HEART RATE: 76 BPM | WEIGHT: 198 LBS | BODY MASS INDEX: 31.82 KG/M2 | DIASTOLIC BLOOD PRESSURE: 89 MMHG | HEIGHT: 66 IN | OXYGEN SATURATION: 98 %

## 2024-01-30 DIAGNOSIS — I34.0 SEVERE MITRAL REGURGITATION: ICD-10-CM

## 2024-01-30 DIAGNOSIS — B95.61 ENDOCARDITIS DUE TO METHICILLIN SUSCEPTIBLE STAPHYLOCOCCUS AUREUS (MSSA): ICD-10-CM

## 2024-01-30 DIAGNOSIS — Z95.2 H/O MITRAL VALVE REPLACEMENT WITH MECHANICAL VALVE: ICD-10-CM

## 2024-01-30 DIAGNOSIS — Z79.01 ENCOUNTER FOR CURRENT LONG-TERM USE OF ANTICOAGULANTS: ICD-10-CM

## 2024-01-30 DIAGNOSIS — R42 LIGHTHEADED: ICD-10-CM

## 2024-01-30 DIAGNOSIS — I33.0 ENDOCARDITIS DUE TO METHICILLIN SUSCEPTIBLE STAPHYLOCOCCUS AUREUS (MSSA): ICD-10-CM

## 2024-01-30 DIAGNOSIS — I10 ESSENTIAL HYPERTENSION: Primary | ICD-10-CM

## 2024-01-30 DIAGNOSIS — E78.2 MIXED HYPERLIPIDEMIA: ICD-10-CM

## 2024-01-30 PROCEDURE — 3079F DIAST BP 80-89 MM HG: CPT | Performed by: PHYSICIAN ASSISTANT

## 2024-01-30 PROCEDURE — 3075F SYST BP GE 130 - 139MM HG: CPT | Performed by: PHYSICIAN ASSISTANT

## 2024-01-30 PROCEDURE — 99214 OFFICE O/P EST MOD 30 MIN: CPT | Performed by: PHYSICIAN ASSISTANT

## 2024-01-30 NOTE — PROGRESS NOTES
sounds normal. No respiratory distress. He has no wheezes, rhonchi or rales. Abdominal: Soft, non-tender. Bowel sounds and aorta are normal. He exhibits no organomegaly, mass or bruit.   Extremities: None. No cyanosis or clubbing. 2+ radial and carotid pulses. Distal extremity pulses: 2+ bilaterally.  Neurological: He is alert and oriented to person, place, and time. No evidence of gross cranial nerve deficit. Coordination appeared normal.   Skin: Skin is warm and dry. There is no rash or diaphoresis.   Psychiatric: He has a normal mood and affect. His speech is normal and behavior is normal.       MOST RECENT LABS ON RECORD:         Lab Results   Component Value Date     WBC 6.5 01/13/2024     HGB 15.3 01/13/2024     HCT 44.3 01/13/2024      01/13/2024     CHOL 215 (H) 12/08/2023     TRIG 185 (H) 12/08/2023     HDL 59 12/08/2023      (H) 12/08/2023     AST 71 (H) 12/08/2023      12/08/2023     K 4.1 12/08/2023      12/08/2023     CREATININE 0.8 12/08/2023     BUN 13 12/08/2023     CO2 22 12/08/2023     TSH 1.45 12/08/2023     INR 3 12/27/2023     LABA1C 5.1 12/08/2023            ASSESSMENT:      1. Essential hypertension    2. Lightheaded    3. Severe mitral regurgitation    4. Endocarditis due to methicillin susceptible Staphylococcus aureus (MSSA)    5. H/O mitral valve replacement with mechanical valve    6. Encounter for current long-term use of anticoagulants    7. Mixed hyperlipidemia      PLAN:         Essential Hypertension: Controlled  Beta Blocker: Continue Metoprolol succinate (Toprol XL) 100 mg daily.   ACE Inibitor/ARB: Not indicated at this time.      Lightheadedness/dizziness: he has light headedness that comes and goes but he says it is not enough that it is concerning.   Nonpharmacologic counseling: Because of his condition, I reminded him to try and keep himself well-hydrated and to take extra time when moving from laying to sitting, sitting to standing and standing to 
that he continue with anticoagulation to decrease his risk of stoke but also reminded him to watch for signs of blood in his stool or black tarry stools and stop the medication immediately if this develops as this could be life threatening.    Additional Testing List: None    Sinus Tachycardia: Now better controlled but may be partly due to hypotension  Beta Blocker: Continue Metoprolol succinate (Toprol XL) 100 mg daily.    Hyperlipidemia: Mixed - Last LDL done on 10/15/2021 was 87 mg/dL   Cholesterol Reduction Therapy: Continue Atorvastatin (Lipitor) 20 mg daily.      In the meantime I asked him to continue taking his other medications and follow up with you as previously scheduled.    FOLLOW UP:   I told Mr. Ni to call my office if he had any problems, but otherwise told him to No follow-ups on file. However, I would be happy to see him sooner should the need arise. Once again, thank you for allowing me to participate in this patients care. Please do not hesitate to contact me could I be of further assistance.    Sincerely,  David Taylor MD, MS, F.A.C.C.  [facility] Cardiology Specialist    [address] [place] [address]  Phone: [phone], Fax: [phone]     I believe that the risk of significant morbidity and mortality related to the patient's current medical conditions are: Intermediate.      The documentation recorded by the scribe, accurately and completely reflects the services I personally performed and the decisions made by me. David Taylor MD, MS, F.A.C.C. January 30, 2024

## 2024-01-30 NOTE — TELEPHONE ENCOUNTER
----- Message from David Taylor MD sent at 1/30/2024 10:40 AM EST -----  Let Mr. Ni know that it appears his blood culture results are negative. Please make appt in 2-3 weeks to discuss. Thanks.  ----- Message -----  From: Joselyn Wong APRN - CNP  Sent: 1/16/2024   1:55 PM EST  To: David Taylor MD; #    NP covering for MD. Reviewed Dr. Su BOBO note regarding atypical echo. Reviewed negative cultures.     Reviewed Dr. Taylor note 1/13/24 as follows:  \"Please call Mr. Ni and let him know we are watching his Blood cultures. If these become positive with a bacteria known to cause endocarditis we will likely we will need to do a MONROE and start antibiotics. Hopefully they will not become positive.\"

## 2024-02-06 ENCOUNTER — HOSPITAL ENCOUNTER (OUTPATIENT)
Dept: PHARMACY | Age: 44
Setting detail: THERAPIES SERIES
Discharge: HOME | End: 2024-02-06
Payer: COMMERCIAL

## 2024-02-06 ENCOUNTER — HOSPITAL ENCOUNTER (OUTPATIENT)
Age: 44
Discharge: HOME | End: 2024-02-08
Payer: COMMERCIAL

## 2024-02-06 VITALS
WEIGHT: 199 LBS | SYSTOLIC BLOOD PRESSURE: 142 MMHG | HEART RATE: 73 BPM | DIASTOLIC BLOOD PRESSURE: 94 MMHG | BODY MASS INDEX: 32.14 KG/M2

## 2024-02-06 VITALS
SYSTOLIC BLOOD PRESSURE: 139 MMHG | DIASTOLIC BLOOD PRESSURE: 89 MMHG | HEIGHT: 66 IN | BODY MASS INDEX: 31.82 KG/M2 | WEIGHT: 197.97 LBS

## 2024-02-06 DIAGNOSIS — I33.0 ENDOCARDITIS DUE TO METHICILLIN SUSCEPTIBLE STAPHYLOCOCCUS AUREUS (MSSA): ICD-10-CM

## 2024-02-06 DIAGNOSIS — Z79.01 ENCOUNTER FOR CURRENT LONG-TERM USE OF ANTICOAGULANTS: ICD-10-CM

## 2024-02-06 DIAGNOSIS — R42 LIGHTHEADED: ICD-10-CM

## 2024-02-06 DIAGNOSIS — I34.0 SEVERE MITRAL REGURGITATION: ICD-10-CM

## 2024-02-06 DIAGNOSIS — Z95.2 H/O MITRAL VALVE REPLACEMENT WITH MECHANICAL VALVE: Primary | ICD-10-CM

## 2024-02-06 DIAGNOSIS — B95.61 ENDOCARDITIS DUE TO METHICILLIN SUSCEPTIBLE STAPHYLOCOCCUS AUREUS (MSSA): ICD-10-CM

## 2024-02-06 DIAGNOSIS — Z95.2 H/O MITRAL VALVE REPLACEMENT WITH MECHANICAL VALVE: ICD-10-CM

## 2024-02-06 DIAGNOSIS — I10 ESSENTIAL HYPERTENSION: ICD-10-CM

## 2024-02-06 DIAGNOSIS — E78.2 MIXED HYPERLIPIDEMIA: ICD-10-CM

## 2024-02-06 LAB
ECHO AO ROOT DIAM: 3.2 CM
ECHO AO ROOT INDEX: 1.6 CM/M2
ECHO AV ACCELERATION TIME: 93.93 MS
ECHO AV CUSP MM: 1.9 CM
ECHO AV MEAN GRADIENT: 3 MMHG
ECHO AV MEAN VELOCITY: 0.8 M/S
ECHO AV PEAK VELOCITY: 1.1 M/S
ECHO AV REGURGITANT PHT: 393.4 MILLISECOND
ECHO AV VTI: 22.2 CM
ECHO BSA: 2.04 M2
ECHO EST RA PRESSURE: 3 MMHG
ECHO LA DIAMETER INDEX: 2.45 CM/M2
ECHO LA DIAMETER: 4.9 CM
ECHO LA MAJOR AXIS: 6 CM
ECHO LA TO AORTIC ROOT RATIO: 1.53
ECHO LA VOL BP: 65 ML (ref 18–58)
ECHO LA VOL MOD A2C: 62 ML (ref 18–58)
ECHO LA VOL MOD A4C: 64 ML (ref 18–58)
ECHO LA VOL/BSA BIPLANE: 33 ML/M2 (ref 16–34)
ECHO LA VOLUME AREA LENGTH: 68 ML
ECHO LA VOLUME INDEX AREA LENGTH: 34 ML/M2 (ref 16–34)
ECHO LA VOLUME INDEX MOD A2C: 31 ML/M2 (ref 16–34)
ECHO LA VOLUME INDEX MOD A4C: 32 ML/M2 (ref 16–34)
ECHO LV E' LATERAL VELOCITY: 10 CM/S
ECHO LV EDV A2C: 90 ML
ECHO LV EDV A4C: 106 ML
ECHO LV EDV BP: 102 ML (ref 67–155)
ECHO LV EDV INDEX A4C: 53 ML/M2
ECHO LV EDV INDEX BP: 51 ML/M2
ECHO LV EDV NDEX A2C: 45 ML/M2
ECHO LV EJECTION FRACTION BIPLANE: 60 % (ref 55–100)
ECHO LV ESV A2C: 35 ML
ECHO LV ESV A4C: 46 ML
ECHO LV ESV BP: 42 ML (ref 22–58)
ECHO LV ESV INDEX A2C: 18 ML/M2
ECHO LV ESV INDEX A4C: 23 ML/M2
ECHO LV ESV INDEX BP: 21 ML/M2
ECHO LV FRACTIONAL SHORTENING: 29 % (ref 28–44)
ECHO LV INTERNAL DIMENSION DIASTOLE INDEX: 2.25 CM/M2
ECHO LV INTERNAL DIMENSION DIASTOLIC: 4.5 CM (ref 4.2–5.9)
ECHO LV INTERNAL DIMENSION SYSTOLIC INDEX: 1.6 CM/M2
ECHO LV INTERNAL DIMENSION SYSTOLIC: 3.2 CM
ECHO LV IVSD: 0.9 CM (ref 0.6–1)
ECHO LV IVSS: 1.6 CM
ECHO LV MASS 2D: 132.8 G (ref 88–224)
ECHO LV MASS INDEX 2D: 66.4 G/M2 (ref 49–115)
ECHO LV POSTERIOR WALL DIASTOLIC: 0.9 CM (ref 0.6–1)
ECHO LV POSTERIOR WALL SYSTOLIC: 1.5 CM
ECHO LV RELATIVE WALL THICKNESS RATIO: 0.4
ECHO LVOT AV VTI INDEX: 0.96
ECHO LVOT MEAN GRADIENT: 2 MMHG
ECHO LVOT VTI: 21.3 CM
ECHO MV A VELOCITY: 1.13 M/S
ECHO MV E DECELERATION TIME (DT): 272.6 MS
ECHO MV E VELOCITY: 1.36 M/S
ECHO MV E/A RATIO: 1.2
ECHO MV E/E' LATERAL: 13.6
ECHO MV MEAN GRADIENT: 6 MMHG
ECHO PV MAX VELOCITY: 0.9 M/S
ECHO RIGHT VENTRICULAR SYSTOLIC PRESSURE (RVSP): 23 MMHG
ECHO RV INTERNAL DIMENSION: 3.8 CM
ECHO TV REGURGITANT MAX VELOCITY: 2.22 M/S
INR BLD: 2.8

## 2024-02-06 PROCEDURE — 85610 PROTHROMBIN TIME: CPT

## 2024-02-06 PROCEDURE — 93306 TTE W/DOPPLER COMPLETE: CPT

## 2024-02-06 PROCEDURE — 99211 OFF/OP EST MAY X REQ PHY/QHP: CPT

## 2024-02-06 PROCEDURE — 93306 TTE W/DOPPLER COMPLETE: CPT | Performed by: INTERNAL MEDICINE

## 2024-02-06 NOTE — PROGRESS NOTES
[facility]  [facility]ication Management  ANTICOAGULATION    Referring Provider: Dr. Taylor    GOAL INR: 2-3    TODAY'S INR: 2.8    WARFARIN Dosage: Continue warfarin 5 mg po every Monday and Friday; 7.5 mg po all other days    INR (no units)   Date Value   2024 2.8   2023 3   2023 1.5   11/15/2023 2.8   10/04/2023 2.6   2023 1.8   2023 2.4       Hemoglobin   Date Value Ref Range Status   2024 15.3 13.0 - 17.0 g/dL Final     Hematocrit   Date Value Ref Range Status   2024 44.3 40.7 - 50.3 % Final     ALT   Date Value Ref Range Status   2023 124 (H) 5 - 41 U/L Final     AST   Date Value Ref Range Status   2023 71 (H) <40 U/L Final       Medication changes:  No changes    Notes:    Fingerstick INR drawn per clinic protocol. Patient states no visible blood in urine and no black tarry stool. Denies any missed doses of warfarin. No change in other maintenance medications or in diet. Will recheck INR in 6 weeks. Patient acknowledges working in consult agreement with pharmacist as referred by his/her physician.                Patient will start working 13 hour days/7 days a week - midnights - starting in March for 4 weeks.  Appointment scheduled for 7 am in 6 weeks - he will drive to clinic after shift ends.     For Pharmacy Admin Tracking Only    Intervention Detail: Adherence Monitorin  Total # of Interventions Recommended: 1  Total # of Interventions Accepted: 1  Time Spent (min): 20     Judith Coe [facility]

## 2024-02-07 ENCOUNTER — APPOINTMENT (OUTPATIENT)
Dept: PHARMACY | Age: 44
End: 2024-02-07
Payer: COMMERCIAL

## 2024-02-07 ENCOUNTER — TELEPHONE (OUTPATIENT)
Dept: CARDIOLOGY | Age: 44
End: 2024-02-07

## 2024-02-07 NOTE — TELEPHONE ENCOUNTER
----- Message from Saundra Mas PA-C sent at 2/7/2024 11:09 AM EST -----  Please let them know their echo looks good, will discuss at follow up appointment. Thanks.

## 2024-03-20 ENCOUNTER — APPOINTMENT (OUTPATIENT)
Dept: PHARMACY | Age: 44
End: 2024-03-20
Payer: COMMERCIAL

## 2024-04-01 RX ORDER — WARFARIN SODIUM 5 MG/1
TABLET ORAL
Qty: 117 TABLET | Refills: 3 | Status: SHIPPED | OUTPATIENT
Start: 2024-04-01

## 2024-04-01 NOTE — TELEPHONE ENCOUNTER
Hi Dr. Taylor,     It appears that you may be the referring provider for this patient's anticoagulation.

## 2024-04-04 ENCOUNTER — HOSPITAL ENCOUNTER (OUTPATIENT)
Dept: PHARMACY | Age: 44
Setting detail: THERAPIES SERIES
Discharge: HOME | End: 2024-04-04
Payer: COMMERCIAL

## 2024-04-04 VITALS
DIASTOLIC BLOOD PRESSURE: 91 MMHG | SYSTOLIC BLOOD PRESSURE: 140 MMHG | HEART RATE: 81 BPM | WEIGHT: 200 LBS | BODY MASS INDEX: 32.3 KG/M2

## 2024-04-04 DIAGNOSIS — Z95.2 H/O MITRAL VALVE REPLACEMENT WITH MECHANICAL VALVE: Primary | ICD-10-CM

## 2024-04-04 LAB — INR BLD: 1.3

## 2024-04-04 PROCEDURE — 99212 OFFICE O/P EST SF 10 MIN: CPT

## 2024-04-04 PROCEDURE — 85610 PROTHROMBIN TIME: CPT

## 2024-04-04 NOTE — PROGRESS NOTES
[facility]  Medication Management  ANTICOAGULATION    Referring Provider: Dr. Taylor     GOAL INR: 2 - 3     TODAY'S INR: 1.3     WARFARIN Dosage: Increase warfarin to 7.5 mg daily.    INR (no units)   Date Value   2024 2.8   2023 3   2023 1.5   11/15/2023 2.8   10/04/2023 2.6   2023 1.8   2023 2.4       Hemoglobin   Date Value Ref Range Status   2024 15.3 13.0 - 17.0 g/dL Final     Hematocrit   Date Value Ref Range Status   2024 44.3 40.7 - 50.3 % Final     ALT   Date Value Ref Range Status   2023 124 (H) 5 - 41 U/L Final     AST   Date Value Ref Range Status   2023 71 (H) <40 U/L Final       Medication changes:  none    Notes:    Fingerstick INR drawn per clinic protocol. Patient states no visible blood in urine and no black tarry stool. Denies any missed doses of warfarin. No change in other maintenance medications or in diet. Will recheck INR in 2 weeks. Patient acknowledges working in consult agreement with pharmacist as referred by his/her physician.   Patient worked 13 hour shifts at night for over a month at work but claims no missed doses of warfarin and no increase in vegetables.     Now patient is working 6 am to 4 pm Mon - Thur and off on .       For Pharmacy Admin Tracking Only    Intervention Detail: Adherence Monitorin and Dose Adjustment: 2, reason: Therapy Optimization  Total # of Interventions Recommended: 3  Total # of Interventions Accepted: 3  Time Spent (min): 20      Margi Chavez [facility], PharmD

## 2024-04-19 ENCOUNTER — APPOINTMENT (OUTPATIENT)
Dept: PHARMACY | Age: 44
End: 2024-04-19
Payer: COMMERCIAL

## 2024-04-19 VITALS
BODY MASS INDEX: 32.46 KG/M2 | SYSTOLIC BLOOD PRESSURE: 123 MMHG | WEIGHT: 201 LBS | HEART RATE: 91 BPM | DIASTOLIC BLOOD PRESSURE: 85 MMHG

## 2024-04-19 DIAGNOSIS — Z95.2 H/O MITRAL VALVE REPLACEMENT WITH MECHANICAL VALVE: Primary | ICD-10-CM

## 2024-04-19 LAB — INR BLD: 2.2

## 2024-04-19 PROCEDURE — 99211 OFF/OP EST MAY X REQ PHY/QHP: CPT

## 2024-04-19 PROCEDURE — 85610 PROTHROMBIN TIME: CPT

## 2024-04-19 NOTE — PROGRESS NOTES
[facility]  Medication Management  ANTICOAGULATION    Referring Provider: Dr. Taylor     GOAL INR: 2 - 3     TODAY'S INR: 2.2     WARFARIN Dosage: Continue warfarin to 7.5 mg daily.    INR (no units)   Date Value   2024 1.3   2024 2.8   2023 3   2023 1.5   11/15/2023 2.8   10/04/2023 2.6   2023 1.8       Hemoglobin   Date Value Ref Range Status   2024 15.3 13.0 - 17.0 g/dL Final     Hematocrit   Date Value Ref Range Status   2024 44.3 40.7 - 50.3 % Final     ALT   Date Value Ref Range Status   2023 124 (H) 5 - 41 U/L Final     AST   Date Value Ref Range Status   2023 71 (H) <40 U/L Final       Medication changes:  none    Notes:    Fingerstick INR drawn per clinic protocol. Patient states no visible blood in urine and no black tarry stool. Denies any missed doses of warfarin. No change in other maintenance medications or in diet. Will recheck INR in 5 weeks. Patient acknowledges working in consult agreement with pharmacist as referred by his/her physician.      Patient had 4 beers last night and took a dose of Tylenol as well.                For Pharmacy Admin Tracking Only    Intervention Detail: Adherence Monitorin  Total # of Interventions Recommended: 1  Total # of Interventions Accepted: 1  Time Spent (min): 20      Margi Chavez RP, PharmD

## 2024-05-10 ENCOUNTER — HOSPITAL ENCOUNTER (OUTPATIENT)
Dept: PHARMACY | Age: 44
Setting detail: THERAPIES SERIES
Discharge: HOME | End: 2024-05-10
Payer: COMMERCIAL

## 2024-05-10 VITALS
SYSTOLIC BLOOD PRESSURE: 143 MMHG | HEART RATE: 80 BPM | DIASTOLIC BLOOD PRESSURE: 89 MMHG | WEIGHT: 199 LBS | BODY MASS INDEX: 32.14 KG/M2

## 2024-05-10 DIAGNOSIS — Z95.2 H/O MITRAL VALVE REPLACEMENT WITH MECHANICAL VALVE: Primary | ICD-10-CM

## 2024-05-10 LAB — INR BLD: 3

## 2024-05-10 PROCEDURE — 85610 PROTHROMBIN TIME: CPT

## 2024-05-10 PROCEDURE — 99211 OFF/OP EST MAY X REQ PHY/QHP: CPT

## 2024-05-10 NOTE — PROGRESS NOTES
[facility]  Medication Management  ANTICOAGULATION    Referring Provider: Dr Taylor    GOAL INR: 2 - 3    TODAY'S INR: 3.0    WARFARIN Dosage: Continue taking Warfarin 1.5 tablets (7.5 mg) daily    INR (no units)   Date Value   2024 2.2   2024 1.3   2024 2.8   2023 3   2023 1.5   11/15/2023 2.8   10/04/2023 2.6       Hemoglobin   Date Value Ref Range Status   2024 15.3 13.0 - 17.0 g/dL Final     Hematocrit   Date Value Ref Range Status   2024 44.3 40.7 - 50.3 % Final     ALT   Date Value Ref Range Status   2023 124 (H) 5 - 41 U/L Final     AST   Date Value Ref Range Status   2023 71 (H) <40 U/L Final       Medication changes:  none    Notes:    Fingerstick INR drawn per clinic protocol. Patient states no visible blood in urine and no black tarry stool. Denies any missed doses of warfarin. No change in other maintenance medications or in diet. Will recheck INR in 5 weeks. Patient acknowledges working in consult agreement with pharmacist as referred by his/her physician.                  For Pharmacy Admin Tracking Only    Intervention Detail: Adherence Monitorin  Total # of Interventions Recommended: 1  Total # of Interventions Accepted: 1  Time Spent (min): 20      Kristal MoyD, 5/10/2024 9:36 AM

## 2024-05-26 ENCOUNTER — HOSPITAL ENCOUNTER (EMERGENCY)
Age: 44
Discharge: HOME | End: 2024-05-26
Attending: EMERGENCY MEDICINE
Payer: COMMERCIAL

## 2024-05-26 VITALS
TEMPERATURE: 97.9 F | DIASTOLIC BLOOD PRESSURE: 102 MMHG | SYSTOLIC BLOOD PRESSURE: 163 MMHG | HEART RATE: 78 BPM | OXYGEN SATURATION: 97 % | RESPIRATION RATE: 16 BRPM

## 2024-05-26 DIAGNOSIS — L03.113 CELLULITIS OF RIGHT ELBOW: Primary | ICD-10-CM

## 2024-05-26 PROCEDURE — 99283 EMERGENCY DEPT VISIT LOW MDM: CPT

## 2024-05-26 RX ORDER — CEPHALEXIN 500 MG/1
500 CAPSULE ORAL 3 TIMES DAILY
Qty: 21 CAPSULE | Refills: 0 | Status: SHIPPED | OUTPATIENT
Start: 2024-05-26 | End: 2024-05-26

## 2024-05-26 RX ORDER — CEPHALEXIN 500 MG/1
500 CAPSULE ORAL 3 TIMES DAILY
Qty: 21 CAPSULE | Refills: 0 | Status: SHIPPED | OUTPATIENT
Start: 2024-05-26 | End: 2024-06-02

## 2024-05-26 ASSESSMENT — PAIN - FUNCTIONAL ASSESSMENT: PAIN_FUNCTIONAL_ASSESSMENT: 0-10

## 2024-05-26 ASSESSMENT — PAIN SCALES - GENERAL: PAINLEVEL_OUTOF10: 4

## 2024-05-26 NOTE — ED PROVIDER NOTES
seconds.   Neurological:      General: No focal deficit present.      Mental Status: He is alert and oriented to person, place, and time.   Psychiatric:         Mood and Affect: Mood normal.         DIAGNOSTIC RESULTS     EKG: All EKG's are interpreted by the Emergency Department Physician who either signs or Co-signs this chart in the absence of a cardiologist.      RADIOLOGY:   Non-plain film images such as CT, Ultrasound and MRI are read by the radiologist. Plain radiographic images are visualized and preliminarily interpreted by the emergency physician with the below findings:      Interpretation per the Radiologist below, if available at the time of this note:    No orders to display         ED BEDSIDE ULTRASOUND:   Performed by ED Physician - none    LABS:  Labs Reviewed - No data to display    All other labs were within normal range or not returned as of this dictation.    EMERGENCY DEPARTMENT COURSE and DIFFERENTIAL DIAGNOSIS/MDM:   Vitals:    Vitals:    05/26/24 1357   BP: (!) 163/102   Pulse: 78   Resp: 16   Temp: 97.9 °F (36.6 °C)   SpO2: 97%         REASSESSMENT        You was evaluated patient has a mitral valve replacement and previous history of cellulitis and MSSA and MRSA I will go ahead and start the patient prophylactically on Keflex.  He does have some mild bruising and swelling and redness to the right elbow joint but he has good range of motion.  I do not suspect any infection at this time however given his history I will treat him prophylactically.  Advised him to keep an eye on the redness.  If the swelling or redness gets worse or if he develops a fever please return to the emergency department otherwise have a close follow-up with your family doctor.  He understands and has no other questions or concerns.    FINAL IMPRESSION      1. Cellulitis of right elbow          DISPOSITION/PLAN   DISPOSITION Decision To Discharge 05/26/2024 02:37:06 PM      PATIENT REFERRED TO:  Lenny Blue MD  27

## 2024-05-26 NOTE — DISCHARGE INSTRUCTIONS
Take the antibiotic as prescribed.  Ice to the area 20 minutes at a time multiple times a day.  If the swelling or redness gets worse please return to the emergency department otherwise have a close follow-up with your primary care doctor this week.

## 2024-05-28 ENCOUNTER — TELEPHONE (OUTPATIENT)
Dept: PRIMARY CARE CLINIC | Age: 44
End: 2024-05-28

## 2024-05-28 NOTE — TELEPHONE ENCOUNTER
[facility] ED Follow up Call    Reason for ED visit:  rt elbow cellulitis     5/28/2024     Jeancarlos Poon , this is Kina from Lenny Flores's office, just calling to see how you are doing after your recent ED visit.    Did you receive discharge instructions?  Yes  Do you understand the discharge instructions? Yes  Did the ED give you any new prescriptions? Yes  Were you able to fill your prescriptions? Yes      Do you have one of our red, yellow and green  Zone sheets that help you to determine when you should go to the ED?  Not Applicable      Do you need or want to make a follow up appt with your PCP?  No    Do you have any further needs in the home, e.g. equipment?  Not Applicable        FU appts/Provider:    Future Appointments   Date Time Provider Department Center   6/14/2024  9:20 AM [facility] ANTICOAGULATION CLINIC [facility] MED [facility] [place]   7/12/2024  1:00 PM Lenny Blue MD TIFF [facility]P PC [facility]   2/3/2025  3:30 PM Saundra Mas PA-C [facility]

## 2024-05-29 ENCOUNTER — TELEPHONE (OUTPATIENT)
Dept: PRIMARY CARE CLINIC | Age: 44
End: 2024-05-29

## 2024-05-29 NOTE — TELEPHONE ENCOUNTER
[facility] ED Follow up Call    Reason for ED visit:  cellulitis of the right elbow     5/29/2024     Jeancarlos Poon , this is Kina from Lenny Flores's office, just calling to see how you are doing after your recent ED visit.    Did you receive discharge instructions?  Yes  Do you understand the discharge instructions? Yes  Did the ED give you any new prescriptions? Yes  Were you able to fill your prescriptions? Yes      Do you have one of our red, yellow and green  Zone sheets that help you to determine when you should go to the ED?  Not Applicable      Do you need or want to make a follow up appt with your PCP?  no    Do you have any further needs in the home, e.g. equipment?  Not Applicable        FU appts/Provider:    Future Appointments   Date Time Provider Department Center   6/14/2024  9:20 AM [facility] ANTICOAGULATION CLINIC [facility] MED [facility] [place]   7/12/2024  1:00 PM Lenny Blue MD [facility]   2/3/2025  3:30 PM [facility]

## 2024-06-13 RX ORDER — WARFARIN SODIUM 5 MG/1
TABLET ORAL
Qty: 135 TABLET | Refills: 3 | Status: SHIPPED | OUTPATIENT
Start: 2024-06-13

## 2024-06-14 ENCOUNTER — HOSPITAL ENCOUNTER (OUTPATIENT)
Dept: PHARMACY | Age: 44
Setting detail: THERAPIES SERIES
Discharge: HOME | End: 2024-06-14
Payer: COMMERCIAL

## 2024-06-14 VITALS
BODY MASS INDEX: 31.47 KG/M2 | DIASTOLIC BLOOD PRESSURE: 96 MMHG | SYSTOLIC BLOOD PRESSURE: 141 MMHG | WEIGHT: 194.9 LBS | HEART RATE: 75 BPM

## 2024-06-14 DIAGNOSIS — Z95.2 H/O MITRAL VALVE REPLACEMENT WITH MECHANICAL VALVE: Primary | ICD-10-CM

## 2024-06-14 LAB — INR BLD: 3.7

## 2024-06-14 PROCEDURE — 99212 OFFICE O/P EST SF 10 MIN: CPT | Performed by: FAMILY MEDICINE

## 2024-06-14 PROCEDURE — 85610 PROTHROMBIN TIME: CPT | Performed by: FAMILY MEDICINE

## 2024-06-14 NOTE — PROGRESS NOTES
[facility]  Medication Management  ANTICOAGULATION    Referring Provider: Dr Taylor    GOAL INR: 2.0-3.0    TODAY'S INR: 3.7    WARFARIN Dosage: hold x1 then decrease to 5mg (1 tablet) Monday, 7.5mg (1 1/2 tablets) all other days    INR (no units)   Date Value   2024 3.7   05/10/2024 3.0   2024 2.2   2024 1.3   2024 2.8   2023 3   2023 1.5       Hemoglobin   Date Value Ref Range Status   2024 15.3 13.0 - 17.0 g/dL Final     Hematocrit   Date Value Ref Range Status   2024 44.3 40.7 - 50.3 % Final     ALT   Date Value Ref Range Status   2023 124 (H) 5 - 41 U/L Final     AST   Date Value Ref Range Status   2023 71 (H) <40 U/L Final       Medication changes:  Discontinued multivitamin  Started Vitamin D3 5000IU daily  Completed cephalexin 500 TID for 7 days on     Notes:    Fingerstick INR drawn per clinic protocol. Patient states no visible blood in urine and no black tarry stool. Denies any missed doses of warfarin. No change in other maintenance medications or in diet. Will recheck INR in 4 weeks. Patient stopped taking multivitamin daily and started vitamin D3. This is likely contributing to his supratherapeutic INR today. Patient will hold warfarin today then decreased weekly dosage as above.  Patient did have cellulitis of right elbow and was seen in [facility] ED on 24, prescribed cephalexin.  Symptoms cleared within a few days and patient completed antibiotic on .  Patient acknowledges working in consult agreement with pharmacist as referred by his/her physician.        New prescription sent to [facility] escribed for warfarin 5mg tablets 1 1/2 tablets by mouth daily or as instructed by [facility][id number] 3 refills (sent yesterday at patient's request)              For Pharmacy Admin Tracking Only    Intervention Detail: Adherence Monitorin, Dose Adjustment: 2, reason: Therapy De-escalation, and Refill(s)

## 2024-06-14 NOTE — PATIENT INSTRUCTIONS
Please do not take warfarin today then decrease your dosing to take 5mg (1 tablet) Mondays and 7.5mg (1 1/2 tablets) all other days.  Continue to monitor urine and stool for signs and symptoms of bleeding.   Please notify the clinic of any medication changes.   Please remember to bring all medications (both prescription and OTC) to your next visit.   Kindly notify the clinic if you are unable to make to your next appointment.   Follow warfarin dosing instructions on dosing calendar provided.

## 2024-07-12 ENCOUNTER — HOSPITAL ENCOUNTER (OUTPATIENT)
Dept: PHARMACY | Age: 44
Setting detail: THERAPIES SERIES
Discharge: HOME | End: 2024-07-12
Payer: COMMERCIAL

## 2024-07-12 VITALS
WEIGHT: 191 LBS | DIASTOLIC BLOOD PRESSURE: 98 MMHG | HEART RATE: 82 BPM | SYSTOLIC BLOOD PRESSURE: 143 MMHG | BODY MASS INDEX: 30.84 KG/M2

## 2024-07-12 DIAGNOSIS — Z95.2 H/O MITRAL VALVE REPLACEMENT WITH MECHANICAL VALVE: Primary | ICD-10-CM

## 2024-07-12 LAB — INR BLD: 3.4

## 2024-07-12 PROCEDURE — 99212 OFFICE O/P EST SF 10 MIN: CPT

## 2024-07-12 PROCEDURE — 85610 PROTHROMBIN TIME: CPT

## 2024-07-12 NOTE — PROGRESS NOTES
[facility]  Medication Management  ANTICOAGULATION    Referring Provider: Dr Taylor    GOAL INR: 2 - 3    TODAY'S INR: 3.4    WARFARIN Dosage: Hold x1 then decrease to 5 mg (1 tablet) Monday and Friday;  7.5 mg (1 1/2 tablets) all other days    INR (no units)   Date Value   2024 3.4   2024 3.7   05/10/2024 3.0   2024 2.2   2024 1.3   2024 2.8   2023 3       Hemoglobin   Date Value Ref Range Status   2024 15.3 13.0 - 17.0 g/dL Final     Hematocrit   Date Value Ref Range Status   2024 44.3 40.7 - 50.3 % Final     ALT   Date Value Ref Range Status   2023 124 (H) 5 - 41 U/L Final     AST   Date Value Ref Range Status   2023 71 (H) <40 U/L Final       Medication changes:  No changes    Notes:    Fingerstick INR drawn per clinic protocol. Patient states no visible blood in urine and no black tarry stool. Denies any missed doses of warfarin. No change in other maintenance medications or in diet. Will recheck INR in 4 weeks. Patient acknowledges working in consult agreement with pharmacist as referred by his/her physician.        For Pharmacy Admin Tracking Only    Intervention Detail: Adherence Monitorin and Dose Adjustment: 2, reason: Improve Adherence, Therapy De-escalation, Therapy Optimization  Total # of Interventions Recommended: 3  Total # of Interventions Accepted: 3  Time Spent (min): 20    Judith Coe RP

## 2024-08-08 SDOH — ECONOMIC STABILITY: INCOME INSECURITY: HOW HARD IS IT FOR YOU TO PAY FOR THE VERY BASICS LIKE FOOD, HOUSING, MEDICAL CARE, AND HEATING?: NOT HARD AT ALL

## 2024-08-08 SDOH — ECONOMIC STABILITY: FOOD INSECURITY: WITHIN THE PAST 12 MONTHS, YOU WORRIED THAT YOUR FOOD WOULD RUN OUT BEFORE YOU GOT MONEY TO BUY MORE.: NEVER TRUE

## 2024-08-08 SDOH — ECONOMIC STABILITY: FOOD INSECURITY: WITHIN THE PAST 12 MONTHS, THE FOOD YOU BOUGHT JUST DIDN'T LAST AND YOU DIDN'T HAVE MONEY TO GET MORE.: NEVER TRUE

## 2024-08-09 ENCOUNTER — OFFICE VISIT (OUTPATIENT)
Dept: PRIMARY CARE CLINIC | Age: 44
End: 2024-08-09

## 2024-08-09 ENCOUNTER — ANTI-COAG VISIT (OUTPATIENT)
Age: 44
End: 2024-08-09
Payer: COMMERCIAL

## 2024-08-09 VITALS
OXYGEN SATURATION: 98 % | HEIGHT: 65 IN | HEART RATE: 95 BPM | BODY MASS INDEX: 31.65 KG/M2 | DIASTOLIC BLOOD PRESSURE: 88 MMHG | SYSTOLIC BLOOD PRESSURE: 128 MMHG | WEIGHT: 190 LBS

## 2024-08-09 DIAGNOSIS — G47.33 OSA (OBSTRUCTIVE SLEEP APNEA): ICD-10-CM

## 2024-08-09 DIAGNOSIS — Z95.2 H/O MITRAL VALVE REPLACEMENT WITH MECHANICAL VALVE: Primary | ICD-10-CM

## 2024-08-09 DIAGNOSIS — E67.8 HYPERVITAMINOSIS: ICD-10-CM

## 2024-08-09 DIAGNOSIS — I10 ESSENTIAL HYPERTENSION: Primary | ICD-10-CM

## 2024-08-09 DIAGNOSIS — E78.5 DYSLIPIDEMIA: ICD-10-CM

## 2024-08-09 LAB — INR BLD: 2.2

## 2024-08-09 PROCEDURE — 99211 OFF/OP EST MAY X REQ PHY/QHP: CPT | Performed by: COUNSELOR

## 2024-08-09 PROCEDURE — 85610 PROTHROMBIN TIME: CPT | Performed by: COUNSELOR

## 2024-08-09 RX ORDER — LISINOPRIL 5 MG/1
5 TABLET ORAL DAILY
Qty: 90 TABLET | Refills: 1 | Status: SHIPPED | OUTPATIENT
Start: 2024-08-09

## 2024-08-09 RX ORDER — ONDANSETRON 4 MG/1
4 TABLET, ORALLY DISINTEGRATING ORAL 3 TIMES DAILY PRN
Qty: 10 TABLET | Refills: 0 | Status: SHIPPED | OUTPATIENT
Start: 2024-08-09

## 2024-08-09 NOTE — PROGRESS NOTES
Start date: 1/1/2004        Quit date: 2014        Years since quitting: 10.6      Smokeless tobacco: Former      Tobacco comments: Chewed tobacco as well    Family History   Problem Relation Age of Onset    No Known Problems Mother     Other Father         diverticulitis           1/9/2024     4:27 PM 6/2/2023     6:50 AM 9/1/2022     3:34 PM 10/5/2021    10:29 AM   PHQ Scores   PHQ2 Score 0 0 0    PHQ9 Score 0 0 0 2     Interpretation of Total Score Depression Severity: 1-4 = Minimal depression, 5-9 = Mild depression, 10-14 = Moderate depression, 15-19 = Moderately severe depression, 20-27 = Severe depression    Review of Systems  Constitutional: Negative for activity change, appetite change, chills, diaphoresis, fatigue, fever and unexpected weight change.   HENT: Negative for sinus pressure, sinus pain, sore throat and trouble swallowing.    Respiratory: Negative for cough, shortness of breath and wheezing.    Cardiovascular: Negative for chest pain, palpitations and leg swelling.   Gastrointestinal: Negative for abdominal pain, diarrhea, nausea and vomiting.   Endocrine: Negative for cold intolerance, polydipsia, polyphagia and polyuria.   Genitourinary: Negative for difficulty urinating, flank pain and frequency.   Musculoskeletal: Negative for gait problem and joint swelling. Negative for back pain, neck pain and neck stiffness.   Skin: Negative for color change and wound. Negative for pallor and rash.   Allergic/Immunologic: Negative for environmental allergies and food allergies.   Neurological: Negative for light-headedness, numbness and headaches.   Psychiatric/Behavioral: Negative for sleep disturbance. Negative for confusion and suicidal ideas.     Objective:    /88   Pulse 95   Ht 1.651 m (5' 5\")   Wt 86.2 kg (190 lb)   SpO2 98%   BMI 31.62 kg/m²    BP Readings from Last 3 Encounters:   08/09/24 128/88   07/12/24 (!) 143/98   06/14/24 (!) 141/96     Physical Exam  Constitutional: Patient

## 2024-08-09 NOTE — PROGRESS NOTES
[facility]/[place]  Medication Management  ANTICOAGULATION    Referring Provider: Dr. Taylor    GOAL INR: 2-3    TODAY'S INR: 2.2    WARFARIN Dosage: Continue warfarin 5 mg po every Monday and Friday; 7.5 mg po all other days    INR (no units)   Date Value   2024 2.2   2024 3.4   2024 3.7   05/10/2024 3.0   2024 2.2   2024 1.3   2024 2.8       Hemoglobin   Date Value Ref Range Status   2024 15.3 13.0 - 17.0 g/dL Final     Hematocrit   Date Value Ref Range Status   2024 44.3 40.7 - 50.3 % Final     ALT   Date Value Ref Range Status   2023 124 (H) 5 - 41 U/L Final     AST   Date Value Ref Range Status   2023 71 (H) <40 U/L Final       Medication changes:  Add lisinopril 5 mg po daily    Notes:    Fingerstick INR drawn per clinic protocol. Patient states no visible blood in urine and no black tarry stool. Denies any missed doses of warfarin. No change in diet. Will recheck INR in 7 weeks per patient request (due to vacation/work schedule). Patient acknowledges working in consult agreement with pharmacist as referred by his/her physician.     Vitals at PCP visit prior to this appt               For Pharmacy Admin Tracking Only    Intervention Detail: Adherence Monitorin  Total # of Interventions Recommended: 1  Total # of Interventions Accepted: 1  Time Spent (min): 20      Judith Coe RPH          I have personally seen and examined this patient.  I have fully participated in the care of this patient. I have reviewed all pertinent clinical information, including history, physical exam, plan and the Resident’s note and agree except as noted.

## 2024-08-30 ENCOUNTER — TELEPHONE (OUTPATIENT)
Dept: PRIMARY CARE CLINIC | Age: 44
End: 2024-08-30

## 2024-08-30 RX ORDER — LOSARTAN POTASSIUM 25 MG/1
25 TABLET ORAL DAILY
Qty: 90 TABLET | Refills: 0 | Status: SHIPPED | OUTPATIENT
Start: 2024-08-30

## 2024-08-30 NOTE — TELEPHONE ENCOUNTER
Hello, yes what about Losartan at 25mg PO daily, pend if agreeable, thank you.  Electronically signed by Lneny Blue MD on 8/30/2024 at 3:19 PM

## 2024-08-30 NOTE — TELEPHONE ENCOUNTER
Pt states that since starting Lisinopril his blood pressure has lowered but his resting heart rate has increased significantly, states this morning it was 99. Pt states this makes him feel unwell and has decided he doesn't want to take it anymore. Is there another he can try?     Pt uses [facility] in [place]

## 2024-09-06 ENCOUNTER — TELEPHONE (OUTPATIENT)
Dept: PRIMARY CARE CLINIC | Age: 44
End: 2024-09-06

## 2024-09-25 DIAGNOSIS — I48.0 PAF (PAROXYSMAL ATRIAL FIBRILLATION) (HCC): Primary | ICD-10-CM

## 2024-09-27 ENCOUNTER — ANTI-COAG VISIT (OUTPATIENT)
Age: 44
End: 2024-09-27
Payer: COMMERCIAL

## 2024-09-27 ENCOUNTER — HOSPITAL ENCOUNTER (OUTPATIENT)
Age: 44
Discharge: HOME | End: 2024-09-27
Payer: COMMERCIAL

## 2024-09-27 VITALS
HEART RATE: 77 BPM | SYSTOLIC BLOOD PRESSURE: 131 MMHG | DIASTOLIC BLOOD PRESSURE: 97 MMHG | WEIGHT: 194 LBS | BODY MASS INDEX: 32.28 KG/M2

## 2024-09-27 DIAGNOSIS — I10 ESSENTIAL HYPERTENSION: ICD-10-CM

## 2024-09-27 DIAGNOSIS — E67.8 HYPERVITAMINOSIS: ICD-10-CM

## 2024-09-27 DIAGNOSIS — E78.5 DYSLIPIDEMIA: ICD-10-CM

## 2024-09-27 DIAGNOSIS — Z95.2 H/O MITRAL VALVE REPLACEMENT WITH MECHANICAL VALVE: Primary | ICD-10-CM

## 2024-09-27 LAB
ALBUMIN SERPL-MCNC: 4.5 G/DL (ref 3.5–5.2)
ALBUMIN/GLOB SERPL: 1.6 {RATIO} (ref 1–2.5)
ALP SERPL-CCNC: 69 U/L (ref 40–129)
ALT SERPL-CCNC: 111 U/L (ref 10–50)
ANION GAP SERPL CALCULATED.3IONS-SCNC: 11 MMOL/L (ref 9–16)
AST SERPL-CCNC: 69 U/L (ref 10–50)
BASOPHILS # BLD: 0.07 K/UL (ref 0–0.2)
BASOPHILS NFR BLD: 1 % (ref 0–2)
BILIRUB SERPL-MCNC: 0.5 MG/DL (ref 0–1.2)
BUN SERPL-MCNC: 15 MG/DL (ref 6–20)
BUN/CREAT SERPL: 21 (ref 9–20)
CALCIUM SERPL-MCNC: 9.8 MG/DL (ref 8.6–10.4)
CHLORIDE SERPL-SCNC: 105 MMOL/L (ref 98–107)
CHOLEST SERPL-MCNC: 253 MG/DL (ref 0–199)
CHOLESTEROL/HDL RATIO: 4
CO2 SERPL-SCNC: 23 MMOL/L (ref 20–31)
CREAT SERPL-MCNC: 0.7 MG/DL (ref 0.7–1.2)
EOSINOPHIL # BLD: 0.09 K/UL (ref 0–0.44)
EOSINOPHILS RELATIVE PERCENT: 1 % (ref 1–4)
ERYTHROCYTE [DISTWIDTH] IN BLOOD BY AUTOMATED COUNT: 12.4 % (ref 11.8–14.4)
FOLATE SERPL-MCNC: 13.8 NG/ML (ref 4.8–24.2)
GFR, ESTIMATED: >90 ML/MIN/1.73M2
GLUCOSE SERPL-MCNC: 99 MG/DL (ref 74–99)
HCT VFR BLD AUTO: 45.4 % (ref 40.7–50.3)
HDLC SERPL-MCNC: 60 MG/DL
HGB BLD-MCNC: 15.7 G/DL (ref 13–17)
IMM GRANULOCYTES # BLD AUTO: <0.03 K/UL (ref 0–0.3)
IMM GRANULOCYTES NFR BLD: 0 %
INTERNATIONAL NORMALIZATION RATIO, POC: 2.3
LDLC SERPL CALC-MCNC: 140 MG/DL (ref 0–100)
LYMPHOCYTES NFR BLD: 1.89 K/UL (ref 1.1–3.7)
LYMPHOCYTES RELATIVE PERCENT: 26 % (ref 24–43)
MCH RBC QN AUTO: 29.9 PG (ref 25.2–33.5)
MCHC RBC AUTO-ENTMCNC: 34.6 G/DL (ref 28.4–34.8)
MCV RBC AUTO: 86.5 FL (ref 82.6–102.9)
MONOCYTES NFR BLD: 0.87 K/UL (ref 0.1–1.2)
MONOCYTES NFR BLD: 12 % (ref 3–12)
NEUTROPHILS NFR BLD: 60 % (ref 36–65)
NEUTS SEG NFR BLD: 4.22 K/UL (ref 1.5–8.1)
NRBC BLD-RTO: 0 PER 100 WBC
PLATELET # BLD AUTO: 204 K/UL (ref 138–453)
PMV BLD AUTO: 9.5 FL (ref 8.1–13.5)
POTASSIUM SERPL-SCNC: 4 MMOL/L (ref 3.7–5.3)
PROT SERPL-MCNC: 7.2 G/DL (ref 6.6–8.7)
PROTHROMBIN TIME, POC: 0
RBC # BLD AUTO: 5.25 M/UL (ref 4.21–5.77)
SODIUM SERPL-SCNC: 139 MMOL/L (ref 136–145)
TRIGL SERPL-MCNC: 268 MG/DL
VIT B12 SERPL-MCNC: 1102 PG/ML (ref 232–1245)
VLDLC SERPL CALC-MCNC: 54 MG/DL
WBC OTHER # BLD: 7.2 K/UL (ref 3.5–11.3)

## 2024-09-27 PROCEDURE — 82746 ASSAY OF FOLIC ACID SERUM: CPT

## 2024-09-27 PROCEDURE — 85610 PROTHROMBIN TIME: CPT | Performed by: COUNSELOR

## 2024-09-27 PROCEDURE — 85025 COMPLETE CBC W/AUTO DIFF WBC: CPT

## 2024-09-27 PROCEDURE — 99211 OFF/OP EST MAY X REQ PHY/QHP: CPT | Performed by: COUNSELOR

## 2024-09-27 PROCEDURE — 80061 LIPID PANEL: CPT

## 2024-09-27 PROCEDURE — 82607 VITAMIN B-12: CPT

## 2024-09-27 PROCEDURE — 36415 COLL VENOUS BLD VENIPUNCTURE: CPT

## 2024-09-27 PROCEDURE — 80053 COMPREHEN METABOLIC PANEL: CPT

## 2024-10-01 ENCOUNTER — HOSPITAL ENCOUNTER (OUTPATIENT)
Dept: SLEEP CENTER | Age: 44
Discharge: HOME | End: 2024-10-01
Attending: STUDENT IN AN ORGANIZED HEALTH CARE EDUCATION/TRAINING PROGRAM
Payer: COMMERCIAL

## 2024-10-01 VITALS — HEIGHT: 66 IN | BODY MASS INDEX: 30.53 KG/M2 | WEIGHT: 190 LBS

## 2024-10-01 DIAGNOSIS — G47.33 OSA (OBSTRUCTIVE SLEEP APNEA): ICD-10-CM

## 2024-10-01 PROCEDURE — 95806 SLEEP STUDY UNATT&RESP EFFT: CPT

## 2024-10-01 ASSESSMENT — SLEEP AND FATIGUE QUESTIONNAIRES
DO YOU SNORE: NO
DO YOU HAVE HIGH BLOOD PRESSURE: YES
HOW LIKELY ARE YOU TO NOD OFF OR FALL ASLEEP WHILE SITTING QUIETLY AFTER LUNCH WITHOUT ALCOHOL: HIGH CHANCE OF DOZING
HOW LIKELY ARE YOU TO NOD OFF OR FALL ASLEEP WHILE SITTING AND TALKING TO SOMEONE: WOULD NEVER DOZE
WHAT TIME DO YOU USUALLY WAKE UP: 12600
I SLEEP WELL: YES
HOW LIKELY ARE YOU TO NOD OFF OR FALL ASLEEP WHILE SITTING INACTIVE IN A PUBLIC PLACE: WOULD NEVER DOZE
ESS TOTAL SCORE: 8
HOW LIKELY ARE YOU TO NOD OFF OR FALL ASLEEP WHILE WATCHING TV: WOULD NEVER DOZE
WHAT TIME DO YOU USUALLY GO TO BED: 73800
NUMBER OF TIMES YOU WAKE PER NIGHT: 1
HOW LIKELY ARE YOU TO NOD OFF OR FALL ASLEEP WHEN YOU ARE A PASSENGER IN A CAR FOR AN HOUR WITHOUT A BREAK: WOULD NEVER DOZE
FUNCTION BEST IN: MORNING
NORMAL AMOUNT OF SLEEP PER NIGHT: 6
HOW LIKELY ARE YOU TO NOD OFF OR FALL ASLEEP WHILE SITTING AND READING: MODERATE CHANCE OF DOZING
HOW LIKELY ARE YOU TO NOD OFF OR FALL ASLEEP IN A CAR, WHILE STOPPED FOR A FEW MINUTES IN TRAFFIC: WOULD NEVER DOZE
HOW MANY NAPS DO YOU TAKE PER WEEK: 0
FOR THE FIRST 30 MINUTES AFTER WAKING, I AM: SOMEWHAT DROWSY
USUAL AMOUNT OF TIME TO FALL ASLEEP (MIN): 10

## 2024-10-01 NOTE — PROGRESS NOTES
Patient arrived for home sleep testing 10/1/24 at 6:50pm. Testing explained, all questions answered, pt voices understanding. Patient will return unit #[id number] tomorrow to ER

## 2024-10-03 RX ORDER — LOSARTAN POTASSIUM 50 MG/1
50 TABLET ORAL DAILY
Qty: 90 TABLET | Refills: 1 | Status: SHIPPED | OUTPATIENT
Start: 2024-10-03

## 2024-10-03 NOTE — TELEPHONE ENCOUNTER
Pt requesting refill of Losartan, states he was instructed to increase from 25 mg to 50 mg so he now needs a 50 mg script sent in

## 2024-10-05 LAB — STATUS: NORMAL

## 2024-10-07 DIAGNOSIS — G47.33 OSA (OBSTRUCTIVE SLEEP APNEA): Primary | ICD-10-CM

## 2024-11-08 ENCOUNTER — APPOINTMENT (OUTPATIENT)
Age: 44
End: 2024-11-08
Payer: COMMERCIAL

## 2024-11-15 ENCOUNTER — ANTI-COAG VISIT (OUTPATIENT)
Age: 44
End: 2024-11-15
Payer: COMMERCIAL

## 2024-11-15 VITALS
BODY MASS INDEX: 30.83 KG/M2 | HEART RATE: 86 BPM | WEIGHT: 191 LBS | DIASTOLIC BLOOD PRESSURE: 80 MMHG | SYSTOLIC BLOOD PRESSURE: 119 MMHG

## 2024-11-15 DIAGNOSIS — Z95.2 H/O MITRAL VALVE REPLACEMENT WITH MECHANICAL VALVE: Primary | ICD-10-CM

## 2024-11-15 LAB
INTERNATIONAL NORMALIZATION RATIO, POC: 2.9
PROTHROMBIN TIME, POC: 0

## 2024-11-15 PROCEDURE — 99212 OFFICE O/P EST SF 10 MIN: CPT | Performed by: COUNSELOR

## 2024-11-15 PROCEDURE — 85610 PROTHROMBIN TIME: CPT | Performed by: COUNSELOR

## 2024-11-15 NOTE — PATIENT INSTRUCTIONS
Decrease current dose of warfarin as instructed on dosing calendar provided.   Continue to monitor urine and stool for signs and symptoms of bleeding.   Please notify the clinic of any medication changes.   Please remember to bring all medications (both prescription and OTC) to your next visit. Kindly notify the clinic if you are unable to make to your next appointment.

## 2024-11-15 NOTE — PROGRESS NOTES
[facility]  Medication Management  ANTICOAGULATION    Referring Provider: Dr. Taylor    GOAL INR: 2-3    TODAY'S INR: 2.9    WARFARIN Dosage: Decrease to 5 mg po every MWF; 7.5 mg po all other days    INR (no units)   Date Value   2024 2.2   2024 3.4   2024 3.7   05/10/2024 3.0   2024 2.2   2024 1.3   2024 2.8     INR,(POC) (no units)   Date Value   11/15/2024 2.9   2024 2.3       Hemoglobin   Date Value Ref Range Status   2024 15.7 13.0 - 17.0 g/dL Final     Hematocrit   Date Value Ref Range Status   2024 45.4 40.7 - 50.3 % Final     ALT   Date Value Ref Range Status   2024 111 (H) 10 - 50 U/L Final     AST   Date Value Ref Range Status   2024 69 (H) 10 - 50 U/L Final       Medication changes:  Add Wegovy weekly ([employer]) - titrating dose - for weight loss    Notes:    Fingerstick INR drawn per clinic protocol. Patient states no visible blood in urine and no black tarry stool. Denies any missed doses of warfarin.  Will recheck INR in 5 weeks per patient preference. Patient acknowledges working in consult agreement with pharmacist as referred by his/her physician.    Patient is going to [facility] for Wegovy injections for weight loss.  He reports that appetite is decreased/eating smaller portions.  He has lost 9 pounds thus far.  Will decrease weekly warfarin dosage 5.3% due to changes in diet/appetite/weight loss.        For Pharmacy Admin Tracking Only    Intervention Detail: Adherence Monitorin and Dose Adjustment: 1, reason: Improve Adherence, Therapy Optimization  Total # of Interventions Recommended: 2  Total # of Interventions Accepted: 2  Time Spent (min): 20      Judith Coe RP

## 2024-11-26 ENCOUNTER — OFFICE VISIT (OUTPATIENT)
Dept: PRIMARY CARE CLINIC | Age: 44
End: 2024-11-26
Payer: COMMERCIAL

## 2024-11-26 VITALS
BODY MASS INDEX: 30.53 KG/M2 | HEIGHT: 66 IN | SYSTOLIC BLOOD PRESSURE: 118 MMHG | OXYGEN SATURATION: 98 % | HEART RATE: 83 BPM | WEIGHT: 190 LBS | DIASTOLIC BLOOD PRESSURE: 80 MMHG

## 2024-11-26 DIAGNOSIS — I15.8 OTHER SECONDARY HYPERTENSION: Primary | ICD-10-CM

## 2024-11-26 DIAGNOSIS — E78.5 DYSLIPIDEMIA: ICD-10-CM

## 2024-11-26 DIAGNOSIS — E67.8 HYPERVITAMINOSIS: ICD-10-CM

## 2024-11-26 DIAGNOSIS — E66.9 OBESITY (BMI 30-39.9): ICD-10-CM

## 2024-11-26 PROCEDURE — 3079F DIAST BP 80-89 MM HG: CPT | Performed by: STUDENT IN AN ORGANIZED HEALTH CARE EDUCATION/TRAINING PROGRAM

## 2024-11-26 PROCEDURE — 99214 OFFICE O/P EST MOD 30 MIN: CPT | Performed by: STUDENT IN AN ORGANIZED HEALTH CARE EDUCATION/TRAINING PROGRAM

## 2024-11-26 PROCEDURE — 3074F SYST BP LT 130 MM HG: CPT | Performed by: STUDENT IN AN ORGANIZED HEALTH CARE EDUCATION/TRAINING PROGRAM

## 2024-11-26 RX ORDER — ONDANSETRON 4 MG/1
4 TABLET, ORALLY DISINTEGRATING ORAL 3 TIMES DAILY PRN
Qty: 40 TABLET | Refills: 0 | Status: SHIPPED | OUTPATIENT
Start: 2024-11-26

## 2024-11-26 NOTE — PROGRESS NOTES
[facility] PRIMARY CARE  [address] [place] [address] , [address]  [place], [place], [address]    Cleve Ni is a 44 y.o. male with  has a past medical history of Bacteremia, Cardioembolic stroke (HCC), Cerebral artery occlusion with cerebral infarction (HCC), Cerebral septic emboli (HCC), Chronic right shoulder pain, Diverticulosis, Endocarditis, History of COVID-19, Hyperlipidemia, Hypertension, MSSA bacteremia, MESSI (obstructive sleep apnea), Platelets decreased (HCC), Research subject, Under care of team, Under care of team, Under care of team, Under care of team, Under care of team, and Wears contact lenses.  Presented to the office today for:  Chief Complaint   Patient presents with    Hypertension    Hyperlipidemia       Assessment/Plan   1. Other secondary hypertension [I15.8]  2. Dyslipidemia  -     Lipid Panel; Future  -     CBC with Auto Differential; Future  -     Comprehensive Metabolic Panel; Future  3. Hypervitaminosis  -     Vitamin B12 & Folate; Future  4. Obesity (BMI 30-39.9)  Return in about 6 months (around 5/26/2025) for F/U Med Management.  Assessment & Plan    Continue w/ statin at the current dose  HTN - noted to be WNL in the office today, monitor it at home, may consider decreasing losartan to 25mg PO daily  Monitor off vitamin B12.  Plan to continue w/ Wegovy.  Zofran PRN for nausea     All patient questions answered.  Pt voiced understanding.   Medications Discontinued During This Encounter   Medication Reason    ondansetron (ZOFRAN-ODT) 4 MG disintegrating tablet REORDER       Patient received counseling on the following healthy behaviors: nutrition, exercise and medication adherence. I encouraged and discussed lifestyle modifications including diet and exercise (150+ minutes of moderate-high intensity) and the patient was agreeable to making positive/beneficial changes to both to help improve their overall health. Discussed use, benefit, and side effects of prescribed

## 2024-12-20 ENCOUNTER — ANTI-COAG VISIT (OUTPATIENT)
Age: 44
End: 2024-12-20
Payer: COMMERCIAL

## 2024-12-20 VITALS
DIASTOLIC BLOOD PRESSURE: 91 MMHG | HEART RATE: 82 BPM | WEIGHT: 181 LBS | BODY MASS INDEX: 29.21 KG/M2 | SYSTOLIC BLOOD PRESSURE: 136 MMHG

## 2024-12-20 DIAGNOSIS — Z95.2 H/O MITRAL VALVE REPLACEMENT WITH MECHANICAL VALVE: Primary | ICD-10-CM

## 2024-12-20 LAB
INTERNATIONAL NORMALIZATION RATIO, POC: 3.6
PROTHROMBIN TIME, POC: 0

## 2024-12-20 PROCEDURE — 99212 OFFICE O/P EST SF 10 MIN: CPT

## 2024-12-20 PROCEDURE — 85610 PROTHROMBIN TIME: CPT

## 2024-12-20 NOTE — PATIENT INSTRUCTIONS
Continue to monitor urine and stool for signs and symptoms of bleeding.   Please notify the clinic of any medication changes.   Please remember to bring all medications (both prescription and OTC) to your next visit.   Kindly notify the clinic if you are unable to make to your next appointment.   Follow warfarin dosing instructions on dosing calendar provided. \

## 2024-12-20 NOTE — PROGRESS NOTES
[facility]  Medication Management  ANTICOAGULATION    Referring Provider: Dr. Taylor     GOAL INR: 2-3     TODAY'S INR: 3.6     WARFARIN Dosage: HOLD today, then decrease to 7.5 mg po every MWF; 5 mg po all other days       INR (no units)   Date Value   2024 2.2   2024 3.4   2024 3.7   05/10/2024 3.0   2024 2.2   2024 1.3   2024 2.8     INR,(POC) (no units)   Date Value   11/15/2024 2.9   2024 2.3       Hemoglobin   Date Value Ref Range Status   2024 15.7 13.0 - 17.0 g/dL Final     Hematocrit   Date Value Ref Range Status   2024 45.4 40.7 - 50.3 % Final     ALT   Date Value Ref Range Status   2024 111 (H) 10 - 50 U/L Final     AST   Date Value Ref Range Status   2024 69 (H) 10 - 50 U/L Final       Medication changes:  Wegovy dose being titrated    Notes:    Fingerstick INR drawn per clinic protocol. Patient states no visible blood in urine and no black tarry stool. Denies any missed doses of warfarin. No change in other maintenance medications or in diet. Will recheck INR in 3 weeks. Patient acknowledges working in consult agreement with pharmacist as referred by his/her physician.             Patient is down 10 lb since last visit.  His appetite has decreased since being on Wegovy.  Wegovy dose is being titrated.      For Pharmacy Admin Tracking Only    Intervention Detail: Adherence Monitorin and Dose Adjustment: 1, reason: Therapy Optimization  Total # of Interventions Recommended: 2  Total # of Interventions Accepted: 2  Time Spent (min): 20      Marcela Farley RPH, PharmD

## 2024-12-29 DIAGNOSIS — B95.61 ENDOCARDITIS DUE TO METHICILLIN SUSCEPTIBLE STAPHYLOCOCCUS AUREUS (MSSA): ICD-10-CM

## 2024-12-29 DIAGNOSIS — I34.0 SEVERE MITRAL REGURGITATION: ICD-10-CM

## 2024-12-29 DIAGNOSIS — I10 PRIMARY HYPERTENSION: ICD-10-CM

## 2024-12-29 DIAGNOSIS — E78.2 MIXED HYPERLIPIDEMIA: ICD-10-CM

## 2024-12-29 DIAGNOSIS — R00.0 SINUS TACHYCARDIA: ICD-10-CM

## 2024-12-29 DIAGNOSIS — I33.0 ENDOCARDITIS DUE TO METHICILLIN SUSCEPTIBLE STAPHYLOCOCCUS AUREUS (MSSA): ICD-10-CM

## 2024-12-29 DIAGNOSIS — R42 LIGHTHEADED: ICD-10-CM

## 2024-12-30 RX ORDER — METOPROLOL SUCCINATE 100 MG/1
TABLET, EXTENDED RELEASE ORAL
Qty: 90 TABLET | Refills: 3 | Status: SHIPPED | OUTPATIENT
Start: 2024-12-30

## 2024-12-30 RX ORDER — ATORVASTATIN CALCIUM 20 MG/1
TABLET, FILM COATED ORAL
Qty: 90 TABLET | Refills: 3 | Status: SHIPPED | OUTPATIENT
Start: 2024-12-30

## 2025-01-10 ENCOUNTER — ANTI-COAG VISIT (OUTPATIENT)
Age: 45
End: 2025-01-10
Payer: COMMERCIAL

## 2025-01-10 VITALS
SYSTOLIC BLOOD PRESSURE: 141 MMHG | BODY MASS INDEX: 29.7 KG/M2 | DIASTOLIC BLOOD PRESSURE: 97 MMHG | WEIGHT: 184 LBS | HEART RATE: 99 BPM

## 2025-01-10 DIAGNOSIS — Z95.2 H/O MITRAL VALVE REPLACEMENT WITH MECHANICAL VALVE: Primary | ICD-10-CM

## 2025-01-10 LAB
INTERNATIONAL NORMALIZATION RATIO, POC: 1.9
PROTHROMBIN TIME, POC: 0

## 2025-01-10 PROCEDURE — 99211 OFF/OP EST MAY X REQ PHY/QHP: CPT

## 2025-01-10 PROCEDURE — 85610 PROTHROMBIN TIME: CPT

## 2025-01-10 NOTE — PROGRESS NOTES
[facility]  Medication Management  ANTICOAGULATION    Referring Provider: Dr. Taylor     GOAL INR: 2 - 3     TODAY'S INR: 1.9     WARFARIN Dosage: Continue warfarin 1.5 tablets for 7.5 mg MWF and 5 mg tablet all other days    INR (no units)   Date Value   2024 2.2   2024 3.4   2024 3.7   05/10/2024 3.0   2024 2.2   2024 1.3   2024 2.8     INR,(POC) (no units)   Date Value   2024 3.6   11/15/2024 2.9   2024 2.3       Hemoglobin   Date Value Ref Range Status   2024 15.7 13.0 - 17.0 g/dL Final     Hematocrit   Date Value Ref Range Status   2024 45.4 40.7 - 50.3 % Final     ALT   Date Value Ref Range Status   2024 111 (H) 10 - 50 U/L Final     AST   Date Value Ref Range Status   2024 69 (H) 10 - 50 U/L Final       Medication changes:  none    Notes:    Fingerstick INR drawn per clinic protocol. Patient states no visible blood in urine and no black tarry stool. Denies any missed doses of warfarin. No change in other maintenance medications or in diet. Will recheck INR in 5 weeks. Patient acknowledges working in consult agreement with pharmacist as referred by his/her physician.    Patient has lost 15 lb and is eating smaller portions and taking Wegovy.  Patient will be at an all inclusive the first week of February and will be drinking alcohol.               For Pharmacy Admin Tracking Only    Intervention Detail: Adherence Monitorin  Total # of Interventions Recommended: 1  Total # of Interventions Accepted: 1  Time Spent (min): 20      Margi Chavez RPH, PharmD

## 2025-02-14 ENCOUNTER — APPOINTMENT (OUTPATIENT)
Age: 45
End: 2025-02-14

## 2025-02-20 ENCOUNTER — APPOINTMENT (OUTPATIENT)
Age: 45
End: 2025-02-20

## 2025-02-27 ENCOUNTER — ANTI-COAG VISIT (OUTPATIENT)
Age: 45
End: 2025-02-27

## 2025-02-27 VITALS
BODY MASS INDEX: 29.38 KG/M2 | WEIGHT: 182 LBS | HEART RATE: 79 BPM | DIASTOLIC BLOOD PRESSURE: 93 MMHG | SYSTOLIC BLOOD PRESSURE: 148 MMHG

## 2025-02-27 DIAGNOSIS — Z95.2 H/O MITRAL VALVE REPLACEMENT WITH MECHANICAL VALVE: Primary | ICD-10-CM

## 2025-02-27 LAB
INTERNATIONAL NORMALIZATION RATIO, POC: 2.4
PROTHROMBIN TIME, POC: 0

## 2025-02-27 PROCEDURE — 99211 OFF/OP EST MAY X REQ PHY/QHP: CPT

## 2025-02-27 PROCEDURE — 85610 PROTHROMBIN TIME: CPT

## 2025-02-27 NOTE — PROGRESS NOTES
[facility]  Medication Management  ANTICOAGULATION    Referring Provider: Dr. Taylor     GOAL INR: 2 - 3     TODAY'S INR: 2.4     WARFARIN Dosage: Continue warfarin 1.5 tablets for 7.5 mg MWF and 5 mg tablet all other days.    INR (no units)   Date Value   2024 2.2   2024 3.4   2024 3.7   05/10/2024 3.0   2024 2.2   2024 1.3   2024 2.8     INR,(POC) (no units)   Date Value   01/10/2025 1.9   2024 3.6   11/15/2024 2.9   2024 2.3       Hemoglobin   Date Value Ref Range Status   2024 15.7 13.0 - 17.0 g/dL Final     Hematocrit   Date Value Ref Range Status   2024 45.4 40.7 - 50.3 % Final     ALT   Date Value Ref Range Status   2024 111 (H) 10 - 50 U/L Final     AST   Date Value Ref Range Status   2024 69 (H) 10 - 50 U/L Final       Medication changes:  none    Notes:    Fingerstick INR drawn per clinic protocol. Patient states no visible blood in urine and no black tarry stool. Denies any missed doses of warfarin. No change in other maintenance medications or in diet. Will recheck INR in 6 weeks. Patient acknowledges working in consult agreement with pharmacist as referred by his/her physician.                  For Pharmacy Admin Tracking Only    Intervention Detail: Adherence Monitorin  Total # of Interventions Recommended: 1  Total # of Interventions Accepted: 1  Time Spent (min): 20      Margi Chavez RPH, PharmD

## 2025-03-26 RX ORDER — LOSARTAN POTASSIUM 50 MG/1
50 TABLET ORAL DAILY
Qty: 90 TABLET | Refills: 1 | Status: SHIPPED | OUTPATIENT
Start: 2025-03-26

## 2025-04-11 ENCOUNTER — ANTI-COAG VISIT (OUTPATIENT)
Age: 45
End: 2025-04-11

## 2025-04-11 VITALS
WEIGHT: 179 LBS | HEART RATE: 91 BPM | BODY MASS INDEX: 28.89 KG/M2 | DIASTOLIC BLOOD PRESSURE: 91 MMHG | SYSTOLIC BLOOD PRESSURE: 132 MMHG

## 2025-04-11 DIAGNOSIS — Z95.2 H/O MITRAL VALVE REPLACEMENT WITH MECHANICAL VALVE: Primary | ICD-10-CM

## 2025-04-11 LAB
INTERNATIONAL NORMALIZATION RATIO, POC: 2.1
PROTHROMBIN TIME, POC: 0

## 2025-04-11 PROCEDURE — 85610 PROTHROMBIN TIME: CPT | Performed by: COUNSELOR

## 2025-04-11 PROCEDURE — 99211 OFF/OP EST MAY X REQ PHY/QHP: CPT | Performed by: COUNSELOR

## 2025-04-11 NOTE — PROGRESS NOTES
[facility]  Medication [facility]  ANTICOAGULATION    Referring Provider: Dr. Taylor    GOAL INR: 2-3    TODAY'S INR: 2.1    WARFARIN Dosage: Continue 7.5 mg po every MWF; 5 mg po all other days    INR (no units)   Date Value   2024 2.2   2024 3.4   2024 3.7   05/10/2024 3.0   2024 2.2   2024 1.3   2024 2.8     INR,(POC) (no units)   Date Value   2025 2.1   2025 2.4   01/10/2025 1.9   2024 3.6   11/15/2024 2.9   2024 2.3       Hemoglobin   Date Value Ref Range Status   2024 15.7 13.0 - 17.0 g/dL Final     Hematocrit   Date Value Ref Range Status   2024 45.4 40.7 - 50.3 % Final     ALT   Date Value Ref Range Status   2024 111 (H) 10 - 50 U/L Final     AST   Date Value Ref Range Status   2024 69 (H) 10 - 50 U/L Final       Medication changes:  No changes    Notes:    Fingerstick INR drawn per clinic protocol. Patient states no visible blood in urine and no black tarry stool. Denies any missed doses of warfarin. No change in other maintenance medications or in diet. Will recheck INR in 6 weeks. Patient acknowledges working in consult agreement with pharmacist as referred by his/her physician.                  For Pharmacy Admin Tracking Only    Intervention Detail: Adherence Monitorin  Total # of Interventions Recommended: 1  Total # of Interventions Accepted: 1  Time Spent (min): 20      Judith Coe [facility]

## 2025-04-25 ENCOUNTER — OFFICE VISIT (OUTPATIENT)
Dept: CARDIOLOGY | Age: 45
End: 2025-04-25
Payer: COMMERCIAL

## 2025-04-25 VITALS
HEART RATE: 90 BPM | BODY MASS INDEX: 29.12 KG/M2 | WEIGHT: 181.2 LBS | RESPIRATION RATE: 18 BRPM | DIASTOLIC BLOOD PRESSURE: 85 MMHG | SYSTOLIC BLOOD PRESSURE: 129 MMHG | HEIGHT: 66 IN | OXYGEN SATURATION: 98 %

## 2025-04-25 DIAGNOSIS — R00.0 SINUS TACHYCARDIA: ICD-10-CM

## 2025-04-25 DIAGNOSIS — Z86.79 HISTORY OF ENDOCARDITIS IN ADULTHOOD: ICD-10-CM

## 2025-04-25 DIAGNOSIS — Z95.2 H/O MITRAL VALVE REPLACEMENT WITH MECHANICAL VALVE: ICD-10-CM

## 2025-04-25 DIAGNOSIS — I10 PRIMARY HYPERTENSION: ICD-10-CM

## 2025-04-25 DIAGNOSIS — E78.2 MIXED HYPERLIPIDEMIA: ICD-10-CM

## 2025-04-25 DIAGNOSIS — I34.0 SEVERE MITRAL REGURGITATION: Primary | ICD-10-CM

## 2025-04-25 PROCEDURE — 3074F SYST BP LT 130 MM HG: CPT | Performed by: FAMILY MEDICINE

## 2025-04-25 PROCEDURE — 93000 ELECTROCARDIOGRAM COMPLETE: CPT | Performed by: FAMILY MEDICINE

## 2025-04-25 PROCEDURE — 99214 OFFICE O/P EST MOD 30 MIN: CPT | Performed by: FAMILY MEDICINE

## 2025-04-25 PROCEDURE — 3079F DIAST BP 80-89 MM HG: CPT | Performed by: FAMILY MEDICINE

## 2025-04-25 RX ORDER — METOPROLOL SUCCINATE 200 MG/1
200 TABLET, EXTENDED RELEASE ORAL DAILY
Qty: 90 TABLET | Refills: 3 | Status: SHIPPED | OUTPATIENT
Start: 2025-04-25

## 2025-04-25 RX ORDER — ATORVASTATIN CALCIUM 20 MG/1
20 TABLET, FILM COATED ORAL NIGHTLY
Qty: 90 TABLET | Refills: 3 | Status: SHIPPED | OUTPATIENT
Start: 2025-04-25

## 2025-04-25 NOTE — PROGRESS NOTES
I, Patria Ramon am scribing for and in the presence of David Taylor MD, MS, F.A.C.C..       Patient: Cleve Ni  : 1980  Date of Visit: 2024     REASON FOR VISIT / CONSULTATION: Hypertension (HX: HTN, HLD, Tahy, Endocarditis. Pt is here to discuss echo that he had done in December. Denies CP, light headed/dizziness, SOB, palps. )        History of Present Illness:         Dear Lenny Blue MD,     I had the pleasure of seeing your patient Cleve Ni in consultation today. As you know, Mr. Ni is a 43 y.o. male who presents with a history of severe mitral vegetation associated with severe mitral regurgitation on a MONROE as well. 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 His MSSA bactremia (endocarditis) led to his severe mitral vegetation. Which led to a mechanical mitral valve replacement (kwame) on 2021 and is on Warfarin at this time with Lovenox injections to get get him to his goal of INR.      Echo done 2021: Left ventricle is normal in size with normal systolic function globally. Calculated ejection fraction is 60%. Left atrial dilatation. Right atrium is normal in size. Mild buckling of the right atrial wall. Mild aortic insufficiency. Prolapse of the of posterior leaflet of the mitral valve. Large vegetations noted on the MONROE done 21 are no longer identified. Small echogenicity is noted on the posterior leaflet, maybe improving vegetation. Severe mitral regurgitation with pulmonary vein flow reversal. Consider MONROE when indicated.Mild tricuspid regurgitation. Estimated right ventricular systolic pressure is 49 mmHg, suggests mild Pulm HTN. Small circumferential pericardial effusion.     Echo done 2/10/2023: Compared to the previous study of 2021, Patient S/P Mitral valve replacement. Pulmonary artery

## 2025-05-23 ENCOUNTER — ANTI-COAG VISIT (OUTPATIENT)
Age: 45
End: 2025-05-23
Payer: COMMERCIAL

## 2025-05-23 VITALS
HEART RATE: 83 BPM | BODY MASS INDEX: 29.54 KG/M2 | DIASTOLIC BLOOD PRESSURE: 92 MMHG | WEIGHT: 183 LBS | SYSTOLIC BLOOD PRESSURE: 134 MMHG

## 2025-05-23 DIAGNOSIS — Z95.2 H/O MITRAL VALVE REPLACEMENT WITH MECHANICAL VALVE: Primary | ICD-10-CM

## 2025-05-23 LAB
INTERNATIONAL NORMALIZATION RATIO, POC: 4
PROTHROMBIN TIME, POC: 0

## 2025-05-23 PROCEDURE — 85610 PROTHROMBIN TIME: CPT

## 2025-05-23 PROCEDURE — 99212 OFFICE O/P EST SF 10 MIN: CPT

## 2025-05-23 NOTE — PROGRESS NOTES
[facility]  Medication Management  ANTICOAGULATION    Referring Provider: Dr. Taylor     GOAL INR: 2 - 3     TODAY'S INR: 4.0     WARFARIN Dosage: Hold warfarin today then decrease warfarin to 1.5 tablets for 7.5 mg MF and 5 mg tablet all other days.      INR (no units)   Date Value   2024 2.2   2024 3.4   2024 3.7   05/10/2024 3.0   2024 2.2   2024 1.3   2024 2.8     INR,(POC) (no units)   Date Value   2025 2.1   2025 2.4   01/10/2025 1.9   2024 3.6   11/15/2024 2.9   2024 2.3       Hemoglobin   Date Value Ref Range Status   2024 15.7 13.0 - 17.0 g/dL Final     Hematocrit   Date Value Ref Range Status   2024 45.4 40.7 - 50.3 % Final     ALT   Date Value Ref Range Status   2024 111 (H) 10 - 50 U/L Final     AST   Date Value Ref Range Status   2024 69 (H) 10 - 50 U/L Final       Medication changes:  Increased Toprol to 200 mg daily    Notes:    Fingerstick INR drawn per clinic protocol. Patient states no visible blood in urine and no black tarry stool. Denies any missed doses of warfarin. No change in other maintenance medications or in diet. Will recheck INR in 2 weeks. Patient acknowledges working in consult agreement with pharmacist as referred by his/her physician.                  For Pharmacy Admin Tracking Only    Intervention Detail: Adherence Monitorin, Dose Adjustment: 1, reason: Therapy Optimization, and New Rx: 1, reason: Needs Additional Therapy  Total # of Interventions Recommended: 3  Total # of Interventions Accepted: 3  Time Spent (min): 20      Margi Chavez [facility], PharmD

## 2025-05-27 ENCOUNTER — OFFICE VISIT (OUTPATIENT)
Dept: PRIMARY CARE CLINIC | Age: 45
End: 2025-05-27
Payer: COMMERCIAL

## 2025-05-27 VITALS
OXYGEN SATURATION: 99 % | WEIGHT: 180.4 LBS | SYSTOLIC BLOOD PRESSURE: 120 MMHG | RESPIRATION RATE: 16 BRPM | DIASTOLIC BLOOD PRESSURE: 82 MMHG | HEART RATE: 88 BPM | BODY MASS INDEX: 29.12 KG/M2

## 2025-05-27 DIAGNOSIS — I15.8 OTHER SECONDARY HYPERTENSION: Primary | ICD-10-CM

## 2025-05-27 DIAGNOSIS — I66.9 CEREBRAL SEPTIC EMBOLI (HCC): ICD-10-CM

## 2025-05-27 DIAGNOSIS — E78.5 DYSLIPIDEMIA: ICD-10-CM

## 2025-05-27 DIAGNOSIS — Z95.2 H/O MITRAL VALVE REPLACEMENT WITH MECHANICAL VALVE: ICD-10-CM

## 2025-05-27 DIAGNOSIS — I76 CEREBRAL SEPTIC EMBOLI (HCC): ICD-10-CM

## 2025-05-27 PROBLEM — J96.01 ACUTE RESPIRATORY FAILURE WITH HYPOXIA (HCC): Status: RESOLVED | Noted: 2021-07-20 | Resolved: 2025-05-27

## 2025-05-27 PROBLEM — F22 DELUSIONS (HCC): Status: RESOLVED | Noted: 2021-07-20 | Resolved: 2025-05-27

## 2025-05-27 PROCEDURE — 3079F DIAST BP 80-89 MM HG: CPT | Performed by: STUDENT IN AN ORGANIZED HEALTH CARE EDUCATION/TRAINING PROGRAM

## 2025-05-27 PROCEDURE — 3074F SYST BP LT 130 MM HG: CPT | Performed by: STUDENT IN AN ORGANIZED HEALTH CARE EDUCATION/TRAINING PROGRAM

## 2025-05-27 PROCEDURE — 99214 OFFICE O/P EST MOD 30 MIN: CPT | Performed by: STUDENT IN AN ORGANIZED HEALTH CARE EDUCATION/TRAINING PROGRAM

## 2025-05-27 PROCEDURE — G2211 COMPLEX E/M VISIT ADD ON: HCPCS | Performed by: STUDENT IN AN ORGANIZED HEALTH CARE EDUCATION/TRAINING PROGRAM

## 2025-05-27 RX ORDER — ONDANSETRON 4 MG/1
4 TABLET, ORALLY DISINTEGRATING ORAL 3 TIMES DAILY PRN
Qty: 40 TABLET | Refills: 2 | Status: SHIPPED | OUTPATIENT
Start: 2025-05-27

## 2025-05-27 SDOH — ECONOMIC STABILITY: FOOD INSECURITY: WITHIN THE PAST 12 MONTHS, THE FOOD YOU BOUGHT JUST DIDN'T LAST AND YOU DIDN'T HAVE MONEY TO GET MORE.: NEVER TRUE

## 2025-05-27 SDOH — ECONOMIC STABILITY: FOOD INSECURITY: WITHIN THE PAST 12 MONTHS, YOU WORRIED THAT YOUR FOOD WOULD RUN OUT BEFORE YOU GOT MONEY TO BUY MORE.: NEVER TRUE

## 2025-05-27 ASSESSMENT — PATIENT HEALTH QUESTIONNAIRE - PHQ9
SUM OF ALL RESPONSES TO PHQ QUESTIONS 1-9: 0
2. FEELING DOWN, DEPRESSED OR HOPELESS: NOT AT ALL
SUM OF ALL RESPONSES TO PHQ QUESTIONS 1-9: 0
1. LITTLE INTEREST OR PLEASURE IN DOING THINGS: NOT AT ALL

## 2025-05-27 NOTE — PROGRESS NOTES
Score 0 0 0 0    PHQ9 Score 0 0 0 0 2     Interpretation of Total Score Depression Severity: 1-4 = Minimal depression, 5-9 = Mild depression, 10-14 = Moderate depression, 15-19 = Moderately severe depression, 20-27 = Severe depression    Review of Systems  Constitutional: Negative for activity change, appetite change, chills, diaphoresis, fatigue, fever and unexpected weight change.   HENT: Negative for sinus pressure, sinus pain, sore throat and trouble swallowing.    Respiratory: Negative for cough, shortness of breath and wheezing.    Cardiovascular: Negative for chest pain, palpitations and leg swelling.   Gastrointestinal: Negative for abdominal pain, diarrhea, nausea and vomiting.   Endocrine: Negative for cold intolerance, polydipsia, polyphagia and polyuria.   Genitourinary: Negative for difficulty urinating, flank pain and frequency.   Musculoskeletal: Negative for gait problem and joint swelling. Negative for back pain, neck pain and neck stiffness.   Skin: Negative for color change and wound. Negative for pallor and rash.   Allergic/Immunologic: Negative for environmental allergies and food allergies.   Neurological: Negative for light-headedness, numbness and headaches.   Psychiatric/Behavioral: Negative for sleep disturbance. Negative for confusion and suicidal ideas.     Objective:    /82 (BP Site: Right Upper Arm, Patient Position: Sitting)   Pulse 88   Resp 16   Wt 81.8 kg (180 lb 6.4 oz)   SpO2 99%   BMI 29.12 kg/m²    BP Readings from Last 3 Encounters:   05/27/25 120/82   05/23/25 (!) 134/92   04/25/25 129/85     Physical Exam  Physical Exam    Constitutional: Patient is oriented to person, place, and time. Patient appears well-developed and well-nourished. No distress.   HENT: Head: Normocephalic and atraumatic.   Eyes: Pupils are equal, round, and reactive to light. Conjunctivae are normal. Right eye exhibits no discharge. Left eye exhibits no discharge.   Cardiovascular: Normal rate,

## 2025-06-02 ENCOUNTER — TELEPHONE (OUTPATIENT)
Dept: CARDIOLOGY | Age: 45
End: 2025-06-02

## 2025-06-02 NOTE — TELEPHONE ENCOUNTER
Patient called into office with complaints of feeling woozy ever since increasing metoprolol to 200mg on 4/25/2025. Please advise.

## 2025-06-04 ENCOUNTER — HOSPITAL ENCOUNTER (OUTPATIENT)
Age: 45
Discharge: HOME | End: 2025-06-04
Attending: FAMILY MEDICINE
Payer: COMMERCIAL

## 2025-06-04 ENCOUNTER — RESULTS FOLLOW-UP (OUTPATIENT)
Dept: CARDIOLOGY | Age: 45
End: 2025-06-04

## 2025-06-04 ENCOUNTER — HOSPITAL ENCOUNTER (OUTPATIENT)
Age: 45
Discharge: HOME | End: 2025-06-06
Attending: FAMILY MEDICINE
Payer: COMMERCIAL

## 2025-06-04 VITALS
WEIGHT: 180 LBS | HEIGHT: 66 IN | SYSTOLIC BLOOD PRESSURE: 120 MMHG | DIASTOLIC BLOOD PRESSURE: 82 MMHG | BODY MASS INDEX: 28.93 KG/M2

## 2025-06-04 DIAGNOSIS — E67.8 HYPERVITAMINOSIS: ICD-10-CM

## 2025-06-04 DIAGNOSIS — I10 PRIMARY HYPERTENSION: ICD-10-CM

## 2025-06-04 DIAGNOSIS — Z95.2 H/O MITRAL VALVE REPLACEMENT WITH MECHANICAL VALVE: ICD-10-CM

## 2025-06-04 DIAGNOSIS — I34.0 SEVERE MITRAL REGURGITATION: ICD-10-CM

## 2025-06-04 DIAGNOSIS — R00.0 SINUS TACHYCARDIA: ICD-10-CM

## 2025-06-04 DIAGNOSIS — E78.2 MIXED HYPERLIPIDEMIA: ICD-10-CM

## 2025-06-04 DIAGNOSIS — E78.5 DYSLIPIDEMIA: ICD-10-CM

## 2025-06-04 LAB
ALBUMIN SERPL-MCNC: 4.4 G/DL (ref 3.5–5.2)
ALBUMIN/GLOB SERPL: 1.8 {RATIO} (ref 1–2.5)
ALP SERPL-CCNC: 61 U/L (ref 40–129)
ALT SERPL-CCNC: 58 U/L (ref 10–50)
ANION GAP SERPL CALCULATED.3IONS-SCNC: 10 MMOL/L (ref 9–16)
AST SERPL-CCNC: 39 U/L (ref 10–50)
BASOPHILS # BLD: 0.04 K/UL (ref 0–0.2)
BASOPHILS NFR BLD: 1 % (ref 0–2)
BILIRUB SERPL-MCNC: 0.4 MG/DL (ref 0–1.2)
BUN SERPL-MCNC: 15 MG/DL (ref 6–20)
BUN/CREAT SERPL: 17 (ref 9–20)
CALCIUM SERPL-MCNC: 9.6 MG/DL (ref 8.6–10.4)
CHLORIDE SERPL-SCNC: 105 MMOL/L (ref 98–107)
CHOLEST SERPL-MCNC: 213 MG/DL (ref 0–199)
CHOLESTEROL/HDL RATIO: 3.8
CO2 SERPL-SCNC: 24 MMOL/L (ref 20–31)
CREAT SERPL-MCNC: 0.9 MG/DL (ref 0.7–1.2)
ECHO AO SINUS VALSALVA DIAM: 3 CM
ECHO AO SINUS VALSALVA INDEX: 1.57 CM/M2
ECHO AR MAX VEL PISA: 4.3 M/S
ECHO AV CUSP MM: 2.4 CM
ECHO AV MEAN GRADIENT: 4 MMHG
ECHO AV MEAN VELOCITY: 0.9 M/S
ECHO AV PEAK GRADIENT: 7 MMHG
ECHO AV PEAK VELOCITY: 1.3 M/S
ECHO AV REGURGITANT PHT: 328 MS
ECHO AV VELOCITY RATIO: 0.77
ECHO AV VTI: 22.9 CM
ECHO BSA: 1.95 M2
ECHO EST RA PRESSURE: 3 MMHG
ECHO LA AREA 2C: 25.4 CM2
ECHO LA AREA 4C: 22.5 CM2
ECHO LA MAJOR AXIS: 6 CM
ECHO LA MINOR AXIS: 6.6 CM
ECHO LA VOL BP: 77 ML (ref 18–58)
ECHO LA VOL MOD A2C: 81 ML (ref 18–58)
ECHO LA VOL MOD A4C: 68 ML (ref 18–58)
ECHO LA VOL/BSA BIPLANE: 40 ML/M2 (ref 16–34)
ECHO LA VOLUME INDEX MOD A2C: 42 ML/M2 (ref 16–34)
ECHO LA VOLUME INDEX MOD A4C: 36 ML/M2 (ref 16–34)
ECHO LV E' LATERAL VELOCITY: 10.6 CM/S
ECHO LV EDV A2C: 79 ML
ECHO LV EDV A4C: 80 ML
ECHO LV EDV INDEX A4C: 42 ML/M2
ECHO LV EDV NDEX A2C: 41 ML/M2
ECHO LV EF PHYSICIAN: 60 %
ECHO LV EJECTION FRACTION A2C: 53 %
ECHO LV EJECTION FRACTION A4C: 57 %
ECHO LV EJECTION FRACTION BIPLANE: 56 % (ref 55–100)
ECHO LV ESV A2C: 37 ML
ECHO LV ESV A4C: 34 ML
ECHO LV ESV INDEX A2C: 19 ML/M2
ECHO LV ESV INDEX A4C: 18 ML/M2
ECHO LV FRACTIONAL SHORTENING: 33 % (ref 28–44)
ECHO LV INTERNAL DIMENSION DIASTOLE INDEX: 2.51 CM/M2
ECHO LV INTERNAL DIMENSION DIASTOLIC: 4.8 CM (ref 4.2–5.9)
ECHO LV INTERNAL DIMENSION SYSTOLIC INDEX: 1.68 CM/M2
ECHO LV INTERNAL DIMENSION SYSTOLIC: 3.2 CM
ECHO LV IVSD: 1 CM (ref 0.6–1)
ECHO LV MASS 2D: 147.8 G (ref 88–224)
ECHO LV MASS INDEX 2D: 77.4 G/M2 (ref 49–115)
ECHO LV POSTERIOR WALL DIASTOLIC: 0.8 CM (ref 0.6–1)
ECHO LV RELATIVE WALL THICKNESS RATIO: 0.33
ECHO LVOT AV VTI INDEX: 0.83
ECHO LVOT MEAN GRADIENT: 2 MMHG
ECHO LVOT PEAK GRADIENT: 4 MMHG
ECHO LVOT PEAK VELOCITY: 1 M/S
ECHO LVOT VTI: 19 CM
ECHO MV A VELOCITY: 1.51 M/S
ECHO MV E DECELERATION TIME (DT): 169 MS
ECHO MV E VELOCITY: 1.1 M/S
ECHO MV E/A RATIO: 0.73
ECHO MV E/E' LATERAL: 10.38
ECHO MV LVOT VTI INDEX: 1.71
ECHO MV MAX VELOCITY: 1.8 M/S
ECHO MV MEAN GRADIENT: 7 MMHG
ECHO MV MEAN VELOCITY: 1.2 M/S
ECHO MV PEAK GRADIENT: 13 MMHG
ECHO MV VTI: 32.5 CM
ECHO PV MAX VELOCITY: 1 M/S
ECHO PV PEAK GRADIENT: 4 MMHG
ECHO RV TAPSE: 2 CM (ref 1.7–?)
EOSINOPHIL # BLD: 0.07 K/UL (ref 0–0.44)
EOSINOPHILS RELATIVE PERCENT: 1 % (ref 1–4)
ERYTHROCYTE [DISTWIDTH] IN BLOOD BY AUTOMATED COUNT: 12.5 % (ref 11.8–14.4)
GFR, ESTIMATED: >90 ML/MIN/1.73M2
GLUCOSE SERPL-MCNC: 91 MG/DL (ref 74–99)
HCT VFR BLD AUTO: 42.3 % (ref 40.7–50.3)
HDLC SERPL-MCNC: 56 MG/DL
HGB BLD-MCNC: 14.5 G/DL (ref 13–17)
IMM GRANULOCYTES # BLD AUTO: <0.03 K/UL (ref 0–0.3)
IMM GRANULOCYTES NFR BLD: 0 %
LDLC SERPL CALC-MCNC: 91 MG/DL (ref 0–100)
LYMPHOCYTES NFR BLD: 2.04 K/UL (ref 1.1–3.7)
LYMPHOCYTES RELATIVE PERCENT: 33 % (ref 24–43)
MCH RBC QN AUTO: 29.8 PG (ref 25.2–33.5)
MCHC RBC AUTO-ENTMCNC: 34.3 G/DL (ref 28.4–34.8)
MCV RBC AUTO: 87 FL (ref 82.6–102.9)
MONOCYTES NFR BLD: 0.76 K/UL (ref 0.1–1.2)
MONOCYTES NFR BLD: 12 % (ref 3–12)
NEUTROPHILS NFR BLD: 53 % (ref 36–65)
NEUTS SEG NFR BLD: 3.25 K/UL (ref 1.5–8.1)
NRBC BLD-RTO: 0 PER 100 WBC
PLATELET # BLD AUTO: 197 K/UL (ref 138–453)
PMV BLD AUTO: 9.5 FL (ref 8.1–13.5)
POTASSIUM SERPL-SCNC: 4 MMOL/L (ref 3.7–5.3)
PROT SERPL-MCNC: 6.9 G/DL (ref 6.6–8.7)
RBC # BLD AUTO: 4.86 M/UL (ref 4.21–5.77)
SODIUM SERPL-SCNC: 139 MMOL/L (ref 136–145)
TRIGL SERPL-MCNC: 331 MG/DL
VLDLC SERPL CALC-MCNC: 66 MG/DL (ref 1–30)
WBC OTHER # BLD: 6.2 K/UL (ref 3.5–11.3)

## 2025-06-04 PROCEDURE — 93306 TTE W/DOPPLER COMPLETE: CPT

## 2025-06-04 PROCEDURE — 82746 ASSAY OF FOLIC ACID SERUM: CPT

## 2025-06-04 PROCEDURE — 36415 COLL VENOUS BLD VENIPUNCTURE: CPT

## 2025-06-04 PROCEDURE — 82607 VITAMIN B-12: CPT

## 2025-06-04 PROCEDURE — 80053 COMPREHEN METABOLIC PANEL: CPT

## 2025-06-04 PROCEDURE — 93306 TTE W/DOPPLER COMPLETE: CPT | Performed by: INTERNAL MEDICINE

## 2025-06-04 PROCEDURE — 80061 LIPID PANEL: CPT

## 2025-06-04 PROCEDURE — 85025 COMPLETE CBC W/AUTO DIFF WBC: CPT

## 2025-06-05 ENCOUNTER — RESULTS FOLLOW-UP (OUTPATIENT)
Dept: PRIMARY CARE CLINIC | Age: 45
End: 2025-06-05

## 2025-06-05 LAB
FOLATE SERPL-MCNC: 14.6 NG/ML (ref 4.8–24.2)
VIT B12 SERPL-MCNC: 621 PG/ML (ref 232–1245)

## 2025-06-05 NOTE — TELEPHONE ENCOUNTER
Patient was notified and stated that he will continue taking the 200mg unless it bothers him more than he will start cutting in half. He is going to call his PCP and get an appointment there. You did have a cancellation on 8/5/25 so he is scheduled then.

## 2025-06-05 NOTE — TELEPHONE ENCOUNTER
----- Message from Dr. David Taylor MD sent at 6/4/2025  5:41 PM EDT -----  Please let Mr. Ni know that their recent test results are largely unremarkable and/or relatively normal for them. No further action is needed at this time. Please continue with your current care plan.

## 2025-06-06 ENCOUNTER — APPOINTMENT (OUTPATIENT)
Age: 45
End: 2025-06-06
Payer: COMMERCIAL

## 2025-06-27 ENCOUNTER — ANTI-COAG VISIT (OUTPATIENT)
Age: 45
End: 2025-06-27
Payer: COMMERCIAL

## 2025-06-27 VITALS
HEART RATE: 85 BPM | SYSTOLIC BLOOD PRESSURE: 135 MMHG | BODY MASS INDEX: 29.07 KG/M2 | WEIGHT: 180 LBS | DIASTOLIC BLOOD PRESSURE: 93 MMHG

## 2025-06-27 DIAGNOSIS — Z95.2 H/O MITRAL VALVE REPLACEMENT WITH MECHANICAL VALVE: Primary | ICD-10-CM

## 2025-06-27 LAB
INTERNATIONAL NORMALIZATION RATIO, POC: 2.4
PROTHROMBIN TIME, POC: 0

## 2025-06-27 PROCEDURE — 85610 PROTHROMBIN TIME: CPT | Performed by: COUNSELOR

## 2025-06-27 PROCEDURE — 99211 OFF/OP EST MAY X REQ PHY/QHP: CPT | Performed by: COUNSELOR

## 2025-06-27 NOTE — PROGRESS NOTES
[facility]  Medication Management  ANTICOAGULATION        Referring Provider: Dr. Taylor     GOAL INR: 2 - 3     TODAY'S INR: 2.4     WARFARIN Dosage: Continue warfarin 1.5 tablets for 7.5 mg MF and 5 mg tablet all other days.    INR (no units)   Date Value   2024 2.2   2024 3.4   2024 3.7   05/10/2024 3.0   2024 2.2   2024 1.3   2024 2.8     INR,(POC) (no units)   Date Value   2025 4.0   2025 2.1   2025 2.4   01/10/2025 1.9   2024 3.6   11/15/2024 2.9   2024 2.3       Hemoglobin   Date Value Ref Range Status   2025 14.5 13.0 - 17.0 g/dL Final     Hematocrit   Date Value Ref Range Status   2025 42.3 40.7 - 50.3 % Final     ALT   Date Value Ref Range Status   2025 58 (H) 10 - 50 U/L Final     AST   Date Value Ref Range Status   2025 39 10 - 50 U/L Final       Medication changes:  No changes    Notes:    Fingerstick INR drawn per clinic protocol. Patient states no visible blood in urine and no black tarry stool. Denies any missed doses of warfarin. No change in other maintenance medications or in diet. Will recheck INR in 6 weeks. Patient acknowledges working in consult agreement with pharmacist as referred by his/her physician.                  For Pharmacy Admin Tracking Only    Intervention Detail: Adherence Monitorin  Total # of Interventions Recommended: 1  Total # of Interventions Accepted: 1  Time Spent (min): 20      Judith Coe RPH

## 2025-06-30 ENCOUNTER — HOSPITAL ENCOUNTER (OUTPATIENT)
Dept: MRI IMAGING | Age: 45
Discharge: HOME | End: 2025-07-02
Payer: COMMERCIAL

## 2025-06-30 ENCOUNTER — HOSPITAL ENCOUNTER (OUTPATIENT)
Dept: GENERAL RADIOLOGY | Age: 45
Discharge: HOME | End: 2025-07-02
Payer: COMMERCIAL

## 2025-06-30 ENCOUNTER — OFFICE VISIT (OUTPATIENT)
Dept: PRIMARY CARE CLINIC | Age: 45
End: 2025-06-30
Payer: COMMERCIAL

## 2025-06-30 VITALS
HEART RATE: 92 BPM | WEIGHT: 178.8 LBS | OXYGEN SATURATION: 97 % | SYSTOLIC BLOOD PRESSURE: 120 MMHG | BODY MASS INDEX: 28.87 KG/M2 | DIASTOLIC BLOOD PRESSURE: 78 MMHG

## 2025-06-30 DIAGNOSIS — Z01.89 ENCOUNTER FOR IMAGING TO SCREEN FOR METAL PRIOR TO MRI: ICD-10-CM

## 2025-06-30 DIAGNOSIS — I69.30 CEREBRAL MULTI-INFARCT STATE: ICD-10-CM

## 2025-06-30 DIAGNOSIS — I69.30 CEREBRAL MULTI-INFARCT STATE: Primary | ICD-10-CM

## 2025-06-30 DIAGNOSIS — H53.9 VISION CHANGES: ICD-10-CM

## 2025-06-30 PROCEDURE — G2211 COMPLEX E/M VISIT ADD ON: HCPCS | Performed by: NURSE PRACTITIONER

## 2025-06-30 PROCEDURE — 3074F SYST BP LT 130 MM HG: CPT | Performed by: NURSE PRACTITIONER

## 2025-06-30 PROCEDURE — 3078F DIAST BP <80 MM HG: CPT | Performed by: NURSE PRACTITIONER

## 2025-06-30 PROCEDURE — 99214 OFFICE O/P EST MOD 30 MIN: CPT | Performed by: NURSE PRACTITIONER

## 2025-06-30 PROCEDURE — 71046 X-RAY EXAM CHEST 2 VIEWS: CPT

## 2025-06-30 ASSESSMENT — ENCOUNTER SYMPTOMS
COUGH: 0
NAUSEA: 1
DIARRHEA: 0
VOMITING: 0
CHEST TIGHTNESS: 0
WHEEZING: 0
CONSTIPATION: 0
SHORTNESS OF BREATH: 0

## 2025-06-30 ASSESSMENT — VISUAL ACUITY: OU: 1

## 2025-06-30 NOTE — PROGRESS NOTES
5 MG tablet Take 1 1/2 tablets by mouth daily or as instructed by [facility] Yes David Taylor MD   magnesium gluconate (MAGONATE) 500 MG tablet Take 1 tablet by mouth daily Yes ProviderMarquita MD   Ascorbic Acid (VITAMIN C) 250 MG tablet Take 2 tablets by mouth daily Emergen C 750 mg QD Yes ProviderMarquita MD      Social History     Tobacco Use    Smoking status: Former     Current packs/day: 0.00     Average packs/day: 1 pack/day for 10.0 years (10.0 ttl pk-yrs)     Types: Cigarettes     Start date: 2004     Quit date:      Years since quittin.5    Smokeless tobacco: Former     Types: Chew, Snuff     Quit date: 9/15/2017    Tobacco comments:     Chewed tobacco as well   Substance Use Topics    Alcohol use: Yes     Alcohol/week: 20.0 standard drinks of alcohol     Types: 20 Cans of beer per week     Comment: 20/ week, average every other day      Vitals:    25 1439   BP: 120/78   Pulse: 92   SpO2: 97%   Weight: 81.1 kg (178 lb 12.8 oz)     Estimated body mass index is 28.87 kg/m² as calculated from the following:    Height as of 25: 1.676 m (5' 5.98\").    Weight as of this encounter: 81.1 kg (178 lb 12.8 oz).    Review of Systems   Constitutional:  Negative for chills, diaphoresis, fatigue and fever.   Respiratory:  Negative for cough, chest tightness, shortness of breath and wheezing.    Cardiovascular:  Negative for chest pain and palpitations.   Gastrointestinal:  Positive for nausea. Negative for constipation, diarrhea and vomiting.   Musculoskeletal:  Negative for arthralgias and myalgias.   Skin:  Negative for rash.   Neurological:  Positive for light-headedness. Negative for dizziness, syncope, facial asymmetry, weakness and headaches.   Psychiatric/Behavioral:  Negative for sleep disturbance.      Physical Exam  Constitutional:       General: He is not in acute distress.     Appearance: He is not ill-appearing, toxic-appearing or diaphoretic.   Eyes:

## 2025-07-01 ENCOUNTER — HOSPITAL ENCOUNTER (OUTPATIENT)
Dept: CT IMAGING | Age: 45
Discharge: HOME | End: 2025-07-03
Payer: COMMERCIAL

## 2025-07-01 ENCOUNTER — HOSPITAL ENCOUNTER (OUTPATIENT)
Age: 45
Discharge: HOME | End: 2025-07-01
Payer: COMMERCIAL

## 2025-07-01 DIAGNOSIS — H53.9 VISION CHANGES: ICD-10-CM

## 2025-07-01 DIAGNOSIS — I69.30 CEREBRAL MULTI-INFARCT STATE: ICD-10-CM

## 2025-07-01 LAB
CREAT SERPL-MCNC: 1 MG/DL (ref 0.7–1.2)
GFR, ESTIMATED: >90 ML/MIN/1.73M2

## 2025-07-01 PROCEDURE — 82565 ASSAY OF CREATININE: CPT

## 2025-07-01 PROCEDURE — 36415 COLL VENOUS BLD VENIPUNCTURE: CPT

## 2025-07-01 PROCEDURE — 6360000004 HC RX CONTRAST MEDICATION: Performed by: NURSE PRACTITIONER

## 2025-07-01 PROCEDURE — 70496 CT ANGIOGRAPHY HEAD: CPT

## 2025-07-01 RX ORDER — IOPAMIDOL 755 MG/ML
75 INJECTION, SOLUTION INTRAVASCULAR
Status: COMPLETED | OUTPATIENT
Start: 2025-07-01 | End: 2025-07-01

## 2025-07-01 RX ADMIN — IOPAMIDOL 75 ML: 755 INJECTION, SOLUTION INTRAVENOUS at 16:21

## 2025-07-02 ENCOUNTER — RESULTS FOLLOW-UP (OUTPATIENT)
Dept: PRIMARY CARE CLINIC | Age: 45
End: 2025-07-02

## 2025-07-02 DIAGNOSIS — I69.30 CEREBRAL MULTI-INFARCT STATE: Primary | ICD-10-CM

## 2025-07-21 ENCOUNTER — HOSPITAL ENCOUNTER (OUTPATIENT)
Dept: VASCULAR LAB | Age: 45
Discharge: HOME | End: 2025-07-23
Payer: COMMERCIAL

## 2025-07-21 DIAGNOSIS — I69.30 CEREBRAL MULTI-INFARCT STATE: ICD-10-CM

## 2025-07-21 PROCEDURE — 93880 EXTRACRANIAL BILAT STUDY: CPT

## 2025-07-22 LAB
VAS LEFT CCA DIST EDV: 18.1 CM/S
VAS LEFT CCA DIST PSV: 75.5 CM/S
VAS LEFT CCA MID EDV: 14.21 CM/S
VAS LEFT CCA MID PSV: 87.24 CM/S
VAS LEFT CCA PROX EDV: 19.4 CM/S
VAS LEFT CCA PROX PSV: 92.5 CM/S
VAS LEFT ECA EDV: 6.02 CM/S
VAS LEFT ECA PSV: 47 CM/S
VAS LEFT ICA DIST EDV: 27.4 CM/S
VAS LEFT ICA DIST PSV: 70.2 CM/S
VAS LEFT ICA MID EDV: 21.7 CM/S
VAS LEFT ICA MID PSV: 56.6 CM/S
VAS LEFT ICA PROX EDV: 12.7 CM/S
VAS LEFT ICA PROX PSV: 44.1 CM/S
VAS LEFT ICA/CCA PSV: 0.93 NO UNITS
VAS LEFT VERTEBRAL EDV: 17.01 CM/S
VAS LEFT VERTEBRAL PSV: 49.8 CM/S
VAS RIGHT CCA DIST EDV: 19.7 CM/S
VAS RIGHT CCA DIST PSV: 71.3 CM/S
VAS RIGHT CCA MID EDV: 18.13 CM/S
VAS RIGHT CCA MID PSV: 75.52 CM/S
VAS RIGHT CCA PROX EDV: 16.8 CM/S
VAS RIGHT CCA PROX PSV: 100.3 CM/S
VAS RIGHT ECA EDV: 7.58 CM/S
VAS RIGHT ECA PSV: 73.5 CM/S
VAS RIGHT ICA DIST EDV: 30.7 CM/S
VAS RIGHT ICA DIST PSV: 65.8 CM/S
VAS RIGHT ICA MID EDV: 25.2 CM/S
VAS RIGHT ICA MID PSV: 77.9 CM/S
VAS RIGHT ICA PROX EDV: 12 CM/S
VAS RIGHT ICA PROX PSV: 63.6 CM/S
VAS RIGHT ICA/CCA PSV: 1.1 NO UNITS
VAS RIGHT VERTEBRAL EDV: 15.28 CM/S
VAS RIGHT VERTEBRAL PSV: 42.8 CM/S

## 2025-07-22 PROCEDURE — 93880 EXTRACRANIAL BILAT STUDY: CPT | Performed by: SURGERY

## 2025-07-30 DIAGNOSIS — Z95.2 H/O MITRAL VALVE REPLACEMENT WITH MECHANICAL VALVE: Primary | ICD-10-CM

## 2025-08-05 ENCOUNTER — APPOINTMENT (OUTPATIENT)
Age: 45
End: 2025-08-05
Payer: COMMERCIAL

## 2025-08-18 ENCOUNTER — HOSPITAL ENCOUNTER (OUTPATIENT)
Age: 45
Discharge: HOME | End: 2025-08-20
Payer: COMMERCIAL

## 2025-08-18 ENCOUNTER — ANTI-COAG VISIT (OUTPATIENT)
Age: 45
End: 2025-08-18
Payer: COMMERCIAL

## 2025-08-18 ENCOUNTER — OFFICE VISIT (OUTPATIENT)
Dept: CARDIOLOGY | Age: 45
End: 2025-08-18
Payer: COMMERCIAL

## 2025-08-18 VITALS
HEART RATE: 83 BPM | OXYGEN SATURATION: 93 % | SYSTOLIC BLOOD PRESSURE: 124 MMHG | DIASTOLIC BLOOD PRESSURE: 80 MMHG | RESPIRATION RATE: 18 BRPM | HEIGHT: 66 IN | WEIGHT: 176.4 LBS | BODY MASS INDEX: 28.35 KG/M2

## 2025-08-18 VITALS
HEART RATE: 86 BPM | WEIGHT: 176 LBS | DIASTOLIC BLOOD PRESSURE: 88 MMHG | BODY MASS INDEX: 28.42 KG/M2 | SYSTOLIC BLOOD PRESSURE: 133 MMHG

## 2025-08-18 DIAGNOSIS — R00.0 SINUS TACHYCARDIA: ICD-10-CM

## 2025-08-18 DIAGNOSIS — E78.2 MIXED HYPERLIPIDEMIA: ICD-10-CM

## 2025-08-18 DIAGNOSIS — Z95.2 H/O MITRAL VALVE REPLACEMENT WITH MECHANICAL VALVE: Primary | ICD-10-CM

## 2025-08-18 DIAGNOSIS — I10 PRIMARY HYPERTENSION: ICD-10-CM

## 2025-08-18 DIAGNOSIS — R00.2 PALPITATIONS: ICD-10-CM

## 2025-08-18 DIAGNOSIS — Z95.2 H/O MITRAL VALVE REPLACEMENT WITH MECHANICAL VALVE: ICD-10-CM

## 2025-08-18 DIAGNOSIS — I34.0 SEVERE MITRAL REGURGITATION: Primary | ICD-10-CM

## 2025-08-18 LAB
ECHO BSA: 1.93 M2
INTERNATIONAL NORMALIZATION RATIO, POC: 1.5
PROTHROMBIN TIME, POC: NORMAL

## 2025-08-18 PROCEDURE — 99214 OFFICE O/P EST MOD 30 MIN: CPT | Performed by: NURSE PRACTITIONER

## 2025-08-18 PROCEDURE — 3074F SYST BP LT 130 MM HG: CPT | Performed by: NURSE PRACTITIONER

## 2025-08-18 PROCEDURE — 85610 PROTHROMBIN TIME: CPT | Performed by: COUNSELOR

## 2025-08-18 PROCEDURE — 99212 OFFICE O/P EST SF 10 MIN: CPT | Performed by: COUNSELOR

## 2025-08-18 PROCEDURE — 93243 EXT ECG>48HR<7D SCAN A/R: CPT

## 2025-08-18 PROCEDURE — 3079F DIAST BP 80-89 MM HG: CPT | Performed by: NURSE PRACTITIONER

## 2025-08-18 RX ORDER — METOPROLOL SUCCINATE 100 MG/1
TABLET, EXTENDED RELEASE ORAL
Qty: 135 TABLET | Refills: 3 | Status: SHIPPED | OUTPATIENT
Start: 2025-08-18

## 2025-08-25 ENCOUNTER — ANTI-COAG VISIT (OUTPATIENT)
Age: 45
End: 2025-08-25

## 2025-08-25 DIAGNOSIS — Z95.2 H/O MITRAL VALVE REPLACEMENT WITH MECHANICAL VALVE: Primary | ICD-10-CM

## 2025-08-25 RX ORDER — WARFARIN SODIUM 5 MG/1
TABLET ORAL
Qty: 105 TABLET | Refills: 3 | Status: SHIPPED | OUTPATIENT
Start: 2025-08-25

## 2025-09-04 LAB — ECHO BSA: 1.93 M2

## (undated) DEVICE — GLOVE SURG SZ 65 L12IN FNGR THK79MIL GRN LTX FREE

## (undated) DEVICE — GLOVE SURG SZ 8 CRM LTX FREE POLYISOPRENE POLYMER BEAD ANTI

## (undated) DEVICE — SUTURE ETHIB EXCL BR GRN TAPR PT 2-0 30 X563H X563H

## (undated) DEVICE — SUTURE PERMA-HAND SZ 0 L18IN NONABSORBABLE BLK CT-2 L26MM C027D

## (undated) DEVICE — CONNECTOR HAD 1/2X1/2IN EQL STR

## (undated) DEVICE — GLOVE SURG SZ 7.5 L11.73IN FNGR THK9.8MIL STRW LTX POLYMER

## (undated) DEVICE — TOWEL,OR,DSP,ST,BLUE,DLX,XR,4/PK,20PK/CS: Brand: MEDLINE

## (undated) DEVICE — Device

## (undated) DEVICE — INTENDED FOR TISSUE SEPARATION, AND OTHER PROCEDURES THAT REQUIRE A SHARP SURGICAL BLADE TO PUNCTURE OR CUT.: Brand: BARD-PARKER ® CARBON RIB-BACK BLADES

## (undated) DEVICE — CONTAINER,SPECIMEN,4OZ,OR STRL: Brand: MEDLINE

## (undated) DEVICE — SUTURE PROL SZ 4-0 L36IN NONABSORBABLE BLU L26MM SH 1/2 CIR 8521H

## (undated) DEVICE — AGENT HEMSTAT W2XL4IN OXIDIZED REGENERATED CELOS ABSRB SFT

## (undated) DEVICE — COVER,LIGHT HANDLE,FLX,2/PK: Brand: MEDLINE INDUSTRIES, INC.

## (undated) DEVICE — TUBING, SUCTION, 9/32" X 20', STRAIGHT: Brand: MEDLINE INDUSTRIES, INC.

## (undated) DEVICE — SUMP CANN PED 12FR 0.25IN CONN INTCARD MULTIPORT VENT TIP

## (undated) DEVICE — GLOVE SURG SZ 75 CRM LTX FREE POLYISOPRENE POLYMER BEAD ANTI

## (undated) DEVICE — CANNULA PERF 20FR L3/8IN ELONG TAPR DIFFUSE TIP WIREWOUND

## (undated) DEVICE — BLADE ES L4IN INSUL EDGE

## (undated) DEVICE — Z DISCONTINUED NO SUB IDED DRAIN SURG 2 COLL PT TB FOR ATS BG OASIS

## (undated) DEVICE — PLATELET CONCENTRATION PACK PROC 14-20 ML SMARTPREP 2

## (undated) DEVICE — SS SUTURE, 3 PER SLEEVE: Brand: MYO/WIRE II

## (undated) DEVICE — COR-KNOT® QUICK LOAD® SINGLES: Brand: COR-KNOT® QUICK LOAD®

## (undated) DEVICE — RETRACTOR SURG INSRT SUT HLD OCTOBASE

## (undated) DEVICE — SUTURE VCRL + SZ 4-0 L18IN ABSRB UD L19MM PS-2 3/8 CIR PRIM VCP496H

## (undated) DEVICE — 3M™ IOBAN™ 2 ANTIMICROBIAL INCISE DRAPE 6650EZ: Brand: IOBAN™ 2

## (undated) DEVICE — TIP APPL GEL PLT ENDO 5MMX32CM

## (undated) DEVICE — COUNTER NDL 40 COUNT HLD 70 FOAM BLK ADH W/ MAG

## (undated) DEVICE — PACK PROCEDURE SURG OPN HRT

## (undated) DEVICE — GLOVE SURG SZ 7 CRM LTX FREE POLYISOPRENE POLYMER BEAD ANTI

## (undated) DEVICE — Z INACTIVE OBSOLETE PER MEDTRONIC CUSTOM PK HY8C67R11 VAVD PK

## (undated) DEVICE — CANNULA PERF AD PED 24FR L12-15IN 1/4-3/8IN CONN SITE VEN

## (undated) DEVICE — CONNECTOR TBNG WHT PLAS SUCT STR 5IN1 LTWT W/ M CONN

## (undated) DEVICE — CLIP INT M L GRN TI TRNSVRS GRV CHEVRON SHP W/ PRECIS TIP

## (undated) DEVICE — DRAPE SLUSH DISC W44XL66IN ST FOR RND BSIN HUSH SLUSH SYS

## (undated) DEVICE — TAPE MED W1/8XL30IN WHT POLY

## (undated) DEVICE — CANNULA PERF 18FR L12IN 1 PC ELONG HI FLO BVL SUT CLLR VENT

## (undated) DEVICE — CONNECTOR PERF 1/4X1/4 STR

## (undated) DEVICE — CANNULA TIP SPRY MAGELLAN

## (undated) DEVICE — GOWN,AURORA,NONREINFORCED,LARGE: Brand: MEDLINE

## (undated) DEVICE — WAX SURG 2.5GM HEMSTAT BNE BEESWAX PARAFFIN ISO PALMITATE

## (undated) DEVICE — GOWN,SIRUS,NONRNF,SETINSLV,XL,20/CS: Brand: MEDLINE

## (undated) DEVICE — FOGARTY - HYDRAGRIP SURGICAL - CLAMP INSERTS: Brand: FOGARTY HYDRAJAW

## (undated) DEVICE — CLIP INT SM WIDE RED TI TRNSVRS GRV CHEVRON SHP W/ PRECIS

## (undated) DEVICE — COR-KNOT MINI® COMBO KITBASE PACKAGE TYPE - KITEACH STERILE PACKAGE KIT CONTAINS (2) SINGLE PATIENT USE COR-KNOT MINI® DEVICES AND (12) COR-KNOT® QUICK LOADS®.: Brand: COR-KNOT MINI®

## (undated) DEVICE — APPLICATOR MEDICATED 26 CC SOLUTION HI LT ORNG CHLORAPREP

## (undated) DEVICE — HEMOCONCENTERATOR PERF W TBNG AND ADPT SET 400 MEDIVATORS

## (undated) DEVICE — CLIP LIG M BLU TI HRT SHP WIRE HORZ 180 PER BX

## (undated) DEVICE — SUTURE PDS II SZ 0 L27IN ABSRB VLT L36MM CT-1 1/2 CIR Z340H

## (undated) DEVICE — PLEDGET SURG W3.5XL7MM THK1.5MM WHT PTFE RECT FIRM TFE

## (undated) DEVICE — PACK CARDPULM BYPS SYS VEN Y CUST

## (undated) DEVICE — ADHESIVE SKIN CLOSURE TOP 36 CC HI VISC DERMBND MINI

## (undated) DEVICE — ADAPTER PERF L7.5IN INLET LEG 3IN Y TYP VENT CLR CODE CLMP

## (undated) DEVICE — CANNULA PERFUSION 5.5IN 9FR AORTIC ROOT

## (undated) DEVICE — SYRINGE IRRIG 60ML SFT PLIABLE BLB EZ TO GRP 1 HND USE W/

## (undated) DEVICE — GOWN,SIRUS,POLYRNF,BRTHSLV,2XL,18/CS: Brand: MEDLINE

## (undated) DEVICE — GOWN,SIRUS,POLYRNF,XLN/3XL,18/CS: Brand: MEDLINE

## (undated) DEVICE — SENSOR O2 AD 40 KG SATURATION INVOS LF DISP

## (undated) DEVICE — SUMP INTCARD W/ 1/4INCH CONN 20FRENCH 15INCH DLP

## (undated) DEVICE — BLADE SAW W6.35XL32MM STRNM CUT STRNOTMY

## (undated) DEVICE — CATHETER,URETHRAL,REDRUBBER,STRL,18FR: Brand: MEDLINE

## (undated) DEVICE — GEL US 20GM NONIRRITATING OVERWRAPPED FILE PCH TRNSMIT

## (undated) DEVICE — TTL1LYR 16FR10ML 100%SIL TMPST TR: Brand: MEDLINE

## (undated) DEVICE — MEDI-VAC NON-CONDUCTIVE SUCTION TUBING 7MM X 6.1M (20 FT.) L: Brand: CARDINAL HEALTH

## (undated) DEVICE — PROTECTOR ULN NRV PUR FOAM HK LOOP STRP ANATOMICALLY

## (undated) DEVICE — POSITIONER,HEAD,MULTIRING,36CS: Brand: MEDLINE

## (undated) DEVICE — SUTURE VCRL + SZ 3-0 L27IN ABSRB WHT CT-1 1/2 CIR VCP258H

## (undated) DEVICE — DRAIN,WOUND,ROUND,24FR,5/16",FULL-FLUTED: Brand: MEDLINE

## (undated) DEVICE — GLOVE SURG SZ 65 CRM LTX FREE POLYISOPRENE POLYMER BEAD ANTI

## (undated) DEVICE — APPLICATOR MEDICATED 10.5 CC SOLUTION HI LT ORNG CHLORAPREP

## (undated) DEVICE — SUTURE PROL SZ 6-0 L18IN NONABSORBABLE BLU RB-2 L13MM 1/2 8714H

## (undated) DEVICE — Z DISCONTINUED BY MEDLINE USE 2711682 TRAY SKIN PREP DRY W/ PREM GLV

## (undated) DEVICE — SUTURE NONABSORBABLE BRAIDED 2-0 SH-2 1X30 IN ETHBND EXCEL PXX80

## (undated) DEVICE — KIT APPL 11:1 PROC W/ FIBRIJET MED CUP APPL TIP TY

## (undated) DEVICE — LEAD PACE L475MM CHNL A OR V MYOCARDIAL STEROID ELUT SIL